# Patient Record
Sex: MALE | Race: WHITE | NOT HISPANIC OR LATINO | URBAN - METROPOLITAN AREA
[De-identification: names, ages, dates, MRNs, and addresses within clinical notes are randomized per-mention and may not be internally consistent; named-entity substitution may affect disease eponyms.]

---

## 2021-02-18 ENCOUNTER — EMERGENCY (EMERGENCY)
Facility: HOSPITAL | Age: 61
LOS: 0 days | Discharge: HOME | End: 2021-02-18
Attending: EMERGENCY MEDICINE
Payer: COMMERCIAL

## 2021-02-18 VITALS
OXYGEN SATURATION: 100 % | DIASTOLIC BLOOD PRESSURE: 84 MMHG | RESPIRATION RATE: 17 BRPM | TEMPERATURE: 99 F | SYSTOLIC BLOOD PRESSURE: 138 MMHG | HEART RATE: 85 BPM

## 2021-02-18 VITALS — HEIGHT: 67 IN | WEIGHT: 134.92 LBS

## 2021-02-18 DIAGNOSIS — R10.9 UNSPECIFIED ABDOMINAL PAIN: ICD-10-CM

## 2021-02-18 DIAGNOSIS — Z85.038 PERSONAL HISTORY OF OTHER MALIGNANT NEOPLASM OF LARGE INTESTINE: ICD-10-CM

## 2021-02-18 DIAGNOSIS — N12 TUBULO-INTERSTITIAL NEPHRITIS, NOT SPECIFIED AS ACUTE OR CHRONIC: ICD-10-CM

## 2021-02-18 DIAGNOSIS — Z87.442 PERSONAL HISTORY OF URINARY CALCULI: ICD-10-CM

## 2021-02-18 LAB
ALBUMIN SERPL ELPH-MCNC: 3.6 G/DL — SIGNIFICANT CHANGE UP (ref 3.3–5)
ALP SERPL-CCNC: 150 U/L — HIGH (ref 40–120)
ALT FLD-CCNC: 27 U/L — SIGNIFICANT CHANGE UP (ref 12–78)
ANION GAP SERPL CALC-SCNC: 5 MMOL/L — SIGNIFICANT CHANGE UP (ref 5–17)
APPEARANCE UR: CLEAR — SIGNIFICANT CHANGE UP
AST SERPL-CCNC: 19 U/L — SIGNIFICANT CHANGE UP (ref 15–37)
BACTERIA # UR AUTO: ABNORMAL
BASOPHILS # BLD AUTO: 0 K/UL — SIGNIFICANT CHANGE UP (ref 0–0.2)
BASOPHILS NFR BLD AUTO: 0 % — SIGNIFICANT CHANGE UP (ref 0–2)
BILIRUB SERPL-MCNC: 0.4 MG/DL — SIGNIFICANT CHANGE UP (ref 0.2–1.2)
BILIRUB UR-MCNC: NEGATIVE — SIGNIFICANT CHANGE UP
BUN SERPL-MCNC: 13 MG/DL — SIGNIFICANT CHANGE UP (ref 7–23)
CALCIUM SERPL-MCNC: 9.5 MG/DL — SIGNIFICANT CHANGE UP (ref 8.5–10.1)
CHLORIDE SERPL-SCNC: 108 MMOL/L — SIGNIFICANT CHANGE UP (ref 96–108)
CO2 SERPL-SCNC: 27 MMOL/L — SIGNIFICANT CHANGE UP (ref 22–31)
COLOR SPEC: YELLOW — SIGNIFICANT CHANGE UP
CREAT SERPL-MCNC: 0.92 MG/DL — SIGNIFICANT CHANGE UP (ref 0.5–1.3)
DIFF PNL FLD: ABNORMAL
EOSINOPHIL # BLD AUTO: 0.2 K/UL — SIGNIFICANT CHANGE UP (ref 0–0.5)
EOSINOPHIL NFR BLD AUTO: 1 % — SIGNIFICANT CHANGE UP (ref 0–6)
EPI CELLS # UR: SIGNIFICANT CHANGE UP
GLUCOSE SERPL-MCNC: 111 MG/DL — HIGH (ref 70–99)
GLUCOSE UR QL: NEGATIVE MG/DL — SIGNIFICANT CHANGE UP
HCT VFR BLD CALC: 35.2 % — LOW (ref 39–50)
HGB BLD-MCNC: 10.6 G/DL — LOW (ref 13–17)
KETONES UR-MCNC: ABNORMAL
LEUKOCYTE ESTERASE UR-ACNC: ABNORMAL
LIDOCAIN IGE QN: 52 U/L — LOW (ref 73–393)
LYMPHOCYTES # BLD AUTO: 23 % — SIGNIFICANT CHANGE UP (ref 13–44)
LYMPHOCYTES # BLD AUTO: 4.64 K/UL — HIGH (ref 1–3.3)
MANUAL SMEAR VERIFICATION: SIGNIFICANT CHANGE UP
MCHC RBC-ENTMCNC: 24.6 PG — LOW (ref 27–34)
MCHC RBC-ENTMCNC: 30.1 GM/DL — LOW (ref 32–36)
MCV RBC AUTO: 81.7 FL — SIGNIFICANT CHANGE UP (ref 80–100)
MONOCYTES # BLD AUTO: 1.82 K/UL — HIGH (ref 0–0.9)
MONOCYTES NFR BLD AUTO: 9 % — SIGNIFICANT CHANGE UP (ref 2–14)
NEUTROPHILS # BLD AUTO: 13.52 K/UL — HIGH (ref 1.8–7.4)
NEUTROPHILS NFR BLD AUTO: 67 % — SIGNIFICANT CHANGE UP (ref 43–77)
NITRITE UR-MCNC: NEGATIVE — SIGNIFICANT CHANGE UP
NRBC # BLD: 0 /100 — SIGNIFICANT CHANGE UP (ref 0–0)
NRBC # BLD: SIGNIFICANT CHANGE UP /100 WBCS (ref 0–0)
OVALOCYTES BLD QL SMEAR: SLIGHT — SIGNIFICANT CHANGE UP
PH UR: 5 — SIGNIFICANT CHANGE UP (ref 5–8)
PLAT MORPH BLD: NORMAL — SIGNIFICANT CHANGE UP
PLATELET # BLD AUTO: 667 K/UL — HIGH (ref 150–400)
POIKILOCYTOSIS BLD QL AUTO: SLIGHT — SIGNIFICANT CHANGE UP
POTASSIUM SERPL-MCNC: 4.1 MMOL/L — SIGNIFICANT CHANGE UP (ref 3.5–5.3)
POTASSIUM SERPL-SCNC: 4.1 MMOL/L — SIGNIFICANT CHANGE UP (ref 3.5–5.3)
PROT SERPL-MCNC: 7.4 GM/DL — SIGNIFICANT CHANGE UP (ref 6–8.3)
PROT UR-MCNC: 15 MG/DL
RBC # BLD: 4.31 M/UL — SIGNIFICANT CHANGE UP (ref 4.2–5.8)
RBC # FLD: 16.6 % — HIGH (ref 10.3–14.5)
RBC BLD AUTO: ABNORMAL
RBC CASTS # UR COMP ASSIST: ABNORMAL /HPF (ref 0–4)
SARS-COV-2 RNA SPEC QL NAA+PROBE: SIGNIFICANT CHANGE UP
SCHISTOCYTES BLD QL AUTO: SLIGHT — SIGNIFICANT CHANGE UP
SODIUM SERPL-SCNC: 140 MMOL/L — SIGNIFICANT CHANGE UP (ref 135–145)
SP GR SPEC: 1.02 — SIGNIFICANT CHANGE UP (ref 1.01–1.02)
TROPONIN I SERPL-MCNC: <0.015 NG/ML — SIGNIFICANT CHANGE UP (ref 0.01–0.04)
UROBILINOGEN FLD QL: NEGATIVE MG/DL — SIGNIFICANT CHANGE UP
WBC # BLD: 20.18 K/UL — HIGH (ref 3.8–10.5)
WBC # FLD AUTO: 20.18 K/UL — HIGH (ref 3.8–10.5)
WBC UR QL: SIGNIFICANT CHANGE UP

## 2021-02-18 PROCEDURE — 84484 ASSAY OF TROPONIN QUANT: CPT

## 2021-02-18 PROCEDURE — 99284 EMERGENCY DEPT VISIT MOD MDM: CPT | Mod: 25

## 2021-02-18 PROCEDURE — 96374 THER/PROPH/DIAG INJ IV PUSH: CPT | Mod: XU

## 2021-02-18 PROCEDURE — 81001 URINALYSIS AUTO W/SCOPE: CPT

## 2021-02-18 PROCEDURE — 74177 CT ABD & PELVIS W/CONTRAST: CPT

## 2021-02-18 PROCEDURE — 87186 SC STD MICRODIL/AGAR DIL: CPT

## 2021-02-18 PROCEDURE — 99284 EMERGENCY DEPT VISIT MOD MDM: CPT

## 2021-02-18 PROCEDURE — 80053 COMPREHEN METABOLIC PANEL: CPT

## 2021-02-18 PROCEDURE — 71045 X-RAY EXAM CHEST 1 VIEW: CPT | Mod: 26

## 2021-02-18 PROCEDURE — 93005 ELECTROCARDIOGRAM TRACING: CPT

## 2021-02-18 PROCEDURE — 96375 TX/PRO/DX INJ NEW DRUG ADDON: CPT

## 2021-02-18 PROCEDURE — 83690 ASSAY OF LIPASE: CPT

## 2021-02-18 PROCEDURE — 93010 ELECTROCARDIOGRAM REPORT: CPT

## 2021-02-18 PROCEDURE — 74177 CT ABD & PELVIS W/CONTRAST: CPT | Mod: 26

## 2021-02-18 PROCEDURE — 85025 COMPLETE CBC W/AUTO DIFF WBC: CPT

## 2021-02-18 PROCEDURE — U0005: CPT

## 2021-02-18 PROCEDURE — 36415 COLL VENOUS BLD VENIPUNCTURE: CPT

## 2021-02-18 PROCEDURE — U0003: CPT

## 2021-02-18 PROCEDURE — 71045 X-RAY EXAM CHEST 1 VIEW: CPT

## 2021-02-18 PROCEDURE — 87077 CULTURE AEROBIC IDENTIFY: CPT

## 2021-02-18 PROCEDURE — 87086 URINE CULTURE/COLONY COUNT: CPT

## 2021-02-18 RX ORDER — ONDANSETRON 8 MG/1
4 TABLET, FILM COATED ORAL ONCE
Refills: 0 | Status: COMPLETED | OUTPATIENT
Start: 2021-02-18 | End: 2021-02-18

## 2021-02-18 RX ORDER — CEFTRIAXONE 500 MG/1
1000 INJECTION, POWDER, FOR SOLUTION INTRAMUSCULAR; INTRAVENOUS ONCE
Refills: 0 | Status: DISCONTINUED | OUTPATIENT
Start: 2021-02-18 | End: 2021-02-18

## 2021-02-18 RX ORDER — FAMOTIDINE 10 MG/ML
20 INJECTION INTRAVENOUS ONCE
Refills: 0 | Status: COMPLETED | OUTPATIENT
Start: 2021-02-18 | End: 2021-02-18

## 2021-02-18 RX ORDER — CEPHALEXIN 500 MG
1 CAPSULE ORAL
Qty: 20 | Refills: 0
Start: 2021-02-18 | End: 2021-02-27

## 2021-02-18 RX ORDER — CEFTRIAXONE 500 MG/1
1000 INJECTION, POWDER, FOR SOLUTION INTRAMUSCULAR; INTRAVENOUS ONCE
Refills: 0 | Status: COMPLETED | OUTPATIENT
Start: 2021-02-18 | End: 2021-02-18

## 2021-02-18 RX ORDER — SODIUM CHLORIDE 9 MG/ML
1000 INJECTION INTRAMUSCULAR; INTRAVENOUS; SUBCUTANEOUS ONCE
Refills: 0 | Status: COMPLETED | OUTPATIENT
Start: 2021-02-18 | End: 2021-02-18

## 2021-02-18 RX ADMIN — CEFTRIAXONE 1000 MILLIGRAM(S): 500 INJECTION, POWDER, FOR SOLUTION INTRAMUSCULAR; INTRAVENOUS at 21:37

## 2021-02-18 RX ADMIN — Medication 30 MILLILITER(S): at 18:27

## 2021-02-18 RX ADMIN — ONDANSETRON 4 MILLIGRAM(S): 8 TABLET, FILM COATED ORAL at 18:27

## 2021-02-18 RX ADMIN — FAMOTIDINE 20 MILLIGRAM(S): 10 INJECTION INTRAVENOUS at 18:27

## 2021-02-18 RX ADMIN — SODIUM CHLORIDE 1000 MILLILITER(S): 9 INJECTION INTRAMUSCULAR; INTRAVENOUS; SUBCUTANEOUS at 18:25

## 2021-02-18 NOTE — ED STATDOCS - NSFOLLOWUPINSTRUCTIONS_ED_ALL_ED_FT
Acute Abdominal Pain    WHAT YOU NEED TO KNOW:    The cause of your abdominal pain may not be found. If a cause is found, treatment will depend on what the cause is.     DISCHARGE INSTRUCTIONS:    Return to the emergency department if:     You vomit blood or cannot stop vomiting.      You have blood in your bowel movement or it looks like tar.       You have bleeding from your rectum.       Your abdomen is larger than usual, more painful, and hard.       You have severe pain in your abdomen.       You stop passing gas and having bowel movements.       You feel weak, dizzy, or faint.    Contact your healthcare provider if:     You have a fever.      You have new signs and symptoms.      Your symptoms do not get better with treatment.       You have questions or concerns about your condition or care.    Medicines may be given to decrease pain, treat an infection, and manage your symptoms. Take your medicine as directed. Call your healthcare provider if you think your medicine is not helping or if you have side effects. Tell him if you are allergic to any medicine. Keep a list of the medicines, vitamins, and herbs you take. Include the amounts, and when and why you take them. Bring the list or the pill bottles to follow-up visits. Carry your medicine list with you in case of an emergency.    Manage your symptoms:     Apply heat on your abdomen for 20 to 30 minutes every 2 hours for as many days as directed. Heat helps decrease pain and muscle spasms.       Manage your stress. Stress may cause abdominal pain. Your healthcare provider may recommend relaxation techniques and deep breathing exercises to help decrease your stress. Your healthcare provider may recommend you talk to someone about your stress or anxiety, such as a counselor or a trusted friend. Get plenty of sleep and exercise regularly.       Limit or do not drink alcohol. Alcohol can make your abdominal pain worse. Ask your healthcare provider if it is safe for you to drink alcohol. Also ask how much is safe for you to drink.       Do not smoke. Nicotine and other chemicals in cigarettes can damage your esophagus and stomach. Ask your healthcare provider for information if you currently smoke and need help to quit. E-cigarettes or smokeless tobacco still contain nicotine. Talk to your healthcare provider before you use these products.     Make changes to the food you eat as directed: Do not eat foods that cause abdominal pain or other symptoms. Eat small meals more often.     Eat more high-fiber foods if you are constipated. High-fiber foods include fruits, vegetables, whole-grain foods, and legumes.       Do not eat foods that cause gas if you have bloating. Examples include broccoli, cabbage, and cauliflower. Do not drink soda or carbonated drinks, because these may also cause gas.       Do not eat foods or drinks that contain sorbitol or fructose if you have diarrhea and bloating. Some examples are fruit juices, candy, jelly, and sugar-free gum.       Do not eat high-fat foods, such as fried foods, cheeseburgers, hot dogs, and desserts.      Limit or do not drink caffeine. Caffeine may make symptoms, such as heart burn or nausea, worse.       Drink plenty of liquids to prevent dehydration from diarrhea or vomiting. Ask your healthcare provider how much liquid to drink each day and which liquids are best for you.     Follow up with your healthcare provider as directed: Write down your questions so you remember to ask them during your visits.     (POSSIBLE) Kidney Infection    WHAT YOU NEED TO KNOW:    A kidney infection, or pyelonephritis, is a bacterial infection. The infection usually starts in your bladder or urethra and moves into your kidney. One or both kidneys may be infected. Kidney, Ureters, Bladder         DISCHARGE INSTRUCTIONS:    Return to the emergency department if:     You have a fever and chills.       You cannot stop vomiting.      You have severe pain in your abdomen, lower back, or sides.    Contact your healthcare provider if:     You continue to have a fever after you take antibiotics for 3 days.      You have pain when you urinate, even after treatment.      Your signs and symptoms return.      You have questions or concerns about your condition or care.    Medicines: You may need any of the following:     Antibiotics treat your bacterial infection.       Acetaminophen decreases pain and fever. It is available without a doctor's order. Ask how much to take and how often to take it. Follow directions. Read the labels of all other medicines you are using to see if they also contain acetaminophen, or ask your doctor or pharmacist. Acetaminophen can cause liver damage if not taken correctly. Do not use more than 4 grams (4,000 milligrams) total of acetaminophen in one day.       NSAIDs, such as ibuprofen, help decrease swelling, pain, and fever. This medicine is available with or without a doctor's order. NSAIDs can cause stomach bleeding or kidney problems in certain people. If you take blood thinner medicine, always ask if NSAIDs are safe for you. Always read the medicine label and follow directions. Do not give these medicines to children under 6 months of age without direction from your child's healthcare provider.      Prescription pain medicine may be given. Ask how to take this medicine safely.      Take your medicine as directed. Contact your healthcare provider if you think your medicine is not helping or if you have side effects. Tell him of her if you are allergic to any medicine. Keep a list of the medicines, vitamins, and herbs you take. Include the amounts, and when and why you take them. Bring the list or the pill bottles to follow-up visits. Carry your medicine list with you in case of an emergency.    Drink liquids as directed: You may need to drink extra liquids to help flush your kidneys and urinary system. Water is the best liquid to drink. Ask your healthcare provider how much liquid to drink each day and which liquids are best for you.    Urinate as soon as you feel the urge: This will help flush bacteria from your urinary system. Do not wait or hold your urine for too long.     Clean your genital area every day with soap and water: Wipe from front to back after you urinate or have a bowel movement. Wear cotton underwear. Fabrics such as nylon and polyester can stay damp. This can increase your risk for infection. Urinate within 15 minutes after you have sex.    Follow up with your healthcare provider as directed: Write down your questions so you remember to ask them during your visits.     FOLLOW UP WITH A PRIMARY DOCTOR, UROLOGIST, AND DR ZIMMER THIS WEEK. RETURN TO ER FOR ANY WORSENING SYMPTOMS OR NEW CONCERNS.

## 2021-02-18 NOTE — ED STATDOCS - GASTROINTESTINAL, MLM
+moderate TPP diffusely, most notable b/l upper quadrants. abdomen soft and non-distended. Bowel sounds present.

## 2021-02-18 NOTE — ED STATDOCS - CLINICAL SUMMARY MEDICAL DECISION MAKING FREE TEXT BOX
Less suspicious for atypical ACS, will check 1 trop. Would eval for SBO vs pancreatitis vs gastritis. Dispo pending labs, imaging, reassessment.

## 2021-02-18 NOTE — ED STATDOCS - PROGRESS NOTE DETAILS
signed Fabiola Bal PA-C Pt seen initially in intake by Dr Weinstein.   60M c/o intermittent upper abd pain this week, occurred a few days ago, then again today around 2pm today. Now resolved. Pt with hx of stage 4 appendix CA, s/p colon resection, s/p cholecystectomy, s/p splenectomy, kidney stones. Sx was in [place] last May, pt has no doctors here in [place] but has an appt with Dr Kuhn next month who was a fellow when he had his sx down [facility] and knows the pt. No significant findings on labwork. UA no clear uti but will treat for possible pyelo given the CT results, will await cx. REcommend pt f/u with urology, Dr Juarez, and a PMD. return precautions given. Pt feeling well at UT, agrees with UT and plan of care. Given CT findings will treat for possible early pyelo.  Pt very well appearing.  Discussed results with Dr. Quezada who agrees with plan of care.  Pt well appearing.  Pain free presently.  AVSS.  Understands indications to return.  D/c home with strict return precautions and prompt outpatient f/u.

## 2021-02-18 NOTE — ED STATDOCS - CARE PROVIDERS DIRECT ADDRESSES
[email or web address]

## 2021-02-18 NOTE — ED ADULT TRIAGE NOTE - CHIEF COMPLAINT QUOTE
c/o upper abdominal pain started 2 weeks ago which resolved, started again last night intermittently, denies fever, nasuea/vomiting, denies hematuria, denies sick contact, patient he always have diarrhea, HX: stage 4 appendix cancer, had colon resection, splenectomy, kidney stone, received 1st covid vaccine yesterday

## 2021-02-18 NOTE — ED STATDOCS - OBJECTIVE STATEMENT
59 y/o male with a PMHx of stage 4 appendix CA, s/p colon resection, s/p cholecystectomy, s/p splenectomy, kidney stones, presents to the ED sent by urgent care c/o upper abdominal pain. Pt reports abdominal pain began 2 weeks ago, subsided, then returned again last night, waxing and waning. Pt ate breakfast this morning, went to the bathroom, then pain became more intense 3 hours PTA. Pt was Dx with appendix CA in 04/2020 s/p surgery in 05/2020. Denies fever, nausea, vomiting, chest pain, back pain, dysuria. Pt received 1st COVID vaccine yesterday. No Hx of COVID-19. Pt was seen at urgent care PTA and sent to the ED for further eval. No other complaints at this time. NKDA. 59 y/o male with a PMHx of  stage 4 appendix CA, s/p colon resection, s/p cholecystectomy, s/p splenectomy, kidney stones, presents to the ED sent by urgent care c/o upper abdominal pain. Pt reports abdominal pain began 2 weeks ago, subsided, then returned again last night, waxing and waning. Pt ate breakfast this morning, went to the bathroom, then pain became more intense 3 hours PTA. Pt was Dx with appendix CA in 04/2020 s/p surgery in 05/2020. Denies fever, nausea, vomiting, chest pain, back pain, dysuria. Pt received 1st COVID vaccine yesterday. No Hx of COVID-19. Pt was seen at urgent care PTA and sent to the ED for further eval. No other complaints at this time. NKDA.

## 2021-02-18 NOTE — ED STATDOCS - PATIENT PORTAL LINK FT
You can access the FollowMyHealth Patient Portal offered by [facility] by registering at the following website: [email or web address]. By joining [facility]’s FollowMyHealth portal, you will also be able to view your health information using other applications (apps) compatible with our system.

## 2021-02-18 NOTE — ED ADULT NURSE NOTE - OBJECTIVE STATEMENT
The patient is a 60y Male complaining of abdominal pain.Patient c/o upper abdominal pain started 2 weeks ago which resolved, started again last night intermittently, denies fever, nasuea/vomiting, denies hematuria, denies sick contact, patient he always have diarrhea, HX: stage 4 appendix cancer, had colon resection, splenectomy, kidney stone, received 1st covid vaccine yesterday

## 2021-02-18 NOTE — ED STATDOCS - PROVIDER TOKENS
PROVIDER:[TOKEN:[[id number]]],PROVIDER:[TOKEN:[[id number]]],PROVIDER:[TOKEN:[[id number]]]

## 2021-02-18 NOTE — ED STATDOCS - CARE PROVIDER_API CALL
Ta Juarez)  Surgery  [address] [place] [address]  Phone: [phone]  Fax: [phone]  Follow Up Time:     Golden Deng)  Internal Medicine  [address], [address]  [place] [address]  Phone: [phone]  Fax: [phone]  Follow Up Time:     Gerry Ellis)  Urology  [address] [place] [address]  Phone: [phone]  Fax: [phone]  Follow Up Time:

## 2021-02-19 ENCOUNTER — TRANSCRIPTION ENCOUNTER (OUTPATIENT)
Age: 61
End: 2021-02-19

## 2021-02-19 PROBLEM — Z00.00 ENCOUNTER FOR PREVENTIVE HEALTH EXAMINATION: Status: ACTIVE | Noted: 2021-02-19

## 2021-02-23 ENCOUNTER — APPOINTMENT (OUTPATIENT)
Dept: INTERNAL MEDICINE | Facility: CLINIC | Age: 61
End: 2021-02-23
Payer: COMMERCIAL

## 2021-02-23 VITALS
WEIGHT: 135 LBS | DIASTOLIC BLOOD PRESSURE: 90 MMHG | SYSTOLIC BLOOD PRESSURE: 130 MMHG | HEIGHT: 67 IN | BODY MASS INDEX: 21.19 KG/M2

## 2021-02-23 PROCEDURE — 99203 OFFICE O/P NEW LOW 30 MIN: CPT

## 2021-02-23 PROCEDURE — 99072 ADDL SUPL MATRL&STAF TM PHE: CPT

## 2021-02-23 NOTE — PHYSICAL EXAM
[No Lymphadenopathy] : no lymphadenopathy [Clear to Auscultation] : lungs were clear to auscultation bilaterally [No Carotid Bruits] : no carotid bruits [No Edema] : there was no peripheral edema [Soft] : abdomen soft [Non Tender] : non-tender [No Focal Deficits] : no focal deficits [Normal] : affect was normal and insight and judgment were intact

## 2021-02-23 NOTE — REVIEW OF SYSTEMS
[Fever] : no fever [Chills] : no chills [Chest Pain] : no chest pain [Shortness Of Breath] : no shortness of breath [Abdominal Pain] : no abdominal pain [Dysuria] : no dysuria [Hematuria] : no hematuria [FreeTextEntry7] : Frequent loose BMs

## 2021-02-23 NOTE — PLAN
[FreeTextEntry1] : Recent UTI–symptomatically is much improved and he will be completing his antibiotic course in the next few days.  He was told if he has any increasing symptoms he is to follow-up for repeat urine testing.\par \par History of stage IV appendiceal cancer–he recently moved back to [place] and has a follow-up appointment with Dr. Juarez in April.\par \par History of hyperlipidemia–he will remain on Zocor 10 mg for now.\par \par History of peripheral neuropathy–he is presently on Neurontin.

## 2021-02-23 NOTE — HISTORY OF PRESENT ILLNESS
[de-identified] : 61 y/o male with a past medical history significant for stage IV appendiceal cancer status post resection and chemotherapy in May 2020.  He was seen at a walk-in clinic 5 days ago complaining of abdominal pain and was referred to [facility] where he had a CAT scan that did not reveal any significant intra-abdominal pathology but was determined to have a urinary tract infection and has been on antibiotics initially Keflex and now amoxicillin.  Symptomatically he is much improved and he still needs to complete his antibiotic course.  His cancer was diagnosed and treated in [place] and recently moved back here.  He has been generally well otherwise without any recent illness.

## 2021-02-26 ENCOUNTER — APPOINTMENT (OUTPATIENT)
Dept: UROLOGY | Facility: CLINIC | Age: 61
End: 2021-02-26
Payer: COMMERCIAL

## 2021-02-26 VITALS
HEIGHT: 67 IN | BODY MASS INDEX: 21.19 KG/M2 | WEIGHT: 135 LBS | HEART RATE: 69 BPM | OXYGEN SATURATION: 100 % | TEMPERATURE: 97.6 F | SYSTOLIC BLOOD PRESSURE: 128 MMHG | DIASTOLIC BLOOD PRESSURE: 84 MMHG

## 2021-02-26 DIAGNOSIS — Z78.9 OTHER SPECIFIED HEALTH STATUS: ICD-10-CM

## 2021-02-26 DIAGNOSIS — Z83.3 FAMILY HISTORY OF DIABETES MELLITUS: ICD-10-CM

## 2021-02-26 DIAGNOSIS — Z82.49 FAMILY HISTORY OF ISCHEMIC HEART DISEASE AND OTHER DISEASES OF THE CIRCULATORY SYSTEM: ICD-10-CM

## 2021-02-26 PROCEDURE — 99072 ADDL SUPL MATRL&STAF TM PHE: CPT

## 2021-02-26 PROCEDURE — 99204 OFFICE O/P NEW MOD 45 MIN: CPT | Mod: 25

## 2021-02-26 PROCEDURE — 51798 US URINE CAPACITY MEASURE: CPT | Mod: 59

## 2021-02-26 PROCEDURE — 51741 ELECTRO-UROFLOWMETRY FIRST: CPT

## 2021-02-26 NOTE — ASSESSMENT
[FreeTextEntry1] : Reviewed outside records. \par \par Pyelonephritis:\par Complete Antibiotics. \par Recommended repeat Urine test 1 week after completion of Antibiotics course. \par \par Benign Prostatic Hyperplasia:\par See Uroflo and PVR. \par Discussed that he has some LUTs which probably are secondary to enlarged prostate and that I would like to address those first with alpha blockers. Patient wants to hold off for now.\par \par Return to office in 2 weeks or sooner if any issues- will do Uroflo/PVR. \par

## 2021-02-26 NOTE — REVIEW OF SYSTEMS
[Abdominal Pain] : abdominal pain [see HPI] : see HPI [Seen by urologist before (Name)  ___] : Preciously seen by a urologist: [unfilled] [Urine Infection (bladder/kidney)] : bladder/kidney infection [Dizziness] : dizziness [Negative] : Heme/Lymph [FreeTextEntry2] : stones in urine(had kidney stone 2x)

## 2021-02-26 NOTE — HISTORY OF PRESENT ILLNESS
[FreeTextEntry1] : 61 yo male presents for Pyelonephritis. \par Went to [facility] ED 10 days ago with left sided abdominal pain and was told has kidney infection. \par Sent home Antibiotics- taking. Symptoms resolved. \par No prior history of Urinary tract infection or Pyelonephritis. Has history of kidney stone- passed. \par Reports variable stream, urinates every few hours or so during the day. Nocturia of 1 x. \par Endorses off and on hesitancy. \par Denies dysuria, hematuria, lower abdominal or flank pain, fever, chills or rigors.\par Normal erections. Has Low libido, in the past was found to have low Testosterone and was on Testosterone replacement therapy. \par No family history of Prostate cancer. \par Had undescended testis and had orchiopexy. \par \par

## 2021-02-26 NOTE — PHYSICAL EXAM
[Normal Appearance] : normal appearance [General Appearance - In No Acute Distress] : no acute distress [] : no respiratory distress [Abdomen Soft] : soft [Abdomen Tenderness] : non-tender [Costovertebral Angle Tenderness] : no ~M costovertebral angle tenderness [Urethral Meatus] : meatus normal [Penis Abnormality] : normal circumcised penis [Scrotum] : the scrotum was normal [Prostate Tenderness] : the prostate was not tender [No Prostate Nodules] : no prostate nodules [Prostate Size ___ (0-4)] : prostate size [unfilled] (scale: 0-4) [FreeTextEntry1] : left small testis, normal right testis [Normal Station and Gait] : the gait and station were normal for the patient's age [Skin Color & Pigmentation] : normal skin color and pigmentation [No Focal Deficits] : no focal deficits [Oriented To Time, Place, And Person] : oriented to person, place, and time [No Palpable Adenopathy] : no palpable adenopathy

## 2021-03-05 ENCOUNTER — TRANSCRIPTION ENCOUNTER (OUTPATIENT)
Age: 61
End: 2021-03-05

## 2021-03-05 ENCOUNTER — NON-APPOINTMENT (OUTPATIENT)
Age: 61
End: 2021-03-05

## 2021-03-08 ENCOUNTER — LABORATORY RESULT (OUTPATIENT)
Age: 61
End: 2021-03-08

## 2021-03-09 ENCOUNTER — NON-APPOINTMENT (OUTPATIENT)
Age: 61
End: 2021-03-09

## 2021-03-11 ENCOUNTER — APPOINTMENT (OUTPATIENT)
Dept: SURGICAL ONCOLOGY | Facility: CLINIC | Age: 61
End: 2021-03-11
Payer: COMMERCIAL

## 2021-03-11 VITALS
DIASTOLIC BLOOD PRESSURE: 84 MMHG | HEART RATE: 90 BPM | HEIGHT: 67 IN | BODY MASS INDEX: 21.19 KG/M2 | SYSTOLIC BLOOD PRESSURE: 139 MMHG | WEIGHT: 135 LBS

## 2021-03-11 PROCEDURE — 99072 ADDL SUPL MATRL&STAF TM PHE: CPT

## 2021-03-11 PROCEDURE — 99244 OFF/OP CNSLTJ NEW/EST MOD 40: CPT

## 2021-03-11 NOTE — HISTORY OF PRESENT ILLNESS
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 Herberth Pink on 5/7/2020 who is here today to establish care after recently moving from [place]. \par \par The patient was originally diagnosed in April 2020 and at the time had noticed significant abd distention and bloating which prompted further work up and eventual malignant diagnosis. He started adjuvant FOLFOX in June 2020 and completed chemotherapy in December 2020. He does admit to peripheral neuropathy in both his hands and feet post chemotherapy. \par \par The patient recently moved from [place] to [place]. He states several weeks ago, he had increased abd pain which prompted him to come to  ER on 2/18/2021. A CT Abd/Pelvis was completed: IMPRESSION – No bowel obstruction. Mild left hydroureter with urothelial enhancement with bladder wall enhancement. Concerning for cystitis with ascending infection. A urine culture was completed and pt was diagnosed with UTI/pyelonephritis and sent home on Keflex. He had a repeat Urine Cx outpatient and his abx were changed to amoxicillin. \par \par Currently, he admits to issues with constipation for which he is taking Miralax intermittently with relief. He is requesting his port to be flushed - it was last flushed on 1/20/21 in [place]. He has not established care with a medical oncologist in [place]. He was never a smoker and rarely drinks. He has no family hx significant for cancer.

## 2021-03-11 NOTE — HISTORY OF PRESENT ILLNESS
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 Herberth Pink on 5/7/2020 who is here today to establish care after recently moving from [place]. \par \par The patient was originally diagnosed in April 2020 and at the time had noticed significant abd distention and bloating which prompted further work up and eventual malignant diagnosis. He started adjuvant FOLFOX in June 2020 and completed chemotherapy in December 2020. He does admit to peripheral neuropathy in both his hands and feet post chemotherapy. \par \par The patient recently moved from [place] to [place]. He states several weeks ago, he had increased abd pain which prompted him to come to  ER on 2/18/2021. A CT Abd/Pelvis was completed: IMPRESSION – No bowel obstruction. Mild left hydroureter with urothelial enhancement with bladder wall enhancement. Concerning for cystitis with ascending infection. A urine culture was completed and pt was diagnosed with UTI/pyelonephritis and sent home on Keflex. He had a repeat Urine Cx outpatient and his abx were changed to amoxicillin. \par \par Currently, he admits to issues with constipation for which he is taking Miralax intermittently with relief. He is requesting his port to be flushed - it was last flushed on 1/20/21 in [place]. He has not established care with a medical oncologist in [place]. He was never a smoker and rarely drinks. He has no family hx significant for cancer.

## 2021-03-12 ENCOUNTER — APPOINTMENT (OUTPATIENT)
Dept: UROLOGY | Facility: CLINIC | Age: 61
End: 2021-03-12
Payer: COMMERCIAL

## 2021-03-12 PROCEDURE — 99072 ADDL SUPL MATRL&STAF TM PHE: CPT

## 2021-03-12 PROCEDURE — 99213 OFFICE O/P EST LOW 20 MIN: CPT

## 2021-03-12 NOTE — HISTORY OF PRESENT ILLNESS
[FreeTextEntry1] : 61 yo male presents for follow up. \par Developed similar left sided abdominal pain last week, had called office, was asked to go to ED considering his extensive abdominal surgery. Took Miralax abdominal pain resolved. \par Had urine test this week, Urine culture again positive for Enterococcus, asymptomatic. \par Started on Nitrofurantoin yesterday considering history of Pyelonephritis. \par Same urination.\par Denies dysuria, hematuria, lower abdominal or flank pain, nausea, vomiting, fever, chills or rigors. \par \par Initially seen for Pyelonephritis. \par Went to [facility] ED with left sided abdominal pain and was told has kidney infection. \par Sent home Antibiotics- taking. Symptoms resolved. \par No prior history of Urinary tract infection or Pyelonephritis. Has history of kidney stone- passed. \par Reported variable stream, urinates every few hours or so during the day. Nocturia of 1 x. \par Endorsed off and on hesitancy. \par Denied dysuria, hematuria, lower abdominal or flank pain, fever, chills or rigors.\par Normal erections. Has Low libido, in the past was found to have low Testosterone and was on Testosterone replacement therapy. \par No family history of Prostate cancer. \par Had undescended testis and had orchiopexy. \par \par

## 2021-03-12 NOTE — ASSESSMENT
[FreeTextEntry1] : Benign Prostatic Hyperplasia:\par Urinary tract infection:\par Continue Nitrofurantoin. \par Recommended repeat Urine test 1 week after completion of Antibiotics course. \par Bladder Ultrasound before follow up appointment. \par \par Return to office on 3/26/21.  Tone is normal, moving all extremities well, reflexes normal for age.

## 2021-03-15 NOTE — REASON FOR VISIT
[Initial Consultation] : an initial consultation for [Spouse] : spouse [FreeTextEntry2] : appendiceal carcinoma

## 2021-03-15 NOTE — ASSESSMENT
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 He is feeling well at this point other than his recent cystitis/pyelonephritis, now resolved. We reviewed his CT from 2 weeks ago. Given his R2b resection we know there is residual mucin, and I can see some scalloping along the diaphragm/liver suggestive of this. We discussed that residual disease can be monitored as long as it does not appear to be increasing or involving the small bowel. I will order him for another CT scan in 3-4 months for surveillance and will send him for a  and CEA to follow for disease recurrence/progression. I will also refer him to medical oncology to establish port maintenance. He will also need a colonoscopy some time around 1 year after his surgery. He will return after completing the above studies in 3-4 months.

## 2021-03-15 NOTE — PHYSICAL EXAM
[Normal] : supple, no neck mass and thyroid not enlarged [Normal Neck Lymph Nodes] : normal neck lymph nodes  [Normal Supraclavicular Lymph Nodes] : normal supraclavicular lymph nodes [Normal Groin Lymph Nodes] : normal groin lymph nodes [Normal Axillary Lymph Nodes] : normal axillary lymph nodes [Normal] : oriented to person, place and time, with appropriate affect [de-identified] : abdomen soft, non-distended, non-tender. no masses. laparotomy and catheter incisions clean, dry, intact.

## 2021-03-15 NOTE — RESULTS/DATA
[FreeTextEntry1] :  EXAM: CT ABDOMEN AND PELVIS OC IC   *** ADDENDUM 02/28/2021 ***  The patient is status post cholecystectomy and splenectomy.  *** END OF ADDENDUM 02/28/2021 ***   PROCEDURE DATE: 02/18/2021    INTERPRETATION: CLINICAL INFORMATION: Abdominal pain. History of prior resection.  COMPARISON: None.  PROCEDURE: CT of the Abdomen and Pelvis was performed with intravenous contrast. Intravenous contrast: 90 ml Omnipaque 350. 10 ml discarded. Oral contrast: positive contrast was administered. Sagittal and coronal reformats were performed.  FINDINGS: LOWER CHEST: Within normal limits.  LIVER: Within normal limits. BILE DUCTS: Normal caliber. GALLBLADDER: Within normal limits. SPLEEN: Within normal limits. PANCREAS: Within normal limits. ADRENALS: Within normal limits. KIDNEYS/URETERS: Mild dilatation of the left ureter with urothelial enhancement. No obstructing stones.  BLADDER: Mild wall enhancement. REPRODUCTIVE ORGANS: Prostate is enlarged.  BOWEL: Surgical material in the proximal stomach. Subtotal colectomy. Large amount of fecal material in the remaining colon. No bowel obstruction. PERITONEUM: Trace perihepatic ascites (2-50). VESSELS: Moderate atherosclerotic calcification. RETROPERITONEUM/LYMPH NODES: No lymphadenopathy. ABDOMINAL WALL: Within normal limits. BONES: No acute abnormality.  IMPRESSION: No bowel obstruction. Mild left hydroureter with urothelial enhancement with bladder wall enhancement. Concerning for cystitis with ascending infection. Please correlate with clinical findings.   ***Please see the addendum at the top of this report. It may contain additional important information or changes.****    LEXUS JIMENEZ MD; Attending Radiologist This document has been electronically signed. Feb 18 2021 8:46PM Addend:LEXUS JIMENEZ MD; Attending Radiologist This addendum was electronically signed on: Feb 28 2021 1:30AM.                                Notes

## 2021-03-15 NOTE — HISTORY OF PRESENT ILLNESS
[de-identified] : Mr. Arias is a 60 year old gentleman referred to me by Dr. 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 Mr. 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 He now reports feeling well. He reports 3-4 loose but not watery BM's/day. He reports improving weight and energy levels. He denies any PO intolerance. \par \par He had an emergency room visit 2 weeks ago for severe abdominal/flank pain with a CT scan and UA suggestive of pyelonephritis.

## 2021-03-15 NOTE — PHYSICAL EXAM
[Normal] : supple, no neck mass and thyroid not enlarged [Normal Neck Lymph Nodes] : normal neck lymph nodes  [Normal Supraclavicular Lymph Nodes] : normal supraclavicular lymph nodes [Normal Groin Lymph Nodes] : normal groin lymph nodes [Normal Axillary Lymph Nodes] : normal axillary lymph nodes [Normal] : oriented to person, place and time, with appropriate affect [de-identified] : abdomen soft, non-distended, non-tender. no masses. laparotomy and catheter incisions clean, dry, intact.

## 2021-03-15 NOTE — HISTORY OF PRESENT ILLNESS
[de-identified] : Mr. Arias is a 60 year old gentleman referred to me by Dr. Herberth Pink at [facility] after he performed a CRS and HIPEC for LAMN on 5/7/2020. Mr. 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 He now reports feeling well. He reports 3-4 loose but not watery BM's/day. He reports improving weight and energy levels. He denies any PO intolerance. \par \par He had an emergency room visit 2 weeks ago for severe abdominal/flank pain with a CT scan and UA suggestive of pyelonephritis.

## 2021-03-17 ENCOUNTER — TRANSCRIPTION ENCOUNTER (OUTPATIENT)
Age: 61
End: 2021-03-17

## 2021-03-24 ENCOUNTER — RESULT REVIEW (OUTPATIENT)
Age: 61
End: 2021-03-24

## 2021-03-24 ENCOUNTER — OUTPATIENT (OUTPATIENT)
Dept: OUTPATIENT SERVICES | Facility: HOSPITAL | Age: 61
LOS: 1 days | End: 2021-03-24
Payer: COMMERCIAL

## 2021-03-24 ENCOUNTER — APPOINTMENT (OUTPATIENT)
Dept: ULTRASOUND IMAGING | Facility: CLINIC | Age: 61
End: 2021-03-24
Payer: COMMERCIAL

## 2021-03-24 ENCOUNTER — LABORATORY RESULT (OUTPATIENT)
Age: 61
End: 2021-03-24

## 2021-03-24 DIAGNOSIS — N40.1 BENIGN PROSTATIC HYPERPLASIA WITH LOWER URINARY TRACT SYMPTOMS: ICD-10-CM

## 2021-03-24 DIAGNOSIS — N39.0 URINARY TRACT INFECTION, SITE NOT SPECIFIED: ICD-10-CM

## 2021-03-24 PROCEDURE — 76770 US EXAM ABDO BACK WALL COMP: CPT | Mod: 26

## 2021-03-24 PROCEDURE — 76770 US EXAM ABDO BACK WALL COMP: CPT

## 2021-03-25 ENCOUNTER — LABORATORY RESULT (OUTPATIENT)
Age: 61
End: 2021-03-25

## 2021-03-25 LAB
APPEARANCE: CLEAR
BILIRUBIN URINE: NEGATIVE
BLOOD URINE: NEGATIVE
COLOR: NORMAL
GLUCOSE QUALITATIVE U: NEGATIVE
KETONES URINE: NEGATIVE
LEUKOCYTE ESTERASE URINE: ABNORMAL
NITRITE URINE: NEGATIVE
PH URINE: 6.5
PROTEIN URINE: NORMAL
SPECIFIC GRAVITY URINE: 1.02
UROBILINOGEN URINE: NORMAL

## 2021-03-26 ENCOUNTER — APPOINTMENT (OUTPATIENT)
Dept: UROLOGY | Facility: CLINIC | Age: 61
End: 2021-03-26
Payer: COMMERCIAL

## 2021-03-26 PROCEDURE — 99072 ADDL SUPL MATRL&STAF TM PHE: CPT

## 2021-03-26 PROCEDURE — 99213 OFFICE O/P EST LOW 20 MIN: CPT

## 2021-03-29 ENCOUNTER — NON-APPOINTMENT (OUTPATIENT)
Age: 61
End: 2021-03-29

## 2021-04-04 ENCOUNTER — OUTPATIENT (OUTPATIENT)
Dept: OUTPATIENT SERVICES | Facility: HOSPITAL | Age: 61
LOS: 1 days | Discharge: HOME | End: 2021-04-04
Payer: COMMERCIAL

## 2021-04-04 DIAGNOSIS — C18.1 MALIGNANT NEOPLASM OF APPENDIX: ICD-10-CM

## 2021-04-06 ENCOUNTER — RESULT REVIEW (OUTPATIENT)
Age: 61
End: 2021-04-06

## 2021-04-06 ENCOUNTER — APPOINTMENT (OUTPATIENT)
Dept: HEMATOLOGY ONCOLOGY | Facility: CLINIC | Age: 61
End: 2021-04-06
Payer: COMMERCIAL

## 2021-04-06 ENCOUNTER — APPOINTMENT (OUTPATIENT)
Dept: INFUSION THERAPY | Facility: CLINIC | Age: 61
End: 2021-04-06

## 2021-04-06 VITALS
WEIGHT: 136.9 LBS | BODY MASS INDEX: 21.44 KG/M2 | HEART RATE: 94 BPM | TEMPERATURE: 98.4 F | DIASTOLIC BLOOD PRESSURE: 80 MMHG | SYSTOLIC BLOOD PRESSURE: 129 MMHG

## 2021-04-06 LAB
ALBUMIN SERPL ELPH-MCNC: 4.1 G/DL — SIGNIFICANT CHANGE UP (ref 3.3–5)
ALP SERPL-CCNC: 109 U/L — SIGNIFICANT CHANGE UP (ref 40–120)
ALT FLD-CCNC: 14 U/L — SIGNIFICANT CHANGE UP (ref 10–45)
ANION GAP SERPL CALC-SCNC: 10 MMOL/L — SIGNIFICANT CHANGE UP (ref 5–17)
AST SERPL-CCNC: 19 U/L — SIGNIFICANT CHANGE UP (ref 10–40)
BASOPHILS # BLD AUTO: 0.05 K/UL — SIGNIFICANT CHANGE UP (ref 0–0.2)
BASOPHILS NFR BLD AUTO: 0.5 % — SIGNIFICANT CHANGE UP (ref 0–2)
BILIRUB SERPL-MCNC: 0.3 MG/DL — SIGNIFICANT CHANGE UP (ref 0.2–1.2)
BUN SERPL-MCNC: 14 MG/DL — SIGNIFICANT CHANGE UP (ref 7–23)
CALCIUM SERPL-MCNC: 9.5 MG/DL — SIGNIFICANT CHANGE UP (ref 8.4–10.5)
CEA SERPL-MCNC: 10.1 NG/ML — HIGH (ref 0–3.8)
CHLORIDE SERPL-SCNC: 107 MMOL/L — SIGNIFICANT CHANGE UP (ref 96–108)
CO2 SERPL-SCNC: 24 MMOL/L — SIGNIFICANT CHANGE UP (ref 22–31)
CREAT SERPL-MCNC: 0.9 MG/DL — SIGNIFICANT CHANGE UP (ref 0.5–1.3)
EOSINOPHIL # BLD AUTO: 0.31 K/UL — SIGNIFICANT CHANGE UP (ref 0–0.5)
EOSINOPHIL NFR BLD AUTO: 3.1 % — SIGNIFICANT CHANGE UP (ref 0–6)
FERRITIN SERPL-MCNC: 14 NG/ML — LOW (ref 30–400)
FOLATE SERPL-MCNC: 16.3 NG/ML — SIGNIFICANT CHANGE UP
GLUCOSE SERPL-MCNC: 84 MG/DL — SIGNIFICANT CHANGE UP (ref 70–99)
HCT VFR BLD CALC: 31.8 % — LOW (ref 39–50)
HGB BLD-MCNC: 9.7 G/DL — LOW (ref 13–17)
IMM GRANULOCYTES NFR BLD AUTO: 0.2 % — SIGNIFICANT CHANGE UP (ref 0–1.5)
IRON SATN MFR SERPL: 20 UG/DL — LOW (ref 45–165)
IRON SATN MFR SERPL: 6 % — LOW (ref 16–55)
LYMPHOCYTES # BLD AUTO: 3.2 K/UL — SIGNIFICANT CHANGE UP (ref 1–3.3)
LYMPHOCYTES # BLD AUTO: 32.3 % — SIGNIFICANT CHANGE UP (ref 13–44)
MCHC RBC-ENTMCNC: 23.7 PG — LOW (ref 27–34)
MCHC RBC-ENTMCNC: 30.5 GM/DL — LOW (ref 32–36)
MCV RBC AUTO: 77.8 FL — LOW (ref 80–100)
MONOCYTES # BLD AUTO: 1.27 K/UL — HIGH (ref 0–0.9)
MONOCYTES NFR BLD AUTO: 12.8 % — SIGNIFICANT CHANGE UP (ref 2–14)
NEUTROPHILS # BLD AUTO: 5.05 K/UL — SIGNIFICANT CHANGE UP (ref 1.8–7.4)
NEUTROPHILS NFR BLD AUTO: 51.1 % — SIGNIFICANT CHANGE UP (ref 43–77)
NRBC # BLD: 0 /100 WBCS — SIGNIFICANT CHANGE UP (ref 0–0)
PLATELET # BLD AUTO: 535 K/UL — HIGH (ref 150–400)
POTASSIUM SERPL-MCNC: 4.4 MMOL/L — SIGNIFICANT CHANGE UP (ref 3.5–5.3)
POTASSIUM SERPL-SCNC: 4.4 MMOL/L — SIGNIFICANT CHANGE UP (ref 3.5–5.3)
PROT SERPL-MCNC: 6.2 G/DL — SIGNIFICANT CHANGE UP (ref 6–8.3)
RBC # BLD: 4.09 M/UL — LOW (ref 4.2–5.8)
RBC # FLD: 18.3 % — HIGH (ref 10.3–14.5)
SODIUM SERPL-SCNC: 142 MMOL/L — SIGNIFICANT CHANGE UP (ref 135–145)
TIBC SERPL-MCNC: 346 UG/DL — SIGNIFICANT CHANGE UP (ref 220–430)
UIBC SERPL-MCNC: 327 UG/DL — SIGNIFICANT CHANGE UP (ref 110–370)
VIT B12 SERPL-MCNC: 485 PG/ML — SIGNIFICANT CHANGE UP (ref 232–1245)
WBC # BLD: 9.9 K/UL — SIGNIFICANT CHANGE UP (ref 3.8–10.5)
WBC # FLD AUTO: 9.9 K/UL — SIGNIFICANT CHANGE UP (ref 3.8–10.5)

## 2021-04-06 PROCEDURE — 99205 OFFICE O/P NEW HI 60 MIN: CPT

## 2021-04-06 RX ORDER — CIPROFLOXACIN HYDROCHLORIDE 500 MG/1
500 TABLET, FILM COATED ORAL TWICE DAILY
Qty: 14 | Refills: 0 | Status: DISCONTINUED | COMMUNITY
Start: 2021-03-29 | End: 2021-04-06

## 2021-04-06 RX ORDER — NITROFURANTOIN (MONOHYDRATE/MACROCRYSTALS) 25; 75 MG/1; MG/1
100 CAPSULE ORAL TWICE DAILY
Qty: 14 | Refills: 0 | Status: DISCONTINUED | COMMUNITY
Start: 2021-03-11 | End: 2021-04-06

## 2021-04-06 RX ORDER — AMOXICILLIN 500 MG/1
CAPSULE ORAL
Refills: 0 | Status: DISCONTINUED | COMMUNITY
End: 2021-04-06

## 2021-04-07 NOTE — ASSESSMENT
[FreeTextEntry1] : Urinary tract infection:\par Pyelonephritis:\par Urine culture pending.\par Reviewed Renal and Bladder Ultrasound. \par Will start Flomax if urine culture positive again.

## 2021-04-07 NOTE — HISTORY OF PRESENT ILLNESS
[FreeTextEntry1] : 61 yo male presents for follow up. \par Had pain one more time since last visit, thought related to eating apple?\par Same urination, no longer has nocturia \par Had Renal and Bladder Ultrasound. \par \par Had left sided abdominal pain last week, had called office, was asked to go to ED considering his extensive abdominal surgery. Took Miralax abdominal pain resolved. \par Urine culture again positive for Enterococcus, asymptomatic. \par Treated with Nitrofurantoin.\par \par Initially seen for Pyelonephritis. \par Went to [facility] ED with left sided abdominal pain and was told has kidney infection. \par Sent home Antibiotics- taking. Symptoms resolved. \par No prior history of Urinary tract infection or Pyelonephritis. Has history of kidney stone- passed. \par Reported variable stream, urinates every few hours or so during the day. Nocturia of 1 x. \par Endorsed off and on hesitancy. \par Denied dysuria, hematuria, lower abdominal or flank pain, fever, chills or rigors.\par Normal erections. Has Low libido, in the past was found to have low Testosterone and was on Testosterone replacement therapy. \par No family history of Prostate cancer. \par Had undescended testis and had orchiopexy. \par \par

## 2021-04-13 ENCOUNTER — TRANSCRIPTION ENCOUNTER (OUTPATIENT)
Age: 61
End: 2021-04-13

## 2021-04-13 LAB
APPEARANCE: CLEAR
BACTERIA: ABNORMAL
BILIRUBIN URINE: NEGATIVE
BLOOD URINE: NEGATIVE
CALCIUM OXALATE CRYSTALS: ABNORMAL
COLOR: YELLOW
GLUCOSE QUALITATIVE U: ABNORMAL
HYALINE CASTS: 1 /LPF
KETONES URINE: NEGATIVE
LEUKOCYTE ESTERASE URINE: ABNORMAL
MICROSCOPIC-UA: NORMAL
NITRITE URINE: NEGATIVE
PH URINE: 5.5
PROTEIN URINE: NORMAL
RED BLOOD CELLS URINE: 1 /HPF
SPECIFIC GRAVITY URINE: 1.03
SQUAMOUS EPITHELIAL CELLS: 1 /HPF
URINE COMMENTS: NORMAL
UROBILINOGEN URINE: NORMAL
WHITE BLOOD CELLS URINE: 27 /HPF

## 2021-04-15 ENCOUNTER — RESULT REVIEW (OUTPATIENT)
Age: 61
End: 2021-04-15

## 2021-04-15 PROCEDURE — 88321 CONSLTJ&REPRT SLD PREP ELSWR: CPT

## 2021-04-16 ENCOUNTER — NON-APPOINTMENT (OUTPATIENT)
Age: 61
End: 2021-04-16

## 2021-04-16 ENCOUNTER — APPOINTMENT (OUTPATIENT)
Dept: UROLOGY | Facility: CLINIC | Age: 61
End: 2021-04-16
Payer: COMMERCIAL

## 2021-04-16 PROCEDURE — 99072 ADDL SUPL MATRL&STAF TM PHE: CPT

## 2021-04-16 PROCEDURE — 51741 ELECTRO-UROFLOWMETRY FIRST: CPT

## 2021-04-16 PROCEDURE — 51798 US URINE CAPACITY MEASURE: CPT

## 2021-04-16 PROCEDURE — 99214 OFFICE O/P EST MOD 30 MIN: CPT | Mod: 25

## 2021-04-19 ENCOUNTER — TRANSCRIPTION ENCOUNTER (OUTPATIENT)
Age: 61
End: 2021-04-19

## 2021-04-20 ENCOUNTER — TRANSCRIPTION ENCOUNTER (OUTPATIENT)
Age: 61
End: 2021-04-20

## 2021-04-20 ENCOUNTER — RX CHANGE (OUTPATIENT)
Age: 61
End: 2021-04-20

## 2021-04-20 ENCOUNTER — APPOINTMENT (OUTPATIENT)
Dept: UROLOGY | Facility: CLINIC | Age: 61
End: 2021-04-20
Payer: COMMERCIAL

## 2021-04-20 ENCOUNTER — NON-APPOINTMENT (OUTPATIENT)
Age: 61
End: 2021-04-20

## 2021-04-20 VITALS
BODY MASS INDEX: 21.35 KG/M2 | TEMPERATURE: 98 F | HEART RATE: 80 BPM | WEIGHT: 136 LBS | SYSTOLIC BLOOD PRESSURE: 126 MMHG | DIASTOLIC BLOOD PRESSURE: 79 MMHG | HEIGHT: 67 IN | RESPIRATION RATE: 17 BRPM

## 2021-04-20 DIAGNOSIS — N13.30 UNSPECIFIED HYDRONEPHROSIS: ICD-10-CM

## 2021-04-20 LAB
BILIRUB UR QL STRIP: NORMAL
CLARITY UR: NORMAL
COLLECTION METHOD: NORMAL
GLUCOSE UR-MCNC: NORMAL
HCG UR QL: 0.2 EU/DL
HGB UR QL STRIP.AUTO: ABNORMAL
KETONES UR-MCNC: NORMAL
LEUKOCYTE ESTERASE UR QL STRIP: NORMAL
NITRITE UR QL STRIP: NORMAL
PH UR STRIP: 5.5
PROT UR STRIP-MCNC: ABNORMAL
SP GR UR STRIP: 1.02
SURGICAL PATHOLOGY STUDY: SIGNIFICANT CHANGE UP

## 2021-04-20 PROCEDURE — 99214 OFFICE O/P EST MOD 30 MIN: CPT

## 2021-04-20 PROCEDURE — 99072 ADDL SUPL MATRL&STAF TM PHE: CPT

## 2021-04-21 ENCOUNTER — NON-APPOINTMENT (OUTPATIENT)
Age: 61
End: 2021-04-21

## 2021-04-21 ENCOUNTER — TRANSCRIPTION ENCOUNTER (OUTPATIENT)
Age: 61
End: 2021-04-21

## 2021-04-21 LAB
APPEARANCE: CLEAR
BACTERIA: NEGATIVE
BILIRUBIN URINE: NEGATIVE
BLOOD URINE: NORMAL
COLOR: NORMAL
GLUCOSE QUALITATIVE U: NEGATIVE
HYALINE CASTS: 2 /LPF
KETONES URINE: NEGATIVE
LEUKOCYTE ESTERASE URINE: NEGATIVE
MICROSCOPIC-UA: NORMAL
NITRITE URINE: NEGATIVE
PH URINE: 6
PROTEIN URINE: NORMAL
RED BLOOD CELLS URINE: 2 /HPF
SPECIFIC GRAVITY URINE: 1.01
SQUAMOUS EPITHELIAL CELLS: 0 /HPF
UROBILINOGEN URINE: NORMAL
WHITE BLOOD CELLS URINE: 2 /HPF

## 2021-04-22 ENCOUNTER — APPOINTMENT (OUTPATIENT)
Dept: INFUSION THERAPY | Facility: CLINIC | Age: 61
End: 2021-04-22

## 2021-04-22 ENCOUNTER — TRANSCRIPTION ENCOUNTER (OUTPATIENT)
Age: 61
End: 2021-04-22

## 2021-04-22 LAB — BACTERIA UR CULT: NORMAL

## 2021-04-23 ENCOUNTER — RX CHANGE (OUTPATIENT)
Age: 61
End: 2021-04-23

## 2021-04-25 NOTE — HISTORY OF PRESENT ILLNESS
[FreeTextEntry1] : 61 yo male presents for follow up. \par Taking Flomax. Not much change in urination except no nocturia. \par Denies dysuria, hematuria, lower abdominal or flank pain, nausea, vomiting, fever, chills or rigors. \par Had repeat urine test.\par \par Had left sided abdominal pain last week, had called office, was asked to go to ED considering his extensive abdominal surgery. Took Miralax abdominal pain resolved. \par Urine culture again positive for Enterococcus, asymptomatic. \par Treated with Nitrofurantoin.\par \par Initially seen for Pyelonephritis. \par Went to [facility] ED with left sided abdominal pain and was told has kidney infection. \par Sent home Antibiotics- taking. Symptoms resolved. \par No prior history of Urinary tract infection or Pyelonephritis. Has history of kidney stone- passed. \par Reported variable stream, urinates every few hours or so during the day. Nocturia of 1 x. \par Endorsed off and on hesitancy. \par Denied dysuria, hematuria, lower abdominal or flank pain, fever, chills or rigors.\par Normal erections. Has Low libido, in the past was found to have low Testosterone and was on Testosterone replacement therapy. \par No family history of Prostate cancer. \par Had undescended testis and had orchiopexy. \par \par

## 2021-04-25 NOTE — ASSESSMENT
[FreeTextEntry1] : Benign Prostatic Hyperplasia:\par Urinary tract infection:\par Discussed Urinalysis. Urine culture pending. \par Has done Nitrofurantoin and Augmentin. \par Will do left over Ciprofloxacin, was prescribed in the past, did not take it. \par Recommended Cystoscopy if still Urine culture positive to rule out urethral stricture.  \par Continue Flomax. \par \par Will inform results.

## 2021-04-25 NOTE — PHYSICAL EXAM
[Normal Appearance] : normal appearance [General Appearance - In No Acute Distress] : no acute distress [Abdomen Soft] : soft [Abdomen Tenderness] : non-tender [Skin Color & Pigmentation] : normal skin color and pigmentation [FreeTextEntry1] : normal peripheral circulation  [] : no respiratory distress

## 2021-04-27 ENCOUNTER — RESULT REVIEW (OUTPATIENT)
Age: 61
End: 2021-04-27

## 2021-04-29 ENCOUNTER — APPOINTMENT (OUTPATIENT)
Dept: INFUSION THERAPY | Facility: CLINIC | Age: 61
End: 2021-04-29

## 2021-04-30 ENCOUNTER — OUTPATIENT (OUTPATIENT)
Dept: OUTPATIENT SERVICES | Facility: HOSPITAL | Age: 61
LOS: 1 days | End: 2021-04-30
Payer: COMMERCIAL

## 2021-04-30 ENCOUNTER — APPOINTMENT (OUTPATIENT)
Dept: CT IMAGING | Facility: CLINIC | Age: 61
End: 2021-04-30
Payer: COMMERCIAL

## 2021-04-30 ENCOUNTER — NON-APPOINTMENT (OUTPATIENT)
Age: 61
End: 2021-04-30

## 2021-04-30 ENCOUNTER — RESULT REVIEW (OUTPATIENT)
Age: 61
End: 2021-04-30

## 2021-04-30 DIAGNOSIS — C18.1 MALIGNANT NEOPLASM OF APPENDIX: ICD-10-CM

## 2021-04-30 PROCEDURE — 74177 CT ABD & PELVIS W/CONTRAST: CPT | Mod: 26

## 2021-04-30 PROCEDURE — 74177 CT ABD & PELVIS W/CONTRAST: CPT

## 2021-05-03 ENCOUNTER — NON-APPOINTMENT (OUTPATIENT)
Age: 61
End: 2021-05-03

## 2021-05-03 ENCOUNTER — TRANSCRIPTION ENCOUNTER (OUTPATIENT)
Age: 61
End: 2021-05-03

## 2021-05-03 NOTE — REVIEW OF SYSTEMS
[Patient Intake Form Reviewed] : Patient intake form was reviewed [Diarrhea] : diarrhea [Negative] : Allergic/Immunologic [FreeTextEntry7] : per HPI  occasional cramping  [de-identified] : numbness tingling

## 2021-05-03 NOTE — RESULTS/DATA
[FreeTextEntry1] : CT A Pel with iv contrast 2/28/21 mild dilatation of L ureter with bladder wall enhancement likely cystitis  Trace perihepatic ascites \par labs 2/18/21  WBC 20 Hgb 10.6  plts 667       LFTs normal \par \par \par Iron studies, CEA pending

## 2021-05-03 NOTE — PHYSICAL EXAM
[Restricted in physically strenuous activity but ambulatory and able to carry out work of a light or sedentary nature] : Status 1- Restricted in physically strenuous activity but ambulatory and able to carry out work of a light or sedentary nature, e.g., light house work, office work [Normal] : affect appropriate [de-identified] : mediport  [de-identified] : healed laparotomy scar , abd slightly distended, NT, BS +, no overt ascites

## 2021-05-03 NOTE — HISTORY OF PRESENT ILLNESS
[Disease: _____________________] : Disease: [unfilled] [T: ___] : T[unfilled] [N: ___] : N[unfilled] [M: ___] : M[unfilled] [de-identified] : 59 y/o male with low grade appendiceal tumor diagnosed in 2020.  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 \par \par 5/7/20 CRS and HIPEC for LAMN  ( Dr. Herberth Pink at [place] ).  Surgery included  a splenectomy and distal gastrectomy, omentectomy, cholecystectomy, subtotal colectomy and diaphragm stripping. 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  R2b resection. 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 \par \par Doing OK. BM- few times per day - baseline since surgery. Neuropathy since chemo- numbness fingers/ toes, no pain. \par \par Moved back to  . Wants to establish his oncologic care here. \par Saw Dr Juarez. CT scans planned for June 20212.\par \par Colonoscopy May 2020- told : OK \par \par Being treated for recurrent UTI.  [de-identified] : low grade appendiceal mucinous neoplasm LAMN

## 2021-05-03 NOTE — ASSESSMENT
[FreeTextEntry1] : 59 y/o male with low grade  mucinous neoplasm of appendix s/p CRS and HIPC  5/7/20 ( Dr Herberth Pink at [place]) R2 resection, tumor was metastatic to 2/14 regional lymph nodes. S/p 12 cycles ( 6 months ) of adjuvant FOLFOX - completed  in Dec 2020.\par His disease is not curable but hopefully it should not progress  rapidly given low histologic grade / natural history of LAMN.\par Continue periodic monitoring with  exams/ blood work ( CEA ) and periodic scans ( scans ordered for June by Dr Juarez.).\par Anemia- suspect iron deficiency. \par Will benefit from parenteral iron.\par \par Return visit in 4 months / sooner PRN.\par \par D/w patient and his wife.

## 2021-05-03 NOTE — CONSULT LETTER
[Dear  ___] : Dear ~CHAIM, [( Thank you for referring [unfilled] for consultation for _____ )] : Thank you for referring [unfilled] for consultation for [unfilled] [Please see my note below.] : Please see my note below. [Consult Closing:] : Thank you very much for allowing me to participate in the care of this patient.  If you have any questions, please do not hesitate to contact me. [Sincerely,] : Sincerely, [FreeTextEntry2] : Dr Bety Izquierdowitch  [FreeTextEntry3] : Malissa Garcia

## 2021-05-04 ENCOUNTER — NON-APPOINTMENT (OUTPATIENT)
Age: 61
End: 2021-05-04

## 2021-05-04 DIAGNOSIS — Z85.038 PERSONAL HISTORY OF OTHER MALIGNANT NEOPLASM OF LARGE INTESTINE: ICD-10-CM

## 2021-05-04 DIAGNOSIS — Z87.440 PERSONAL HISTORY OF URINARY (TRACT) INFECTIONS: ICD-10-CM

## 2021-05-06 ENCOUNTER — OUTPATIENT (OUTPATIENT)
Dept: OUTPATIENT SERVICES | Facility: HOSPITAL | Age: 61
LOS: 1 days | End: 2021-05-06
Payer: COMMERCIAL

## 2021-05-06 ENCOUNTER — APPOINTMENT (OUTPATIENT)
Dept: INFUSION THERAPY | Facility: CLINIC | Age: 61
End: 2021-05-06

## 2021-05-06 DIAGNOSIS — Z20.828 CONTACT WITH AND (SUSPECTED) EXPOSURE TO OTHER VIRAL COMMUNICABLE DISEASES: ICD-10-CM

## 2021-05-06 LAB — SARS-COV-2 RNA SPEC QL NAA+PROBE: SIGNIFICANT CHANGE UP

## 2021-05-06 PROCEDURE — C9803: CPT

## 2021-05-06 PROCEDURE — U0003: CPT

## 2021-05-06 PROCEDURE — U0005: CPT

## 2021-05-07 ENCOUNTER — RESULT REVIEW (OUTPATIENT)
Age: 61
End: 2021-05-07

## 2021-05-07 ENCOUNTER — OUTPATIENT (OUTPATIENT)
Dept: INPATIENT UNIT | Facility: HOSPITAL | Age: 61
LOS: 1 days | Discharge: HOME | End: 2021-05-07
Payer: COMMERCIAL

## 2021-05-07 VITALS
SYSTOLIC BLOOD PRESSURE: 130 MMHG | RESPIRATION RATE: 16 BRPM | OXYGEN SATURATION: 100 % | HEIGHT: 67 IN | HEART RATE: 64 BPM | TEMPERATURE: 98 F | DIASTOLIC BLOOD PRESSURE: 81 MMHG | WEIGHT: 138.89 LBS

## 2021-05-07 VITALS
TEMPERATURE: 97 F | OXYGEN SATURATION: 100 % | HEART RATE: 74 BPM | SYSTOLIC BLOOD PRESSURE: 120 MMHG | RESPIRATION RATE: 16 BRPM | DIASTOLIC BLOOD PRESSURE: 68 MMHG

## 2021-05-07 DIAGNOSIS — C18.1 MALIGNANT NEOPLASM OF APPENDIX: ICD-10-CM

## 2021-05-07 DIAGNOSIS — R18.8 OTHER ASCITES: ICD-10-CM

## 2021-05-07 DIAGNOSIS — Z87.438 PERSONAL HISTORY OF OTHER DISEASES OF MALE GENITAL ORGANS: Chronic | ICD-10-CM

## 2021-05-07 DIAGNOSIS — R19.00 INTRA-ABDOMINAL AND PELVIC SWELLING, MASS AND LUMP, UNSPECIFIED SITE: Chronic | ICD-10-CM

## 2021-05-07 DIAGNOSIS — Z98.890 OTHER SPECIFIED POSTPROCEDURAL STATES: Chronic | ICD-10-CM

## 2021-05-07 DIAGNOSIS — Z20.828 CONTACT WITH AND (SUSPECTED) EXPOSURE TO OTHER VIRAL COMMUNICABLE DISEASES: ICD-10-CM

## 2021-05-07 LAB
ANION GAP SERPL CALC-SCNC: 4 MMOL/L — LOW (ref 5–17)
B PERT IGG+IGM PNL SER: ABNORMAL
BUN SERPL-MCNC: 20 MG/DL — SIGNIFICANT CHANGE UP (ref 7–23)
CALCIUM SERPL-MCNC: 10 MG/DL — SIGNIFICANT CHANGE UP (ref 8.5–10.1)
CHLORIDE SERPL-SCNC: 108 MMOL/L — SIGNIFICANT CHANGE UP (ref 96–108)
CO2 SERPL-SCNC: 28 MMOL/L — SIGNIFICANT CHANGE UP (ref 22–31)
COLOR FLD: SIGNIFICANT CHANGE UP
CREAT SERPL-MCNC: 1.01 MG/DL — SIGNIFICANT CHANGE UP (ref 0.5–1.3)
EOSINOPHIL # FLD: 9 % — SIGNIFICANT CHANGE UP
FLUID INTAKE SUBSTANCE CLASS: SIGNIFICANT CHANGE UP
FLUID SEGMENTED GRANULOCYTES: 3 % — SIGNIFICANT CHANGE UP
GLUCOSE SERPL-MCNC: 95 MG/DL — SIGNIFICANT CHANGE UP (ref 70–99)
GRAM STN FLD: SIGNIFICANT CHANGE UP
HCT VFR BLD CALC: 33.3 % — LOW (ref 39–50)
HGB BLD-MCNC: 9.8 G/DL — LOW (ref 13–17)
INR BLD: 1.1 RATIO — SIGNIFICANT CHANGE UP (ref 0.88–1.16)
LYMPHOCYTES # FLD: 58 % — SIGNIFICANT CHANGE UP
MCHC RBC-ENTMCNC: 22.7 PG — LOW (ref 27–34)
MCHC RBC-ENTMCNC: 29.4 GM/DL — LOW (ref 32–36)
MCV RBC AUTO: 77.3 FL — LOW (ref 80–100)
MONOS+MACROS # FLD: 30 % — SIGNIFICANT CHANGE UP
PLATELET # BLD AUTO: 759 K/UL — HIGH (ref 150–400)
POTASSIUM SERPL-MCNC: 4.4 MMOL/L — SIGNIFICANT CHANGE UP (ref 3.5–5.3)
POTASSIUM SERPL-SCNC: 4.4 MMOL/L — SIGNIFICANT CHANGE UP (ref 3.5–5.3)
PROTHROM AB SERPL-ACNC: 12.8 SEC — SIGNIFICANT CHANGE UP (ref 10.6–13.6)
RBC # BLD: 4.31 M/UL — SIGNIFICANT CHANGE UP (ref 4.2–5.8)
RBC # FLD: 17.2 % — HIGH (ref 10.3–14.5)
RCV VOL RI: HIGH /UL (ref 0–0)
SODIUM SERPL-SCNC: 140 MMOL/L — SIGNIFICANT CHANGE UP (ref 135–145)
SPECIMEN SOURCE: SIGNIFICANT CHANGE UP
TOTAL NUCLEATED CELL COUNT, BODY FLUID: 1728 /UL — SIGNIFICANT CHANGE UP
TUBE TYPE: SIGNIFICANT CHANGE UP
WBC # BLD: 11.81 K/UL — HIGH (ref 3.8–10.5)
WBC # FLD AUTO: 11.81 K/UL — HIGH (ref 3.8–10.5)

## 2021-05-07 PROCEDURE — 49083 ABD PARACENTESIS W/IMAGING: CPT

## 2021-05-07 PROCEDURE — 85027 COMPLETE CBC AUTOMATED: CPT

## 2021-05-07 PROCEDURE — 83615 LACTATE (LD) (LDH) ENZYME: CPT

## 2021-05-07 PROCEDURE — 88305 TISSUE EXAM BY PATHOLOGIST: CPT

## 2021-05-07 PROCEDURE — 88108 CYTOPATH CONCENTRATE TECH: CPT

## 2021-05-07 PROCEDURE — 80048 BASIC METABOLIC PNL TOTAL CA: CPT

## 2021-05-07 PROCEDURE — 87102 FUNGUS ISOLATION CULTURE: CPT

## 2021-05-07 PROCEDURE — 89051 BODY FLUID CELL COUNT: CPT

## 2021-05-07 PROCEDURE — 85610 PROTHROMBIN TIME: CPT

## 2021-05-07 PROCEDURE — 87070 CULTURE OTHR SPECIMN AEROBIC: CPT

## 2021-05-07 PROCEDURE — C1729: CPT

## 2021-05-07 PROCEDURE — 82042 OTHER SOURCE ALBUMIN QUAN EA: CPT

## 2021-05-07 PROCEDURE — 87075 CULTR BACTERIA EXCEPT BLOOD: CPT

## 2021-05-07 PROCEDURE — 88305 TISSUE EXAM BY PATHOLOGIST: CPT | Mod: 26

## 2021-05-07 PROCEDURE — 36415 COLL VENOUS BLD VENIPUNCTURE: CPT

## 2021-05-07 PROCEDURE — 82945 GLUCOSE OTHER FLUID: CPT

## 2021-05-07 PROCEDURE — 84157 ASSAY OF PROTEIN OTHER: CPT

## 2021-05-07 NOTE — ASU PATIENT PROFILE, ADULT - PSH
History of undescended testicle  age 7   Abdominal mass  surgery 5/2020  remove mass, spleen, partial colectomy, lymph nodes, part small intestines, omentum, apendix, gall bladder, part stomach  History of undescended testicle  age 7   Abdominal mass  surgery 5/2020  remove mass, spleen, partial colectomy, lymph nodes, part small intestines, omentum, apendix, gall bladder, part stomach  History of abdominal paracentesis  x3  History of undescended testicle  age 7

## 2021-05-07 NOTE — ASU PATIENT PROFILE, ADULT - PMH
BPH (benign prostatic hyperplasia)    HLD (hyperlipidemia)    Neuropathic pain  hands and feet  No pertinent past medical history    UTI (urinary tract infection)     Appendix carcinoma    BPH (benign prostatic hyperplasia)    HLD (hyperlipidemia)    Neuropathic pain  hands and feet  UTI (urinary tract infection)

## 2021-05-08 LAB
ALBUMIN FLD-MCNC: 2.9 G/DL — SIGNIFICANT CHANGE UP
GLUCOSE FLD-MCNC: 70 MG/DL — SIGNIFICANT CHANGE UP
LDH SERPL L TO P-CCNC: 251 U/L — SIGNIFICANT CHANGE UP
PROT FLD-MCNC: 4.7 G/DL — SIGNIFICANT CHANGE UP

## 2021-05-12 PROBLEM — E78.5 HYPERLIPIDEMIA, UNSPECIFIED: Chronic | Status: ACTIVE | Noted: 2021-05-07

## 2021-05-12 PROBLEM — M79.2 NEURALGIA AND NEURITIS, UNSPECIFIED: Chronic | Status: ACTIVE | Noted: 2021-05-07

## 2021-05-12 PROBLEM — N40.0 BENIGN PROSTATIC HYPERPLASIA WITHOUT LOWER URINARY TRACT SYMPTOMS: Chronic | Status: ACTIVE | Noted: 2021-05-07

## 2021-05-12 LAB
CULTURE RESULTS: SIGNIFICANT CHANGE UP
SPECIMEN SOURCE: SIGNIFICANT CHANGE UP

## 2021-05-13 ENCOUNTER — RX RENEWAL (OUTPATIENT)
Age: 61
End: 2021-05-13

## 2021-05-13 ENCOUNTER — APPOINTMENT (OUTPATIENT)
Dept: SURGICAL ONCOLOGY | Facility: CLINIC | Age: 61
End: 2021-05-13
Payer: COMMERCIAL

## 2021-05-13 ENCOUNTER — APPOINTMENT (OUTPATIENT)
Dept: INFUSION THERAPY | Facility: CLINIC | Age: 61
End: 2021-05-13

## 2021-05-13 VITALS
RESPIRATION RATE: 17 BRPM | BODY MASS INDEX: 20.92 KG/M2 | SYSTOLIC BLOOD PRESSURE: 136 MMHG | WEIGHT: 138 LBS | OXYGEN SATURATION: 100 % | DIASTOLIC BLOOD PRESSURE: 85 MMHG | HEIGHT: 68 IN | HEART RATE: 85 BPM

## 2021-05-13 PROCEDURE — 99072 ADDL SUPL MATRL&STAF TM PHE: CPT

## 2021-05-13 PROCEDURE — 99245 OFF/OP CONSLTJ NEW/EST HI 55: CPT

## 2021-05-13 NOTE — REASON FOR VISIT
[Initial Consultation] : an initial consultation for [Spouse] : spouse [FreeTextEntry2] : appendiceal carcinoma-

## 2021-05-13 NOTE — HISTORY OF PRESENT ILLNESS
[de-identified] : Mr. Arias is a 60 year old gentleman referred to me by Dr. 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 He underwent  CRS and HIPEC for presumed LAMN, which turned out to node positive.([facility] pathology review well-differentiated mucinous adenocarcinoma) on 5/7/2020 at [place] with Dr. Herberth Peters. \par \par PCI: \par CC: R2b\par \par Mr. Arias has since moved to [place] to be closer to family and has established care in [facility]. Lives in [place]. \par \par Cytoreduction included a splenectomy and distal gastrectomy, omentectomy, cholecystectomy, subtotal colectomy and diaphragm stripping. 4L of mucinous ascites was drained. 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 As his tumor was felt to be low grade, he did not receive any systemic therapy up front. He now reports feeling well. He reports 3-4 loose but not watery BM's/day. He reports improving weight and energy levels. He denies any PO intolerance. He plays golf and does nature walks close to [facility]. \par \par CEA \par 6/5/20: 3.8\par 8/12/20: 6.1\par 4/7: 10.1 \par \par Pathology: \par 5/7/21: Paracentesis, cytology negative for malignant cells. 1.6 Liters removed. 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 He underwent subtotal colectomy with positive margins. 2/14 nodes involved. [place] review: low grade appendiceal mucinous neoplasm\par \par Scans were obtained at [facility] and demonstrated: \par 4/30/21: loculated intraperitoneal fluid. No bowel obstruction. \par \par Last colonoscopy May 2020. \par

## 2021-05-13 NOTE — ASSESSMENT
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 \par No extraperitoneal disease. \par Perfomance status- ecog 1\par \par He is feeling well at this point. He follows with Dr. Sanford for chronic bactiuria for which he is on 6 week course on nitrofurantoin. \par \par Disease was reasonably stable over the last year, now with progression requiring paracentesis-- most recent 1.6L drained May 2021. \par \par Given his progression within 1 year of CRS/ HIPEC, as well as R2b resection at first operation I do not think additional cytoreductive surgery is feasible. I have also discussed this with his surgeon, Dr. Peters who agrees. \par \par I discussed case with patients medical oncologist who favors regional approach over additional systemic therapy.\par \par I discussed regional approaches to peritoneal metastasis. Given prior CRS/ HIPEC patient is already very well versed. We discussed the pressurized intraperitoneal aerosolized chemotherapy trial. (PIPAC). We discussed risks and benefits associated with trial, as well as biologic rationale and available data. We discussed abdominal adhesions following CRS/ HIPEC as the major barrier. To better assess extent of disease I have recommended diagnostic laparoscopy and patient would like to proceed as early as possible. We discussed the risks of injury to intraperitoneal structures upon abdominal entry. \par \par If abdominal access is feasible will discuss with the [facility] PIPAC team and coordinate clinical trial enrollment in the near future. \par

## 2021-05-13 NOTE — PHYSICAL EXAM
[Normal] : supple, no neck mass and thyroid not enlarged [Normal Neck Lymph Nodes] : normal neck lymph nodes  [Normal Supraclavicular Lymph Nodes] : normal supraclavicular lymph nodes [Normal Groin Lymph Nodes] : normal groin lymph nodes [Normal Axillary Lymph Nodes] : normal axillary lymph nodes [Normal] : oriented to person, place and time, with appropriate affect [de-identified] : abdomen soft, non-distended, non-tender. no masses. laparotomy and drain incisions well healed

## 2021-05-14 ENCOUNTER — TRANSCRIPTION ENCOUNTER (OUTPATIENT)
Age: 61
End: 2021-05-14

## 2021-05-16 ENCOUNTER — APPOINTMENT (OUTPATIENT)
Dept: DISASTER EMERGENCY | Facility: CLINIC | Age: 61
End: 2021-05-16

## 2021-05-17 ENCOUNTER — OUTPATIENT (OUTPATIENT)
Dept: OUTPATIENT SERVICES | Facility: HOSPITAL | Age: 61
LOS: 1 days | End: 2021-05-17

## 2021-05-17 VITALS
SYSTOLIC BLOOD PRESSURE: 120 MMHG | OXYGEN SATURATION: 98 % | TEMPERATURE: 98 F | HEIGHT: 66.5 IN | DIASTOLIC BLOOD PRESSURE: 80 MMHG | WEIGHT: 136.91 LBS | RESPIRATION RATE: 16 BRPM | HEART RATE: 65 BPM

## 2021-05-17 DIAGNOSIS — Z87.438 PERSONAL HISTORY OF OTHER DISEASES OF MALE GENITAL ORGANS: Chronic | ICD-10-CM

## 2021-05-17 DIAGNOSIS — C18.1 MALIGNANT NEOPLASM OF APPENDIX: ICD-10-CM

## 2021-05-17 DIAGNOSIS — Z98.890 OTHER SPECIFIED POSTPROCEDURAL STATES: Chronic | ICD-10-CM

## 2021-05-17 DIAGNOSIS — R19.00 INTRA-ABDOMINAL AND PELVIC SWELLING, MASS AND LUMP, UNSPECIFIED SITE: Chronic | ICD-10-CM

## 2021-05-17 LAB
ALBUMIN SERPL ELPH-MCNC: 3.8 G/DL — SIGNIFICANT CHANGE UP (ref 3.3–5)
ALP SERPL-CCNC: 136 U/L — HIGH (ref 40–120)
ALT FLD-CCNC: 18 U/L — SIGNIFICANT CHANGE UP (ref 4–41)
ANION GAP SERPL CALC-SCNC: 11 MMOL/L — SIGNIFICANT CHANGE UP (ref 7–14)
AST SERPL-CCNC: 17 U/L — SIGNIFICANT CHANGE UP (ref 4–40)
BILIRUB SERPL-MCNC: <0.2 MG/DL — SIGNIFICANT CHANGE UP (ref 0.2–1.2)
BLD GP AB SCN SERPL QL: NEGATIVE — SIGNIFICANT CHANGE UP
BUN SERPL-MCNC: 20 MG/DL — SIGNIFICANT CHANGE UP (ref 7–23)
CALCIUM SERPL-MCNC: 9.2 MG/DL — SIGNIFICANT CHANGE UP (ref 8.4–10.5)
CHLORIDE SERPL-SCNC: 102 MMOL/L — SIGNIFICANT CHANGE UP (ref 98–107)
CO2 SERPL-SCNC: 26 MMOL/L — SIGNIFICANT CHANGE UP (ref 22–31)
CREAT SERPL-MCNC: 1 MG/DL — SIGNIFICANT CHANGE UP (ref 0.5–1.3)
GLUCOSE SERPL-MCNC: 79 MG/DL — SIGNIFICANT CHANGE UP (ref 70–99)
HCT VFR BLD CALC: 31.5 % — LOW (ref 39–50)
HGB BLD-MCNC: 9.4 G/DL — LOW (ref 13–17)
MCHC RBC-ENTMCNC: 22.9 PG — LOW (ref 27–34)
MCHC RBC-ENTMCNC: 29.8 GM/DL — LOW (ref 32–36)
MCV RBC AUTO: 76.6 FL — LOW (ref 80–100)
NRBC # BLD: 0 /100 WBCS — SIGNIFICANT CHANGE UP
NRBC # FLD: 0 K/UL — SIGNIFICANT CHANGE UP
PLATELET # BLD AUTO: 785 K/UL — HIGH (ref 150–400)
POTASSIUM SERPL-MCNC: 4.3 MMOL/L — SIGNIFICANT CHANGE UP (ref 3.5–5.3)
POTASSIUM SERPL-SCNC: 4.3 MMOL/L — SIGNIFICANT CHANGE UP (ref 3.5–5.3)
PROT SERPL-MCNC: 6.6 G/DL — SIGNIFICANT CHANGE UP (ref 6–8.3)
RBC # BLD: 4.11 M/UL — LOW (ref 4.2–5.8)
RBC # FLD: 17 % — HIGH (ref 10.3–14.5)
RH IG SCN BLD-IMP: POSITIVE — SIGNIFICANT CHANGE UP
SARS-COV-2 N GENE NPH QL NAA+PROBE: NOT DETECTED
SODIUM SERPL-SCNC: 139 MMOL/L — SIGNIFICANT CHANGE UP (ref 135–145)
WBC # BLD: 11.98 K/UL — HIGH (ref 3.8–10.5)
WBC # FLD AUTO: 11.98 K/UL — HIGH (ref 3.8–10.5)

## 2021-05-17 RX ORDER — CITALOPRAM 10 MG/1
5 TABLET, FILM COATED ORAL
Qty: 0 | Refills: 0 | DISCHARGE

## 2021-05-17 RX ORDER — SIMVASTATIN 20 MG/1
1 TABLET, FILM COATED ORAL
Qty: 0 | Refills: 0 | DISCHARGE

## 2021-05-17 RX ORDER — NITROFURANTOIN MACROCRYSTAL 50 MG
0 CAPSULE ORAL
Qty: 0 | Refills: 0 | DISCHARGE

## 2021-05-17 RX ORDER — SODIUM CHLORIDE 9 MG/ML
1000 INJECTION, SOLUTION INTRAVENOUS
Refills: 0 | Status: DISCONTINUED | OUTPATIENT
Start: 2021-05-19 | End: 2021-06-02

## 2021-05-17 RX ORDER — GABAPENTIN 400 MG/1
0 CAPSULE ORAL
Qty: 0 | Refills: 0 | DISCHARGE

## 2021-05-17 RX ORDER — TAMSULOSIN HYDROCHLORIDE 0.4 MG/1
1 CAPSULE ORAL
Qty: 0 | Refills: 0 | DISCHARGE

## 2021-05-17 NOTE — H&P PST ADULT - ATTENDING COMMENTS
Patient with peritoneal only metastasis from appendiceal tumor. Prior CRS/HIPEC at [facility] with recurrence after approximately one year. Plan for diagnostic laparoscopy today to assess for extent of peritoneal disease and determine eligibility for further regional therapy to the peritoneum. Risks, benefits, and alternatives discussed with patient. Especially risk of bowel injury given extent of prior operation.

## 2021-05-17 NOTE — H&P PST ADULT - HISTORY OF PRESENT ILLNESS
59 yo male with preop dx. Malignant neoplasm of appendix presents to pre surgical testing.  Pt with abdominal pain and bloating 2 weeks ago,  Workup CT revealed intraperitoneal fluid which was drained, negative for malignant cells.  Pt is scheduled for diagnostic laparoscopy with peritoneal washing and biopsy.  59 yo male with preop dx. Malignant neoplasm of appendix presents to pre surgical testing.  Pt s/p cytoreduction surgery with HIPEC 5/2020 and adjuvent chemotherapy.  Pt with c/o abdominal pain and bloating 2 weeks ago,  Workup CT revealed intraperitoneal fluid which was drained, negative for malignant cells.  Pt is scheduled for diagnostic laparoscopy with peritoneal washing and biopsy.     ***Of note, pt being treated for UTI with 6 week course of nitrofurantoin started 4/20/21, surgeon aware per pt.***

## 2021-05-17 NOTE — H&P PST ADULT - NEGATIVE NEUROLOGICAL SYMPTOMS
no transient paralysis/no weakness/no generalized seizures/no vertigo/no loss of sensation/no difficulty walking/no headache

## 2021-05-17 NOTE — H&P PST ADULT - NSANTHOSAYNRD_GEN_A_CORE
No. MALISSA screening performed.  STOP BANG Legend: 0-2 = LOW Risk; 3-4 = INTERMEDIATE Risk; 5-8 = HIGH Risk

## 2021-05-17 NOTE — H&P PST ADULT - NSICDXPASTSURGICALHX_GEN_ALL_CORE_FT
PAST SURGICAL HISTORY:  Abdominal mass surgery 5/2020  remove mass, spleen, partial colectomy, lymph nodes, part small intestines, omentum, apendix, gall bladder, part stomach. HIPEC    History of abdominal paracentesis x3    History of undescended testicle age 7

## 2021-05-17 NOTE — H&P PST ADULT - NSICDXPROBLEM_GEN_ALL_CORE_FT
PROBLEM DIAGNOSES  Problem: Malignant neoplasm of appendix  Assessment and Plan:        PROBLEM DIAGNOSES  Problem: Malignant neoplasm of appendix  Assessment and Plan: Pt is scheduled for diagnostic laparoscopy with peritoneal washing and biopsy on 5/19/21.  Verbal and written pre op instructions reviewed with patient and pt able to verbalize understanding.   Verbal and written instructions given with chlorhexidine wash, pt able to verbalize understanding via teach back method. COVID testing scheduled preop per pt.    Pt met STOP BANG score for MALISSA precaution. OR booking notified via fax.

## 2021-05-17 NOTE — H&P PST ADULT - GASTROINTESTINAL COMMENTS
abdominal bloating, preop dx. Malignant neoplasm of appendix abdominal bloating, preop dx. Malignant neoplasm of appendix, refer to HPI

## 2021-05-17 NOTE — H&P PST ADULT - NSICDXPASTMEDICALHX_GEN_ALL_CORE_FT
PAST MEDICAL HISTORY:  Anxiety     Appendix carcinoma s/p abdominal surgery and chemotherapy, completed 12/2020    BPH (benign prostatic hyperplasia)     HLD (hyperlipidemia)     Neuropathic pain hands and feet    UTI (urinary tract infection) started on nitrofurintoin 3.5 weeks ago, was prescribed 6 week treatment

## 2021-05-17 NOTE — H&P PST ADULT - NSICDXFAMILYHX_GEN_ALL_CORE_FT
FAMILY HISTORY:  Father  Still living? No  FH: diabetes mellitus, Age at diagnosis: Age Unknown    Mother  Still living? Yes, Estimated age: Age Unknown  FH: HTN (hypertension), Age at diagnosis: Age Unknown

## 2021-05-18 ENCOUNTER — TRANSCRIPTION ENCOUNTER (OUTPATIENT)
Age: 61
End: 2021-05-18

## 2021-05-18 NOTE — ASU PATIENT PROFILE, ADULT - PSH
Abdominal mass  surgery 5/2020  remove mass, spleen, partial colectomy, lymph nodes, part small intestines, omentum, apendix, gall bladder, part stomach. HIPEC  History of abdominal paracentesis  x3  History of undescended testicle  age 7

## 2021-05-18 NOTE — ASU PATIENT PROFILE, ADULT - PMH
Anxiety    Appendix carcinoma  s/p abdominal surgery and chemotherapy, completed 12/2020  BPH (benign prostatic hyperplasia)    HLD (hyperlipidemia)    Neuropathic pain  hands and feet  UTI (urinary tract infection)  started on nitrofurintoin 3.5 weeks ago, was prescribed 6 week treatment

## 2021-05-18 NOTE — ASU PATIENT PROFILE, ADULT - ABILITY TO HEAR (WITH HEARING AID OR HEARING APPLIANCE IF NORMALLY USED):
wears hearing aids but left them home/Mildly to Moderately Impaired: difficulty hearing in some environments or speaker may need to increase volume or speak distinctly

## 2021-05-19 ENCOUNTER — OUTPATIENT (OUTPATIENT)
Dept: OUTPATIENT SERVICES | Facility: HOSPITAL | Age: 61
LOS: 1 days | Discharge: HOME | End: 2021-05-19
Payer: COMMERCIAL

## 2021-05-19 ENCOUNTER — RESULT REVIEW (OUTPATIENT)
Age: 61
End: 2021-05-19

## 2021-05-19 ENCOUNTER — APPOINTMENT (OUTPATIENT)
Dept: SURGICAL ONCOLOGY | Facility: HOSPITAL | Age: 61
End: 2021-05-19

## 2021-05-19 VITALS
HEART RATE: 55 BPM | DIASTOLIC BLOOD PRESSURE: 73 MMHG | OXYGEN SATURATION: 100 % | RESPIRATION RATE: 16 BRPM | TEMPERATURE: 98 F | SYSTOLIC BLOOD PRESSURE: 152 MMHG

## 2021-05-19 VITALS
WEIGHT: 136.91 LBS | DIASTOLIC BLOOD PRESSURE: 81 MMHG | RESPIRATION RATE: 16 BRPM | HEART RATE: 66 BPM | OXYGEN SATURATION: 99 % | SYSTOLIC BLOOD PRESSURE: 124 MMHG | TEMPERATURE: 99 F | HEIGHT: 66.5 IN

## 2021-05-19 DIAGNOSIS — Z87.438 PERSONAL HISTORY OF OTHER DISEASES OF MALE GENITAL ORGANS: Chronic | ICD-10-CM

## 2021-05-19 DIAGNOSIS — C18.1 MALIGNANT NEOPLASM OF APPENDIX: ICD-10-CM

## 2021-05-19 DIAGNOSIS — Z98.890 OTHER SPECIFIED POSTPROCEDURAL STATES: Chronic | ICD-10-CM

## 2021-05-19 DIAGNOSIS — R19.00 INTRA-ABDOMINAL AND PELVIC SWELLING, MASS AND LUMP, UNSPECIFIED SITE: Chronic | ICD-10-CM

## 2021-05-19 PROCEDURE — 88305 TISSUE EXAM BY PATHOLOGIST: CPT | Mod: 26

## 2021-05-19 PROCEDURE — 88305 TISSUE EXAM BY PATHOLOGIST: CPT | Mod: 26,59

## 2021-05-19 PROCEDURE — 88112 CYTOPATH CELL ENHANCE TECH: CPT | Mod: 26

## 2021-05-19 PROCEDURE — 49321 LAPAROSCOPY BIOPSY: CPT

## 2021-05-19 RX ORDER — FENTANYL CITRATE 50 UG/ML
25 INJECTION INTRAVENOUS
Refills: 0 | Status: DISCONTINUED | OUTPATIENT
Start: 2021-05-19 | End: 2021-05-19

## 2021-05-19 RX ORDER — ONDANSETRON 8 MG/1
4 TABLET, FILM COATED ORAL ONCE
Refills: 0 | Status: DISCONTINUED | OUTPATIENT
Start: 2021-05-19 | End: 2021-06-02

## 2021-05-19 RX ORDER — IBUPROFEN 200 MG
400 TABLET ORAL EVERY 6 HOURS
Refills: 0 | Status: DISCONTINUED | OUTPATIENT
Start: 2021-05-19 | End: 2021-06-02

## 2021-05-19 RX ORDER — ACETAMINOPHEN 500 MG
650 TABLET ORAL EVERY 6 HOURS
Refills: 0 | Status: DISCONTINUED | OUTPATIENT
Start: 2021-05-19 | End: 2021-06-02

## 2021-05-19 RX ORDER — HYDROMORPHONE HYDROCHLORIDE 2 MG/ML
0.5 INJECTION INTRAMUSCULAR; INTRAVENOUS; SUBCUTANEOUS
Refills: 0 | Status: DISCONTINUED | OUTPATIENT
Start: 2021-05-19 | End: 2021-05-19

## 2021-05-19 RX ORDER — OXYCODONE HYDROCHLORIDE 5 MG/1
1 TABLET ORAL
Qty: 5 | Refills: 0
Start: 2021-05-19

## 2021-05-19 RX ORDER — OXYCODONE HYDROCHLORIDE 5 MG/1
5 TABLET ORAL EVERY 4 HOURS
Refills: 0 | Status: DISCONTINUED | OUTPATIENT
Start: 2021-05-19 | End: 2021-05-19

## 2021-05-19 RX ADMIN — SODIUM CHLORIDE 75 MILLILITER(S): 9 INJECTION, SOLUTION INTRAVENOUS at 10:00

## 2021-05-19 NOTE — ASU DISCHARGE PLAN (ADULT/PEDIATRIC) - ASU DC SPECIAL INSTRUCTIONSFT
Alternate Tylenol and Motrin for pain. Both can be taken every six hours, so you can take Tylenol at noon, Motrin at 3PM, Tylenol at 6PM, Motrin at 9PM, ect.     Take oxycodone as needed for severe pain.     Follow up with Dr. Rosas  in 2 weeks. Alternate Tylenol and Motrin for pain. Both can be taken every six hours, so you can take Tylenol at noon, Motrin at 3PM, Tylenol at 6PM, Motrin at 9PM, ect.     Take oxycodone as needed for severe pain.     Follow up with Dr. Rosas  in 2 weeks.    no tylenol or tylenol containing products till 3pm

## 2021-05-19 NOTE — ASU DISCHARGE PLAN (ADULT/PEDIATRIC) - CARE PROVIDER_API CALL
Florecita Mason)  Complex General Surgical Oncology; Surgery  [address] [place] [address]  Phone: [phone]  Fax: [phone]  Follow Up Time: 2 weeks

## 2021-05-19 NOTE — BRIEF OPERATIVE NOTE - NSICDXBRIEFPROCEDURE_GEN_ALL_CORE_FT
PROCEDURES:  Diagnostic laparoscopy 19-May-2021 10:03:16  Almas Rubio  Peritoneal biopsy 19-May-2021 10:03:24  Almas Rubio

## 2021-05-20 PROBLEM — F41.9 ANXIETY DISORDER, UNSPECIFIED: Chronic | Status: ACTIVE | Noted: 2021-05-17

## 2021-05-21 LAB — NON-GYNECOLOGICAL CYTOLOGY STUDY: SIGNIFICANT CHANGE UP

## 2021-05-25 ENCOUNTER — OUTPATIENT (OUTPATIENT)
Dept: OUTPATIENT SERVICES | Facility: HOSPITAL | Age: 61
LOS: 1 days | Discharge: HOME | End: 2021-05-25

## 2021-05-25 DIAGNOSIS — C26.9 MALIGNANT NEOPLASM OF ILL-DEFINED SITES WITHIN THE DIGESTIVE SYSTEM: ICD-10-CM

## 2021-05-25 DIAGNOSIS — Z98.890 OTHER SPECIFIED POSTPROCEDURAL STATES: Chronic | ICD-10-CM

## 2021-05-25 DIAGNOSIS — Z87.438 PERSONAL HISTORY OF OTHER DISEASES OF MALE GENITAL ORGANS: Chronic | ICD-10-CM

## 2021-05-25 DIAGNOSIS — R19.00 INTRA-ABDOMINAL AND PELVIC SWELLING, MASS AND LUMP, UNSPECIFIED SITE: Chronic | ICD-10-CM

## 2021-05-25 LAB — SURGICAL PATHOLOGY STUDY: SIGNIFICANT CHANGE UP

## 2021-05-26 ENCOUNTER — RESULT REVIEW (OUTPATIENT)
Age: 61
End: 2021-05-26

## 2021-05-26 ENCOUNTER — APPOINTMENT (OUTPATIENT)
Dept: HEMATOLOGY ONCOLOGY | Facility: CLINIC | Age: 61
End: 2021-05-26
Payer: COMMERCIAL

## 2021-05-26 ENCOUNTER — APPOINTMENT (OUTPATIENT)
Dept: SURGICAL ONCOLOGY | Facility: CLINIC | Age: 61
End: 2021-05-26

## 2021-05-26 ENCOUNTER — NON-APPOINTMENT (OUTPATIENT)
Age: 61
End: 2021-05-26

## 2021-05-26 ENCOUNTER — APPOINTMENT (OUTPATIENT)
Dept: SURGICAL ONCOLOGY | Facility: CLINIC | Age: 61
End: 2021-05-26
Payer: COMMERCIAL

## 2021-05-26 VITALS
SYSTOLIC BLOOD PRESSURE: 134 MMHG | WEIGHT: 137.79 LBS | BODY MASS INDEX: 21.88 KG/M2 | DIASTOLIC BLOOD PRESSURE: 84 MMHG | TEMPERATURE: 97.3 F | RESPIRATION RATE: 16 BRPM | HEART RATE: 75 BPM | OXYGEN SATURATION: 99 % | HEIGHT: 66.73 IN

## 2021-05-26 VITALS
BODY MASS INDEX: 21.19 KG/M2 | RESPIRATION RATE: 16 BRPM | TEMPERATURE: 98.1 F | HEART RATE: 76 BPM | SYSTOLIC BLOOD PRESSURE: 126 MMHG | WEIGHT: 135 LBS | OXYGEN SATURATION: 98 % | DIASTOLIC BLOOD PRESSURE: 80 MMHG | HEIGHT: 67 IN

## 2021-05-26 DIAGNOSIS — G62.9 POLYNEUROPATHY, UNSPECIFIED: ICD-10-CM

## 2021-05-26 DIAGNOSIS — D50.0 IRON DEFICIENCY ANEMIA SECONDARY TO BLOOD LOSS (CHRONIC): ICD-10-CM

## 2021-05-26 DIAGNOSIS — Z78.9 OTHER SPECIFIED HEALTH STATUS: ICD-10-CM

## 2021-05-26 LAB
BASOPHILS # BLD AUTO: 0.1 K/UL — SIGNIFICANT CHANGE UP (ref 0–0.2)
BASOPHILS NFR BLD AUTO: 0.9 % — SIGNIFICANT CHANGE UP (ref 0–2)
CHOLEST SERPL-MCNC: 184 MG/DL
EOSINOPHIL # BLD AUTO: 0.35 K/UL — SIGNIFICANT CHANGE UP (ref 0–0.5)
EOSINOPHIL NFR BLD AUTO: 3 % — SIGNIFICANT CHANGE UP (ref 0–6)
ESTIMATED AVERAGE GLUCOSE: 128 MG/DL
HBA1C MFR BLD HPLC: 6.1 %
HCT VFR BLD CALC: 32.8 % — LOW (ref 39–50)
HDLC SERPL-MCNC: 52 MG/DL
HGB BLD-MCNC: 9.6 G/DL — LOW (ref 13–17)
IMM GRANULOCYTES NFR BLD AUTO: 0.4 % — SIGNIFICANT CHANGE UP (ref 0–1.5)
LDLC SERPL CALC-MCNC: 102 MG/DL
LYMPHOCYTES # BLD AUTO: 35.2 % — SIGNIFICANT CHANGE UP (ref 13–44)
LYMPHOCYTES # BLD AUTO: 4.11 K/UL — HIGH (ref 1–3.3)
MCHC RBC-ENTMCNC: 22.8 PG — LOW (ref 27–34)
MCHC RBC-ENTMCNC: 29.3 G/DL — LOW (ref 32–36)
MCV RBC AUTO: 77.9 FL — LOW (ref 80–100)
MONOCYTES # BLD AUTO: 1.06 K/UL — HIGH (ref 0–0.9)
MONOCYTES NFR BLD AUTO: 9.1 % — SIGNIFICANT CHANGE UP (ref 2–14)
NEUTROPHILS # BLD AUTO: 5.99 K/UL — SIGNIFICANT CHANGE UP (ref 1.8–7.4)
NEUTROPHILS NFR BLD AUTO: 51.4 % — SIGNIFICANT CHANGE UP (ref 43–77)
NONHDLC SERPL-MCNC: 132 MG/DL
NRBC # BLD: 0 /100 WBCS — SIGNIFICANT CHANGE UP (ref 0–0)
PLATELET # BLD AUTO: 709 K/UL — HIGH (ref 150–400)
RBC # BLD: 4.21 M/UL — SIGNIFICANT CHANGE UP (ref 4.2–5.8)
RBC # FLD: 17.4 % — HIGH (ref 10.3–14.5)
TRIGL SERPL-MCNC: 151 MG/DL
WBC # BLD: 11.66 K/UL — HIGH (ref 3.8–10.5)
WBC # FLD AUTO: 11.66 K/UL — HIGH (ref 3.8–10.5)

## 2021-05-26 PROCEDURE — 99072 ADDL SUPL MATRL&STAF TM PHE: CPT

## 2021-05-26 PROCEDURE — 99245 OFF/OP CONSLTJ NEW/EST HI 55: CPT

## 2021-05-26 PROCEDURE — 99024 POSTOP FOLLOW-UP VISIT: CPT

## 2021-05-26 RX ORDER — CIPROFLOXACIN HYDROCHLORIDE 500 MG/1
500 TABLET, FILM COATED ORAL
Qty: 4 | Refills: 0 | Status: DISCONTINUED | COMMUNITY
Start: 2021-04-19 | End: 2021-05-26

## 2021-05-26 RX ORDER — AMOXICILLIN AND CLAVULANATE POTASSIUM 500; 125 MG/1; MG/1
500-125 TABLET, FILM COATED ORAL
Qty: 14 | Refills: 0 | Status: DISCONTINUED | COMMUNITY
Start: 2021-03-29 | End: 2021-05-26

## 2021-05-26 NOTE — CONSULT LETTER
[Dear  ___] : Dear  [unfilled], [Consult Letter:] : I had the pleasure of evaluating your patient, [unfilled]. [Please see my note below.] : Please see my note below. [Consult Closing:] : Thank you very much for allowing me to participate in the care of this patient.  If you have any questions, please do not hesitate to contact me. [Sincerely,] : Sincerely, [DrSamson  ___] : Dr. DAVID [DrSamson ___] : Dr. DAVID

## 2021-05-26 NOTE — HISTORY OF PRESENT ILLNESS
[de-identified] : Mr. Arias is a 60 year old gentlemen with past medical history of BPH presenting to the office for an initial consultation of appendiceal neoplasm.\par \par Patient was being seen by Dr. Mendoza at [facility] for low grade appendiceal tumor diagnosed in 2020.  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 During 4/2020 noted bloating and ascites was found. CT scan showed the malignancy.\par \par 5/7/2020 CRS and HIPEC for LAMN ( Dr. Herberth Pink at [place] ). Surgery included a splenectomy and distal gastrectomy, omentectomy, cholecystectomy, subtotal colectomy and diaphragm stripping. 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 R2b resection. 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 There was some dose reduction. Neuropathy started continually after chemotherapy was completed and is grade 1, with no impairment of ADLs. \par \par He moved from [place] back to [place] for more family support.\par In April 2021, he noted increase in abdominal fluid, and saw Oncology, Dr. Garcia.\par CT scan ordered and ascites drained.\par There was recurrence of disease on CT scan.\par He was referred to Dr. Mason, who has recommended pressurized intraperitoneal aerosolized chemotherapy trial. (PIPAC).\par \par CEA \par 6/5/2020: 3.8\par 8/12/2020: 6.1\par 4/72021: 10.1 \par \par Had laparoscope last week to biopsy and stage disease. He has mild pain that is worse with certain movements.\par Does not require pain medication.\par Normally, has has up to 6 BM daily due to short colon.\par Stools are loose.\par Currently no bloating, since drainage last week.\par Eating well; weight is stable. [de-identified] : Medical Oncology at [facility]: Sadie Mendoza\par Surgical Oncology: Florecita Rosas\par PCP: Pipe Lester\par Urology: Cesar Sanford\par \par patient: [phone]\par Astrid: [phone] (wife).

## 2021-05-27 ENCOUNTER — APPOINTMENT (OUTPATIENT)
Dept: CT IMAGING | Facility: CLINIC | Age: 61
End: 2021-05-27
Payer: COMMERCIAL

## 2021-05-27 ENCOUNTER — OUTPATIENT (OUTPATIENT)
Dept: OUTPATIENT SERVICES | Facility: HOSPITAL | Age: 61
LOS: 1 days | End: 2021-05-27
Payer: COMMERCIAL

## 2021-05-27 ENCOUNTER — RESULT REVIEW (OUTPATIENT)
Age: 61
End: 2021-05-27

## 2021-05-27 DIAGNOSIS — Z87.438 PERSONAL HISTORY OF OTHER DISEASES OF MALE GENITAL ORGANS: Chronic | ICD-10-CM

## 2021-05-27 DIAGNOSIS — R19.00 INTRA-ABDOMINAL AND PELVIC SWELLING, MASS AND LUMP, UNSPECIFIED SITE: Chronic | ICD-10-CM

## 2021-05-27 DIAGNOSIS — Z98.890 OTHER SPECIFIED POSTPROCEDURAL STATES: Chronic | ICD-10-CM

## 2021-05-27 DIAGNOSIS — Z00.8 ENCOUNTER FOR OTHER GENERAL EXAMINATION: ICD-10-CM

## 2021-05-27 PROCEDURE — 82565 ASSAY OF CREATININE: CPT

## 2021-05-27 PROCEDURE — 74177 CT ABD & PELVIS W/CONTRAST: CPT | Mod: 26

## 2021-05-27 PROCEDURE — 74177 CT ABD & PELVIS W/CONTRAST: CPT

## 2021-05-27 NOTE — HISTORY OF PRESENT ILLNESS
[de-identified] : Mr. Arias is a 60 year old gentleman referred to me by Dr. 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 He underwent  CRS and HIPEC for presumed LAMN, which turned out to node positive.([facility] pathology review well-differentiated mucinous adenocarcinoma) on 5/7/2020 at [place] with Dr. Herberth Peters. \par \par PCI: 34\par CC: R2b\par \par Mr. Arias has since moved to [place] to be closer to family and has established care in [facility]. Lives in [place]. \par \par Cytoreduction included a splenectomy and distal gastrectomy, omentectomy, cholecystectomy, subtotal colectomy and diaphragm stripping. 4L of mucinous ascites was drained. 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 As his tumor was felt to be low grade, he did not receive any systemic therapy up front. He now reports feeling well. He reports 3-4 loose but not watery BM's/day. He reports improving weight and energy levels. He denies any PO intolerance. He plays golf and does nature walks close to [facility]. \par \par CEA \par 6/5/20: 3.8\par 8/12/20: 6.1\par 4/7: 10.1 \par \par Pathology: \par 5/7/21: Paracentesis, cytology negative for malignant cells. 1.6 Liters removed. 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 He underwent subtotal colectomy with positive margins. 2/14 nodes involved. [place] review: low grade appendiceal mucinous neoplasm\par \par Scans were obtained at [facility] and demonstrated: \par 4/30/21: loculated intraperitoneal fluid. No bowel obstruction. \par \par Last colonoscopy May 2020. \par \par Underwent diagnostic laparoscopy by me on 5/19/21. PCI approx 25. Evacuated 1.4 liters ascites. Biopsies consistent with mucinous adenocarcinoma. \par

## 2021-05-27 NOTE — PHYSICAL EXAM
[Normal] : supple, no neck mass and thyroid not enlarged [Normal Neck Lymph Nodes] : normal neck lymph nodes  [Normal Supraclavicular Lymph Nodes] : normal supraclavicular lymph nodes [Normal Groin Lymph Nodes] : normal groin lymph nodes [Normal Axillary Lymph Nodes] : normal axillary lymph nodes [Normal] : oriented to person, place and time, with appropriate affect [de-identified] : abdomen soft, non-distended, non-tender. no masses. laparotomy and drain incisions well healed. Port sites healing well.

## 2021-05-27 NOTE — REASON FOR VISIT
[de-identified] : diagnostic laparoscopy 5/19/21 [Post-Op Visit] : a post-op visit for [Spouse] : spouse [FreeTextEntry2] : appendiceal carcinoma-  Here for enrollment visit for [facility] clinical trial

## 2021-05-27 NOTE — ASSESSMENT
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 \par No extraperitoneal disease \par Perfomance status- ecog 1\par \par He is feeling well at this point. He follows with Dr. Sanford for chronic bactiuria for which he is on 6 week course on nitrofurantoin. \par \par Disease was reasonably stable over the last year, now with progression requiring paracentesis-- most recent 1.6L drained May 2021. \par \par Given his progression within 1 year of CRS/ HIPEC, as well as R2b/ CC 2 resection at first operation I do not think additional cytoreductive surgery is feasible. I have notified his surgeon from [place], Dr. Peters who agrees. \par \par I discussed case with patients medical oncologist who favors regional approach over additional systemic therapy.\par \par I discussed regional approaches to peritoneal metastasis. Given prior CRS/ HIPEC patient is already very well versed. We discussed the pressurized intraperitoneal aerosolized chemotherapy trial. (PIPAC). We discussed risks and benefits associated with trial, as well as biologic rationale and available data. \par \par He is s/p diagnostic laparoscopy and there are no adhesions to anterior abdominal wall. \par \par Patient has visible peritoneal disease on diagnostic laparoscopy, and there is no evidence of impending bowel obstruction or = 5 L of ascites, is not a candidate for cytoreduction and HIPEC, does not have contraindications for laparoscopy, and no extraperitoneal metastatic disease. No known DPD deficiency, no bowel obstruction requiring NGT, PEG or exclusive TPN, does not have any prior unanticipated severe reaction or hypersensitivity to platinum based compounds, does not have known history of chronic hepatitis B virus or hepatitis C virus infection. Does not have any AE >Grade 1 from prior anti cancer therapy, has not received chemotherapy or surgery within the last 4 weeks prior to enrollment, did not have a previous anaphylactic reaction to the chemotherapy drug used, and is not receiving any other investigational or concurrent anti-cancer agents. Does not have ascites due to decompensated liver cirrhosis or portal vein thrombosis or uncontrolled intercurrent illness (NO ongoing or active infection, symptomatic CHF, severe myocardial insufficiency, recent MI, severe arrhythmias, severe renal impairment, myelosuppression, or severe hepatic impairment). Patient is not immunocompromised, does not have involvement in the planning and conduct of the study, does not have CNS metastases, does not have psychiatric illness/social situations that would limit study compliance. Patient does not have any of the following that required hospitalization in the last 6 months: heart failure, MI, acute coronary syndrome, DM with ketoacidosis, COPD. Patient had no simultaneous tumor debulking with gastrointestinal resection. They do not have a major systemic infection requiring antibiotics, does not need exclusive TPN and did not undergo any prior intra-abdominal aerosol chemotherapy. Patient has progressed on more than one evidence-based chemotherapeutic regimen, has feasible PIPAC access, there is room for aerosol therapy. Patient has a life expectancy of more than 6 months. Patient and partner agreed on using an adequate method of contraception to avoid pregnancy throughout the study and for up to 12 weeks after the last dose of investigational product.\par \par

## 2021-06-02 ENCOUNTER — TRANSCRIPTION ENCOUNTER (OUTPATIENT)
Age: 61
End: 2021-06-02

## 2021-06-03 ENCOUNTER — APPOINTMENT (OUTPATIENT)
Dept: INFUSION THERAPY | Facility: CLINIC | Age: 61
End: 2021-06-03

## 2021-06-03 ENCOUNTER — NON-APPOINTMENT (OUTPATIENT)
Age: 61
End: 2021-06-03

## 2021-06-03 ENCOUNTER — APPOINTMENT (OUTPATIENT)
Dept: SURGICAL ONCOLOGY | Facility: HOSPITAL | Age: 61
End: 2021-06-03

## 2021-06-03 ENCOUNTER — RESULT REVIEW (OUTPATIENT)
Age: 61
End: 2021-06-03

## 2021-06-03 ENCOUNTER — TRANSCRIPTION ENCOUNTER (OUTPATIENT)
Age: 61
End: 2021-06-03

## 2021-06-03 ENCOUNTER — INPATIENT (INPATIENT)
Facility: HOSPITAL | Age: 61
LOS: 0 days | Discharge: HOME | End: 2021-06-04
Attending: SURGERY | Admitting: SURGERY
Payer: COMMERCIAL

## 2021-06-03 VITALS
SYSTOLIC BLOOD PRESSURE: 110 MMHG | DIASTOLIC BLOOD PRESSURE: 53 MMHG | WEIGHT: 136.91 LBS | RESPIRATION RATE: 16 BRPM | HEIGHT: 67 IN | TEMPERATURE: 98 F | OXYGEN SATURATION: 99 % | HEART RATE: 56 BPM

## 2021-06-03 DIAGNOSIS — C18.1 MALIGNANT NEOPLASM OF APPENDIX: ICD-10-CM

## 2021-06-03 DIAGNOSIS — Z87.438 PERSONAL HISTORY OF OTHER DISEASES OF MALE GENITAL ORGANS: Chronic | ICD-10-CM

## 2021-06-03 DIAGNOSIS — R19.00 INTRA-ABDOMINAL AND PELVIC SWELLING, MASS AND LUMP, UNSPECIFIED SITE: Chronic | ICD-10-CM

## 2021-06-03 DIAGNOSIS — Z98.890 OTHER SPECIFIED POSTPROCEDURAL STATES: Chronic | ICD-10-CM

## 2021-06-03 LAB
ALBUMIN SERPL ELPH-MCNC: 4.1 G/DL — SIGNIFICANT CHANGE UP (ref 3.3–5)
ALP SERPL-CCNC: 138 U/L — HIGH (ref 40–120)
ALT FLD-CCNC: 18 U/L — SIGNIFICANT CHANGE UP (ref 4–41)
ANION GAP SERPL CALC-SCNC: 9 MMOL/L — SIGNIFICANT CHANGE UP (ref 7–14)
AST SERPL-CCNC: 17 U/L — SIGNIFICANT CHANGE UP (ref 4–40)
BASOPHILS # BLD AUTO: 0.09 K/UL — SIGNIFICANT CHANGE UP (ref 0–0.2)
BASOPHILS NFR BLD AUTO: 0.8 % — SIGNIFICANT CHANGE UP (ref 0–2)
BILIRUB SERPL-MCNC: 0.3 MG/DL — SIGNIFICANT CHANGE UP (ref 0.2–1.2)
BUN SERPL-MCNC: 16 MG/DL — SIGNIFICANT CHANGE UP (ref 7–23)
CALCIUM SERPL-MCNC: 9 MG/DL — SIGNIFICANT CHANGE UP (ref 8.4–10.5)
CHLORIDE SERPL-SCNC: 106 MMOL/L — SIGNIFICANT CHANGE UP (ref 98–107)
CO2 SERPL-SCNC: 25 MMOL/L — SIGNIFICANT CHANGE UP (ref 22–31)
CREAT SERPL-MCNC: 0.82 MG/DL — SIGNIFICANT CHANGE UP (ref 0.5–1.3)
EOSINOPHIL # BLD AUTO: 0.2 K/UL — SIGNIFICANT CHANGE UP (ref 0–0.5)
EOSINOPHIL NFR BLD AUTO: 1.9 % — SIGNIFICANT CHANGE UP (ref 0–6)
FULL GENE SEQUENCE RESULT: NORMAL
GLUCOSE SERPL-MCNC: 99 MG/DL — SIGNIFICANT CHANGE UP (ref 70–99)
HCT VFR BLD CALC: 32.4 % — LOW (ref 39–50)
HGB BLD-MCNC: 9.7 G/DL — LOW (ref 13–17)
IANC: 6.1 K/UL — SIGNIFICANT CHANGE UP (ref 1.5–8.5)
IMM GRANULOCYTES NFR BLD AUTO: 0.3 % — SIGNIFICANT CHANGE UP (ref 0–1.5)
INTERPRETATION PGDFRB: NORMAL
LYMPHOCYTES # BLD AUTO: 3.28 K/UL — SIGNIFICANT CHANGE UP (ref 1–3.3)
LYMPHOCYTES # BLD AUTO: 31 % — SIGNIFICANT CHANGE UP (ref 13–44)
Lab: NORMAL
MCHC RBC-ENTMCNC: 23.4 PG — LOW (ref 27–34)
MCHC RBC-ENTMCNC: 29.9 GM/DL — LOW (ref 32–36)
MCV RBC AUTO: 78.3 FL — LOW (ref 80–100)
MONOCYTES # BLD AUTO: 0.89 K/UL — SIGNIFICANT CHANGE UP (ref 0–0.9)
MONOCYTES NFR BLD AUTO: 8.4 % — SIGNIFICANT CHANGE UP (ref 2–14)
NEUTROPHILS # BLD AUTO: 6.1 K/UL — SIGNIFICANT CHANGE UP (ref 1.8–7.4)
NEUTROPHILS NFR BLD AUTO: 57.6 % — SIGNIFICANT CHANGE UP (ref 43–77)
NRBC # BLD: 0 /100 WBCS — SIGNIFICANT CHANGE UP
NRBC # FLD: 0 K/UL — SIGNIFICANT CHANGE UP
PLATELET # BLD AUTO: 733 K/UL — HIGH (ref 150–400)
POTASSIUM SERPL-MCNC: 4.4 MMOL/L — SIGNIFICANT CHANGE UP (ref 3.5–5.3)
POTASSIUM SERPL-SCNC: 4.4 MMOL/L — SIGNIFICANT CHANGE UP (ref 3.5–5.3)
PROT SERPL-MCNC: 6.7 G/DL — SIGNIFICANT CHANGE UP (ref 6–8.3)
RBC # BLD: 4.14 M/UL — LOW (ref 4.2–5.8)
RBC # FLD: 19.4 % — HIGH (ref 10.3–14.5)
REF LAB TEST METHOD: NORMAL
REVIEWED BY: NORMAL
SODIUM SERPL-SCNC: 140 MMOL/L — SIGNIFICANT CHANGE UP (ref 135–145)
TA REPEAT RESULT: NORMAL
TEST PERFORMANCE INFO SPEC: NORMAL
WBC # BLD: 10.59 K/UL — HIGH (ref 3.8–10.5)
WBC # FLD AUTO: 10.59 K/UL — HIGH (ref 3.8–10.5)

## 2021-06-03 PROCEDURE — 88112 CYTOPATH CELL ENHANCE TECH: CPT | Mod: 26

## 2021-06-03 PROCEDURE — 88305 TISSUE EXAM BY PATHOLOGIST: CPT | Mod: 26

## 2021-06-03 PROCEDURE — 88342 IMHCHEM/IMCYTCHM 1ST ANTB: CPT | Mod: 26

## 2021-06-03 PROCEDURE — 88341 IMHCHEM/IMCYTCHM EA ADD ANTB: CPT | Mod: 26

## 2021-06-03 PROCEDURE — 88305 TISSUE EXAM BY PATHOLOGIST: CPT | Mod: 26,59

## 2021-06-03 RX ORDER — TAMSULOSIN HYDROCHLORIDE 0.4 MG/1
0.4 CAPSULE ORAL AT BEDTIME
Refills: 0 | Status: DISCONTINUED | OUTPATIENT
Start: 2021-06-03 | End: 2021-06-04

## 2021-06-03 RX ORDER — FENTANYL CITRATE 50 UG/ML
50 INJECTION INTRAVENOUS
Refills: 0 | Status: DISCONTINUED | OUTPATIENT
Start: 2021-06-03 | End: 2021-06-03

## 2021-06-03 RX ORDER — ESCITALOPRAM OXALATE 10 MG/1
5 TABLET, FILM COATED ORAL DAILY
Refills: 0 | Status: DISCONTINUED | OUTPATIENT
Start: 2021-06-03 | End: 2021-06-04

## 2021-06-03 RX ORDER — NITROFURANTOIN MACROCRYSTAL 50 MG
50 CAPSULE ORAL
Refills: 0 | Status: DISCONTINUED | OUTPATIENT
Start: 2021-06-03 | End: 2021-06-03

## 2021-06-03 RX ORDER — ACETAMINOPHEN 500 MG
975 TABLET ORAL EVERY 6 HOURS
Refills: 0 | Status: DISCONTINUED | OUTPATIENT
Start: 2021-06-03 | End: 2021-06-04

## 2021-06-03 RX ORDER — ENOXAPARIN SODIUM 100 MG/ML
40 INJECTION SUBCUTANEOUS DAILY
Refills: 0 | Status: DISCONTINUED | OUTPATIENT
Start: 2021-06-03 | End: 2021-06-04

## 2021-06-03 RX ORDER — OXALIPLATIN 5 MG/ML
154 INJECTION, SOLUTION INTRAVENOUS ONCE
Refills: 0 | Status: DISCONTINUED | OUTPATIENT
Start: 2021-06-03 | End: 2021-06-04

## 2021-06-03 RX ORDER — OXYCODONE HYDROCHLORIDE 5 MG/1
5 TABLET ORAL EVERY 4 HOURS
Refills: 0 | Status: DISCONTINUED | OUTPATIENT
Start: 2021-06-03 | End: 2021-06-04

## 2021-06-03 RX ORDER — SODIUM CHLORIDE 9 MG/ML
1000 INJECTION, SOLUTION INTRAVENOUS
Refills: 0 | Status: DISCONTINUED | OUTPATIENT
Start: 2021-06-03 | End: 2021-06-04

## 2021-06-03 RX ORDER — HYDROMORPHONE HYDROCHLORIDE 2 MG/ML
0.5 INJECTION INTRAMUSCULAR; INTRAVENOUS; SUBCUTANEOUS
Refills: 0 | Status: DISCONTINUED | OUTPATIENT
Start: 2021-06-03 | End: 2021-06-03

## 2021-06-03 RX ORDER — ONDANSETRON 8 MG/1
4 TABLET, FILM COATED ORAL ONCE
Refills: 0 | Status: DISCONTINUED | OUTPATIENT
Start: 2021-06-03 | End: 2021-06-03

## 2021-06-03 RX ORDER — GABAPENTIN 400 MG/1
100 CAPSULE ORAL AT BEDTIME
Refills: 0 | Status: DISCONTINUED | OUTPATIENT
Start: 2021-06-03 | End: 2021-06-04

## 2021-06-03 RX ORDER — ONDANSETRON 8 MG/1
4 TABLET, FILM COATED ORAL ONCE
Refills: 0 | Status: COMPLETED | OUTPATIENT
Start: 2021-06-03 | End: 2021-06-04

## 2021-06-03 RX ORDER — SIMVASTATIN 20 MG/1
10 TABLET, FILM COATED ORAL AT BEDTIME
Refills: 0 | Status: DISCONTINUED | OUTPATIENT
Start: 2021-06-03 | End: 2021-06-04

## 2021-06-03 RX ORDER — NITROFURANTOIN MACROCRYSTAL 50 MG
100 CAPSULE ORAL
Refills: 0 | Status: DISCONTINUED | OUTPATIENT
Start: 2021-06-03 | End: 2021-06-04

## 2021-06-03 RX ADMIN — GABAPENTIN 100 MILLIGRAM(S): 400 CAPSULE ORAL at 22:16

## 2021-06-03 RX ADMIN — TAMSULOSIN HYDROCHLORIDE 0.4 MILLIGRAM(S): 0.4 CAPSULE ORAL at 22:15

## 2021-06-03 RX ADMIN — Medication 100 MILLIGRAM(S): at 22:14

## 2021-06-03 RX ADMIN — Medication 975 MILLIGRAM(S): at 22:16

## 2021-06-03 RX ADMIN — Medication 975 MILLIGRAM(S): at 22:46

## 2021-06-03 RX ADMIN — SODIUM CHLORIDE 50 MILLILITER(S): 9 INJECTION, SOLUTION INTRAVENOUS at 15:00

## 2021-06-03 RX ADMIN — HYDROMORPHONE HYDROCHLORIDE 0.5 MILLIGRAM(S): 2 INJECTION INTRAMUSCULAR; INTRAVENOUS; SUBCUTANEOUS at 15:35

## 2021-06-03 RX ADMIN — HYDROMORPHONE HYDROCHLORIDE 0.5 MILLIGRAM(S): 2 INJECTION INTRAMUSCULAR; INTRAVENOUS; SUBCUTANEOUS at 16:00

## 2021-06-03 RX ADMIN — ESCITALOPRAM OXALATE 5 MILLIGRAM(S): 10 TABLET, FILM COATED ORAL at 22:15

## 2021-06-03 NOTE — H&P ADULT - ATTENDING COMMENTS
Patient seen and examined. Plan for pressurized aerosolized intraperitoneal chemotherapy (PIPAC) with oxaliplatin today. Risks, benefits, and alternatives discussed in detail. Specifically risk of bowel injury and need for open operation was discussed.

## 2021-06-03 NOTE — ASU PREOP CHECKLIST - 4.
left upper chest infuseport left upper chest infuseport, abdominal old scopy sites x2 with dermabond, no ddrainage

## 2021-06-03 NOTE — H&P ADULT - ASSESSMENT
61 yo male with preop dx. Malignant neoplasm of appendix presents to pre surgical testing.  Pt s/p cytoreduction surgery with HIPEC 5/2020 and adjuvent chemotherapy.  Pt with c/o abdominal pain and bloating 2 weeks ago,  Workup CT revealed intraperitoneal fluid which was drained, negative for malignant cells.  Pt is scheduled for PIPAC clinical trial.     PLAN:  - Pt was discussed with Dr. Mason  - Pt is scheduled for PIPAC clinical trial    Berto Stafford, PGY-1  D Team  [id number] 59 yo male with preop dx.  Pt s/p cytoreduction surgery with HIPEC 5/2020 and adjuvent chemotherapy.  Pt with c/o abdominal pain and bloating 2 weeks ago,  Workup CT revealed intraperitoneal fluid which was drained, negative for malignant cells.  Pt is scheduled for PIPAC clinical trial.     PLAN:  - Pt was discussed with Dr. Mason  - Pt is scheduled for PIPAC clinical trial      Berto Stafford, PGY-1  D Team  [id number]

## 2021-06-03 NOTE — DISCHARGE NOTE PROVIDER - NSDCMRMEDTOKEN_GEN_ALL_CORE_FT
escitalopram 5 mg oral tablet: 1 tab(s) orally once a day (at bedtime)  gabapentin 100 mg oral tablet: 1 tab(s) orally once a day (at bedtime)  MiraLax oral powder for reconstitution: 1 packet(s) orally  nitrofurantoin macrocrystals 50 mg oral capsule: 1 cap(s) orally once a day (at bedtime)  oxyCODONE 5 mg oral tablet: 1 tab(s) orally every 6 hours, As Needed -for severe pain MDD:4 tabs per day   simvastatin 10 mg oral tablet: 1 tab(s) orally once a day (at bedtime)  tamsulosin 0.4 mg oral capsule: 1 cap(s) orally once a day (at bedtime)   acetaminophen 325 mg oral tablet: 3 tab(s) orally every 6 hours  escitalopram 5 mg oral tablet: 1 tab(s) orally once a day (at bedtime)  gabapentin 100 mg oral tablet: 1 tab(s) orally once a day (at bedtime)  MiraLax oral powder for reconstitution: 1 packet(s) orally  nitrofurantoin macrocrystals 50 mg oral capsule: 1 cap(s) orally once a day (at bedtime)  Oxaydo 5 mg oral tablet: 1 tab(s) orally every 6 hours for severe pain as needed MDD:4  simvastatin 10 mg oral tablet: 1 tab(s) orally once a day (at bedtime)  tamsulosin 0.4 mg oral capsule: 1 cap(s) orally once a day (at bedtime)

## 2021-06-03 NOTE — DISCHARGE NOTE PROVIDER - NSDCFUSCHEDAPPT_GEN_ALL_CORE_FT
RYANNE YOUNG ; 06/22/2021 ; NPP Urology [address] [facility]  RYANNE YOUNG ; 06/22/2021 ; NPP Urology [address] [facility]  RYANNE YOUNG ; 07/15/2021 ; NPP Surgonc [address] ; 07/28/2021 ; NPP [facility]  RYANNE YOUNG ; 07/28/2021 ; [facility]

## 2021-06-03 NOTE — H&P ADULT - HISTORY OF PRESENT ILLNESS
61 yo male with preop dx. Malignant neoplasm of appendix presents to pre surgical testing.  Pt s/p cytoreduction surgery with HIPEC 5/2020 and adjuvent chemotherapy.  Pt with c/o abdominal pain and bloating 2 weeks ago,  Workup CT revealed intraperitoneal fluid which was drained, negative for malignant cells.  Pt is scheduled for PIPAC clinical trial.

## 2021-06-03 NOTE — ASU PATIENT PROFILE, ADULT - ABILITY TO HEAR (WITH HEARING AID OR HEARING APPLIANCE IF NORMALLY USED):
mandy CALLE, left at home/Mildly to Moderately Impaired: difficulty hearing in some environments or speaker may need to increase volume or speak distinctly

## 2021-06-03 NOTE — DISCHARGE NOTE PROVIDER - HOSPITAL COURSE
Patient is a 60 year old male with a PMHx of BPH and HLD who presented with a pre-operative diagnosis of malignant neoplasm of appendix.    On 6/3 patient went to the OR for PIPEC.  Post operatively patient was advanced to a regular diet, which he tolerated well.  Pain was controlled with oral medication.    At the time of discharge, the patient was hemodynamically stable, was tolerating PO diet, was voiding urine and passing stool.  He was  ambulating and was comfortable with adequate pain control.  The patient was instructed to follow up with Dr. Mason within 1 week after discharge from the hospital.  The patient / family felt comfortable with discharge.  The patient had no other issues.    Per attending, patient deemed medically stable and cleared for discharge at this time.      ***COMPLETE UP TO 6/3*** Patient is a 60 year old male with a PMHx of BPH and HLD who presented with a pre-operative diagnosis of malignant neoplasm of appendix.    On 6/3 patient went to the OR for PIPEC.  Post operatively patient was advanced to a regular diet, which he tolerated well.  Pain was controlled with oral medication.    At the time of discharge, the patient was hemodynamically stable, was tolerating PO diet, was voiding urine and passing stool.  He was  ambulating and was comfortable with adequate pain control.  The patient was instructed to follow up with Dr. Mason within 1 week after discharge from the hospital.  The patient / family felt comfortable with discharge.  The patient had no other issues.    Per attending, patient deemed medically stable and cleared for discharge at this time.   Patient is a 60 year old male with a PMHx of BPH and HLD who presented with a pre-operative diagnosis of malignant neoplasm of appendix.    On 6/3 patient went to the OR for PIPAC.  Post operatively patient was advanced to a regular diet, which he tolerated well.  Pain was controlled with oral medication.    At the time of discharge, the patient was hemodynamically stable, was tolerating PO diet, was voiding urine and passing stool.  He was  ambulating and was comfortable with adequate pain control.  The patient was instructed to follow up with Dr. Mason within 1 week after discharge from the hospital.  The patient / family felt comfortable with discharge.  The patient had no other issues.    Per attending, patient deemed medically stable and cleared for discharge at this time.

## 2021-06-03 NOTE — DISCHARGE NOTE PROVIDER - NSDCCPCAREPLAN_GEN_ALL_CORE_FT
PRINCIPAL DISCHARGE DIAGNOSIS  Diagnosis: Neoplasm of appendix  Assessment and Plan of Treatment: s/p PIPEC  WOUND CARE:  Please keep incisions clean and dry. Please do not Scrub or rub incisions. DO NOT use lotion or powder on incisions.   BATHING: You may shower and/or sponge bathe. You may use warm soapy water in the shower and rinse, pat dry. NO baths no pools for 6 weeks.  ACTIVITY: No heavy lifting or straining. Otherwise, you may return to your usual level of physical activity.   Take Tylenol 500mg every 6 hours as needed for mild to moderate pain if your pain continues take oxycodone 5 mg. If you are taking narcotic pain (oxycodone) medication DO NOT drive a car, operate machinery or make important decisions.  DIET: Return to your usual diet.  NOTIFY YOUR SURGEON IF YOU HAVE: any bleeding that does not stop, any pus draining from your wound(s), any fever (over 100.4 F) persistent nausea/vomiting, or if your pain is not controlled on your discharge pain medications, unable to urinate.  Please follow up with your primary care physician in one week regarding your hospitalization, bring copies of your discharge paperwork.  Please follow up with your surgeon, Dr. Domínguez

## 2021-06-03 NOTE — DISCHARGE NOTE PROVIDER - NSDCFUADDINST_GEN_ALL_CORE_FT
WOUND CARE: Please keep incisions clean and dry.  Please do not scrub or rub incisions.  Do not use lotion or powder on incisions.   BATHING: Please do not submerge wound underwater.  You may shower and / or sponge bathe.  ACTIVITY: No heavy lifting or straining. Otherwise, you may return to your usual level of physical activity.  If you are taking narcotic pain medication (such as Oxycodone or Percocet) DO NOT drive a car, operate machinery or make important decisions.  DIET: Resume regular diet as tolerated.  NOTIFY YOUR SURGEON IF: You have any bleeding that does not stop, any pus draining from your wound(s), any fever (over 100.4 F) or chills, persistent nausea / vomiting, persistent diarrhea or if your pain is not controlled on your discharge pain medications.  FOLLOW-UP: Please follow up with your primary care physician in one week regarding your hospitalization.  Please follow-up with your surgeon, Dr. Mason within 7 days following discharge.  Please call [phone] to schedule an appointment.

## 2021-06-03 NOTE — H&P ADULT - NSHPPHYSICALEXAM_GEN_ALL_CORE
Physical Exam:  Constitutional: well-groomed; no distress  Eyes:	PERRL; EOMI; conjunctiva clear  ENMT:	mouth  Mouth:  mouth and gums; moist  Neck:	supple; no JVD  Breasts:	normal shape  Back:	normal shape; ROM intact; strength intact  Respiratory:	breath sounds equal; good air movement; respirations non-labored; clear to auscultation bilaterally  Cardiovascular:	regular rate and rhythm. positive S1; positive S2  GI:	soft; nontender; bowel sounds normal, ascites  Extremities:	no clubbing; no cyanosis; no pedal edema  Radial Pulse	right normal; left normal  Neurological:	Alert & oriented; no sensory, motor or coordination deficits, normal reflexes  Skin Details:	warm and dry; color normal  Lymphatics:	No palpable anterior cervical lymph nodes.  Musculoskeletal:	ROM intact; no joint swelling; no joint erythema; no joint warmth; no calf tenderness; normal strength  Psychiatric:	Affect and characteristics of appearance, verbalizations, behaviors are appropriate

## 2021-06-03 NOTE — DISCHARGE NOTE PROVIDER - CARE PROVIDER_API CALL
Florecita Mason)  Complex General Surgical Oncology; Surgery  [address] [place] [address]  Phone: [phone]  Fax: [phone]  Follow Up Time: 1 week

## 2021-06-03 NOTE — CHART NOTE - NSCHARTNOTEFT_GEN_A_CORE
SURGERY POST-OP NOTE:    S: Patient underwent and tolerated procedure without issue and sent to PACU then floor. Patient denies chest pain, shortness of breath, nausea, vomiting, lightheadedness, or dizziness. Reports some abdominal soreness but pain is well controlled.      O:  T(C): 37.2 (06-04-21 @ 00:30), Max: 37.2 (06-04-21 @ 00:30)  HR: 79 (06-04-21 @ 00:30) (78 - 79)  BP: 125/71 (06-04-21 @ 00:30) (122/69 - 125/71)  RR: 19 (06-04-21 @ 00:30) (19 - 19)  SpO2: 97% (06-04-21 @ 00:30) (97% - 100%)  Wt(kg): --                        9.7    10.59 )-----------( 733      ( 03 Jun 2021 12:17 )             32.4        06-03    140  |  106  |  16  ----------------------------<  99  4.4   |  25  |  0.82    Ca    9.0      03 Jun 2021 11:57      Gen: NAD  LS: Respirations unlabored   GI: Soft. Mildly tender to palpation. Nondistended. Incisions with dermabond c/d/i. Prior surgical incisions with dermabond peeling.   Ext: Warm, well perfused      Assessment/Plan:  59 yo males/p cytoreduction surgery with HIPEC 5/2020 and adjuvant chemotherapy s/p PIPEC 06/03 (enrolled in clinical trial)    PLAN:  - pain control  - regular diet  - continue nitrofurantoin from outpatient for UTI  - OOBAT  - DVT PPx: Lovenox    Dispo: 8T    D Team Surgery  [id number]

## 2021-06-03 NOTE — ASU PATIENT PROFILE, ADULT - VISION (WITH CORRECTIVE LENSES IF THE PATIENT USUALLY WEARS THEM):
with eyeglassess/Normal vision: sees adequately in most situations; can see medication labels, newsprint

## 2021-06-04 ENCOUNTER — TRANSCRIPTION ENCOUNTER (OUTPATIENT)
Age: 61
End: 2021-06-04

## 2021-06-04 VITALS
DIASTOLIC BLOOD PRESSURE: 67 MMHG | OXYGEN SATURATION: 99 % | TEMPERATURE: 99 F | HEART RATE: 86 BPM | SYSTOLIC BLOOD PRESSURE: 115 MMHG | RESPIRATION RATE: 18 BRPM

## 2021-06-04 LAB
ANION GAP SERPL CALC-SCNC: 14 MMOL/L — SIGNIFICANT CHANGE UP (ref 7–14)
BUN SERPL-MCNC: 14 MG/DL — SIGNIFICANT CHANGE UP (ref 7–23)
CALCIUM SERPL-MCNC: 8.8 MG/DL — SIGNIFICANT CHANGE UP (ref 8.4–10.5)
CEA SERPL-MCNC: 22.9 NG/ML — HIGH (ref 1–3.8)
CHLORIDE SERPL-SCNC: 102 MMOL/L — SIGNIFICANT CHANGE UP (ref 98–107)
CO2 SERPL-SCNC: 21 MMOL/L — LOW (ref 22–31)
CREAT SERPL-MCNC: 0.86 MG/DL — SIGNIFICANT CHANGE UP (ref 0.5–1.3)
GLUCOSE SERPL-MCNC: 169 MG/DL — HIGH (ref 70–99)
HCT VFR BLD CALC: 31.9 % — LOW (ref 39–50)
HCV AB S/CO SERPL IA: 0.11 S/CO — SIGNIFICANT CHANGE UP (ref 0–0.99)
HCV AB SERPL-IMP: SIGNIFICANT CHANGE UP
HGB BLD-MCNC: 9.8 G/DL — LOW (ref 13–17)
MAGNESIUM SERPL-MCNC: 1.7 MG/DL — SIGNIFICANT CHANGE UP (ref 1.6–2.6)
MCHC RBC-ENTMCNC: 23.7 PG — LOW (ref 27–34)
MCHC RBC-ENTMCNC: 30.7 GM/DL — LOW (ref 32–36)
MCV RBC AUTO: 77.2 FL — LOW (ref 80–100)
NRBC # BLD: 0 /100 WBCS — SIGNIFICANT CHANGE UP
NRBC # FLD: 0 K/UL — SIGNIFICANT CHANGE UP
PHOSPHATE SERPL-MCNC: 3.8 MG/DL — SIGNIFICANT CHANGE UP (ref 2.5–4.5)
PLATELET # BLD AUTO: 615 K/UL — HIGH (ref 150–400)
POTASSIUM SERPL-MCNC: 3.9 MMOL/L — SIGNIFICANT CHANGE UP (ref 3.5–5.3)
POTASSIUM SERPL-SCNC: 3.9 MMOL/L — SIGNIFICANT CHANGE UP (ref 3.5–5.3)
RBC # BLD: 4.13 M/UL — LOW (ref 4.2–5.8)
RBC # FLD: 19.6 % — HIGH (ref 10.3–14.5)
SODIUM SERPL-SCNC: 137 MMOL/L — SIGNIFICANT CHANGE UP (ref 135–145)
WBC # BLD: 18.75 K/UL — HIGH (ref 3.8–10.5)
WBC # FLD AUTO: 18.75 K/UL — HIGH (ref 3.8–10.5)

## 2021-06-04 RX ORDER — CALCIUM CARBONATE 500(1250)
2 TABLET ORAL ONCE
Refills: 0 | Status: COMPLETED | OUTPATIENT
Start: 2021-06-04 | End: 2021-06-04

## 2021-06-04 RX ORDER — ACETAMINOPHEN 500 MG
3 TABLET ORAL
Qty: 0 | Refills: 0 | DISCHARGE
Start: 2021-06-04

## 2021-06-04 RX ORDER — MAGNESIUM SULFATE 500 MG/ML
1 VIAL (ML) INJECTION ONCE
Refills: 0 | Status: COMPLETED | OUTPATIENT
Start: 2021-06-04 | End: 2021-06-04

## 2021-06-04 RX ORDER — OXYCODONE HYDROCHLORIDE 5 MG/1
1 TABLET ORAL
Qty: 8 | Refills: 0
Start: 2021-06-04 | End: 2021-06-05

## 2021-06-04 RX ADMIN — ENOXAPARIN SODIUM 40 MILLIGRAM(S): 100 INJECTION SUBCUTANEOUS at 11:58

## 2021-06-04 RX ADMIN — Medication 100 GRAM(S): at 11:58

## 2021-06-04 RX ADMIN — ONDANSETRON 4 MILLIGRAM(S): 8 TABLET, FILM COATED ORAL at 00:10

## 2021-06-04 RX ADMIN — SIMVASTATIN 10 MILLIGRAM(S): 20 TABLET, FILM COATED ORAL at 00:10

## 2021-06-04 RX ADMIN — Medication 975 MILLIGRAM(S): at 07:28

## 2021-06-04 RX ADMIN — Medication 300 UNIT(S): at 11:58

## 2021-06-04 RX ADMIN — Medication 2 TABLET(S): at 04:40

## 2021-06-04 NOTE — PROGRESS NOTE ADULT - SUBJECTIVE AND OBJECTIVE BOX
SURGERY PROGRESS NOTE    SUBJECTIVE / 24H EVENTS:  Patient seen and examined on morning rounds. Patient requested antacid for heartburn overnight.      OBJECTIVE:  VITAL SIGNS:  T(C): 37.2 (06-04-21 @ 00:30), Max: 37.2 (06-04-21 @ 00:30)  HR: 79 (06-04-21 @ 00:30) (53 - 79)  BP: 125/71 (06-04-21 @ 00:30) (110/53 - 146/62)  RR: 19 (06-04-21 @ 00:30) (14 - 24)  SpO2: 97% (06-04-21 @ 00:30) (97% - 100%)  Daily Height in cm: 170.18 (03 Jun 2021 10:42)    Daily       PHYSICAL EXAM:  Gen: NAD  LS: Respirations unlabored   GI: Soft. Mildly tender to palpation. Nondistended. Incisions with dermabond c/d/i. Prior surgical incisions with dermabond peeling.   Ext: Warm, well perfused      06-03-21 @ 07:01  -  06-04-21 @ 04:17  --------------------------------------------------------  IN:  Total IN: 0 mL    OUT:    Voided (mL): 325 mL  Total OUT: 325 mL    Total NET: -325 mL          LAB VALUES:  06-03    140  |  106  |  16  ----------------------------<  99  4.4   |  25  |  0.82    Ca    9.0      03 Jun 2021 11:57    TPro  6.7  /  Alb  4.1  /  TBili  0.3  /  DBili  x   /  AST  17  /  ALT  18  /  AlkPhos  138<H>  06-03                               9.7    10.59 )-----------( 733      ( 03 Jun 2021 12:17 )             32.4     LIVER FUNCTIONS - ( 03 Jun 2021 11:57 )  Alb: 4.1 g/dL / Pro: 6.7 g/dL / ALK PHOS: 138 U/L / ALT: 18 U/L / AST: 17 U/L / GGT: x                       MICROBIOLOGY:      RADIOLOGY:        MEDICATIONS  (STANDING):  acetaminophen   Tablet .. 975 milliGRAM(s) Oral every 6 hours  calcium carbonate    500 mG (Tums) Chewable 2 Tablet(s) Chew once  enoxaparin Injectable 40 milliGRAM(s) SubCutaneous daily  escitalopram 5 milliGRAM(s) Oral daily  gabapentin 100 milliGRAM(s) Oral at bedtime  lactated ringers. 1000 milliLiter(s) (50 mL/Hr) IV Continuous <Continuous>  nitrofurantoin macrocrystals (MACRODANTIN) 100 milliGRAM(s) Oral <User Schedule>  oxaliplatin INTRAPERITONEAL (eMAR) 154 milliGRAM(s) IntraPeritoneal once  simvastatin 10 milliGRAM(s) Oral at bedtime  tamsulosin 0.4 milliGRAM(s) Oral at bedtime    MEDICATIONS  (PRN):  oxyCODONE    IR 5 milliGRAM(s) Oral every 4 hours PRN moderate to severe pain

## 2021-06-04 NOTE — DISCHARGE NOTE NURSING/CASE MANAGEMENT/SOCIAL WORK - PATIENT PORTAL LINK FT
You can access the FollowMyHealth Patient Portal offered by [facility] by registering at the following website: [email or web address]. By joining [email or web address]’s FollowMyHealth portal, you will also be able to view your health information using other applications (apps) compatible with our system.

## 2021-06-04 NOTE — PROGRESS NOTE ADULT - ASSESSMENT
Assesment/Plan:    61 yo males/p cytoreduction surgery with HIPEC 5/2020 and adjuvant chemotherapy s/p PIPEC 06/03 (enrolled in clinical trial)    PLAN:  - pain control  - regular diet  - continue nitrofurantoin from outpatient for UTI  - OOBAT  - DVT PPx: Lovenox    D Team Surgery  [id number]   Assesment/Plan:    59 yo males/p cytoreduction surgery with HIPEC 5/2020 and adjuvant chemotherapy s/p PIPEC 06/03 (enrolled in clinical trial)    PLAN:  - pain control  - regular diet  - continue nitrofurantoin from outpatient for UTI  - OOBAT  - DVT PPx: Lovenox  - d/c    D Team Surgery  [id number]

## 2021-06-05 LAB
CULTURE RESULTS: SIGNIFICANT CHANGE UP
SPECIMEN SOURCE: SIGNIFICANT CHANGE UP

## 2021-06-06 LAB
DPYD GENOTYPE: NORMAL
DPYD PHENOTYPE: NORMAL
DPYD SPECIMEN: NORMAL

## 2021-06-07 ENCOUNTER — TRANSCRIPTION ENCOUNTER (OUTPATIENT)
Age: 61
End: 2021-06-07

## 2021-06-07 LAB — NON-GYNECOLOGICAL CYTOLOGY STUDY: SIGNIFICANT CHANGE UP

## 2021-06-08 LAB — SURGICAL PATHOLOGY STUDY: SIGNIFICANT CHANGE UP

## 2021-06-17 ENCOUNTER — APPOINTMENT (OUTPATIENT)
Dept: SURGICAL ONCOLOGY | Facility: CLINIC | Age: 61
End: 2021-06-17
Payer: COMMERCIAL

## 2021-06-17 ENCOUNTER — APPOINTMENT (OUTPATIENT)
Dept: HEMATOLOGY ONCOLOGY | Facility: CLINIC | Age: 61
End: 2021-06-17

## 2021-06-17 ENCOUNTER — NON-APPOINTMENT (OUTPATIENT)
Age: 61
End: 2021-06-17

## 2021-06-17 ENCOUNTER — RESULT REVIEW (OUTPATIENT)
Age: 61
End: 2021-06-17

## 2021-06-17 VITALS
HEART RATE: 75 BPM | WEIGHT: 133 LBS | DIASTOLIC BLOOD PRESSURE: 85 MMHG | SYSTOLIC BLOOD PRESSURE: 124 MMHG | BODY MASS INDEX: 21.38 KG/M2 | RESPIRATION RATE: 16 BRPM | OXYGEN SATURATION: 98 % | HEIGHT: 66 IN

## 2021-06-17 LAB
BASOPHILS # BLD AUTO: 0.08 K/UL — SIGNIFICANT CHANGE UP (ref 0–0.2)
BASOPHILS NFR BLD AUTO: 0.7 % — SIGNIFICANT CHANGE UP (ref 0–2)
EOSINOPHIL # BLD AUTO: 0.57 K/UL — HIGH (ref 0–0.5)
EOSINOPHIL NFR BLD AUTO: 5.3 % — SIGNIFICANT CHANGE UP (ref 0–6)
HCT VFR BLD CALC: 35.5 % — LOW (ref 39–50)
HGB BLD-MCNC: 10.5 G/DL — LOW (ref 13–17)
IMM GRANULOCYTES NFR BLD AUTO: 0.5 % — SIGNIFICANT CHANGE UP (ref 0–1.5)
LYMPHOCYTES # BLD AUTO: 3.85 K/UL — HIGH (ref 1–3.3)
LYMPHOCYTES # BLD AUTO: 35.9 % — SIGNIFICANT CHANGE UP (ref 13–44)
MCHC RBC-ENTMCNC: 24.5 PG — LOW (ref 27–34)
MCHC RBC-ENTMCNC: 29.6 G/DL — LOW (ref 32–36)
MCV RBC AUTO: 82.8 FL — SIGNIFICANT CHANGE UP (ref 80–100)
MONOCYTES # BLD AUTO: 1.25 K/UL — HIGH (ref 0–0.9)
MONOCYTES NFR BLD AUTO: 11.6 % — SIGNIFICANT CHANGE UP (ref 2–14)
NEUTROPHILS # BLD AUTO: 4.93 K/UL — SIGNIFICANT CHANGE UP (ref 1.8–7.4)
NEUTROPHILS NFR BLD AUTO: 46 % — SIGNIFICANT CHANGE UP (ref 43–77)
NRBC # BLD: 0 /100 WBCS — SIGNIFICANT CHANGE UP (ref 0–0)
PLATELET # BLD AUTO: 710 K/UL — HIGH (ref 150–400)
RBC # BLD: 4.29 M/UL — SIGNIFICANT CHANGE UP (ref 4.2–5.8)
RBC # FLD: 23.4 % — HIGH (ref 10.3–14.5)
WBC # BLD: 10.73 K/UL — HIGH (ref 3.8–10.5)
WBC # FLD AUTO: 10.73 K/UL — HIGH (ref 3.8–10.5)

## 2021-06-17 PROCEDURE — 99072 ADDL SUPL MATRL&STAF TM PHE: CPT

## 2021-06-17 PROCEDURE — 99212 OFFICE O/P EST SF 10 MIN: CPT

## 2021-06-17 NOTE — PHYSICAL EXAM
[Normal] : supple, no neck mass and thyroid not enlarged [Normal Neck Lymph Nodes] : normal neck lymph nodes  [Normal Supraclavicular Lymph Nodes] : normal supraclavicular lymph nodes [Normal Groin Lymph Nodes] : normal groin lymph nodes [Normal Axillary Lymph Nodes] : normal axillary lymph nodes [Normal] : oriented to person, place and time, with appropriate affect [de-identified] : abdomen soft, non-distended, non-tender. no masses. laparotomy and drain incisions well healed. Port sites healing well.

## 2021-06-17 NOTE — ASSESSMENT
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 \par No extraperitoneal disease \par Perfomance status- ecog 1\par \par He is feeling well at this point. He follows with Dr. Sanford for chronic bactiuria for which he is on 6 week course on nitrofurantoin. \par \par Disease was reasonably stable over the last year, now with progression requiring paracentesis-- most recent 1.6L drained May 2021. \par \par Given his progression within 1 year of CRS/ HIPEC, as well as R2b/ CC 2 resection at first operation I do not think additional cytoreductive surgery is feasible. I have notified his surgeon from [place], Dr. Peters who agrees. \par \par I discussed case with patients medical oncologist who favors regional approach over additional systemic therapy.\par \par Doing well after 1st PIPAC. No major adverse events. He describes some dyspepsia/ reflux symptoms, but is eating normally. \par \par Plan: \par [] Labs today\par [] FU w labs in 2 weeks\par [] Plan for PIPAC 2 around July 14th with 5-FU infusion the day prior \par \par \par

## 2021-06-17 NOTE — REASON FOR VISIT
[de-identified] : diagnostic laparoscopy 5/19/21, PIPAC 6/3/21 [Post-Op Visit] : a post-op visit for [Spouse] : spouse [FreeTextEntry2] : appendiceal carcinoma-  Here for enrollment visit for [facility] clinical trial

## 2021-06-17 NOTE — HISTORY OF PRESENT ILLNESS
[de-identified] : Mr. Arias is a 60 year old gentleman referred to me by Dr. 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 Herberth Peters. \par \par PCI: 34\par CC: R2b\par \par Mr. Arias has since moved to [place] to be closer to family and has established care in [facility]. Lives in [place]. \par \par Cytoreduction included a splenectomy and distal gastrectomy, omentectomy, cholecystectomy, subtotal colectomy and diaphragm stripping. 4L of mucinous ascites was drained. 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 As his tumor was felt to be low grade, he did not receive any systemic therapy up front. He now reports feeling well. He reports 3-4 loose but not watery BM's/day. He reports improving weight and energy levels. He denies any PO intolerance. He plays golf and does nature walks close to [facility]. \par \par CEA \par 6/5/20: 3.8\par 8/12/20: 6.1\par 4/7: 10.1 \par \par Pathology: \par 5/7/21: Paracentesis, cytology negative for malignant cells. 1.6 Liters removed. 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 He underwent subtotal colectomy with positive margins. 2/14 nodes involved. [facility] review: low grade appendiceal mucinous neoplasm\par \par Scans were obtained at [facility] and demonstrated: \par 4/30/21: loculated intraperitoneal fluid. No bowel obstruction. \par \par Last colonoscopy May 2020. \par \par Underwent diagnostic laparoscopy by me on 5/19/21. PCI approx 25. Evacuated 1.4 liters ascites. Biopsies consistent with mucinous adenocarcinoma. \par 6/3: PIPAC #1 \par 6/17: Doing well. No pain. Mild reflux/ dyspepsia. Relieved with tums. Eating well. \par

## 2021-06-20 LAB
ALBUMIN SERPL ELPH-MCNC: 4.1 G/DL
ALP BLD-CCNC: 154 U/L
ALT SERPL-CCNC: 16 U/L
ANION GAP SERPL CALC-SCNC: 12 MMOL/L
AST SERPL-CCNC: 16 U/L
BILIRUB SERPL-MCNC: 0.2 MG/DL
BUN SERPL-MCNC: 18 MG/DL
CALCIUM SERPL-MCNC: 9.6 MG/DL
CEA SERPL-MCNC: 19.9 NG/ML
CHLORIDE SERPL-SCNC: 107 MMOL/L
CO2 SERPL-SCNC: 24 MMOL/L
CREAT SERPL-MCNC: 0.98 MG/DL
GLUCOSE SERPL-MCNC: 92 MG/DL
POTASSIUM SERPL-SCNC: 5.3 MMOL/L
PROT SERPL-MCNC: 6.8 G/DL
SODIUM SERPL-SCNC: 143 MMOL/L

## 2021-06-22 ENCOUNTER — APPOINTMENT (OUTPATIENT)
Dept: UROLOGY | Facility: CLINIC | Age: 61
End: 2021-06-22
Payer: COMMERCIAL

## 2021-06-22 ENCOUNTER — APPOINTMENT (OUTPATIENT)
Dept: UROLOGY | Facility: CLINIC | Age: 61
End: 2021-06-22

## 2021-06-22 VITALS
WEIGHT: 133 LBS | HEART RATE: 62 BPM | BODY MASS INDEX: 21.38 KG/M2 | HEIGHT: 66 IN | DIASTOLIC BLOOD PRESSURE: 74 MMHG | TEMPERATURE: 97.2 F | RESPIRATION RATE: 17 BRPM | SYSTOLIC BLOOD PRESSURE: 124 MMHG

## 2021-06-22 DIAGNOSIS — N39.0 URINARY TRACT INFECTION, SITE NOT SPECIFIED: ICD-10-CM

## 2021-06-22 PROCEDURE — 99072 ADDL SUPL MATRL&STAF TM PHE: CPT

## 2021-06-22 PROCEDURE — 99214 OFFICE O/P EST MOD 30 MIN: CPT

## 2021-06-22 NOTE — ASSESSMENT
[FreeTextEntry1] : 61 yo M with apendiceal ca, now receiving PIPEC\par \par finish macrobid next few days\par UA/Cx\par US kidney in 6 months, if imaging done by other providers will just see in office

## 2021-06-22 NOTE — PHYSICAL EXAM
[General Appearance - Well Developed] : well developed [Epididymis] : the epididymides were normal [Testes Tenderness] : no tenderness of the testes [Skin Turgor] : supple [Respiration, Rhythm And Depth] : normal respiratory rhythm and effort

## 2021-06-22 NOTE — HISTORY OF PRESENT ILLNESS
[FreeTextEntry1] : pt with appeniceal ca status post major surgery including right colectomy, cholecystectomy and splenectomy. had post op chmo. Now having pressurized intraperitoneal chemo. feeling well. CT in May 2021 no hydronephrosis.\par \par in ER feb for abdominal pain form constipation. ct which i reviewed personalluy shows question hydro to bladdr.\par had bacituia that has persisted with enterococcus. had bph symptoms, on flowmax now  and voiding well other than ocasional splity stream. no symptoms of UTI currently on cipro. [Urinary Incontinence] : no urinary incontinence [Urinary Retention] : no urinary retention [Urinary Urgency] : no urinary urgency [Urinary Frequency] : no urinary frequency [Nocturia] : no nocturia

## 2021-06-23 LAB
APPEARANCE: CLEAR
BACTERIA UR CULT: NORMAL
BACTERIA: NEGATIVE
BILIRUBIN URINE: NEGATIVE
BLOOD URINE: NEGATIVE
COLOR: YELLOW
GLUCOSE QUALITATIVE U: NEGATIVE
HYALINE CASTS: 0 /LPF
KETONES URINE: NEGATIVE
LEUKOCYTE ESTERASE URINE: ABNORMAL
MICROSCOPIC-UA: NORMAL
NITRITE URINE: NEGATIVE
PH URINE: 6
PROTEIN URINE: NORMAL
RED BLOOD CELLS URINE: 2 /HPF
SPECIFIC GRAVITY URINE: 1.03
SQUAMOUS EPITHELIAL CELLS: 3 /HPF
UROBILINOGEN URINE: NORMAL
WHITE BLOOD CELLS URINE: 26 /HPF

## 2021-06-25 ENCOUNTER — OUTPATIENT (OUTPATIENT)
Dept: OUTPATIENT SERVICES | Facility: HOSPITAL | Age: 61
LOS: 1 days | End: 2021-06-25

## 2021-06-25 VITALS
HEART RATE: 57 BPM | OXYGEN SATURATION: 97 % | HEIGHT: 66 IN | SYSTOLIC BLOOD PRESSURE: 112 MMHG | RESPIRATION RATE: 16 BRPM | WEIGHT: 132.06 LBS | DIASTOLIC BLOOD PRESSURE: 74 MMHG | TEMPERATURE: 98 F

## 2021-06-25 DIAGNOSIS — Z87.438 PERSONAL HISTORY OF OTHER DISEASES OF MALE GENITAL ORGANS: Chronic | ICD-10-CM

## 2021-06-25 DIAGNOSIS — C18.1 MALIGNANT NEOPLASM OF APPENDIX: ICD-10-CM

## 2021-06-25 DIAGNOSIS — Z98.890 OTHER SPECIFIED POSTPROCEDURAL STATES: Chronic | ICD-10-CM

## 2021-06-25 DIAGNOSIS — R19.00 INTRA-ABDOMINAL AND PELVIC SWELLING, MASS AND LUMP, UNSPECIFIED SITE: Chronic | ICD-10-CM

## 2021-06-25 RX ORDER — TAMSULOSIN HYDROCHLORIDE 0.4 MG/1
1 CAPSULE ORAL
Qty: 0 | Refills: 0 | DISCHARGE

## 2021-06-25 RX ORDER — SIMVASTATIN 20 MG/1
1 TABLET, FILM COATED ORAL
Qty: 0 | Refills: 0 | DISCHARGE

## 2021-06-25 RX ORDER — NITROFURANTOIN MACROCRYSTAL 50 MG
1 CAPSULE ORAL
Qty: 0 | Refills: 0 | DISCHARGE

## 2021-06-25 RX ORDER — POLYETHYLENE GLYCOL 3350 17 G/17G
1 POWDER, FOR SOLUTION ORAL
Qty: 0 | Refills: 0 | DISCHARGE

## 2021-06-25 RX ORDER — ESCITALOPRAM OXALATE 10 MG/1
1 TABLET, FILM COATED ORAL
Qty: 0 | Refills: 0 | DISCHARGE

## 2021-06-25 RX ORDER — GABAPENTIN 400 MG/1
1 CAPSULE ORAL
Qty: 0 | Refills: 0 | DISCHARGE

## 2021-06-25 NOTE — H&P PST ADULT - NEGATIVE ENMT SYMPTOMS
no ear pain/no tinnitus/no vertigo/no sinus symptoms/no nasal congestion/no nasal discharge/no nasal obstruction/no post-nasal discharge/no nose bleeds/no dry mouth/no throat pain/no dysphagia

## 2021-06-25 NOTE — H&P PST ADULT - NEGATIVE NEUROLOGICAL SYMPTOMS
April 4, 2017      Regarding:  Samuel Allen  [address] [place] [address]         This is to certify that Samuel Allen has been under my care and can return to regular work with the following restrictions;.    RESTRICTIONS: Light duty (less than 20 pounds) for 2 weeks .            SIGNATURE:___________________________________________,   4/4/2017                                           MD Kane Alfredo MD  General and Bariatric Surgery    [facility]  [address] [place] [id number] [phone]     no transient paralysis/no weakness/no generalized seizures/no syncope/no tremors/no vertigo/no loss of sensation/no difficulty walking/no headache/no loss of consciousness/no hemiparesis

## 2021-06-25 NOTE — H&P PST ADULT - NEGATIVE GASTROINTESTINAL SYMPTOMS
no nausea/no vomiting/no diarrhea/no constipation/no change in bowel habits/no melena/no jaundice/no hiccoughs

## 2021-06-25 NOTE — H&P PST ADULT - NEGATIVE OPHTHALMOLOGIC SYMPTOMS
no diplopia/no photophobia/no lacrimation L/no lacrimation R/no blurred vision L/no blurred vision R/no discharge L/no pain L/no pain R/no irritation L/no irritation R/no loss of vision R

## 2021-06-25 NOTE — H&P PST ADULT - HISTORY OF PRESENT ILLNESS
61 y/o male with H/O: Dyslipidemia, BPH    preop dx. Malignant neoplasm of appendix presents to pre surgical testing.  Pt s/p cytoreduction surgery with HIPEC 5/2020 and adjuvent chemotherapy.  Pt with c/o abdominal pain and bloating 2 weeks ago,  Workup CT revealed intraperitoneal fluid which was drained, negative for malignant cells.  Pt is scheduled for diagnostic laparoscopy with peritoneal washing and biopsy.      59 y/o male with H/O: Dyslipidemia, BPH, Malignant neoplasm of appendix - s/p cytoreduction surgery with HIPEC 5/2020 and adjuvent chemotherapy. Pt presents to PST for pre op evaluation in preparation for diagnostic laparoscopy, peritoneal biopsies, pressurized intraperitoneal chemotherapy (PIPAC) on 07/14/2021

## 2021-06-25 NOTE — H&P PST ADULT - NSICDXPROBLEM_GEN_ALL_CORE_FT
PROBLEM DIAGNOSES  Problem: Malignant neoplasm of appendix  Assessment and Plan: Scheduled for Diagnostic laparoscopy, peritoneal biopsies, pressurized intraperitoneal chemotherapy (PIPAC) on 07/14/2021. Pre op instructions, famotidine, chlorhexidine gluconate soap given and explained. Pt verbalized understanding.

## 2021-06-28 ENCOUNTER — TRANSCRIPTION ENCOUNTER (OUTPATIENT)
Age: 61
End: 2021-06-28

## 2021-06-28 ENCOUNTER — OUTPATIENT (OUTPATIENT)
Dept: OUTPATIENT SERVICES | Facility: HOSPITAL | Age: 61
LOS: 1 days | Discharge: HOME | End: 2021-06-28

## 2021-06-28 DIAGNOSIS — C26.9 MALIGNANT NEOPLASM OF ILL-DEFINED SITES WITHIN THE DIGESTIVE SYSTEM: ICD-10-CM

## 2021-06-28 DIAGNOSIS — Z87.438 PERSONAL HISTORY OF OTHER DISEASES OF MALE GENITAL ORGANS: Chronic | ICD-10-CM

## 2021-06-28 DIAGNOSIS — R19.00 INTRA-ABDOMINAL AND PELVIC SWELLING, MASS AND LUMP, UNSPECIFIED SITE: Chronic | ICD-10-CM

## 2021-06-28 DIAGNOSIS — Z98.890 OTHER SPECIFIED POSTPROCEDURAL STATES: Chronic | ICD-10-CM

## 2021-07-01 ENCOUNTER — APPOINTMENT (OUTPATIENT)
Dept: HEMATOLOGY ONCOLOGY | Facility: CLINIC | Age: 61
End: 2021-07-01

## 2021-07-01 ENCOUNTER — APPOINTMENT (OUTPATIENT)
Dept: SURGICAL ONCOLOGY | Facility: CLINIC | Age: 61
End: 2021-07-01
Payer: COMMERCIAL

## 2021-07-01 ENCOUNTER — RESULT REVIEW (OUTPATIENT)
Age: 61
End: 2021-07-01

## 2021-07-01 ENCOUNTER — NON-APPOINTMENT (OUTPATIENT)
Age: 61
End: 2021-07-01

## 2021-07-01 VITALS
DIASTOLIC BLOOD PRESSURE: 82 MMHG | RESPIRATION RATE: 16 BRPM | WEIGHT: 128 LBS | OXYGEN SATURATION: 97 % | HEART RATE: 64 BPM | HEIGHT: 66 IN | SYSTOLIC BLOOD PRESSURE: 130 MMHG | BODY MASS INDEX: 20.57 KG/M2

## 2021-07-01 LAB
BASOPHILS # BLD AUTO: 0.05 K/UL — SIGNIFICANT CHANGE UP (ref 0–0.2)
BASOPHILS NFR BLD AUTO: 0.5 % — SIGNIFICANT CHANGE UP (ref 0–2)
EOSINOPHIL # BLD AUTO: 0.34 K/UL — SIGNIFICANT CHANGE UP (ref 0–0.5)
EOSINOPHIL NFR BLD AUTO: 3.6 % — SIGNIFICANT CHANGE UP (ref 0–6)
HCT VFR BLD CALC: 38.5 % — LOW (ref 39–50)
HGB BLD-MCNC: 11.8 G/DL — LOW (ref 13–17)
IMM GRANULOCYTES NFR BLD AUTO: 0.3 % — SIGNIFICANT CHANGE UP (ref 0–1.5)
LYMPHOCYTES # BLD AUTO: 3.66 K/UL — HIGH (ref 1–3.3)
LYMPHOCYTES # BLD AUTO: 39 % — SIGNIFICANT CHANGE UP (ref 13–44)
MCHC RBC-ENTMCNC: 26 PG — LOW (ref 27–34)
MCHC RBC-ENTMCNC: 30.6 G/DL — LOW (ref 32–36)
MCV RBC AUTO: 84.8 FL — SIGNIFICANT CHANGE UP (ref 80–100)
MONOCYTES # BLD AUTO: 1.08 K/UL — HIGH (ref 0–0.9)
MONOCYTES NFR BLD AUTO: 11.5 % — SIGNIFICANT CHANGE UP (ref 2–14)
NEUTROPHILS # BLD AUTO: 4.23 K/UL — SIGNIFICANT CHANGE UP (ref 1.8–7.4)
NEUTROPHILS NFR BLD AUTO: 45.1 % — SIGNIFICANT CHANGE UP (ref 43–77)
NRBC # BLD: 0 /100 WBCS — SIGNIFICANT CHANGE UP (ref 0–0)
PLATELET # BLD AUTO: 498 K/UL — HIGH (ref 150–400)
RBC # BLD: 4.54 M/UL — SIGNIFICANT CHANGE UP (ref 4.2–5.8)
RBC # FLD: 23.6 % — HIGH (ref 10.3–14.5)
WBC # BLD: 9.39 K/UL — SIGNIFICANT CHANGE UP (ref 3.8–10.5)
WBC # FLD AUTO: 9.39 K/UL — SIGNIFICANT CHANGE UP (ref 3.8–10.5)

## 2021-07-01 PROCEDURE — 99072 ADDL SUPL MATRL&STAF TM PHE: CPT

## 2021-07-01 PROCEDURE — 99212 OFFICE O/P EST SF 10 MIN: CPT

## 2021-07-01 NOTE — PHYSICAL EXAM
[Normal] : supple, no neck mass and thyroid not enlarged [Normal Neck Lymph Nodes] : normal neck lymph nodes  [Normal Supraclavicular Lymph Nodes] : normal supraclavicular lymph nodes [Normal Groin Lymph Nodes] : normal groin lymph nodes [Normal Axillary Lymph Nodes] : normal axillary lymph nodes [Normal] : grossly intact [de-identified] : abdomen soft, non-distended, non-tender. no masses. laparotomy and drain incisions well healed. Port sites healing well.

## 2021-07-01 NOTE — REASON FOR VISIT
[de-identified] : diagnostic laparoscopy 5/19/21, PIPAC 6/3/21 [Post-Op Visit] : a post-op visit for [Spouse] : spouse [FreeTextEntry2] : appendiceal carcinoma-  Here for enrollment visit for [facility] clinical trial

## 2021-07-01 NOTE — ASSESSMENT
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 \par No extraperitoneal disease \par Perfomance status- ecog 1\par \par He is feeling well at this point. He follows with Dr. Sanford for chronic bactiuria for which he is on 6 week course on nitrofurantoin. \par \par Disease was reasonably stable over the last year, now with progression requiring paracentesis-- most recent 1.6L drained May 2021. \par \par Given his progression within 1 year of CRS/ HIPEC, as well as R2b/ CC 2 resection at first operation I do not think additional cytoreductive surgery is feasible. I have notified his surgeon from [facility], Dr. Peters who agrees. \par \par I discussed case with patients medical oncologist who favors regional approach over additional systemic therapy.\par \par Doing well after 1st PIPAC. No major adverse events. He describes some dyspepsia/ reflux symptoms, which have persisted from last visit. His foot pain has resolved and lasted only a few days. \par \par Plan: \par [] Labs today\par [] PIPAC #2 in 2 weeks with 5-FU infusion the day prior\par [] Prescription for omeprazole sent to patients pharmarcy\par \par \par \par

## 2021-07-08 ENCOUNTER — APPOINTMENT (OUTPATIENT)
Dept: SURGICAL ONCOLOGY | Facility: CLINIC | Age: 61
End: 2021-07-08

## 2021-07-11 ENCOUNTER — EMERGENCY (EMERGENCY)
Facility: HOSPITAL | Age: 61
LOS: 0 days | Discharge: OTHER INSTITUTION | End: 2021-07-12
Attending: EMERGENCY MEDICINE
Payer: COMMERCIAL

## 2021-07-11 VITALS — HEIGHT: 67 IN | WEIGHT: 130.07 LBS

## 2021-07-11 DIAGNOSIS — Z20.822 CONTACT WITH AND (SUSPECTED) EXPOSURE TO COVID-19: ICD-10-CM

## 2021-07-11 DIAGNOSIS — Z98.890 OTHER SPECIFIED POSTPROCEDURAL STATES: Chronic | ICD-10-CM

## 2021-07-11 DIAGNOSIS — R19.00 INTRA-ABDOMINAL AND PELVIC SWELLING, MASS AND LUMP, UNSPECIFIED SITE: Chronic | ICD-10-CM

## 2021-07-11 DIAGNOSIS — Z85.038 PERSONAL HISTORY OF OTHER MALIGNANT NEOPLASM OF LARGE INTESTINE: ICD-10-CM

## 2021-07-11 DIAGNOSIS — Z90.49 ACQUIRED ABSENCE OF OTHER SPECIFIED PARTS OF DIGESTIVE TRACT: ICD-10-CM

## 2021-07-11 DIAGNOSIS — R10.84 GENERALIZED ABDOMINAL PAIN: ICD-10-CM

## 2021-07-11 DIAGNOSIS — Z87.438 PERSONAL HISTORY OF OTHER DISEASES OF MALE GENITAL ORGANS: Chronic | ICD-10-CM

## 2021-07-11 DIAGNOSIS — K56.609 UNSPECIFIED INTESTINAL OBSTRUCTION, UNSPECIFIED AS TO PARTIAL VERSUS COMPLETE OBSTRUCTION: ICD-10-CM

## 2021-07-11 DIAGNOSIS — R11.2 NAUSEA WITH VOMITING, UNSPECIFIED: ICD-10-CM

## 2021-07-11 LAB
HCT VFR BLD CALC: 40.5 % — SIGNIFICANT CHANGE UP (ref 39–50)
HGB BLD-MCNC: 12.9 G/DL — LOW (ref 13–17)
MCHC RBC-ENTMCNC: 26.4 PG — LOW (ref 27–34)
MCHC RBC-ENTMCNC: 31.9 GM/DL — LOW (ref 32–36)
MCV RBC AUTO: 83 FL — SIGNIFICANT CHANGE UP (ref 80–100)
PLATELET # BLD AUTO: 565 K/UL — HIGH (ref 150–400)
RBC # BLD: 4.88 M/UL — SIGNIFICANT CHANGE UP (ref 4.2–5.8)
RBC # FLD: 23.9 % — HIGH (ref 10.3–14.5)
WBC # BLD: 18.12 K/UL — HIGH (ref 3.8–10.5)
WBC # FLD AUTO: 18.12 K/UL — HIGH (ref 3.8–10.5)

## 2021-07-11 PROCEDURE — 93010 ELECTROCARDIOGRAM REPORT: CPT

## 2021-07-11 PROCEDURE — 74177 CT ABD & PELVIS W/CONTRAST: CPT

## 2021-07-11 PROCEDURE — 71045 X-RAY EXAM CHEST 1 VIEW: CPT

## 2021-07-11 PROCEDURE — 99284 EMERGENCY DEPT VISIT MOD MDM: CPT | Mod: 25

## 2021-07-11 PROCEDURE — 93005 ELECTROCARDIOGRAM TRACING: CPT

## 2021-07-11 PROCEDURE — 36415 COLL VENOUS BLD VENIPUNCTURE: CPT

## 2021-07-11 PROCEDURE — 83690 ASSAY OF LIPASE: CPT

## 2021-07-11 PROCEDURE — 0225U NFCT DS DNA&RNA 21 SARSCOV2: CPT

## 2021-07-11 PROCEDURE — 96376 TX/PRO/DX INJ SAME DRUG ADON: CPT

## 2021-07-11 PROCEDURE — 81001 URINALYSIS AUTO W/SCOPE: CPT

## 2021-07-11 PROCEDURE — 84484 ASSAY OF TROPONIN QUANT: CPT

## 2021-07-11 PROCEDURE — 85025 COMPLETE CBC W/AUTO DIFF WBC: CPT

## 2021-07-11 PROCEDURE — 96374 THER/PROPH/DIAG INJ IV PUSH: CPT | Mod: XU

## 2021-07-11 PROCEDURE — 96375 TX/PRO/DX INJ NEW DRUG ADDON: CPT

## 2021-07-11 PROCEDURE — 99285 EMERGENCY DEPT VISIT HI MDM: CPT

## 2021-07-11 PROCEDURE — 80053 COMPREHEN METABOLIC PANEL: CPT

## 2021-07-11 RX ORDER — ONDANSETRON 8 MG/1
4 TABLET, FILM COATED ORAL ONCE
Refills: 0 | Status: COMPLETED | OUTPATIENT
Start: 2021-07-11 | End: 2021-07-11

## 2021-07-11 RX ORDER — FAMOTIDINE 10 MG/ML
20 INJECTION INTRAVENOUS ONCE
Refills: 0 | Status: COMPLETED | OUTPATIENT
Start: 2021-07-11 | End: 2021-07-11

## 2021-07-11 RX ORDER — SODIUM CHLORIDE 9 MG/ML
1000 INJECTION INTRAMUSCULAR; INTRAVENOUS; SUBCUTANEOUS ONCE
Refills: 0 | Status: COMPLETED | OUTPATIENT
Start: 2021-07-11 | End: 2021-07-11

## 2021-07-11 RX ORDER — MORPHINE SULFATE 50 MG/1
4 CAPSULE, EXTENDED RELEASE ORAL ONCE
Refills: 0 | Status: DISCONTINUED | OUTPATIENT
Start: 2021-07-11 | End: 2021-07-11

## 2021-07-11 NOTE — ED ADULT TRIAGE NOTE - CHIEF COMPLAINT QUOTE
Patient presents to the ED ambulatory co 8/10 epigastric pain and ehartburn. onset of symptoms yesterday; pt states he took miralax this morning for pain resolution wihtout releif. pt endorses nausea, deneis numbness/tinlging/back pain/vomiting/diarrhea.

## 2021-07-12 ENCOUNTER — APPOINTMENT (OUTPATIENT)
Dept: SURGICAL ONCOLOGY | Facility: HOSPITAL | Age: 61
End: 2021-07-12

## 2021-07-12 ENCOUNTER — INPATIENT (INPATIENT)
Facility: HOSPITAL | Age: 61
LOS: 0 days | Discharge: HOME | End: 2021-07-13
Attending: SURGERY | Admitting: SURGERY
Payer: COMMERCIAL

## 2021-07-12 VITALS
TEMPERATURE: 98 F | DIASTOLIC BLOOD PRESSURE: 93 MMHG | OXYGEN SATURATION: 97 % | SYSTOLIC BLOOD PRESSURE: 173 MMHG | HEART RATE: 68 BPM | RESPIRATION RATE: 18 BRPM

## 2021-07-12 VITALS
SYSTOLIC BLOOD PRESSURE: 163 MMHG | HEART RATE: 90 BPM | TEMPERATURE: 98 F | RESPIRATION RATE: 18 BRPM | DIASTOLIC BLOOD PRESSURE: 92 MMHG | OXYGEN SATURATION: 98 %

## 2021-07-12 DIAGNOSIS — Z98.890 OTHER SPECIFIED POSTPROCEDURAL STATES: Chronic | ICD-10-CM

## 2021-07-12 DIAGNOSIS — Z90.49 ACQUIRED ABSENCE OF OTHER SPECIFIED PARTS OF DIGESTIVE TRACT: Chronic | ICD-10-CM

## 2021-07-12 DIAGNOSIS — Z87.438 PERSONAL HISTORY OF OTHER DISEASES OF MALE GENITAL ORGANS: Chronic | ICD-10-CM

## 2021-07-12 DIAGNOSIS — C18.1 MALIGNANT NEOPLASM OF APPENDIX: ICD-10-CM

## 2021-07-12 DIAGNOSIS — R19.00 INTRA-ABDOMINAL AND PELVIC SWELLING, MASS AND LUMP, UNSPECIFIED SITE: Chronic | ICD-10-CM

## 2021-07-12 LAB
ALBUMIN SERPL ELPH-MCNC: 3.7 G/DL — SIGNIFICANT CHANGE UP (ref 3.3–5)
ALBUMIN SERPL ELPH-MCNC: 4.4 G/DL — SIGNIFICANT CHANGE UP (ref 3.3–5)
ALP SERPL-CCNC: 131 U/L — HIGH (ref 40–120)
ALP SERPL-CCNC: 169 U/L — HIGH (ref 40–120)
ALT FLD-CCNC: 34 U/L — SIGNIFICANT CHANGE UP (ref 12–78)
ALT FLD-CCNC: 58 U/L — HIGH (ref 4–41)
ANION GAP SERPL CALC-SCNC: 12 MMOL/L — SIGNIFICANT CHANGE UP (ref 7–14)
ANION GAP SERPL CALC-SCNC: 4 MMOL/L — LOW (ref 5–17)
APPEARANCE UR: CLEAR — SIGNIFICANT CHANGE UP
APTT BLD: 32.6 SEC — SIGNIFICANT CHANGE UP (ref 27–36.3)
AST SERPL-CCNC: 30 U/L — SIGNIFICANT CHANGE UP (ref 15–37)
AST SERPL-CCNC: 71 U/L — HIGH (ref 4–40)
BASOPHILS # BLD AUTO: 0 K/UL — SIGNIFICANT CHANGE UP (ref 0–0.2)
BASOPHILS NFR BLD AUTO: 0 % — SIGNIFICANT CHANGE UP (ref 0–2)
BILIRUB SERPL-MCNC: 0.5 MG/DL — SIGNIFICANT CHANGE UP (ref 0.2–1.2)
BILIRUB SERPL-MCNC: 0.5 MG/DL — SIGNIFICANT CHANGE UP (ref 0.2–1.2)
BILIRUB UR-MCNC: NEGATIVE — SIGNIFICANT CHANGE UP
BLD GP AB SCN SERPL QL: NEGATIVE — SIGNIFICANT CHANGE UP
BUN SERPL-MCNC: 9 MG/DL — SIGNIFICANT CHANGE UP (ref 7–23)
BUN SERPL-MCNC: 9 MG/DL — SIGNIFICANT CHANGE UP (ref 7–23)
CALCIUM SERPL-MCNC: 9.2 MG/DL — SIGNIFICANT CHANGE UP (ref 8.5–10.1)
CALCIUM SERPL-MCNC: 9.7 MG/DL — SIGNIFICANT CHANGE UP (ref 8.4–10.5)
CHLORIDE SERPL-SCNC: 103 MMOL/L — SIGNIFICANT CHANGE UP (ref 98–107)
CHLORIDE SERPL-SCNC: 106 MMOL/L — SIGNIFICANT CHANGE UP (ref 96–108)
CO2 SERPL-SCNC: 26 MMOL/L — SIGNIFICANT CHANGE UP (ref 22–31)
CO2 SERPL-SCNC: 28 MMOL/L — SIGNIFICANT CHANGE UP (ref 22–31)
COLOR SPEC: YELLOW — SIGNIFICANT CHANGE UP
CREAT SERPL-MCNC: 0.82 MG/DL — SIGNIFICANT CHANGE UP (ref 0.5–1.3)
CREAT SERPL-MCNC: 0.87 MG/DL — SIGNIFICANT CHANGE UP (ref 0.5–1.3)
DIFF PNL FLD: ABNORMAL
EOSINOPHIL # BLD AUTO: 0.18 K/UL — SIGNIFICANT CHANGE UP (ref 0–0.5)
EOSINOPHIL NFR BLD AUTO: 1 % — SIGNIFICANT CHANGE UP (ref 0–6)
GLUCOSE SERPL-MCNC: 114 MG/DL — HIGH (ref 70–99)
GLUCOSE SERPL-MCNC: 165 MG/DL — HIGH (ref 70–99)
GLUCOSE UR QL: NEGATIVE MG/DL — SIGNIFICANT CHANGE UP
HCT VFR BLD CALC: 42.9 % — SIGNIFICANT CHANGE UP (ref 39–50)
HGB BLD-MCNC: 13.1 G/DL — SIGNIFICANT CHANGE UP (ref 13–17)
INR BLD: 1.03 RATIO — SIGNIFICANT CHANGE UP (ref 0.88–1.16)
KETONES UR-MCNC: NEGATIVE — SIGNIFICANT CHANGE UP
LEUKOCYTE ESTERASE UR-ACNC: ABNORMAL
LIDOCAIN IGE QN: 39 U/L — LOW (ref 73–393)
LYMPHOCYTES # BLD AUTO: 12 % — LOW (ref 13–44)
LYMPHOCYTES # BLD AUTO: 2.17 K/UL — SIGNIFICANT CHANGE UP (ref 1–3.3)
MAGNESIUM SERPL-MCNC: 2.1 MG/DL — SIGNIFICANT CHANGE UP (ref 1.6–2.6)
MCHC RBC-ENTMCNC: 25.8 PG — LOW (ref 27–34)
MCHC RBC-ENTMCNC: 30.5 GM/DL — LOW (ref 32–36)
MCV RBC AUTO: 84.6 FL — SIGNIFICANT CHANGE UP (ref 80–100)
MONOCYTES # BLD AUTO: 1.63 K/UL — HIGH (ref 0–0.9)
MONOCYTES NFR BLD AUTO: 9 % — SIGNIFICANT CHANGE UP (ref 2–14)
NEUTROPHILS # BLD AUTO: 14.13 K/UL — HIGH (ref 1.8–7.4)
NEUTROPHILS NFR BLD AUTO: 76 % — SIGNIFICANT CHANGE UP (ref 43–77)
NITRITE UR-MCNC: NEGATIVE — SIGNIFICANT CHANGE UP
NRBC # BLD: 0 /100 WBCS — SIGNIFICANT CHANGE UP
NRBC # BLD: SIGNIFICANT CHANGE UP /100 WBCS (ref 0–0)
NRBC # FLD: 0 K/UL — SIGNIFICANT CHANGE UP
PH UR: 5 — SIGNIFICANT CHANGE UP (ref 5–8)
PHOSPHATE SERPL-MCNC: 4.3 MG/DL — SIGNIFICANT CHANGE UP (ref 2.5–4.5)
PLATELET # BLD AUTO: 577 K/UL — HIGH (ref 150–400)
POTASSIUM SERPL-MCNC: 4 MMOL/L — SIGNIFICANT CHANGE UP (ref 3.5–5.3)
POTASSIUM SERPL-MCNC: 4.2 MMOL/L — SIGNIFICANT CHANGE UP (ref 3.5–5.3)
POTASSIUM SERPL-SCNC: 4 MMOL/L — SIGNIFICANT CHANGE UP (ref 3.5–5.3)
POTASSIUM SERPL-SCNC: 4.2 MMOL/L — SIGNIFICANT CHANGE UP (ref 3.5–5.3)
PROT SERPL-MCNC: 7.5 GM/DL — SIGNIFICANT CHANGE UP (ref 6–8.3)
PROT SERPL-MCNC: 7.6 G/DL — SIGNIFICANT CHANGE UP (ref 6–8.3)
PROT UR-MCNC: 15 MG/DL
PROTHROM AB SERPL-ACNC: 11.7 SEC — SIGNIFICANT CHANGE UP (ref 10.6–13.6)
RAPID RVP RESULT: SIGNIFICANT CHANGE UP
RBC # BLD: 5.07 M/UL — SIGNIFICANT CHANGE UP (ref 4.2–5.8)
RBC # FLD: 24.4 % — HIGH (ref 10.3–14.5)
RH IG SCN BLD-IMP: POSITIVE — SIGNIFICANT CHANGE UP
SARS-COV-2 RNA SPEC QL NAA+PROBE: SIGNIFICANT CHANGE UP
SODIUM SERPL-SCNC: 138 MMOL/L — SIGNIFICANT CHANGE UP (ref 135–145)
SODIUM SERPL-SCNC: 141 MMOL/L — SIGNIFICANT CHANGE UP (ref 135–145)
SP GR SPEC: 1.01 — SIGNIFICANT CHANGE UP (ref 1.01–1.02)
TROPONIN I SERPL-MCNC: <0.015 NG/ML — SIGNIFICANT CHANGE UP (ref 0.01–0.04)
UROBILINOGEN FLD QL: NEGATIVE MG/DL — SIGNIFICANT CHANGE UP
WBC # BLD: 12.88 K/UL — HIGH (ref 3.8–10.5)
WBC # FLD AUTO: 12.88 K/UL — HIGH (ref 3.8–10.5)

## 2021-07-12 PROCEDURE — 99223 1ST HOSP IP/OBS HIGH 75: CPT

## 2021-07-12 PROCEDURE — 71045 X-RAY EXAM CHEST 1 VIEW: CPT | Mod: 26

## 2021-07-12 PROCEDURE — 74177 CT ABD & PELVIS W/CONTRAST: CPT | Mod: 26,MA

## 2021-07-12 RX ORDER — SODIUM CHLORIDE 9 MG/ML
1000 INJECTION, SOLUTION INTRAVENOUS
Refills: 0 | Status: DISCONTINUED | OUTPATIENT
Start: 2021-07-12 | End: 2021-07-13

## 2021-07-12 RX ORDER — HYDROMORPHONE HYDROCHLORIDE 2 MG/ML
1 INJECTION INTRAMUSCULAR; INTRAVENOUS; SUBCUTANEOUS ONCE
Refills: 0 | Status: DISCONTINUED | OUTPATIENT
Start: 2021-07-12 | End: 2021-07-12

## 2021-07-12 RX ORDER — ACETAMINOPHEN 500 MG
1000 TABLET ORAL ONCE
Refills: 0 | Status: COMPLETED | OUTPATIENT
Start: 2021-07-12 | End: 2021-07-12

## 2021-07-12 RX ORDER — ENOXAPARIN SODIUM 100 MG/ML
40 INJECTION SUBCUTANEOUS EVERY 24 HOURS
Refills: 0 | Status: DISCONTINUED | OUTPATIENT
Start: 2021-07-12 | End: 2021-07-13

## 2021-07-12 RX ORDER — METOCLOPRAMIDE HCL 10 MG
10 TABLET ORAL ONCE
Refills: 0 | Status: COMPLETED | OUTPATIENT
Start: 2021-07-12 | End: 2021-07-12

## 2021-07-12 RX ORDER — MORPHINE SULFATE 50 MG/1
4 CAPSULE, EXTENDED RELEASE ORAL ONCE
Refills: 0 | Status: DISCONTINUED | OUTPATIENT
Start: 2021-07-12 | End: 2021-07-12

## 2021-07-12 RX ADMIN — Medication 10 MILLIGRAM(S): at 13:10

## 2021-07-12 RX ADMIN — HYDROMORPHONE HYDROCHLORIDE 1 MILLIGRAM(S): 2 INJECTION INTRAMUSCULAR; INTRAVENOUS; SUBCUTANEOUS at 03:14

## 2021-07-12 RX ADMIN — Medication 400 MILLIGRAM(S): at 10:55

## 2021-07-12 RX ADMIN — SODIUM CHLORIDE 1000 MILLILITER(S): 9 INJECTION INTRAMUSCULAR; INTRAVENOUS; SUBCUTANEOUS at 00:02

## 2021-07-12 RX ADMIN — FAMOTIDINE 20 MILLIGRAM(S): 10 INJECTION INTRAVENOUS at 00:03

## 2021-07-12 RX ADMIN — Medication 10 MILLIGRAM(S): at 00:04

## 2021-07-12 RX ADMIN — ONDANSETRON 4 MILLIGRAM(S): 8 TABLET, FILM COATED ORAL at 00:03

## 2021-07-12 RX ADMIN — MORPHINE SULFATE 4 MILLIGRAM(S): 50 CAPSULE, EXTENDED RELEASE ORAL at 05:58

## 2021-07-12 RX ADMIN — Medication 1000 MILLIGRAM(S): at 11:45

## 2021-07-12 RX ADMIN — SODIUM CHLORIDE 100 MILLILITER(S): 9 INJECTION, SOLUTION INTRAVENOUS at 10:22

## 2021-07-12 RX ADMIN — MORPHINE SULFATE 4 MILLIGRAM(S): 50 CAPSULE, EXTENDED RELEASE ORAL at 00:03

## 2021-07-12 RX ADMIN — ENOXAPARIN SODIUM 40 MILLIGRAM(S): 100 INJECTION SUBCUTANEOUS at 17:32

## 2021-07-12 NOTE — H&P ADULT - NSHPPHYSICALEXAM_GEN_ALL_CORE
General: alert and oriented, NAD  Resp: airway patent, respirations unlabored  CVS: regular rate and rhythm  Abdomen:   NGT:  Extremities: no edema  Skin: warm, dry, appropriate color General: alert and oriented, NAD  Resp: airway patent, respirations unlabored  CVS: regular rate and rhythm  Abdomen: mildly distended, non-localized tenderness. No rebound.   *NGT: bilious drainage  Extremities: no edema  Skin: warm, dry, appropriate color

## 2021-07-12 NOTE — H&P ADULT - NSHPREVIEWOFSYSTEMS_GEN_ALL_CORE
Patient is a 60 year old male with PMHx of appendiceal carcinoma with mets, s/p partial colectomy who presented to [facility] 7/11 with c/o diffuse abd pain, n/v x 1 day.     CT scan in ED--> Small bowel obstruction with transition in the lower abdomen. Additional focally dilated loop of small bowel in the mid abdomen likely reflects a second separate site of obstruction. Findings may be related to multiple sites of adhesion and/or tumor implants.    Slight interval decrease in moderate loculated ascites. 1 cm focal cystic implant along the posterior right hepatic lobe, stable since prior exam and decreased since 12/2/20.    New mild to moderate bilateral hydronephrosis and partial hydroureter.    NGT was placed in ED @ OSH. Patient was transferred to [facility] 7/12 given plans for PIPAC 7/14/21 under the care of Dr. Rosas.

## 2021-07-12 NOTE — ED PROVIDER NOTE - PROGRESS NOTE DETAILS
workup pending. Care transitioned to Dr. Reese. ~Aniket Quintana PA-C PA: Patient with hx of appendix cancer with mets s/o colectomy presents with c/o n/v and abd pain. Will get labs, IVF, anti-emetics. Reassess. ~Aniket Quintana PA-C Contacted by Dr. Nash who is the patient's oncologic surgeon at [facility] and will transfer out.  Lukas Phillip, DO

## 2021-07-12 NOTE — H&P ADULT - HISTORY OF PRESENT ILLNESS
Patient is a 60 year old male with PMHx of appendiceal carcinoma with mets, s/p partial colectomy who presented to [facility] 7/11 with c/o diffuse abd pain, n/v x 1 day.     CT scan in ED--> Small bowel obstruction with transition in the lower abdomen. Additional focally dilated loop of small bowel in the mid abdomen likely reflects a second separate site of obstruction. Findings may be related to multiple sites of adhesion and/or tumor implants.    Slight interval decrease in moderate loculated ascites. 1 cm focal cystic implant along the posterior right hepatic lobe, stable since prior exam and decreased since 12/2/20.    New mild to moderate bilateral hydronephrosis and partial hydroureter.    NGT was placed in ED @ OSH. Patient was transferred to [facility] 7/12 given plans for PIPAC 7/14/21 under the care of Dr. Rosas. Patient is a 60 year old male with PMHx Dyslipidemia, BPH, Malignant neoplasm of appendix - s/p cytoreduction surgery with HIPEC 5/2020 and adjuvent chemotherapy who presented to [facility] 7/11/21 with c/o diffuse abd pain, n/v x 1 day.     CT scan in ED--> Small bowel obstruction with transition in the lower abdomen. Additional focally dilated loop of small bowel in the mid abdomen likely reflects a second separate site of obstruction. Findings may be related to multiple sites of adhesion and/or tumor implants.    Slight interval decrease in moderate loculated ascites. 1 cm focal cystic implant along the posterior right hepatic lobe, stable since prior exam and decreased since 12/2/20.    New mild to moderate bilateral hydronephrosis and partial hydroureter.    NGT was placed in ED @ OSH. WBC: 18. Patient was transferred to [facility] 7/12 given plans for PIPAC 7/14/21 under the care of Dr. Rosas. Patient is a 60 year old male with PMHx Dyslipidemia, BPH, Malignant neoplasm of appendix - s/p cytoreduction surgery with HIPEC 5/2020 and adjuvent chemotherapy, PIPAC 6/3/21 who presented to [facility] 7/11/21 with c/o diffuse abd pain, n/v x 1 day.     CT scan in ED--> Small bowel obstruction with transition in the lower abdomen. Additional focally dilated loop of small bowel in the mid abdomen likely reflects a second separate site of obstruction. Findings may be related to multiple sites of adhesion and/or tumor implants.    Slight interval decrease in moderate loculated ascites. 1 cm focal cystic implant along the posterior right hepatic lobe, stable since prior exam and decreased since 12/2/20.    New mild to moderate bilateral hydronephrosis and partial hydroureter.    NGT was placed in ED @ OSH. WBC: 18. Patient was transferred to [facility] 7/12 given plans for PIPAC 7/14/21 under the care of Dr. Rosas.

## 2021-07-12 NOTE — ED PROVIDER NOTE - IV ALTEPASE ADMIN HIDDEN
Chinyere Olvera 73 y.o.  CC:Follow-up; Diabetes (TYpe II, eye exam 7-2019 Dr Fink); Hyperlipidemia; and Hypertension      [place]: Follow-up; Diabetes (TYpe II, eye exam 7-2019 Dr Fink); Hyperlipidemia; and Hypertension    Low fasting sugar- at times between 3 am and 6 am   Does not get snack at night  Discussed lowering levemir   Blood sugar and 90 day average sugar reviewed  Results for orders placed or performed in visit on 01/31/20   POC Glycosylated Hemoglobin (Hb A1C)   Result Value Ref Range    Hemoglobin A1C 6.4 %   POC Glucose Fingerstick   Result Value Ref Range    Glucose 170 (A) 70 - 130 mg/dL     Average sugar is 135  Some sweats today- not sure she is getting a bug or not   Discussed royce download and reviewed findings with her   14 days of data discussed with trend overnight of blood sugar falling - discussed download and recommended reducing lantus by 2 units to 28 units  Is utd with eye exam  No neuropathy or foot callus  Is on lipitor 10 mg daily   Is not on thyroid supplement  bp is good  C/o nausea currently     Allergies   Allergen Reactions   • Penicillins Rash       Current Outpatient Medications:   •  ACCU-CHEK GUIDE test strip, Test blood sugars QID, Disp: 400 each, Rfl: 3  •  amLODIPine (NORVASC) 5 MG tablet, TAKE 1 TABLET EVERY DAY, Disp: 90 tablet, Rfl: 1  •  atorvastatin (LIPITOR) 10 MG tablet, TAKE 1 TABLET EVERY DAY, Disp: 90 tablet, Rfl: 1  •  Blood Glucose Monitoring Suppl (ACCU-CHEK GUIDE) w/Device kit, 1 Device Daily., Disp: 1 kit, Rfl: 0  •  buPROPion XL (WELLBUTRIN XL) 150 MG 24 hr tablet, , Disp: , Rfl:   •  Calcium Citrate-Vitamin D 250-200 MG-UNIT tablet, , Disp: , Rfl:   •  Continuous Blood Gluc Sensor (FREESTYLE ROYCE 14 DAY SENSOR) misc, 1 Device Every 14 (Fourteen) Days., Disp: 6 each, Rfl: 3  •  Ferrous Fumarate (IRON) 18 MG tablet controlled-release, Take  by mouth daily., Disp: , Rfl:   •  glimepiride (AMARYL) 4 MG tablet, TAKE 1 TABLET EVERY DAY, Disp: 90 tablet,  "Rfl: 1  •  glucose blood (ONE TOUCH ULTRA TEST) test strip, Test blood sugars QID, Disp: 400 each, Rfl: 3  •  insulin aspart (NOVOLOG FLEXPEN) 100 UNIT/ML solution pen-injector sc pen, Inject 10 Units under the skin into the appropriate area as directed 3 (Three) Times a Day With Meals., Disp: 30 mL, Rfl: 3  •  Insulin Glargine (LANTUS SOLOSTAR) 100 UNIT/ML injection pen, INJECT 30 UNITS UNDER THE SKIN ONCE NIGHTLY, Disp: 15 pen, Rfl: 0  •  Insulin Pen Needle (B-D ULTRAFINE III SHORT PEN) 31G X 8 MM misc, USE FOUR TIMES DAILY TO INJECT INSULIN, Disp: 400 each, Rfl: 3  •  Insulin Syringe-Needle U-100 (BD INSULIN SYRINGE ULTRAFINE) 31G X 5/16\" 0.5 ML misc, , Disp: , Rfl:   •  lansoprazole (PREVACID) 30 MG capsule, Take  by mouth., Disp: , Rfl:   •  losartan-hydrochlorothiazide (HYZAAR) 100-12.5 MG per tablet, TAKE 1 TABLET EVERY DAY, Disp: 90 tablet, Rfl: 0  •  metoprolol tartrate (LOPRESSOR) 25 MG tablet, Take 12.5 mg by mouth 2 (Two) Times a Day., Disp: , Rfl:   •  Multiple Vitamins-Minerals (MULTIVITAMIN ADULTS 50+) tablet, , Disp: , Rfl:   •  Omega-3 Fatty Acids (FISH OIL) 1000 MG capsule capsule, Take  by mouth daily., Disp: , Rfl:   •  ondansetron (ZOFRAN) 4 MG tablet, Take 1 tablet by mouth Daily As Needed for Nausea or Vomiting., Disp: 30 tablet, Rfl: 1  •  potassium chloride (K-DUR,KLOR-CON) 10 MEQ CR tablet, TAKE 1 TABLET DAILY WITH FOOD., Disp: 90 tablet, Rfl: 0  •  PRODIGY TWIST TOP LANCETS 28G misc, Use 4 per day to test blood sugars, Disp: 400 each, Rfl: 3  •  zinc gluconate 50 MG tablet, zinc gluconate 50 mg tablet  Daily, Disp: , Rfl:   Patient Active Problem List    Diagnosis   • Abdominal bloating [R14.0]   • Preoperative evaluation to rule out surgical contraindication [Z01.818]   • Obstructive sleep apnea syndrome [G47.33]   • Peripheral neuropathy, idiopathic [G60.9]   • Stress fracture of foot [M84.376A]   • Foot pain [M79.673]   • Pain in left foot [M79.672]   • Abnormal liver function tests " [R94.5]   • Anemia [D64.9]   • Benign essential hypertension [I10]   • Type 1 diabetes mellitus with neurological manifestations (CMS/HCC) [E10.49]   • Gastroesophageal reflux disease [K21.9]   • Hyperlipidemia [E78.5]   • Hypokalemia [E87.6]   • Calculus of kidney [N20.0]   • Cyst of ovary [N83.209]   • Renal insufficiency [N28.9]   • Solitary pulmonary nodule [R91.1]   • Vitamin D deficiency [E55.9]   • Abnormal weight loss [R63.4]     Review of Systems   Constitutional: Negative for activity change, appetite change, chills, diaphoresis, fatigue, fever and unexpected weight change.   HENT: Negative for congestion, dental problem, drooling, ear discharge, ear pain, facial swelling, hearing loss, mouth sores, nosebleeds, postnasal drip, rhinorrhea, sinus pressure, sneezing, sore throat, tinnitus, trouble swallowing and voice change.    Eyes: Negative for photophobia, pain, discharge, redness, itching and visual disturbance.   Respiratory: Negative for apnea, cough, choking, chest tightness, shortness of breath, wheezing and stridor.    Cardiovascular: Negative for chest pain, palpitations and leg swelling.   Gastrointestinal: Positive for nausea (with associated sweats this morning). Negative for abdominal distention, abdominal pain, anal bleeding, blood in stool, constipation, diarrhea, rectal pain and vomiting.   Endocrine: Negative for cold intolerance, heat intolerance, polydipsia, polyphagia and polyuria.   Genitourinary: Negative for decreased urine volume, difficulty urinating, dysuria, enuresis, flank pain, frequency, genital sores, hematuria and urgency.   Musculoskeletal: Positive for arthralgias. Negative for back pain, gait problem, joint swelling, myalgias, neck pain and neck stiffness.   Skin: Negative for color change, pallor, rash and wound.   Allergic/Immunologic: Negative for environmental allergies, food allergies and immunocompromised state.   Neurological: Negative for dizziness, tremors,  "seizures, syncope, facial asymmetry, speech difficulty, weakness, light-headedness, numbness and headaches.   Hematological: Negative for adenopathy. Does not bruise/bleed easily.   Psychiatric/Behavioral: Negative for agitation, behavioral problems, confusion, decreased concentration, dysphoric mood, hallucinations, self-injury, sleep disturbance and suicidal ideas. The patient is not nervous/anxious and is not hyperactive.      Social History     Socioeconomic History   • Marital status:      Spouse name: Not on file   • Number of children: Not on file   • Years of education: Not on file   • Highest education level: Not on file   Tobacco Use   • Smoking status: Former Smoker   • Smokeless tobacco: Never Used   Substance and Sexual Activity   • Alcohol use: Yes     Alcohol/week: 7.0 standard drinks     Types: 7 Glasses of wine per week   • Drug use: No   • Sexual activity: Defer     Family History   Problem Relation Age of Onset   • Diabetes Father    • Diabetes Maternal Grandfather    • Coronary artery disease Other      /70   Pulse 69   Ht 165.1 cm (65\")   Wt 82.1 kg (181 lb)   SpO2 98%   BMI 30.12 kg/m²   Physical Exam   Constitutional: She is oriented to person, place, and time. She appears well-developed and well-nourished.   HENT:   Head: Normocephalic and atraumatic.   Nose: Nose normal.   Mouth/Throat: Oropharynx is clear and moist.   Eyes: Pupils are equal, round, and reactive to light. Conjunctivae, EOM and lids are normal.   Neck: Trachea normal and normal range of motion. Neck supple. Carotid bruit is not present. No tracheal deviation present. No thyroid mass and no thyromegaly present.   Cardiovascular: Normal rate, regular rhythm, normal heart sounds and intact distal pulses. Exam reveals no gallop and no friction rub.   No murmur heard.  Pulmonary/Chest: Effort normal and breath sounds normal. No respiratory distress. She has no wheezes.   Musculoskeletal: Normal range of motion. " She exhibits no edema or deformity.    Chinyere had a diabetic foot exam performed today.   During the foot exam she had a monofilament test performed.    Neurological Sensory Findings - Unaltered hot/cold right ankle/foot discrimination and unaltered hot/cold left ankle/foot discrimination. Unaltered sharp/dull right ankle/foot discrimination and unaltered sharp/dull left ankle/foot discrimination. No right ankle/foot altered proprioception and no left ankle/foot altered proprioception  Vascular Status -  Her right foot exhibits normal foot vasculature  and no edema. Her left foot exhibits normal foot vasculature  and no edema.  Skin Integrity  -  Her right foot skin is intact.Her left foot skin is intact..     Lymphadenopathy:     She has no cervical adenopathy.   Neurological: She is alert and oriented to person, place, and time. She has normal reflexes. She displays normal reflexes. No cranial nerve deficit.   Skin: Skin is warm and dry. No rash noted. No cyanosis or erythema. Nails show no clubbing.   Psychiatric: She has a normal mood and affect. Her speech is normal and behavior is normal. Judgment and thought content normal. Cognition and memory are normal.   Nursing note and vitals reviewed.    Results for orders placed or performed in visit on 01/31/20   POC Glycosylated Hemoglobin (Hb A1C)   Result Value Ref Range    Hemoglobin A1C 6.4 %   POC Glucose Fingerstick   Result Value Ref Range    Glucose 170 (A) 70 - 130 mg/dL     Chinyere was seen today for follow-up, diabetes, hyperlipidemia and hypertension.    Diagnoses and all orders for this visit:    Type 1 diabetes mellitus with diabetic polyneuropathy (CMS/HCC)  -     POC Glycosylated Hemoglobin (Hb A1C)  -     POC Glucose Fingerstick    Benign essential hypertension    Pure hypercholesterolemia    Other orders  -     Insulin Pen Needle (B-D ULTRAFINE III SHORT PEN) 31G X 8 MM misc; USE FOUR TIMES DAILY TO INJECT INSULIN  -     ondansetron (ZOFRAN) 4 MG tablet;  Take 1 tablet by mouth Daily As Needed for Nausea or Vomiting.      Problem List Items Addressed This Visit        Cardiovascular and Mediastinum    Hyperlipidemia     Is on low fat diet and atorvastatin 10 mg daily   LDL is 68   No change in medication currently (at goal)         Benign essential hypertension     Current bp is well controlled   No change in management currently   Continue monitoring and current medication             Endocrine    Type 1 diabetes mellitus with neurological manifestations (CMS/[facility]) - Primary     Blood sugar and 90 day average sugar reviewed  Results for orders placed or performed in visit on 01/31/20   POC Glycosylated Hemoglobin (Hb A1C)   Result Value Ref Range    Hemoglobin A1C 6.4 %   POC Glucose Fingerstick   Result Value Ref Range    Glucose 170 (A) 70 - 130 mg/dL     Average sugar is 135   Is utd with eye exam  No neuropathy but does have risk related to deformity  Ur alb neg and utd   Average sugar is good  Kike downloaded and reviewed 14 days of data (worn 88% of the time)  Higher daytime sugars with drop in glucose overnight, alcides at 3-6 am with significant low sugar  Discussed reducing lantus based on this by 2 units now with continued monitoring and further adjustment if she does not find that this corrects the issue  Is in target 72% of the time, doing very well with this device and it has made a significant impact on average sugar   F/u 3-4 months          Relevant Orders    POC Glycosylated Hemoglobin (Hb A1C) (Completed)    POC Glucose Fingerstick (Completed)        Had nausea in office- sent zofran to pharmacy  Resolved prior to her leaving without abdominal pain or nausea  Return in about 3 months (around 4/30/2020) for Recheck 30 min .    Maria Eugenia Lawrence MD  Signed Maria Eugenia Lawrence MD       show

## 2021-07-12 NOTE — H&P ADULT - ASSESSMENT
60y year old Male PMHx of appendiceal carcinoma with mets, s/p partial colectomy who presented to [facility] 7/11, diagnosed with SBO.     - Admit to D Team Surgery  - Serial abdominal exams  - No pain control until examined  - NPO/IVF  - NGT TO LCWS  - AXR to check NGT placement  - STAT labs  - DVT prophylaxis    D TEAM   [id number]   60y year old Male PMHx of appendiceal carcinoma with mets, s/p partial colectomy who presented to [facility] 7/11, diagnosed with SBO.     - Admit to D Team Surgery  - Serial abdominal exams  - No pain control until examined  - NPO/IVF  - NGT TO LCWS  - AXR to check NGT placement  - STAT labs  - Suppository then possible enema if no bowel movement  - DVT prophylaxis    D TEAM   [id number]

## 2021-07-12 NOTE — H&P ADULT - ATTENDING COMMENTS
Agree with above. Imaging reviewed. Constipation with concern for small bowel obstruction. Patient now reports passing flatus. NPO/NGT/IVF. Await bowel function. Suppository.

## 2021-07-12 NOTE — ED ADULT NURSE NOTE - OBJECTIVE STATEMENT
pt c/o abd pain that began yesterday. Endorses nausea/vomiting while in the ED. denies diarrhea. will ctm

## 2021-07-12 NOTE — ED PROVIDER NOTE - GASTROINTESTINAL, MLM
+Actively vomiting. +Surgical scar mid-abd. Abdomen is soft, +Mild diffuse tenderness. No rebound. NO guarding. NEG Lea. NEG McBurney. NO CVAT.

## 2021-07-12 NOTE — ED PROVIDER NOTE - CLINICAL SUMMARY MEDICAL DECISION MAKING FREE TEXT BOX
Pt with extensive surgical history p/w diffuse pain and vomiting concerning for SBO.  Plan: pain control prn, IV fluids, CBC, CMP, CTAP

## 2021-07-13 ENCOUNTER — NON-APPOINTMENT (OUTPATIENT)
Age: 61
End: 2021-07-13

## 2021-07-13 ENCOUNTER — APPOINTMENT (OUTPATIENT)
Dept: INFUSION THERAPY | Facility: HOSPITAL | Age: 61
End: 2021-07-13

## 2021-07-13 ENCOUNTER — TRANSCRIPTION ENCOUNTER (OUTPATIENT)
Age: 61
End: 2021-07-13

## 2021-07-13 ENCOUNTER — APPOINTMENT (OUTPATIENT)
Dept: HEMATOLOGY ONCOLOGY | Facility: CLINIC | Age: 61
End: 2021-07-13

## 2021-07-13 VITALS
HEART RATE: 66 BPM | TEMPERATURE: 99 F | DIASTOLIC BLOOD PRESSURE: 72 MMHG | SYSTOLIC BLOOD PRESSURE: 128 MMHG | RESPIRATION RATE: 18 BRPM | OXYGEN SATURATION: 100 %

## 2021-07-13 LAB
ANION GAP SERPL CALC-SCNC: 14 MMOL/L — SIGNIFICANT CHANGE UP (ref 7–14)
BUN SERPL-MCNC: 10 MG/DL — SIGNIFICANT CHANGE UP (ref 7–23)
CALCIUM SERPL-MCNC: 9 MG/DL — SIGNIFICANT CHANGE UP (ref 8.4–10.5)
CHLORIDE SERPL-SCNC: 103 MMOL/L — SIGNIFICANT CHANGE UP (ref 98–107)
CO2 SERPL-SCNC: 23 MMOL/L — SIGNIFICANT CHANGE UP (ref 22–31)
CREAT SERPL-MCNC: 0.79 MG/DL — SIGNIFICANT CHANGE UP (ref 0.5–1.3)
GLUCOSE SERPL-MCNC: 81 MG/DL — SIGNIFICANT CHANGE UP (ref 70–99)
HCT VFR BLD CALC: 36.9 % — LOW (ref 39–50)
HGB BLD-MCNC: 11.2 G/DL — LOW (ref 13–17)
MAGNESIUM SERPL-MCNC: 1.8 MG/DL — SIGNIFICANT CHANGE UP (ref 1.6–2.6)
MCHC RBC-ENTMCNC: 25.9 PG — LOW (ref 27–34)
MCHC RBC-ENTMCNC: 30.4 GM/DL — LOW (ref 32–36)
MCV RBC AUTO: 85.4 FL — SIGNIFICANT CHANGE UP (ref 80–100)
NRBC # BLD: 0 /100 WBCS — SIGNIFICANT CHANGE UP
NRBC # FLD: 0 K/UL — SIGNIFICANT CHANGE UP
PHOSPHATE SERPL-MCNC: 3.5 MG/DL — SIGNIFICANT CHANGE UP (ref 2.5–4.5)
PLATELET # BLD AUTO: 498 K/UL — HIGH (ref 150–400)
POTASSIUM SERPL-MCNC: 3.7 MMOL/L — SIGNIFICANT CHANGE UP (ref 3.5–5.3)
POTASSIUM SERPL-SCNC: 3.7 MMOL/L — SIGNIFICANT CHANGE UP (ref 3.5–5.3)
RBC # BLD: 4.32 M/UL — SIGNIFICANT CHANGE UP (ref 4.2–5.8)
RBC # FLD: 24.6 % — HIGH (ref 10.3–14.5)
SODIUM SERPL-SCNC: 140 MMOL/L — SIGNIFICANT CHANGE UP (ref 135–145)
WBC # BLD: 8.59 K/UL — SIGNIFICANT CHANGE UP (ref 3.8–10.5)
WBC # FLD AUTO: 8.59 K/UL — SIGNIFICANT CHANGE UP (ref 3.8–10.5)

## 2021-07-13 PROCEDURE — 74018 RADEX ABDOMEN 1 VIEW: CPT | Mod: 26

## 2021-07-13 PROCEDURE — 99231 SBSQ HOSP IP/OBS SF/LOW 25: CPT

## 2021-07-13 RX ORDER — OMEPRAZOLE 10 MG/1
1 CAPSULE, DELAYED RELEASE ORAL
Qty: 30 | Refills: 2
Start: 2021-07-13

## 2021-07-13 RX ORDER — SODIUM CHLORIDE 9 MG/ML
1000 INJECTION, SOLUTION INTRAVENOUS
Refills: 0 | Status: DISCONTINUED | OUTPATIENT
Start: 2021-07-13 | End: 2021-07-13

## 2021-07-13 RX ORDER — DOCUSATE SODIUM 100 MG
1 CAPSULE ORAL
Qty: 180 | Refills: 0
Start: 2021-07-13

## 2021-07-13 RX ADMIN — SODIUM CHLORIDE 100 MILLILITER(S): 9 INJECTION, SOLUTION INTRAVENOUS at 16:17

## 2021-07-13 RX ADMIN — Medication 10 MILLIGRAM(S): at 09:28

## 2021-07-13 RX ADMIN — SODIUM CHLORIDE 100 MILLILITER(S): 9 INJECTION, SOLUTION INTRAVENOUS at 08:41

## 2021-07-13 NOTE — DISCHARGE NOTE PROVIDER - HOSPITAL COURSE
Patient is a 60 year old male with PMHx Dyslipidemia, BPH, Malignant neoplasm of appendix - s/p cytoreduction surgery with HIPEC 5/2020 and adjuvent chemotherapy, PIPAC 6/3/21 who presented to [facility] 7/11/21 with c/o diffuse abd pain, n/v x 1 day.     CT scan in ED--> Small bowel obstruction with transition in the lower abdomen. Additional focally dilated loop of small bowel in the mid abdomen likely reflects a second separate site of obstruction. Findings may be related to multiple sites of adhesion and/or tumor implants.    Slight interval decrease in moderate loculated ascites. 1 cm focal cystic implant along the posterior right hepatic lobe, stable since prior exam and decreased since 12/2/20.    New mild to moderate bilateral hydronephrosis and partial hydroureter.    NGT was placed in ED @ OSH. WBC: 18. Patient was transferred to [facility] 7/12.    On arrival to [facility] an NGT was placed. He was given a suppository and later had 2 bowel movements. 7/13 abdominal XR demonstrated contrast in the colon suggesting resolution or partial obstruction only. The NGT was removed and his diet was advanced to clears. He felt less distended, was not in pain, and had two more bowel movements. He was deemed stable for discharge on a liquid diet with follow up with Dr. Rosas on 7/15. Patient is a 60 year old male with PMHx Dyslipidemia, BPH, Malignant neoplasm of appendix - s/p cytoreduction surgery with HIPEC 5/2020 and adjuvent chemotherapy, PIPAC 6/3/21 who presented to [facility] 7/11/21 with c/o diffuse abd pain, n/v x 1 day.     CT scan in ED--> Small bowel obstruction with transition in the lower abdomen. Additional focally dilated loop of small bowel in the mid abdomen likely reflects a second separate site of obstruction. Findings may be related to multiple sites of adhesion and/or tumor implants.    Slight interval decrease in moderate loculated ascites. 1 cm focal cystic implant along the posterior right hepatic lobe, stable since prior exam and decreased since 12/2/20.    New mild to moderate bilateral hydronephrosis and partial hydroureter.    NGT was placed in ED @ OSH. WBC: 18. Patient was transferred to [facility] 7/12.    On arrival to [facility] an NGT was placed. He was given a suppository and later had multiple bowel movements. 7/13 abdominal XR demonstrated contrast in the colon suggesting resolution. The NGT was removed and his diet was advanced to clears. He felt less distended, was not in pain, and had two more bowel movements. He was deemed stable for discharge on a liquid diet with follow up with Dr. Rosas on 7/15.

## 2021-07-13 NOTE — PROGRESS NOTE ADULT - ATTENDING COMMENTS
Feels better, passing flatus and BM. Contrast progression to distal colon on imaging. WIll trial NGT removal and slowly advance diet. Coordinated with clinical research team, colorectal surgery, medical oncology, anesethesia and OR nursing to cancel planned PIPAC for tomorrow. If bowel obstruction completely resolves will reschedule PIPAC and continue on clinical trial.

## 2021-07-13 NOTE — DISCHARGE NOTE PROVIDER - NSDCMRMEDTOKEN_GEN_ALL_CORE_FT
Colace 100 mg oral capsule: 1 cap(s) orally 2 times a day -for constipation   escitalopram 5 mg oral tablet: 1 tab(s) orally once a day (at bedtime)  gabapentin 100 mg oral capsule: 1 cap(s) orally once a day (at bedtime)  nitrofurantoin macrocrystals 50 mg oral capsule: 1 cap(s) orally once a day (at bedtime)  omeprazole 40 mg oral delayed release capsule: 1 cap(s) orally once a day   simvastatin 10 mg oral tablet: 1 tab(s) orally once a day (at bedtime)  tamsulosin 0.4 mg oral capsule: 1 cap(s) orally once a day (at bedtime)

## 2021-07-13 NOTE — DISCHARGE NOTE PROVIDER - NSDCCPCAREPLAN_GEN_ALL_CORE_FT
PRINCIPAL DISCHARGE DIAGNOSIS  Diagnosis: Partial bowel obstruction  Assessment and Plan of Treatment: DIET: Please stay on clear liquid diet tonight. Tomorrow you may have thicker full liquids such as yogurt, pureed food, Ensure, protein shakes. Please stay on a full liquid diet until follow up with Dr. Rosas.  MEDICATIONS: Take colace twice daily for constipation. Take omeprazole once daily for gastric reflux.  NOTIFY YOUR SURGEON IF: You have any abdominal pain, pain with eating or drinking, any fever (over 100.4 F) or chills, persistent nausea/vomiting.  FOLLOW-UP:  1. Please call to make a follow-up appointment tomorrow with Dr. Rosas  2. Please follow up with your primary care physician in one week regarding your hospitalization.

## 2021-07-13 NOTE — PROGRESS NOTE ADULT - ASSESSMENT
60y year old Male PMHx of appendiceal carcinoma with mets, s/p partial colectomy who presented to [facility] 7/11, diagnosed with SBO.     - Serial abdominal exams  - No pain control until examined  - AXR to check contrast progression  - NPO/IVF  - NGT TO LCWS ; possible clamp trial pending AXR  - Dulcolax suppository x1  - DVT prophylaxis    D TEAM   [id number]

## 2021-07-13 NOTE — DISCHARGE NOTE PROVIDER - NSDCFUSCHEDAPPT_GEN_ALL_CORE_FT
RYANNE YOUNG ; 07/14/2021 ; [facility]  RYANNE YOUNG ; 07/28/2021 ; [facility]  RYANNE YOUNG ; 07/28/2021 ; [facility] RYANNE YOUNG ; 07/22/2021 ; [facility]  RYANNE YOUNG ; 07/22/2021 ; [facility] [address]  RYANNE YOUNG ; 07/28/2021 ; [facility]  RYANNE YOUNG ; 07/28/2021 ; [facility]

## 2021-07-13 NOTE — DISCHARGE NOTE NURSING/CASE MANAGEMENT/SOCIAL WORK - NSDCPNINST_GEN_ALL_CORE
Any temperature greater than 100.4, severe pain not relieved with medications or bleeding at surgical site please call your surgeon or come back to ER.

## 2021-07-13 NOTE — DISCHARGE NOTE PROVIDER - CARE PROVIDER_API CALL
Florecita Mason)  Complex General Surgical Oncology; Surgery  [address] [place] [address]  Phone: [phone]  Fax: [phone]  Follow Up Time:

## 2021-07-13 NOTE — PROGRESS NOTE ADULT - SUBJECTIVE AND OBJECTIVE BOX
D TEAM SURGERY DAILY PROGRESS NOTE    Subjective: Patient seen and examined at bedside. Patient had two small bowel movements overnight and flatus. Denies pain.     O: Vital Signs Last 24 Hrs  T(C): 36.8 (13 Jul 2021 06:43), Max: 37 (12 Jul 2021 17:36)  T(F): 98.3 (13 Jul 2021 06:43), Max: 98.6 (12 Jul 2021 17:36)  HR: 71 (13 Jul 2021 06:43) (58 - 71)  BP: 146/81 (13 Jul 2021 06:43) (120/72 - 146/81)  RR: 18 (13 Jul 2021 06:43) (18 - 18)  SpO2: 100% (13 Jul 2021 06:43) (99% - 100%)    I&O's Detail    12 Jul 2021 07:01  -  13 Jul 2021 07:00  --------------------------------------------------------  IN:    Lactated Ringers: 1000 mL  Total IN: 1000 mL    OUT:    Nasogastric/Oral tube (mL): 200 mL    Oral Fluid: 0 mL    Voided (mL): 825 mL  Total OUT: 1025 mL    Total NET: -25 mL      EXAM  General: alert and oriented, NAD  Resp: airway patent, respirations unlabored  CVS: regular rate and rhythm  Abdomen: soft, nontender, mildly distended  Extremities: no edema  Skin: warm, dry, appropriate color      LABS                      11.2   8.59  )-----------( 498      ( 13 Jul 2021 07:25 )             36.9   07-13    140  |  103  |  10  ----------------------------<  81  3.7   |  23  |  0.79    Ca    9.0      13 Jul 2021 07:25  Phos  3.5     07-13  Mg     1.80     07-13    TPro  7.6  /  Alb  4.4  /  TBili  0.5  /  DBili  x   /  AST  71<H>  /  ALT  58<H>  /  AlkPhos  169<H>  07-12

## 2021-07-13 NOTE — DISCHARGE NOTE NURSING/CASE MANAGEMENT/SOCIAL WORK - PATIENT PORTAL LINK FT
You can access the FollowMyHealth Patient Portal offered by [facility] by registering at the following website: [email or web address]. By joining [employer]’s FollowMyHealth portal, you will also be able to view your health information using other applications (apps) compatible with our system.

## 2021-07-14 ENCOUNTER — TRANSCRIPTION ENCOUNTER (OUTPATIENT)
Age: 61
End: 2021-07-14

## 2021-07-14 ENCOUNTER — APPOINTMENT (OUTPATIENT)
Dept: SURGICAL ONCOLOGY | Facility: HOSPITAL | Age: 61
End: 2021-07-14

## 2021-07-14 PROBLEM — Z00.00 ENCOUNTER FOR PREVENTIVE HEALTH EXAMINATION: Noted: 2021-07-14

## 2021-07-15 ENCOUNTER — APPOINTMENT (OUTPATIENT)
Dept: SURGICAL ONCOLOGY | Facility: CLINIC | Age: 61
End: 2021-07-15

## 2021-07-15 ENCOUNTER — NON-APPOINTMENT (OUTPATIENT)
Age: 61
End: 2021-07-15

## 2021-07-15 ENCOUNTER — APPOINTMENT (OUTPATIENT)
Dept: SURGICAL ONCOLOGY | Facility: CLINIC | Age: 61
End: 2021-07-15
Payer: COMMERCIAL

## 2021-07-15 PROCEDURE — 99214 OFFICE O/P EST MOD 30 MIN: CPT | Mod: 95

## 2021-07-21 ENCOUNTER — APPOINTMENT (OUTPATIENT)
Dept: HEMATOLOGY ONCOLOGY | Facility: CLINIC | Age: 61
End: 2021-07-21

## 2021-07-21 ENCOUNTER — NON-APPOINTMENT (OUTPATIENT)
Age: 61
End: 2021-07-21

## 2021-07-21 ENCOUNTER — APPOINTMENT (OUTPATIENT)
Dept: HEMATOLOGY ONCOLOGY | Facility: CLINIC | Age: 61
End: 2021-07-21
Payer: COMMERCIAL

## 2021-07-21 ENCOUNTER — LABORATORY RESULT (OUTPATIENT)
Age: 61
End: 2021-07-21

## 2021-07-21 ENCOUNTER — TRANSCRIPTION ENCOUNTER (OUTPATIENT)
Age: 61
End: 2021-07-21

## 2021-07-21 VITALS
HEART RATE: 65 BPM | RESPIRATION RATE: 16 BRPM | TEMPERATURE: 98.4 F | OXYGEN SATURATION: 99 % | WEIGHT: 123.99 LBS | SYSTOLIC BLOOD PRESSURE: 123 MMHG | HEIGHT: 66.73 IN | DIASTOLIC BLOOD PRESSURE: 75 MMHG | BODY MASS INDEX: 19.69 KG/M2

## 2021-07-21 VITALS
HEART RATE: 65 BPM | OXYGEN SATURATION: 99 % | WEIGHT: 124.78 LBS | DIASTOLIC BLOOD PRESSURE: 75 MMHG | BODY MASS INDEX: 19.82 KG/M2 | RESPIRATION RATE: 16 BRPM | SYSTOLIC BLOOD PRESSURE: 123 MMHG | TEMPERATURE: 98.4 F | HEIGHT: 66.73 IN

## 2021-07-21 LAB
ALBUMIN SERPL ELPH-MCNC: 4.2 G/DL
ALP BLD-CCNC: 121 U/L
ALT SERPL-CCNC: 17 U/L
ANION GAP SERPL CALC-SCNC: 12 MMOL/L
APTT BLD: 31.8 SEC
AST SERPL-CCNC: 17 U/L
BASOPHILS # BLD AUTO: 0.05 K/UL
BASOPHILS NFR BLD AUTO: 0.6 %
BILIRUB SERPL-MCNC: 0.3 MG/DL
BUN SERPL-MCNC: 17 MG/DL
CALCIUM SERPL-MCNC: 9.6 MG/DL
CEA SERPL-MCNC: 15.2 NG/ML
CHLORIDE SERPL-SCNC: 105 MMOL/L
CO2 SERPL-SCNC: 23 MMOL/L
CREAT SERPL-MCNC: 0.93 MG/DL
EOSINOPHIL # BLD AUTO: 0.2 K/UL
EOSINOPHIL NFR BLD AUTO: 2.2 %
GLUCOSE SERPL-MCNC: 105 MG/DL
HCT VFR BLD CALC: 38.4 %
HGB BLD-MCNC: 11.7 G/DL
IMM GRANULOCYTES NFR BLD AUTO: 0.1 %
INR PPP: 1 RATIO
LYMPHOCYTES # BLD AUTO: 3.96 K/UL
LYMPHOCYTES NFR BLD AUTO: 43.9 %
MAGNESIUM SERPL-MCNC: 2.1 MG/DL
MAN DIFF?: NORMAL
MCHC RBC-ENTMCNC: 26.3 PG
MCHC RBC-ENTMCNC: 30.5 GM/DL
MCV RBC AUTO: 86.3 FL
MONOCYTES # BLD AUTO: 0.89 K/UL
MONOCYTES NFR BLD AUTO: 9.9 %
NEUTROPHILS # BLD AUTO: 3.92 K/UL
NEUTROPHILS NFR BLD AUTO: 43.3 %
PHOSPHATE SERPL-MCNC: 3.7 MG/DL
PLATELET # BLD AUTO: 582 K/UL
POTASSIUM SERPL-SCNC: 5 MMOL/L
PROT SERPL-MCNC: 6.6 G/DL
PT BLD: 11.9 SEC
RBC # BLD: 4.45 M/UL
RBC # FLD: 23.9 %
SODIUM SERPL-SCNC: 141 MMOL/L
WBC # FLD AUTO: 9.03 K/UL

## 2021-07-21 PROCEDURE — 99072 ADDL SUPL MATRL&STAF TM PHE: CPT

## 2021-07-21 PROCEDURE — 99214 OFFICE O/P EST MOD 30 MIN: CPT

## 2021-07-21 NOTE — HISTORY OF PRESENT ILLNESS
[de-identified] : Mr. Arias is a 60 year old gentleman referred to me by Dr. 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 Herberth Peters. \par \par PCI: 34\par CC: R2b\par \par Mr. Arias has since moved to [place] to be closer to family and has established care in [facility]. Lives in [place]. \par \par Cytoreduction included a splenectomy and distal gastrectomy, omentectomy, cholecystectomy, subtotal colectomy and diaphragm stripping. 4L of mucinous ascites was drained. 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 As his tumor was felt to be low grade, he did not receive any systemic therapy up front. He now reports feeling well. He reports 3-4 loose but not watery BM's/day. He reports improving weight and energy levels. He denies any PO intolerance. He plays golf and does nature walks close to [facility]. \par \par CEA \par 6/5/20: 3.8\par 8/12/20: 6.1\par 4/7: 10.1 \par \par Pathology: \par 5/7/21: Paracentesis, cytology negative for malignant cells. 1.6 Liters removed. 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 He underwent subtotal colectomy with positive margins. 2/14 nodes involved. [facility] review: low grade appendiceal mucinous neoplasm\par \par Scans were obtained at [facility] and demonstrated: \par 4/30/21: loculated intraperitoneal fluid. No bowel obstruction. \par \par Last colonoscopy May 2020. \par \par Underwent diagnostic laparoscopy by me on 5/19/21. PCI approx 25. Evacuated 1.4 liters ascites. Biopsies consistent with mucinous adenocarcinoma. \par 6/3: PIPAC #1 \par 6/17: Doing well. No pain. Mild reflux/ dyspepsia. Relieved with tums. Eating well. \par 7/15/21: Doing well. Admitted earlier this week 7/12-7/13 to [facility] with small bowel obstruction. Dramatically improved after 24 hours nasogastric decompression. Feels well since discharge. Multiple BM last 48 hours. Tolerating full liquid diet. No abdominal pain. \par

## 2021-07-21 NOTE — HISTORY OF PRESENT ILLNESS
[Disease: _____________________] : Disease: [unfilled] [de-identified] : Mr. Arias is a 60 year old gentlemen with past medical history of BPH presenting to the office for an initial consultation of appendiceal neoplasm.\par \par Patient was being seen by Dr. Mendoza at [facility] for low grade appendiceal tumor diagnosed in 2020.  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 During 4/2020 noted bloating and ascites was found. CT scan showed the malignancy.\par \par 5/7/2020 CRS and HIPEC for LAMN ( Dr. Herberth Pink at [facility] ). Surgery included a splenectomy and distal gastrectomy, omentectomy, cholecystectomy, subtotal colectomy and diaphragm stripping. 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 R2b resection. 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 There was some dose reduction. Neuropathy started continually after chemotherapy was completed and is grade 1, with no impairment of ADLs. \par \par He moved from [place] back to [place] for more family support.\par In April 2021, he noted increase in abdominal fluid, and saw Oncology, Dr. Garcia.\par CT scan ordered and ascites drained.\par There was recurrence of disease on CT scan.\par He was referred to Dr. Mason, who has recommended pressurized intraperitoneal aerosolized chemotherapy trial. (PIPAC).\par \par 7/21/2021\par Underwent diagnostic laparoscopy on 5/19/21. PCI approx 25. Biopsies consistent with mucinous adenocarcinoma. \par 6/3: PIPAC/oxaliplatin treatment #1\par 7/12 - 7/14/21: Patient admitted for transient, self-limited SBO, that was not deemed to be treatment-related.\par 7/22/21: Plan for PIPAC #2 + 5FU/LV\par Normally, has has up to 4-6 BM daily due to short colon.\par Stools are loose.\par Currently no bloating.\par He is active, has no pain, and looking forward to next cycle tomorrow.\par CEA dropping as below.\par \par CEA:\par 6/5/2020: 3.8\par 8/12/2020: 6.1\par 4/7/2021: 10.1 \par 5/26/21: 29\par 6/17/21: 19.9\par 7/21/21: 15.2 [de-identified] : CEA [de-identified] : Medical Oncology at [facility]: Sadie Mendoza\par Surgical Oncology: Florecita Rosas\par PCP: Pipe Lester\par Urology: Cesar Sanford\par \par patient: [phone]\par Astrid: [phone] (wife).

## 2021-07-21 NOTE — REASON FOR VISIT
[de-identified] : diagnostic laparoscopy 5/19/21, PIPAC 6/3/21 [FreeTextEntry2] : appendiceal carcinoma-  Here for enrollment visit for [facility] clinical trial

## 2021-07-21 NOTE — ASSESSMENT
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 \par No extraperitoneal disease \par Perfomance status- ecog 1\par \par He is feeling well at this point. He follows with Dr. Sanford for chronic bactiuria for which he is on 6 week course on nitrofurantoin. \par \par Disease was reasonably stable over the last year, now with progression requiring paracentesis-- most recent 1.6L drained May 2021. \par \par Given his progression within 1 year of CRS/ HIPEC, as well as R2b/ CC 2 resection at first operation I do not think additional cytoreductive surgery is feasible. I have notified his surgeon from [place], Dr. Peters who agrees. \par \par I discussed case with patients medical oncologist who favors regional approach over additional systemic therapy.\par \par Doing well after 1st PIPAC. Developed small bowel obstruction 5+ weeks after first PIPAC. Resolved with nonoperative management. \par \par Plan: \par [] Continue soft diet, low fiber\par [] Plan for PIPAC #2 on 7/22 with 5-FU infusion the morning of surgery\par [] Blood work the day prior. \par [] He will call if any symptoms of bowel obstruction in the meantime\par \par \par \par

## 2021-07-21 NOTE — PHYSICAL EXAM
[de-identified] : abdomen soft, non-distended, non-tender. no masses. laparotomy and drain incisions well healed. Port sites healing well.

## 2021-07-22 ENCOUNTER — INPATIENT (INPATIENT)
Facility: HOSPITAL | Age: 61
LOS: 0 days | Discharge: HOME | End: 2021-07-23
Attending: SURGERY | Admitting: SURGERY
Payer: COMMERCIAL

## 2021-07-22 ENCOUNTER — RESULT REVIEW (OUTPATIENT)
Age: 61
End: 2021-07-22

## 2021-07-22 ENCOUNTER — APPOINTMENT (OUTPATIENT)
Dept: INFUSION THERAPY | Facility: HOSPITAL | Age: 61
End: 2021-07-22

## 2021-07-22 ENCOUNTER — NON-APPOINTMENT (OUTPATIENT)
Age: 61
End: 2021-07-22

## 2021-07-22 ENCOUNTER — APPOINTMENT (OUTPATIENT)
Dept: SURGICAL ONCOLOGY | Facility: HOSPITAL | Age: 61
End: 2021-07-22

## 2021-07-22 ENCOUNTER — LABORATORY RESULT (OUTPATIENT)
Age: 61
End: 2021-07-22

## 2021-07-22 VITALS
RESPIRATION RATE: 16 BRPM | HEIGHT: 66 IN | OXYGEN SATURATION: 100 % | TEMPERATURE: 98 F | HEART RATE: 54 BPM | DIASTOLIC BLOOD PRESSURE: 63 MMHG | SYSTOLIC BLOOD PRESSURE: 114 MMHG | WEIGHT: 132.06 LBS

## 2021-07-22 DIAGNOSIS — Z98.890 OTHER SPECIFIED POSTPROCEDURAL STATES: Chronic | ICD-10-CM

## 2021-07-22 DIAGNOSIS — R19.00 INTRA-ABDOMINAL AND PELVIC SWELLING, MASS AND LUMP, UNSPECIFIED SITE: Chronic | ICD-10-CM

## 2021-07-22 DIAGNOSIS — Z90.49 ACQUIRED ABSENCE OF OTHER SPECIFIED PARTS OF DIGESTIVE TRACT: Chronic | ICD-10-CM

## 2021-07-22 DIAGNOSIS — C18.1 MALIGNANT NEOPLASM OF APPENDIX: ICD-10-CM

## 2021-07-22 DIAGNOSIS — Z87.438 PERSONAL HISTORY OF OTHER DISEASES OF MALE GENITAL ORGANS: Chronic | ICD-10-CM

## 2021-07-22 LAB
BLD GP AB SCN SERPL QL: NEGATIVE — SIGNIFICANT CHANGE UP
RH IG SCN BLD-IMP: POSITIVE — SIGNIFICANT CHANGE UP

## 2021-07-22 PROCEDURE — 88333 PATH CONSLTJ SURG CYTO XM 1: CPT | Mod: 26

## 2021-07-22 PROCEDURE — 88112 CYTOPATH CELL ENHANCE TECH: CPT | Mod: 26

## 2021-07-22 PROCEDURE — 49321 LAPAROSCOPY BIOPSY: CPT

## 2021-07-22 PROCEDURE — 88305 TISSUE EXAM BY PATHOLOGIST: CPT | Mod: 26,59

## 2021-07-22 PROCEDURE — 88305 TISSUE EXAM BY PATHOLOGIST: CPT | Mod: 26

## 2021-07-22 RX ORDER — SODIUM CHLORIDE 9 MG/ML
1000 INJECTION, SOLUTION INTRAVENOUS
Refills: 0 | Status: DISCONTINUED | OUTPATIENT
Start: 2021-07-22 | End: 2021-07-23

## 2021-07-22 RX ORDER — HYDROMORPHONE HYDROCHLORIDE 2 MG/ML
1 INJECTION INTRAMUSCULAR; INTRAVENOUS; SUBCUTANEOUS
Refills: 0 | Status: DISCONTINUED | OUTPATIENT
Start: 2021-07-22 | End: 2021-07-22

## 2021-07-22 RX ORDER — GABAPENTIN 400 MG/1
1 CAPSULE ORAL
Qty: 0 | Refills: 0 | DISCHARGE

## 2021-07-22 RX ORDER — TAMSULOSIN HYDROCHLORIDE 0.4 MG/1
0.4 CAPSULE ORAL AT BEDTIME
Refills: 0 | Status: DISCONTINUED | OUTPATIENT
Start: 2021-07-22 | End: 2021-07-23

## 2021-07-22 RX ORDER — ONDANSETRON 8 MG/1
4 TABLET, FILM COATED ORAL ONCE
Refills: 0 | Status: DISCONTINUED | OUTPATIENT
Start: 2021-07-22 | End: 2021-07-22

## 2021-07-22 RX ORDER — OXALIPLATIN 5 MG/ML
152 INJECTION, SOLUTION INTRAVENOUS ONCE
Refills: 0 | Status: DISCONTINUED | OUTPATIENT
Start: 2021-07-22 | End: 2021-07-23

## 2021-07-22 RX ORDER — ENOXAPARIN SODIUM 100 MG/ML
40 INJECTION SUBCUTANEOUS EVERY 24 HOURS
Refills: 0 | Status: DISCONTINUED | OUTPATIENT
Start: 2021-07-23 | End: 2021-07-23

## 2021-07-22 RX ORDER — OXYCODONE HYDROCHLORIDE 5 MG/1
2.5 TABLET ORAL EVERY 4 HOURS
Refills: 0 | Status: DISCONTINUED | OUTPATIENT
Start: 2021-07-22 | End: 2021-07-23

## 2021-07-22 RX ORDER — SODIUM CHLORIDE 9 MG/ML
1000 INJECTION, SOLUTION INTRAVENOUS
Refills: 0 | Status: DISCONTINUED | OUTPATIENT
Start: 2021-07-22 | End: 2021-07-22

## 2021-07-22 RX ORDER — KETOROLAC TROMETHAMINE 30 MG/ML
15 SYRINGE (ML) INJECTION EVERY 6 HOURS
Refills: 0 | Status: DISCONTINUED | OUTPATIENT
Start: 2021-07-22 | End: 2021-07-23

## 2021-07-22 RX ORDER — SIMVASTATIN 20 MG/1
10 TABLET, FILM COATED ORAL AT BEDTIME
Refills: 0 | Status: DISCONTINUED | OUTPATIENT
Start: 2021-07-22 | End: 2021-07-23

## 2021-07-22 RX ORDER — PANTOPRAZOLE SODIUM 20 MG/1
40 TABLET, DELAYED RELEASE ORAL
Refills: 0 | Status: DISCONTINUED | OUTPATIENT
Start: 2021-07-22 | End: 2021-07-23

## 2021-07-22 RX ORDER — ESCITALOPRAM OXALATE 10 MG/1
5 TABLET, FILM COATED ORAL AT BEDTIME
Refills: 0 | Status: DISCONTINUED | OUTPATIENT
Start: 2021-07-22 | End: 2021-07-23

## 2021-07-22 RX ORDER — ACETAMINOPHEN 500 MG
650 TABLET ORAL EVERY 6 HOURS
Refills: 0 | Status: DISCONTINUED | OUTPATIENT
Start: 2021-07-22 | End: 2021-07-23

## 2021-07-22 RX ADMIN — SODIUM CHLORIDE 30 MILLILITER(S): 9 INJECTION, SOLUTION INTRAVENOUS at 23:21

## 2021-07-22 RX ADMIN — TAMSULOSIN HYDROCHLORIDE 0.4 MILLIGRAM(S): 0.4 CAPSULE ORAL at 23:04

## 2021-07-22 RX ADMIN — Medication 650 MILLIGRAM(S): at 23:09

## 2021-07-22 RX ADMIN — SIMVASTATIN 10 MILLIGRAM(S): 20 TABLET, FILM COATED ORAL at 23:21

## 2021-07-22 RX ADMIN — Medication 15 MILLIGRAM(S): at 23:09

## 2021-07-22 RX ADMIN — ESCITALOPRAM OXALATE 5 MILLIGRAM(S): 10 TABLET, FILM COATED ORAL at 23:04

## 2021-07-22 NOTE — CHART NOTE - NSCHARTNOTEFT_GEN_A_CORE
POST-OPERATIVE NOTE    Subjective:  Patient is s/p diagnostic lap, peritoneal biopsy, and PIPAC. Reports mild incisional soreness but otherwise has no complaints. Denies CP, SOB, fevers/chills.    Vital Signs Last 24 Hrs  T(C): 36.8 (22 Jul 2021 22:15), Max: 36.9 (22 Jul 2021 21:00)  T(F): 98.3 (22 Jul 2021 22:15), Max: 98.4 (22 Jul 2021 21:00)  HR: 49 (22 Jul 2021 22:15) (42 - 58)  BP: 119/65 (22 Jul 2021 22:15) (112/69 - 127/72)  BP(mean): 81 (22 Jul 2021 21:15) (78 - 83)  RR: 16 (22 Jul 2021 22:15) (12 - 18)  SpO2: 100% (22 Jul 2021 22:15) (99% - 100%)  I&O's Detail    22 Jul 2021 07:01  -  22 Jul 2021 22:45  --------------------------------------------------------  IN:    Lactated Ringers: 225 mL    Oral Fluid: 240 mL  Total IN: 465 mL    OUT:    Voided (mL): 100 mL  Total OUT: 100 mL    Total NET: 365 mL        tamsulosin 0.4    PAST MEDICAL & SURGICAL HISTORY:  HLD (hyperlipidemia)    BPH (benign prostatic hyperplasia)    Neuropathic pain  hands and feet    UTI (urinary tract infection)  started on nitrofurintoin 3.5 weeks ago, was prescribed 6 week treatment    Appendix carcinoma  s/p abdominal surgery and chemotherapy, completed 12/2020    Anxiety    History of undescended testicle  age 7    Abdominal mass  surgery 5/2020  remove mass, spleen, partial colectomy, lymph nodes, part small intestines, omentum, apendix, gall bladder, part stomach. HIPEC    History of abdominal paracentesis  x3        Physical Exam:   GEN: resting in bed comfortably in NAD  RESP: no increased WOB  ABD: soft, non-distended, appropriately tender around incisions without rebound tenderness or guarding. Incision sites clean, dry, and intact without erythema or edema.   EXTR: warm, well-perfused without gross deformities  NEURO: awake, alert     LABS:  None    Assessment:  The patient is a 60y Male who is now several hours post-op from a diagnostic lap, peritoneal biopsy, and PIPAC    Plan:  - Pain control as needed  - CLD  - OOB and ambulating as tolerated  - F/u AM labs

## 2021-07-22 NOTE — BRIEF OPERATIVE NOTE - OPERATION/FINDINGS
Diagnostic laparoscopy, peritoneal carcinomatosis index 28, from 29 on initial treatment  Biopsies taken of RLQ, RUQ, LLQ, LUQ. Frozen section demonstrated low grade mucinous carcinoma  Pressurized IntraPeritoneal Aerosol Chemotherapy (PIPAC) with Oxaliplatin

## 2021-07-22 NOTE — PROVIDER CONTACT NOTE (OTHER) - ACTION/TREATMENT ORDERED:
Provider aware, states to contact MD if HR goes below 40 or patient becomes symptomatic. Will continue tele monitoring.

## 2021-07-22 NOTE — PROVIDER CONTACT NOTE (OTHER) - ASSESSMENT
Patient is A&Ox4. Patient runs bradycardic between 40-60s, all other vitals stable. Patient denies chest pain, sob, palpitations, and dizziness.

## 2021-07-22 NOTE — H&P ADULT - NSHPPHYSICALEXAM_GEN_ALL_CORE
General: alert and oriented, NAD  Resp: airway patent, respirations unlabored  CVS: regular rate and rhythm  Abdomen: mildly distended, non-localized tenderness. No rebound.   *NGT: bilious drainage  Extremities: no edema  Skin: warm, dry, appropriate color

## 2021-07-22 NOTE — H&P ADULT - HISTORY OF PRESENT ILLNESS
Patient is a 60 year old male with PMHx Dyslipidemia, BPH, Malignant neoplasm of appendix - s/p cytoreduction surgery with HIPEC 5/2020 and adjuvent chemotherapy, PIPAC 6/3/21 who presented to [facility] 7/11/21 with c/o diffuse abd pain, n/v x 1 day.     CT scan in ED--> Small bowel obstruction with transition in the lower abdomen. Additional focally dilated loop of small bowel in the mid abdomen likely reflects a second separate site of obstruction. Findings may be related to multiple sites of adhesion and/or tumor implants.    Slight interval decrease in moderate loculated ascites. 1 cm focal cystic implant along the posterior right hepatic lobe, stable since prior exam and decreased since 12/2/20.    New mild to moderate bilateral hydronephrosis and partial hydroureter.    NGT was placed in ED @ OSH. WBC: 18. Patient was transferred to [facility] 7/12 given plans for PIPAC 7/14/21 under the care of Dr. Rosas. Patient is a 60 year old male with PMHx Dyslipidemia, BPH, Malignant neoplasm of appendix - s/p cytoreduction surgery with HIPEC 5/2020 and adjuvent chemotherapy, PIPAC 6/3/21 who presented to [facility] 7/11/21 with c/o diffuse abd pain, n/v x 1 day.     CT scan in ED--> Small bowel obstruction with transition in the lower abdomen. Additional focally dilated loop of small bowel in the mid abdomen likely reflects a second separate site of obstruction. Findings may be related to multiple sites of adhesion and/or tumor implants.    Slight interval decrease in moderate loculated ascites. 1 cm focal cystic implant along the posterior right hepatic lobe, stable since prior exam and decreased since 12/2/20.    New mild to moderate bilateral hydronephrosis and partial hydroureter.    NGT was placed in ED @ OSH. WBC: 18. Patient was transferred to [facility] 7/12 given plans for PIPAC 7/14/21 under the care of Dr. Rosas.    Patient recovered within 36 hours and was discharged home the day after admission. He has done well and received 5-FU today.

## 2021-07-22 NOTE — H&P ADULT - ATTENDING COMMENTS
Agree with above. Imaging reviewed. Constipation with concern for small bowel obstruction. Patient now reports passing flatus. NPO/NGT/IVF. Await bowel function. Suppository. Agree with above. Plan for diagnostic laparoscopy, peritoneal biopsies, and pressurized aerosolized intraperitoneal chemotherapy. (PIPAC.) Risks, benefits, and alternatives discussed in detail with patient. Received 5-Fu today.

## 2021-07-23 ENCOUNTER — TRANSCRIPTION ENCOUNTER (OUTPATIENT)
Age: 61
End: 2021-07-23

## 2021-07-23 VITALS
HEART RATE: 65 BPM | OXYGEN SATURATION: 100 % | RESPIRATION RATE: 15 BRPM | DIASTOLIC BLOOD PRESSURE: 49 MMHG | SYSTOLIC BLOOD PRESSURE: 101 MMHG | TEMPERATURE: 99 F

## 2021-07-23 DIAGNOSIS — Z51.11 ENCOUNTER FOR ANTINEOPLASTIC CHEMOTHERAPY: ICD-10-CM

## 2021-07-23 DIAGNOSIS — R11.2 NAUSEA WITH VOMITING, UNSPECIFIED: ICD-10-CM

## 2021-07-23 LAB
ANION GAP SERPL CALC-SCNC: 14 MMOL/L — SIGNIFICANT CHANGE UP (ref 7–14)
BUN SERPL-MCNC: 16 MG/DL — SIGNIFICANT CHANGE UP (ref 7–23)
CALCIUM SERPL-MCNC: 8.7 MG/DL — SIGNIFICANT CHANGE UP (ref 8.4–10.5)
CHLORIDE SERPL-SCNC: 101 MMOL/L — SIGNIFICANT CHANGE UP (ref 98–107)
CO2 SERPL-SCNC: 21 MMOL/L — LOW (ref 22–31)
CREAT SERPL-MCNC: 0.84 MG/DL — SIGNIFICANT CHANGE UP (ref 0.5–1.3)
GLUCOSE SERPL-MCNC: 111 MG/DL — HIGH (ref 70–99)
HCT VFR BLD CALC: 34.7 % — LOW (ref 39–50)
HGB BLD-MCNC: 10.6 G/DL — LOW (ref 13–17)
MAGNESIUM SERPL-MCNC: 1.8 MG/DL — SIGNIFICANT CHANGE UP (ref 1.6–2.6)
MCHC RBC-ENTMCNC: 26.2 PG — LOW (ref 27–34)
MCHC RBC-ENTMCNC: 30.5 GM/DL — LOW (ref 32–36)
MCV RBC AUTO: 85.7 FL — SIGNIFICANT CHANGE UP (ref 80–100)
NRBC # BLD: 0 /100 WBCS — SIGNIFICANT CHANGE UP
NRBC # FLD: 0 K/UL — SIGNIFICANT CHANGE UP
PHOSPHATE SERPL-MCNC: 4.3 MG/DL — SIGNIFICANT CHANGE UP (ref 2.5–4.5)
PLATELET # BLD AUTO: 514 K/UL — HIGH (ref 150–400)
POTASSIUM SERPL-MCNC: 3.9 MMOL/L — SIGNIFICANT CHANGE UP (ref 3.5–5.3)
POTASSIUM SERPL-SCNC: 3.9 MMOL/L — SIGNIFICANT CHANGE UP (ref 3.5–5.3)
RBC # BLD: 4.05 M/UL — LOW (ref 4.2–5.8)
RBC # FLD: 23.9 % — HIGH (ref 10.3–14.5)
SODIUM SERPL-SCNC: 136 MMOL/L — SIGNIFICANT CHANGE UP (ref 135–145)
WBC # BLD: 16.72 K/UL — HIGH (ref 3.8–10.5)
WBC # FLD AUTO: 16.72 K/UL — HIGH (ref 3.8–10.5)

## 2021-07-23 RX ORDER — ACETAMINOPHEN 500 MG
2 TABLET ORAL
Qty: 0 | Refills: 0 | DISCHARGE
Start: 2021-07-23

## 2021-07-23 RX ORDER — OXYCODONE HYDROCHLORIDE 5 MG/1
2.5 TABLET ORAL
Qty: 0 | Refills: 0 | DISCHARGE
Start: 2021-07-23

## 2021-07-23 RX ORDER — OXYCODONE HYDROCHLORIDE 5 MG/1
1 TABLET ORAL
Qty: 8 | Refills: 0
Start: 2021-07-23

## 2021-07-23 RX ADMIN — PANTOPRAZOLE SODIUM 40 MILLIGRAM(S): 20 TABLET, DELAYED RELEASE ORAL at 05:17

## 2021-07-23 RX ADMIN — ENOXAPARIN SODIUM 40 MILLIGRAM(S): 100 INJECTION SUBCUTANEOUS at 05:16

## 2021-07-23 NOTE — DISCHARGE NOTE PROVIDER - NSDCMRMEDTOKEN_GEN_ALL_CORE_FT
acetaminophen 325 mg oral tablet: 2 tab(s) orally every 6 hours, As Needed  Colace 100 mg oral capsule: 1 cap(s) orally 2 times a day -for constipation   escitalopram 5 mg oral tablet: 1 tab(s) orally once a day (at bedtime)  nitrofurantoin macrocrystals 50 mg oral capsule: 1 cap(s) orally once a day (at bedtime)  omeprazole 40 mg oral delayed release capsule: 1 cap(s) orally once a day   oxyCODONE 5 mg oral tablet: 1 tab(s) orally every 6 hours, As Needed MDD:4  simvastatin 10 mg oral tablet: 1 tab(s) orally once a day (at bedtime)  tamsulosin 0.4 mg oral capsule: 1 cap(s) orally once a day (at bedtime)

## 2021-07-23 NOTE — DISCHARGE NOTE PROVIDER - HOSPITAL COURSE
Patient is a 60 year old male with PMHx Dyslipidemia, BPH, Malignant neoplasm of appendix - s/p cytoreduction surgery with HIPEC 5/2020 and adjuvant chemotherapy, PIPAC 6/3/21 who was recently admitted for SBO two weeks ago. Patient followed up with Dr Mason with plan for PIPAC on 7/22.    Patient was taken to the OR  and is s/p Diagnostic laparoscopy, peritoneal carcinomatosis index 28, from 29 on initial treatment  Biopsies taken of RLQ, RUQ, LLQ, LUQ. Frozen section demonstrated low grade mucinous carcinoma. Pressurized Intra Peritoneal Aerosol Chemotherapy (PIPAC) with Oxaliplatin    Patient did well post operatively. Patient was advanced from clears to regular diet and tolerated well.    Pain is well controlled. He is voiding and ambulating without difficulty.    Patient is stable for discharge home and will follow up with Dr Mason.

## 2021-07-23 NOTE — PROGRESS NOTE ADULT - SUBJECTIVE AND OBJECTIVE BOX
Subjective:   Patient seen at bedside this AM. Overnight pt was bradycardic to the 40s but asymptomatic.    Objective:  Vital Signs  T(C): 36.8 (07-22 @ 22:15), Max: 36.9 (07-22 @ 21:00)  HR: 49 (07-22 @ 22:15) (42 - 58)  BP: 119/65 (07-22 @ 22:15) (112/69 - 127/72)  RR: 16 (07-22 @ 22:15) (12 - 18)  SpO2: 100% (07-22 @ 22:15) (99% - 100%)  07-22-21 @ 07:01  -  07-23-21 @ 00:22  --------------------------------------------------------  IN:  Total IN: 0 mL    OUT:    Voided (mL): 100 mL  Total OUT: 100 mL    Total NET: -100 mL        Physical Exam:  GEN: resting in bed comfortably in NAD  RESP: no increased WOB  ABD: soft, non-distended, non-tender without rebound tenderness or guarding. Incision sites clean, dry, and intact without erythema or edema.  EXTR: warm, well-perfused, no edema  NEURO: awake, alert    Labs:        CAPILLARY BLOOD GLUCOSE          Imaging:     Subjective:   Patient seen at bedside this AM. Overnight pt was bradycardic to the 40s but asymptomatic.    Vital Signs Last 24 Hrs  T(C): 36.9 (23 Jul 2021 05:15), Max: 36.9 (22 Jul 2021 21:00)  T(F): 98.4 (23 Jul 2021 05:15), Max: 98.4 (22 Jul 2021 21:00)  HR: 39 (23 Jul 2021 05:39) (39 - 75)  BP: 104/60 (23 Jul 2021 05:15) (104/60 - 127/72)  BP(mean): 81 (22 Jul 2021 21:15) (78 - 83)  RR: 16 (23 Jul 2021 05:15) (12 - 18)  SpO2: 100% (23 Jul 2021 05:15) (97% - 100%)      22 Jul 2021 07:01  -  23 Jul 2021 07:00  --------------------------------------------------------  IN:    Lactated Ringers: 225 mL    Lactated Ringers: 300 mL    Oral Fluid: 510 mL  Total IN: 1035 mL    OUT:    Voided (mL): 350 mL  Total OUT: 350 mL    Total NET: 685 mL        Physical Exam:  GEN: resting in bed comfortably in NAD  RESP: no increased WOB  ABD: soft, non-distended, non-tender without rebound tenderness or guarding. Incision sites clean, dry, and intact without erythema or edema.  EXTR: warm, well-perfused, no edema  NEURO: awake, alert      LABS:  cret                        10.6   16.72 )-----------( 514      ( 23 Jul 2021 06:28 )             34.7     07-23    136  |  101  |  16  ----------------------------<  111<H>  3.9   |  21<L>  |  0.84    Ca    8.7      23 Jul 2021 06:28  Phos  4.3     07-23  Mg     1.80     07-23       Subjective:   Patient seen at bedside this AM. Overnight pt was bradycardic to the 40s but asymptomatic.  Feeling well, tolerating liquids.     Vital Signs Last 24 Hrs  T(C): 36.9 (23 Jul 2021 05:15), Max: 36.9 (22 Jul 2021 21:00)  T(F): 98.4 (23 Jul 2021 05:15), Max: 98.4 (22 Jul 2021 21:00)  HR: 39 (23 Jul 2021 05:39) (39 - 75)  BP: 104/60 (23 Jul 2021 05:15) (104/60 - 127/72)  BP(mean): 81 (22 Jul 2021 21:15) (78 - 83)  RR: 16 (23 Jul 2021 05:15) (12 - 18)  SpO2: 100% (23 Jul 2021 05:15) (97% - 100%)      22 Jul 2021 07:01  -  23 Jul 2021 07:00  --------------------------------------------------------  IN:    Lactated Ringers: 225 mL    Lactated Ringers: 300 mL    Oral Fluid: 510 mL  Total IN: 1035 mL    OUT:    Voided (mL): 350 mL  Total OUT: 350 mL    Total NET: 685 mL        Physical Exam:  GEN: resting in bed comfortably in NAD  RESP: no increased WOB  ABD: soft, non-distended, non-tender without rebound tenderness or guarding. Incision sites clean, dry, and intact without erythema or edema.  EXTR: warm, well-perfused, no edema  NEURO: awake, alert      LABS:  cret                        10.6   16.72 )-----------( 514      ( 23 Jul 2021 06:28 )             34.7     07-23    136  |  101  |  16  ----------------------------<  111<H>  3.9   |  21<L>  |  0.84    Ca    8.7      23 Jul 2021 06:28  Phos  4.3     07-23  Mg     1.80     07-23

## 2021-07-23 NOTE — DISCHARGE NOTE PROVIDER - NSDCFUADDINST_GEN_ALL_CORE_FT
WOUND CARE:    BATHING: Please do not submerge wound underwater. You may shower and/or sponge bathe.  ACTIVITY: No heavy lifting or straining. Otherwise, you may return to your usual level of physical activity. If you are taking narcotic pain medication (such as Percocet) DO NOT drive a car, operate machinery or make important decisions.  DIET: Return to your usual diet.  NOTIFY YOUR SURGEON IF: You have any bleeding that does not stop, any pus draining from your wound(s), any fever (over 100.4 F) or chills, persistent nausea/vomiting, persistent diarrhea, or if your pain is not controlled on your discharge pain medications.  FOLLOW-UP: Please follow up with your primary care physician in one week regarding your hospitalization

## 2021-07-23 NOTE — PROGRESS NOTE ADULT - ASSESSMENT
61yo M with h/o appendiceal carcinoma s/p extensive abdominal surgery and HIPEC (5/2020), now s/p diagnostic lap, peritoneal biopsy, and PIPAC 7/22. 59yo M with h/o appendiceal carcinoma s/p extensive abdominal surgery and HIPEC (5/2020), now s/p diagnostic lap, peritoneal biopsy, and PIPAC 7/22.    Plan:  - advance to soft diet  - IVF @ 30   - lovenox for DVT ppx  - dispo planning to home       D Team Surgery   [id number]

## 2021-07-23 NOTE — DISCHARGE NOTE NURSING/CASE MANAGEMENT/SOCIAL WORK - NSDCPNINST_GEN_ALL_CORE
Call 911 for chest pain, and/or difficulty breathing. Report to your doctor any fever, pus to incision

## 2021-07-23 NOTE — DISCHARGE NOTE NURSING/CASE MANAGEMENT/SOCIAL WORK - PATIENT PORTAL LINK FT
You can access the FollowMyHealth Patient Portal offered by [facility] by registering at the following website: [email or web address]. By joining [facility]’s FollowMyHealth portal, you will also be able to view your health information using other applications (apps) compatible with our system.

## 2021-07-23 NOTE — DISCHARGE NOTE PROVIDER - NSDCFUSCHEDAPPT_GEN_ALL_CORE_FT
RYANNE YOUNG ; 07/28/2021 ; [facility]  RYANNE YOUNG ; 07/28/2021 ; [facility]

## 2021-07-23 NOTE — DISCHARGE NOTE PROVIDER - NSDCCPTREATMENT_GEN_ALL_CORE_FT
PRINCIPAL PROCEDURE  Procedure: Diagnostic laparoscopy  Findings and Treatment:       SECONDARY PROCEDURE  Procedure: Intraperitoneal chemotherapy  Findings and Treatment:     
Discharged

## 2021-07-23 NOTE — DISCHARGE NOTE PROVIDER - CARE PROVIDER_API CALL
Florecita Mason)  Complex General Surgical Oncology; Surgery  [address] [place] [address]  Phone: [phone]  Fax: [phone]  Follow Up Time: 1 week

## 2021-07-26 ENCOUNTER — OUTPATIENT (OUTPATIENT)
Dept: OUTPATIENT SERVICES | Facility: HOSPITAL | Age: 61
LOS: 1 days | Discharge: HOME | End: 2021-07-26

## 2021-07-26 DIAGNOSIS — Z90.49 ACQUIRED ABSENCE OF OTHER SPECIFIED PARTS OF DIGESTIVE TRACT: Chronic | ICD-10-CM

## 2021-07-26 DIAGNOSIS — Z87.438 PERSONAL HISTORY OF OTHER DISEASES OF MALE GENITAL ORGANS: Chronic | ICD-10-CM

## 2021-07-26 DIAGNOSIS — Z98.890 OTHER SPECIFIED POSTPROCEDURAL STATES: Chronic | ICD-10-CM

## 2021-07-26 DIAGNOSIS — C26.9 MALIGNANT NEOPLASM OF ILL-DEFINED SITES WITHIN THE DIGESTIVE SYSTEM: ICD-10-CM

## 2021-07-26 DIAGNOSIS — R19.00 INTRA-ABDOMINAL AND PELVIC SWELLING, MASS AND LUMP, UNSPECIFIED SITE: Chronic | ICD-10-CM

## 2021-07-27 ENCOUNTER — OUTPATIENT (OUTPATIENT)
Dept: OUTPATIENT SERVICES | Facility: HOSPITAL | Age: 61
LOS: 1 days | Discharge: HOME | End: 2021-07-27

## 2021-07-27 DIAGNOSIS — C26.9 MALIGNANT NEOPLASM OF ILL-DEFINED SITES WITHIN THE DIGESTIVE SYSTEM: ICD-10-CM

## 2021-07-27 DIAGNOSIS — Z90.49 ACQUIRED ABSENCE OF OTHER SPECIFIED PARTS OF DIGESTIVE TRACT: Chronic | ICD-10-CM

## 2021-07-27 DIAGNOSIS — Z98.890 OTHER SPECIFIED POSTPROCEDURAL STATES: Chronic | ICD-10-CM

## 2021-07-27 DIAGNOSIS — Z87.438 PERSONAL HISTORY OF OTHER DISEASES OF MALE GENITAL ORGANS: Chronic | ICD-10-CM

## 2021-07-27 DIAGNOSIS — R19.00 INTRA-ABDOMINAL AND PELVIC SWELLING, MASS AND LUMP, UNSPECIFIED SITE: Chronic | ICD-10-CM

## 2021-07-28 ENCOUNTER — APPOINTMENT (OUTPATIENT)
Dept: INFUSION THERAPY | Facility: CLINIC | Age: 61
End: 2021-07-28

## 2021-07-28 ENCOUNTER — APPOINTMENT (OUTPATIENT)
Dept: HEMATOLOGY ONCOLOGY | Facility: CLINIC | Age: 61
End: 2021-07-28

## 2021-07-29 ENCOUNTER — TRANSCRIPTION ENCOUNTER (OUTPATIENT)
Age: 61
End: 2021-07-29

## 2021-07-29 LAB — SURGICAL PATHOLOGY STUDY: SIGNIFICANT CHANGE UP

## 2021-07-30 LAB
NON-GYNECOLOGICAL CYTOLOGY STUDY: SIGNIFICANT CHANGE UP

## 2021-08-04 ENCOUNTER — TRANSCRIPTION ENCOUNTER (OUTPATIENT)
Age: 61
End: 2021-08-04

## 2021-08-04 ENCOUNTER — RESULT REVIEW (OUTPATIENT)
Age: 61
End: 2021-08-04

## 2021-08-05 ENCOUNTER — APPOINTMENT (OUTPATIENT)
Dept: HEMATOLOGY ONCOLOGY | Facility: CLINIC | Age: 61
End: 2021-08-05

## 2021-08-05 ENCOUNTER — RESULT REVIEW (OUTPATIENT)
Age: 61
End: 2021-08-05

## 2021-08-05 ENCOUNTER — APPOINTMENT (OUTPATIENT)
Dept: SURGICAL ONCOLOGY | Facility: CLINIC | Age: 61
End: 2021-08-05
Payer: COMMERCIAL

## 2021-08-05 ENCOUNTER — NON-APPOINTMENT (OUTPATIENT)
Age: 61
End: 2021-08-05

## 2021-08-05 VITALS
DIASTOLIC BLOOD PRESSURE: 76 MMHG | WEIGHT: 122 LBS | HEIGHT: 66 IN | OXYGEN SATURATION: 98 % | BODY MASS INDEX: 19.61 KG/M2 | SYSTOLIC BLOOD PRESSURE: 123 MMHG | RESPIRATION RATE: 16 BRPM

## 2021-08-05 LAB
BASOPHILS # BLD AUTO: 0.06 K/UL — SIGNIFICANT CHANGE UP (ref 0–0.2)
BASOPHILS NFR BLD AUTO: 0.7 % — SIGNIFICANT CHANGE UP (ref 0–2)
EOSINOPHIL # BLD AUTO: 0.19 K/UL — SIGNIFICANT CHANGE UP (ref 0–0.5)
EOSINOPHIL NFR BLD AUTO: 2.3 % — SIGNIFICANT CHANGE UP (ref 0–6)
HCT VFR BLD CALC: 34.4 % — LOW (ref 39–50)
HGB BLD-MCNC: 10.7 G/DL — LOW (ref 13–17)
IMM GRANULOCYTES NFR BLD AUTO: 0.2 % — SIGNIFICANT CHANGE UP (ref 0–1.5)
LYMPHOCYTES # BLD AUTO: 2.91 K/UL — SIGNIFICANT CHANGE UP (ref 1–3.3)
LYMPHOCYTES # BLD AUTO: 35.1 % — SIGNIFICANT CHANGE UP (ref 13–44)
MCHC RBC-ENTMCNC: 27.4 PG — SIGNIFICANT CHANGE UP (ref 27–34)
MCHC RBC-ENTMCNC: 31.1 G/DL — LOW (ref 32–36)
MCV RBC AUTO: 88 FL — SIGNIFICANT CHANGE UP (ref 80–100)
MONOCYTES # BLD AUTO: 0.81 K/UL — SIGNIFICANT CHANGE UP (ref 0–0.9)
MONOCYTES NFR BLD AUTO: 9.8 % — SIGNIFICANT CHANGE UP (ref 2–14)
NEUTROPHILS # BLD AUTO: 4.29 K/UL — SIGNIFICANT CHANGE UP (ref 1.8–7.4)
NEUTROPHILS NFR BLD AUTO: 51.9 % — SIGNIFICANT CHANGE UP (ref 43–77)
NRBC # BLD: 0 /100 WBCS — SIGNIFICANT CHANGE UP (ref 0–0)
PLATELET # BLD AUTO: 543 K/UL — HIGH (ref 150–400)
RBC # BLD: 3.91 M/UL — LOW (ref 4.2–5.8)
RBC # FLD: 23.6 % — HIGH (ref 10.3–14.5)
WBC # BLD: 8.28 K/UL — SIGNIFICANT CHANGE UP (ref 3.8–10.5)
WBC # FLD AUTO: 8.28 K/UL — SIGNIFICANT CHANGE UP (ref 3.8–10.5)

## 2021-08-05 PROCEDURE — 99212 OFFICE O/P EST SF 10 MIN: CPT

## 2021-08-05 NOTE — REASON FOR VISIT
[Post-Op Visit] : a post-op visit for [Spouse] : spouse [de-identified] : diagnostic laparoscopy 5/19/21, PIPAC 6/3/21, 7/22/21 [FreeTextEntry2] : appendiceal carcinoma-  Here for enrollment visit for [facility] clinical trial

## 2021-08-05 NOTE — PHYSICAL EXAM
[Normal] : oriented to person, place and time, with appropriate affect [de-identified] : abdomen soft, non-distended, non-tender. no masses. laparotomy and drain incisions well healed. Port sites healing well.

## 2021-08-05 NOTE — ASSESSMENT
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 \par No extraperitoneal disease \par Perfomance status- ecog 1\par \par He is feeling well at this point. He follows with Dr. Sanford for chronic bactiuria for which he completed  6 week course on nitrofurantoin. \par \par Disease was reasonably stable over the last year, now with progression requiring paracentesis-- 1.6L drained May 2021. \par \par Given his progression within 1 year of CRS/ HIPEC, as well as R2b/ CC 2 resection at first operation I do not think additional cytoreductive surgery is feasible. I have notified his surgeon from [place], Dr. Peters who agrees. \par \par I discussed case with patients medical oncologist who favors regional approach over additional systemic therapy.\par \par Doing well after 2nd PIPAC. 2nd PIPAC delayed by 1 week given concern for small bowel obstruction requiring overnight admission. Resolved quickly. Now moving bowels at least daily. Taking prilosec intermittently. No nausea/ vomiting/ abdominal pain. \par \par PCI PIPAC 1: 29\par PCI PIPAC 2: 28\par \par Plan: \par [] Blood work today\par [] CT Scheduled for tomorrow \par [] Plan for PIPAC #3 likely 9/1 with 5-FU infusion the day prior\par [] He will call if any symptoms of bowel obstruction in the meantime\par \par \par \par

## 2021-08-05 NOTE — HISTORY OF PRESENT ILLNESS
[Post-Op Complications] : No post-op complications reported [de-identified] : Mr. Arias is a 60 year old gentleman referred to me by Dr. 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 He underwent  CRS and HIPEC for presumed LAMN, which turned out to node positive.([facility] pathology review well-differentiated mucinous adenocarcinoma) on 5/7/2020 at [place] with Dr. Herberth Peters. \par \par PCI: 34\par CC: R2b\par \par Mr. Arias has since moved to [place] to be closer to family and has established care in [facility]. Lives in [place]. \par \par Cytoreduction included a splenectomy and distal gastrectomy, omentectomy, cholecystectomy, subtotal colectomy and diaphragm stripping. 4L of mucinous ascites was drained. 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 As his tumor was felt to be low grade, he did not receive any systemic therapy up front. He now reports feeling well. He reports 3-4 loose but not watery BM's/day. He reports improving weight and energy levels. He denies any PO intolerance. He plays golf and does nature walks close to [facility]. \par \par CEA \par 6/5/20: 3.8\par 8/12/20: 6.1\par 4/7: 10.1 \par \par Pathology: \par 5/7/21: Paracentesis, cytology negative for malignant cells. 1.6 Liters removed. 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 He underwent subtotal colectomy with positive margins. 2/14 nodes involved. [place] review: low grade appendiceal mucinous neoplasm\par \par Scans were obtained at [facility] and demonstrated: \par 4/30/21: loculated intraperitoneal fluid. No bowel obstruction. \par \par Last colonoscopy May 2020. \par \par Underwent diagnostic laparoscopy by me on 5/19/21. PCI approx 25. Evacuated 1.4 liters ascites. Biopsies consistent with mucinous adenocarcinoma. \par 6/3: PIPAC #1 \par 6/17: Doing well. No pain. Mild reflux/ dyspepsia. Relieved with tums. Eating well. \par 7/15/21: Doing well. Admitted earlier this week 7/12-7/13 to [facility] with small bowel obstruction. Dramatically improved after 24 hours nasogastric decompression. Feels well since discharge. Multiple BM last 48 hours. Tolerating full liquid diet. No abdominal pain. \par 8/5/21: \par

## 2021-08-06 ENCOUNTER — NON-APPOINTMENT (OUTPATIENT)
Age: 61
End: 2021-08-06

## 2021-08-06 ENCOUNTER — OUTPATIENT (OUTPATIENT)
Dept: OUTPATIENT SERVICES | Facility: HOSPITAL | Age: 61
LOS: 1 days | End: 2021-08-06
Payer: COMMERCIAL

## 2021-08-06 ENCOUNTER — APPOINTMENT (OUTPATIENT)
Dept: CT IMAGING | Facility: CLINIC | Age: 61
End: 2021-08-06
Payer: COMMERCIAL

## 2021-08-06 ENCOUNTER — RESULT REVIEW (OUTPATIENT)
Age: 61
End: 2021-08-06

## 2021-08-06 DIAGNOSIS — Z98.890 OTHER SPECIFIED POSTPROCEDURAL STATES: Chronic | ICD-10-CM

## 2021-08-06 DIAGNOSIS — R19.00 INTRA-ABDOMINAL AND PELVIC SWELLING, MASS AND LUMP, UNSPECIFIED SITE: Chronic | ICD-10-CM

## 2021-08-06 DIAGNOSIS — Z87.438 PERSONAL HISTORY OF OTHER DISEASES OF MALE GENITAL ORGANS: Chronic | ICD-10-CM

## 2021-08-06 DIAGNOSIS — Z90.49 ACQUIRED ABSENCE OF OTHER SPECIFIED PARTS OF DIGESTIVE TRACT: Chronic | ICD-10-CM

## 2021-08-06 DIAGNOSIS — Z00.8 ENCOUNTER FOR OTHER GENERAL EXAMINATION: ICD-10-CM

## 2021-08-06 LAB
ALBUMIN SERPL ELPH-MCNC: 3.9 G/DL
ALP BLD-CCNC: 107 U/L
ALT SERPL-CCNC: 15 U/L
ANION GAP SERPL CALC-SCNC: 11 MMOL/L
AST SERPL-CCNC: 17 U/L
BILIRUB SERPL-MCNC: 0.3 MG/DL
BUN SERPL-MCNC: 20 MG/DL
CALCIUM SERPL-MCNC: 9.1 MG/DL
CEA SERPL-MCNC: 13.6 NG/ML
CHLORIDE SERPL-SCNC: 107 MMOL/L
CO2 SERPL-SCNC: 24 MMOL/L
CREAT SERPL-MCNC: 0.99 MG/DL
GLUCOSE SERPL-MCNC: 110 MG/DL
POTASSIUM SERPL-SCNC: 4.2 MMOL/L
PROT SERPL-MCNC: 6.2 G/DL
SODIUM SERPL-SCNC: 142 MMOL/L

## 2021-08-06 PROCEDURE — 74177 CT ABD & PELVIS W/CONTRAST: CPT

## 2021-08-06 PROCEDURE — 74177 CT ABD & PELVIS W/CONTRAST: CPT | Mod: 26

## 2021-08-11 PROBLEM — Z78.9 OTHER SPECIFIED HEALTH STATUS: Chronic | Status: INACTIVE | Noted: 2021-02-18 | Resolved: 2021-05-07

## 2021-08-12 PROBLEM — C18.1 MALIGNANT NEOPLASM OF APPENDIX: Chronic | Status: ACTIVE | Noted: 2021-07-12

## 2021-08-17 ENCOUNTER — LABORATORY RESULT (OUTPATIENT)
Age: 61
End: 2021-08-17

## 2021-08-19 ENCOUNTER — APPOINTMENT (OUTPATIENT)
Dept: SURGICAL ONCOLOGY | Facility: CLINIC | Age: 61
End: 2021-08-19
Payer: COMMERCIAL

## 2021-08-19 ENCOUNTER — OUTPATIENT (OUTPATIENT)
Dept: OUTPATIENT SERVICES | Facility: HOSPITAL | Age: 61
LOS: 1 days | End: 2021-08-19
Payer: COMMERCIAL

## 2021-08-19 VITALS
HEIGHT: 67 IN | WEIGHT: 119.93 LBS | OXYGEN SATURATION: 99 % | SYSTOLIC BLOOD PRESSURE: 112 MMHG | DIASTOLIC BLOOD PRESSURE: 70 MMHG | TEMPERATURE: 98 F | RESPIRATION RATE: 16 BRPM | HEART RATE: 52 BPM

## 2021-08-19 DIAGNOSIS — Z90.49 ACQUIRED ABSENCE OF OTHER SPECIFIED PARTS OF DIGESTIVE TRACT: Chronic | ICD-10-CM

## 2021-08-19 DIAGNOSIS — Z20.822 CONTACT WITH AND (SUSPECTED) EXPOSURE TO COVID-19: ICD-10-CM

## 2021-08-19 DIAGNOSIS — C18.1 MALIGNANT NEOPLASM OF APPENDIX: ICD-10-CM

## 2021-08-19 DIAGNOSIS — Z98.890 OTHER SPECIFIED POSTPROCEDURAL STATES: Chronic | ICD-10-CM

## 2021-08-19 DIAGNOSIS — R19.00 INTRA-ABDOMINAL AND PELVIC SWELLING, MASS AND LUMP, UNSPECIFIED SITE: Chronic | ICD-10-CM

## 2021-08-19 DIAGNOSIS — Z87.438 PERSONAL HISTORY OF OTHER DISEASES OF MALE GENITAL ORGANS: Chronic | ICD-10-CM

## 2021-08-19 PROCEDURE — 99212 OFFICE O/P EST SF 10 MIN: CPT | Mod: 95

## 2021-08-19 PROCEDURE — 93010 ELECTROCARDIOGRAM REPORT: CPT

## 2021-08-19 RX ORDER — SODIUM CHLORIDE 9 MG/ML
1000 INJECTION, SOLUTION INTRAVENOUS
Refills: 0 | Status: DISCONTINUED | OUTPATIENT
Start: 2021-09-02 | End: 2021-09-03

## 2021-08-19 RX ORDER — NITROFURANTOIN MACROCRYSTAL 50 MG
1 CAPSULE ORAL
Qty: 0 | Refills: 0 | DISCHARGE

## 2021-08-19 NOTE — REASON FOR VISIT
[de-identified] : diagnostic laparoscopy 5/19/21, PIPAC 6/3/21, 7/22/21 [Post-Op Visit] : a post-op visit for [Spouse] : spouse [FreeTextEntry2] : appendiceal carcinoma-  Here for enrollment visit for [facility] clinical trial

## 2021-08-19 NOTE — H&P PST ADULT - PROBLEM SELECTOR PLAN 1
Patient scheduled for diagnostic laparoscopy, peritoneal biopsies, pressurized intraperitoneal chemotherapy on 9/2/2021.    Written & verbal preop instructions, gi prophylaxis patient to take own & surgical soap given  Pt verbalized good understanding.  Teach back done on surgical soap instructions. Patient scheduled for diagnostic laparoscopy, peritoneal biopsies, pressurized intraperitoneal chemotherapy on 9/2/2021.    Written & verbal preop instructions, gi prophylaxis patient to take own & surgical soap given  Pt verbalized good understanding.  Teach back done on surgical soap instructions.  MALISSA precaution recommended

## 2021-08-19 NOTE — ASSESSMENT
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 \par No extraperitoneal disease \par Performance status- ecog 1\par \par He is feeling well at this point. \par Disease was reasonably stable over the last year, now with progression requiring paracentesis-- 1.6L drained May 2021. \par \par Given his progression within 1 year of CRS/ HIPEC, as well as R2b/ CC 2 resection at first operation I do not think additional cytoreductive surgery is feasible. I have notified his surgeon from [place], Dr. Peters who agrees. \par \par I discussed case with patients medical oncologist who favors regional approach over additional systemic therapy.\par \par Doing well after 2nd PIPAC. 2nd PIPAC delayed by 1 week given concern for small bowel obstruction requiring overnight admission. Resolved quickly. Now moving bowels at least daily. Taking prilosec intermittently. No nausea/ vomiting/ abdominal pain. \par \par PCI PIPAC 1: 29\par PCI PIPAC 2: 28\par \par Plan: \par [] Blood work reviewed \par [] CT reviewed \par [] Plan for PIPAC #3 likely 9/2 with 5-FU infusion the morning of surgery \par [] PST completed \par [] He will call if any symptoms of bowel obstruction in the meantime\par \par \par \par

## 2021-08-19 NOTE — HISTORY OF PRESENT ILLNESS
[Post-Op Complications] : No post-op complications reported [de-identified] : Mr. Arias is a 60 year old gentleman referred to me by Dr. 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 Herberth Peters. \par \par PCI: 34\par CC: R2b\par \par Mr. Arias has since moved to [place] to be closer to family and has established care in [facility]. Lives in [place]. \par \par Cytoreduction included a splenectomy and distal gastrectomy, omentectomy, cholecystectomy, subtotal colectomy and diaphragm stripping. 4L of mucinous ascites was drained. 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 As his tumor was felt to be low grade, he did not receive any systemic therapy up front. He now reports feeling well. He reports 3-4 loose but not watery BM's/day. He reports improving weight and energy levels. He denies any PO intolerance. He plays golf and does nature walks close to [facility]. \par \par CEA \par 6/5/20: 3.8\par 8/12/20: 6.1\par 4/7: 10.1 \par \par Pathology: \par 5/7/21: Paracentesis, cytology negative for malignant cells. 1.6 Liters removed. 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 He underwent subtotal colectomy with positive margins. 2/14 nodes involved. [facility] [facility] review: low grade appendiceal mucinous neoplasm\par \par Scans were obtained at [facility] and demonstrated: \par 4/30/21: loculated intraperitoneal fluid. No bowel obstruction. \par \par Last colonoscopy May 2020. \par \par Underwent diagnostic laparoscopy by me on 5/19/21. PCI approx 25. Evacuated 1.4 liters ascites. Biopsies consistent with mucinous adenocarcinoma. \par 6/3: PIPAC #1 \par 6/17: Doing well. No pain. Mild reflux/ dyspepsia. Relieved with tums. Eating well. \par 7/15/21: Doing well. Admitted earlier this week 7/12-7/13 to [facility] with small bowel obstruction. Dramatically improved after 24 hours nasogastric decompression. Feels well since discharge. Multiple BM last 48 hours. Tolerating full liquid diet. No abdominal pain. \par 8/19/21: Doing well. No adverse events. No complaints. Wants to go swimming. Eating well. Weight is stable. Moving bowels at least daily. \par

## 2021-08-19 NOTE — H&P PST ADULT - OTHER CARE PROVIDERS
Dr. Shanae Garcia (Onc) [phone], Dr. Burrell Oncologist Dr. Shanae Garcia (Onc) [phone], Dr. Isidro Daniels Oncologist  [phone]

## 2021-08-19 NOTE — H&P PST ADULT - NEGATIVE ENMT SYMPTOMS
no ear pain/no tinnitus/no vertigo/no sinus symptoms/no nasal congestion/no nose bleeds/no gum bleeding/no throat pain/no dysphagia

## 2021-08-19 NOTE — H&P PST ADULT - ATTENDING COMMENTS
Plan for 3rd PIPAC treatment today. RIsks, benefits, and alternatives discussed in detail. COnsent in chart.

## 2021-08-19 NOTE — H&P PST ADULT - NEGATIVE NEUROLOGICAL SYMPTOMS
Spoke to patient in regards to ortho appointment. Informed patient to arrive 30 minutes prior for xrays. Patient verbalized understanding. MS   
no weakness/no generalized seizures/no focal seizures/no syncope/no tremors/no vertigo/no loss of sensation/no difficulty walking/no headache/no loss of consciousness

## 2021-08-19 NOTE — H&P PST ADULT - NSICDXPASTSURGICALHX_GEN_ALL_CORE_FT
PAST SURGICAL HISTORY:  Abdominal mass surgery 5/2020  remove mass, spleen, partial colectomy, lymph nodes, part small intestines, omentum, apendix, gall bladder, part stomach. HIPEC    H/O colectomy     History of abdominal paracentesis x3    History of undescended testicle age 7

## 2021-08-19 NOTE — H&P PST ADULT - HISTORY OF PRESENT ILLNESS
59 y/o male with H/O: Dyslipidemia, BPH, Malignant neoplasm of appendix - s/p cytoreduction surgery with HIPEC 5/2020 and adjuvent chemotherapy. Pt presents to PST for pre op evaluation in preparation for diagnostic laparoscopy, peritoneal biopsies, pressurized intraperitoneal chemotherapy. 61 yo male presents to [facility] for preop evaluation for diagnostic laparoscopy, peritoneal biopsies, pressurized intraperitoneal chemotherapy.  Patient reports history of appendix cancer s/p cytoreduction surgery with HIPEC 5/2020 and adjuvent chemotherapy.  Patient denies abdominal pain, nausea, vomiting,  changes in bowel habits or hematochezia

## 2021-08-19 NOTE — PHYSICAL EXAM
[Normal] : oriented to person, place and time, with appropriate affect [de-identified] : abdomen soft, non-distended, non-tender. no masses. laparotomy and drain incisions well healed. Port sites healing well.

## 2021-08-24 PROBLEM — C18.1 MALIGNANT NEOPLASM OF APPENDIX: Chronic | Status: ACTIVE | Noted: 2021-08-19

## 2021-08-24 RX ORDER — OXALIPLATIN 5 MG/ML
146 INJECTION, SOLUTION INTRAVENOUS ONCE
Refills: 0 | Status: DISCONTINUED | OUTPATIENT
Start: 2021-09-02 | End: 2021-09-03

## 2021-08-30 ENCOUNTER — APPOINTMENT (OUTPATIENT)
Dept: DISASTER EMERGENCY | Facility: CLINIC | Age: 61
End: 2021-08-30

## 2021-08-31 ENCOUNTER — OUTPATIENT (OUTPATIENT)
Dept: OUTPATIENT SERVICES | Facility: HOSPITAL | Age: 61
LOS: 1 days | Discharge: HOME | End: 2021-08-31

## 2021-08-31 DIAGNOSIS — Z98.890 OTHER SPECIFIED POSTPROCEDURAL STATES: Chronic | ICD-10-CM

## 2021-08-31 DIAGNOSIS — Z87.438 PERSONAL HISTORY OF OTHER DISEASES OF MALE GENITAL ORGANS: Chronic | ICD-10-CM

## 2021-08-31 DIAGNOSIS — R19.00 INTRA-ABDOMINAL AND PELVIC SWELLING, MASS AND LUMP, UNSPECIFIED SITE: Chronic | ICD-10-CM

## 2021-08-31 DIAGNOSIS — C26.9 MALIGNANT NEOPLASM OF ILL-DEFINED SITES WITHIN THE DIGESTIVE SYSTEM: ICD-10-CM

## 2021-08-31 DIAGNOSIS — Z90.49 ACQUIRED ABSENCE OF OTHER SPECIFIED PARTS OF DIGESTIVE TRACT: Chronic | ICD-10-CM

## 2021-08-31 LAB — SARS-COV-2 N GENE NPH QL NAA+PROBE: NOT DETECTED

## 2021-09-01 ENCOUNTER — LABORATORY RESULT (OUTPATIENT)
Age: 61
End: 2021-09-01

## 2021-09-01 ENCOUNTER — NON-APPOINTMENT (OUTPATIENT)
Age: 61
End: 2021-09-01

## 2021-09-01 ENCOUNTER — TRANSCRIPTION ENCOUNTER (OUTPATIENT)
Age: 61
End: 2021-09-01

## 2021-09-01 NOTE — ASU PATIENT PROFILE, ADULT - VISION (WITH CORRECTIVE LENSES IF THE PATIENT USUALLY WEARS THEM):
with eyeglasses/Normal vision: sees adequately in most situations; can see medication labels, newsprint

## 2021-09-01 NOTE — ASU PATIENT PROFILE, ADULT - PRO INTERPRETER NEED 2
New problem  Concern for bacterial as worsening  Start Z-pack x 5 days, Allegra 180mg daily and Flonase nasal spray BID  Tylenol prn  Drink plenty of fluids, warm soup and tea  Return to clinic if worsening symptoms  Monitor  
English

## 2021-09-01 NOTE — ASU PATIENT PROFILE, ADULT - NSICDXPASTMEDICALHX_GEN_ALL_CORE_FT
PAST MEDICAL HISTORY:  Anxiety     Appendix carcinoma s/p abdominal surgery and chemotherapy, completed 12/2020    BPH (benign prostatic hyperplasia)     Cancer of appendix     HLD (hyperlipidemia)     Malignant neoplasm of appendix     Neuropathic pain hands and feet    UTI (urinary tract infection)

## 2021-09-02 ENCOUNTER — APPOINTMENT (OUTPATIENT)
Dept: INFUSION THERAPY | Facility: HOSPITAL | Age: 61
End: 2021-09-02

## 2021-09-02 ENCOUNTER — APPOINTMENT (OUTPATIENT)
Dept: HEMATOLOGY ONCOLOGY | Facility: CLINIC | Age: 61
End: 2021-09-02
Payer: COMMERCIAL

## 2021-09-02 ENCOUNTER — RESULT REVIEW (OUTPATIENT)
Age: 61
End: 2021-09-02

## 2021-09-02 ENCOUNTER — NON-APPOINTMENT (OUTPATIENT)
Age: 61
End: 2021-09-02

## 2021-09-02 ENCOUNTER — APPOINTMENT (OUTPATIENT)
Dept: SURGICAL ONCOLOGY | Facility: HOSPITAL | Age: 61
End: 2021-09-02

## 2021-09-02 ENCOUNTER — INPATIENT (INPATIENT)
Facility: HOSPITAL | Age: 61
LOS: 0 days | Discharge: HOME | End: 2021-09-03
Attending: SURGERY | Admitting: SURGERY
Payer: COMMERCIAL

## 2021-09-02 VITALS
WEIGHT: 120.59 LBS | BODY MASS INDEX: 19.15 KG/M2 | SYSTOLIC BLOOD PRESSURE: 132 MMHG | TEMPERATURE: 97.2 F | HEIGHT: 66.54 IN | OXYGEN SATURATION: 98 % | HEART RATE: 51 BPM | DIASTOLIC BLOOD PRESSURE: 83 MMHG | RESPIRATION RATE: 16 BRPM

## 2021-09-02 VITALS
DIASTOLIC BLOOD PRESSURE: 59 MMHG | HEART RATE: 54 BPM | SYSTOLIC BLOOD PRESSURE: 115 MMHG | HEIGHT: 67 IN | TEMPERATURE: 99 F | RESPIRATION RATE: 18 BRPM | WEIGHT: 119.93 LBS | OXYGEN SATURATION: 99 %

## 2021-09-02 DIAGNOSIS — Z87.438 PERSONAL HISTORY OF OTHER DISEASES OF MALE GENITAL ORGANS: Chronic | ICD-10-CM

## 2021-09-02 DIAGNOSIS — Z98.890 OTHER SPECIFIED POSTPROCEDURAL STATES: Chronic | ICD-10-CM

## 2021-09-02 DIAGNOSIS — Z90.49 ACQUIRED ABSENCE OF OTHER SPECIFIED PARTS OF DIGESTIVE TRACT: Chronic | ICD-10-CM

## 2021-09-02 DIAGNOSIS — C18.1 MALIGNANT NEOPLASM OF APPENDIX: ICD-10-CM

## 2021-09-02 DIAGNOSIS — R19.00 INTRA-ABDOMINAL AND PELVIC SWELLING, MASS AND LUMP, UNSPECIFIED SITE: Chronic | ICD-10-CM

## 2021-09-02 LAB
ALBUMIN SERPL ELPH-MCNC: 3.8 G/DL
ALBUMIN SERPL ELPH-MCNC: 4.2 G/DL
ALP BLD-CCNC: 122 U/L
ALP BLD-CCNC: 99 U/L
ALT SERPL-CCNC: 19 U/L
ALT SERPL-CCNC: 21 U/L
ANION GAP SERPL CALC-SCNC: 12 MMOL/L
ANION GAP SERPL CALC-SCNC: 9 MMOL/L
AST SERPL-CCNC: 17 U/L
AST SERPL-CCNC: 25 U/L
BASOPHILS # BLD AUTO: 0 K/UL
BASOPHILS # BLD AUTO: 0.08 K/UL
BASOPHILS NFR BLD AUTO: 0 %
BASOPHILS NFR BLD AUTO: 0.7 %
BILIRUB SERPL-MCNC: 0.3 MG/DL
BILIRUB SERPL-MCNC: 0.3 MG/DL
BUN SERPL-MCNC: 17 MG/DL
BUN SERPL-MCNC: 22 MG/DL
CALCIUM SERPL-MCNC: 9.3 MG/DL
CALCIUM SERPL-MCNC: 9.6 MG/DL
CEA SERPL-MCNC: 11.9 NG/ML
CHLORIDE SERPL-SCNC: 104 MMOL/L
CHLORIDE SERPL-SCNC: 106 MMOL/L
CO2 SERPL-SCNC: 24 MMOL/L
CO2 SERPL-SCNC: 28 MMOL/L
CREAT SERPL-MCNC: 0.84 MG/DL
CREAT SERPL-MCNC: 0.88 MG/DL
EOSINOPHIL # BLD AUTO: 0.07 K/UL
EOSINOPHIL # BLD AUTO: 0.25 K/UL
EOSINOPHIL NFR BLD AUTO: 0.9 %
EOSINOPHIL NFR BLD AUTO: 2.3 %
GLUCOSE SERPL-MCNC: 174 MG/DL
GLUCOSE SERPL-MCNC: 84 MG/DL
HCT VFR BLD CALC: 36.6 %
HCT VFR BLD CALC: 39.6 %
HGB BLD-MCNC: 11.4 G/DL
HGB BLD-MCNC: 12.2 G/DL
IMM GRANULOCYTES NFR BLD AUTO: 0.2 %
LYMPHOCYTES # BLD AUTO: 3.36 K/UL
LYMPHOCYTES # BLD AUTO: 4.99 K/UL
LYMPHOCYTES NFR BLD AUTO: 40.8 %
LYMPHOCYTES NFR BLD AUTO: 45.5 %
MAN DIFF?: NORMAL
MAN DIFF?: NORMAL
MCHC RBC-ENTMCNC: 28.4 PG
MCHC RBC-ENTMCNC: 28.4 PG
MCHC RBC-ENTMCNC: 30.8 GM/DL
MCHC RBC-ENTMCNC: 31.1 GM/DL
MCV RBC AUTO: 91 FL
MCV RBC AUTO: 92.3 FL
MONOCYTES # BLD AUTO: 0.86 K/UL
MONOCYTES # BLD AUTO: 0.89 K/UL
MONOCYTES NFR BLD AUTO: 10.8 %
MONOCYTES NFR BLD AUTO: 7.8 %
NEUTROPHILS # BLD AUTO: 3.91 K/UL
NEUTROPHILS # BLD AUTO: 4.76 K/UL
NEUTROPHILS NFR BLD AUTO: 43.5 %
NEUTROPHILS NFR BLD AUTO: 47.5 %
PLATELET # BLD AUTO: 419 K/UL
PLATELET # BLD AUTO: 584 K/UL
POTASSIUM SERPL-SCNC: 4.7 MMOL/L
POTASSIUM SERPL-SCNC: 4.7 MMOL/L
PROT SERPL-MCNC: 6.8 G/DL
PROT SERPL-MCNC: 6.9 G/DL
RBC # BLD: 4.02 M/UL
RBC # BLD: 4.29 M/UL
RBC # FLD: 19.4 %
RBC # FLD: 22.3 %
SODIUM SERPL-SCNC: 141 MMOL/L
SODIUM SERPL-SCNC: 141 MMOL/L
WBC # FLD AUTO: 10.96 K/UL
WBC # FLD AUTO: 8.23 K/UL

## 2021-09-02 PROCEDURE — 88305 TISSUE EXAM BY PATHOLOGIST: CPT | Mod: 26

## 2021-09-02 PROCEDURE — 88341 IMHCHEM/IMCYTCHM EA ADD ANTB: CPT | Mod: 26

## 2021-09-02 PROCEDURE — 99213 OFFICE O/P EST LOW 20 MIN: CPT

## 2021-09-02 PROCEDURE — 88112 CYTOPATH CELL ENHANCE TECH: CPT | Mod: 26,59

## 2021-09-02 PROCEDURE — 88342 IMHCHEM/IMCYTCHM 1ST ANTB: CPT | Mod: 26

## 2021-09-02 PROCEDURE — 49321 LAPAROSCOPY BIOPSY: CPT

## 2021-09-02 PROCEDURE — 88305 TISSUE EXAM BY PATHOLOGIST: CPT | Mod: 26,59

## 2021-09-02 RX ORDER — ENOXAPARIN SODIUM 100 MG/ML
40 INJECTION SUBCUTANEOUS EVERY 24 HOURS
Refills: 0 | Status: DISCONTINUED | OUTPATIENT
Start: 2021-09-03 | End: 2021-09-03

## 2021-09-02 RX ORDER — ONDANSETRON 8 MG/1
4 TABLET, FILM COATED ORAL ONCE
Refills: 0 | Status: DISCONTINUED | OUTPATIENT
Start: 2021-09-02 | End: 2021-09-02

## 2021-09-02 RX ORDER — ACETAMINOPHEN 500 MG
1000 TABLET ORAL EVERY 6 HOURS
Refills: 0 | Status: DISCONTINUED | OUTPATIENT
Start: 2021-09-02 | End: 2021-09-03

## 2021-09-02 RX ORDER — HYDROMORPHONE HYDROCHLORIDE 2 MG/ML
0.5 INJECTION INTRAMUSCULAR; INTRAVENOUS; SUBCUTANEOUS
Refills: 0 | Status: DISCONTINUED | OUTPATIENT
Start: 2021-09-02 | End: 2021-09-02

## 2021-09-02 RX ORDER — SENNA PLUS 8.6 MG/1
2 TABLET ORAL AT BEDTIME
Refills: 0 | Status: DISCONTINUED | OUTPATIENT
Start: 2021-09-02 | End: 2021-09-03

## 2021-09-02 RX ORDER — HYDROMORPHONE HYDROCHLORIDE 2 MG/ML
0.5 INJECTION INTRAMUSCULAR; INTRAVENOUS; SUBCUTANEOUS EVERY 4 HOURS
Refills: 0 | Status: DISCONTINUED | OUTPATIENT
Start: 2021-09-02 | End: 2021-09-03

## 2021-09-02 RX ORDER — TAMSULOSIN HYDROCHLORIDE 0.4 MG/1
0.4 CAPSULE ORAL AT BEDTIME
Refills: 0 | Status: DISCONTINUED | OUTPATIENT
Start: 2021-09-02 | End: 2021-09-03

## 2021-09-02 RX ORDER — FENTANYL CITRATE 50 UG/ML
50 INJECTION INTRAVENOUS
Refills: 0 | Status: DISCONTINUED | OUTPATIENT
Start: 2021-09-02 | End: 2021-09-02

## 2021-09-02 RX ORDER — PANTOPRAZOLE SODIUM 20 MG/1
40 TABLET, DELAYED RELEASE ORAL
Refills: 0 | Status: DISCONTINUED | OUTPATIENT
Start: 2021-09-02 | End: 2021-09-03

## 2021-09-02 RX ORDER — ESCITALOPRAM OXALATE 10 MG/1
5 TABLET, FILM COATED ORAL AT BEDTIME
Refills: 0 | Status: DISCONTINUED | OUTPATIENT
Start: 2021-09-02 | End: 2021-09-03

## 2021-09-02 RX ORDER — KETOROLAC TROMETHAMINE 30 MG/ML
30 SYRINGE (ML) INJECTION ONCE
Refills: 0 | Status: DISCONTINUED | OUTPATIENT
Start: 2021-09-02 | End: 2021-09-02

## 2021-09-02 RX ORDER — KETOROLAC TROMETHAMINE 30 MG/ML
15 SYRINGE (ML) INJECTION EVERY 6 HOURS
Refills: 0 | Status: DISCONTINUED | OUTPATIENT
Start: 2021-09-02 | End: 2021-09-03

## 2021-09-02 RX ORDER — SIMVASTATIN 20 MG/1
10 TABLET, FILM COATED ORAL AT BEDTIME
Refills: 0 | Status: DISCONTINUED | OUTPATIENT
Start: 2021-09-02 | End: 2021-09-03

## 2021-09-02 RX ORDER — INFLUENZA VIRUS VACCINE 15; 15; 15; 15 UG/.5ML; UG/.5ML; UG/.5ML; UG/.5ML
0.5 SUSPENSION INTRAMUSCULAR ONCE
Refills: 0 | Status: DISCONTINUED | OUTPATIENT
Start: 2021-09-02 | End: 2021-09-03

## 2021-09-02 RX ADMIN — ESCITALOPRAM OXALATE 5 MILLIGRAM(S): 10 TABLET, FILM COATED ORAL at 22:29

## 2021-09-02 RX ADMIN — SIMVASTATIN 10 MILLIGRAM(S): 20 TABLET, FILM COATED ORAL at 22:29

## 2021-09-02 RX ADMIN — Medication 15 MILLIGRAM(S): at 23:00

## 2021-09-02 RX ADMIN — SODIUM CHLORIDE 30 MILLILITER(S): 9 INJECTION, SOLUTION INTRAVENOUS at 14:21

## 2021-09-02 RX ADMIN — TAMSULOSIN HYDROCHLORIDE 0.4 MILLIGRAM(S): 0.4 CAPSULE ORAL at 22:29

## 2021-09-02 RX ADMIN — SENNA PLUS 2 TABLET(S): 8.6 TABLET ORAL at 22:30

## 2021-09-02 RX ADMIN — Medication 15 MILLIGRAM(S): at 22:30

## 2021-09-02 NOTE — HISTORY OF PRESENT ILLNESS
[Disease: _____________________] : Disease: [unfilled] [de-identified] : Mr. Arias is a 60 year old gentlemen with past medical history of BPH presenting to the office for an initial consultation of appendiceal neoplasm.\par \par Patient was being seen by Dr. Mendoza at [facility] for low grade appendiceal tumor diagnosed in 2020.  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 During 4/2020 noted bloating and ascites was found. CT scan showed the malignancy.\par \par 5/7/2020 CRS and HIPEC for LAMN ( Dr. Herberth Pink at [facility] ). Surgery included a splenectomy and distal gastrectomy, omentectomy, cholecystectomy, subtotal colectomy and diaphragm stripping. 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 R2b resection. 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 There was some dose reduction. Neuropathy started continually after chemotherapy was completed and is grade 1, with no impairment of ADLs. \par \par He moved from [place] back to [place] for more family support.\par In April 2021, he noted increase in abdominal fluid, and saw Oncology, Dr. Garcia.\par CT scan ordered and ascites drained.\par There was recurrence of disease on CT scan.\par He was referred to Dr. Mason, who has recommended pressurized intraperitoneal aerosolized chemotherapy trial. (PIPAC).\par \par 7/21/2021\par Underwent diagnostic laparoscopy on 5/19/21. PCI approx 25. Biopsies consistent with mucinous adenocarcinoma. \par 6/3: PIPAC/oxaliplatin treatment #1\par 7/12 - 7/14/21: Patient admitted for transient, self-limited SBO, that was not deemed to be treatment-related.\par 7/22/21: Plan for PIPAC #2 + 5FU/LV\par Normally, has has up to 4-6 BM daily due to short colon.\par Stools are loose.\par Currently no bloating.\par He is active, has no pain, and looking forward to next cycle tomorrow.\par CEA dropping as below.\par \par CEA:\par 6/5/2020: 3.8\par 8/12/2020: 6.1\par 4/7/2021: 10.1 \par 5/26/21: 29\par 6/17/21: 19.9\par 7/21/21: 15.2\par \par 9/2/21\par Patient here for C#3 PIPAC \par Patient has GERD and requests more omeprazole.\par Definitely needs to have it.\par CEA has been declining. [de-identified] : CEA [de-identified] : Medical Oncology at [facility]: Sadie Mendoza\par Surgical Oncology: Florecita Rosas\par PCP: Pipe Lester\par Urology: Cesar Sanford\par \par patient: [phone]\par Astrid: [phone] (wife).

## 2021-09-02 NOTE — PHYSICAL EXAM
[Restricted in physically strenuous activity but ambulatory and able to carry out work of a light or sedentary nature] : Status 1- Restricted in physically strenuous activity but ambulatory and able to carry out work of a light or sedentary nature, e.g., light house work, office work [Normal] : affect appropriate [de-identified] : incision site shows small granulating suture; right lateral linda-umbilical.

## 2021-09-02 NOTE — CHART NOTE - NSCHARTNOTEFT_GEN_A_CORE
POST-OPERATIVE NOTE    Subjective:  Patient is s/p diagnostic laparoscopy, peritoneal biopsy. Recovering appropriately. Patient is doing well, urinating without any issues. His pain is well managed. Reports no SOB, n/v or chest pain.     Vital Signs Last 24 Hrs  T(C): 36.5 (02 Sep 2021 21:05), Max: 37.1 (02 Sep 2021 19:10)  T(F): 97.7 (02 Sep 2021 21:05), Max: 98.8 (02 Sep 2021 19:10)  HR: 60 (02 Sep 2021 21:05) (46 - 82)  BP: 124/76 (02 Sep 2021 21:05) (111/66 - 126/78)  BP(mean): 73 (02 Sep 2021 20:15) (69 - 76)  RR: 18 (02 Sep 2021 21:05) (12 - 19)  SpO2: 99% (02 Sep 2021 21:05) (95% - 100%)  I&O's Detail    02 Sep 2021 07:01  -  02 Sep 2021 23:59  --------------------------------------------------------  IN:    Lactated Ringers: 150 mL    Oral Fluid: 50 mL  Total IN: 200 mL    OUT:    Voided (mL): 100 mL  Total OUT: 100 mL    Total NET: 100 mL        tamsulosin 0.4    PAST MEDICAL & SURGICAL HISTORY:  HLD (hyperlipidemia)    BPH (benign prostatic hyperplasia)    Neuropathic pain  hands and feet    UTI (urinary tract infection)    Appendix carcinoma  s/p abdominal surgery and chemotherapy, completed 12/2020    Anxiety    Cancer of appendix    Malignant neoplasm of appendix    History of undescended testicle  age 7    Abdominal mass  surgery 5/2020  remove mass, spleen, partial colectomy, lymph nodes, part small intestines, omentum, apendix, gall bladder, part stomach. HIPEC    History of abdominal paracentesis  x3    H/O colectomy          Physical Exam:  General: NAD, resting comfortably in bed  Pulmonary: Nonlabored breathing, no respiratory distress  Cardiovascular: NSR  Abdominal: soft, NT/ND  Extremities: WWP      LABS:            CAPILLARY BLOOD GLUCOSE          Radiology and Additional Studies:    Assessment:  The patient is a 60y Male who is now several hours post-op from a diagnostic laparoscopy, peritoneal biopsy.    Plan:  - Pain control as needed  - DVT ppx  - OOB and ambulating as tolerated  - F/u AM labs

## 2021-09-02 NOTE — BRIEF OPERATIVE NOTE - OPERATION/FINDINGS
Diagnostic laparoscopy, peritoneal carcinomatosis index 24, from 29 on initial treatment  Biopsies taken of RLQ, R flank, LLQ, LUQ, and normal tissue  Pressurized IntraPeritoneal Aerosol Chemotherapy (PIPAC) with Oxaliplatin

## 2021-09-02 NOTE — PATIENT PROFILE ADULT - SURGICAL SITE DESCRIPTION
Anal cancer  diagnosed in 2010, had chemotherapy and radiation in 2010.  Asthma  no h/o intubation, last episode of hospitalization due to asthma 10 years ago . No h/o medications on a daily basis  Cataract    Complete rotator cuff tear or rupture of left shoulder, not specified as traumatic    Complete rotator cuff tear or rupture of right shoulder, not specified as traumatic    Hyperlipemia    Hypertension    Hypothyroidism x3 [facility] sites

## 2021-09-03 ENCOUNTER — TRANSCRIPTION ENCOUNTER (OUTPATIENT)
Age: 61
End: 2021-09-03

## 2021-09-03 ENCOUNTER — NON-APPOINTMENT (OUTPATIENT)
Age: 61
End: 2021-09-03

## 2021-09-03 VITALS
HEART RATE: 54 BPM | OXYGEN SATURATION: 100 % | DIASTOLIC BLOOD PRESSURE: 61 MMHG | SYSTOLIC BLOOD PRESSURE: 104 MMHG | TEMPERATURE: 98 F | RESPIRATION RATE: 16 BRPM

## 2021-09-03 DIAGNOSIS — Z51.11 ENCOUNTER FOR ANTINEOPLASTIC CHEMOTHERAPY: ICD-10-CM

## 2021-09-03 DIAGNOSIS — R11.2 NAUSEA WITH VOMITING, UNSPECIFIED: ICD-10-CM

## 2021-09-03 LAB
ANION GAP SERPL CALC-SCNC: 12 MMOL/L — SIGNIFICANT CHANGE UP (ref 7–14)
BUN SERPL-MCNC: 22 MG/DL — SIGNIFICANT CHANGE UP (ref 7–23)
CALCIUM SERPL-MCNC: 9 MG/DL — SIGNIFICANT CHANGE UP (ref 8.4–10.5)
CHLORIDE SERPL-SCNC: 101 MMOL/L — SIGNIFICANT CHANGE UP (ref 98–107)
CO2 SERPL-SCNC: 24 MMOL/L — SIGNIFICANT CHANGE UP (ref 22–31)
CREAT SERPL-MCNC: 0.83 MG/DL — SIGNIFICANT CHANGE UP (ref 0.5–1.3)
GLUCOSE SERPL-MCNC: 131 MG/DL — HIGH (ref 70–99)
HCT VFR BLD CALC: 34.6 % — LOW (ref 39–50)
HGB BLD-MCNC: 11.2 G/DL — LOW (ref 13–17)
MAGNESIUM SERPL-MCNC: 1.8 MG/DL — SIGNIFICANT CHANGE UP (ref 1.6–2.6)
MCHC RBC-ENTMCNC: 28.4 PG — SIGNIFICANT CHANGE UP (ref 27–34)
MCHC RBC-ENTMCNC: 32.4 GM/DL — SIGNIFICANT CHANGE UP (ref 32–36)
MCV RBC AUTO: 87.8 FL — SIGNIFICANT CHANGE UP (ref 80–100)
NRBC # BLD: 0 /100 WBCS — SIGNIFICANT CHANGE UP
NRBC # FLD: 0 K/UL — SIGNIFICANT CHANGE UP
PHOSPHATE SERPL-MCNC: 4.9 MG/DL — HIGH (ref 2.5–4.5)
PLATELET # BLD AUTO: 516 K/UL — HIGH (ref 150–400)
POTASSIUM SERPL-MCNC: 4.6 MMOL/L — SIGNIFICANT CHANGE UP (ref 3.5–5.3)
POTASSIUM SERPL-SCNC: 4.6 MMOL/L — SIGNIFICANT CHANGE UP (ref 3.5–5.3)
RBC # BLD: 3.94 M/UL — LOW (ref 4.2–5.8)
RBC # FLD: 18.4 % — HIGH (ref 10.3–14.5)
SODIUM SERPL-SCNC: 137 MMOL/L — SIGNIFICANT CHANGE UP (ref 135–145)
WBC # BLD: 18.55 K/UL — HIGH (ref 3.8–10.5)
WBC # FLD AUTO: 18.55 K/UL — HIGH (ref 3.8–10.5)

## 2021-09-03 RX ORDER — IBUPROFEN 200 MG
400 TABLET ORAL EVERY 6 HOURS
Refills: 0 | Status: DISCONTINUED | OUTPATIENT
Start: 2021-09-03 | End: 2021-09-03

## 2021-09-03 RX ORDER — IBUPROFEN 200 MG
1 TABLET ORAL
Qty: 0 | Refills: 0 | DISCHARGE
Start: 2021-09-03

## 2021-09-03 RX ORDER — MAGNESIUM SULFATE 500 MG/ML
1 VIAL (ML) INJECTION ONCE
Refills: 0 | Status: COMPLETED | OUTPATIENT
Start: 2021-09-03 | End: 2021-09-03

## 2021-09-03 RX ORDER — ACETAMINOPHEN 500 MG
3 TABLET ORAL
Qty: 0 | Refills: 0 | DISCHARGE
Start: 2021-09-03

## 2021-09-03 RX ORDER — ACETAMINOPHEN 500 MG
975 TABLET ORAL EVERY 6 HOURS
Refills: 0 | Status: DISCONTINUED | OUTPATIENT
Start: 2021-09-03 | End: 2021-09-03

## 2021-09-03 RX ORDER — SENNA PLUS 8.6 MG/1
2 TABLET ORAL
Qty: 0 | Refills: 0 | DISCHARGE
Start: 2021-09-03

## 2021-09-03 RX ADMIN — Medication 400 MILLIGRAM(S): at 05:51

## 2021-09-03 RX ADMIN — Medication 1000 MILLIGRAM(S): at 07:13

## 2021-09-03 RX ADMIN — Medication 15 MILLIGRAM(S): at 05:51

## 2021-09-03 RX ADMIN — ENOXAPARIN SODIUM 40 MILLIGRAM(S): 100 INJECTION SUBCUTANEOUS at 05:51

## 2021-09-03 RX ADMIN — PANTOPRAZOLE SODIUM 40 MILLIGRAM(S): 20 TABLET, DELAYED RELEASE ORAL at 05:52

## 2021-09-03 RX ADMIN — Medication 100 GRAM(S): at 09:49

## 2021-09-03 RX ADMIN — Medication 15 MILLIGRAM(S): at 07:13

## 2021-09-03 RX ADMIN — Medication 975 MILLIGRAM(S): at 12:28

## 2021-09-03 NOTE — DISCHARGE NOTE PROVIDER - CARE PROVIDER_API CALL
Florecita Mason)  Complex General Surgical Oncology; Surgery  [address] [place] [address]  Phone: [phone]  Fax: [phone]  Follow Up Time: 1 week

## 2021-09-03 NOTE — DISCHARGE NOTE PROVIDER - NSDCMRMEDTOKEN_GEN_ALL_CORE_FT
Colace 100 mg oral capsule: 1 cap(s) orally 2 times a day -for constipation   escitalopram 5 mg oral tablet: 1 tab(s) orally once a day (at bedtime)  omeprazole 40 mg oral delayed release capsule: 1 cap(s) orally once a day   simvastatin 10 mg oral tablet: 1 tab(s) orally once a day (at bedtime)  tamsulosin 0.4 mg oral capsule: 1 cap(s) orally once a day (at bedtime)   acetaminophen 325 mg oral tablet: 3 tab(s) orally every 6 hours  Colace 100 mg oral capsule: 1 cap(s) orally 2 times a day -for constipation   escitalopram 5 mg oral tablet: 1 tab(s) orally once a day (at bedtime)  ibuprofen 400 mg oral tablet: 1 tab(s) orally every 6 hours  omeprazole 40 mg oral delayed release capsule: 1 cap(s) orally once a day   senna oral tablet: 2 tab(s) orally once a day (at bedtime)  simvastatin 10 mg oral tablet: 1 tab(s) orally once a day (at bedtime)  tamsulosin 0.4 mg oral capsule: 1 cap(s) orally once a day (at bedtime)

## 2021-09-03 NOTE — DISCHARGE NOTE PROVIDER - HOSPITAL COURSE
59 yo male presents to [facility] for preop evaluation for diagnostic laparoscopy, peritoneal biopsies, pressurized intraperitoneal chemotherapy.  Patient reports history of appendix cancer s/p cytoreduction surgery with HIPEC 5/2020 and adjuvent chemotherapy.  Patient denies abdominal pain, nausea, vomiting,  changes in bowel habits or hematochezia.  Patient was admitted to [facility] surgery service 9/2/21 where he underwent diagnostic laparoscopy, peritoneal carcinomatosis index 24, from 29 on initial treatment  Biopsies taken of RLQ, R flank, LLQ, LUQ, and normal tissue and Pressurized IntraPeritoneal Aerosol Chemotherapy with Oxaliplatin. Patient tolerated procedure well without complication.  Patient was transferred to surgical floor for monitoring. IV pain medications transitioned to oral. Diet advanced to regular as tolerated.  Patient is currently ambulating, voiding, GI function is at baseline, tolerating a regular diet, and pain is well controlled.  Per team and attending patient is hemodynamically stable for discharge home today and will follow up outpatient with Dr. Mason in one week.

## 2021-09-03 NOTE — PROGRESS NOTE ADULT - SUBJECTIVE AND OBJECTIVE BOX
GENERAL SURGERY PROGRESS NOTE    SUBJECTIVE  Patient seen and examined. No acute events overnight. Reports tolerating diet without nausea, vomiting, passing flatus, having bowel movements, voiding without issues, have been ambulating and out of bed. Denies fever, chills, SOB, chest pain.         OBJECTIVE    PHYSICAL EXAM  General: Appears well, NAD  CHEST: breathing comfortably  CV: appears well perfused  Abdomen: soft, nontender, nondistended, no rebound or guarding  Extremities: Grossly symmetric    T(C): 36.8 (09-03-21 @ 00:11), Max: 37.1 (09-02-21 @ 19:10)  HR: 68 (09-03-21 @ 00:11) (46 - 82)  BP: 118/67 (09-03-21 @ 00:11) (111/66 - 126/78)  RR: 18 (09-03-21 @ 00:11) (12 - 19)  SpO2: 98% (09-03-21 @ 00:11) (95% - 100%)    09-02-21 @ 07:01  -  09-03-21 @ 03:21  --------------------------------------------------------  IN: 650 mL / OUT: 100 mL / NET: 550 mL        MEDICATIONS  acetaminophen  IVPB .. 1000 milliGRAM(s) IV Intermittent every 6 hours  enoxaparin Injectable 40 milliGRAM(s) SubCutaneous every 24 hours  escitalopram 5 milliGRAM(s) Oral at bedtime  HYDROmorphone  Injectable 0.5 milliGRAM(s) IV Push every 4 hours PRN  influenza   Vaccine 0.5 milliLiter(s) IntraMuscular once  ketorolac   Injectable 15 milliGRAM(s) IV Push every 6 hours  lactated ringers. 1000 milliLiter(s) IV Continuous <Continuous>  oxaliplatin INTRAPERITONEAL (eMAR) 146 milliGRAM(s) IntraPeritoneal once  pantoprazole    Tablet 40 milliGRAM(s) Oral before breakfast  senna 2 Tablet(s) Oral at bedtime  simvastatin 10 milliGRAM(s) Oral at bedtime  tamsulosin 0.4 milliGRAM(s) Oral at bedtime      LABS                RADIOLOGY & ADDITIONAL STUDIES D TEAM SURGERY PROGRESS NOTE    INTERVAL EVENTS: Patient POD#1 s/p diagnostic laparoscopy, biopsies and PIPAC.     SUBJECTIVE  Patient seen and examined. No acute events overnight. Denies abdominal pain. Tolerating clear liquids. Denies nausea, vomiting. Passing flatus, no bowel movement. Voiding spontaneously. Denies fever, chills, SOB, chest pain.       OBJECTIVE    T(C): 36.8 (09-03-21 @ 00:11), Max: 37.1 (09-02-21 @ 19:10)  HR: 68 (09-03-21 @ 00:11) (46 - 82)  BP: 118/67 (09-03-21 @ 00:11) (111/66 - 126/78)  RR: 18 (09-03-21 @ 00:11) (12 - 19)  SpO2: 98% (09-03-21 @ 00:11) (95% - 100%)    09-02-21 @ 07:01  -  09-03-21 @ 03:21  --------------------------------------------------------  IN: 650 mL / OUT: 100 mL / NET: 550 mL    PHYSICAL EXAM  General: Appears well, NAD  CHEST: breathing comfortably  CV: appears well perfused  Abdomen: soft, nontender, nondistended, no rebound or guarding. Incisions C/D/I.  Extremities: Grossly symmetric    MEDICATIONS  acetaminophen  IVPB .. 1000 milliGRAM(s) IV Intermittent every 6 hours  enoxaparin Injectable 40 milliGRAM(s) SubCutaneous every 24 hours  escitalopram 5 milliGRAM(s) Oral at bedtime  HYDROmorphone  Injectable 0.5 milliGRAM(s) IV Push every 4 hours PRN  influenza   Vaccine 0.5 milliLiter(s) IntraMuscular once  ketorolac   Injectable 15 milliGRAM(s) IV Push every 6 hours  lactated ringers. 1000 milliLiter(s) IV Continuous <Continuous>  oxaliplatin INTRAPERITONEAL (eMAR) 146 milliGRAM(s) IntraPeritoneal once  pantoprazole    Tablet 40 milliGRAM(s) Oral before breakfast  senna 2 Tablet(s) Oral at bedtime  simvastatin 10 milliGRAM(s) Oral at bedtime  tamsulosin 0.4 milliGRAM(s) Oral at bedtime      LABS                        11.2   18.55 )-----------( 516      ( 03 Sep 2021 06:33 )             34.6   09-03    137  |  101  |  22  ----------------------------<  131<H>  4.6   |  24  |  0.83    Ca    9.0      03 Sep 2021 06:33  Phos  4.9     09-03  Mg     1.80     09-03                    RADIOLOGY & ADDITIONAL STUDIES

## 2021-09-03 NOTE — DISCHARGE NOTE NURSING/CASE MANAGEMENT/SOCIAL WORK - PATIENT PORTAL LINK FT
You can access the FollowMyHealth Patient Portal offered by [facility] by registering at the following website: [email or web address]. By joining [facility]’s FollowMyHealth portal, you will also be able to view your health information using other applications (apps) compatible with our system.

## 2021-09-03 NOTE — PROGRESS NOTE ADULT - ASSESSMENT
60M with history of appendiceal cancer s/p cytoreduction surgery with HIPEC 5/2020 and adjuvant chemotherapy. Presenting with peritoneal carcinomatosis, now s/p diagnostic laparoscopy, biopsies and PIPAC. Recovering appropriately.     PLAN:  - Pain control PRN  - OOB/ Ambulate/ IS while in bed  - Advance diet to regular  - IVL  - Continue home medications  - VTE ppx: Lovenox  - Dispo: Home possibly today if tolerates regular    D Team Surgery  [id number]

## 2021-09-03 NOTE — DISCHARGE NOTE PROVIDER - NSDCCPCAREPLAN_GEN_ALL_CORE_FT
PRINCIPAL DISCHARGE DIAGNOSIS  Diagnosis: Malignant neoplasm of appendix  Assessment and Plan of Treatment: WOUND CARE:  Keep port sites clean, dry, and intact. Do not rub or scrub incisions.  BATHING: Please do not submerge wound underwater. You may shower and/or sponge bathe.  ACTIVITY: No heavy lifting or straining. Otherwise, you may return to your usual level of physical activity. If you are taking narcotic pain medication (such as Percocet) DO NOT drive a car, operate machinery or make important decisions.  DIET: Return to your usual diet.  NOTIFY YOUR SURGEON IF: You have any bleeding that does not stop, any pus draining from your wound(s), any fever (over 100.4 F) or chills, persistent nausea/vomiting, persistent diarrhea, or if your pain is not controlled on your discharge pain medications.  FOLLOW-UP: Please follow up with your primary care physician in one week regarding your hospitalization       PRINCIPAL DISCHARGE DIAGNOSIS  Diagnosis: Malignant neoplasm of appendix  Assessment and Plan of Treatment: WOUND CARE:  Keep port sites clean, dry, and intact. Do not rub or scrub incisions.  BATHING: Please do not submerge wound underwater. You may shower and/or sponge bathe.  ACTIVITY: No heavy lifting or straining. Otherwise, you may return to your usual level of physical activity.   DIET: Return to your usual diet.  NOTIFY YOUR SURGEON IF: You have any bleeding that does not stop, any pus draining from your wound(s), any fever (over 100.4 F) or chills, persistent nausea/vomiting, persistent diarrhea, or if your pain is not controlled on your discharge pain medications.  FOLLOW-UP: Please follow up with your primary care physician in one week regarding your hospitalization  Please follow up with Dr. Nash in 1 week from discharge from the hospital. Call to make an appointment.

## 2021-09-08 ENCOUNTER — NON-APPOINTMENT (OUTPATIENT)
Age: 61
End: 2021-09-08

## 2021-09-08 LAB — SURGICAL PATHOLOGY STUDY: SIGNIFICANT CHANGE UP

## 2021-09-10 LAB
NON-GYNECOLOGICAL CYTOLOGY STUDY: SIGNIFICANT CHANGE UP

## 2021-09-17 ENCOUNTER — APPOINTMENT (OUTPATIENT)
Dept: COLORECTAL SURGERY | Facility: CLINIC | Age: 61
End: 2021-09-17
Payer: COMMERCIAL

## 2021-09-17 ENCOUNTER — NON-APPOINTMENT (OUTPATIENT)
Age: 61
End: 2021-09-17

## 2021-09-17 VITALS
WEIGHT: 120.19 LBS | SYSTOLIC BLOOD PRESSURE: 119 MMHG | HEART RATE: 58 BPM | DIASTOLIC BLOOD PRESSURE: 75 MMHG | OXYGEN SATURATION: 99 % | TEMPERATURE: 98.6 F | HEIGHT: 67 IN | BODY MASS INDEX: 18.87 KG/M2

## 2021-09-17 LAB
ALBUMIN SERPL ELPH-MCNC: 4.2 G/DL
ALP BLD-CCNC: 124 U/L
ALT SERPL-CCNC: 15 U/L
ANION GAP SERPL CALC-SCNC: 12 MMOL/L
AST SERPL-CCNC: 15 U/L
BASOPHILS # BLD AUTO: 0.11 K/UL
BASOPHILS NFR BLD AUTO: 1.1 %
BILIRUB SERPL-MCNC: 0.2 MG/DL
BUN SERPL-MCNC: 18 MG/DL
CALCIUM SERPL-MCNC: 9.6 MG/DL
CEA SERPL-MCNC: 9.3 NG/ML
CHLORIDE SERPL-SCNC: 106 MMOL/L
CO2 SERPL-SCNC: 24 MMOL/L
CREAT SERPL-MCNC: 0.91 MG/DL
EOSINOPHIL # BLD AUTO: 0.21 K/UL
EOSINOPHIL NFR BLD AUTO: 2.1 %
GLUCOSE SERPL-MCNC: 88 MG/DL
HCT VFR BLD CALC: 42.2 %
HGB BLD-MCNC: 12.3 G/DL
IMM GRANULOCYTES NFR BLD AUTO: 0.2 %
LYMPHOCYTES # BLD AUTO: 4.25 K/UL
LYMPHOCYTES NFR BLD AUTO: 43.4 %
MAN DIFF?: NORMAL
MCHC RBC-ENTMCNC: 28.1 PG
MCHC RBC-ENTMCNC: 29.1 GM/DL
MCV RBC AUTO: 96.3 FL
MONOCYTES # BLD AUTO: 0.85 K/UL
MONOCYTES NFR BLD AUTO: 8.7 %
NEUTROPHILS # BLD AUTO: 4.35 K/UL
NEUTROPHILS NFR BLD AUTO: 44.5 %
PLATELET # BLD AUTO: 605 K/UL
POTASSIUM SERPL-SCNC: 4.9 MMOL/L
PROT SERPL-MCNC: 6.7 G/DL
RBC # BLD: 4.38 M/UL
RBC # FLD: 17.9 %
SODIUM SERPL-SCNC: 143 MMOL/L
WBC # FLD AUTO: 9.79 K/UL

## 2021-09-17 PROCEDURE — 99024 POSTOP FOLLOW-UP VISIT: CPT

## 2021-09-17 RX ORDER — GABAPENTIN 100 MG/1
100 CAPSULE ORAL 3 TIMES DAILY
Qty: 90 | Refills: 0 | Status: COMPLETED | COMMUNITY
End: 2021-09-17

## 2021-09-17 RX ORDER — NITROFURANTOIN MACROCRYSTALS 50 MG/1
50 CAPSULE ORAL
Qty: 30 | Refills: 1 | Status: COMPLETED | COMMUNITY
Start: 2021-04-20 | End: 2021-09-17

## 2021-09-17 NOTE — HISTORY OF PRESENT ILLNESS
[FreeTextEntry1] : 60 year old male s/p 3rd PIPAC  procedure on  9/2/2021.  doing well.  No  pain, no nauseea or vomiting. \par Tolerating diet.    \par Patient is part of [facility] Phase 1 PIPAC trial.\par Patient ahs complete the 3rd pipac and this is the first post pipac visit being done 14 days postop.\par The oxaliplatin PIPAC and IV 5 FU leukovorin (given the same day) were tolerated well.\par Patient was discuarged on POD1.\par At home has been eating and stooling well.  No N/V.  Does have some GERD sx's for which he takes omeprazole.  No fever.  Maintaining normal activity levels.  \par No urinary, pulmonary, or other issues noted.  \par \par labs   CBC  and chemistry tests WNL except for minor A phosphatase elevation.\par CEA 9.3 (down from 11 9/1/21 and 29 May 26th.

## 2021-09-17 NOTE — ASSESSMENT
[FreeTextEntry1] : Doing well post 3rd and final PIPAC\par NO CTCAE events noted.\par Wounds fine\par GI function and overall function is good.  stable.\par Next visit will be vitural in 2 weeks (blood tests will be done as well), the in person visit in 4 weeks at which time a CT scan and added bloods will be taken. \par Patient to call if problems develop.\par Patient and wife request compassionate use 4th PIPAC as he is responding and has few other options.\par They understand that this will likely not be possible given our inability to buy capnopens (not FDA approved yet) and the few we have are earmarked for phase 1 trial use. \par Email sent to company asking for gratis pen.  Await reply.  If pen gotten then would make appeal to FDA.  \par [place] might go [place] or [facility] to get treatment.    \par

## 2021-09-17 NOTE — PHYSICAL EXAM
[Normal] : was normal [None] : there was no rectal abscess [Normal Breath Sounds] : Normal breath sounds [Normal Heart Sounds] : normal heart sounds [2+] : left 2+ [No Rash or Lesion] : No rash or lesion [Alert] : alert [Oriented to Person] : oriented to person [Oriented to Place] : oriented to place [Oriented to Time] : oriented to time [Calm] : calm [Abdomen Masses] : No abdominal masses [Abdomen Tenderness] : ~T No ~M abdominal tenderness [Stool Sample Taken] : no stool obtained on rectal exam [Gross Blood] : no gross blood [Fecal Impaction] : no fecal impaction was present [JVD] : no jugular venous distention  [Thyroid] : the thyroid was abnormal [Carotid Bruits] : no carotid bruits [de-identified] : incisons closed, dermabond in place.  BS+, not distended, non tender [de-identified] : peruritus type mild rash in jose antonio shape, no skin ulcerations but skin irritated.  Digital: no impaction or masses, no anoscopy [de-identified] : NAD

## 2021-09-21 ENCOUNTER — TRANSCRIPTION ENCOUNTER (OUTPATIENT)
Age: 61
End: 2021-09-21

## 2021-10-01 ENCOUNTER — RESULT REVIEW (OUTPATIENT)
Age: 61
End: 2021-10-01

## 2021-10-01 ENCOUNTER — APPOINTMENT (OUTPATIENT)
Dept: COLORECTAL SURGERY | Facility: CLINIC | Age: 61
End: 2021-10-01
Payer: COMMERCIAL

## 2021-10-01 LAB
ALBUMIN SERPL ELPH-MCNC: 4 G/DL
ALP BLD-CCNC: 94 U/L
ALT SERPL-CCNC: 19 U/L
ANION GAP SERPL CALC-SCNC: 11 MMOL/L
AST SERPL-CCNC: 17 U/L
BASOPHILS # BLD AUTO: 0.06 K/UL
BASOPHILS NFR BLD AUTO: 0.7 %
BILIRUB SERPL-MCNC: 0.3 MG/DL
BUN SERPL-MCNC: 17 MG/DL
CALCIUM SERPL-MCNC: 9.3 MG/DL
CHLORIDE SERPL-SCNC: 105 MMOL/L
CO2 SERPL-SCNC: 23 MMOL/L
CREAT SERPL-MCNC: 0.88 MG/DL
EOSINOPHIL # BLD AUTO: 0.21 K/UL
EOSINOPHIL NFR BLD AUTO: 2.5 %
GLUCOSE SERPL-MCNC: 79 MG/DL
HCT VFR BLD CALC: 40.3 %
HGB BLD-MCNC: 12.3 G/DL
IMM GRANULOCYTES NFR BLD AUTO: 0.2 %
LYMPHOCYTES # BLD AUTO: 3.56 K/UL
LYMPHOCYTES NFR BLD AUTO: 43 %
MAN DIFF?: NORMAL
MCHC RBC-ENTMCNC: 28.9 PG
MCHC RBC-ENTMCNC: 30.5 GM/DL
MCV RBC AUTO: 94.8 FL
MONOCYTES # BLD AUTO: 0.81 K/UL
MONOCYTES NFR BLD AUTO: 9.8 %
NEUTROPHILS # BLD AUTO: 3.62 K/UL
NEUTROPHILS NFR BLD AUTO: 43.8 %
PLATELET # BLD AUTO: 437 K/UL
POTASSIUM SERPL-SCNC: 4.6 MMOL/L
PROT SERPL-MCNC: 6.5 G/DL
RBC # BLD: 4.25 M/UL
RBC # FLD: 16.7 %
SODIUM SERPL-SCNC: 140 MMOL/L
WBC # FLD AUTO: 8.28 K/UL

## 2021-10-01 PROCEDURE — 99213 OFFICE O/P EST LOW 20 MIN: CPT | Mod: 95

## 2021-10-01 NOTE — ASSESSMENT
[FreeTextEntry1] : see above.\par \par Doing well. \par No complications\par No AE's\par ECOG 0 status\par To have CT on October 15th\par [facility], [address], \par Next visit in person  in 2 weeks\par Submitting compassionate use waiver request to the FDA

## 2021-10-01 NOTE — HISTORY OF PRESENT ILLNESS
[FreeTextEntry1] : Appendiceal cancer with carcinomatosis.  S/P 3rd PIPAC treatment - 4 weeks after.\par Eating normally.  No N/V. Having up to 6 semiformed BM's per day (his normal since having had colectomy)\par Ambulating and pursuing normal acitivites of daily living.  Is independend\par ECOG Grade 0.\par NO complications from the surgery or chemotherapy noted.  Clavien Dindo 0 (no complication\par \par Exam via phone:  abd: wounds well healed, no distension\par \par H/H  12.2/40.3\par WBC 8.3\par lytes WNL\par Albumen 4.0

## 2021-10-01 NOTE — REASON FOR VISIT
[Home] : at home, [unfilled] , at the time of the visit. [Medical Office: ([facility])___] : at the medical office located in  [Verbal consent obtained from patient] : the patient, [unfilled] [Post Op: _________] : a [unfilled] post op visit [Spouse] : spouse

## 2021-10-04 ENCOUNTER — NON-APPOINTMENT (OUTPATIENT)
Age: 61
End: 2021-10-04

## 2021-10-14 ENCOUNTER — APPOINTMENT (OUTPATIENT)
Dept: INFUSION THERAPY | Facility: HOSPITAL | Age: 61
End: 2021-10-14

## 2021-10-15 ENCOUNTER — OUTPATIENT (OUTPATIENT)
Dept: OUTPATIENT SERVICES | Facility: HOSPITAL | Age: 61
LOS: 1 days | End: 2021-10-15

## 2021-10-15 VITALS
HEART RATE: 58 BPM | WEIGHT: 123.9 LBS | RESPIRATION RATE: 16 BRPM | HEIGHT: 66.5 IN | SYSTOLIC BLOOD PRESSURE: 114 MMHG | OXYGEN SATURATION: 98 % | TEMPERATURE: 97 F | DIASTOLIC BLOOD PRESSURE: 78 MMHG

## 2021-10-15 DIAGNOSIS — R19.00 INTRA-ABDOMINAL AND PELVIC SWELLING, MASS AND LUMP, UNSPECIFIED SITE: Chronic | ICD-10-CM

## 2021-10-15 DIAGNOSIS — C18.1 MALIGNANT NEOPLASM OF APPENDIX: ICD-10-CM

## 2021-10-15 DIAGNOSIS — Z87.438 PERSONAL HISTORY OF OTHER DISEASES OF MALE GENITAL ORGANS: Chronic | ICD-10-CM

## 2021-10-15 DIAGNOSIS — Z98.890 OTHER SPECIFIED POSTPROCEDURAL STATES: Chronic | ICD-10-CM

## 2021-10-15 DIAGNOSIS — Z90.49 ACQUIRED ABSENCE OF OTHER SPECIFIED PARTS OF DIGESTIVE TRACT: Chronic | ICD-10-CM

## 2021-10-15 LAB
ALBUMIN SERPL ELPH-MCNC: 4.1 G/DL — SIGNIFICANT CHANGE UP (ref 3.3–5)
ALP SERPL-CCNC: 89 U/L — SIGNIFICANT CHANGE UP (ref 40–120)
ALT FLD-CCNC: 18 U/L — SIGNIFICANT CHANGE UP (ref 4–41)
ANION GAP SERPL CALC-SCNC: 11 MMOL/L — SIGNIFICANT CHANGE UP (ref 7–14)
AST SERPL-CCNC: 21 U/L — SIGNIFICANT CHANGE UP (ref 4–40)
BILIRUB SERPL-MCNC: 0.4 MG/DL — SIGNIFICANT CHANGE UP (ref 0.2–1.2)
BLD GP AB SCN SERPL QL: NEGATIVE — SIGNIFICANT CHANGE UP
BUN SERPL-MCNC: 19 MG/DL — SIGNIFICANT CHANGE UP (ref 7–23)
CALCIUM SERPL-MCNC: 9.7 MG/DL — SIGNIFICANT CHANGE UP (ref 8.4–10.5)
CHLORIDE SERPL-SCNC: 104 MMOL/L — SIGNIFICANT CHANGE UP (ref 98–107)
CO2 SERPL-SCNC: 26 MMOL/L — SIGNIFICANT CHANGE UP (ref 22–31)
CREAT SERPL-MCNC: 0.86 MG/DL — SIGNIFICANT CHANGE UP (ref 0.5–1.3)
GLUCOSE SERPL-MCNC: 85 MG/DL — SIGNIFICANT CHANGE UP (ref 70–99)
HCT VFR BLD CALC: 39.7 % — SIGNIFICANT CHANGE UP (ref 39–50)
HGB BLD-MCNC: 12.9 G/DL — LOW (ref 13–17)
MCHC RBC-ENTMCNC: 29.8 PG — SIGNIFICANT CHANGE UP (ref 27–34)
MCHC RBC-ENTMCNC: 32.5 GM/DL — SIGNIFICANT CHANGE UP (ref 32–36)
MCV RBC AUTO: 91.7 FL — SIGNIFICANT CHANGE UP (ref 80–100)
NRBC # BLD: 0 /100 WBCS — SIGNIFICANT CHANGE UP
NRBC # FLD: 0 K/UL — SIGNIFICANT CHANGE UP
PLATELET # BLD AUTO: 433 K/UL — HIGH (ref 150–400)
POTASSIUM SERPL-MCNC: 4.4 MMOL/L — SIGNIFICANT CHANGE UP (ref 3.5–5.3)
POTASSIUM SERPL-SCNC: 4.4 MMOL/L — SIGNIFICANT CHANGE UP (ref 3.5–5.3)
PROT SERPL-MCNC: 7 G/DL — SIGNIFICANT CHANGE UP (ref 6–8.3)
RBC # BLD: 4.33 M/UL — SIGNIFICANT CHANGE UP (ref 4.2–5.8)
RBC # FLD: 16.6 % — HIGH (ref 10.3–14.5)
RH IG SCN BLD-IMP: POSITIVE — SIGNIFICANT CHANGE UP
SODIUM SERPL-SCNC: 141 MMOL/L — SIGNIFICANT CHANGE UP (ref 135–145)
WBC # BLD: 8.34 K/UL — SIGNIFICANT CHANGE UP (ref 3.8–10.5)
WBC # FLD AUTO: 8.34 K/UL — SIGNIFICANT CHANGE UP (ref 3.8–10.5)

## 2021-10-15 RX ORDER — SODIUM CHLORIDE 9 MG/ML
1000 INJECTION, SOLUTION INTRAVENOUS
Refills: 0 | Status: DISCONTINUED | OUTPATIENT
Start: 2021-10-21 | End: 2021-10-21

## 2021-10-15 NOTE — H&P PST ADULT - NEGATIVE ENMT SYMPTOMS
no ear pain/no tinnitus/no vertigo/no sinus symptoms/no nasal congestion/no nose bleeds/no gum bleeding/no throat pain/no dysphagia no sinus symptoms/no throat pain/no dysphagia

## 2021-10-15 NOTE — H&P PST ADULT - MUSCULOSKELETAL
ROM intact/no joint swelling/no joint erythema/no joint warmth/no calf tenderness detailed exam details… ROM intact/normal strength

## 2021-10-15 NOTE — H&P PST ADULT - NEGATIVE RESPIRATORY AND THORAX SYMPTOMS
no wheezing/no dyspnea/no cough/no hemoptysis/no pleuritic chest pain no wheezing/no dyspnea/no cough/no pleuritic chest pain

## 2021-10-15 NOTE — H&P PST ADULT - HISTORY OF PRESENT ILLNESS
61 yo male presents to [facility] for preop evaluation for diagnostic laparoscopy, peritoneal biopsies, pressurized intraperitoneal chemotherapy.  Patient reports history of appendix cancer s/p cytoreduction surgery with HIPEC 5/2020 and adjuvent chemotherapy.  Patient denies abdominal pain, nausea, vomiting,  changes in bowel habits or hematochezia Pt is a 59 yo male presents to PST for preop evaluation for diagnostic laparoscopy, peritoneal biopsies, pressurized intraperitoneal chemotherapy with dr Manriquez tentatively 10/21/21 -  Patient reports history of appendix cancer s/p cytoreduction surgery with HIPEC 5/2020 and adjuvent chemotherapy.  Patient denies abdominal pain, nausea, vomiting,  but c/o of increased BM and diarrhea. Pt has Port placed left upper chest.   Pt denies COVID   Pt has had Pfizer vaccine  Patient instructed to contact surgeon's office concerning COVID test prior to surgery

## 2021-10-15 NOTE — H&P PST ADULT - NEGATIVE NEUROLOGICAL SYMPTOMS
no weakness/no generalized seizures/no focal seizures/no syncope/no tremors/no vertigo/no loss of sensation/no difficulty walking/no headache/no loss of consciousness no transient paralysis/no weakness/no generalized seizures

## 2021-10-15 NOTE — H&P PST ADULT - NEGATIVE GASTROINTESTINAL SYMPTOMS
no nausea/no vomiting/no constipation/no abdominal pain/no hematochezia no nausea/no vomiting/no constipation/no abdominal pain

## 2021-10-15 NOTE — H&P PST ADULT - NEGATIVE OPHTHALMOLOGIC SYMPTOMS
no diplopia/no photophobia/no blurred vision L/no blurred vision R/no pain L/no pain R/no irritation L/no irritation R no diplopia/no blurred vision L/no blurred vision R

## 2021-10-15 NOTE — H&P PST ADULT - NEGATIVE CARDIOVASCULAR SYMPTOMS
no chest pain/no palpitations/no dyspnea on exertion/no peripheral edema/no claudication no chest pain/no palpitations/no dyspnea on exertion/no peripheral edema

## 2021-10-15 NOTE — H&P PST ADULT - RS GEN PE MLT RESP DETAILS PC
airway patent/breath sounds equal/good air movement/respirations non-labored/clear to auscultation bilaterally airway patent/respirations non-labored/clear to auscultation bilaterally

## 2021-10-15 NOTE — H&P PST ADULT - NEGATIVE MUSCULOSKELETAL SYMPTOMS
no arthritis/no joint swelling/no myalgia/no muscle cramps/no neck pain/no back pain no arthralgia/no arthritis/no neck pain/no back pain

## 2021-10-15 NOTE — H&P PST ADULT - NEGATIVE GENERAL GENITOURINARY SYMPTOMS
no hematuria/no bladder infections/no dysuria/normal urinary frequency no hematuria/no flank pain L/no flank pain R/no dysuria/normal urinary frequency

## 2021-10-15 NOTE — H&P PST ADULT - PROBLEM SELECTOR PLAN 1
Pt scheduled for surgery diagnostic laparoscopy, peritoneal biopsies, pressurized intraperitoneal chemotherapy with dr Manriquez tentatively 10/21/21 and preop instructions including instructions for taking own GI protection  and for Chlorhexidine use in showering on the day of surgery, given verbally and with use of  written materials, and patient confirming understanding of such instructions using  teach back method.

## 2021-10-18 ENCOUNTER — LABORATORY RESULT (OUTPATIENT)
Age: 61
End: 2021-10-18

## 2021-10-18 ENCOUNTER — NON-APPOINTMENT (OUTPATIENT)
Age: 61
End: 2021-10-18

## 2021-10-18 ENCOUNTER — OUTPATIENT (OUTPATIENT)
Dept: OUTPATIENT SERVICES | Facility: HOSPITAL | Age: 61
LOS: 1 days | End: 2021-10-18
Payer: COMMERCIAL

## 2021-10-18 ENCOUNTER — APPOINTMENT (OUTPATIENT)
Dept: CT IMAGING | Facility: CLINIC | Age: 61
End: 2021-10-18
Payer: COMMERCIAL

## 2021-10-18 DIAGNOSIS — Z90.49 ACQUIRED ABSENCE OF OTHER SPECIFIED PARTS OF DIGESTIVE TRACT: Chronic | ICD-10-CM

## 2021-10-18 DIAGNOSIS — R19.00 INTRA-ABDOMINAL AND PELVIC SWELLING, MASS AND LUMP, UNSPECIFIED SITE: Chronic | ICD-10-CM

## 2021-10-18 DIAGNOSIS — Z00.8 ENCOUNTER FOR OTHER GENERAL EXAMINATION: ICD-10-CM

## 2021-10-18 DIAGNOSIS — C18.1 MALIGNANT NEOPLASM OF APPENDIX: ICD-10-CM

## 2021-10-18 DIAGNOSIS — Z98.890 OTHER SPECIFIED POSTPROCEDURAL STATES: Chronic | ICD-10-CM

## 2021-10-18 DIAGNOSIS — Z87.438 PERSONAL HISTORY OF OTHER DISEASES OF MALE GENITAL ORGANS: Chronic | ICD-10-CM

## 2021-10-18 LAB
ALBUMIN SERPL ELPH-MCNC: 4.1 G/DL
ALP BLD-CCNC: 90 U/L
ALT SERPL-CCNC: 21 U/L
ANION GAP SERPL CALC-SCNC: 10 MMOL/L
AST SERPL-CCNC: 21 U/L
BASOPHILS # BLD AUTO: 0.07 K/UL
BASOPHILS NFR BLD AUTO: 0.7 %
BILIRUB SERPL-MCNC: 0.4 MG/DL
BUN SERPL-MCNC: 16 MG/DL
CALCIUM SERPL-MCNC: 9.5 MG/DL
CEA SERPL-MCNC: 9.2 NG/ML
CHLORIDE SERPL-SCNC: 105 MMOL/L
CO2 SERPL-SCNC: 25 MMOL/L
CREAT SERPL-MCNC: 0.93 MG/DL
EOSINOPHIL # BLD AUTO: 0.21 K/UL
EOSINOPHIL NFR BLD AUTO: 2.2 %
GLUCOSE SERPL-MCNC: 70 MG/DL
HCT VFR BLD CALC: 40.7 %
HGB BLD-MCNC: 13 G/DL
IMM GRANULOCYTES NFR BLD AUTO: 0.1 %
INR PPP: 0.95 RATIO
LYMPHOCYTES # BLD AUTO: 3.99 K/UL
LYMPHOCYTES NFR BLD AUTO: 41.2 %
MAN DIFF?: NORMAL
MCHC RBC-ENTMCNC: 30 PG
MCHC RBC-ENTMCNC: 31.9 GM/DL
MCV RBC AUTO: 94 FL
MONOCYTES # BLD AUTO: 0.9 K/UL
MONOCYTES NFR BLD AUTO: 9.3 %
NEUTROPHILS # BLD AUTO: 4.5 K/UL
NEUTROPHILS NFR BLD AUTO: 46.5 %
PLATELET # BLD AUTO: 417 K/UL
POTASSIUM SERPL-SCNC: 4.3 MMOL/L
PROT SERPL-MCNC: 6.4 G/DL
PT BLD: 11.2 SEC
RBC # BLD: 4.33 M/UL
RBC # FLD: 16.7 %
SODIUM SERPL-SCNC: 140 MMOL/L
WBC # FLD AUTO: 9.68 K/UL

## 2021-10-18 PROCEDURE — 74177 CT ABD & PELVIS W/CONTRAST: CPT | Mod: 26

## 2021-10-18 PROCEDURE — 74177 CT ABD & PELVIS W/CONTRAST: CPT

## 2021-10-18 PROCEDURE — 71260 CT THORAX DX C+: CPT

## 2021-10-18 PROCEDURE — 71260 CT THORAX DX C+: CPT | Mod: 26

## 2021-10-19 ENCOUNTER — OUTPATIENT (OUTPATIENT)
Dept: OUTPATIENT SERVICES | Facility: HOSPITAL | Age: 61
LOS: 1 days | Discharge: HOME | End: 2021-10-19

## 2021-10-19 DIAGNOSIS — Z98.890 OTHER SPECIFIED POSTPROCEDURAL STATES: Chronic | ICD-10-CM

## 2021-10-19 DIAGNOSIS — C26.9 MALIGNANT NEOPLASM OF ILL-DEFINED SITES WITHIN THE DIGESTIVE SYSTEM: ICD-10-CM

## 2021-10-19 DIAGNOSIS — R19.00 INTRA-ABDOMINAL AND PELVIC SWELLING, MASS AND LUMP, UNSPECIFIED SITE: Chronic | ICD-10-CM

## 2021-10-19 DIAGNOSIS — Z90.49 ACQUIRED ABSENCE OF OTHER SPECIFIED PARTS OF DIGESTIVE TRACT: Chronic | ICD-10-CM

## 2021-10-19 DIAGNOSIS — Z87.438 PERSONAL HISTORY OF OTHER DISEASES OF MALE GENITAL ORGANS: Chronic | ICD-10-CM

## 2021-10-19 LAB — SARS-COV-2 N GENE NPH QL NAA+PROBE: NOT DETECTED

## 2021-10-20 ENCOUNTER — LABORATORY RESULT (OUTPATIENT)
Age: 61
End: 2021-10-20

## 2021-10-20 ENCOUNTER — RESULT REVIEW (OUTPATIENT)
Age: 61
End: 2021-10-20

## 2021-10-20 ENCOUNTER — APPOINTMENT (OUTPATIENT)
Dept: INFUSION THERAPY | Facility: HOSPITAL | Age: 61
End: 2021-10-20

## 2021-10-20 ENCOUNTER — NON-APPOINTMENT (OUTPATIENT)
Age: 61
End: 2021-10-20

## 2021-10-20 ENCOUNTER — TRANSCRIPTION ENCOUNTER (OUTPATIENT)
Age: 61
End: 2021-10-20

## 2021-10-20 ENCOUNTER — APPOINTMENT (OUTPATIENT)
Dept: HEMATOLOGY ONCOLOGY | Facility: CLINIC | Age: 61
End: 2021-10-20
Payer: COMMERCIAL

## 2021-10-20 VITALS
HEIGHT: 66.73 IN | HEART RATE: 59 BPM | TEMPERATURE: 97.9 F | OXYGEN SATURATION: 98 % | RESPIRATION RATE: 16 BRPM | SYSTOLIC BLOOD PRESSURE: 108 MMHG | BODY MASS INDEX: 19.85 KG/M2 | DIASTOLIC BLOOD PRESSURE: 73 MMHG | WEIGHT: 125 LBS

## 2021-10-20 LAB
BASOPHILS # BLD AUTO: 0.09 K/UL — SIGNIFICANT CHANGE UP (ref 0–0.2)
BASOPHILS NFR BLD AUTO: 0.7 % — SIGNIFICANT CHANGE UP (ref 0–2)
EOSINOPHIL # BLD AUTO: 0.29 K/UL — SIGNIFICANT CHANGE UP (ref 0–0.5)
EOSINOPHIL NFR BLD AUTO: 2.3 % — SIGNIFICANT CHANGE UP (ref 0–6)
HCT VFR BLD CALC: 39.6 % — SIGNIFICANT CHANGE UP (ref 39–50)
HGB BLD-MCNC: 13 G/DL — SIGNIFICANT CHANGE UP (ref 13–17)
IMM GRANULOCYTES NFR BLD AUTO: 1.7 % — HIGH (ref 0–1.5)
LYMPHOCYTES # BLD AUTO: 34.2 % — SIGNIFICANT CHANGE UP (ref 13–44)
LYMPHOCYTES # BLD AUTO: 4.4 K/UL — HIGH (ref 1–3.3)
MCHC RBC-ENTMCNC: 30 PG — SIGNIFICANT CHANGE UP (ref 27–34)
MCHC RBC-ENTMCNC: 32.8 G/DL — SIGNIFICANT CHANGE UP (ref 32–36)
MCV RBC AUTO: 91.5 FL — SIGNIFICANT CHANGE UP (ref 80–100)
MONOCYTES # BLD AUTO: 1.15 K/UL — HIGH (ref 0–0.9)
MONOCYTES NFR BLD AUTO: 8.9 % — SIGNIFICANT CHANGE UP (ref 2–14)
NEUTROPHILS # BLD AUTO: 6.73 K/UL — SIGNIFICANT CHANGE UP (ref 1.8–7.4)
NEUTROPHILS NFR BLD AUTO: 52.2 % — SIGNIFICANT CHANGE UP (ref 43–77)
NRBC # BLD: 0 /100 WBCS — SIGNIFICANT CHANGE UP (ref 0–0)
PLATELET # BLD AUTO: 379 K/UL — SIGNIFICANT CHANGE UP (ref 150–400)
RBC # BLD: 4.33 M/UL — SIGNIFICANT CHANGE UP (ref 4.2–5.8)
RBC # FLD: 16.3 % — HIGH (ref 10.3–14.5)
WBC # BLD: 12.88 K/UL — HIGH (ref 3.8–10.5)
WBC # FLD AUTO: 12.88 K/UL — HIGH (ref 3.8–10.5)

## 2021-10-20 PROCEDURE — 99214 OFFICE O/P EST MOD 30 MIN: CPT

## 2021-10-20 RX ORDER — OXALIPLATIN 5 MG/ML
146 INJECTION, SOLUTION INTRAVENOUS ONCE
Refills: 0 | Status: DISCONTINUED | OUTPATIENT
Start: 2021-10-21 | End: 2021-10-22

## 2021-10-20 NOTE — PHYSICAL EXAM
[Restricted in physically strenuous activity but ambulatory and able to carry out work of a light or sedentary nature] : Status 1- Restricted in physically strenuous activity but ambulatory and able to carry out work of a light or sedentary nature, e.g., light house work, office work [Normal] : affect appropriate [de-identified] : incision site shows small granulating suture; right lateral linda-umbilical.

## 2021-10-20 NOTE — HISTORY OF PRESENT ILLNESS
[Disease: _____________________] : Disease: [unfilled] [de-identified] : Mr. Arias is a 60 year old gentlemen with past medical history of BPH presenting to the office for an initial consultation of appendiceal neoplasm.\par \par Patient was being seen by Dr. Mendoza at [facility] for low grade appendiceal tumor diagnosed in 2020.  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 During 4/2020 noted bloating and ascites was found. CT scan showed the malignancy.\par \par 5/7/2020 CRS and HIPEC for LAMN ( Dr. Herberth Pink at [facility] ). Surgery included a splenectomy and distal gastrectomy, omentectomy, cholecystectomy, subtotal colectomy and diaphragm stripping. 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 R2b resection. 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 There was some dose reduction. Neuropathy started continually after chemotherapy was completed and is grade 1, with no impairment of ADLs. \par \par He moved from [place] back to [place] for more family support.\par In April 2021, he noted increase in abdominal fluid, and saw Oncology, Dr. Garcia.\par CT scan ordered and ascites drained.\par There was recurrence of disease on CT scan.\par He was referred to Dr. Mason, who has recommended pressurized intraperitoneal aerosolized chemotherapy trial. (PIPAC).\par \par 7/21/2021\par Underwent diagnostic laparoscopy on 5/19/21. PCI approx 25. Biopsies consistent with mucinous adenocarcinoma. \par 6/3: PIPAC/oxaliplatin treatment #1\par 7/12 - 7/14/21: Patient admitted for transient, self-limited SBO, that was not deemed to be treatment-related.\par 7/22/21: Plan for PIPAC #2 + 5FU/LV\par Normally, has has up to 4-6 BM daily due to short colon.\par Stools are loose.\par Currently no bloating.\par He is active, has no pain, and looking forward to next cycle tomorrow.\par CEA dropping as below.\par \par CEA:\par 6/5/2020: 3.8\par 8/12/2020: 6.1\par 4/7/2021: 10.1 \par 5/26/21: 29\par 6/17/21: 19.9\par 7/21/21: 15.2\par \par 9/2/21\par Patient here for C#3 PIPAC \par Patient has GERD and requests more omeprazole.\par Definitely needs to have it.\par CEA has been declining.\par \par 10/20/21\par Patient has been approved for compassionate use treatment with PIPAC.\par He feels well and does want to proceed.\par Today will be his EOT visit, as well as a pre-treatment visit for continued use of PIIPAC.\par Will have CBC today.\par 5FU and LV will be administered within 24 hour window of planned PIPAC. [de-identified] : CEA [de-identified] : Medical Oncology at [facility]: Sadie Mendoza\par Surgical Oncology: Florecita Rosas\par PCP: Pipe Lester\par Urology: Cesar Sanford\par \par patient: [phone]\par Astrid: [phone] (wife).

## 2021-10-21 ENCOUNTER — APPOINTMENT (OUTPATIENT)
Dept: SURGICAL ONCOLOGY | Facility: HOSPITAL | Age: 61
End: 2021-10-21

## 2021-10-21 ENCOUNTER — RESULT REVIEW (OUTPATIENT)
Age: 61
End: 2021-10-21

## 2021-10-21 ENCOUNTER — INPATIENT (INPATIENT)
Facility: HOSPITAL | Age: 61
LOS: 0 days | Discharge: HOME | End: 2021-10-22
Attending: SURGERY | Admitting: SURGERY
Payer: COMMERCIAL

## 2021-10-21 ENCOUNTER — NON-APPOINTMENT (OUTPATIENT)
Age: 61
End: 2021-10-21

## 2021-10-21 VITALS
RESPIRATION RATE: 16 BRPM | WEIGHT: 123.9 LBS | HEART RATE: 41 BPM | HEIGHT: 66.5 IN | TEMPERATURE: 99 F | OXYGEN SATURATION: 98 % | DIASTOLIC BLOOD PRESSURE: 63 MMHG | SYSTOLIC BLOOD PRESSURE: 115 MMHG

## 2021-10-21 DIAGNOSIS — Z51.11 ENCOUNTER FOR ANTINEOPLASTIC CHEMOTHERAPY: ICD-10-CM

## 2021-10-21 DIAGNOSIS — R11.2 NAUSEA WITH VOMITING, UNSPECIFIED: ICD-10-CM

## 2021-10-21 DIAGNOSIS — Z90.49 ACQUIRED ABSENCE OF OTHER SPECIFIED PARTS OF DIGESTIVE TRACT: Chronic | ICD-10-CM

## 2021-10-21 DIAGNOSIS — R19.00 INTRA-ABDOMINAL AND PELVIC SWELLING, MASS AND LUMP, UNSPECIFIED SITE: Chronic | ICD-10-CM

## 2021-10-21 DIAGNOSIS — Z87.438 PERSONAL HISTORY OF OTHER DISEASES OF MALE GENITAL ORGANS: Chronic | ICD-10-CM

## 2021-10-21 DIAGNOSIS — C18.1 MALIGNANT NEOPLASM OF APPENDIX: ICD-10-CM

## 2021-10-21 DIAGNOSIS — Z98.890 OTHER SPECIFIED POSTPROCEDURAL STATES: Chronic | ICD-10-CM

## 2021-10-21 PROCEDURE — 88305 TISSUE EXAM BY PATHOLOGIST: CPT | Mod: 26

## 2021-10-21 PROCEDURE — 88305 TISSUE EXAM BY PATHOLOGIST: CPT | Mod: 26,59

## 2021-10-21 PROCEDURE — 88112 CYTOPATH CELL ENHANCE TECH: CPT | Mod: 26

## 2021-10-21 RX ORDER — TAMSULOSIN HYDROCHLORIDE 0.4 MG/1
0.4 CAPSULE ORAL AT BEDTIME
Refills: 0 | Status: DISCONTINUED | OUTPATIENT
Start: 2021-10-21 | End: 2021-10-22

## 2021-10-21 RX ORDER — ACETAMINOPHEN 500 MG
650 TABLET ORAL EVERY 6 HOURS
Refills: 0 | Status: DISCONTINUED | OUTPATIENT
Start: 2021-10-21 | End: 2021-10-22

## 2021-10-21 RX ORDER — OXYCODONE HYDROCHLORIDE 5 MG/1
2.5 TABLET ORAL EVERY 4 HOURS
Refills: 0 | Status: DISCONTINUED | OUTPATIENT
Start: 2021-10-21 | End: 2021-10-22

## 2021-10-21 RX ORDER — ENOXAPARIN SODIUM 100 MG/ML
40 INJECTION SUBCUTANEOUS DAILY
Refills: 0 | Status: DISCONTINUED | OUTPATIENT
Start: 2021-10-22 | End: 2021-10-22

## 2021-10-21 RX ORDER — OXYCODONE HYDROCHLORIDE 5 MG/1
5 TABLET ORAL EVERY 4 HOURS
Refills: 0 | Status: DISCONTINUED | OUTPATIENT
Start: 2021-10-21 | End: 2021-10-22

## 2021-10-21 RX ORDER — PANTOPRAZOLE SODIUM 20 MG/1
40 TABLET, DELAYED RELEASE ORAL
Refills: 0 | Status: DISCONTINUED | OUTPATIENT
Start: 2021-10-21 | End: 2021-10-22

## 2021-10-21 RX ORDER — FENTANYL CITRATE 50 UG/ML
50 INJECTION INTRAVENOUS
Refills: 0 | Status: DISCONTINUED | OUTPATIENT
Start: 2021-10-21 | End: 2021-10-21

## 2021-10-21 RX ORDER — SODIUM CHLORIDE 9 MG/ML
1000 INJECTION, SOLUTION INTRAVENOUS
Refills: 0 | Status: DISCONTINUED | OUTPATIENT
Start: 2021-10-21 | End: 2021-10-22

## 2021-10-21 RX ORDER — SIMVASTATIN 20 MG/1
10 TABLET, FILM COATED ORAL AT BEDTIME
Refills: 0 | Status: DISCONTINUED | OUTPATIENT
Start: 2021-10-21 | End: 2021-10-22

## 2021-10-21 RX ORDER — INFLUENZA VIRUS VACCINE 15; 15; 15; 15 UG/.5ML; UG/.5ML; UG/.5ML; UG/.5ML
0.5 SUSPENSION INTRAMUSCULAR ONCE
Refills: 0 | Status: DISCONTINUED | OUTPATIENT
Start: 2021-10-21 | End: 2021-10-22

## 2021-10-21 RX ORDER — ESCITALOPRAM OXALATE 10 MG/1
5 TABLET, FILM COATED ORAL DAILY
Refills: 0 | Status: DISCONTINUED | OUTPATIENT
Start: 2021-10-21 | End: 2021-10-22

## 2021-10-21 RX ORDER — HYDROMORPHONE HYDROCHLORIDE 2 MG/ML
0.5 INJECTION INTRAMUSCULAR; INTRAVENOUS; SUBCUTANEOUS
Refills: 0 | Status: DISCONTINUED | OUTPATIENT
Start: 2021-10-21 | End: 2021-10-21

## 2021-10-21 RX ORDER — ONDANSETRON 8 MG/1
4 TABLET, FILM COATED ORAL ONCE
Refills: 0 | Status: COMPLETED | OUTPATIENT
Start: 2021-10-21 | End: 2021-10-21

## 2021-10-21 RX ADMIN — SIMVASTATIN 10 MILLIGRAM(S): 20 TABLET, FILM COATED ORAL at 23:12

## 2021-10-21 RX ADMIN — TAMSULOSIN HYDROCHLORIDE 0.4 MILLIGRAM(S): 0.4 CAPSULE ORAL at 21:53

## 2021-10-21 RX ADMIN — ESCITALOPRAM OXALATE 5 MILLIGRAM(S): 10 TABLET, FILM COATED ORAL at 21:53

## 2021-10-21 RX ADMIN — ONDANSETRON 4 MILLIGRAM(S): 8 TABLET, FILM COATED ORAL at 21:18

## 2021-10-21 RX ADMIN — SODIUM CHLORIDE 40 MILLILITER(S): 9 INJECTION, SOLUTION INTRAVENOUS at 19:05

## 2021-10-21 NOTE — HISTORY OF PRESENT ILLNESS
[FreeTextEntry1] : Patient is s/p 3 PIPAC treatments and this is the 18 week study visit being done at [facility] prior to a compassionate use PIPAC treatment.\par \par Patient has had no adverse events following the last PIPAC.\par Full activity, doing all ADL's, walks > 1 mile per day outdoors.\par Eating well.  Has gained 2-3 lbs.  BM x several daily (s/p LAR).\par No N/V.\par No abdominal or other pain.  Not taking pain meds.\par voiding well.  \par \par VS (see holding area values, Pulse 62, O2 Sat 99%), /67, RR 12/minute\par

## 2021-10-21 NOTE — CHART NOTE - NSCHARTNOTEFT_GEN_A_CORE
Surgical Oncology  Post Operative Check Note  Patient: RYANNE YOUNG 61y (1960) Male   MRN: [id number]  Location: [place] [id number] A  Visit: 10-21-21 Inpatient  Date: 10-22-21 @ 01:42    Procedure: S/P diagnostic laparoscopy with biopsies taken of RLQ, R flank, LLQ, LUQ and PIPAC with Oxaliplatin    Subjective: Patient seen and examined at bedside approximately four hours after surgery. No acute events postoperatively. Pain controlled. No nausea/vomiting. Patient sleeping comfortably in bed. Passed TOV after landin was removed.      Objective:  Vitals: T(F): 97.8 (10-22-21 @ 00:32), Max: 98.9 (10-21-21 @ 13:51)  HR: 62 (10-22-21 @ 00:32)  BP: 108/60 (10-22-21 @ 00:32) (108/60 - 136/76)  RR: 18 (10-22-21 @ 00:32)  SpO2: 100% (10-22-21 @ 00:32)  Vent Settings:     In:   10-21-21 @ 07:01  -  10-22-21 @ 01:42  --------------------------------------------------------  IN: 478 mL      IV Fluids: lactated ringers. 1000 milliLiter(s) (40 mL/Hr) IV Continuous <Continuous>      Out:   10-21-21 @ 07:01  -  10-22-21 @ 01:42  --------------------------------------------------------  OUT: 150 mL      EBL:     Voided Urine:   10-21-21 @ 07:01  -  10-22-21 @ 01:42  --------------------------------------------------------  OUT: 150 mL      Landin Catheter: no   No drains        Physical Examination:  General: NAD, resting comfortably in bed  CV: Regular rate  Resp: Nonlabored breathing on room air  Abdomen: Soft, nondistended, appropriately tender to palpation, 3 port signs c/d/i with no dehiscence or drainage    Imaging:  No post-op imaging studies    Assessment:  61yMale patient S/P *** for ***    Plan:  - IV Abx:   - Pain control PRN  - Diet:  - Activity:  - DVT ppx:    Date/Time: 10-22-21 @ 01:42 Surgical Oncology  Post Operative Check Note  Patient: RYANNE YOUNG 61y (1960) Male   MRN: [id number]  Location: [place] [id number] A  Visit: 10-21-21 Inpatient  Date: 10-22-21 @ 01:42    Procedure: S/P diagnostic laparoscopy with biopsies taken of RLQ, R flank, LLQ, LUQ and PIPAC with Oxaliplatin    Subjective: Patient seen and examined at bedside approximately four hours after surgery. No acute events postoperatively. Pain controlled. No nausea/vomiting. Patient sleeping comfortably in bed. Passed TOV after landin was removed.      Objective:  Vitals: T(F): 97.8 (10-22-21 @ 00:32), Max: 98.9 (10-21-21 @ 13:51)  HR: 62 (10-22-21 @ 00:32)  BP: 108/60 (10-22-21 @ 00:32) (108/60 - 136/76)  RR: 18 (10-22-21 @ 00:32)  SpO2: 100% (10-22-21 @ 00:32)  Vent Settings:     In:   10-21-21 @ 07:01  -  10-22-21 @ 01:42  --------------------------------------------------------  IN: 478 mL      IV Fluids: lactated ringers. 1000 milliLiter(s) (40 mL/Hr) IV Continuous <Continuous>      Out:   10-21-21 @ 07:01  -  10-22-21 @ 01:42  --------------------------------------------------------  OUT: 150 mL      EBL:     Voided Urine:   10-21-21 @ 07:01  -  10-22-21 @ 01:42  --------------------------------------------------------  OUT: 150 mL      Landin Catheter: no   No drains        Physical Examination:  General: NAD, resting comfortably in bed  CV: Regular rate  Resp: Nonlabored breathing on room air  Abdomen: Soft, nondistended, appropriately tender to palpation, 3 port signs c/d/i with no dehiscence or drainage    Imaging:  No post-op imaging studies    Assessment:  61yMale patient S/P diagnostic laparoscopy with biopsies taken of RLQ, R flank, LLQ, LUQ and PIPAC with Oxaliplatin for peritoneal carcinomatosis. Recovering well.    Plan:  - Pain control  - Diet: Regular  - Resume home meds as ordered  - Activity: OOB and ambulate as tolerated  - DVT ppx: LVX      Date/Time: 10-22-21 @ 01:42  D Team Surgery  [id number]

## 2021-10-21 NOTE — PHYSICAL EXAM
[Exam Deferred] : exam was deferred [No Rash or Lesion] : No rash or lesion [Alert] : alert [Oriented to Person] : oriented to person [Oriented to Place] : oriented to place [Oriented to Time] : oriented to time [Calm] : calm [Abdomen Masses] : No abdominal masses [Abdomen Tenderness] : ~T No ~M abdominal tenderness [de-identified] : not distended, incisions well healed, no hernia, no tenderness

## 2021-10-21 NOTE — ASU PREOP CHECKLIST - BLOOD AVAILABLE
Quality 226: Preventive Care And Screening: Tobacco Use: Screening And Cessation Intervention: Patient screened for tobacco use and is an ex/non-smoker Quality 431: Preventive Care And Screening: Unhealthy Alcohol Use - Screening: Patient screened for unhealthy alcohol use using a single question and scores less than 2 times per year Quality 111:Pneumonia Vaccination Status For Older Adults: Pneumococcal Vaccination Previously Received Detail Level: Generalized Quality 110: Preventive Care And Screening: Influenza Immunization: Influenza Immunization Administered during Influenza season n/a

## 2021-10-21 NOTE — BRIEF OPERATIVE NOTE - OPERATION/FINDINGS
Diagnostic laparoscopy  Biopsies taken of RLQ, R flank, LLQ, LUQ  Pressurized IntraPeritoneal Aerosol Chemotherapy (PIPAC) with Oxaliplatin

## 2021-10-21 NOTE — ASSESSMENT
[FreeTextEntry1] : Patient is doing well after 3 PIPAC\par No HEAVEN or AE's.\par GI function excellent\par ECOG 0-1\par \par Repeat CT done this week and report in system. loculated ascistes (decreased) No liver mets, suspect mesenteric disease and periotoneal disease (slighlty decreased), no SBO\par \par CEA 8.1 (< 9.3)\par \par metabolic panel and CBC in system. renal function is fine. \par \par To get compassionate use PIPAC treatment today.  \par \par F/u in 2 weeks.  \par

## 2021-10-22 ENCOUNTER — APPOINTMENT (OUTPATIENT)
Dept: COLORECTAL SURGERY | Facility: CLINIC | Age: 61
End: 2021-10-22

## 2021-10-22 ENCOUNTER — TRANSCRIPTION ENCOUNTER (OUTPATIENT)
Age: 61
End: 2021-10-22

## 2021-10-22 VITALS — TEMPERATURE: 99 F

## 2021-10-22 LAB
ANION GAP SERPL CALC-SCNC: 15 MMOL/L — HIGH (ref 7–14)
BUN SERPL-MCNC: 16 MG/DL — SIGNIFICANT CHANGE UP (ref 7–23)
CALCIUM SERPL-MCNC: 8.9 MG/DL — SIGNIFICANT CHANGE UP (ref 8.4–10.5)
CHLORIDE SERPL-SCNC: 100 MMOL/L — SIGNIFICANT CHANGE UP (ref 98–107)
CO2 SERPL-SCNC: 22 MMOL/L — SIGNIFICANT CHANGE UP (ref 22–31)
CREAT SERPL-MCNC: 0.8 MG/DL — SIGNIFICANT CHANGE UP (ref 0.5–1.3)
GLUCOSE SERPL-MCNC: 119 MG/DL — HIGH (ref 70–99)
HCT VFR BLD CALC: 36.6 % — LOW (ref 39–50)
HGB BLD-MCNC: 12.4 G/DL — LOW (ref 13–17)
MAGNESIUM SERPL-MCNC: 1.7 MG/DL — SIGNIFICANT CHANGE UP (ref 1.6–2.6)
MCHC RBC-ENTMCNC: 30.2 PG — SIGNIFICANT CHANGE UP (ref 27–34)
MCHC RBC-ENTMCNC: 33.9 GM/DL — SIGNIFICANT CHANGE UP (ref 32–36)
MCV RBC AUTO: 89.1 FL — SIGNIFICANT CHANGE UP (ref 80–100)
NRBC # BLD: 0 /100 WBCS — SIGNIFICANT CHANGE UP
NRBC # FLD: 0 K/UL — SIGNIFICANT CHANGE UP
PHOSPHATE SERPL-MCNC: 5.3 MG/DL — HIGH (ref 2.5–4.5)
PLATELET # BLD AUTO: 352 K/UL — SIGNIFICANT CHANGE UP (ref 150–400)
POTASSIUM SERPL-MCNC: 4.5 MMOL/L — SIGNIFICANT CHANGE UP (ref 3.5–5.3)
POTASSIUM SERPL-SCNC: 4.5 MMOL/L — SIGNIFICANT CHANGE UP (ref 3.5–5.3)
RBC # BLD: 4.11 M/UL — LOW (ref 4.2–5.8)
RBC # FLD: 16.2 % — HIGH (ref 10.3–14.5)
SODIUM SERPL-SCNC: 137 MMOL/L — SIGNIFICANT CHANGE UP (ref 135–145)
WBC # BLD: 17.26 K/UL — HIGH (ref 3.8–10.5)
WBC # FLD AUTO: 17.26 K/UL — HIGH (ref 3.8–10.5)

## 2021-10-22 RX ORDER — ONDANSETRON 8 MG/1
4 TABLET, FILM COATED ORAL ONCE
Refills: 0 | Status: COMPLETED | OUTPATIENT
Start: 2021-10-22 | End: 2021-10-22

## 2021-10-22 RX ORDER — MAGNESIUM SULFATE 500 MG/ML
2 VIAL (ML) INJECTION ONCE
Refills: 0 | Status: COMPLETED | OUTPATIENT
Start: 2021-10-22 | End: 2021-10-22

## 2021-10-22 RX ADMIN — Medication 50 GRAM(S): at 11:28

## 2021-10-22 RX ADMIN — Medication 650 MILLIGRAM(S): at 02:04

## 2021-10-22 RX ADMIN — ONDANSETRON 4 MILLIGRAM(S): 8 TABLET, FILM COATED ORAL at 06:06

## 2021-10-22 RX ADMIN — Medication 650 MILLIGRAM(S): at 10:00

## 2021-10-22 RX ADMIN — Medication 650 MILLIGRAM(S): at 09:15

## 2021-10-22 RX ADMIN — Medication 650 MILLIGRAM(S): at 01:34

## 2021-10-22 RX ADMIN — ENOXAPARIN SODIUM 40 MILLIGRAM(S): 100 INJECTION SUBCUTANEOUS at 11:28

## 2021-10-22 RX ADMIN — PANTOPRAZOLE SODIUM 40 MILLIGRAM(S): 20 TABLET, DELAYED RELEASE ORAL at 05:01

## 2021-10-22 NOTE — PROGRESS NOTE ADULT - SUBJECTIVE AND OBJECTIVE BOX
Morning Surgical Progress Note    SUBJECTIVE: Patient seen and examined at bedside with surgical team, patient without complaints. Pt +/- w/ regard to bowel status. Pt denies fevers/chills    Vital Signs Last 24 Hrs  T(C): 36.4 (22 Oct 2021 04:55), Max: 37.2 (21 Oct 2021 13:51)  T(F): 97.6 (22 Oct 2021 04:55), Max: 98.9 (21 Oct 2021 13:51)  HR: 52 (22 Oct 2021 04:55) (41 - 64)  BP: 105/61 (22 Oct 2021 04:55) (105/61 - 136/76)  BP(mean): 90 (21 Oct 2021 21:00) (61 - 90)  RR: 18 (22 Oct 2021 04:55) (12 - 18)  SpO2: 100% (22 Oct 2021 04:55) (97% - 100%)I&O's Detail    21 Oct 2021 07:01  -  22 Oct 2021 07:00  --------------------------------------------------------  IN:    Lactated Ringers: 120 mL    Lactated Ringers: 240 mL    Oral Fluid: 118 mL  Total IN: 478 mL    OUT:    Voided (mL): 450 mL  Total OUT: 450 mL    Total NET: 28 mL        Medications  MEDICATIONS  (STANDING):  enoxaparin Injectable 40 milliGRAM(s) SubCutaneous daily  escitalopram 5 milliGRAM(s) Oral daily  influenza   Vaccine 0.5 milliLiter(s) IntraMuscular once  lactated ringers. 1000 milliLiter(s) (40 mL/Hr) IV Continuous <Continuous>  magnesium sulfate  IVPB 2 Gram(s) IV Intermittent once  oxaliplatin INTRAPERITONEAL (eMAR) 146 milliGRAM(s) IntraPeritoneal once  pantoprazole    Tablet 40 milliGRAM(s) Oral before breakfast  simvastatin 10 milliGRAM(s) Oral at bedtime  tamsulosin 0.4 milliGRAM(s) Oral at bedtime    MEDICATIONS  (PRN):  acetaminophen     Tablet .. 650 milliGRAM(s) Oral every 6 hours PRN Mild Pain (1 - 3)  oxyCODONE    IR 5 milliGRAM(s) Oral every 4 hours PRN Severe Pain (7 - 10)  oxyCODONE    IR 2.5 milliGRAM(s) Oral every 4 hours PRN Moderate Pain (4 - 6)      Physical Exam  Physical Examination:  General: NAD, resting comfortably in bed  CV: Regular rate  Resp: Nonlabored breathing on room air  Abdomen: Soft, nondistended, appropriately tender to palpation, 3 port signs c/d/i with no dehiscence or drainage    LABS:                        12.4   17.26 )-----------( 352      ( 22 Oct 2021 06:40 )             36.6     10-22    137  |  100  |  16  ----------------------------<  119<H>  4.5   |  22  |  0.80    Ca    8.9      22 Oct 2021 06:40  Phos  5.3     10-22  Mg     1.70     10-22

## 2021-10-22 NOTE — DISCHARGE NOTE PROVIDER - CARE PROVIDER_API CALL
Gigi Manriquez)  Surgery  [address], [place] [address]  Phone: [phone]  Fax: ()-  Follow Up Time: 2 weeks    Rafi Krishnamurthy)  ColonRectal Surgery  [address] [place] [address], [place] [address]  Phone: [phone]  Fax: [phone]  Follow Up Time: 2 weeks

## 2021-10-22 NOTE — DISCHARGE NOTE PROVIDER - NSDCFUADDINST_GEN_ALL_CORE_FT
WOUND CARE:  Please keep incisions clean and dry. Please do not Scrub or rub incisions. Do not use lotion or powder on incisions  BATHING: Please do not submerge wound underwater. You may shower and/or sponge bathe 48 hours after surgery.  ACTIVITY: No heavy lifting or straining. Otherwise, you may return to your usual level of physical activity. If you are taking narcotic pain medication (such as Oxycodone) DO NOT drive a car, operate machinery or make important decisions.  DIET: Return to your usual diet.  NOTIFY YOUR SURGEON IF: You have any bleeding that does not stop, any pus draining from your wound(s), any fever (over 100.4 F) or chills, persistent nausea/vomiting, persistent diarrhea, or if your pain is not controlled on your discharge pain medications.  FOLLOW-UP: Please follow up with your primary care physician in one week regarding your hospitalization.  Please follow up with your surgeon.  Call today for appointment in 1 week. 
Statement Selected

## 2021-10-22 NOTE — DISCHARGE NOTE NURSING/CASE MANAGEMENT/SOCIAL WORK - PATIENT PORTAL LINK FT
You can access the FollowMyHealth Patient Portal offered by [facility] by registering at the following website: [email or web address]. By joining [employer]’s FollowMyHealth portal, you will also be able to view your health information using other applications (apps) compatible with our system.

## 2021-10-22 NOTE — DISCHARGE NOTE PROVIDER - PROVIDER TOKENS
PROVIDER:[TOKEN:[[id number]],FOLLOWUP:[2 weeks]],PROVIDER:[TOKEN:[[id number]],FOLLOWUP:[2 weeks]]

## 2021-10-22 NOTE — DISCHARGE NOTE PROVIDER - NSDCMRMEDTOKEN_GEN_ALL_CORE_FT
escitalopram 5 mg oral tablet: 1 tab(s) orally once a day (at bedtime)  omeprazole 40 mg oral delayed release capsule: 1 cap(s) orally once a day   simvastatin 10 mg oral tablet: 1 tab(s) orally once a day (at bedtime)  tamsulosin 0.4 mg oral capsule: 1 cap(s) orally once a day (at bedtime)

## 2021-10-22 NOTE — PROGRESS NOTE ADULT - ASSESSMENT
61yMale patient S/P diagnostic laparoscopy with biopsies taken of RLQ, R flank, LLQ, LUQ and PIPAC with Oxaliplatin for peritoneal carcinomatosis. Recovering well.    Plan:  - F/u Electrolytes  - F/u Bowel Status  - F/u diet tolerance  - DVT PPx: Lovenox  - If patient stable and amenable later in day, plan to d/c home  - Dispo; Home      D-Team Surgery  [id number]

## 2021-10-22 NOTE — DISCHARGE NOTE PROVIDER - HOSPITAL COURSE
Pt is a 61 yo male presents to [facility] for preop evaluation for diagnostic laparoscopy, peritoneal biopsies, pressurized intraperitoneal chemotherapy with dr Manriquez tentatively 10/21/21 -  Patient reports history of appendix cancer s/p cytoreduction surgery with HIPEC 5/2020 and adjuvent chemotherapy.  Patient denies abdominal pain, nausea, vomiting,  but c/o of increased BM and diarrhea. Pt has Port placed left upper chest.     On 10/21 patient underwent Diagnostic laparoscopy and Pressurized IntraPeritoneal Aerosol Chemotherapy (PIPAC) with Oxaliplatin. Pt tolerated the procedure well, recovered in the post operative care unit and then was subsequently transferred up to the surgical floors where patient had his diet advanced. Pt was able to tolerate a regular diet, is voiding without issues, pain controlled and was deemed stable for discharge by attending. At this point in time patient is stable for discharge to home. Patient will follow up with Dr. Manriquez and Dr. Krishnamurthy within 1-2 weeks.

## 2021-10-22 NOTE — DISCHARGE NOTE PROVIDER - NSDCCPCAREPLAN_GEN_ALL_CORE_FT
PRINCIPAL DISCHARGE DIAGNOSIS  Diagnosis: Malignant tumor of appendix  Assessment and Plan of Treatment:

## 2021-10-27 LAB — SURGICAL PATHOLOGY STUDY: SIGNIFICANT CHANGE UP

## 2021-10-28 LAB
NON-GYNECOLOGICAL CYTOLOGY STUDY: SIGNIFICANT CHANGE UP

## 2021-11-03 LAB
ALBUMIN SERPL ELPH-MCNC: 4.1 G/DL
ALP BLD-CCNC: 97 U/L
ALT SERPL-CCNC: 24 U/L
ANION GAP SERPL CALC-SCNC: 13 MMOL/L
AST SERPL-CCNC: 20 U/L
BASOPHILS # BLD AUTO: 0.07 K/UL
BASOPHILS NFR BLD AUTO: 0.8 %
BILIRUB SERPL-MCNC: 0.4 MG/DL
BUN SERPL-MCNC: 19 MG/DL
CALCIUM SERPL-MCNC: 9.5 MG/DL
CHLORIDE SERPL-SCNC: 104 MMOL/L
CO2 SERPL-SCNC: 23 MMOL/L
CREAT SERPL-MCNC: 0.97 MG/DL
EOSINOPHIL # BLD AUTO: 0.25 K/UL
EOSINOPHIL NFR BLD AUTO: 2.8 %
GLUCOSE SERPL-MCNC: 99 MG/DL
HCT VFR BLD CALC: 41.5 %
HGB BLD-MCNC: 12.9 G/DL
IMM GRANULOCYTES NFR BLD AUTO: 0.2 %
LYMPHOCYTES # BLD AUTO: 3.32 K/UL
LYMPHOCYTES NFR BLD AUTO: 37.2 %
MAN DIFF?: NORMAL
MCHC RBC-ENTMCNC: 29.6 PG
MCHC RBC-ENTMCNC: 31.1 GM/DL
MCV RBC AUTO: 95.2 FL
MONOCYTES # BLD AUTO: 0.87 K/UL
MONOCYTES NFR BLD AUTO: 9.7 %
NEUTROPHILS # BLD AUTO: 4.4 K/UL
NEUTROPHILS NFR BLD AUTO: 49.3 %
PLATELET # BLD AUTO: 286 K/UL
POTASSIUM SERPL-SCNC: 5.1 MMOL/L
PROT SERPL-MCNC: 6.4 G/DL
RBC # BLD: 4.36 M/UL
RBC # FLD: 16.4 %
SODIUM SERPL-SCNC: 140 MMOL/L
WBC # FLD AUTO: 8.93 K/UL

## 2021-11-05 ENCOUNTER — APPOINTMENT (OUTPATIENT)
Dept: COLORECTAL SURGERY | Facility: CLINIC | Age: 61
End: 2021-11-05
Payer: COMMERCIAL

## 2021-11-05 VITALS
BODY MASS INDEX: 19.44 KG/M2 | SYSTOLIC BLOOD PRESSURE: 115 MMHG | TEMPERATURE: 97.6 F | WEIGHT: 121 LBS | OXYGEN SATURATION: 100 % | HEART RATE: 56 BPM | DIASTOLIC BLOOD PRESSURE: 70 MMHG | HEIGHT: 66 IN

## 2021-11-05 PROCEDURE — 99024 POSTOP FOLLOW-UP VISIT: CPT

## 2021-11-05 NOTE — HISTORY OF PRESENT ILLNESS
[FreeTextEntry1] : 2 weeks s/p the 4th PIPAC treatment.\par He went home on POD 1\par He took tylenol only for pain and is not having any abdominal pain presently.\par No N/V.\par Tolerating diet minus problems.  Having daily BM's. No blood.\par Maintaining activity levels and is walking outside x 1 mile per most days.\par No fever, wound, or other problems noted.  Voiding well.\par \par Wants to get the 5th PIPAC\par

## 2021-11-05 NOTE — ASSESSMENT
[FreeTextEntry1] : Doing well.\par No surgical complications or chemo related complications noted\par Chemistry and CBC results all NL, K= 5.0.  BUN / creatinine are fine.\par Eating well. exam unremarkable.\par \par Plan is to do 5th PIPAC  December 2nd.  \par We discussed the options for Rx after PIPAC 5: 1) apply for additional PIPAC (no guarantee but would attempt), 2) start another line of chemo (thus far he has only received FOLFOX x 12 cycles) FOLFIRI is an option, 3) go on trial for phase 1 new drug, 4) not get more Rx for cancer.   \par I have asked the patient to speak with 's Malissa Garcia and Isidro Daniels about possible chemo options.\par \par We also talked about the new adhesions noted laterally, cephalad, and in pelvis noted at last PIPAC.  Central and main anterior abdominal wall was adhesion free and, thus, I am comfortable giving the 5th PIPAC.  The adhesions may increase (that has been the pattern) which may preclude ore PIPAC at some point.  \par \par Next f/u in 2 weeks via telehealth\par PIPAC December 2nd\par \par \par

## 2021-11-10 ENCOUNTER — TRANSCRIPTION ENCOUNTER (OUTPATIENT)
Age: 61
End: 2021-11-10

## 2021-11-12 LAB — CEA SERPL-MCNC: 8 NG/ML

## 2021-11-22 ENCOUNTER — APPOINTMENT (OUTPATIENT)
Dept: COLORECTAL SURGERY | Facility: CLINIC | Age: 61
End: 2021-11-22
Payer: COMMERCIAL

## 2021-11-22 PROCEDURE — 99024 POSTOP FOLLOW-UP VISIT: CPT

## 2021-11-22 PROCEDURE — 99212 OFFICE O/P EST SF 10 MIN: CPT | Mod: 95

## 2021-11-22 NOTE — HISTORY OF PRESENT ILLNESS
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  Been 4 weeks since 4th PIPAC.  Eating OK.  Having daily BM's x 6 (his routine).  No bleeding. No N/V.\par No abdominal pain.  No pain meds being taken\par \par Wright has appointment with Dr. Garcia on 11/30/21.  Will see Dr. Daniels prior to next PIPAC\par Issue of what systemic chemo, if any, he might take after PIPAC completed discussed.\par Dr. Daniels and [facility] meghan think that FOLFOX + avastin is next best acknowledging that the response rate for LAMN.  Patient may take break from treatment.  He has spoken to Charis Hamm MD who advises short course of FOLFOX + avastin only.  Patient is considering immunotherapy as well (would have to be on trial).  \par \par \par Actiivty level: same:  working out in home gym doing resistance training.  Walks outside as well but less with colder weather.  \par Weight stable:  125 lb.\par \par Current Meds\par simvastatin\par cialopam\par proton pump inhibitor\par flomax\par androgel\par \par No exam\par \par

## 2021-11-22 NOTE — REASON FOR VISIT
[Home] : at home, [unfilled] , at the time of the visit. [Medical Office: ([facility])___] : at the medical office located in  [Verbal consent obtained from patient] : the patient, [unfilled] [Follow-Up: _____] : a [unfilled] follow-up visit [Spouse] : spouse

## 2021-11-22 NOTE — ASSESSMENT
[FreeTextEntry1] : Doing well presently.\par Functional level is good and stable.\par Diet and bowel function is ok.\par Next and final PIPAC scheduled for December 2, 2021 (pending final approvals).  \par Needs preop bloods and also appointment with Med Onc at [facility] for pre-PIPAC chemo.\par Needs to decide if he will go with IV chemo postop.  \par To call if problems arise.

## 2021-11-29 ENCOUNTER — OUTPATIENT (OUTPATIENT)
Dept: OUTPATIENT SERVICES | Facility: HOSPITAL | Age: 61
LOS: 1 days | Discharge: HOME | End: 2021-11-29

## 2021-11-29 ENCOUNTER — OUTPATIENT (OUTPATIENT)
Dept: OUTPATIENT SERVICES | Facility: HOSPITAL | Age: 61
LOS: 1 days | End: 2021-11-29
Payer: COMMERCIAL

## 2021-11-29 VITALS
RESPIRATION RATE: 16 BRPM | HEART RATE: 57 BPM | WEIGHT: 126.1 LBS | SYSTOLIC BLOOD PRESSURE: 110 MMHG | OXYGEN SATURATION: 98 % | HEIGHT: 66.5 IN | TEMPERATURE: 97 F | DIASTOLIC BLOOD PRESSURE: 72 MMHG

## 2021-11-29 DIAGNOSIS — C18.1 MALIGNANT NEOPLASM OF APPENDIX: ICD-10-CM

## 2021-11-29 DIAGNOSIS — C26.9 MALIGNANT NEOPLASM OF ILL-DEFINED SITES WITHIN THE DIGESTIVE SYSTEM: ICD-10-CM

## 2021-11-29 DIAGNOSIS — Z98.890 OTHER SPECIFIED POSTPROCEDURAL STATES: Chronic | ICD-10-CM

## 2021-11-29 DIAGNOSIS — Z90.49 ACQUIRED ABSENCE OF OTHER SPECIFIED PARTS OF DIGESTIVE TRACT: Chronic | ICD-10-CM

## 2021-11-29 DIAGNOSIS — R19.00 INTRA-ABDOMINAL AND PELVIC SWELLING, MASS AND LUMP, UNSPECIFIED SITE: Chronic | ICD-10-CM

## 2021-11-29 DIAGNOSIS — Z87.438 PERSONAL HISTORY OF OTHER DISEASES OF MALE GENITAL ORGANS: Chronic | ICD-10-CM

## 2021-11-29 DIAGNOSIS — C18.1 MALIGNANT NEOPLASM OF APPENDIX: Chronic | ICD-10-CM

## 2021-11-29 LAB
ALBUMIN SERPL ELPH-MCNC: 4.1 G/DL — SIGNIFICANT CHANGE UP (ref 3.3–5)
ALP SERPL-CCNC: 98 U/L — SIGNIFICANT CHANGE UP (ref 40–120)
ALT FLD-CCNC: 24 U/L — SIGNIFICANT CHANGE UP (ref 4–41)
ANION GAP SERPL CALC-SCNC: 9 MMOL/L — SIGNIFICANT CHANGE UP (ref 7–14)
AST SERPL-CCNC: 20 U/L — SIGNIFICANT CHANGE UP (ref 4–40)
BILIRUB SERPL-MCNC: 0.5 MG/DL — SIGNIFICANT CHANGE UP (ref 0.2–1.2)
BLD GP AB SCN SERPL QL: NEGATIVE — SIGNIFICANT CHANGE UP
BUN SERPL-MCNC: 19 MG/DL — SIGNIFICANT CHANGE UP (ref 7–23)
CALCIUM SERPL-MCNC: 9.4 MG/DL — SIGNIFICANT CHANGE UP (ref 8.4–10.5)
CHLORIDE SERPL-SCNC: 106 MMOL/L — SIGNIFICANT CHANGE UP (ref 98–107)
CO2 SERPL-SCNC: 28 MMOL/L — SIGNIFICANT CHANGE UP (ref 22–31)
CREAT SERPL-MCNC: 0.92 MG/DL — SIGNIFICANT CHANGE UP (ref 0.5–1.3)
GLUCOSE SERPL-MCNC: 86 MG/DL — SIGNIFICANT CHANGE UP (ref 70–99)
HCT VFR BLD CALC: 40.6 % — SIGNIFICANT CHANGE UP (ref 39–50)
HGB BLD-MCNC: 13.2 G/DL — SIGNIFICANT CHANGE UP (ref 13–17)
MCHC RBC-ENTMCNC: 30.6 PG — SIGNIFICANT CHANGE UP (ref 27–34)
MCHC RBC-ENTMCNC: 32.5 GM/DL — SIGNIFICANT CHANGE UP (ref 32–36)
MCV RBC AUTO: 94.2 FL — SIGNIFICANT CHANGE UP (ref 80–100)
NRBC # BLD: 0 /100 WBCS — SIGNIFICANT CHANGE UP
NRBC # FLD: 0 K/UL — SIGNIFICANT CHANGE UP
PLATELET # BLD AUTO: 456 K/UL — HIGH (ref 150–400)
POTASSIUM SERPL-MCNC: 4.3 MMOL/L — SIGNIFICANT CHANGE UP (ref 3.5–5.3)
POTASSIUM SERPL-SCNC: 4.3 MMOL/L — SIGNIFICANT CHANGE UP (ref 3.5–5.3)
PROT SERPL-MCNC: 6.7 G/DL — SIGNIFICANT CHANGE UP (ref 6–8.3)
RBC # BLD: 4.31 M/UL — SIGNIFICANT CHANGE UP (ref 4.2–5.8)
RBC # FLD: 14.9 % — HIGH (ref 10.3–14.5)
RH IG SCN BLD-IMP: POSITIVE — SIGNIFICANT CHANGE UP
SODIUM SERPL-SCNC: 143 MMOL/L — SIGNIFICANT CHANGE UP (ref 135–145)
WBC # BLD: 8.93 K/UL — SIGNIFICANT CHANGE UP (ref 3.8–10.5)
WBC # FLD AUTO: 8.93 K/UL — SIGNIFICANT CHANGE UP (ref 3.8–10.5)

## 2021-11-29 PROCEDURE — 93010 ELECTROCARDIOGRAM REPORT: CPT

## 2021-11-29 RX ORDER — SODIUM CHLORIDE 9 MG/ML
1000 INJECTION, SOLUTION INTRAVENOUS
Refills: 0 | Status: DISCONTINUED | OUTPATIENT
Start: 2021-12-02 | End: 2021-12-02

## 2021-11-29 RX ORDER — ESCITALOPRAM OXALATE 10 MG/1
1 TABLET, FILM COATED ORAL
Qty: 0 | Refills: 0 | DISCHARGE

## 2021-11-29 NOTE — H&P PST ADULT - NSICDXPASTSURGICALHX_GEN_ALL_CORE_FT
PAST SURGICAL HISTORY:  Abdominal mass surgery 5/2020  remove mass, spleen, partial colectomy, lymph nodes, part small intestines, omentum, apendix, gall bladder, part stomach. HIPEC    H/O colectomy     History of abdominal paracentesis x3    History of undescended testicle age 7     PAST SURGICAL HISTORY:  Abdominal mass surgery 5/2020  remove mass, spleen, partial colectomy, lymph nodes, part small intestines, omentum, apendix, gall bladder, part stomach. 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

## 2021-11-29 NOTE — H&P PST ADULT - PROBLEM SELECTOR PLAN 1
Diagnostic Laparoscopy Peritoneal Biopsy, Pressurized Intraperitoneal chemotherapy.      CBC CMP T&S EKG    Preop instructions and antibacterial soap given and explained (verbal and written), with teach back. Diagnostic Laparoscopy Peritoneal Biopsy, Pressurized Intraperitoneal chemotherapy.      CBC CMP T&S EKG    Preop instructions and antibacterial soap given and explained (verbal and written), with teach back.    MALISSA precautions per stop bang questionnaire criteria

## 2021-11-29 NOTE — H&P PST ADULT - PRESSURE ULCER(S)
Pharmacist Admission Medication Reconciliation Pending Note    Prior to Admission Medications were reviewed by the pharmacist and pended for provider review during admission medication reconciliation.    Medications were pended by the pharmacist at this time as follows:    Pended Admission Order Reconciliation Actions     Order Name Action    naproxen (NAPROSYN) 250 MG tablet Do Not Order for Admission    MAGNESIUM PO Do Not Order for Admission    Ascorbic Acid (VITAMIN C) 1000 MG tablet Do Not Order for Admission    Multiple Vitamin (MULTI-VITAMIN DAILY) Tab Do Not Order for Admission    VITAMIN E PO Do Not Order for Admission    Misc Natural Products (GREEN TEA) Tab Do Not Order for Admission                  Pharmacist Notations:           Orders that are ultimately reconciled and signed during admission medication reconciliation may differ from the pended actions above.    Please contact the pharmacist for questions.    Hector Cobian [facility]  3/20/2018 2:30 AM  
no

## 2021-11-29 NOTE — H&P PST ADULT - ASSESSMENT
Pt is a 61 yo male  with hx of appendix carcinoma, s/p 4 PIPAC Rx, most recently Oct 2021.  Hx of Cytoreduction surgery with HIPEC 5/2020 and chemotherapy. Now scheduled for diagnostic Laparoscopy Peritoneal Biopsy, Pressurized Intraperitoneal chemotherapy.

## 2021-11-29 NOTE — H&P PST ADULT - GASTROINTESTINAL COMMENTS
hx of appendix carcinoma, s/p 4 PIPAC Rx, most recently Oct 2021.  Hx of  Cytoreduction surgery with HIPEC 5/2020 and chemotherapy. Now scheduled for diagnostic Laparoscopy Peritoneal Biopsy, Pressurized Intraperitoneal chemotherapy.

## 2021-11-29 NOTE — H&P PST ADULT - HISTORY OF PRESENT ILLNESS
Pt is a 59 yo male  with hx of appendix carcinoma, s/p 4 PIPAC Rx, most recently Oct 2021.  Cytoreduction surgery with HIPEC 5/2020 and chemotherapy. Now scheduled for diagnostic Laparoscopy Peritoneal Biopsy, Pressurized Intraperitoneal chemotherapy.       diagnostic laparoscopy, peritoneal biopsies, pressurized intraperitoneal chemotherapy with Dr Manriquez tentatively 10/21/21 -     Pt is a 61 yo male  with hx of appendix carcinoma, s/p 4 PIPAC Rx, most recently Oct 2021.  Hx of Cytoreduction surgery with HIPEC 5/2020 and chemotherapy. Now scheduled for diagnostic Laparoscopy Peritoneal Biopsy, Pressurized Intraperitoneal chemotherapy.       diagnostic laparoscopy, peritoneal biopsies, pressurized intraperitoneal chemotherapy with Dr Manriquez tentatively 10/21/21 -     Pt is a 59 yo male  with hx of appendix carcinoma, s/p 4 PIPAC Rx, most recently Oct 2021.  Hx of Cytoreduction surgery with HIPEC 5/2020 and chemotherapy. Now scheduled for diagnostic Laparoscopy Peritoneal Biopsy, Pressurized Intraperitoneal chemotherapy.          Pt is a 61 yo male  with hx of appendix carcinoma, s/p 4 PIPAC Rx, most recently Oct 2021.  Hx of Cytoreduction surgery with HIPEC 5/2020 and chemotherapy. Now scheduled for diagnostic Laparoscopy Peritoneal Biopsy, Pressurized Intraperitoneal chemotherapy.     Dr. Jenkins to add codes

## 2021-11-29 NOTE — H&P PST ADULT - ATTENDING COMMENTS
Plan for diagnostic laparoscopy, peritoneal biopsies, and pressurized aerosolized chemotherapy today with oxaliplatin. Received 5FU infusion yesterday. Risks, benefits, and alternatives discussed and surgical consent obtained.

## 2021-11-29 NOTE — H&P PST ADULT - GENERAL
ED Time Seen By Provider Entered On:  11/29/2017 4:39     Performed On:  11/29/2017 4:39  by Patience Vang MD      Time Seen By Provider   Time Seen by Provider :   11/29/2017 04:39    Kirk Lopez MD, Patience Pardo - 11/29/2017 4:39            details…

## 2021-11-29 NOTE — ASU PATIENT PROFILE, ADULT - HEALTH/HEALTHCARE ANXIETIES, PROFILE
Chief Complaint   Patient presents with     Consult     Consult to discuss IR Port Replacement Catheter Only Right.         Medications and allergies reconciled.  Vitals collected.    Rajwinder Barrett LPN    
pre op anxiety

## 2021-11-30 ENCOUNTER — OUTPATIENT (OUTPATIENT)
Dept: OUTPATIENT SERVICES | Facility: HOSPITAL | Age: 61
LOS: 1 days | Discharge: HOME | End: 2021-11-30

## 2021-11-30 ENCOUNTER — APPOINTMENT (OUTPATIENT)
Dept: HEMATOLOGY ONCOLOGY | Facility: CLINIC | Age: 61
End: 2021-11-30
Payer: COMMERCIAL

## 2021-11-30 DIAGNOSIS — Z87.438 PERSONAL HISTORY OF OTHER DISEASES OF MALE GENITAL ORGANS: Chronic | ICD-10-CM

## 2021-11-30 DIAGNOSIS — C18.1 MALIGNANT NEOPLASM OF APPENDIX: Chronic | ICD-10-CM

## 2021-11-30 DIAGNOSIS — Z98.890 OTHER SPECIFIED POSTPROCEDURAL STATES: Chronic | ICD-10-CM

## 2021-11-30 DIAGNOSIS — C26.9 MALIGNANT NEOPLASM OF ILL-DEFINED SITES WITHIN THE DIGESTIVE SYSTEM: ICD-10-CM

## 2021-11-30 DIAGNOSIS — Z90.49 ACQUIRED ABSENCE OF OTHER SPECIFIED PARTS OF DIGESTIVE TRACT: Chronic | ICD-10-CM

## 2021-11-30 DIAGNOSIS — R19.00 INTRA-ABDOMINAL AND PELVIC SWELLING, MASS AND LUMP, UNSPECIFIED SITE: Chronic | ICD-10-CM

## 2021-11-30 PROCEDURE — XXXXX: CPT

## 2021-12-01 ENCOUNTER — RESULT REVIEW (OUTPATIENT)
Age: 61
End: 2021-12-01

## 2021-12-01 ENCOUNTER — TRANSCRIPTION ENCOUNTER (OUTPATIENT)
Age: 61
End: 2021-12-01

## 2021-12-01 ENCOUNTER — APPOINTMENT (OUTPATIENT)
Dept: INFUSION THERAPY | Facility: HOSPITAL | Age: 61
End: 2021-12-01

## 2021-12-01 ENCOUNTER — APPOINTMENT (OUTPATIENT)
Dept: HEMATOLOGY ONCOLOGY | Facility: CLINIC | Age: 61
End: 2021-12-01
Payer: COMMERCIAL

## 2021-12-01 VITALS
SYSTOLIC BLOOD PRESSURE: 116 MMHG | HEART RATE: 55 BPM | RESPIRATION RATE: 14 BRPM | BODY MASS INDEX: 19.75 KG/M2 | WEIGHT: 122.36 LBS | OXYGEN SATURATION: 100 % | TEMPERATURE: 97 F | DIASTOLIC BLOOD PRESSURE: 72 MMHG

## 2021-12-01 LAB
BASOPHILS # BLD AUTO: 0.08 K/UL — SIGNIFICANT CHANGE UP (ref 0–0.2)
BASOPHILS NFR BLD AUTO: 0.8 % — SIGNIFICANT CHANGE UP (ref 0–2)
EOSINOPHIL # BLD AUTO: 0.21 K/UL — SIGNIFICANT CHANGE UP (ref 0–0.5)
EOSINOPHIL NFR BLD AUTO: 2.2 % — SIGNIFICANT CHANGE UP (ref 0–6)
HCT VFR BLD CALC: 37.9 % — LOW (ref 39–50)
HGB BLD-MCNC: 12.6 G/DL — LOW (ref 13–17)
IMM GRANULOCYTES NFR BLD AUTO: 0.3 % — SIGNIFICANT CHANGE UP (ref 0–1.5)
LYMPHOCYTES # BLD AUTO: 3.51 K/UL — HIGH (ref 1–3.3)
LYMPHOCYTES # BLD AUTO: 36.4 % — SIGNIFICANT CHANGE UP (ref 13–44)
MCHC RBC-ENTMCNC: 30.7 PG — SIGNIFICANT CHANGE UP (ref 27–34)
MCHC RBC-ENTMCNC: 33.2 G/DL — SIGNIFICANT CHANGE UP (ref 32–36)
MCV RBC AUTO: 92.4 FL — SIGNIFICANT CHANGE UP (ref 80–100)
MONOCYTES # BLD AUTO: 1.01 K/UL — HIGH (ref 0–0.9)
MONOCYTES NFR BLD AUTO: 10.5 % — SIGNIFICANT CHANGE UP (ref 2–14)
NEUTROPHILS # BLD AUTO: 4.79 K/UL — SIGNIFICANT CHANGE UP (ref 1.8–7.4)
NEUTROPHILS NFR BLD AUTO: 49.8 % — SIGNIFICANT CHANGE UP (ref 43–77)
NRBC # BLD: 0 /100 WBCS — SIGNIFICANT CHANGE UP (ref 0–0)
PLATELET # BLD AUTO: 416 K/UL — HIGH (ref 150–400)
RBC # BLD: 4.1 M/UL — LOW (ref 4.2–5.8)
RBC # FLD: 14.6 % — HIGH (ref 10.3–14.5)
SARS-COV-2 N GENE NPH QL NAA+PROBE: NOT DETECTED
WBC # BLD: 9.63 K/UL — SIGNIFICANT CHANGE UP (ref 3.8–10.5)
WBC # FLD AUTO: 9.63 K/UL — SIGNIFICANT CHANGE UP (ref 3.8–10.5)

## 2021-12-01 PROCEDURE — 99215 OFFICE O/P EST HI 40 MIN: CPT

## 2021-12-01 RX ORDER — OMEPRAZOLE 40 MG/1
40 CAPSULE, DELAYED RELEASE ORAL DAILY
Qty: 90 | Refills: 3 | Status: DISCONTINUED | COMMUNITY
Start: 2021-07-01 | End: 2021-12-01

## 2021-12-01 RX ORDER — DOXYCYCLINE HYCLATE 100 MG/1
100 CAPSULE ORAL
Qty: 2 | Refills: 0 | Status: DISCONTINUED | COMMUNITY
Start: 2021-10-14 | End: 2021-12-01

## 2021-12-01 NOTE — ASU PATIENT PROFILE, ADULT - NSICDXPASTSURGICALHX_GEN_ALL_CORE_FT
PAST SURGICAL HISTORY:  Abdominal mass surgery 5/2020  remove mass, spleen, partial colectomy, lymph nodes, part small intestines, omentum, apendix, gall bladder, part stomach. 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

## 2021-12-01 NOTE — ASU PATIENT PROFILE, ADULT - FALL HARM RISK - UNIVERSAL INTERVENTIONS
Bed in lowest position, wheels locked, appropriate side rails in place/Call bell, personal items and telephone in reach/Instruct patient to call for assistance before getting out of bed or chair/Non-slip footwear when patient is out of bed/[place] to call system/Physically safe environment - no spills, clutter or unnecessary equipment/Purposeful Proactive Rounding/Room/bathroom lighting operational, light cord in reach

## 2021-12-02 ENCOUNTER — RESULT REVIEW (OUTPATIENT)
Age: 61
End: 2021-12-02

## 2021-12-02 ENCOUNTER — APPOINTMENT (OUTPATIENT)
Dept: SURGICAL ONCOLOGY | Facility: HOSPITAL | Age: 61
End: 2021-12-02

## 2021-12-02 ENCOUNTER — TRANSCRIPTION ENCOUNTER (OUTPATIENT)
Age: 61
End: 2021-12-02

## 2021-12-02 ENCOUNTER — NON-APPOINTMENT (OUTPATIENT)
Age: 61
End: 2021-12-02

## 2021-12-02 ENCOUNTER — INPATIENT (INPATIENT)
Facility: HOSPITAL | Age: 61
LOS: 0 days | Discharge: HOME | End: 2021-12-03
Attending: SURGERY | Admitting: SURGERY
Payer: COMMERCIAL

## 2021-12-02 VITALS
SYSTOLIC BLOOD PRESSURE: 126 MMHG | DIASTOLIC BLOOD PRESSURE: 69 MMHG | WEIGHT: 126.1 LBS | HEIGHT: 66.5 IN | HEART RATE: 81 BPM | RESPIRATION RATE: 14 BRPM | TEMPERATURE: 98 F | OXYGEN SATURATION: 100 %

## 2021-12-02 DIAGNOSIS — C18.1 MALIGNANT NEOPLASM OF APPENDIX: Chronic | ICD-10-CM

## 2021-12-02 DIAGNOSIS — R19.00 INTRA-ABDOMINAL AND PELVIC SWELLING, MASS AND LUMP, UNSPECIFIED SITE: Chronic | ICD-10-CM

## 2021-12-02 DIAGNOSIS — Z90.49 ACQUIRED ABSENCE OF OTHER SPECIFIED PARTS OF DIGESTIVE TRACT: Chronic | ICD-10-CM

## 2021-12-02 DIAGNOSIS — Z51.11 ENCOUNTER FOR ANTINEOPLASTIC CHEMOTHERAPY: ICD-10-CM

## 2021-12-02 DIAGNOSIS — R11.2 NAUSEA WITH VOMITING, UNSPECIFIED: ICD-10-CM

## 2021-12-02 DIAGNOSIS — Z98.890 OTHER SPECIFIED POSTPROCEDURAL STATES: Chronic | ICD-10-CM

## 2021-12-02 DIAGNOSIS — Z87.438 PERSONAL HISTORY OF OTHER DISEASES OF MALE GENITAL ORGANS: Chronic | ICD-10-CM

## 2021-12-02 DIAGNOSIS — C18.1 MALIGNANT NEOPLASM OF APPENDIX: ICD-10-CM

## 2021-12-02 PROCEDURE — 49321 LAPAROSCOPY BIOPSY: CPT

## 2021-12-02 PROCEDURE — 88305 TISSUE EXAM BY PATHOLOGIST: CPT | Mod: 26

## 2021-12-02 PROCEDURE — 88305 TISSUE EXAM BY PATHOLOGIST: CPT | Mod: 26,59

## 2021-12-02 PROCEDURE — 88112 CYTOPATH CELL ENHANCE TECH: CPT | Mod: 26

## 2021-12-02 RX ORDER — KETOROLAC TROMETHAMINE 30 MG/ML
15 SYRINGE (ML) INJECTION EVERY 6 HOURS
Refills: 0 | Status: COMPLETED | OUTPATIENT
Start: 2021-12-02 | End: 2021-12-03

## 2021-12-02 RX ORDER — ENOXAPARIN SODIUM 100 MG/ML
40 INJECTION SUBCUTANEOUS ONCE
Refills: 0 | Status: DISCONTINUED | OUTPATIENT
Start: 2021-12-03 | End: 2021-12-03

## 2021-12-02 RX ORDER — OXALIPLATIN 5 MG/ML
144 INJECTION, SOLUTION INTRAVENOUS ONCE
Refills: 0 | Status: DISCONTINUED | OUTPATIENT
Start: 2021-12-02 | End: 2021-12-03

## 2021-12-02 RX ORDER — HYDROMORPHONE HYDROCHLORIDE 2 MG/ML
0.5 INJECTION INTRAMUSCULAR; INTRAVENOUS; SUBCUTANEOUS EVERY 4 HOURS
Refills: 0 | Status: DISCONTINUED | OUTPATIENT
Start: 2021-12-02 | End: 2021-12-03

## 2021-12-02 RX ORDER — ONDANSETRON 8 MG/1
4 TABLET, FILM COATED ORAL ONCE
Refills: 0 | Status: DISCONTINUED | OUTPATIENT
Start: 2021-12-02 | End: 2021-12-02

## 2021-12-02 RX ORDER — HYDROMORPHONE HYDROCHLORIDE 2 MG/ML
0.5 INJECTION INTRAMUSCULAR; INTRAVENOUS; SUBCUTANEOUS
Refills: 0 | Status: DISCONTINUED | OUTPATIENT
Start: 2021-12-02 | End: 2021-12-02

## 2021-12-02 RX ORDER — INFLUENZA VIRUS VACCINE 15; 15; 15; 15 UG/.5ML; UG/.5ML; UG/.5ML; UG/.5ML
0.5 SUSPENSION INTRAMUSCULAR ONCE
Refills: 0 | Status: DISCONTINUED | OUTPATIENT
Start: 2021-12-02 | End: 2021-12-03

## 2021-12-02 RX ORDER — ATORVASTATIN CALCIUM 80 MG/1
10 TABLET, FILM COATED ORAL AT BEDTIME
Refills: 0 | Status: DISCONTINUED | OUTPATIENT
Start: 2021-12-02 | End: 2021-12-03

## 2021-12-02 RX ORDER — OXYCODONE HYDROCHLORIDE 5 MG/1
5 TABLET ORAL ONCE
Refills: 0 | Status: DISCONTINUED | OUTPATIENT
Start: 2021-12-02 | End: 2021-12-02

## 2021-12-02 RX ORDER — FAMOTIDINE 10 MG/ML
20 INJECTION INTRAVENOUS
Refills: 0 | Status: DISCONTINUED | OUTPATIENT
Start: 2021-12-02 | End: 2021-12-03

## 2021-12-02 RX ORDER — SODIUM CHLORIDE 9 MG/ML
1000 INJECTION, SOLUTION INTRAVENOUS
Refills: 0 | Status: DISCONTINUED | OUTPATIENT
Start: 2021-12-02 | End: 2021-12-03

## 2021-12-02 RX ORDER — ACETAMINOPHEN 500 MG
975 TABLET ORAL EVERY 6 HOURS
Refills: 0 | Status: DISCONTINUED | OUTPATIENT
Start: 2021-12-02 | End: 2021-12-03

## 2021-12-02 RX ORDER — CALCIUM CARBONATE 500(1250)
1 TABLET ORAL ONCE
Refills: 0 | Status: COMPLETED | OUTPATIENT
Start: 2021-12-02 | End: 2021-12-02

## 2021-12-02 RX ORDER — OXYCODONE HYDROCHLORIDE 5 MG/1
5 TABLET ORAL EVERY 4 HOURS
Refills: 0 | Status: DISCONTINUED | OUTPATIENT
Start: 2021-12-02 | End: 2021-12-03

## 2021-12-02 RX ORDER — TAMSULOSIN HYDROCHLORIDE 0.4 MG/1
0.4 CAPSULE ORAL AT BEDTIME
Refills: 0 | Status: DISCONTINUED | OUTPATIENT
Start: 2021-12-02 | End: 2021-12-03

## 2021-12-02 RX ORDER — ESCITALOPRAM OXALATE 10 MG/1
10 TABLET, FILM COATED ORAL AT BEDTIME
Refills: 0 | Status: DISCONTINUED | OUTPATIENT
Start: 2021-12-02 | End: 2021-12-03

## 2021-12-02 RX ADMIN — Medication 1 TABLET(S): at 19:59

## 2021-12-02 RX ADMIN — Medication 15 MILLIGRAM(S): at 18:52

## 2021-12-02 RX ADMIN — Medication 1 TABLET(S): at 22:37

## 2021-12-02 RX ADMIN — Medication 975 MILLIGRAM(S): at 23:27

## 2021-12-02 RX ADMIN — Medication 15 MILLIGRAM(S): at 18:11

## 2021-12-02 RX ADMIN — Medication 975 MILLIGRAM(S): at 22:37

## 2021-12-02 NOTE — PATIENT PROFILE ADULT - FALL HARM RISK - HARM RISK INTERVENTIONS

## 2021-12-02 NOTE — BRIEF OPERATIVE NOTE - OPERATION/FINDINGS
Diagnostic laparoscopy  Biopsies taken of pelvic cavity, LUQ, RLQ, umbilical port site  Pressurized IntraPeritoneal Aerosol Chemotherapy (PIPAC) with Oxaliplatin

## 2021-12-02 NOTE — DISCHARGE NOTE PROVIDER - HOSPITAL COURSE
Pt is a 59 yo male  with hx of appendix carcinoma, s/p 4 PIPAC Rx, most recently Oct 2021.  Hx of Cytoreduction surgery with HIPEC 5/2020 and chemotherapy. Patient presented to [facility] 12/2/21 for scheduled surgical intervention. He was admitted to the surgical service and taken to the operating room 12/2/21 and underwent diagnostic laparoscopy, biopsies taken of pelvic cavity, LUQ, RLQ, umbilical port site, and pressurized IntraPeritoneal Aerosol Chemotherapy (PIPAC) with Oxaliplatin. Pt tolerated procedure well, without complication.Pt remained hemodynamically stable in the PACU and transferred to the surgical floor.     Diet was restarted and advanced as tolerated. Pain control was transitioned from IV to PO pain meds. Pt currently ambulating, voiding, tolerating a regular diet, with pain well controlled on PO pain meds. Patient is stable for discharge home to follow up as an outpatient, instructed to call to schedule appointment.

## 2021-12-02 NOTE — DISCHARGE NOTE PROVIDER - NSDCCPCAREPLAN_GEN_ALL_CORE_FT
PRINCIPAL DISCHARGE DIAGNOSIS  Diagnosis: Appendiceal tumor  Assessment and Plan of Treatment: WOUND CARE:  Please keep incisions clean and dry. Please do not Scrub or rub incisions. Do not use lotion or powder on incisions.   BATHING: Please do not submerge wound underwater. You may shower and/or sponge bathe.  ACTIVITY: No heavy lifting or straining. Otherwise, you may return to your usual level of physical activity. If you are taking narcotic pain medication (such as Percocet) DO NOT drive a car, operate machinery or make important decisions.  DIET: Return to your usual diet.  NOTIFY YOUR SURGEON IF: You have any bleeding that does not stop, any pus draining from your wound(s), any fever (over 100.4 F) or chills, persistent nausea/vomiting, persistent diarrhea, or if your pain is not controlled on your discharge pain medications.  FOLLOW-UP: Please follow up with your primary care physician in one week regarding your hospitalization. Please follow-up with your surgeon, within 7 days following discharge- please call to schedule an appointment.

## 2021-12-02 NOTE — CHART NOTE - NSCHARTNOTEFT_GEN_A_CORE
Post Operative Note  Patient: RYANNE YOUNG 61y (1960) Male   MRN: [id number]  Location: [facility] 03  Visit: 12-02-21 Inpatient  Date: 12-02-21 @ 19:56    Procedure: S/P Diagnostic laparoscopy and Pressurized IntraPeritoneal Aerosol Chemotherapy (PIPAC) with Oxaliplatin    Subjective:   Seen and examined 4 hrs postop. No acute events in the immediate postop period. Denies chest pain, SOB, nausea or vomiting. Tolerating a diet.     Objective:  Vitals: T(F): 98.8 (12-02-21 @ 19:30), Max: 98.8 (12-02-21 @ 19:30)  HR: 78 (12-02-21 @ 19:30)  BP: 120/62 (12-02-21 @ 19:30) (115/62 - 133/62)  RR: 20 (12-02-21 @ 19:30)  SpO2: 100% (12-02-21 @ 19:30)  Vent Settings:     In:   12-02-21 @ 07:01  -  12-02-21 @ 19:56  --------------------------------------------------------  IN: 300 mL      IV Fluids: lactated ringers. 1000 milliLiter(s) (50 mL/Hr) IV Continuous <Continuous>      Out:   12-02-21 @ 07:01  -  12-02-21 @ 19:56  --------------------------------------------------------  OUT: 0 mL      EBL: 2cc    Voided Urine:   12-02-21 @ 07:01  -  12-02-21 @ 19:56  --------------------------------------------------------  OUT: 0 mL      Raygoza Catheter: no         Physical Examination:  General: NAD, resting comfortably in bed  HEENT: Normocephalic atraumatic  Respiratory: Nonlabored respirations, normal CW expansion.  Cardio: regular rate and rhythm.  Abdomen: softly distended, appropriately tender, surgical incisions are c/d/i.   Vascular: extremities are warm and well perfused.     Imaging:  No post-op imaging studies    Assessment:  61yMale patient S/P Diagnostic laparoscopy and Pressurized IntraPeritoneal Aerosol Chemotherapy (PIPAC) with Oxaliplatin for peritoneal carcinomatois    Plan:  - IV Abx: periop  - Pain control: tylenol, toradol  - Diet: LRD  - IVF   - Activity: as tolerated   - DVT ppx: LVX    D Team Surgery  [id number]

## 2021-12-02 NOTE — DISCHARGE NOTE PROVIDER - NSDCMRMEDTOKEN_GEN_ALL_CORE_FT
AndroGel Pump: 2 pumps Apply topically to affected area once a day  escitalopram 10 mg oral tablet: 1 tab(s) orally once a day  Pepcid AC: 20 milligram(s) orally 2 times a day  simvastatin 10 mg oral tablet: 1 tab(s) orally once a day (at bedtime)  tamsulosin 0.4 mg oral capsule: 1 cap(s) orally once a day (at bedtime)   acetaminophen 325 mg oral tablet: 3 tab(s) orally every 6 hours  AndroGel Pump: 2 pumps Apply topically to affected area once a day  escitalopram 10 mg oral tablet: 1 tab(s) orally once a day  Pepcid AC: 20 milligram(s) orally 2 times a day  simvastatin 10 mg oral tablet: 1 tab(s) orally once a day (at bedtime)  tamsulosin 0.4 mg oral capsule: 1 cap(s) orally once a day (at bedtime)

## 2021-12-02 NOTE — DISCHARGE NOTE PROVIDER - CARE PROVIDER_API CALL
Florecita Mason)  Complex General Surgical Oncology; Surgery  [address] [place] [address]  Phone: [phone]  Fax: [phone]  Follow Up Time:

## 2021-12-02 NOTE — DISCHARGE NOTE PROVIDER - NSDCFUADDAPPT_GEN_ALL_CORE_FT
Please follow-up with your surgeon, within 7 days following discharge- please call to schedule an appointment.

## 2021-12-03 ENCOUNTER — TRANSCRIPTION ENCOUNTER (OUTPATIENT)
Age: 61
End: 2021-12-03

## 2021-12-03 VITALS
DIASTOLIC BLOOD PRESSURE: 79 MMHG | HEART RATE: 56 BPM | OXYGEN SATURATION: 100 % | SYSTOLIC BLOOD PRESSURE: 130 MMHG | TEMPERATURE: 98 F | RESPIRATION RATE: 16 BRPM

## 2021-12-03 LAB
ANION GAP SERPL CALC-SCNC: 15 MMOL/L — HIGH (ref 7–14)
BUN SERPL-MCNC: 18 MG/DL — SIGNIFICANT CHANGE UP (ref 7–23)
CALCIUM SERPL-MCNC: 9.2 MG/DL — SIGNIFICANT CHANGE UP (ref 8.4–10.5)
CHLORIDE SERPL-SCNC: 103 MMOL/L — SIGNIFICANT CHANGE UP (ref 98–107)
CO2 SERPL-SCNC: 18 MMOL/L — LOW (ref 22–31)
CREAT SERPL-MCNC: 0.87 MG/DL — SIGNIFICANT CHANGE UP (ref 0.5–1.3)
GLUCOSE SERPL-MCNC: 94 MG/DL — SIGNIFICANT CHANGE UP (ref 70–99)
HCT VFR BLD CALC: 39.5 % — SIGNIFICANT CHANGE UP (ref 39–50)
HGB BLD-MCNC: 12.8 G/DL — LOW (ref 13–17)
MAGNESIUM SERPL-MCNC: 2 MG/DL — SIGNIFICANT CHANGE UP (ref 1.6–2.6)
MCHC RBC-ENTMCNC: 30.2 PG — SIGNIFICANT CHANGE UP (ref 27–34)
MCHC RBC-ENTMCNC: 32.4 GM/DL — SIGNIFICANT CHANGE UP (ref 32–36)
MCV RBC AUTO: 93.2 FL — SIGNIFICANT CHANGE UP (ref 80–100)
NRBC # BLD: 0 /100 WBCS — SIGNIFICANT CHANGE UP
NRBC # FLD: 0 K/UL — SIGNIFICANT CHANGE UP
PHOSPHATE SERPL-MCNC: 4.3 MG/DL — SIGNIFICANT CHANGE UP (ref 2.5–4.5)
PLATELET # BLD AUTO: 382 K/UL — SIGNIFICANT CHANGE UP (ref 150–400)
POTASSIUM SERPL-MCNC: 4.7 MMOL/L — SIGNIFICANT CHANGE UP (ref 3.5–5.3)
POTASSIUM SERPL-SCNC: 4.7 MMOL/L — SIGNIFICANT CHANGE UP (ref 3.5–5.3)
RBC # BLD: 4.24 M/UL — SIGNIFICANT CHANGE UP (ref 4.2–5.8)
RBC # FLD: 15 % — HIGH (ref 10.3–14.5)
SODIUM SERPL-SCNC: 136 MMOL/L — SIGNIFICANT CHANGE UP (ref 135–145)
WBC # BLD: 17.82 K/UL — HIGH (ref 3.8–10.5)
WBC # FLD AUTO: 17.82 K/UL — HIGH (ref 3.8–10.5)

## 2021-12-03 RX ORDER — ACETAMINOPHEN 500 MG
3 TABLET ORAL
Qty: 0 | Refills: 0 | DISCHARGE
Start: 2021-12-03

## 2021-12-03 RX ORDER — FAMOTIDINE 10 MG/ML
20 INJECTION INTRAVENOUS DAILY
Refills: 0 | Status: DISCONTINUED | OUTPATIENT
Start: 2021-12-03 | End: 2021-12-03

## 2021-12-03 RX ADMIN — ESCITALOPRAM OXALATE 10 MILLIGRAM(S): 10 TABLET, FILM COATED ORAL at 01:20

## 2021-12-03 RX ADMIN — FAMOTIDINE 20 MILLIGRAM(S): 10 INJECTION INTRAVENOUS at 06:55

## 2021-12-03 RX ADMIN — TAMSULOSIN HYDROCHLORIDE 0.4 MILLIGRAM(S): 0.4 CAPSULE ORAL at 00:46

## 2021-12-03 RX ADMIN — ATORVASTATIN CALCIUM 10 MILLIGRAM(S): 80 TABLET, FILM COATED ORAL at 01:20

## 2021-12-03 RX ADMIN — FAMOTIDINE 20 MILLIGRAM(S): 10 INJECTION INTRAVENOUS at 00:17

## 2021-12-03 NOTE — DISCHARGE NOTE NURSING/CASE MANAGEMENT/SOCIAL WORK - NSDCPNINST_GEN_ALL_CORE
no Call 911 for chest pain, and/or difficulty breathing. Report to your doctor any fever, pus to incision.

## 2021-12-03 NOTE — DISCHARGE NOTE NURSING/CASE MANAGEMENT/SOCIAL WORK - NSDCPEFALRISK_GEN_ALL_CORE
For information on Fall & Injury Prevention, visit: [email or web address] OR  [email or web address] OR  https://www.cdc.gov/steadi/patient.html

## 2021-12-03 NOTE — PROGRESS NOTE ADULT - ASSESSMENT
Assessment:  61yMale patient S/P Diagnostic laparoscopy and Pressurized IntraPeritoneal Aerosol Chemotherapy (PIPAC) with Oxaliplatin for peritoneal carcinomatois s/p 12/2.     Plan  -  -  -  -   Assessment:  61yMale patient S/P Diagnostic laparoscopy and Pressurized IntraPeritoneal Aerosol Chemotherapy (PIPAC) with Oxaliplatin for peritoneal carcinomatois s/p 12/2.     Plan:  - Multimodal pain control   - Diet: LRD  - Oob/ambulation as tolerated   - DVT ppx: LVX  - D/C today     D Team Surgery  [id number].

## 2021-12-03 NOTE — PROGRESS NOTE ADULT - SUBJECTIVE AND OBJECTIVE BOX
SUBJECTIVE:   Seen and examined at bedside. No acute events overnight.     OBJECTIVE: T(C): 36.7 (12-03-21 @ 05:25), Max: 37.1 (12-02-21 @ 19:30)  HR: 56 (12-03-21 @ 05:25) (55 - 88)  BP: 130/79 (12-03-21 @ 05:25) (115/62 - 133/62)  RR: 16 (12-03-21 @ 05:25) (14 - 24)  SpO2: 100% (12-03-21 @ 05:25) (98% - 100%)  Wt(kg): --  I&O's Summary    02 Dec 2021 07:01  -  03 Dec 2021 05:55  --------------------------------------------------------  IN: 1120 mL / OUT: 150 mL / NET: 970 mL      I&O's Detail    02 Dec 2021 07:01  -  03 Dec 2021 05:55  --------------------------------------------------------  IN:    Lactated Ringers: 650 mL    Oral Fluid: 470 mL  Total IN: 1120 mL    OUT:    Voided (mL): 150 mL  Total OUT: 150 mL    Total NET: 970 mL      Physical Exam:   General:  Abdomen:    MEDICATIONS  (STANDING):  acetaminophen     Tablet .. 975 milliGRAM(s) Oral every 6 hours  atorvastatin 10 milliGRAM(s) Oral at bedtime  enoxaparin Injectable 40 milliGRAM(s) SubCutaneous once  escitalopram 10 milliGRAM(s) Oral at bedtime  famotidine    Tablet 20 milliGRAM(s) Oral two times a day  influenza   Vaccine 0.5 milliLiter(s) IntraMuscular once  ketorolac   Injectable 15 milliGRAM(s) IV Push every 6 hours  lactated ringers. 1000 milliLiter(s) (50 mL/Hr) IV Continuous <Continuous>  oxaliplatin INTRAPERITONEAL (eMAR) 144 milliGRAM(s) IntraPeritoneal once  tamsulosin 0.4 milliGRAM(s) Oral at bedtime    MEDICATIONS  (PRN):  HYDROmorphone  Injectable 0.5 milliGRAM(s) IV Push every 4 hours PRN Breakthrough pain  oxyCODONE    IR 5 milliGRAM(s) Oral every 4 hours PRN Severe Pain (7 - 10)      LABS:                        12.6   9.63  )-----------( 416      ( 01 Dec 2021 15:37 )             37.9                        SUBJECTIVE:   Seen and examined at bedside. No acute events overnight.     OBJECTIVE: T(C): 36.7 (12-03-21 @ 05:25), Max: 37.1 (12-02-21 @ 19:30)  HR: 56 (12-03-21 @ 05:25) (55 - 88)  BP: 130/79 (12-03-21 @ 05:25) (115/62 - 133/62)  RR: 16 (12-03-21 @ 05:25) (14 - 24)  SpO2: 100% (12-03-21 @ 05:25) (98% - 100%)  Wt(kg): --  I&O's Summary    02 Dec 2021 07:01  -  03 Dec 2021 05:55  --------------------------------------------------------  IN: 1120 mL / OUT: 150 mL / NET: 970 mL      I&O's Detail    02 Dec 2021 07:01  -  03 Dec 2021 05:55  --------------------------------------------------------  IN:    Lactated Ringers: 650 mL    Oral Fluid: 470 mL  Total IN: 1120 mL    OUT:    Voided (mL): 150 mL  Total OUT: 150 mL    Total NET: 970 mL      Physical Examination:  General: NAD, resting comfortably in bed  HEENT: Normocephalic atraumatic  Respiratory: Nonlabored respirations, normal CW expansion.  Cardio: regular rate and rhythm.  Abdomen: softly distended, appropriately tender, surgical incisions are c/d/i.   Vascular: extremities are warm and well perfused.    MEDICATIONS  (STANDING):  acetaminophen     Tablet .. 975 milliGRAM(s) Oral every 6 hours  atorvastatin 10 milliGRAM(s) Oral at bedtime  enoxaparin Injectable 40 milliGRAM(s) SubCutaneous once  escitalopram 10 milliGRAM(s) Oral at bedtime  famotidine    Tablet 20 milliGRAM(s) Oral two times a day  influenza   Vaccine 0.5 milliLiter(s) IntraMuscular once  ketorolac   Injectable 15 milliGRAM(s) IV Push every 6 hours  lactated ringers. 1000 milliLiter(s) (50 mL/Hr) IV Continuous <Continuous>  oxaliplatin INTRAPERITONEAL (eMAR) 144 milliGRAM(s) IntraPeritoneal once  tamsulosin 0.4 milliGRAM(s) Oral at bedtime    MEDICATIONS  (PRN):  HYDROmorphone  Injectable 0.5 milliGRAM(s) IV Push every 4 hours PRN Breakthrough pain  oxyCODONE    IR 5 milliGRAM(s) Oral every 4 hours PRN Severe Pain (7 - 10)      LABS:                        12.6   9.63  )-----------( 416      ( 01 Dec 2021 15:37 )             37.9

## 2021-12-03 NOTE — DISCHARGE NOTE NURSING/CASE MANAGEMENT/SOCIAL WORK - PATIENT PORTAL LINK FT
You can access the FollowMyHealth Patient Portal offered by [facility] by registering at the following website: [email or web address]. By joining [facility]’s FollowMyHealth portal, you will also be able to view your health information using other applications (apps) compatible with our system.

## 2021-12-04 NOTE — PHYSICAL EXAM
[Restricted in physically strenuous activity but ambulatory and able to carry out work of a light or sedentary nature] : Status 1- Restricted in physically strenuous activity but ambulatory and able to carry out work of a light or sedentary nature, e.g., light house work, office work [Normal] : affect appropriate [de-identified] : anicteric  [de-identified] : normal respiratory effort, no audible wheeze [de-identified] : incision site shows small granulating suture; right lateral linda-umbilical.

## 2021-12-04 NOTE — HISTORY OF PRESENT ILLNESS
[Disease: _____________________] : Disease: [unfilled] [de-identified] : Mr. Arias is a 60 year old gentlemen with past medical history of BPH presenting to the office for an initial consultation of appendiceal neoplasm.\par \par Patient was being seen by Dr. Mendoza at [facility] for low grade appendiceal tumor diagnosed in 2020.  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 During 4/2020 noted bloating and ascites was found. CT scan showed the malignancy.\par \par 5/7/2020 CRS and HIPEC for LAMN ( Dr. Herberth Pink at [place] ). Surgery included a splenectomy and distal gastrectomy, omentectomy, cholecystectomy, subtotal colectomy and diaphragm stripping. 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 R2b resection. 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 There was some dose reduction. Neuropathy started continually after chemotherapy was completed and is grade 1, with no impairment of ADLs. \par \par He moved from [place] back to [place] for more family support.\par In April 2021, he noted increase in abdominal fluid, and saw Oncology, Dr. Garcia.\par CT scan ordered and ascites drained.\par There was recurrence of disease on CT scan.\par He was referred to Dr. Mason, who has recommended pressurized intraperitoneal aerosolized chemotherapy trial. (PIPAC).\par \par 7/21/2021\par Underwent diagnostic laparoscopy on 5/19/21. PCI approx 25. Biopsies consistent with mucinous adenocarcinoma. \par 6/3: PIPAC/oxaliplatin treatment #1\par 7/12 - 7/14/21: Patient admitted for transient, self-limited SBO, that was not deemed to be treatment-related.\par 7/22/21: Plan for PIPAC #2 + 5FU/LV\par Normally, has has up to 4-6 BM daily due to short colon.\par Stools are loose.\par Currently no bloating.\par He is active, has no pain, and looking forward to next cycle tomorrow.\par CEA dropping as below.\par \par CEA:\par 6/5/2020: 3.8\par 8/12/2020: 6.1\par 4/7/2021: 10.1 \par 5/26/21: 29\par 6/17/21: 19.9\par 7/21/21: 15.2\par \par 9/2/21\par Patient here for C#3 PIPAC \par Patient has GERD and requests more omeprazole.\par Definitely needs to have it.\par CEA has been declining.\par \par 10/20/21\par Patient has been approved for compassionate use treatment with PIPAC.\par He feels well and does want to proceed.\par Today will be his EOT visit, as well as a pre-treatment visit for continued use of PIIPAC.\par Will have CBC today.\par 5FU and LV will be administered within 24 hour window of planned PIPAC. [de-identified] : CEA [de-identified] : patient with spouse today \par planned for PIPAC tomorrow\par to get 5FU/LV today \par no abdominal pain, fevers or chills

## 2021-12-06 ENCOUNTER — APPOINTMENT (OUTPATIENT)
Dept: INTERNAL MEDICINE | Facility: CLINIC | Age: 61
End: 2021-12-06
Payer: COMMERCIAL

## 2021-12-06 VITALS
BODY MASS INDEX: 19.61 KG/M2 | DIASTOLIC BLOOD PRESSURE: 70 MMHG | HEIGHT: 66 IN | WEIGHT: 122 LBS | SYSTOLIC BLOOD PRESSURE: 114 MMHG

## 2021-12-06 PROCEDURE — 99214 OFFICE O/P EST MOD 30 MIN: CPT

## 2021-12-06 NOTE — HISTORY OF PRESENT ILLNESS
[de-identified] : 60 y/o presents for follow up and prescription renewal.\par He has a history of appendiceal cancer and is presently being treated with intra-abdominal chemotherapy.\par He also has a history of hyperlipidemia, hypothyroidism and GERD symptoms that he states seemed to be worse after his chemo treatments.  He has used Prilosec in the past but is presently using Pepcid with some partial relief.  His diet is very limited due to his partial colectomy and this in combination with the reflux is making it difficult for him to maintain his weight.\par He has been on testosterone replacement for approximately 10 years but had only done intermittently when his cancer treatment started.  He recently has restarted using it and needs the AndroGel he has been taking renewed.

## 2021-12-06 NOTE — ASSESSMENT
[FreeTextEntry1] : Hyperlipidemia–a requisition was sent to obtain fasting labs and lipid profile.\par For now he will continue Zocor 10 mg daily.\par \par GERD symptoms–options were discussed.  He is going to try Protonix 40 mg daily and Pepcid 40 mg at bedtime.  He was told he can use this on a regular basis or only intermittently based on his symptoms.\par \par History of hypogonadism–he will obtain a follow-up testosterone level.  For now he will remain on AndroGel 1.62%, 4 pumps daily.  He has used this dose in the past and it has been effective with adequate levels previously.\par \par Appendiceal cancer–he remains on treatment with PIPAC and and had his most recent treatment last week.

## 2021-12-06 NOTE — PHYSICAL EXAM
[No Acute Distress] : no acute distress [No JVD] : no jugular venous distention [Clear to Auscultation] : lungs were clear to auscultation bilaterally [Normal Rate] : normal rate  [Regular Rhythm] : with a regular rhythm [Normal S1, S2] : normal S1 and S2

## 2021-12-06 NOTE — REVIEW OF SYSTEMS
[Fatigue] : fatigue [Heartburn] : heartburn [Fever] : no fever [Chills] : no chills [Chest Pain] : no chest pain [Shortness Of Breath] : no shortness of breath

## 2021-12-07 LAB
NON-GYNECOLOGICAL CYTOLOGY STUDY: SIGNIFICANT CHANGE UP

## 2021-12-08 ENCOUNTER — LABORATORY RESULT (OUTPATIENT)
Age: 61
End: 2021-12-08

## 2021-12-09 LAB — SURGICAL PATHOLOGY STUDY: SIGNIFICANT CHANGE UP

## 2021-12-14 LAB
ALBUMIN SERPL ELPH-MCNC: 3.8 G/DL
ALP BLD-CCNC: 101 U/L
ALT SERPL-CCNC: 26 U/L
ANION GAP SERPL CALC-SCNC: 11 MMOL/L
APTT 2H P 1:4 NP PPP: NORMAL
APTT 2H P INC PPP: NORMAL
APTT IMM NP/PRE NP PPP: NORMAL
APTT INV RATIO PPP: 30.4 SEC
AST SERPL-CCNC: 23 U/L
BASOPHILS # BLD AUTO: 0.08 K/UL
BASOPHILS NFR BLD AUTO: 0.9 %
BILIRUB SERPL-MCNC: 0.4 MG/DL
BUN SERPL-MCNC: 21 MG/DL
CALCIUM SERPL-MCNC: 8.9 MG/DL
CHLORIDE SERPL-SCNC: 105 MMOL/L
CO2 SERPL-SCNC: 25 MMOL/L
CREAT SERPL-MCNC: 0.97 MG/DL
EOSINOPHIL # BLD AUTO: 0.23 K/UL
EOSINOPHIL NFR BLD AUTO: 2.6 %
GLUCOSE SERPL-MCNC: 100 MG/DL
HCT VFR BLD CALC: 40.8 %
HGB BLD-MCNC: 12.7 G/DL
IMM GRANULOCYTES NFR BLD AUTO: 0.2 %
INR PPP: 0.99 RATIO
LYMPHOCYTES # BLD AUTO: 3.73 K/UL
LYMPHOCYTES NFR BLD AUTO: 42.6 %
MAN DIFF?: NORMAL
MCHC RBC-ENTMCNC: 30.5 PG
MCHC RBC-ENTMCNC: 31.1 GM/DL
MCV RBC AUTO: 98.1 FL
MONOCYTES # BLD AUTO: 1.15 K/UL
MONOCYTES NFR BLD AUTO: 13.1 %
NEUTROPHILS # BLD AUTO: 3.55 K/UL
NEUTROPHILS NFR BLD AUTO: 40.6 %
NPP NORMAL POOLED PLASMA: NORMAL SECS
PLATELET # BLD AUTO: 483 K/UL
POTASSIUM SERPL-SCNC: 4.7 MMOL/L
PROT SERPL-MCNC: 6.4 G/DL
PT BLD: 11.7 SEC
RBC # BLD: 4.16 M/UL
RBC # FLD: 15.1 %
SODIUM SERPL-SCNC: 141 MMOL/L
WBC # FLD AUTO: 8.76 K/UL

## 2021-12-16 ENCOUNTER — APPOINTMENT (OUTPATIENT)
Dept: SURGICAL ONCOLOGY | Facility: CLINIC | Age: 61
End: 2021-12-16
Payer: COMMERCIAL

## 2021-12-16 VITALS
SYSTOLIC BLOOD PRESSURE: 120 MMHG | HEART RATE: 86 BPM | DIASTOLIC BLOOD PRESSURE: 76 MMHG | BODY MASS INDEX: 19.61 KG/M2 | TEMPERATURE: 97.5 F | RESPIRATION RATE: 16 BRPM | WEIGHT: 122 LBS | OXYGEN SATURATION: 95 % | HEIGHT: 66 IN

## 2021-12-16 PROCEDURE — 99212 OFFICE O/P EST SF 10 MIN: CPT

## 2021-12-16 NOTE — REASON FOR VISIT
[de-identified] : diagnostic laparoscopy 5/19/21, PIPAC 6/3/21, 7/22/21, 9/2/21, 10/2021, 12/2/21 [Post-Op Visit] : a post-op visit for [Spouse] : spouse [FreeTextEntry2] : appendiceal carcinoma-  Here for enrollment visit for [facility] clinical trial

## 2021-12-16 NOTE — PHYSICAL EXAM
[Normal] : oriented to person, place and time, with appropriate affect [de-identified] : abdomen soft, non-distended, non-tender. no masses. laparotomy and drain incisions well healed. Port sites healing well.

## 2021-12-16 NOTE — ASSESSMENT
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 \par No extraperitoneal disease \par Performance status- ecog 1\par \par He is feeling well at this point. \par Disease was reasonably stable over the last year, now with progression requiring paracentesis-- 1.6L drained May 2021. \par \par Given his progression within 1 year of CRS/ HIPEC, as well as R2b/ CC 2 resection at first operation I do not think additional cytoreductive surgery is feasible. I have notified his surgeon from [place], Dr. Peters who agrees. \par \par I discussed case with patients medical oncologist who favors regional approach over additional systemic therapy.\par \par Doing well after 5th PIPAC. CEA down to 6.7 from 8. Pre-PIPAC was 29. \par \par PCI PIPAC 1: 29\par PCI PIPAC 2: 28\par PCI PIPAC 3: 24\par PCI PIPAC 4: \par PCI PIPAC 5: 22\par \par Plan: \par [] Blood work reviewed \par [] Plan for restaging scans in Jan 2022\par [] Plan for PIPAC #6 in 8-10 weeks, compassionate use\par \par \par \par \par

## 2021-12-16 NOTE — HISTORY OF PRESENT ILLNESS
[Post-Op Complications] : No post-op complications reported [de-identified] : Mr. Arias is a 60 year old gentleman referred to me by Dr. 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 Herberth Peters. \par \par PCI: 34\par CC: R2b\par \par Mr. Arias has since moved to [place] to be closer to family and has established care in [facility]. Lives in [place]. \par \par Cytoreduction included a splenectomy and distal gastrectomy, omentectomy, cholecystectomy, subtotal colectomy and diaphragm stripping. 4L of mucinous ascites was drained. 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 As his tumor was felt to be low grade, he did not receive any systemic therapy up front. He now reports feeling well. He reports 3-4 loose but not watery BM's/day. He reports improving weight and energy levels. He denies any PO intolerance. He plays golf and does nature walks close to [facility]. \par \par CEA \par 6/5/20: 3.8\par 8/12/20: 6.1\par 4/7: 10.1 \par \par Pathology: \par 5/7/21: Paracentesis, cytology negative for malignant cells. 1.6 Liters removed. 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 He underwent subtotal colectomy with positive margins. 2/14 nodes involved. [facility] review: low grade appendiceal mucinous neoplasm\par \par Scans were obtained at [facility] and demonstrated: \par 4/30/21: loculated intraperitoneal fluid. No bowel obstruction. \par \par Last colonoscopy May 2020. \par \par Underwent diagnostic laparoscopy by me on 5/19/21. PCI approx 25. Evacuated 1.4 liters ascites. Biopsies consistent with mucinous adenocarcinoma. \par 6/3: PIPAC #1 \par 6/17: Doing well. No pain. Mild reflux/ dyspepsia. Relieved with tums. Eating well. \par 7/15/21: Doing well. Admitted earlier this week 7/12-7/13 to [facility] with small bowel obstruction. Dramatically improved after 24 hours nasogastric decompression. Feels well since discharge. Multiple BM last 48 hours. Tolerating full liquid diet. No abdominal pain. \par 8/19/21: Doing well. No adverse events. No complaints. Wants to go swimming. Eating well. Weight is stable. Moving bowels at least daily. \par 12/16/21: Doing well. Completed compassionate use PIPAC X 2. No complaints. Wants to lift weights, go scuba diving, and skiing. Plan for travel to [place] next month\par

## 2021-12-17 ENCOUNTER — TRANSCRIPTION ENCOUNTER (OUTPATIENT)
Age: 61
End: 2021-12-17

## 2021-12-30 ENCOUNTER — APPOINTMENT (OUTPATIENT)
Dept: CT IMAGING | Facility: CLINIC | Age: 61
End: 2021-12-30
Payer: COMMERCIAL

## 2021-12-30 ENCOUNTER — OUTPATIENT (OUTPATIENT)
Dept: OUTPATIENT SERVICES | Facility: HOSPITAL | Age: 61
LOS: 1 days | End: 2021-12-30
Payer: COMMERCIAL

## 2021-12-30 DIAGNOSIS — Z00.8 ENCOUNTER FOR OTHER GENERAL EXAMINATION: ICD-10-CM

## 2021-12-30 DIAGNOSIS — C18.1 MALIGNANT NEOPLASM OF APPENDIX: Chronic | ICD-10-CM

## 2021-12-30 DIAGNOSIS — Z98.890 OTHER SPECIFIED POSTPROCEDURAL STATES: Chronic | ICD-10-CM

## 2021-12-30 DIAGNOSIS — Z87.438 PERSONAL HISTORY OF OTHER DISEASES OF MALE GENITAL ORGANS: Chronic | ICD-10-CM

## 2021-12-30 DIAGNOSIS — C18.1 MALIGNANT NEOPLASM OF APPENDIX: ICD-10-CM

## 2021-12-30 DIAGNOSIS — R19.00 INTRA-ABDOMINAL AND PELVIC SWELLING, MASS AND LUMP, UNSPECIFIED SITE: Chronic | ICD-10-CM

## 2021-12-30 DIAGNOSIS — Z90.49 ACQUIRED ABSENCE OF OTHER SPECIFIED PARTS OF DIGESTIVE TRACT: Chronic | ICD-10-CM

## 2021-12-30 PROCEDURE — 74177 CT ABD & PELVIS W/CONTRAST: CPT

## 2021-12-30 PROCEDURE — 71260 CT THORAX DX C+: CPT | Mod: 26

## 2021-12-30 PROCEDURE — 71260 CT THORAX DX C+: CPT

## 2021-12-30 PROCEDURE — 74177 CT ABD & PELVIS W/CONTRAST: CPT | Mod: 26

## 2022-01-01 ENCOUNTER — APPOINTMENT (OUTPATIENT)
Dept: UROLOGY | Facility: CLINIC | Age: 62
End: 2022-01-01

## 2022-01-01 ENCOUNTER — TRANSCRIPTION ENCOUNTER (OUTPATIENT)
Age: 62
End: 2022-01-01

## 2022-01-01 ENCOUNTER — INPATIENT (INPATIENT)
Facility: HOSPITAL | Age: 62
LOS: 0 days | Discharge: HOME | End: 2022-10-20
Attending: HOSPITALIST | Admitting: HOSPITALIST

## 2022-01-01 ENCOUNTER — NON-APPOINTMENT (OUTPATIENT)
Age: 62
End: 2022-01-01

## 2022-01-01 ENCOUNTER — OUTPATIENT (OUTPATIENT)
Dept: OUTPATIENT SERVICES | Facility: HOSPITAL | Age: 62
LOS: 1 days | Discharge: HOME | End: 2022-01-01

## 2022-01-01 ENCOUNTER — INPATIENT (INPATIENT)
Facility: HOSPITAL | Age: 62
LOS: 1 days | Discharge: HOME | End: 2022-12-03
Attending: SURGERY | Admitting: SURGERY

## 2022-01-01 ENCOUNTER — INPATIENT (INPATIENT)
Facility: HOSPITAL | Age: 62
LOS: 2 days | Discharge: HOME | End: 2022-12-30
Attending: SURGERY | Admitting: SURGERY
Payer: MEDICARE

## 2022-01-01 ENCOUNTER — APPOINTMENT (OUTPATIENT)
Dept: INTERNAL MEDICINE | Facility: CLINIC | Age: 62
End: 2022-01-01
Payer: MEDICARE

## 2022-01-01 ENCOUNTER — OUTPATIENT (OUTPATIENT)
Dept: OUTPATIENT SERVICES | Facility: HOSPITAL | Age: 62
LOS: 1 days | End: 2022-01-01
Payer: MEDICARE

## 2022-01-01 ENCOUNTER — APPOINTMENT (OUTPATIENT)
Dept: INFUSION THERAPY | Facility: CLINIC | Age: 62
End: 2022-01-01

## 2022-01-01 ENCOUNTER — APPOINTMENT (OUTPATIENT)
Dept: INFECTIOUS DISEASE | Facility: CLINIC | Age: 62
End: 2022-01-01

## 2022-01-01 ENCOUNTER — RX RENEWAL (OUTPATIENT)
Age: 62
End: 2022-01-01

## 2022-01-01 ENCOUNTER — APPOINTMENT (OUTPATIENT)
Dept: HEMATOLOGY ONCOLOGY | Facility: CLINIC | Age: 62
End: 2022-01-01

## 2022-01-01 ENCOUNTER — APPOINTMENT (OUTPATIENT)
Dept: SURGICAL ONCOLOGY | Facility: CLINIC | Age: 62
End: 2022-01-01

## 2022-01-01 ENCOUNTER — LABORATORY RESULT (OUTPATIENT)
Age: 62
End: 2022-01-01

## 2022-01-01 ENCOUNTER — APPOINTMENT (OUTPATIENT)
Dept: CT IMAGING | Facility: CLINIC | Age: 62
End: 2022-01-01

## 2022-01-01 ENCOUNTER — INPATIENT (INPATIENT)
Facility: HOSPITAL | Age: 62
LOS: 3 days | Discharge: HOME | End: 2022-12-15
Attending: SURGERY | Admitting: SURGERY
Payer: MEDICARE

## 2022-01-01 ENCOUNTER — OUTPATIENT (OUTPATIENT)
Dept: OUTPATIENT SERVICES | Facility: HOSPITAL | Age: 62
LOS: 1 days | End: 2022-01-01
Payer: COMMERCIAL

## 2022-01-01 VITALS
DIASTOLIC BLOOD PRESSURE: 72 MMHG | SYSTOLIC BLOOD PRESSURE: 129 MMHG | TEMPERATURE: 98 F | OXYGEN SATURATION: 100 % | HEART RATE: 45 BPM | RESPIRATION RATE: 18 BRPM

## 2022-01-01 VITALS
HEART RATE: 57 BPM | OXYGEN SATURATION: 100 % | RESPIRATION RATE: 18 BRPM | BODY MASS INDEX: 19.53 KG/M2 | SYSTOLIC BLOOD PRESSURE: 136 MMHG | DIASTOLIC BLOOD PRESSURE: 83 MMHG | WEIGHT: 121.5 LBS | TEMPERATURE: 98.4 F | HEIGHT: 66 IN

## 2022-01-01 VITALS
HEIGHT: 66 IN | OXYGEN SATURATION: 100 % | RESPIRATION RATE: 16 BRPM | DIASTOLIC BLOOD PRESSURE: 83 MMHG | BODY MASS INDEX: 19.25 KG/M2 | WEIGHT: 119.8 LBS | SYSTOLIC BLOOD PRESSURE: 136 MMHG | HEART RATE: 53 BPM | TEMPERATURE: 97.5 F

## 2022-01-01 VITALS
DIASTOLIC BLOOD PRESSURE: 105 MMHG | SYSTOLIC BLOOD PRESSURE: 162 MMHG | RESPIRATION RATE: 18 BRPM | HEART RATE: 65 BPM | HEIGHT: 67 IN | OXYGEN SATURATION: 100 % | TEMPERATURE: 98 F

## 2022-01-01 VITALS
HEIGHT: 66 IN | WEIGHT: 124.19 LBS | OXYGEN SATURATION: 99 % | SYSTOLIC BLOOD PRESSURE: 132 MMHG | DIASTOLIC BLOOD PRESSURE: 77 MMHG | HEART RATE: 70 BPM | TEMPERATURE: 99.1 F | BODY MASS INDEX: 19.96 KG/M2 | RESPIRATION RATE: 18 BRPM

## 2022-01-01 VITALS
SYSTOLIC BLOOD PRESSURE: 108 MMHG | DIASTOLIC BLOOD PRESSURE: 82 MMHG | WEIGHT: 124 LBS | BODY MASS INDEX: 19.93 KG/M2 | HEIGHT: 66 IN

## 2022-01-01 VITALS
HEART RATE: 67 BPM | RESPIRATION RATE: 18 BRPM | DIASTOLIC BLOOD PRESSURE: 79 MMHG | TEMPERATURE: 98.3 F | OXYGEN SATURATION: 99 % | WEIGHT: 127 LBS | BODY MASS INDEX: 20.41 KG/M2 | HEIGHT: 66 IN | SYSTOLIC BLOOD PRESSURE: 121 MMHG

## 2022-01-01 VITALS
TEMPERATURE: 98 F | HEIGHT: 67 IN | RESPIRATION RATE: 16 BRPM | HEART RATE: 61 BPM | DIASTOLIC BLOOD PRESSURE: 84 MMHG | SYSTOLIC BLOOD PRESSURE: 147 MMHG

## 2022-01-01 VITALS
TEMPERATURE: 98 F | HEART RATE: 67 BPM | OXYGEN SATURATION: 100 % | SYSTOLIC BLOOD PRESSURE: 152 MMHG | RESPIRATION RATE: 18 BRPM | DIASTOLIC BLOOD PRESSURE: 64 MMHG

## 2022-01-01 VITALS
OXYGEN SATURATION: 98 % | TEMPERATURE: 99 F | HEART RATE: 59 BPM | DIASTOLIC BLOOD PRESSURE: 74 MMHG | RESPIRATION RATE: 16 BRPM | SYSTOLIC BLOOD PRESSURE: 116 MMHG

## 2022-01-01 VITALS
HEART RATE: 70 BPM | WEIGHT: 124 LBS | TEMPERATURE: 99.1 F | DIASTOLIC BLOOD PRESSURE: 86 MMHG | RESPIRATION RATE: 17 BRPM | HEIGHT: 66 IN | SYSTOLIC BLOOD PRESSURE: 135 MMHG | BODY MASS INDEX: 19.93 KG/M2

## 2022-01-01 VITALS
SYSTOLIC BLOOD PRESSURE: 141 MMHG | DIASTOLIC BLOOD PRESSURE: 88 MMHG | TEMPERATURE: 96.9 F | HEIGHT: 66 IN | HEART RATE: 67 BPM | WEIGHT: 124 LBS | BODY MASS INDEX: 19.93 KG/M2 | RESPIRATION RATE: 17 BRPM

## 2022-01-01 VITALS
HEART RATE: 76 BPM | SYSTOLIC BLOOD PRESSURE: 154 MMHG | DIASTOLIC BLOOD PRESSURE: 86 MMHG | HEIGHT: 67 IN | TEMPERATURE: 98 F | RESPIRATION RATE: 18 BRPM | OXYGEN SATURATION: 100 %

## 2022-01-01 VITALS
RESPIRATION RATE: 16 BRPM | HEART RATE: 60 BPM | OXYGEN SATURATION: 100 % | DIASTOLIC BLOOD PRESSURE: 84 MMHG | SYSTOLIC BLOOD PRESSURE: 146 MMHG | TEMPERATURE: 98 F

## 2022-01-01 VITALS
TEMPERATURE: 98.2 F | HEART RATE: 67 BPM | SYSTOLIC BLOOD PRESSURE: 124 MMHG | DIASTOLIC BLOOD PRESSURE: 88 MMHG | RESPIRATION RATE: 16 BRPM

## 2022-01-01 VITALS
DIASTOLIC BLOOD PRESSURE: 95 MMHG | HEIGHT: 67 IN | TEMPERATURE: 98 F | HEART RATE: 64 BPM | SYSTOLIC BLOOD PRESSURE: 172 MMHG | RESPIRATION RATE: 24 BRPM | OXYGEN SATURATION: 100 %

## 2022-01-01 DIAGNOSIS — C18.1 MALIGNANT NEOPLASM OF APPENDIX: Chronic | ICD-10-CM

## 2022-01-01 DIAGNOSIS — N21.0 CALCULUS IN BLADDER: ICD-10-CM

## 2022-01-01 DIAGNOSIS — R19.00 INTRA-ABDOMINAL AND PELVIC SWELLING, MASS AND LUMP, UNSPECIFIED SITE: Chronic | ICD-10-CM

## 2022-01-01 DIAGNOSIS — N17.9 ACUTE KIDNEY FAILURE, UNSPECIFIED: ICD-10-CM

## 2022-01-01 DIAGNOSIS — Z98.890 OTHER SPECIFIED POSTPROCEDURAL STATES: Chronic | ICD-10-CM

## 2022-01-01 DIAGNOSIS — Z87.438 PERSONAL HISTORY OF OTHER DISEASES OF MALE GENITAL ORGANS: Chronic | ICD-10-CM

## 2022-01-01 DIAGNOSIS — K56.609 UNSPECIFIED INTESTINAL OBSTRUCTION, UNSPECIFIED AS TO PARTIAL VERSUS COMPLETE OBSTRUCTION: ICD-10-CM

## 2022-01-01 DIAGNOSIS — D72.829 ELEVATED WHITE BLOOD CELL COUNT, UNSPECIFIED: ICD-10-CM

## 2022-01-01 DIAGNOSIS — C78.6 SECONDARY MALIGNANT NEOPLASM OF RETROPERITONEUM AND PERITONEUM: ICD-10-CM

## 2022-01-01 DIAGNOSIS — K21.9 GASTRO-ESOPHAGEAL REFLUX DISEASE W/OUT ESOPHAGITIS: ICD-10-CM

## 2022-01-01 DIAGNOSIS — N13.30 UNSPECIFIED HYDRONEPHROSIS: ICD-10-CM

## 2022-01-01 DIAGNOSIS — R35.0 FREQUENCY OF MICTURITION: ICD-10-CM

## 2022-01-01 DIAGNOSIS — C26.9 MALIGNANT NEOPLASM OF ILL-DEFINED SITES WITHIN THE DIGESTIVE SYSTEM: ICD-10-CM

## 2022-01-01 DIAGNOSIS — Z90.49 ACQUIRED ABSENCE OF OTHER SPECIFIED PARTS OF DIGESTIVE TRACT: Chronic | ICD-10-CM

## 2022-01-01 DIAGNOSIS — R10.9 UNSPECIFIED ABDOMINAL PAIN: ICD-10-CM

## 2022-01-01 DIAGNOSIS — C18.1 MALIGNANT NEOPLASM OF APPENDIX: ICD-10-CM

## 2022-01-01 DIAGNOSIS — E78.5 HYPERLIPIDEMIA, UNSPECIFIED: ICD-10-CM

## 2022-01-01 DIAGNOSIS — F32.A DEPRESSION, UNSPECIFIED: ICD-10-CM

## 2022-01-01 DIAGNOSIS — Z29.9 ENCOUNTER FOR PROPHYLACTIC MEASURES, UNSPECIFIED: ICD-10-CM

## 2022-01-01 DIAGNOSIS — N13.8 BENIGN PROSTATIC HYPERPLASIA WITH LOWER URINARY TRACT SYMPMS: ICD-10-CM

## 2022-01-01 DIAGNOSIS — N40.1 BENIGN PROSTATIC HYPERPLASIA WITH LOWER URINARY TRACT SYMPTOMS: ICD-10-CM

## 2022-01-01 DIAGNOSIS — N40.1 BENIGN PROSTATIC HYPERPLASIA WITH LOWER URINARY TRACT SYMPMS: ICD-10-CM

## 2022-01-01 DIAGNOSIS — N20.0 CALCULUS OF KIDNEY: ICD-10-CM

## 2022-01-01 DIAGNOSIS — F41.9 ANXIETY DISORDER, UNSPECIFIED: ICD-10-CM

## 2022-01-01 DIAGNOSIS — N12 TUBULO-INTERSTITIAL NEPHRITIS, NOT SPECIFIED AS ACUTE OR CHRONIC: ICD-10-CM

## 2022-01-01 DIAGNOSIS — Z51.5 ENCOUNTER FOR PALLIATIVE CARE: ICD-10-CM

## 2022-01-01 DIAGNOSIS — Z00.8 ENCOUNTER FOR OTHER GENERAL EXAMINATION: ICD-10-CM

## 2022-01-01 LAB
ACANTHOCYTES BLD QL SMEAR: SLIGHT — SIGNIFICANT CHANGE UP
ALBUMIN SERPL ELPH-MCNC: 3.6 G/DL
ALBUMIN SERPL ELPH-MCNC: 3.7 G/DL
ALBUMIN SERPL ELPH-MCNC: 3.8 G/DL
ALBUMIN SERPL ELPH-MCNC: 3.9 G/DL
ALBUMIN SERPL ELPH-MCNC: 4 G/DL — SIGNIFICANT CHANGE UP (ref 3.3–5)
ALBUMIN SERPL ELPH-MCNC: 4.1 G/DL
ALBUMIN SERPL ELPH-MCNC: 4.1 G/DL — SIGNIFICANT CHANGE UP (ref 3.3–5)
ALBUMIN SERPL ELPH-MCNC: 4.2 G/DL — SIGNIFICANT CHANGE UP (ref 3.3–5)
ALBUMIN SERPL ELPH-MCNC: 4.3 G/DL — SIGNIFICANT CHANGE UP (ref 3.3–5)
ALBUMIN SERPL ELPH-MCNC: 4.5 G/DL
ALP BLD-CCNC: 103 U/L
ALP BLD-CCNC: 105 U/L
ALP BLD-CCNC: 170 U/L
ALP BLD-CCNC: 232 U/L
ALP BLD-CCNC: 90 U/L
ALP BLD-CCNC: 90 U/L
ALP SERPL-CCNC: 103 U/L — SIGNIFICANT CHANGE UP (ref 40–120)
ALP SERPL-CCNC: 84 U/L — SIGNIFICANT CHANGE UP (ref 40–120)
ALP SERPL-CCNC: 92 U/L — SIGNIFICANT CHANGE UP (ref 40–120)
ALP SERPL-CCNC: 97 U/L — SIGNIFICANT CHANGE UP (ref 40–120)
ALT FLD-CCNC: 14 U/L — SIGNIFICANT CHANGE UP (ref 4–41)
ALT FLD-CCNC: 19 U/L — SIGNIFICANT CHANGE UP (ref 4–41)
ALT FLD-CCNC: 20 U/L — SIGNIFICANT CHANGE UP (ref 4–41)
ALT FLD-CCNC: 23 U/L — SIGNIFICANT CHANGE UP (ref 4–41)
ALT SERPL-CCNC: 19 U/L
ALT SERPL-CCNC: 22 U/L
ALT SERPL-CCNC: 23 U/L
ALT SERPL-CCNC: 26 U/L
ALT SERPL-CCNC: 26 U/L
ALT SERPL-CCNC: 30 U/L
ANION GAP SERPL CALC-SCNC: 10 MMOL/L
ANION GAP SERPL CALC-SCNC: 10 MMOL/L — SIGNIFICANT CHANGE UP (ref 7–14)
ANION GAP SERPL CALC-SCNC: 11 MMOL/L
ANION GAP SERPL CALC-SCNC: 11 MMOL/L — SIGNIFICANT CHANGE UP (ref 7–14)
ANION GAP SERPL CALC-SCNC: 12 MMOL/L
ANION GAP SERPL CALC-SCNC: 12 MMOL/L — SIGNIFICANT CHANGE UP (ref 7–14)
ANION GAP SERPL CALC-SCNC: 12 MMOL/L — SIGNIFICANT CHANGE UP (ref 7–14)
ANION GAP SERPL CALC-SCNC: 13 MMOL/L — SIGNIFICANT CHANGE UP (ref 7–14)
ANION GAP SERPL CALC-SCNC: 14 MMOL/L
ANION GAP SERPL CALC-SCNC: 14 MMOL/L — SIGNIFICANT CHANGE UP (ref 7–14)
ANION GAP SERPL CALC-SCNC: 14 MMOL/L — SIGNIFICANT CHANGE UP (ref 7–14)
ANION GAP SERPL CALC-SCNC: 15 MMOL/L
ANION GAP SERPL CALC-SCNC: 15 MMOL/L
ANION GAP SERPL CALC-SCNC: 16 MMOL/L — HIGH (ref 7–14)
ANION GAP SERPL CALC-SCNC: 17 MMOL/L
ANION GAP SERPL CALC-SCNC: 6 MMOL/L — LOW (ref 7–14)
ANION GAP SERPL CALC-SCNC: 7 MMOL/L — SIGNIFICANT CHANGE UP (ref 7–14)
ANION GAP SERPL CALC-SCNC: 8 MMOL/L — SIGNIFICANT CHANGE UP (ref 7–14)
ANION GAP SERPL CALC-SCNC: 8 MMOL/L — SIGNIFICANT CHANGE UP (ref 7–14)
ANION GAP SERPL CALC-SCNC: 9 MMOL/L — SIGNIFICANT CHANGE UP (ref 7–14)
ANISOCYTOSIS BLD QL: SLIGHT — SIGNIFICANT CHANGE UP
ANISOCYTOSIS BLD QL: SLIGHT — SIGNIFICANT CHANGE UP
APPEARANCE UR: CLEAR — SIGNIFICANT CHANGE UP
APTT BLD: 28 SEC — SIGNIFICANT CHANGE UP (ref 27–36.3)
APTT BLD: 29.3 SEC — SIGNIFICANT CHANGE UP (ref 27–36.3)
APTT BLD: 34.8 SEC
AST SERPL-CCNC: 20 U/L
AST SERPL-CCNC: 20 U/L — SIGNIFICANT CHANGE UP (ref 4–40)
AST SERPL-CCNC: 20 U/L — SIGNIFICANT CHANGE UP (ref 4–40)
AST SERPL-CCNC: 22 U/L
AST SERPL-CCNC: 23 U/L — SIGNIFICANT CHANGE UP (ref 4–40)
AST SERPL-CCNC: 24 U/L — SIGNIFICANT CHANGE UP (ref 4–40)
AST SERPL-CCNC: 30 U/L
AST SERPL-CCNC: 32 U/L
AST SERPL-CCNC: 33 U/L
AST SERPL-CCNC: 34 U/L
BACTERIA # UR AUTO: NEGATIVE — SIGNIFICANT CHANGE UP
BACTERIA # UR AUTO: NEGATIVE — SIGNIFICANT CHANGE UP
BACTERIA UR CULT: NORMAL
BASE EXCESS BLDV CALC-SCNC: -1.9 MMOL/L — SIGNIFICANT CHANGE UP (ref -2–3)
BASE EXCESS BLDV CALC-SCNC: 0.6 MMOL/L — SIGNIFICANT CHANGE UP (ref -2–3)
BASE EXCESS BLDV CALC-SCNC: 2.2 MMOL/L — SIGNIFICANT CHANGE UP (ref -2–3)
BASE EXCESS BLDV CALC-SCNC: 3.1 MMOL/L — HIGH (ref -2–3)
BASOPHILS # BLD AUTO: 0 K/UL — SIGNIFICANT CHANGE UP (ref 0–0.2)
BASOPHILS # BLD AUTO: 0 K/UL — SIGNIFICANT CHANGE UP (ref 0–0.2)
BASOPHILS # BLD AUTO: 0.02 K/UL — SIGNIFICANT CHANGE UP (ref 0–0.2)
BASOPHILS # BLD AUTO: 0.02 K/UL — SIGNIFICANT CHANGE UP (ref 0–0.2)
BASOPHILS # BLD AUTO: 0.06 K/UL — SIGNIFICANT CHANGE UP (ref 0–0.2)
BASOPHILS # BLD AUTO: 0.07 K/UL
BASOPHILS # BLD AUTO: 0.08 K/UL
BASOPHILS # BLD AUTO: 0.08 K/UL — SIGNIFICANT CHANGE UP (ref 0–0.2)
BASOPHILS # BLD AUTO: 0.08 K/UL — SIGNIFICANT CHANGE UP (ref 0–0.2)
BASOPHILS # BLD AUTO: 0.09 K/UL
BASOPHILS # BLD AUTO: 0.1 K/UL
BASOPHILS # BLD AUTO: 0.1 K/UL
BASOPHILS # BLD AUTO: 0.1 K/UL — SIGNIFICANT CHANGE UP (ref 0–0.2)
BASOPHILS # BLD AUTO: 0.11 K/UL
BASOPHILS NFR BLD AUTO: 0 % — SIGNIFICANT CHANGE UP (ref 0–2)
BASOPHILS NFR BLD AUTO: 0 % — SIGNIFICANT CHANGE UP (ref 0–2)
BASOPHILS NFR BLD AUTO: 0.3 % — SIGNIFICANT CHANGE UP (ref 0–2)
BASOPHILS NFR BLD AUTO: 0.3 % — SIGNIFICANT CHANGE UP (ref 0–2)
BASOPHILS NFR BLD AUTO: 0.5 %
BASOPHILS NFR BLD AUTO: 0.5 % — SIGNIFICANT CHANGE UP (ref 0–2)
BASOPHILS NFR BLD AUTO: 0.8 %
BASOPHILS NFR BLD AUTO: 0.8 %
BASOPHILS NFR BLD AUTO: 0.9 %
BASOPHILS NFR BLD AUTO: 0.9 %
BASOPHILS NFR BLD AUTO: 0.9 % — SIGNIFICANT CHANGE UP (ref 0–2)
BASOPHILS NFR BLD AUTO: 1.1 %
BILIRUB SERPL-MCNC: 0.3 MG/DL
BILIRUB SERPL-MCNC: 0.4 MG/DL
BILIRUB SERPL-MCNC: 0.4 MG/DL
BILIRUB SERPL-MCNC: 0.4 MG/DL — SIGNIFICANT CHANGE UP (ref 0.2–1.2)
BILIRUB SERPL-MCNC: 0.5 MG/DL
BILIRUB SERPL-MCNC: 0.6 MG/DL — SIGNIFICANT CHANGE UP (ref 0.2–1.2)
BILIRUB SERPL-MCNC: 0.6 MG/DL — SIGNIFICANT CHANGE UP (ref 0.2–1.2)
BILIRUB SERPL-MCNC: 0.7 MG/DL — SIGNIFICANT CHANGE UP (ref 0.2–1.2)
BILIRUB SERPL-MCNC: <0.2 MG/DL
BILIRUB SERPL-MCNC: <0.2 MG/DL
BILIRUB UR-MCNC: NEGATIVE — SIGNIFICANT CHANGE UP
BLD GP AB SCN SERPL QL: NEGATIVE — SIGNIFICANT CHANGE UP
BLOOD GAS VENOUS COMPREHENSIVE RESULT: SIGNIFICANT CHANGE UP
BUN SERPL-MCNC: 16 MG/DL — SIGNIFICANT CHANGE UP (ref 7–23)
BUN SERPL-MCNC: 17 MG/DL
BUN SERPL-MCNC: 17 MG/DL — SIGNIFICANT CHANGE UP (ref 7–23)
BUN SERPL-MCNC: 18 MG/DL
BUN SERPL-MCNC: 18 MG/DL — SIGNIFICANT CHANGE UP (ref 7–23)
BUN SERPL-MCNC: 18 MG/DL — SIGNIFICANT CHANGE UP (ref 7–23)
BUN SERPL-MCNC: 19 MG/DL
BUN SERPL-MCNC: 19 MG/DL — SIGNIFICANT CHANGE UP (ref 7–23)
BUN SERPL-MCNC: 20 MG/DL — SIGNIFICANT CHANGE UP (ref 7–23)
BUN SERPL-MCNC: 21 MG/DL
BUN SERPL-MCNC: 21 MG/DL
BUN SERPL-MCNC: 22 MG/DL — SIGNIFICANT CHANGE UP (ref 7–23)
BUN SERPL-MCNC: 23 MG/DL
BUN SERPL-MCNC: 26 MG/DL — HIGH (ref 7–23)
BUN SERPL-MCNC: 28 MG/DL
BURR CELLS BLD QL SMEAR: PRESENT — SIGNIFICANT CHANGE UP
CALCIUM SERPL-MCNC: 10 MG/DL — SIGNIFICANT CHANGE UP (ref 8.4–10.5)
CALCIUM SERPL-MCNC: 8.6 MG/DL — SIGNIFICANT CHANGE UP (ref 8.4–10.5)
CALCIUM SERPL-MCNC: 8.7 MG/DL — SIGNIFICANT CHANGE UP (ref 8.4–10.5)
CALCIUM SERPL-MCNC: 8.7 MG/DL — SIGNIFICANT CHANGE UP (ref 8.4–10.5)
CALCIUM SERPL-MCNC: 8.8 MG/DL — SIGNIFICANT CHANGE UP (ref 8.4–10.5)
CALCIUM SERPL-MCNC: 8.9 MG/DL — SIGNIFICANT CHANGE UP (ref 8.4–10.5)
CALCIUM SERPL-MCNC: 8.9 MG/DL — SIGNIFICANT CHANGE UP (ref 8.4–10.5)
CALCIUM SERPL-MCNC: 9 MG/DL — SIGNIFICANT CHANGE UP (ref 8.4–10.5)
CALCIUM SERPL-MCNC: 9.2 MG/DL
CALCIUM SERPL-MCNC: 9.2 MG/DL — SIGNIFICANT CHANGE UP (ref 8.4–10.5)
CALCIUM SERPL-MCNC: 9.3 MG/DL
CALCIUM SERPL-MCNC: 9.3 MG/DL — SIGNIFICANT CHANGE UP (ref 8.4–10.5)
CALCIUM SERPL-MCNC: 9.4 MG/DL
CALCIUM SERPL-MCNC: 9.4 MG/DL
CALCIUM SERPL-MCNC: 9.6 MG/DL
CALCIUM SERPL-MCNC: 9.7 MG/DL
CALCIUM SERPL-MCNC: 9.7 MG/DL — SIGNIFICANT CHANGE UP (ref 8.4–10.5)
CALCIUM SERPL-MCNC: 9.9 MG/DL
CEA SERPL-MCNC: 4.3 NG/ML
CEA SERPL-MCNC: 6.4 NG/ML
CEA SERPL-MCNC: 6.6 NG/ML
CEA SERPL-MCNC: 6.7 NG/ML
CEA SERPL-MCNC: 7.2 NG/ML
CEA SERPL-MCNC: 7.7 NG/ML
CEA SERPL-MCNC: 7.9 NG/ML — HIGH (ref 1–3.8)
CEA SERPL-MCNC: 8.7 NG/ML
CEA SERPL-MCNC: 9.7 NG/ML
CHLORIDE BLDV-SCNC: 103 MMOL/L — SIGNIFICANT CHANGE UP (ref 96–108)
CHLORIDE BLDV-SCNC: 103 MMOL/L — SIGNIFICANT CHANGE UP (ref 96–108)
CHLORIDE BLDV-SCNC: 104 MMOL/L — SIGNIFICANT CHANGE UP (ref 96–108)
CHLORIDE BLDV-SCNC: 96 MMOL/L — SIGNIFICANT CHANGE UP (ref 96–108)
CHLORIDE SERPL-SCNC: 100 MMOL/L — SIGNIFICANT CHANGE UP (ref 98–107)
CHLORIDE SERPL-SCNC: 100 MMOL/L — SIGNIFICANT CHANGE UP (ref 98–107)
CHLORIDE SERPL-SCNC: 101 MMOL/L — SIGNIFICANT CHANGE UP (ref 98–107)
CHLORIDE SERPL-SCNC: 102 MMOL/L
CHLORIDE SERPL-SCNC: 102 MMOL/L — SIGNIFICANT CHANGE UP (ref 98–107)
CHLORIDE SERPL-SCNC: 103 MMOL/L — SIGNIFICANT CHANGE UP (ref 98–107)
CHLORIDE SERPL-SCNC: 104 MMOL/L
CHLORIDE SERPL-SCNC: 104 MMOL/L
CHLORIDE SERPL-SCNC: 104 MMOL/L — SIGNIFICANT CHANGE UP (ref 98–107)
CHLORIDE SERPL-SCNC: 105 MMOL/L — SIGNIFICANT CHANGE UP (ref 98–107)
CHLORIDE SERPL-SCNC: 106 MMOL/L
CHLORIDE SERPL-SCNC: 106 MMOL/L
CHOLEST SERPL-MCNC: 211 MG/DL
CO2 BLDV-SCNC: 27.4 MMOL/L — HIGH (ref 22–26)
CO2 BLDV-SCNC: 28.5 MMOL/L — HIGH (ref 22–26)
CO2 BLDV-SCNC: 29.8 MMOL/L — HIGH (ref 22–26)
CO2 BLDV-SCNC: 30.5 MMOL/L — HIGH (ref 22–26)
CO2 SERPL-SCNC: 17 MMOL/L — LOW (ref 22–31)
CO2 SERPL-SCNC: 21 MMOL/L
CO2 SERPL-SCNC: 21 MMOL/L
CO2 SERPL-SCNC: 22 MMOL/L
CO2 SERPL-SCNC: 22 MMOL/L — SIGNIFICANT CHANGE UP (ref 22–31)
CO2 SERPL-SCNC: 23 MMOL/L — SIGNIFICANT CHANGE UP (ref 22–31)
CO2 SERPL-SCNC: 24 MMOL/L
CO2 SERPL-SCNC: 24 MMOL/L
CO2 SERPL-SCNC: 24 MMOL/L — SIGNIFICANT CHANGE UP (ref 22–31)
CO2 SERPL-SCNC: 25 MMOL/L
CO2 SERPL-SCNC: 25 MMOL/L
CO2 SERPL-SCNC: 25 MMOL/L — SIGNIFICANT CHANGE UP (ref 22–31)
CO2 SERPL-SCNC: 25 MMOL/L — SIGNIFICANT CHANGE UP (ref 22–31)
CO2 SERPL-SCNC: 26 MMOL/L — SIGNIFICANT CHANGE UP (ref 22–31)
CO2 SERPL-SCNC: 27 MMOL/L — SIGNIFICANT CHANGE UP (ref 22–31)
CO2 SERPL-SCNC: 29 MMOL/L — SIGNIFICANT CHANGE UP (ref 22–31)
CO2 SERPL-SCNC: 29 MMOL/L — SIGNIFICANT CHANGE UP (ref 22–31)
CO2 SERPL-SCNC: 30 MMOL/L — SIGNIFICANT CHANGE UP (ref 22–31)
COLOR SPEC: SIGNIFICANT CHANGE UP
CREAT SERPL-MCNC: 0.94 MG/DL
CREAT SERPL-MCNC: 0.97 MG/DL
CREAT SERPL-MCNC: 1.02 MG/DL
CREAT SERPL-MCNC: 1.07 MG/DL
CREAT SERPL-MCNC: 1.14 MG/DL — SIGNIFICANT CHANGE UP (ref 0.5–1.3)
CREAT SERPL-MCNC: 1.16 MG/DL — SIGNIFICANT CHANGE UP (ref 0.5–1.3)
CREAT SERPL-MCNC: 1.17 MG/DL — SIGNIFICANT CHANGE UP (ref 0.5–1.3)
CREAT SERPL-MCNC: 1.17 MG/DL — SIGNIFICANT CHANGE UP (ref 0.5–1.3)
CREAT SERPL-MCNC: 1.2 MG/DL — SIGNIFICANT CHANGE UP (ref 0.5–1.3)
CREAT SERPL-MCNC: 1.22 MG/DL — SIGNIFICANT CHANGE UP (ref 0.5–1.3)
CREAT SERPL-MCNC: 1.23 MG/DL
CREAT SERPL-MCNC: 1.23 MG/DL — SIGNIFICANT CHANGE UP (ref 0.5–1.3)
CREAT SERPL-MCNC: 1.25 MG/DL
CREAT SERPL-MCNC: 1.25 MG/DL — SIGNIFICANT CHANGE UP (ref 0.5–1.3)
CREAT SERPL-MCNC: 1.26 MG/DL — SIGNIFICANT CHANGE UP (ref 0.5–1.3)
CREAT SERPL-MCNC: 1.27 MG/DL
CREAT SERPL-MCNC: 1.31 MG/DL — HIGH (ref 0.5–1.3)
CREAT SERPL-MCNC: 1.32 MG/DL — HIGH (ref 0.5–1.3)
CREAT SERPL-MCNC: 1.46 MG/DL — HIGH (ref 0.5–1.3)
CREAT SERPL-MCNC: 1.55 MG/DL — HIGH (ref 0.5–1.3)
CREAT SERPL-MCNC: 1.7 MG/DL — HIGH (ref 0.5–1.3)
CREAT SERPL-MCNC: 2.19 MG/DL — HIGH (ref 0.5–1.3)
CULTURE RESULTS: SIGNIFICANT CHANGE UP
DACRYOCYTES BLD QL SMEAR: SLIGHT — SIGNIFICANT CHANGE UP
DIFF PNL FLD: ABNORMAL
DIFF PNL FLD: NEGATIVE — SIGNIFICANT CHANGE UP
DIFF PNL FLD: NEGATIVE — SIGNIFICANT CHANGE UP
EGFR: 33 ML/MIN/1.73M2 — LOW
EGFR: 45 ML/MIN/1.73M2 — LOW
EGFR: 50 ML/MIN/1.73M2 — LOW
EGFR: 54 ML/MIN/1.73M2 — LOW
EGFR: 61 ML/MIN/1.73M2 — SIGNIFICANT CHANGE UP
EGFR: 61 ML/MIN/1.73M2 — SIGNIFICANT CHANGE UP
EGFR: 63 ML/MIN/1.73M2
EGFR: 64 ML/MIN/1.73M2 — SIGNIFICANT CHANGE UP
EGFR: 65 ML/MIN/1.73M2
EGFR: 65 ML/MIN/1.73M2 — SIGNIFICANT CHANGE UP
EGFR: 66 ML/MIN/1.73M2
EGFR: 66 ML/MIN/1.73M2 — SIGNIFICANT CHANGE UP
EGFR: 67 ML/MIN/1.73M2 — SIGNIFICANT CHANGE UP
EGFR: 68 ML/MIN/1.73M2 — SIGNIFICANT CHANGE UP
EGFR: 70 ML/MIN/1.73M2 — SIGNIFICANT CHANGE UP
EGFR: 70 ML/MIN/1.73M2 — SIGNIFICANT CHANGE UP
EGFR: 71 ML/MIN/1.73M2 — SIGNIFICANT CHANGE UP
EGFR: 72 ML/MIN/1.73M2 — SIGNIFICANT CHANGE UP
EGFR: 78 ML/MIN/1.73M2
EGFR: 83 ML/MIN/1.73M2
EGFR: 88 ML/MIN/1.73M2
EGFR: 92 ML/MIN/1.73M2
ELLIPTOCYTES BLD QL SMEAR: SLIGHT — SIGNIFICANT CHANGE UP
EOSINOPHIL # BLD AUTO: 0 K/UL — SIGNIFICANT CHANGE UP (ref 0–0.5)
EOSINOPHIL # BLD AUTO: 0.03 K/UL — SIGNIFICANT CHANGE UP (ref 0–0.5)
EOSINOPHIL # BLD AUTO: 0.04 K/UL — SIGNIFICANT CHANGE UP (ref 0–0.5)
EOSINOPHIL # BLD AUTO: 0.07 K/UL
EOSINOPHIL # BLD AUTO: 0.08 K/UL — SIGNIFICANT CHANGE UP (ref 0–0.5)
EOSINOPHIL # BLD AUTO: 0.18 K/UL
EOSINOPHIL # BLD AUTO: 0.22 K/UL
EOSINOPHIL # BLD AUTO: 0.24 K/UL — SIGNIFICANT CHANGE UP (ref 0–0.5)
EOSINOPHIL # BLD AUTO: 0.28 K/UL
EOSINOPHIL # BLD AUTO: 0.3 K/UL
EOSINOPHIL # BLD AUTO: 0.55 K/UL — HIGH (ref 0–0.5)
EOSINOPHIL # BLD AUTO: 0.56 K/UL
EOSINOPHIL NFR BLD AUTO: 0 % — SIGNIFICANT CHANGE UP (ref 0–6)
EOSINOPHIL NFR BLD AUTO: 0.3 % — SIGNIFICANT CHANGE UP (ref 0–6)
EOSINOPHIL NFR BLD AUTO: 0.4 %
EOSINOPHIL NFR BLD AUTO: 0.4 % — SIGNIFICANT CHANGE UP (ref 0–6)
EOSINOPHIL NFR BLD AUTO: 1.1 % — SIGNIFICANT CHANGE UP (ref 0–6)
EOSINOPHIL NFR BLD AUTO: 2 %
EOSINOPHIL NFR BLD AUTO: 2.2 %
EOSINOPHIL NFR BLD AUTO: 2.2 %
EOSINOPHIL NFR BLD AUTO: 2.7 %
EOSINOPHIL NFR BLD AUTO: 2.8 % — SIGNIFICANT CHANGE UP (ref 0–6)
EOSINOPHIL NFR BLD AUTO: 3.8 % — SIGNIFICANT CHANGE UP (ref 0–6)
EOSINOPHIL NFR BLD AUTO: 6.1 %
EPI CELLS # UR: 2 /HPF — SIGNIFICANT CHANGE UP (ref 0–5)
EPI CELLS # UR: 4 /HPF — SIGNIFICANT CHANGE UP (ref 0–5)
ESTRADIOL SERPL-MCNC: 26 PG/ML
FLUAV AG NPH QL: SIGNIFICANT CHANGE UP
FLUBV AG NPH QL: SIGNIFICANT CHANGE UP
GAS PNL BLDV: 127 MMOL/L — LOW (ref 136–145)
GAS PNL BLDV: 135 MMOL/L — LOW (ref 136–145)
GAS PNL BLDV: 137 MMOL/L — SIGNIFICANT CHANGE UP (ref 136–145)
GAS PNL BLDV: 138 MMOL/L — SIGNIFICANT CHANGE UP (ref 136–145)
GAS PNL BLDV: SIGNIFICANT CHANGE UP
GIANT PLATELETS BLD QL SMEAR: PRESENT — SIGNIFICANT CHANGE UP
GLUCOSE BLDV-MCNC: 101 MG/DL — HIGH (ref 70–99)
GLUCOSE BLDV-MCNC: 110 MG/DL — HIGH (ref 70–99)
GLUCOSE BLDV-MCNC: 110 MG/DL — HIGH (ref 70–99)
GLUCOSE BLDV-MCNC: 120 MG/DL — HIGH (ref 70–99)
GLUCOSE SERPL-MCNC: 102 MG/DL
GLUCOSE SERPL-MCNC: 107 MG/DL — HIGH (ref 70–99)
GLUCOSE SERPL-MCNC: 108 MG/DL — HIGH (ref 70–99)
GLUCOSE SERPL-MCNC: 109 MG/DL — HIGH (ref 70–99)
GLUCOSE SERPL-MCNC: 110 MG/DL — HIGH (ref 70–99)
GLUCOSE SERPL-MCNC: 111 MG/DL
GLUCOSE SERPL-MCNC: 113 MG/DL — HIGH (ref 70–99)
GLUCOSE SERPL-MCNC: 114 MG/DL — HIGH (ref 70–99)
GLUCOSE SERPL-MCNC: 117 MG/DL — HIGH (ref 70–99)
GLUCOSE SERPL-MCNC: 125 MG/DL — HIGH (ref 70–99)
GLUCOSE SERPL-MCNC: 127 MG/DL — HIGH (ref 70–99)
GLUCOSE SERPL-MCNC: 132 MG/DL — HIGH (ref 70–99)
GLUCOSE SERPL-MCNC: 133 MG/DL — HIGH (ref 70–99)
GLUCOSE SERPL-MCNC: 133 MG/DL — HIGH (ref 70–99)
GLUCOSE SERPL-MCNC: 142 MG/DL — HIGH (ref 70–99)
GLUCOSE SERPL-MCNC: 150 MG/DL — HIGH (ref 70–99)
GLUCOSE SERPL-MCNC: 168 MG/DL — HIGH (ref 70–99)
GLUCOSE SERPL-MCNC: 48 MG/DL
GLUCOSE SERPL-MCNC: 87 MG/DL — SIGNIFICANT CHANGE UP (ref 70–99)
GLUCOSE SERPL-MCNC: 88 MG/DL
GLUCOSE SERPL-MCNC: 88 MG/DL
GLUCOSE SERPL-MCNC: 90 MG/DL
GLUCOSE SERPL-MCNC: 99 MG/DL
GLUCOSE SERPL-MCNC: 99 MG/DL — SIGNIFICANT CHANGE UP (ref 70–99)
GLUCOSE UR QL: NEGATIVE — SIGNIFICANT CHANGE UP
HCO3 BLDV-SCNC: 26 MMOL/L — SIGNIFICANT CHANGE UP (ref 22–29)
HCO3 BLDV-SCNC: 27 MMOL/L — SIGNIFICANT CHANGE UP (ref 22–29)
HCO3 BLDV-SCNC: 28 MMOL/L — SIGNIFICANT CHANGE UP (ref 22–29)
HCO3 BLDV-SCNC: 29 MMOL/L — SIGNIFICANT CHANGE UP (ref 22–29)
HCT VFR BLD CALC: 33 % — LOW (ref 39–50)
HCT VFR BLD CALC: 33.4 % — LOW (ref 39–50)
HCT VFR BLD CALC: 33.8 % — LOW (ref 39–50)
HCT VFR BLD CALC: 34 % — LOW (ref 39–50)
HCT VFR BLD CALC: 35 %
HCT VFR BLD CALC: 35.2 %
HCT VFR BLD CALC: 35.5 % — LOW (ref 39–50)
HCT VFR BLD CALC: 35.7 % — LOW (ref 39–50)
HCT VFR BLD CALC: 35.9 % — LOW (ref 39–50)
HCT VFR BLD CALC: 37.2 % — LOW (ref 39–50)
HCT VFR BLD CALC: 38.1 % — LOW (ref 39–50)
HCT VFR BLD CALC: 38.5 % — LOW (ref 39–50)
HCT VFR BLD CALC: 39 %
HCT VFR BLD CALC: 39.4 % — SIGNIFICANT CHANGE UP (ref 39–50)
HCT VFR BLD CALC: 39.5 % — SIGNIFICANT CHANGE UP (ref 39–50)
HCT VFR BLD CALC: 40.9 % — SIGNIFICANT CHANGE UP (ref 39–50)
HCT VFR BLD CALC: 41.5 %
HCT VFR BLD CALC: 42.2 %
HCT VFR BLD CALC: 43.4 %
HCT VFR BLDA CALC: 35 % — LOW (ref 39–51)
HCT VFR BLDA CALC: 36 % — LOW (ref 39–51)
HCT VFR BLDA CALC: 37 % — LOW (ref 39–51)
HCT VFR BLDA CALC: 38 % — LOW (ref 39–51)
HDLC SERPL-MCNC: 88 MG/DL
HGB BLD CALC-MCNC: 11.6 G/DL — LOW (ref 13–17)
HGB BLD CALC-MCNC: 11.9 G/DL — LOW (ref 13–17)
HGB BLD CALC-MCNC: 12.2 G/DL — LOW (ref 13–17)
HGB BLD CALC-MCNC: 12.8 G/DL — LOW (ref 13–17)
HGB BLD-MCNC: 10.3 G/DL
HGB BLD-MCNC: 10.6 G/DL — LOW (ref 13–17)
HGB BLD-MCNC: 10.6 G/DL — LOW (ref 13–17)
HGB BLD-MCNC: 10.7 G/DL — LOW (ref 13–17)
HGB BLD-MCNC: 10.8 G/DL
HGB BLD-MCNC: 11.1 G/DL — LOW (ref 13–17)
HGB BLD-MCNC: 11.2 G/DL — LOW (ref 13–17)
HGB BLD-MCNC: 11.3 G/DL — LOW (ref 13–17)
HGB BLD-MCNC: 11.6 G/DL — LOW (ref 13–17)
HGB BLD-MCNC: 11.7 G/DL
HGB BLD-MCNC: 11.7 G/DL — LOW (ref 13–17)
HGB BLD-MCNC: 11.9 G/DL — LOW (ref 13–17)
HGB BLD-MCNC: 12.1 G/DL — LOW (ref 13–17)
HGB BLD-MCNC: 12.2 G/DL — LOW (ref 13–17)
HGB BLD-MCNC: 12.6 G/DL
HGB BLD-MCNC: 12.7 G/DL — LOW (ref 13–17)
HGB BLD-MCNC: 12.9 G/DL — LOW (ref 13–17)
HGB BLD-MCNC: 13 G/DL
HGB BLD-MCNC: 13.4 G/DL
HYALINE CASTS # UR AUTO: 1 /LPF — SIGNIFICANT CHANGE UP (ref 0–7)
HYALINE CASTS # UR AUTO: 3 /LPF — SIGNIFICANT CHANGE UP (ref 0–7)
IANC: 12.11 K/UL — HIGH (ref 1.8–7.4)
IANC: 16.46 K/UL — HIGH (ref 1.8–7.4)
IANC: 22.51 K/UL — HIGH (ref 1.8–7.4)
IANC: 4.55 K/UL — SIGNIFICANT CHANGE UP (ref 1.8–7.4)
IANC: 5.08 K/UL — SIGNIFICANT CHANGE UP (ref 1.8–7.4)
IANC: 5.26 K/UL — SIGNIFICANT CHANGE UP (ref 1.8–7.4)
IANC: 8.45 K/UL — HIGH (ref 1.8–7.4)
IANC: 9.8 K/UL — HIGH (ref 1.8–7.4)
IMM GRANULOCYTES NFR BLD AUTO: 0.1 %
IMM GRANULOCYTES NFR BLD AUTO: 0.2 %
IMM GRANULOCYTES NFR BLD AUTO: 0.3 % — SIGNIFICANT CHANGE UP (ref 0–0.9)
IMM GRANULOCYTES NFR BLD AUTO: 0.5 %
IMM GRANULOCYTES NFR BLD AUTO: 0.5 % — SIGNIFICANT CHANGE UP (ref 0–0.9)
IMM GRANULOCYTES NFR BLD AUTO: 0.7 % — SIGNIFICANT CHANGE UP (ref 0–0.9)
INR BLD: 0.99 RATIO — SIGNIFICANT CHANGE UP (ref 0.88–1.16)
INR BLD: 1.03 RATIO — SIGNIFICANT CHANGE UP (ref 0.88–1.16)
INR PPP: 1.11 RATIO
KETONES UR-MCNC: ABNORMAL
KETONES UR-MCNC: ABNORMAL
KETONES UR-MCNC: NEGATIVE — SIGNIFICANT CHANGE UP
LACTATE BLDV-MCNC: 1.1 MMOL/L — SIGNIFICANT CHANGE UP (ref 0.5–2)
LACTATE BLDV-MCNC: 1.4 MMOL/L — SIGNIFICANT CHANGE UP (ref 0.5–2)
LACTATE BLDV-MCNC: 1.6 MMOL/L — SIGNIFICANT CHANGE UP (ref 0.5–2)
LACTATE BLDV-MCNC: 1.6 MMOL/L — SIGNIFICANT CHANGE UP (ref 0.5–2)
LACTATE BLDV-MCNC: 2.7 MMOL/L — HIGH (ref 0.5–2)
LDLC SERPL CALC-MCNC: 104 MG/DL
LEUKOCYTE ESTERASE UR-ACNC: ABNORMAL
LEUKOCYTE ESTERASE UR-ACNC: NEGATIVE — SIGNIFICANT CHANGE UP
LEUKOCYTE ESTERASE UR-ACNC: NEGATIVE — SIGNIFICANT CHANGE UP
LIDOCAIN IGE QN: 17 U/L — SIGNIFICANT CHANGE UP (ref 7–60)
LIDOCAIN IGE QN: 23 U/L — SIGNIFICANT CHANGE UP (ref 7–60)
LIDOCAIN IGE QN: 26 U/L — SIGNIFICANT CHANGE UP (ref 7–60)
LIDOCAIN IGE QN: 27 U/L — SIGNIFICANT CHANGE UP (ref 7–60)
LYMPHOCYTES # BLD AUTO: 1.16 K/UL — SIGNIFICANT CHANGE UP (ref 1–3.3)
LYMPHOCYTES # BLD AUTO: 1.26 K/UL — SIGNIFICANT CHANGE UP (ref 1–3.3)
LYMPHOCYTES # BLD AUTO: 1.31 K/UL — SIGNIFICANT CHANGE UP (ref 1–3.3)
LYMPHOCYTES # BLD AUTO: 1.33 K/UL — SIGNIFICANT CHANGE UP (ref 1–3.3)
LYMPHOCYTES # BLD AUTO: 1.68 K/UL — SIGNIFICANT CHANGE UP (ref 1–3.3)
LYMPHOCYTES # BLD AUTO: 1.74 K/UL — SIGNIFICANT CHANGE UP (ref 1–3.3)
LYMPHOCYTES # BLD AUTO: 15 % — SIGNIFICANT CHANGE UP (ref 13–44)
LYMPHOCYTES # BLD AUTO: 18.9 % — SIGNIFICANT CHANGE UP (ref 13–44)
LYMPHOCYTES # BLD AUTO: 19.2 % — SIGNIFICANT CHANGE UP (ref 13–44)
LYMPHOCYTES # BLD AUTO: 19.6 % — SIGNIFICANT CHANGE UP (ref 13–44)
LYMPHOCYTES # BLD AUTO: 2.36 K/UL — SIGNIFICANT CHANGE UP (ref 1–3.3)
LYMPHOCYTES # BLD AUTO: 2.86 K/UL — SIGNIFICANT CHANGE UP (ref 1–3.3)
LYMPHOCYTES # BLD AUTO: 27.4 % — SIGNIFICANT CHANGE UP (ref 13–44)
LYMPHOCYTES # BLD AUTO: 3.02 K/UL
LYMPHOCYTES # BLD AUTO: 3.08 K/UL
LYMPHOCYTES # BLD AUTO: 3.77 K/UL
LYMPHOCYTES # BLD AUTO: 3.84 K/UL
LYMPHOCYTES # BLD AUTO: 4.16 K/UL
LYMPHOCYTES # BLD AUTO: 4.4 % — LOW (ref 13–44)
LYMPHOCYTES # BLD AUTO: 4.54 K/UL
LYMPHOCYTES # BLD AUTO: 6.7 % — LOW (ref 13–44)
LYMPHOCYTES # BLD AUTO: 9.7 % — LOW (ref 13–44)
LYMPHOCYTES NFR BLD AUTO: 17 %
LYMPHOCYTES NFR BLD AUTO: 24.3 %
LYMPHOCYTES NFR BLD AUTO: 41.3 %
LYMPHOCYTES NFR BLD AUTO: 41.9 %
LYMPHOCYTES NFR BLD AUTO: 42.1 %
LYMPHOCYTES NFR BLD AUTO: 42.6 %
MACROCYTES BLD QL: SLIGHT — SIGNIFICANT CHANGE UP
MAGNESIUM SERPL-MCNC: 1.7 MG/DL — SIGNIFICANT CHANGE UP (ref 1.6–2.6)
MAGNESIUM SERPL-MCNC: 1.8 MG/DL — SIGNIFICANT CHANGE UP (ref 1.6–2.6)
MAGNESIUM SERPL-MCNC: 1.8 MG/DL — SIGNIFICANT CHANGE UP (ref 1.6–2.6)
MAGNESIUM SERPL-MCNC: 1.9 MG/DL — SIGNIFICANT CHANGE UP (ref 1.6–2.6)
MAGNESIUM SERPL-MCNC: 1.9 MG/DL — SIGNIFICANT CHANGE UP (ref 1.6–2.6)
MAGNESIUM SERPL-MCNC: 2 MG/DL — SIGNIFICANT CHANGE UP (ref 1.6–2.6)
MAN DIFF?: NORMAL
MANUAL SMEAR VERIFICATION: SIGNIFICANT CHANGE UP
MANUAL SMEAR VERIFICATION: SIGNIFICANT CHANGE UP
MCHC RBC-ENTMCNC: 27.1 PG — SIGNIFICANT CHANGE UP (ref 27–34)
MCHC RBC-ENTMCNC: 27.2 PG
MCHC RBC-ENTMCNC: 27.3 PG — SIGNIFICANT CHANGE UP (ref 27–34)
MCHC RBC-ENTMCNC: 27.4 PG
MCHC RBC-ENTMCNC: 27.6 PG
MCHC RBC-ENTMCNC: 27.6 PG — SIGNIFICANT CHANGE UP (ref 27–34)
MCHC RBC-ENTMCNC: 27.6 PG — SIGNIFICANT CHANGE UP (ref 27–34)
MCHC RBC-ENTMCNC: 27.7 PG
MCHC RBC-ENTMCNC: 27.7 PG
MCHC RBC-ENTMCNC: 27.8 PG — SIGNIFICANT CHANGE UP (ref 27–34)
MCHC RBC-ENTMCNC: 27.9 PG — SIGNIFICANT CHANGE UP (ref 27–34)
MCHC RBC-ENTMCNC: 28 PG — SIGNIFICANT CHANGE UP (ref 27–34)
MCHC RBC-ENTMCNC: 28.3 PG — SIGNIFICANT CHANGE UP (ref 27–34)
MCHC RBC-ENTMCNC: 29.1 PG
MCHC RBC-ENTMCNC: 29.3 GM/DL
MCHC RBC-ENTMCNC: 30 GM/DL
MCHC RBC-ENTMCNC: 30.4 GM/DL
MCHC RBC-ENTMCNC: 30.8 GM/DL
MCHC RBC-ENTMCNC: 30.9 GM/DL
MCHC RBC-ENTMCNC: 30.9 GM/DL
MCHC RBC-ENTMCNC: 31 GM/DL — LOW (ref 32–36)
MCHC RBC-ENTMCNC: 31.2 GM/DL — LOW (ref 32–36)
MCHC RBC-ENTMCNC: 31.4 GM/DL — LOW (ref 32–36)
MCHC RBC-ENTMCNC: 31.4 GM/DL — LOW (ref 32–36)
MCHC RBC-ENTMCNC: 31.5 GM/DL — LOW (ref 32–36)
MCHC RBC-ENTMCNC: 31.5 GM/DL — LOW (ref 32–36)
MCHC RBC-ENTMCNC: 31.7 GM/DL — LOW (ref 32–36)
MCHC RBC-ENTMCNC: 31.7 GM/DL — LOW (ref 32–36)
MCHC RBC-ENTMCNC: 31.8 GM/DL — LOW (ref 32–36)
MCHC RBC-ENTMCNC: 32.1 GM/DL — SIGNIFICANT CHANGE UP (ref 32–36)
MCHC RBC-ENTMCNC: 32.2 GM/DL — SIGNIFICANT CHANGE UP (ref 32–36)
MCHC RBC-ENTMCNC: 32.3 GM/DL — SIGNIFICANT CHANGE UP (ref 32–36)
MCHC RBC-ENTMCNC: 32.6 GM/DL — SIGNIFICANT CHANGE UP (ref 32–36)
MCV RBC AUTO: 85.9 FL — SIGNIFICANT CHANGE UP (ref 80–100)
MCV RBC AUTO: 87.1 FL — SIGNIFICANT CHANGE UP (ref 80–100)
MCV RBC AUTO: 87.1 FL — SIGNIFICANT CHANGE UP (ref 80–100)
MCV RBC AUTO: 87.2 FL — SIGNIFICANT CHANGE UP (ref 80–100)
MCV RBC AUTO: 87.3 FL — SIGNIFICANT CHANGE UP (ref 80–100)
MCV RBC AUTO: 87.4 FL — SIGNIFICANT CHANGE UP (ref 80–100)
MCV RBC AUTO: 87.6 FL — SIGNIFICANT CHANGE UP (ref 80–100)
MCV RBC AUTO: 87.6 FL — SIGNIFICANT CHANGE UP (ref 80–100)
MCV RBC AUTO: 87.9 FL — SIGNIFICANT CHANGE UP (ref 80–100)
MCV RBC AUTO: 88.3 FL — SIGNIFICANT CHANGE UP (ref 80–100)
MCV RBC AUTO: 88.4 FL — SIGNIFICANT CHANGE UP (ref 80–100)
MCV RBC AUTO: 88.6 FL — SIGNIFICANT CHANGE UP (ref 80–100)
MCV RBC AUTO: 89.4 FL — SIGNIFICANT CHANGE UP (ref 80–100)
MCV RBC AUTO: 89.7 FL
MCV RBC AUTO: 90 FL
MCV RBC AUTO: 91 FL
MCV RBC AUTO: 91.3 FL
MCV RBC AUTO: 93.1 FL
MCV RBC AUTO: 94.3 FL
MEV IGG FLD QL IA: 211 AU/ML
MEV IGG+IGM SER-IMP: POSITIVE
MONOCYTES # BLD AUTO: 0.34 K/UL — SIGNIFICANT CHANGE UP (ref 0–0.9)
MONOCYTES # BLD AUTO: 0.35 K/UL — SIGNIFICANT CHANGE UP (ref 0–0.9)
MONOCYTES # BLD AUTO: 0.79 K/UL — SIGNIFICANT CHANGE UP (ref 0–0.9)
MONOCYTES # BLD AUTO: 0.88 K/UL
MONOCYTES # BLD AUTO: 0.96 K/UL
MONOCYTES # BLD AUTO: 1.23 K/UL
MONOCYTES # BLD AUTO: 1.23 K/UL — HIGH (ref 0–0.9)
MONOCYTES # BLD AUTO: 1.23 K/UL — HIGH (ref 0–0.9)
MONOCYTES # BLD AUTO: 1.26 K/UL
MONOCYTES # BLD AUTO: 1.29 K/UL — HIGH (ref 0–0.9)
MONOCYTES # BLD AUTO: 1.33 K/UL — HIGH (ref 0–0.9)
MONOCYTES # BLD AUTO: 1.38 K/UL
MONOCYTES # BLD AUTO: 1.49 K/UL
MONOCYTES # BLD AUTO: 1.84 K/UL — HIGH (ref 0–0.9)
MONOCYTES NFR BLD AUTO: 10 %
MONOCYTES NFR BLD AUTO: 11.1 % — SIGNIFICANT CHANGE UP (ref 2–14)
MONOCYTES NFR BLD AUTO: 13.6 %
MONOCYTES NFR BLD AUTO: 13.8 %
MONOCYTES NFR BLD AUTO: 15.4 % — HIGH (ref 2–14)
MONOCYTES NFR BLD AUTO: 4.2 % — SIGNIFICANT CHANGE UP (ref 2–14)
MONOCYTES NFR BLD AUTO: 4.8 % — SIGNIFICANT CHANGE UP (ref 2–14)
MONOCYTES NFR BLD AUTO: 5.1 % — SIGNIFICANT CHANGE UP (ref 2–14)
MONOCYTES NFR BLD AUTO: 7 % — SIGNIFICANT CHANGE UP (ref 2–14)
MONOCYTES NFR BLD AUTO: 7.1 % — SIGNIFICANT CHANGE UP (ref 2–14)
MONOCYTES NFR BLD AUTO: 7.8 %
MONOCYTES NFR BLD AUTO: 8.4 % — SIGNIFICANT CHANGE UP (ref 2–14)
MONOCYTES NFR BLD AUTO: 9.7 %
MONOCYTES NFR BLD AUTO: 9.7 %
MUV AB SER-ACNC: POSITIVE
MUV IGG SER QL IA: >300 AU/ML
NEUTROPHILS # BLD AUTO: 13.04 K/UL — HIGH (ref 1.8–7.4)
NEUTROPHILS # BLD AUTO: 13.12 K/UL
NEUTROPHILS # BLD AUTO: 16.46 K/UL — HIGH (ref 1.8–7.4)
NEUTROPHILS # BLD AUTO: 23.09 K/UL — HIGH (ref 1.8–7.4)
NEUTROPHILS # BLD AUTO: 3.36 K/UL
NEUTROPHILS # BLD AUTO: 3.93 K/UL
NEUTROPHILS # BLD AUTO: 4.45 K/UL
NEUTROPHILS # BLD AUTO: 4.54 K/UL
NEUTROPHILS # BLD AUTO: 4.55 K/UL — SIGNIFICANT CHANGE UP (ref 1.8–7.4)
NEUTROPHILS # BLD AUTO: 5.08 K/UL — SIGNIFICANT CHANGE UP (ref 1.8–7.4)
NEUTROPHILS # BLD AUTO: 5.26 K/UL — SIGNIFICANT CHANGE UP (ref 1.8–7.4)
NEUTROPHILS # BLD AUTO: 7.98 K/UL
NEUTROPHILS # BLD AUTO: 8.45 K/UL — HIGH (ref 1.8–7.4)
NEUTROPHILS # BLD AUTO: 9.8 K/UL — HIGH (ref 1.8–7.4)
NEUTROPHILS NFR BLD AUTO: 36.9 %
NEUTROPHILS NFR BLD AUTO: 41.3 %
NEUTROPHILS NFR BLD AUTO: 44.5 %
NEUTROPHILS NFR BLD AUTO: 44.9 %
NEUTROPHILS NFR BLD AUTO: 52.8 % — SIGNIFICANT CHANGE UP (ref 43–77)
NEUTROPHILS NFR BLD AUTO: 62.8 %
NEUTROPHILS NFR BLD AUTO: 67.4 % — SIGNIFICANT CHANGE UP (ref 43–77)
NEUTROPHILS NFR BLD AUTO: 72.8 % — SIGNIFICANT CHANGE UP (ref 43–77)
NEUTROPHILS NFR BLD AUTO: 73.8 %
NEUTROPHILS NFR BLD AUTO: 74.6 % — SIGNIFICANT CHANGE UP (ref 43–77)
NEUTROPHILS NFR BLD AUTO: 74.7 % — SIGNIFICANT CHANGE UP (ref 43–77)
NEUTROPHILS NFR BLD AUTO: 75.2 % — SIGNIFICANT CHANGE UP (ref 43–77)
NEUTROPHILS NFR BLD AUTO: 87.7 % — HIGH (ref 43–77)
NEUTROPHILS NFR BLD AUTO: 88.1 % — HIGH (ref 43–77)
NIDUS STONE QN: SIGNIFICANT CHANGE UP
NITRITE UR-MCNC: NEGATIVE — SIGNIFICANT CHANGE UP
NONHDLC SERPL-MCNC: 124 MG/DL
NRBC # BLD: 0 /100 WBCS — SIGNIFICANT CHANGE UP (ref 0–0)
NRBC # BLD: 1 /100 — HIGH (ref 0–0)
NRBC # FLD: 0 K/UL — SIGNIFICANT CHANGE UP (ref 0–0)
NRBC # FLD: 0.02 K/UL — HIGH (ref 0–0)
OVALOCYTES BLD QL SMEAR: SLIGHT — SIGNIFICANT CHANGE UP
OVALOCYTES BLD QL SMEAR: SLIGHT — SIGNIFICANT CHANGE UP
PCO2 BLDV: 49 MMHG — SIGNIFICANT CHANGE UP (ref 42–55)
PCO2 BLDV: 49 MMHG — SIGNIFICANT CHANGE UP (ref 42–55)
PCO2 BLDV: 50 MMHG — SIGNIFICANT CHANGE UP (ref 42–55)
PCO2 BLDV: 56 MMHG — HIGH (ref 42–55)
PH BLDV: 7.27 — LOW (ref 7.32–7.43)
PH BLDV: 7.34 — SIGNIFICANT CHANGE UP (ref 7.32–7.43)
PH BLDV: 7.37 — SIGNIFICANT CHANGE UP (ref 7.32–7.43)
PH BLDV: 7.38 — SIGNIFICANT CHANGE UP (ref 7.32–7.43)
PH UR: 6 — SIGNIFICANT CHANGE UP (ref 5–8)
PHOSPHATE SERPL-MCNC: 2.8 MG/DL — SIGNIFICANT CHANGE UP (ref 2.5–4.5)
PHOSPHATE SERPL-MCNC: 2.9 MG/DL — SIGNIFICANT CHANGE UP (ref 2.5–4.5)
PHOSPHATE SERPL-MCNC: 3 MG/DL — SIGNIFICANT CHANGE UP (ref 2.5–4.5)
PHOSPHATE SERPL-MCNC: 3.3 MG/DL — SIGNIFICANT CHANGE UP (ref 2.5–4.5)
PHOSPHATE SERPL-MCNC: 3.5 MG/DL — SIGNIFICANT CHANGE UP (ref 2.5–4.5)
PHOSPHATE SERPL-MCNC: 3.7 MG/DL — SIGNIFICANT CHANGE UP (ref 2.5–4.5)
PHOSPHATE SERPL-MCNC: 4 MG/DL — SIGNIFICANT CHANGE UP (ref 2.5–4.5)
PHOSPHATE SERPL-MCNC: 4 MG/DL — SIGNIFICANT CHANGE UP (ref 2.5–4.5)
PHOSPHATE SERPL-MCNC: 4.4 MG/DL — SIGNIFICANT CHANGE UP (ref 2.5–4.5)
PHOSPHATE SERPL-MCNC: 4.7 MG/DL — HIGH (ref 2.5–4.5)
PHOSPHATE SERPL-MCNC: 4.9 MG/DL — HIGH (ref 2.5–4.5)
PLAT MORPH BLD: ABNORMAL
PLAT MORPH BLD: NORMAL — SIGNIFICANT CHANGE UP
PLATELET # BLD AUTO: 358 K/UL
PLATELET # BLD AUTO: 400 K/UL — SIGNIFICANT CHANGE UP (ref 150–400)
PLATELET # BLD AUTO: 402 K/UL
PLATELET # BLD AUTO: 424 K/UL — HIGH (ref 150–400)
PLATELET # BLD AUTO: 428 K/UL — HIGH (ref 150–400)
PLATELET # BLD AUTO: 438 K/UL — HIGH (ref 150–400)
PLATELET # BLD AUTO: 441 K/UL
PLATELET # BLD AUTO: 446 K/UL — HIGH (ref 150–400)
PLATELET # BLD AUTO: 459 K/UL — HIGH (ref 150–400)
PLATELET # BLD AUTO: 466 K/UL — HIGH (ref 150–400)
PLATELET # BLD AUTO: 492 K/UL — HIGH (ref 150–400)
PLATELET # BLD AUTO: 493 K/UL
PLATELET # BLD AUTO: 501 K/UL — HIGH (ref 150–400)
PLATELET # BLD AUTO: 506 K/UL — HIGH (ref 150–400)
PLATELET # BLD AUTO: 515 K/UL — HIGH (ref 150–400)
PLATELET # BLD AUTO: 541 K/UL — HIGH (ref 150–400)
PLATELET # BLD AUTO: 554 K/UL — HIGH (ref 150–400)
PLATELET # BLD AUTO: 813 K/UL
PLATELET # BLD AUTO: 989 K/UL
PLATELET COUNT - ESTIMATE: ABNORMAL
PLATELET COUNT - ESTIMATE: NORMAL — SIGNIFICANT CHANGE UP
PO2 BLDV: 24 MMHG — SIGNIFICANT CHANGE UP
PO2 BLDV: 27 MMHG — SIGNIFICANT CHANGE UP
PO2 BLDV: 32 MMHG — SIGNIFICANT CHANGE UP
PO2 BLDV: 32 MMHG — SIGNIFICANT CHANGE UP
POIKILOCYTOSIS BLD QL AUTO: SIGNIFICANT CHANGE UP
POIKILOCYTOSIS BLD QL AUTO: SLIGHT — SIGNIFICANT CHANGE UP
POLYCHROMASIA BLD QL SMEAR: SLIGHT — SIGNIFICANT CHANGE UP
POLYCHROMASIA BLD QL SMEAR: SLIGHT — SIGNIFICANT CHANGE UP
POTASSIUM BLDV-SCNC: 3.9 MMOL/L — SIGNIFICANT CHANGE UP (ref 3.5–5.1)
POTASSIUM BLDV-SCNC: 4.3 MMOL/L — SIGNIFICANT CHANGE UP (ref 3.5–5.1)
POTASSIUM BLDV-SCNC: 4.3 MMOL/L — SIGNIFICANT CHANGE UP (ref 3.5–5.1)
POTASSIUM BLDV-SCNC: 4.7 MMOL/L — SIGNIFICANT CHANGE UP (ref 3.5–5.1)
POTASSIUM SERPL-MCNC: 3.8 MMOL/L — SIGNIFICANT CHANGE UP (ref 3.5–5.3)
POTASSIUM SERPL-MCNC: 3.9 MMOL/L — SIGNIFICANT CHANGE UP (ref 3.5–5.3)
POTASSIUM SERPL-MCNC: 4 MMOL/L — SIGNIFICANT CHANGE UP (ref 3.5–5.3)
POTASSIUM SERPL-MCNC: 4 MMOL/L — SIGNIFICANT CHANGE UP (ref 3.5–5.3)
POTASSIUM SERPL-MCNC: 4.1 MMOL/L — SIGNIFICANT CHANGE UP (ref 3.5–5.3)
POTASSIUM SERPL-MCNC: 4.2 MMOL/L — SIGNIFICANT CHANGE UP (ref 3.5–5.3)
POTASSIUM SERPL-MCNC: 4.3 MMOL/L — SIGNIFICANT CHANGE UP (ref 3.5–5.3)
POTASSIUM SERPL-MCNC: 4.4 MMOL/L — SIGNIFICANT CHANGE UP (ref 3.5–5.3)
POTASSIUM SERPL-MCNC: 4.4 MMOL/L — SIGNIFICANT CHANGE UP (ref 3.5–5.3)
POTASSIUM SERPL-MCNC: 4.5 MMOL/L — SIGNIFICANT CHANGE UP (ref 3.5–5.3)
POTASSIUM SERPL-MCNC: 4.6 MMOL/L — SIGNIFICANT CHANGE UP (ref 3.5–5.3)
POTASSIUM SERPL-MCNC: 4.8 MMOL/L — SIGNIFICANT CHANGE UP (ref 3.5–5.3)
POTASSIUM SERPL-MCNC: 4.9 MMOL/L — SIGNIFICANT CHANGE UP (ref 3.5–5.3)
POTASSIUM SERPL-MCNC: 5 MMOL/L — SIGNIFICANT CHANGE UP (ref 3.5–5.3)
POTASSIUM SERPL-MCNC: 5.6 MMOL/L — HIGH (ref 3.5–5.3)
POTASSIUM SERPL-SCNC: 3.8 MMOL/L — SIGNIFICANT CHANGE UP (ref 3.5–5.3)
POTASSIUM SERPL-SCNC: 3.9 MMOL/L — SIGNIFICANT CHANGE UP (ref 3.5–5.3)
POTASSIUM SERPL-SCNC: 4 MMOL/L — SIGNIFICANT CHANGE UP (ref 3.5–5.3)
POTASSIUM SERPL-SCNC: 4 MMOL/L — SIGNIFICANT CHANGE UP (ref 3.5–5.3)
POTASSIUM SERPL-SCNC: 4.1 MMOL/L — SIGNIFICANT CHANGE UP (ref 3.5–5.3)
POTASSIUM SERPL-SCNC: 4.2 MMOL/L — SIGNIFICANT CHANGE UP (ref 3.5–5.3)
POTASSIUM SERPL-SCNC: 4.3 MMOL/L — SIGNIFICANT CHANGE UP (ref 3.5–5.3)
POTASSIUM SERPL-SCNC: 4.4 MMOL/L — SIGNIFICANT CHANGE UP (ref 3.5–5.3)
POTASSIUM SERPL-SCNC: 4.4 MMOL/L — SIGNIFICANT CHANGE UP (ref 3.5–5.3)
POTASSIUM SERPL-SCNC: 4.5 MMOL/L
POTASSIUM SERPL-SCNC: 4.5 MMOL/L
POTASSIUM SERPL-SCNC: 4.5 MMOL/L — SIGNIFICANT CHANGE UP (ref 3.5–5.3)
POTASSIUM SERPL-SCNC: 4.6 MMOL/L
POTASSIUM SERPL-SCNC: 4.6 MMOL/L
POTASSIUM SERPL-SCNC: 4.6 MMOL/L — SIGNIFICANT CHANGE UP (ref 3.5–5.3)
POTASSIUM SERPL-SCNC: 4.7 MMOL/L
POTASSIUM SERPL-SCNC: 4.8 MMOL/L — SIGNIFICANT CHANGE UP (ref 3.5–5.3)
POTASSIUM SERPL-SCNC: 4.9 MMOL/L
POTASSIUM SERPL-SCNC: 4.9 MMOL/L — SIGNIFICANT CHANGE UP (ref 3.5–5.3)
POTASSIUM SERPL-SCNC: 5 MMOL/L — SIGNIFICANT CHANGE UP (ref 3.5–5.3)
POTASSIUM SERPL-SCNC: 5.2 MMOL/L
POTASSIUM SERPL-SCNC: 5.6 MMOL/L — HIGH (ref 3.5–5.3)
PROT SERPL-MCNC: 6.7 G/DL
PROT SERPL-MCNC: 6.8 G/DL
PROT SERPL-MCNC: 6.8 G/DL — SIGNIFICANT CHANGE UP (ref 6–8.3)
PROT SERPL-MCNC: 6.9 G/DL
PROT SERPL-MCNC: 6.9 G/DL — SIGNIFICANT CHANGE UP (ref 6–8.3)
PROT SERPL-MCNC: 7.3 G/DL — SIGNIFICANT CHANGE UP (ref 6–8.3)
PROT SERPL-MCNC: 7.4 G/DL
PROT SERPL-MCNC: 7.4 G/DL — SIGNIFICANT CHANGE UP (ref 6–8.3)
PROT SERPL-MCNC: 7.5 G/DL
PROT SERPL-MCNC: 7.8 G/DL
PROT UR-MCNC: ABNORMAL
PROTHROM AB SERPL-ACNC: 11.5 SEC — SIGNIFICANT CHANGE UP (ref 10.5–13.4)
PROTHROM AB SERPL-ACNC: 11.9 SEC — SIGNIFICANT CHANGE UP (ref 10.5–13.4)
PSA SERPL-MCNC: 3.56 NG/ML
PT BLD: 12.9 SEC
RBC # BLD: 3.78 M/UL
RBC # BLD: 3.78 M/UL — LOW (ref 4.2–5.8)
RBC # BLD: 3.78 M/UL — LOW (ref 4.2–5.8)
RBC # BLD: 3.88 M/UL — LOW (ref 4.2–5.8)
RBC # BLD: 3.9 M/UL
RBC # BLD: 3.96 M/UL — LOW (ref 4.2–5.8)
RBC # BLD: 4.06 M/UL — LOW (ref 4.2–5.8)
RBC # BLD: 4.1 M/UL — LOW (ref 4.2–5.8)
RBC # BLD: 4.1 M/UL — LOW (ref 4.2–5.8)
RBC # BLD: 4.2 M/UL — SIGNIFICANT CHANGE UP (ref 4.2–5.8)
RBC # BLD: 4.26 M/UL — SIGNIFICANT CHANGE UP (ref 4.2–5.8)
RBC # BLD: 4.27 M/UL
RBC # BLD: 4.38 M/UL — SIGNIFICANT CHANGE UP (ref 4.2–5.8)
RBC # BLD: 4.5 M/UL — SIGNIFICANT CHANGE UP (ref 4.2–5.8)
RBC # BLD: 4.53 M/UL — SIGNIFICANT CHANGE UP (ref 4.2–5.8)
RBC # BLD: 4.56 M/UL
RBC # BLD: 4.6 M/UL
RBC # BLD: 4.63 M/UL — SIGNIFICANT CHANGE UP (ref 4.2–5.8)
RBC # BLD: 4.69 M/UL
RBC # FLD: 14 %
RBC # FLD: 14.4 % — SIGNIFICANT CHANGE UP (ref 10.3–14.5)
RBC # FLD: 14.5 % — SIGNIFICANT CHANGE UP (ref 10.3–14.5)
RBC # FLD: 14.5 % — SIGNIFICANT CHANGE UP (ref 10.3–14.5)
RBC # FLD: 14.8 %
RBC # FLD: 14.9 % — HIGH (ref 10.3–14.5)
RBC # FLD: 15 % — HIGH (ref 10.3–14.5)
RBC # FLD: 15.1 % — HIGH (ref 10.3–14.5)
RBC # FLD: 15.2 % — HIGH (ref 10.3–14.5)
RBC # FLD: 15.2 % — HIGH (ref 10.3–14.5)
RBC # FLD: 15.6 % — HIGH (ref 10.3–14.5)
RBC # FLD: 15.7 % — HIGH (ref 10.3–14.5)
RBC # FLD: 15.9 %
RBC # FLD: 15.9 % — HIGH (ref 10.3–14.5)
RBC # FLD: 16 % — HIGH (ref 10.3–14.5)
RBC # FLD: 16.1 %
RBC # FLD: 16.2 % — HIGH (ref 10.3–14.5)
RBC # FLD: 17.3 %
RBC # FLD: 18.7 %
RBC BLD AUTO: ABNORMAL
RBC BLD AUTO: ABNORMAL
RBC CASTS # UR COMP ASSIST: 2 /HPF — SIGNIFICANT CHANGE UP (ref 0–4)
RBC CASTS # UR COMP ASSIST: 8 /HPF — HIGH (ref 0–4)
RH IG SCN BLD-IMP: POSITIVE — SIGNIFICANT CHANGE UP
RSV RNA NPH QL NAA+NON-PROBE: SIGNIFICANT CHANGE UP
RUBV IGG FLD-ACNC: 25.3 INDEX
RUBV IGG SER-IMP: POSITIVE
SAO2 % BLDV: 32.3 % — SIGNIFICANT CHANGE UP
SAO2 % BLDV: 32.9 % — SIGNIFICANT CHANGE UP
SAO2 % BLDV: 50.4 % — SIGNIFICANT CHANGE UP
SAO2 % BLDV: 53.5 % — SIGNIFICANT CHANGE UP
SARS-COV-2 N GENE NPH QL NAA+PROBE: NOT DETECTED
SARS-COV-2 RNA SPEC QL NAA+PROBE: SIGNIFICANT CHANGE UP
SCHISTOCYTES BLD QL AUTO: SIGNIFICANT CHANGE UP
SODIUM SERPL-SCNC: 134 MMOL/L — LOW (ref 135–145)
SODIUM SERPL-SCNC: 135 MMOL/L — SIGNIFICANT CHANGE UP (ref 135–145)
SODIUM SERPL-SCNC: 137 MMOL/L — SIGNIFICANT CHANGE UP (ref 135–145)
SODIUM SERPL-SCNC: 138 MMOL/L
SODIUM SERPL-SCNC: 138 MMOL/L — SIGNIFICANT CHANGE UP (ref 135–145)
SODIUM SERPL-SCNC: 138 MMOL/L — SIGNIFICANT CHANGE UP (ref 135–145)
SODIUM SERPL-SCNC: 139 MMOL/L
SODIUM SERPL-SCNC: 139 MMOL/L — SIGNIFICANT CHANGE UP (ref 135–145)
SODIUM SERPL-SCNC: 140 MMOL/L
SODIUM SERPL-SCNC: 140 MMOL/L
SODIUM SERPL-SCNC: 140 MMOL/L — SIGNIFICANT CHANGE UP (ref 135–145)
SODIUM SERPL-SCNC: 141 MMOL/L
SODIUM SERPL-SCNC: 141 MMOL/L — SIGNIFICANT CHANGE UP (ref 135–145)
SODIUM SERPL-SCNC: 141 MMOL/L — SIGNIFICANT CHANGE UP (ref 135–145)
SODIUM SERPL-SCNC: 142 MMOL/L
SODIUM SERPL-SCNC: 142 MMOL/L
SP GR SPEC: 1.02 — SIGNIFICANT CHANGE UP (ref 1.01–1.05)
SP GR SPEC: 1.03 — SIGNIFICANT CHANGE UP (ref 1.01–1.05)
SP GR SPEC: >1.05 (ref 1.01–1.05)
SPECIMEN SOURCE: SIGNIFICANT CHANGE UP
TARGETS BLD QL SMEAR: SLIGHT — SIGNIFICANT CHANGE UP
TESTOST SERPL-MCNC: 755 NG/DL
TRIGL SERPL-MCNC: 99 MG/DL
UROBILINOGEN FLD QL: SIGNIFICANT CHANGE UP
VARIANT LYMPHS # BLD: 0.9 % — SIGNIFICANT CHANGE UP (ref 0–6)
VARIANT LYMPHS # BLD: 8 % — HIGH (ref 0–6)
WBC # BLD: 10.69 K/UL — HIGH (ref 3.8–10.5)
WBC # BLD: 11.61 K/UL — HIGH (ref 3.8–10.5)
WBC # BLD: 14.57 K/UL — HIGH (ref 3.8–10.5)
WBC # BLD: 17.34 K/UL — HIGH (ref 3.8–10.5)
WBC # BLD: 18.71 K/UL — HIGH (ref 3.8–10.5)
WBC # BLD: 26.33 K/UL — HIGH (ref 3.8–10.5)
WBC # BLD: 6.65 K/UL — SIGNIFICANT CHANGE UP (ref 3.8–10.5)
WBC # BLD: 6.81 K/UL — SIGNIFICANT CHANGE UP (ref 3.8–10.5)
WBC # BLD: 7.05 K/UL — SIGNIFICANT CHANGE UP (ref 3.8–10.5)
WBC # BLD: 7.73 K/UL — SIGNIFICANT CHANGE UP (ref 3.8–10.5)
WBC # BLD: 8.36 K/UL — SIGNIFICANT CHANGE UP (ref 3.8–10.5)
WBC # BLD: 8.47 K/UL — SIGNIFICANT CHANGE UP (ref 3.8–10.5)
WBC # BLD: 8.62 K/UL — SIGNIFICANT CHANGE UP (ref 3.8–10.5)
WBC # FLD AUTO: 10.69 K/UL — HIGH (ref 3.8–10.5)
WBC # FLD AUTO: 10.99 K/UL
WBC # FLD AUTO: 11.61 K/UL — HIGH (ref 3.8–10.5)
WBC # FLD AUTO: 12.69 K/UL
WBC # FLD AUTO: 14.57 K/UL — HIGH (ref 3.8–10.5)
WBC # FLD AUTO: 17.34 K/UL — HIGH (ref 3.8–10.5)
WBC # FLD AUTO: 17.77 K/UL
WBC # FLD AUTO: 18.71 K/UL — HIGH (ref 3.8–10.5)
WBC # FLD AUTO: 26.33 K/UL — HIGH (ref 3.8–10.5)
WBC # FLD AUTO: 6.65 K/UL — SIGNIFICANT CHANGE UP (ref 3.8–10.5)
WBC # FLD AUTO: 6.81 K/UL — SIGNIFICANT CHANGE UP (ref 3.8–10.5)
WBC # FLD AUTO: 7.05 K/UL — SIGNIFICANT CHANGE UP (ref 3.8–10.5)
WBC # FLD AUTO: 7.73 K/UL — SIGNIFICANT CHANGE UP (ref 3.8–10.5)
WBC # FLD AUTO: 8.36 K/UL — SIGNIFICANT CHANGE UP (ref 3.8–10.5)
WBC # FLD AUTO: 8.47 K/UL — SIGNIFICANT CHANGE UP (ref 3.8–10.5)
WBC # FLD AUTO: 8.62 K/UL — SIGNIFICANT CHANGE UP (ref 3.8–10.5)
WBC # FLD AUTO: 8.84 K/UL
WBC # FLD AUTO: 9.12 K/UL
WBC # FLD AUTO: 9.92 K/UL
WBC UR QL: 37 /HPF — HIGH (ref 0–5)
WBC UR QL: 5 /HPF — SIGNIFICANT CHANGE UP (ref 0–5)

## 2022-01-01 PROCEDURE — 71045 X-RAY EXAM CHEST 1 VIEW: CPT | Mod: 26

## 2022-01-01 PROCEDURE — 99223 1ST HOSP IP/OBS HIGH 75: CPT | Mod: GC

## 2022-01-01 PROCEDURE — 99239 HOSP IP/OBS DSCHRG MGMT >30: CPT | Mod: GC

## 2022-01-01 PROCEDURE — 99221 1ST HOSP IP/OBS SF/LOW 40: CPT

## 2022-01-01 PROCEDURE — 36415 COLL VENOUS BLD VENIPUNCTURE: CPT | Mod: 59

## 2022-01-01 PROCEDURE — 76775 US EXAM ABDO BACK WALL LIM: CPT | Mod: 26

## 2022-01-01 PROCEDURE — G0439: CPT

## 2022-01-01 PROCEDURE — 74177 CT ABD & PELVIS W/CONTRAST: CPT | Mod: 26,MG

## 2022-01-01 PROCEDURE — 99232 SBSQ HOSP IP/OBS MODERATE 35: CPT

## 2022-01-01 PROCEDURE — 71045 X-RAY EXAM CHEST 1 VIEW: CPT | Mod: 26,76

## 2022-01-01 PROCEDURE — 99213 OFFICE O/P EST LOW 20 MIN: CPT

## 2022-01-01 PROCEDURE — 99223 1ST HOSP IP/OBS HIGH 75: CPT

## 2022-01-01 PROCEDURE — 99285 EMERGENCY DEPT VISIT HI MDM: CPT

## 2022-01-01 PROCEDURE — 52310 CYSTOSCOPY AND TREATMENT: CPT | Mod: 58

## 2022-01-01 PROCEDURE — 99285 EMERGENCY DEPT VISIT HI MDM: CPT | Mod: GC

## 2022-01-01 PROCEDURE — 74177 CT ABD & PELVIS W/CONTRAST: CPT | Mod: 26

## 2022-01-01 PROCEDURE — 74177 CT ABD & PELVIS W/CONTRAST: CPT | Mod: 26,MA

## 2022-01-01 PROCEDURE — G0402 INITIAL PREVENTIVE EXAM: CPT

## 2022-01-01 PROCEDURE — 52310 CYSTOSCOPY AND TREATMENT: CPT

## 2022-01-01 PROCEDURE — 99215 OFFICE O/P EST HI 40 MIN: CPT

## 2022-01-01 PROCEDURE — 76775 US EXAM ABDO BACK WALL LIM: CPT

## 2022-01-01 PROCEDURE — 99231 SBSQ HOSP IP/OBS SF/LOW 25: CPT

## 2022-01-01 PROCEDURE — 99213 OFFICE O/P EST LOW 20 MIN: CPT | Mod: PD

## 2022-01-01 PROCEDURE — 74177 CT ABD & PELVIS W/CONTRAST: CPT

## 2022-01-01 PROCEDURE — 99284 EMERGENCY DEPT VISIT MOD MDM: CPT | Mod: FS

## 2022-01-01 PROCEDURE — G1004: CPT

## 2022-01-01 DEVICE — GUIDEWIRE AMPLATZ SUPER-STIFF .038" X 145CM 3.5CM FLEXIBLE
Type: IMPLANTABLE DEVICE | Site: LEFT | Status: NON-FUNCTIONAL
Removed: 2022-12-15

## 2022-01-01 DEVICE — URETERAL SHEATH NAVIGATOR HD 11/13FR X 36CM
Type: IMPLANTABLE DEVICE | Site: LEFT | Status: NON-FUNCTIONAL
Removed: 2022-12-15

## 2022-01-01 DEVICE — URETERAL CATH OPEN END 5FR 70CM
Type: IMPLANTABLE DEVICE | Site: LEFT | Status: NON-FUNCTIONAL
Removed: 2022-12-15

## 2022-01-01 DEVICE — IMPLANTABLE DEVICE
Type: IMPLANTABLE DEVICE | Site: LEFT | Status: NON-FUNCTIONAL
Removed: 2022-12-15

## 2022-01-01 DEVICE — GUIDEWIRE SENSOR NITINOL ANGLED .038" X 150CM
Type: IMPLANTABLE DEVICE | Site: LEFT | Status: NON-FUNCTIONAL
Removed: 2022-12-15

## 2022-01-01 DEVICE — KIT URET PERFLX PLUS 6FRX24CM
Type: IMPLANTABLE DEVICE | Site: LEFT | Status: NON-FUNCTIONAL
Removed: 2022-12-15

## 2022-01-01 RX ORDER — TAMSULOSIN HYDROCHLORIDE 0.4 MG/1
0.4 CAPSULE ORAL AT BEDTIME
Refills: 0 | Status: DISCONTINUED | OUTPATIENT
Start: 2022-01-01 | End: 2022-01-01

## 2022-01-01 RX ORDER — TAMSULOSIN HYDROCHLORIDE 0.4 MG/1
1 CAPSULE ORAL
Qty: 0 | Refills: 0 | DISCHARGE

## 2022-01-01 RX ORDER — HYDROMORPHONE HYDROCHLORIDE 2 MG/ML
0.5 INJECTION INTRAMUSCULAR; INTRAVENOUS; SUBCUTANEOUS EVERY 6 HOURS
Refills: 0 | Status: DISCONTINUED | OUTPATIENT
Start: 2022-01-01 | End: 2022-01-01

## 2022-01-01 RX ORDER — ONDANSETRON 4 MG/1
4 TABLET ORAL EVERY 6 HOURS
Qty: 30 | Refills: 1 | Status: ACTIVE | COMMUNITY
Start: 2022-01-01 | End: 1900-01-01

## 2022-01-01 RX ORDER — SODIUM CHLORIDE 9 MG/ML
1000 INJECTION INTRAMUSCULAR; INTRAVENOUS; SUBCUTANEOUS ONCE
Refills: 0 | Status: COMPLETED | OUTPATIENT
Start: 2022-01-01 | End: 2022-01-01

## 2022-01-01 RX ORDER — OCTREOTIDE ACETATE 200 UG/ML
100 INJECTION, SOLUTION INTRAVENOUS; SUBCUTANEOUS EVERY 8 HOURS
Refills: 0 | Status: DISCONTINUED | OUTPATIENT
Start: 2022-01-01 | End: 2022-01-01

## 2022-01-01 RX ORDER — SODIUM CHLORIDE 9 MG/ML
1000 INJECTION, SOLUTION INTRAVENOUS
Refills: 0 | Status: DISCONTINUED | OUTPATIENT
Start: 2022-01-01 | End: 2022-01-01

## 2022-01-01 RX ORDER — ESCITALOPRAM OXALATE 10 MG/1
1 TABLET, FILM COATED ORAL
Qty: 0 | Refills: 0 | DISCHARGE
Start: 2022-01-01

## 2022-01-01 RX ORDER — DEXAMETHASONE 0.5 MG/5ML
4 ELIXIR ORAL EVERY 12 HOURS
Refills: 0 | Status: DISCONTINUED | OUTPATIENT
Start: 2022-01-01 | End: 2022-01-01

## 2022-01-01 RX ORDER — HYDROMORPHONE HYDROCHLORIDE 2 MG/ML
1 INJECTION INTRAMUSCULAR; INTRAVENOUS; SUBCUTANEOUS ONCE
Refills: 0 | Status: DISCONTINUED | OUTPATIENT
Start: 2022-01-01 | End: 2022-01-01

## 2022-01-01 RX ORDER — IOHEXOL 300 MG/ML
30 INJECTION, SOLUTION INTRAVENOUS ONCE
Refills: 0 | Status: COMPLETED | OUTPATIENT
Start: 2022-01-01 | End: 2022-01-01

## 2022-01-01 RX ORDER — ESCITALOPRAM OXALATE 10 MG/1
5 TABLET, FILM COATED ORAL DAILY
Refills: 0 | Status: DISCONTINUED | OUTPATIENT
Start: 2022-01-01 | End: 2022-01-01

## 2022-01-01 RX ORDER — SODIUM CHLORIDE 9 MG/ML
500 INJECTION, SOLUTION INTRAVENOUS ONCE
Refills: 0 | Status: COMPLETED | OUTPATIENT
Start: 2022-01-01 | End: 2022-01-01

## 2022-01-01 RX ORDER — FAMOTIDINE 10 MG/ML
40 INJECTION INTRAVENOUS DAILY
Refills: 0 | Status: DISCONTINUED | OUTPATIENT
Start: 2022-01-01 | End: 2022-01-01

## 2022-01-01 RX ORDER — TAMSULOSIN HYDROCHLORIDE 0.4 MG/1
0.4 CAPSULE ORAL ONCE
Refills: 0 | Status: COMPLETED | OUTPATIENT
Start: 2022-01-01 | End: 2022-01-01

## 2022-01-01 RX ORDER — PANTOPRAZOLE SODIUM 20 MG/1
1 TABLET, DELAYED RELEASE ORAL
Qty: 0 | Refills: 0 | DISCHARGE

## 2022-01-01 RX ORDER — FAMOTIDINE 10 MG/ML
20 INJECTION INTRAVENOUS DAILY
Refills: 0 | Status: DISCONTINUED | OUTPATIENT
Start: 2022-01-01 | End: 2022-01-01

## 2022-01-01 RX ORDER — HYDROMORPHONE HYDROCHLORIDE 2 MG/ML
0.5 INJECTION INTRAMUSCULAR; INTRAVENOUS; SUBCUTANEOUS ONCE
Refills: 0 | Status: DISCONTINUED | OUTPATIENT
Start: 2022-01-01 | End: 2022-01-01

## 2022-01-01 RX ORDER — PANTOPRAZOLE SODIUM 20 MG/1
40 TABLET, DELAYED RELEASE ORAL
Refills: 0 | Status: DISCONTINUED | OUTPATIENT
Start: 2022-01-01 | End: 2022-01-01

## 2022-01-01 RX ORDER — SODIUM CHLORIDE 9 MG/ML
500 INJECTION INTRAMUSCULAR; INTRAVENOUS; SUBCUTANEOUS ONCE
Refills: 0 | Status: COMPLETED | OUTPATIENT
Start: 2022-01-01 | End: 2022-01-01

## 2022-01-01 RX ORDER — ONDANSETRON 8 MG/1
4 TABLET, FILM COATED ORAL ONCE
Refills: 0 | Status: COMPLETED | OUTPATIENT
Start: 2022-01-01 | End: 2022-01-01

## 2022-01-01 RX ORDER — FAMOTIDINE 10 MG/ML
20 INJECTION INTRAVENOUS ONCE
Refills: 0 | Status: COMPLETED | OUTPATIENT
Start: 2022-01-01 | End: 2022-01-01

## 2022-01-01 RX ORDER — POTASSIUM CHLORIDE 750 MG/1
10 TABLET, FILM COATED, EXTENDED RELEASE ORAL 3 TIMES DAILY
Qty: 540 | Refills: 0 | Status: ACTIVE | COMMUNITY
Start: 2022-01-01 | End: 1900-01-01

## 2022-01-01 RX ORDER — MAGNESIUM SULFATE 500 MG/ML
2 VIAL (ML) INJECTION ONCE
Refills: 0 | Status: COMPLETED | OUTPATIENT
Start: 2022-01-01 | End: 2022-01-01

## 2022-01-01 RX ORDER — METOCLOPRAMIDE HCL 10 MG
10 TABLET ORAL EVERY 8 HOURS
Refills: 0 | Status: DISCONTINUED | OUTPATIENT
Start: 2022-01-01 | End: 2022-01-01

## 2022-01-01 RX ORDER — ENOXAPARIN SODIUM 100 MG/ML
40 INJECTION SUBCUTANEOUS EVERY 24 HOURS
Refills: 0 | Status: DISCONTINUED | OUTPATIENT
Start: 2022-01-01 | End: 2022-01-01

## 2022-01-01 RX ORDER — ALPRAZOLAM 0.25 MG
1 TABLET ORAL
Qty: 21 | Refills: 0
Start: 2022-01-01 | End: 2023-01-01

## 2022-01-01 RX ORDER — PANTOPRAZOLE 40 MG/1
40 TABLET, DELAYED RELEASE ORAL
Qty: 90 | Refills: 1 | Status: ACTIVE | COMMUNITY
Start: 2021-12-06 | End: 1900-01-01

## 2022-01-01 RX ORDER — OXYCODONE HYDROCHLORIDE 5 MG/1
1 TABLET ORAL
Qty: 9 | Refills: 0
Start: 2022-01-01 | End: 2022-01-01

## 2022-01-01 RX ORDER — FAMOTIDINE 10 MG/ML
1 INJECTION INTRAVENOUS
Qty: 0 | Refills: 0 | DISCHARGE

## 2022-01-01 RX ORDER — FAMOTIDINE 10 MG/ML
20 INJECTION INTRAVENOUS EVERY 24 HOURS
Refills: 0 | Status: DISCONTINUED | OUTPATIENT
Start: 2022-01-01 | End: 2022-01-01

## 2022-01-01 RX ORDER — PANTOPRAZOLE SODIUM 20 MG/1
40 TABLET, DELAYED RELEASE ORAL DAILY
Refills: 0 | Status: DISCONTINUED | OUTPATIENT
Start: 2022-01-01 | End: 2022-01-01

## 2022-01-01 RX ORDER — PANTOPRAZOLE SODIUM 20 MG/1
1 TABLET, DELAYED RELEASE ORAL
Qty: 0 | Refills: 0 | DISCHARGE
Start: 2022-01-01

## 2022-01-01 RX ORDER — MORPHINE SULFATE 50 MG/1
4 CAPSULE, EXTENDED RELEASE ORAL ONCE
Refills: 0 | Status: DISCONTINUED | OUTPATIENT
Start: 2022-01-01 | End: 2022-01-01

## 2022-01-01 RX ORDER — ESCITALOPRAM OXALATE 10 MG/1
1 TABLET, FILM COATED ORAL
Qty: 0 | Refills: 0 | DISCHARGE

## 2022-01-01 RX ORDER — ACETAMINOPHEN 500 MG
1000 TABLET ORAL ONCE
Refills: 0 | Status: DISCONTINUED | OUTPATIENT
Start: 2022-01-01 | End: 2022-01-01

## 2022-01-01 RX ORDER — SIMVASTATIN 20 MG/1
10 TABLET, FILM COATED ORAL AT BEDTIME
Refills: 0 | Status: DISCONTINUED | OUTPATIENT
Start: 2022-01-01 | End: 2022-01-01

## 2022-01-01 RX ORDER — TESTOSTERONE 16.2 MG/G
20.25 MG/ACT GEL TRANSDERMAL
Qty: 6 | Refills: 0 | Status: ACTIVE | COMMUNITY
Start: 2021-12-06

## 2022-01-01 RX ORDER — TRIMETHOPRIM HYDROCHLORIDE 50 MG/5ML
1 SOLUTION ORAL
Qty: 14 | Refills: 0
Start: 2022-01-01 | End: 2022-01-01

## 2022-01-01 RX ORDER — FAMOTIDINE 10 MG/ML
20 INJECTION INTRAVENOUS AT BEDTIME
Refills: 0 | Status: DISCONTINUED | OUTPATIENT
Start: 2022-01-01 | End: 2022-01-01

## 2022-01-01 RX ORDER — FAMOTIDINE 10 MG/ML
40 INJECTION INTRAVENOUS AT BEDTIME
Refills: 0 | Status: DISCONTINUED | OUTPATIENT
Start: 2022-01-01 | End: 2022-01-01

## 2022-01-01 RX ORDER — HYDROMORPHONE HYDROCHLORIDE 2 MG/ML
2 INJECTION INTRAMUSCULAR; INTRAVENOUS; SUBCUTANEOUS
Qty: 168 | Refills: 0
Start: 2022-01-01 | End: 2023-01-01

## 2022-01-01 RX ORDER — SIMVASTATIN 20 MG/1
1 TABLET, FILM COATED ORAL
Qty: 0 | Refills: 0 | DISCHARGE

## 2022-01-01 RX ORDER — TAMSULOSIN HYDROCHLORIDE 0.4 MG/1
1 CAPSULE ORAL
Qty: 0 | Refills: 0 | DISCHARGE
Start: 2022-01-01

## 2022-01-01 RX ORDER — ONDANSETRON 8 MG/1
1 TABLET, FILM COATED ORAL
Qty: 0 | Refills: 1 | DISCHARGE
Start: 2022-01-01

## 2022-01-01 RX ORDER — TAMSULOSIN HYDROCHLORIDE 0.4 MG/1
0.4 CAPSULE ORAL
Qty: 90 | Refills: 3 | Status: ACTIVE | COMMUNITY
Start: 2021-03-29 | End: 1900-01-01

## 2022-01-01 RX ORDER — SODIUM CHLORIDE 9 MG/ML
1000 INJECTION, SOLUTION INTRAVENOUS ONCE
Refills: 0 | Status: COMPLETED | OUTPATIENT
Start: 2022-01-01 | End: 2022-01-01

## 2022-01-01 RX ORDER — FENTANYL CITRATE 50 UG/ML
50 INJECTION INTRAVENOUS ONCE
Refills: 0 | Status: DISCONTINUED | OUTPATIENT
Start: 2022-01-01 | End: 2022-01-01

## 2022-01-01 RX ORDER — ATROPA BELLADONNA AND OPIUM 16.2; 6 MG/1; MG/1
1 SUPPOSITORY RECTAL ONCE
Refills: 0 | Status: DISCONTINUED | OUTPATIENT
Start: 2022-01-01 | End: 2022-01-01

## 2022-01-01 RX ORDER — FAMOTIDINE 10 MG/ML
1 INJECTION INTRAVENOUS
Qty: 0 | Refills: 0 | DISCHARGE
Start: 2022-01-01

## 2022-01-01 RX ORDER — HEPARIN SODIUM 5000 [USP'U]/ML
5000 INJECTION INTRAVENOUS; SUBCUTANEOUS EVERY 8 HOURS
Refills: 0 | Status: DISCONTINUED | OUTPATIENT
Start: 2022-01-01 | End: 2022-01-01

## 2022-01-01 RX ORDER — SIMVASTATIN 20 MG/1
1 TABLET, FILM COATED ORAL
Qty: 0 | Refills: 0 | DISCHARGE
Start: 2022-01-01

## 2022-01-01 RX ORDER — ACETAMINOPHEN 500 MG
1000 TABLET ORAL EVERY 6 HOURS
Refills: 0 | Status: DISCONTINUED | OUTPATIENT
Start: 2022-01-01 | End: 2022-01-01

## 2022-01-01 RX ORDER — PANTOPRAZOLE SODIUM 20 MG/1
40 TABLET, DELAYED RELEASE ORAL EVERY 24 HOURS
Refills: 0 | Status: DISCONTINUED | OUTPATIENT
Start: 2022-01-01 | End: 2022-01-01

## 2022-01-01 RX ORDER — NALOXONE HYDROCHLORIDE 4 MG/.1ML
4 SPRAY NASAL
Qty: 1 | Refills: 0
Start: 2022-01-01 | End: 2022-01-01

## 2022-01-01 RX ORDER — FENTANYL CITRATE 50 UG/ML
75 INJECTION INTRAVENOUS ONCE
Refills: 0 | Status: DISCONTINUED | OUTPATIENT
Start: 2022-01-01 | End: 2022-01-01

## 2022-01-01 RX ADMIN — SODIUM CHLORIDE 500 MILLILITER(S): 9 INJECTION, SOLUTION INTRAVENOUS at 02:39

## 2022-01-01 RX ADMIN — OCTREOTIDE ACETATE 100 MICROGRAM(S): 200 INJECTION, SOLUTION INTRAVENOUS; SUBCUTANEOUS at 22:17

## 2022-01-01 RX ADMIN — FAMOTIDINE 20 MILLIGRAM(S): 10 INJECTION INTRAVENOUS at 11:31

## 2022-01-01 RX ADMIN — SODIUM CHLORIDE 125 MILLILITER(S): 9 INJECTION, SOLUTION INTRAVENOUS at 05:17

## 2022-01-01 RX ADMIN — MORPHINE SULFATE 4 MILLIGRAM(S): 50 CAPSULE, EXTENDED RELEASE ORAL at 08:00

## 2022-01-01 RX ADMIN — FENTANYL CITRATE 75 MICROGRAM(S): 50 INJECTION INTRAVENOUS at 10:11

## 2022-01-01 RX ADMIN — FAMOTIDINE 40 MILLIGRAM(S): 10 INJECTION INTRAVENOUS at 22:05

## 2022-01-01 RX ADMIN — Medication 4 MILLIGRAM(S): at 05:06

## 2022-01-01 RX ADMIN — Medication 25 GRAM(S): at 09:47

## 2022-01-01 RX ADMIN — ATROPA BELLADONNA AND OPIUM 1 SUPPOSITORY(S): 16.2; 6 SUPPOSITORY RECTAL at 15:18

## 2022-01-01 RX ADMIN — ENOXAPARIN SODIUM 40 MILLIGRAM(S): 100 INJECTION SUBCUTANEOUS at 06:39

## 2022-01-01 RX ADMIN — ENOXAPARIN SODIUM 40 MILLIGRAM(S): 100 INJECTION SUBCUTANEOUS at 05:06

## 2022-01-01 RX ADMIN — IOHEXOL 30 MILLILITER(S): 300 INJECTION, SOLUTION INTRAVENOUS at 05:58

## 2022-01-01 RX ADMIN — Medication 4 MILLIGRAM(S): at 22:46

## 2022-01-01 RX ADMIN — SODIUM CHLORIDE 1000 MILLILITER(S): 9 INJECTION INTRAMUSCULAR; INTRAVENOUS; SUBCUTANEOUS at 05:37

## 2022-01-01 RX ADMIN — SODIUM CHLORIDE 75 MILLILITER(S): 9 INJECTION, SOLUTION INTRAVENOUS at 17:56

## 2022-01-01 RX ADMIN — Medication 10 MILLIGRAM(S): at 15:30

## 2022-01-01 RX ADMIN — MORPHINE SULFATE 4 MILLIGRAM(S): 50 CAPSULE, EXTENDED RELEASE ORAL at 05:37

## 2022-01-01 RX ADMIN — Medication 4 MILLIGRAM(S): at 05:32

## 2022-01-01 RX ADMIN — Medication 10 MILLIGRAM(S): at 13:55

## 2022-01-01 RX ADMIN — HYDROMORPHONE HYDROCHLORIDE 0.5 MILLIGRAM(S): 2 INJECTION INTRAMUSCULAR; INTRAVENOUS; SUBCUTANEOUS at 13:43

## 2022-01-01 RX ADMIN — FAMOTIDINE 40 MILLIGRAM(S): 10 INJECTION INTRAVENOUS at 23:19

## 2022-01-01 RX ADMIN — Medication 4 MILLIGRAM(S): at 06:33

## 2022-01-01 RX ADMIN — SODIUM CHLORIDE 100 MILLILITER(S): 9 INJECTION, SOLUTION INTRAVENOUS at 22:46

## 2022-01-01 RX ADMIN — FAMOTIDINE 40 MILLIGRAM(S): 10 INJECTION INTRAVENOUS at 22:33

## 2022-01-01 RX ADMIN — FENTANYL CITRATE 50 MICROGRAM(S): 50 INJECTION INTRAVENOUS at 17:05

## 2022-01-01 RX ADMIN — OCTREOTIDE ACETATE 100 MICROGRAM(S): 200 INJECTION, SOLUTION INTRAVENOUS; SUBCUTANEOUS at 21:20

## 2022-01-01 RX ADMIN — PANTOPRAZOLE SODIUM 40 MILLIGRAM(S): 20 TABLET, DELAYED RELEASE ORAL at 06:39

## 2022-01-01 RX ADMIN — ESCITALOPRAM OXALATE 5 MILLIGRAM(S): 10 TABLET, FILM COATED ORAL at 22:30

## 2022-01-01 RX ADMIN — SODIUM CHLORIDE 75 MILLILITER(S): 9 INJECTION, SOLUTION INTRAVENOUS at 09:40

## 2022-01-01 RX ADMIN — ATROPA BELLADONNA AND OPIUM 1 SUPPOSITORY(S): 16.2; 6 SUPPOSITORY RECTAL at 14:45

## 2022-01-01 RX ADMIN — Medication 25 GRAM(S): at 11:46

## 2022-01-01 RX ADMIN — ESCITALOPRAM OXALATE 5 MILLIGRAM(S): 10 TABLET, FILM COATED ORAL at 22:24

## 2022-01-01 RX ADMIN — SIMVASTATIN 10 MILLIGRAM(S): 20 TABLET, FILM COATED ORAL at 23:19

## 2022-01-01 RX ADMIN — SODIUM CHLORIDE 1000 MILLILITER(S): 9 INJECTION INTRAMUSCULAR; INTRAVENOUS; SUBCUTANEOUS at 07:14

## 2022-01-01 RX ADMIN — SODIUM CHLORIDE 125 MILLILITER(S): 9 INJECTION, SOLUTION INTRAVENOUS at 15:29

## 2022-01-01 RX ADMIN — TAMSULOSIN HYDROCHLORIDE 0.4 MILLIGRAM(S): 0.4 CAPSULE ORAL at 22:31

## 2022-01-01 RX ADMIN — Medication 25 GRAM(S): at 12:13

## 2022-01-01 RX ADMIN — OCTREOTIDE ACETATE 100 MICROGRAM(S): 200 INJECTION, SOLUTION INTRAVENOUS; SUBCUTANEOUS at 06:17

## 2022-01-01 RX ADMIN — FENTANYL CITRATE 50 MICROGRAM(S): 50 INJECTION INTRAVENOUS at 16:48

## 2022-01-01 RX ADMIN — Medication 10 MILLIGRAM(S): at 21:09

## 2022-01-01 RX ADMIN — TAMSULOSIN HYDROCHLORIDE 0.4 MILLIGRAM(S): 0.4 CAPSULE ORAL at 23:24

## 2022-01-01 RX ADMIN — SODIUM CHLORIDE 100 MILLILITER(S): 9 INJECTION, SOLUTION INTRAVENOUS at 19:14

## 2022-01-01 RX ADMIN — HYDROMORPHONE HYDROCHLORIDE 1 MILLIGRAM(S): 2 INJECTION INTRAMUSCULAR; INTRAVENOUS; SUBCUTANEOUS at 23:18

## 2022-01-01 RX ADMIN — ONDANSETRON 4 MILLIGRAM(S): 8 TABLET, FILM COATED ORAL at 15:43

## 2022-01-01 RX ADMIN — Medication 4 MILLIGRAM(S): at 06:40

## 2022-01-01 RX ADMIN — Medication 10 MILLIGRAM(S): at 06:30

## 2022-01-01 RX ADMIN — FAMOTIDINE 20 MILLIGRAM(S): 10 INJECTION INTRAVENOUS at 23:50

## 2022-01-01 RX ADMIN — ESCITALOPRAM OXALATE 5 MILLIGRAM(S): 10 TABLET, FILM COATED ORAL at 22:05

## 2022-01-01 RX ADMIN — PANTOPRAZOLE SODIUM 40 MILLIGRAM(S): 20 TABLET, DELAYED RELEASE ORAL at 11:22

## 2022-01-01 RX ADMIN — OCTREOTIDE ACETATE 100 MICROGRAM(S): 200 INJECTION, SOLUTION INTRAVENOUS; SUBCUTANEOUS at 13:55

## 2022-01-01 RX ADMIN — Medication 4 MILLIGRAM(S): at 17:39

## 2022-01-01 RX ADMIN — FAMOTIDINE 40 MILLIGRAM(S): 10 INJECTION INTRAVENOUS at 13:47

## 2022-01-01 RX ADMIN — ENOXAPARIN SODIUM 40 MILLIGRAM(S): 100 INJECTION SUBCUTANEOUS at 22:25

## 2022-01-01 RX ADMIN — Medication 4 MILLIGRAM(S): at 17:56

## 2022-01-01 RX ADMIN — TAMSULOSIN HYDROCHLORIDE 0.4 MILLIGRAM(S): 0.4 CAPSULE ORAL at 22:46

## 2022-01-01 RX ADMIN — TAMSULOSIN HYDROCHLORIDE 0.4 MILLIGRAM(S): 0.4 CAPSULE ORAL at 21:59

## 2022-01-01 RX ADMIN — PANTOPRAZOLE SODIUM 40 MILLIGRAM(S): 20 TABLET, DELAYED RELEASE ORAL at 08:49

## 2022-01-01 RX ADMIN — SODIUM CHLORIDE 100 MILLILITER(S): 9 INJECTION, SOLUTION INTRAVENOUS at 17:40

## 2022-01-01 RX ADMIN — HYDROMORPHONE HYDROCHLORIDE 0.5 MILLIGRAM(S): 2 INJECTION INTRAMUSCULAR; INTRAVENOUS; SUBCUTANEOUS at 22:58

## 2022-01-01 RX ADMIN — SIMVASTATIN 10 MILLIGRAM(S): 20 TABLET, FILM COATED ORAL at 22:31

## 2022-01-01 RX ADMIN — SODIUM CHLORIDE 100 MILLILITER(S): 9 INJECTION, SOLUTION INTRAVENOUS at 19:16

## 2022-01-01 RX ADMIN — ESCITALOPRAM OXALATE 5 MILLIGRAM(S): 10 TABLET, FILM COATED ORAL at 23:24

## 2022-01-01 RX ADMIN — ENOXAPARIN SODIUM 40 MILLIGRAM(S): 100 INJECTION SUBCUTANEOUS at 05:13

## 2022-01-01 RX ADMIN — SIMVASTATIN 10 MILLIGRAM(S): 20 TABLET, FILM COATED ORAL at 21:59

## 2022-01-01 RX ADMIN — Medication 10 MILLIGRAM(S): at 14:04

## 2022-01-01 RX ADMIN — HYDROMORPHONE HYDROCHLORIDE 0.5 MILLIGRAM(S): 2 INJECTION INTRAMUSCULAR; INTRAVENOUS; SUBCUTANEOUS at 23:33

## 2022-01-01 RX ADMIN — Medication 4 MILLIGRAM(S): at 05:15

## 2022-01-01 RX ADMIN — ESCITALOPRAM OXALATE 5 MILLIGRAM(S): 10 TABLET, FILM COATED ORAL at 12:13

## 2022-01-01 RX ADMIN — TAMSULOSIN HYDROCHLORIDE 0.4 MILLIGRAM(S): 0.4 CAPSULE ORAL at 01:16

## 2022-01-01 RX ADMIN — Medication 10 MILLIGRAM(S): at 21:41

## 2022-01-01 RX ADMIN — Medication 4 MILLIGRAM(S): at 11:23

## 2022-01-01 RX ADMIN — ONDANSETRON 4 MILLIGRAM(S): 8 TABLET, FILM COATED ORAL at 02:12

## 2022-01-01 RX ADMIN — Medication 300 UNIT(S): at 10:43

## 2022-01-01 RX ADMIN — OCTREOTIDE ACETATE 100 MICROGRAM(S): 200 INJECTION, SOLUTION INTRAVENOUS; SUBCUTANEOUS at 05:18

## 2022-01-01 RX ADMIN — Medication 10 MILLIGRAM(S): at 06:09

## 2022-01-01 RX ADMIN — Medication 4 MILLIGRAM(S): at 17:57

## 2022-01-01 RX ADMIN — FAMOTIDINE 20 MILLIGRAM(S): 10 INJECTION INTRAVENOUS at 17:57

## 2022-01-01 RX ADMIN — HYDROMORPHONE HYDROCHLORIDE 1 MILLIGRAM(S): 2 INJECTION INTRAMUSCULAR; INTRAVENOUS; SUBCUTANEOUS at 18:35

## 2022-01-01 RX ADMIN — Medication 10 MILLIGRAM(S): at 21:02

## 2022-01-01 RX ADMIN — SODIUM CHLORIDE 125 MILLILITER(S): 9 INJECTION, SOLUTION INTRAVENOUS at 09:12

## 2022-01-01 RX ADMIN — PANTOPRAZOLE SODIUM 40 MILLIGRAM(S): 20 TABLET, DELAYED RELEASE ORAL at 05:13

## 2022-01-01 RX ADMIN — SODIUM CHLORIDE 100 MILLILITER(S): 9 INJECTION, SOLUTION INTRAVENOUS at 00:16

## 2022-01-01 RX ADMIN — MORPHINE SULFATE 4 MILLIGRAM(S): 50 CAPSULE, EXTENDED RELEASE ORAL at 15:43

## 2022-01-01 RX ADMIN — SODIUM CHLORIDE 75 MILLILITER(S): 9 INJECTION, SOLUTION INTRAVENOUS at 22:01

## 2022-01-01 RX ADMIN — OCTREOTIDE ACETATE 100 MICROGRAM(S): 200 INJECTION, SOLUTION INTRAVENOUS; SUBCUTANEOUS at 07:50

## 2022-01-01 RX ADMIN — OCTREOTIDE ACETATE 100 MICROGRAM(S): 200 INJECTION, SOLUTION INTRAVENOUS; SUBCUTANEOUS at 14:04

## 2022-01-01 RX ADMIN — SODIUM CHLORIDE 75 MILLILITER(S): 9 INJECTION, SOLUTION INTRAVENOUS at 05:32

## 2022-01-01 RX ADMIN — ENOXAPARIN SODIUM 40 MILLIGRAM(S): 100 INJECTION SUBCUTANEOUS at 06:20

## 2022-01-01 RX ADMIN — Medication 10 MILLIGRAM(S): at 05:18

## 2022-01-01 RX ADMIN — SODIUM CHLORIDE 2000 MILLILITER(S): 9 INJECTION INTRAMUSCULAR; INTRAVENOUS; SUBCUTANEOUS at 13:26

## 2022-01-01 RX ADMIN — OCTREOTIDE ACETATE 100 MICROGRAM(S): 200 INJECTION, SOLUTION INTRAVENOUS; SUBCUTANEOUS at 15:30

## 2022-01-01 RX ADMIN — Medication 10 MILLIGRAM(S): at 22:19

## 2022-01-01 RX ADMIN — PANTOPRAZOLE SODIUM 40 MILLIGRAM(S): 20 TABLET, DELAYED RELEASE ORAL at 05:07

## 2022-01-01 RX ADMIN — OCTREOTIDE ACETATE 100 MICROGRAM(S): 200 INJECTION, SOLUTION INTRAVENOUS; SUBCUTANEOUS at 21:09

## 2022-01-01 RX ADMIN — FAMOTIDINE 20 MILLIGRAM(S): 10 INJECTION INTRAVENOUS at 21:20

## 2022-01-01 RX ADMIN — TAMSULOSIN HYDROCHLORIDE 0.4 MILLIGRAM(S): 0.4 CAPSULE ORAL at 23:03

## 2022-01-01 RX ADMIN — SODIUM CHLORIDE 100 MILLILITER(S): 9 INJECTION, SOLUTION INTRAVENOUS at 05:18

## 2022-01-01 RX ADMIN — OCTREOTIDE ACETATE 100 MICROGRAM(S): 200 INJECTION, SOLUTION INTRAVENOUS; SUBCUTANEOUS at 05:14

## 2022-01-01 RX ADMIN — ENOXAPARIN SODIUM 40 MILLIGRAM(S): 100 INJECTION SUBCUTANEOUS at 22:46

## 2022-01-01 RX ADMIN — Medication 4 MILLIGRAM(S): at 22:55

## 2022-01-01 RX ADMIN — OCTREOTIDE ACETATE 100 MICROGRAM(S): 200 INJECTION, SOLUTION INTRAVENOUS; SUBCUTANEOUS at 12:13

## 2022-01-01 RX ADMIN — TAMSULOSIN HYDROCHLORIDE 0.4 MILLIGRAM(S): 0.4 CAPSULE ORAL at 22:25

## 2022-01-01 RX ADMIN — Medication 10 MILLIGRAM(S): at 06:40

## 2022-01-01 RX ADMIN — ONDANSETRON 4 MILLIGRAM(S): 8 TABLET, FILM COATED ORAL at 05:37

## 2022-01-01 RX ADMIN — SODIUM CHLORIDE 1000 MILLILITER(S): 9 INJECTION INTRAMUSCULAR; INTRAVENOUS; SUBCUTANEOUS at 15:43

## 2022-01-01 RX ADMIN — OCTREOTIDE ACETATE 100 MICROGRAM(S): 200 INJECTION, SOLUTION INTRAVENOUS; SUBCUTANEOUS at 06:09

## 2022-01-01 RX ADMIN — PANTOPRAZOLE SODIUM 40 MILLIGRAM(S): 20 TABLET, DELAYED RELEASE ORAL at 06:19

## 2022-01-01 RX ADMIN — OCTREOTIDE ACETATE 100 MICROGRAM(S): 200 INJECTION, SOLUTION INTRAVENOUS; SUBCUTANEOUS at 21:01

## 2022-01-01 RX ADMIN — PANTOPRAZOLE SODIUM 40 MILLIGRAM(S): 20 TABLET, DELAYED RELEASE ORAL at 06:35

## 2022-01-01 RX ADMIN — HYDROMORPHONE HYDROCHLORIDE 1 MILLIGRAM(S): 2 INJECTION INTRAMUSCULAR; INTRAVENOUS; SUBCUTANEOUS at 02:08

## 2022-01-01 RX ADMIN — SODIUM CHLORIDE 1000 MILLILITER(S): 9 INJECTION INTRAMUSCULAR; INTRAVENOUS; SUBCUTANEOUS at 02:20

## 2022-01-01 RX ADMIN — Medication 4 MILLIGRAM(S): at 21:41

## 2022-01-01 RX ADMIN — FAMOTIDINE 20 MILLIGRAM(S): 10 INJECTION INTRAVENOUS at 22:57

## 2022-01-01 RX ADMIN — Medication 75 MILLIGRAM(S): at 13:47

## 2022-01-01 RX ADMIN — HYDROMORPHONE HYDROCHLORIDE 1 MILLIGRAM(S): 2 INJECTION INTRAMUSCULAR; INTRAVENOUS; SUBCUTANEOUS at 18:19

## 2022-01-01 RX ADMIN — MORPHINE SULFATE 4 MILLIGRAM(S): 50 CAPSULE, EXTENDED RELEASE ORAL at 16:05

## 2022-01-01 RX ADMIN — OCTREOTIDE ACETATE 100 MICROGRAM(S): 200 INJECTION, SOLUTION INTRAVENOUS; SUBCUTANEOUS at 21:41

## 2022-01-01 RX ADMIN — ESCITALOPRAM OXALATE 5 MILLIGRAM(S): 10 TABLET, FILM COATED ORAL at 22:46

## 2022-01-01 RX ADMIN — ENOXAPARIN SODIUM 40 MILLIGRAM(S): 100 INJECTION SUBCUTANEOUS at 06:38

## 2022-01-01 RX ADMIN — ONDANSETRON 4 MILLIGRAM(S): 8 TABLET, FILM COATED ORAL at 23:50

## 2022-01-01 RX ADMIN — MORPHINE SULFATE 4 MILLIGRAM(S): 50 CAPSULE, EXTENDED RELEASE ORAL at 07:40

## 2022-01-01 RX ADMIN — SODIUM CHLORIDE 1000 MILLILITER(S): 9 INJECTION INTRAMUSCULAR; INTRAVENOUS; SUBCUTANEOUS at 17:00

## 2022-01-01 RX ADMIN — OCTREOTIDE ACETATE 100 MICROGRAM(S): 200 INJECTION, SOLUTION INTRAVENOUS; SUBCUTANEOUS at 05:32

## 2022-01-01 RX ADMIN — Medication 10 MILLIGRAM(S): at 05:15

## 2022-01-01 RX ADMIN — FENTANYL CITRATE 75 MICROGRAM(S): 50 INJECTION INTRAVENOUS at 09:19

## 2022-01-01 RX ADMIN — SODIUM CHLORIDE 100 MILLILITER(S): 9 INJECTION, SOLUTION INTRAVENOUS at 05:13

## 2022-01-01 RX ADMIN — OCTREOTIDE ACETATE 100 MICROGRAM(S): 200 INJECTION, SOLUTION INTRAVENOUS; SUBCUTANEOUS at 16:22

## 2022-01-01 RX ADMIN — Medication 10 MILLIGRAM(S): at 16:25

## 2022-01-01 RX ADMIN — Medication 10 MILLIGRAM(S): at 21:20

## 2022-01-01 RX ADMIN — PANTOPRAZOLE SODIUM 40 MILLIGRAM(S): 20 TABLET, DELAYED RELEASE ORAL at 06:20

## 2022-01-01 RX ADMIN — PANTOPRAZOLE SODIUM 40 MILLIGRAM(S): 20 TABLET, DELAYED RELEASE ORAL at 05:01

## 2022-01-01 RX ADMIN — SODIUM CHLORIDE 500 MILLILITER(S): 9 INJECTION, SOLUTION INTRAVENOUS at 16:23

## 2022-01-01 RX ADMIN — OCTREOTIDE ACETATE 100 MICROGRAM(S): 200 INJECTION, SOLUTION INTRAVENOUS; SUBCUTANEOUS at 22:58

## 2022-01-01 RX ADMIN — Medication 10 MILLIGRAM(S): at 06:17

## 2022-01-01 RX ADMIN — OCTREOTIDE ACETATE 100 MICROGRAM(S): 200 INJECTION, SOLUTION INTRAVENOUS; SUBCUTANEOUS at 06:39

## 2022-01-01 RX ADMIN — ENOXAPARIN SODIUM 40 MILLIGRAM(S): 100 INJECTION SUBCUTANEOUS at 22:56

## 2022-01-01 RX ADMIN — FAMOTIDINE 20 MILLIGRAM(S): 10 INJECTION INTRAVENOUS at 18:51

## 2022-01-01 RX ADMIN — ENOXAPARIN SODIUM 40 MILLIGRAM(S): 100 INJECTION SUBCUTANEOUS at 22:31

## 2022-01-01 RX ADMIN — Medication 10 MILLIGRAM(S): at 22:57

## 2022-01-01 RX ADMIN — Medication 10 MILLIGRAM(S): at 05:32

## 2022-01-01 RX ADMIN — SODIUM CHLORIDE 100 MILLILITER(S): 9 INJECTION, SOLUTION INTRAVENOUS at 01:23

## 2022-01-01 RX ADMIN — SODIUM CHLORIDE 125 MILLILITER(S): 9 INJECTION, SOLUTION INTRAVENOUS at 17:39

## 2022-01-01 RX ADMIN — SODIUM CHLORIDE 75 MILLILITER(S): 9 INJECTION, SOLUTION INTRAVENOUS at 00:04

## 2022-01-06 ENCOUNTER — TRANSCRIPTION ENCOUNTER (OUTPATIENT)
Age: 62
End: 2022-01-06

## 2022-01-06 ENCOUNTER — OUTPATIENT (OUTPATIENT)
Dept: OUTPATIENT SERVICES | Facility: HOSPITAL | Age: 62
LOS: 1 days | Discharge: HOME | End: 2022-01-06

## 2022-01-06 DIAGNOSIS — Z87.438 PERSONAL HISTORY OF OTHER DISEASES OF MALE GENITAL ORGANS: Chronic | ICD-10-CM

## 2022-01-06 DIAGNOSIS — R19.00 INTRA-ABDOMINAL AND PELVIC SWELLING, MASS AND LUMP, UNSPECIFIED SITE: Chronic | ICD-10-CM

## 2022-01-06 DIAGNOSIS — C26.9 MALIGNANT NEOPLASM OF ILL-DEFINED SITES WITHIN THE DIGESTIVE SYSTEM: ICD-10-CM

## 2022-01-06 DIAGNOSIS — Z90.49 ACQUIRED ABSENCE OF OTHER SPECIFIED PARTS OF DIGESTIVE TRACT: Chronic | ICD-10-CM

## 2022-01-06 DIAGNOSIS — Z98.890 OTHER SPECIFIED POSTPROCEDURAL STATES: Chronic | ICD-10-CM

## 2022-01-06 DIAGNOSIS — C18.1 MALIGNANT NEOPLASM OF APPENDIX: Chronic | ICD-10-CM

## 2022-01-06 LAB
ALBUMIN SERPL ELPH-MCNC: 4.2 G/DL
ALBUMIN SERPL ELPH-MCNC: 4.3 G/DL
ALP BLD-CCNC: 149 U/L
ALP BLD-CCNC: 158 U/L
ALT SERPL-CCNC: 20 U/L
ALT SERPL-CCNC: 20 U/L
ANION GAP SERPL CALC-SCNC: 10 MMOL/L
ANION GAP SERPL CALC-SCNC: 15 MMOL/L
AST SERPL-CCNC: 19 U/L
AST SERPL-CCNC: 20 U/L
BILIRUB SERPL-MCNC: 0.2 MG/DL
BILIRUB SERPL-MCNC: 0.2 MG/DL
BUN SERPL-MCNC: 14 MG/DL
BUN SERPL-MCNC: 18 MG/DL
CALCIUM SERPL-MCNC: 9.4 MG/DL
CALCIUM SERPL-MCNC: 9.9 MG/DL
CEA SERPL-MCNC: 29 NG/ML
CHLORIDE SERPL-SCNC: 105 MMOL/L
CHLORIDE SERPL-SCNC: 106 MMOL/L
CO2 SERPL-SCNC: 22 MMOL/L
CO2 SERPL-SCNC: 26 MMOL/L
CREAT SERPL-MCNC: 0.83 MG/DL
CREAT SERPL-MCNC: 0.93 MG/DL
GLUCOSE SERPL-MCNC: 114 MG/DL
GLUCOSE SERPL-MCNC: 96 MG/DL
MAGNESIUM SERPL-MCNC: 2 MG/DL
PHOSPHATE SERPL-MCNC: 4.6 MG/DL
POTASSIUM SERPL-SCNC: 5.1 MMOL/L
POTASSIUM SERPL-SCNC: 5.3 MMOL/L
PROT SERPL-MCNC: 6.8 G/DL
PROT SERPL-MCNC: 7 G/DL
SODIUM SERPL-SCNC: 141 MMOL/L
SODIUM SERPL-SCNC: 143 MMOL/L

## 2022-01-06 NOTE — PATIENT PROFILE ADULT - NSPROPOAURINARYCATHETER_GEN_A_NUR
Spoke with Emelia RN from patients Pediatrician office.  The referral that is in place for the time being has not been completed since patient was originally due in April.    After reviewing with Emelia she is going to work with her referral team and have them process this asap.  They from the office is just looking for codes that might be attached to the visit with Dr. Schwartz so they can add those to the referral as well.    If there are any codes that their office is needed to fax to [phone]  Can [facility] to Emelia and she will get this to the referral team with patients pcp office.    Holding open until fully completed.   no

## 2022-01-07 ENCOUNTER — TRANSCRIPTION ENCOUNTER (OUTPATIENT)
Age: 62
End: 2022-01-07

## 2022-01-07 ENCOUNTER — RESULT REVIEW (OUTPATIENT)
Age: 62
End: 2022-01-07

## 2022-01-07 ENCOUNTER — APPOINTMENT (OUTPATIENT)
Dept: INFUSION THERAPY | Facility: CLINIC | Age: 62
End: 2022-01-07

## 2022-01-07 LAB
BASOPHILS # BLD AUTO: 0.07 K/UL — SIGNIFICANT CHANGE UP (ref 0–0.2)
BASOPHILS NFR BLD AUTO: 0.8 % — SIGNIFICANT CHANGE UP (ref 0–2)
EOSINOPHIL # BLD AUTO: 0.2 K/UL — SIGNIFICANT CHANGE UP (ref 0–0.5)
EOSINOPHIL NFR BLD AUTO: 2.2 % — SIGNIFICANT CHANGE UP (ref 0–6)
ERYTHROCYTE [SEDIMENTATION RATE] IN BLOOD: 12 MM/HR — SIGNIFICANT CHANGE UP (ref 0–20)
FERRITIN SERPL-MCNC: 21 NG/ML — LOW (ref 30–400)
HCT VFR BLD CALC: 38.2 % — LOW (ref 39–50)
HGB BLD-MCNC: 12.4 G/DL — LOW (ref 13–17)
IMM GRANULOCYTES NFR BLD AUTO: 0.1 % — SIGNIFICANT CHANGE UP (ref 0–1.5)
IRON SATN MFR SERPL: 17 % — SIGNIFICANT CHANGE UP (ref 16–55)
IRON SATN MFR SERPL: 57 UG/DL — SIGNIFICANT CHANGE UP (ref 45–165)
LYMPHOCYTES # BLD AUTO: 3.4 K/UL — HIGH (ref 1–3.3)
LYMPHOCYTES # BLD AUTO: 37.4 % — SIGNIFICANT CHANGE UP (ref 13–44)
MCHC RBC-ENTMCNC: 30.4 PG — SIGNIFICANT CHANGE UP (ref 27–34)
MCHC RBC-ENTMCNC: 32.5 GM/DL — SIGNIFICANT CHANGE UP (ref 32–36)
MCV RBC AUTO: 93.6 FL — SIGNIFICANT CHANGE UP (ref 80–100)
MONOCYTES # BLD AUTO: 1.05 K/UL — HIGH (ref 0–0.9)
MONOCYTES NFR BLD AUTO: 11.6 % — SIGNIFICANT CHANGE UP (ref 2–14)
NEUTROPHILS # BLD AUTO: 4.36 K/UL — SIGNIFICANT CHANGE UP (ref 1.8–7.4)
NEUTROPHILS NFR BLD AUTO: 47.9 % — SIGNIFICANT CHANGE UP (ref 43–77)
NRBC # BLD: 0 /100 WBCS — SIGNIFICANT CHANGE UP (ref 0–0)
PLATELET # BLD AUTO: 361 K/UL — SIGNIFICANT CHANGE UP (ref 150–400)
RBC # BLD: 4.08 M/UL — LOW (ref 4.2–5.8)
RBC # FLD: 14.4 % — SIGNIFICANT CHANGE UP (ref 10.3–14.5)
TIBC SERPL-MCNC: 334 UG/DL — SIGNIFICANT CHANGE UP (ref 220–430)
UIBC SERPL-MCNC: 277 UG/DL — SIGNIFICANT CHANGE UP (ref 110–370)
WBC # BLD: 9.09 K/UL — SIGNIFICANT CHANGE UP (ref 3.8–10.5)
WBC # FLD AUTO: 9.09 K/UL — SIGNIFICANT CHANGE UP (ref 3.8–10.5)

## 2022-01-20 LAB
APPEARANCE: CLEAR
BACTERIA: NEGATIVE
BILIRUBIN URINE: NEGATIVE
BLOOD URINE: NEGATIVE
COLOR: YELLOW
GLUCOSE QUALITATIVE U: NEGATIVE
HYALINE CASTS: 0 /LPF
KETONES URINE: NEGATIVE
LEUKOCYTE ESTERASE URINE: NEGATIVE
MICROSCOPIC-UA: NORMAL
NITRITE URINE: NEGATIVE
PH URINE: 6
PROTEIN URINE: NORMAL
RED BLOOD CELLS URINE: 3 /HPF
SPECIFIC GRAVITY URINE: 1.03
SQUAMOUS EPITHELIAL CELLS: 1 /HPF
UROBILINOGEN URINE: NORMAL
WHITE BLOOD CELLS URINE: 5 /HPF

## 2022-01-23 LAB — BACTERIA UR CULT: ABNORMAL

## 2022-01-25 ENCOUNTER — APPOINTMENT (OUTPATIENT)
Dept: UROLOGY | Facility: CLINIC | Age: 62
End: 2022-01-25
Payer: COMMERCIAL

## 2022-01-25 ENCOUNTER — TRANSCRIPTION ENCOUNTER (OUTPATIENT)
Age: 62
End: 2022-01-25

## 2022-01-25 PROCEDURE — 99214 OFFICE O/P EST MOD 30 MIN: CPT

## 2022-01-25 NOTE — ASSESSMENT
[FreeTextEntry1] : 61 year old male presents for follow up of hydronephrosis\par \par Hx of Metastatic Appendiceal Ca s/p ex-lap R colectomy, Cholecystectomy, Splenectomy May 2020\par Post-op Chemo, s/p Pressurized Intraperitoneal Chemotherapy, now approved with compassionate care getting 5th PIPEC treatment\par \par Went to ER Feb 2021 for ab pain -> Questionable hydro down to bladder\par Has had bacteriuria, with persistent with Enterococcus. \par Feels pressure in the bladder area, but no dysuria, hematuria, flank pain.\par LUTS Symptoms on Flomax, Voiding well.\par \par Cr 0.97 Dec 2021\par UCx 1/19/22 -> 50-99k E. Faecalis\par CT Dec 2021 -> Mild to mod R hydro slightly increased since 10/18/21?  Unchanged peritoneal implants and/or loculated pockets of ascites.  Reviewed images personally.\par \par -- Would continue to be conservative unless hydronephrosis worsens or Cr worsens\par -- r/b/a discussed regarding treating the colonized enterococcus.  no pain on LIZZETTE or bogginess of the prostate.  Given no symptology, would continue observation.

## 2022-01-25 NOTE — PHYSICAL EXAM
[General Appearance - Well Developed] : well developed [General Appearance - Well Nourished] : well nourished [Normal Appearance] : normal appearance [Well Groomed] : well groomed [General Appearance - In No Acute Distress] : no acute distress [Abdomen Soft] : soft [Prostate Tenderness] : the prostate was not tender [No Prostate Nodules] : no prostate nodules [Prostate Size ___ gm] : prostate size [unfilled] gm [FreeTextEntry1] : flat [Skin Color & Pigmentation] : normal skin color and pigmentation [Heart Rate And Rhythm] : Heart rate and rhythm were normal [] : no respiratory distress [Exaggerated Use Of Accessory Muscles For Inspiration] : no accessory muscle use [Oriented To Time, Place, And Person] : oriented to person, place, and time [Normal Station and Gait] : the gait and station were normal for the patient's age

## 2022-01-25 NOTE — HISTORY OF PRESENT ILLNESS
[FreeTextEntry1] : 61 year old male presents for follow up of hydronephrosis\par \par Last seen Jun 2021\par \par Hx of Metastatic Appendiceal Ca s/p ex-lap R colectomy, Cholecystectomy, Splenectomy.  \par Post-op Chemo, s/p Pressurized Intraperitoneal Chemotherapy, now approved with compassionate care getting 5th PIPEC treatment\par \par Went to ER Feb 2021 for ab pain -> Questionable hydro down to bladder\par Has had bacteriuria, with persistent with Enterococcus. \par Feels pressure in the bladder area, but no dysuria, hematuria, flank pain.\par LUTS Symptoms on Flomax, Voiding well.\par \par Cr 0.97 Dec 2021\par UCx 1/19/22 -> 50-99k E. Faecalis\par CT Dec 2021 -> Mild to mod R hydro slightly increased since 10/18/21?  Unchanged peritoneal implants and/or loculated pockets of ascites.  Reviewed images personally. [Urinary Incontinence] : no urinary incontinence [Urinary Retention] : no urinary retention [Urinary Urgency] : no urinary urgency [Urinary Frequency] : urinary frequency [Nocturia] : no nocturia [Straining] : no straining [Dysuria] : no dysuria [Hematuria - Gross] : no gross hematuria [Fever] : no fever [Fatigue] : no fatigue [Nausea] : no nausea

## 2022-01-28 LAB — BACTERIA UR CULT: ABNORMAL

## 2022-02-01 ENCOUNTER — OUTPATIENT (OUTPATIENT)
Dept: OUTPATIENT SERVICES | Facility: HOSPITAL | Age: 62
LOS: 1 days | End: 2022-02-01
Payer: COMMERCIAL

## 2022-02-01 VITALS
OXYGEN SATURATION: 99 % | DIASTOLIC BLOOD PRESSURE: 75 MMHG | TEMPERATURE: 97 F | RESPIRATION RATE: 16 BRPM | HEART RATE: 60 BPM | SYSTOLIC BLOOD PRESSURE: 122 MMHG | HEIGHT: 66 IN | WEIGHT: 132.06 LBS

## 2022-02-01 DIAGNOSIS — C18.1 MALIGNANT NEOPLASM OF APPENDIX: ICD-10-CM

## 2022-02-01 DIAGNOSIS — Z90.49 ACQUIRED ABSENCE OF OTHER SPECIFIED PARTS OF DIGESTIVE TRACT: Chronic | ICD-10-CM

## 2022-02-01 DIAGNOSIS — Z98.890 OTHER SPECIFIED POSTPROCEDURAL STATES: Chronic | ICD-10-CM

## 2022-02-01 DIAGNOSIS — K21.9 GASTRO-ESOPHAGEAL REFLUX DISEASE WITHOUT ESOPHAGITIS: ICD-10-CM

## 2022-02-01 DIAGNOSIS — Z87.438 PERSONAL HISTORY OF OTHER DISEASES OF MALE GENITAL ORGANS: ICD-10-CM

## 2022-02-01 DIAGNOSIS — G47.33 OBSTRUCTIVE SLEEP APNEA (ADULT) (PEDIATRIC): ICD-10-CM

## 2022-02-01 DIAGNOSIS — C18.1 MALIGNANT NEOPLASM OF APPENDIX: Chronic | ICD-10-CM

## 2022-02-01 DIAGNOSIS — R19.00 INTRA-ABDOMINAL AND PELVIC SWELLING, MASS AND LUMP, UNSPECIFIED SITE: Chronic | ICD-10-CM

## 2022-02-01 DIAGNOSIS — F41.9 ANXIETY DISORDER, UNSPECIFIED: ICD-10-CM

## 2022-02-01 DIAGNOSIS — Z87.438 PERSONAL HISTORY OF OTHER DISEASES OF MALE GENITAL ORGANS: Chronic | ICD-10-CM

## 2022-02-01 LAB
ALBUMIN SERPL ELPH-MCNC: 4 G/DL — SIGNIFICANT CHANGE UP (ref 3.3–5)
ALP SERPL-CCNC: 102 U/L — SIGNIFICANT CHANGE UP (ref 40–120)
ALT FLD-CCNC: 19 U/L — SIGNIFICANT CHANGE UP (ref 4–41)
ANION GAP SERPL CALC-SCNC: 10 MMOL/L — SIGNIFICANT CHANGE UP (ref 7–14)
AST SERPL-CCNC: 20 U/L — SIGNIFICANT CHANGE UP (ref 4–40)
BILIRUB SERPL-MCNC: 0.4 MG/DL — SIGNIFICANT CHANGE UP (ref 0.2–1.2)
BLD GP AB SCN SERPL QL: NEGATIVE — SIGNIFICANT CHANGE UP
BUN SERPL-MCNC: 16 MG/DL — SIGNIFICANT CHANGE UP (ref 7–23)
CALCIUM SERPL-MCNC: 9.2 MG/DL — SIGNIFICANT CHANGE UP (ref 8.4–10.5)
CHLORIDE SERPL-SCNC: 102 MMOL/L — SIGNIFICANT CHANGE UP (ref 98–107)
CO2 SERPL-SCNC: 28 MMOL/L — SIGNIFICANT CHANGE UP (ref 22–31)
CREAT SERPL-MCNC: 1.01 MG/DL — SIGNIFICANT CHANGE UP (ref 0.5–1.3)
GLUCOSE SERPL-MCNC: 78 MG/DL — SIGNIFICANT CHANGE UP (ref 70–99)
HCT VFR BLD CALC: 42 % — SIGNIFICANT CHANGE UP (ref 39–50)
HGB BLD-MCNC: 13.1 G/DL — SIGNIFICANT CHANGE UP (ref 13–17)
MCHC RBC-ENTMCNC: 29.7 PG — SIGNIFICANT CHANGE UP (ref 27–34)
MCHC RBC-ENTMCNC: 31.2 GM/DL — LOW (ref 32–36)
MCV RBC AUTO: 95.2 FL — SIGNIFICANT CHANGE UP (ref 80–100)
NRBC # BLD: 0 /100 WBCS — SIGNIFICANT CHANGE UP
NRBC # FLD: 0 K/UL — SIGNIFICANT CHANGE UP
PLATELET # BLD AUTO: 441 K/UL — HIGH (ref 150–400)
POTASSIUM SERPL-MCNC: 4.4 MMOL/L — SIGNIFICANT CHANGE UP (ref 3.5–5.3)
POTASSIUM SERPL-SCNC: 4.4 MMOL/L — SIGNIFICANT CHANGE UP (ref 3.5–5.3)
PROT SERPL-MCNC: 7.1 G/DL — SIGNIFICANT CHANGE UP (ref 6–8.3)
RBC # BLD: 4.41 M/UL — SIGNIFICANT CHANGE UP (ref 4.2–5.8)
RBC # FLD: 14 % — SIGNIFICANT CHANGE UP (ref 10.3–14.5)
RH IG SCN BLD-IMP: POSITIVE — SIGNIFICANT CHANGE UP
SODIUM SERPL-SCNC: 140 MMOL/L — SIGNIFICANT CHANGE UP (ref 135–145)
WBC # BLD: 10.79 K/UL — HIGH (ref 3.8–10.5)
WBC # FLD AUTO: 10.79 K/UL — HIGH (ref 3.8–10.5)

## 2022-02-01 PROCEDURE — 93010 ELECTROCARDIOGRAM REPORT: CPT

## 2022-02-01 RX ORDER — FAMOTIDINE 10 MG/ML
20 INJECTION INTRAVENOUS
Qty: 0 | Refills: 0 | DISCHARGE

## 2022-02-01 NOTE — H&P PST ADULT - ATTENDING COMMENTS
PIPAC under compassionate use. Risks, benefits, and alternatives discussed and patient consent in chart.

## 2022-02-01 NOTE — H&P PST ADULT - NSICDXPASTMEDICALHX_GEN_ALL_CORE_FT
PAST MEDICAL HISTORY:  Anxiety     Appendix carcinoma s/p abdominal surgery and chemotherapy, completed 12/2020    BPH (benign prostatic hyperplasia)     Cancer of appendix     GERD (gastroesophageal reflux disease) pt denes recent endoscopy    HLD (hyperlipidemia)     Malignant neoplasm of appendix     Neuropathic pain hands and feet    UTI (urinary tract infection) pt denes recent infection

## 2022-02-01 NOTE — H&P PST ADULT - GASTROINTESTINAL COMMENTS
hx of appendix carcinoma, s/p 5 PIPAC Rx, most recently Oct 2021.  Hx of  Cytoreduction surgery with HIPEC 5/2020 and chemotherapy. Now scheduled for diagnostic Laparoscopy Peritoneal Biopsy, Pressurized Intraperitoneal chemotherapy. Abdominal suture line well healed hx of appendix carcinoma, s/p 5 PIPAC Rx, most recently Dec 2021.  Hx of  Cytoreduction surgery with HIPEC 5/2020 and chemotherapy. Now scheduled for diagnostic Laparoscopy Peritoneal Biopsy, PIPAC .

## 2022-02-01 NOTE — H&P PST ADULT - PROBLEM SELECTOR PLAN 1
Diagnostic Laparoscopy Peritoneal Biopsies, PIPAC    Pre op instructions reviewed with pt ; including Hibiclens with teach back ;pt verbalized good understanding of pre op instructions

## 2022-02-01 NOTE — H&P PST ADULT - NSSUBSTANCEUSE_GEN_ALL_CORE_SD
Patient Education        Ankle Sprain: Care Instructions  Your Care Instructions    An ankle sprain can happen when you twist your ankle. The ligaments that support the ankle can get stretched and torn. Often the ankle is swollen and painful. Ankle sprains may take from several weeks to several months to heal. Usually, the more pain and swelling you have, the more severe your ankle sprain is and the longer it will take to heal. You can heal faster and regain strength in your ankle with good home treatment. It is very important to give your ankle time to heal completely, so that you do not easily hurt your ankle again. Follow-up care is a key part of your treatment and safety. Be sure to make and go to all appointments, and call your doctor if you are having problems. It's also a good idea to know your test results and keep a list of the medicines you take. How can you care for yourself at home? · Prop up your foot on pillows as much as possible for the next 3 days. Try to keep your ankle above the level of your heart. This will help reduce the swelling. · Follow your doctor's directions for wearing a splint or elastic bandage. Wrapping the ankle may help reduce or prevent swelling. · Your doctor may give you a splint, a brace, an air stirrup, or another form of ankle support to protect your ankle until it is healed. Wear it as directed while your ankle is healing. Do not remove it unless your doctor tells you to. After your ankle has healed, ask your doctor whether you should wear the brace when you exercise. · Put ice or cold packs on your injured ankle for 10 to 20 minutes at a time. Try to do this every 1 to 2 hours for the next 3 days (when you are awake) or until the swelling goes down. Put a thin cloth between the ice and your skin. · You may need to use crutches until you can walk without pain. If you do use crutches, try to bear some weight on your injured ankle if you can do so without pain.  This helps the ankle heal.  · Take pain medicines exactly as directed. ? If the doctor gave you a prescription medicine for pain, take it as prescribed. ? If you are not taking a prescription pain medicine, ask your doctor if you can take an over-the-counter medicine. · If you have been given ankle exercises to do at home, do them exactly as instructed. These can promote healing and help prevent lasting weakness. When should you call for help? Call your doctor now or seek immediate medical care if:    · Your pain is getting worse.     · Your swelling is getting worse.     · Your splint feels too tight or you are unable to loosen it.    Watch closely for changes in your health, and be sure to contact your doctor if:    · You are not getting better after 1 week. Where can you learn more? Go to http://nesha-clint.info/  Enter N539 in the search box to learn more about \"Ankle Sprain: Care Instructions. \"  Current as of: June 26, 2019Content Version: 12.4  © 8027-5503 [employer]. Care instructions adapted under license by [employer] (which disclaims liability or warranty for this information). If you have questions about a medical condition or this instruction, always ask your healthcare professional. [place] [address] any warranty or liability for your use of this information. Patient Education        Ankle Sprain: Rehab Exercises  Introduction  Here are some examples of exercises for you to try. The exercises may be suggested for a condition or for rehabilitation. Start each exercise slowly. Ease off the exercises if you start to have pain. You will be told when to start these exercises and which ones will work best for you. How to do the exercises  \"Alphabet\" exercise   1. Trace the alphabet with your toe. This helps your ankle move in all directions. Side-to-side knee swing exercise   1.  Sit in a chair with your foot flat on the floor.  2. Slowly move your knee from side to side. Keep your foot pressed flat. 3. Continue this exercise for 2 to 3 minutes. Towel curl   1. While sitting, place your foot on a towel on the floor. Scrunch the towel toward you with your toes. 2. Then use your toes to push the towel away from you. 3. To make this exercise more challenging you can put something on the other end of the towel. A can of soup is about the right weight for this. Towel stretch   1. Sit with your legs extended and knees straight. 2. Place a towel around your foot just under the toes. 3. Hold each end of the towel in each hand, with your hands above your knees. 4. Pull back with the towel so that your foot stretches toward you. 5. Hold the position for at least 15 to 30 seconds. 6. Repeat 2 to 4 times a session. Do up to 5 sessions a day. Ankle eversion exercise   1. Start by sitting with your foot flat on the floor. Push your foot outward against a wall or a piece of furniture that doesn't move. Hold for about 6 seconds, and relax. Repeat 8 to 12 times. 2. After you feel comfortable with this, try using rubber tubing looped around the outside of your feet for resistance. Push your foot out to the side against the tubing, and then count to 10 as you slowly bring your foot back to the middle. Repeat 8 to 12 times. Isometric opposition exercises   1. While sitting, put your feet together flat on the floor. 2. Press your injured foot inward against your other foot. Hold for about 6 seconds, and relax. Repeat 8 to 12 times. 3. Then place the heel of your other foot on top of the injured one. Push down with the top heel while trying to push up with your injured foot. Hold for about 6 seconds, and relax. Repeat 8 to 12 times. Resisted ankle inversion   1. Sit on the floor with your good leg crossed over your other leg. 2. Hold both ends of an exercise band and loop the band around the inside of your affected foot.  Then press your other foot against the band. 3. Keeping your legs crossed, slowly push your affected foot against the band so that foot moves away from your other foot. Then slowly relax. 4. Repeat 8 to 12 times. Resisted ankle eversion   1. Sit on the floor with your legs straight. 2. Hold both ends of an exercise band and loop the band around the outside of your affected foot. Then press your other foot against the band. 3. Keeping your leg straight, slowly push your affected foot outward against the band and away from your other foot without letting your leg rotate. Then slowly relax. 4. Repeat 8 to 12 times. Resisted ankle dorsiflexion   1. Tie the ends of an exercise band together to form a loop. Attach one end of the loop to a secure object or shut a door on it to hold it in place. (Or you can have someone hold one end of the loop to provide resistance.)  2. While sitting on the floor or in a chair, loop the other end of the band over the top of your affected foot. 3. Keeping your knee and leg straight, slowly flex your foot to pull back on the exercise band, and then slowly relax. 4. Repeat 8 to 12 times. Single-leg balance   1. Stand on a flat surface with your arms stretched out to your sides like you are making the letter \"T. \" Then lift your good leg off the floor, bending it at the knee. If you are not steady on your feet, use one hand to hold on to a chair, counter, or wall. 2. Standing on the leg with your affected ankle, keep that knee straight. Try to balance on that leg for up to 30 seconds. Then rest for up to 10 seconds. 3. Repeat 6 to 8 times. 4. When you can balance on your affected leg for 30 seconds with your eyes open, try to balance on it with your eyes closed. 5. When you can do this exercise with your eyes closed for 30 seconds and with ease and no pain, try standing on a pillow or piece of foam, and repeat steps 1 through 4.     Follow-up care is a key part of your treatment and safety. Be sure to make and go to all appointments, and call your doctor if you are having problems. It's also a good idea to know your test results and keep a list of the medicines you take. Where can you learn more? Go to http://nesha-clint.info/  Enter Shira Olszewski in the search box to learn more about \"Ankle Sprain: Rehab Exercises. \"  Current as of: June 26, 2019Content Version: 12.4  © 1999-0038 [employer]. Care instructions adapted under license by [employer] (which disclaims liability or warranty for this information). If you have questions about a medical condition or this instruction, always ask your healthcare professional. [address] any warranty or liability for your use of this information. Patient Education        Learning About RICE (Rest, Ice, Compression, and Elevation)  What is RICE? RICE is a way to care for an injury. RICE helps relieve pain and swelling. It may also help with healing and flexibility. RICE stands for:  · R est and protect the injured or sore area. · I ce or a cold pack used as soon as possible. · C ompression, or wrapping the injured or sore area with an elastic bandage. · E levation (propping up) the injured or sore area. How do you do RICE? You can use RICE for home treatment when you have general aches and pains or after an injury or surgery. Rest  · Do not put weight on the injury for at least 24 to 48 hours. · Use crutches for a badly sprained knee or ankle. · Support a sprained wrist, elbow, or shoulder with a sling. Ice  · Put ice or a cold pack on the injury right away to reduce pain and swelling. Frozen vegetables will also work as an ice pack. Put a thin cloth between the ice or cold pack and your skin. The cloth protects the injured area from getting too cold. · Use ice for 10 to 15 minutes at a time for the first 48 to 72 hours.   Compression  · Use compression for sprains, strains, and surgeries of the arms and legs. · Wrap the injured area with an elastic bandage or compression sleeve to reduce swelling. · Don't wrap it too tightly. If the area below it feels numb, tingles, or feels cool, loosen the wrap. Elevation  · Use elevation for areas of the body that can be propped up, such as arms and legs. · Prop up the injured area on pillows whenever you use ice. Keep it propped up anytime you sit or lie down. · Try to keep the injured area at or above the level of your heart. This will help reduce swelling and bruising. Where can you learn more? Go to http://nesha-clint.info/  Enter I463 in the search box to learn more about \"Learning About RICE (Rest, Ice, Compression, and Elevation). \"  Current as of: June 26, 2019Content Version: 12.4  © 6786-1558 [employer]. Care instructions adapted under license by [employer] (which disclaims liability or warranty for this information). If you have questions about a medical condition or this instruction, always ask your healthcare professional. [place] [address] any warranty or liability for your use of this information. never used

## 2022-02-01 NOTE — H&P PST ADULT - HISTORY OF PRESENT ILLNESS
Pt is a 59 yo male  with hx of appendix carcinoma, s/p 5 PIPAC Rx, most recently Dec 2021 .  Hx of Cytoreduction surgery with HIPEC 5/2020 and chemotherapy. Now scheduled for diagnostic Laparoscopy Peritoneal Biopsy, Pressurized Intraperitoneal chemotherapy.     Dr. Jenkins to add codes         Pt is a 59 yo male  with hx of appendix carcinoma, s/p Diagnostic Laparoscopy X 5 PIPAC Rx, most recently Dec 2021 .  Hx of Cytoreduction surgery with HIPEC 5/2020 and chemotherapy. Now scheduled for diagnostic Laparoscopy Peritoneal Biopsy, PIPAC

## 2022-02-01 NOTE — H&P PST ADULT - NSICDXPASTSURGICALHX_GEN_ALL_CORE_FT
PAST SURGICAL HISTORY:  Abdominal mass surgery 5/2020  remove mass, spleen, partial colectomy, lymph nodes, part small intestines, omentum, apendix, gall bladder, part stomach. HIPEC    H/O colectomy Splenectomy , Cholecystectomy   5/2020. Insertion of Mediport    History of abdominal paracentesis x3    History of undescended testicle age 7    Malignant neoplasm of appendix chemo 10/2021; s/p Pipac x 5 last 12/21

## 2022-02-07 ENCOUNTER — OUTPATIENT (OUTPATIENT)
Dept: OUTPATIENT SERVICES | Facility: HOSPITAL | Age: 62
LOS: 1 days | Discharge: HOME | End: 2022-02-07

## 2022-02-07 DIAGNOSIS — Z87.438 PERSONAL HISTORY OF OTHER DISEASES OF MALE GENITAL ORGANS: Chronic | ICD-10-CM

## 2022-02-07 DIAGNOSIS — Z90.49 ACQUIRED ABSENCE OF OTHER SPECIFIED PARTS OF DIGESTIVE TRACT: Chronic | ICD-10-CM

## 2022-02-07 DIAGNOSIS — C26.9 MALIGNANT NEOPLASM OF ILL-DEFINED SITES WITHIN THE DIGESTIVE SYSTEM: ICD-10-CM

## 2022-02-07 DIAGNOSIS — C18.1 MALIGNANT NEOPLASM OF APPENDIX: Chronic | ICD-10-CM

## 2022-02-07 DIAGNOSIS — Z98.890 OTHER SPECIFIED POSTPROCEDURAL STATES: Chronic | ICD-10-CM

## 2022-02-07 DIAGNOSIS — R19.00 INTRA-ABDOMINAL AND PELVIC SWELLING, MASS AND LUMP, UNSPECIFIED SITE: Chronic | ICD-10-CM

## 2022-02-07 PROBLEM — N39.0 URINARY TRACT INFECTION, SITE NOT SPECIFIED: Chronic | Status: ACTIVE | Noted: 2021-05-07

## 2022-02-07 PROBLEM — K21.9 GASTRO-ESOPHAGEAL REFLUX DISEASE WITHOUT ESOPHAGITIS: Chronic | Status: ACTIVE | Noted: 2022-02-01

## 2022-02-08 ENCOUNTER — APPOINTMENT (OUTPATIENT)
Dept: HEMATOLOGY ONCOLOGY | Facility: CLINIC | Age: 62
End: 2022-02-08

## 2022-02-08 ENCOUNTER — APPOINTMENT (OUTPATIENT)
Dept: HEMATOLOGY ONCOLOGY | Facility: CLINIC | Age: 62
End: 2022-02-08
Payer: COMMERCIAL

## 2022-02-08 PROCEDURE — 99213 OFFICE O/P EST LOW 20 MIN: CPT | Mod: 95

## 2022-02-08 NOTE — HISTORY OF PRESENT ILLNESS
[Verbal consent obtained from patient] : the patient, [unfilled] [Disease: _____________________] : Disease: [unfilled] [de-identified] : Mr. Arias is a 60 year old gentlemen with past medical history of BPH presenting to the office for an initial consultation of appendiceal neoplasm.\par \par Patient was being seen by Dr. Mendoza at [facility] for low grade appendiceal tumor diagnosed in 2020.  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 During 4/2020 noted bloating and ascites was found. CT scan showed the malignancy.\par \par 5/7/2020 CRS and HIPEC for LAMN ( Dr. Herberth Pink at [facility] ). Surgery included a splenectomy and distal gastrectomy, omentectomy, cholecystectomy, subtotal colectomy and diaphragm stripping. 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 R2b resection. 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 There was some dose reduction. Neuropathy started continually after chemotherapy was completed and is grade 1, with no impairment of ADLs. \par \par He moved from [place] back to [place] for more family support.\par In April 2021, he noted increase in abdominal fluid, and saw Oncology, Dr. Garcia.\par CT scan ordered and ascites drained.\par There was recurrence of disease on CT scan.\par He was referred to Dr. Mason, who has recommended pressurized intraperitoneal aerosolized chemotherapy trial. (PIPAC).\par \par 7/21/2021\par Underwent diagnostic laparoscopy on 5/19/21. PCI approx 25. Biopsies consistent with mucinous adenocarcinoma. \par 6/3: PIPAC/oxaliplatin treatment #1\par 7/12 - 7/14/21: Patient admitted for transient, self-limited SBO, that was not deemed to be treatment-related.\par 7/22/21: Plan for PIPAC #2 + 5FU/LV\par Normally, has has up to 4-6 BM daily due to short colon.\par Stools are loose.\par Currently no bloating.\par He is active, has no pain, and looking forward to next cycle tomorrow.\par CEA dropping as below.\par \par CEA:\par 6/5/2020: 3.8\par 8/12/2020: 6.1\par 4/7/2021: 10.1 \par 5/26/21: 29\par 6/17/21: 19.9\par 7/21/21: 15.2\par \par 9/2/21\par Patient here for C#3 PIPAC \par Patient has GERD and requests more omeprazole.\par Definitely needs to have it.\par CEA has been declining.\par \par 10/20/21\par Patient has been approved for compassionate use treatment with PIPAC.\par He feels well and does want to proceed.\par Today will be his EOT visit, as well as a pre-treatment visit for continued use of PIIPAC.\par Will have CBC today.\par 5FU and LV will be administered within 24 hour window of planned PIPAC.\par \par 2/8/2022:\par 1) Appendiceal mucinous CA: \par Patient is scheduled for C6D1 5-FU injectable 400 mg/m2 and Leucovorin tomorrow, 2/9/2022.\par He is scheduled for PIPAC with Dr. Mason on 2/10/2022.\par He is feeling well today and voices no adverse events.\par Denies abdominal pain, nausea or vomiting.\par Patient will have labs today at [facility].\par No changes in his medications. [de-identified] : CEA [de-identified] : patient with spouse today \par planned for PIPAC tomorrow\par to get 5FU/LV today \par no abdominal pain, fevers or chills

## 2022-02-08 NOTE — PHYSICAL EXAM
[Restricted in physically strenuous activity but ambulatory and able to carry out work of a light or sedentary nature] : Status 1- Restricted in physically strenuous activity but ambulatory and able to carry out work of a light or sedentary nature, e.g., light house work, office work [Normal] : affect appropriate [de-identified] : anicteric  [de-identified] : incision site shows small granulating suture; right lateral linda-umbilical.

## 2022-02-09 ENCOUNTER — RESULT REVIEW (OUTPATIENT)
Age: 62
End: 2022-02-09

## 2022-02-09 ENCOUNTER — TRANSCRIPTION ENCOUNTER (OUTPATIENT)
Age: 62
End: 2022-02-09

## 2022-02-09 ENCOUNTER — APPOINTMENT (OUTPATIENT)
Dept: INFUSION THERAPY | Facility: HOSPITAL | Age: 62
End: 2022-02-09

## 2022-02-09 LAB
ALBUMIN SERPL ELPH-MCNC: 4.2 G/DL
ALP BLD-CCNC: 100 U/L
ALT SERPL-CCNC: 19 U/L
ANION GAP SERPL CALC-SCNC: 15 MMOL/L
AST SERPL-CCNC: 20 U/L
BASOPHILS # BLD AUTO: 0.08 K/UL — SIGNIFICANT CHANGE UP (ref 0–0.2)
BASOPHILS NFR BLD AUTO: 0.7 % — SIGNIFICANT CHANGE UP (ref 0–2)
BILIRUB SERPL-MCNC: 0.4 MG/DL
BUN SERPL-MCNC: 18 MG/DL
CALCIUM SERPL-MCNC: 9.3 MG/DL
CEA SERPL-MCNC: 7.7 NG/ML
CHLORIDE SERPL-SCNC: 105 MMOL/L
CO2 SERPL-SCNC: 24 MMOL/L
CREAT SERPL-MCNC: 1.06 MG/DL
EOSINOPHIL # BLD AUTO: 0.22 K/UL — SIGNIFICANT CHANGE UP (ref 0–0.5)
EOSINOPHIL NFR BLD AUTO: 2 % — SIGNIFICANT CHANGE UP (ref 0–6)
GLUCOSE SERPL-MCNC: 89 MG/DL
HCT VFR BLD CALC: 37.1 % — LOW (ref 39–50)
HGB BLD-MCNC: 12.1 G/DL — LOW (ref 13–17)
IMM GRANULOCYTES NFR BLD AUTO: 0.2 % — SIGNIFICANT CHANGE UP (ref 0–1.5)
LYMPHOCYTES # BLD AUTO: 27.5 % — SIGNIFICANT CHANGE UP (ref 13–44)
LYMPHOCYTES # BLD AUTO: 3.06 K/UL — SIGNIFICANT CHANGE UP (ref 1–3.3)
MCHC RBC-ENTMCNC: 30.5 PG — SIGNIFICANT CHANGE UP (ref 27–34)
MCHC RBC-ENTMCNC: 32.6 G/DL — SIGNIFICANT CHANGE UP (ref 32–36)
MCV RBC AUTO: 93.5 FL — SIGNIFICANT CHANGE UP (ref 80–100)
MONOCYTES # BLD AUTO: 1.34 K/UL — HIGH (ref 0–0.9)
MONOCYTES NFR BLD AUTO: 12 % — SIGNIFICANT CHANGE UP (ref 2–14)
NEUTROPHILS # BLD AUTO: 6.42 K/UL — SIGNIFICANT CHANGE UP (ref 1.8–7.4)
NEUTROPHILS NFR BLD AUTO: 57.6 % — SIGNIFICANT CHANGE UP (ref 43–77)
NRBC # BLD: 0 /100 WBCS — SIGNIFICANT CHANGE UP (ref 0–0)
PLATELET # BLD AUTO: 433 K/UL — HIGH (ref 150–400)
POTASSIUM SERPL-SCNC: 5.2 MMOL/L
PROT SERPL-MCNC: 6.6 G/DL
RBC # BLD: 3.97 M/UL — LOW (ref 4.2–5.8)
RBC # FLD: 13.6 % — SIGNIFICANT CHANGE UP (ref 10.3–14.5)
SODIUM SERPL-SCNC: 144 MMOL/L
WBC # BLD: 11.14 K/UL — HIGH (ref 3.8–10.5)
WBC # FLD AUTO: 11.14 K/UL — HIGH (ref 3.8–10.5)

## 2022-02-09 NOTE — ASU PATIENT PROFILE, ADULT - NSSUBSTANCEUSE_GEN_ALL_CORE_SD
Problem: Falls - Risk of  Goal: *Absence of Falls  Document Tomy Fall Risk and appropriate interventions in the flowsheet.    Outcome: Progressing Towards Goal  Fall Risk Interventions:  Mobility Interventions: Patient to call before getting OOB, Utilize walker, cane, or other assitive device    Mentation Interventions: Adequate sleep, hydration, pain control    Medication Interventions: Patient to call before getting OOB, Teach patient to arise slowly    Elimination Interventions: Call light in reach never used

## 2022-02-09 NOTE — ASU PATIENT PROFILE, ADULT - FALL HARM RISK - UNIVERSAL INTERVENTIONS
Bed in lowest position, wheels locked, appropriate side rails in place/Call bell, personal items and telephone in reach/Instruct patient to call for assistance before getting out of bed or chair/Non-slip footwear when patient is out of bed/[place] to call system/Physically safe environment - no spills, clutter or unnecessary equipment/Purposeful Proactive Rounding/Room/bathroom lighting operational, light cord in reach

## 2022-02-10 ENCOUNTER — NON-APPOINTMENT (OUTPATIENT)
Age: 62
End: 2022-02-10

## 2022-02-10 ENCOUNTER — RESULT REVIEW (OUTPATIENT)
Age: 62
End: 2022-02-10

## 2022-02-10 ENCOUNTER — APPOINTMENT (OUTPATIENT)
Dept: SURGICAL ONCOLOGY | Facility: HOSPITAL | Age: 62
End: 2022-02-10

## 2022-02-10 ENCOUNTER — INPATIENT (INPATIENT)
Facility: HOSPITAL | Age: 62
LOS: 0 days | Discharge: HOME | End: 2022-02-11
Attending: SURGERY | Admitting: SURGERY
Payer: COMMERCIAL

## 2022-02-10 ENCOUNTER — TRANSCRIPTION ENCOUNTER (OUTPATIENT)
Age: 62
End: 2022-02-10

## 2022-02-10 VITALS
HEART RATE: 50 BPM | DIASTOLIC BLOOD PRESSURE: 65 MMHG | RESPIRATION RATE: 16 BRPM | TEMPERATURE: 98 F | WEIGHT: 130.07 LBS | SYSTOLIC BLOOD PRESSURE: 116 MMHG | HEIGHT: 67 IN | OXYGEN SATURATION: 100 %

## 2022-02-10 DIAGNOSIS — R19.00 INTRA-ABDOMINAL AND PELVIC SWELLING, MASS AND LUMP, UNSPECIFIED SITE: Chronic | ICD-10-CM

## 2022-02-10 DIAGNOSIS — Z90.49 ACQUIRED ABSENCE OF OTHER SPECIFIED PARTS OF DIGESTIVE TRACT: Chronic | ICD-10-CM

## 2022-02-10 DIAGNOSIS — C18.1 MALIGNANT NEOPLASM OF APPENDIX: Chronic | ICD-10-CM

## 2022-02-10 DIAGNOSIS — Z98.890 OTHER SPECIFIED POSTPROCEDURAL STATES: Chronic | ICD-10-CM

## 2022-02-10 DIAGNOSIS — Z87.438 PERSONAL HISTORY OF OTHER DISEASES OF MALE GENITAL ORGANS: Chronic | ICD-10-CM

## 2022-02-10 DIAGNOSIS — Z51.11 ENCOUNTER FOR ANTINEOPLASTIC CHEMOTHERAPY: ICD-10-CM

## 2022-02-10 DIAGNOSIS — R11.2 NAUSEA WITH VOMITING, UNSPECIFIED: ICD-10-CM

## 2022-02-10 DIAGNOSIS — C18.1 MALIGNANT NEOPLASM OF APPENDIX: ICD-10-CM

## 2022-02-10 PROCEDURE — 88305 TISSUE EXAM BY PATHOLOGIST: CPT | Mod: 26

## 2022-02-10 PROCEDURE — 88305 TISSUE EXAM BY PATHOLOGIST: CPT | Mod: 26,59

## 2022-02-10 PROCEDURE — 88112 CYTOPATH CELL ENHANCE TECH: CPT | Mod: 26

## 2022-02-10 PROCEDURE — 88341 IMHCHEM/IMCYTCHM EA ADD ANTB: CPT | Mod: 26

## 2022-02-10 PROCEDURE — 49321 LAPAROSCOPY BIOPSY: CPT

## 2022-02-10 PROCEDURE — 88342 IMHCHEM/IMCYTCHM 1ST ANTB: CPT | Mod: 26

## 2022-02-10 RX ORDER — ESCITALOPRAM OXALATE 10 MG/1
5 TABLET, FILM COATED ORAL AT BEDTIME
Refills: 0 | Status: DISCONTINUED | OUTPATIENT
Start: 2022-02-10 | End: 2022-02-11

## 2022-02-10 RX ORDER — FAMOTIDINE 10 MG/ML
40 INJECTION INTRAVENOUS AT BEDTIME
Refills: 0 | Status: DISCONTINUED | OUTPATIENT
Start: 2022-02-10 | End: 2022-02-11

## 2022-02-10 RX ORDER — PANTOPRAZOLE SODIUM 20 MG/1
40 TABLET, DELAYED RELEASE ORAL
Refills: 0 | Status: DISCONTINUED | OUTPATIENT
Start: 2022-02-11 | End: 2022-02-11

## 2022-02-10 RX ORDER — HYDROMORPHONE HYDROCHLORIDE 2 MG/ML
1 INJECTION INTRAMUSCULAR; INTRAVENOUS; SUBCUTANEOUS
Refills: 0 | Status: DISCONTINUED | OUTPATIENT
Start: 2022-02-10 | End: 2022-02-10

## 2022-02-10 RX ORDER — TAMSULOSIN HYDROCHLORIDE 0.4 MG/1
0.4 CAPSULE ORAL AT BEDTIME
Refills: 0 | Status: DISCONTINUED | OUTPATIENT
Start: 2022-02-10 | End: 2022-02-11

## 2022-02-10 RX ORDER — SODIUM CHLORIDE 9 MG/ML
1000 INJECTION, SOLUTION INTRAVENOUS
Refills: 0 | Status: DISCONTINUED | OUTPATIENT
Start: 2022-02-10 | End: 2022-02-11

## 2022-02-10 RX ORDER — SIMVASTATIN 20 MG/1
10 TABLET, FILM COATED ORAL AT BEDTIME
Refills: 0 | Status: DISCONTINUED | OUTPATIENT
Start: 2022-02-10 | End: 2022-02-11

## 2022-02-10 RX ORDER — HEPARIN SODIUM 5000 [USP'U]/ML
5000 INJECTION INTRAVENOUS; SUBCUTANEOUS EVERY 8 HOURS
Refills: 0 | Status: DISCONTINUED | OUTPATIENT
Start: 2022-02-10 | End: 2022-02-11

## 2022-02-10 RX ORDER — CALCIUM CARBONATE 500(1250)
1 TABLET ORAL ONCE
Refills: 0 | Status: COMPLETED | OUTPATIENT
Start: 2022-02-10 | End: 2022-02-10

## 2022-02-10 RX ORDER — OXALIPLATIN 5 MG/ML
146 INJECTION, SOLUTION INTRAVENOUS ONCE
Refills: 0 | Status: DISCONTINUED | OUTPATIENT
Start: 2022-02-10 | End: 2022-02-11

## 2022-02-10 RX ORDER — ONDANSETRON 8 MG/1
4 TABLET, FILM COATED ORAL ONCE
Refills: 0 | Status: DISCONTINUED | OUTPATIENT
Start: 2022-02-10 | End: 2022-02-10

## 2022-02-10 RX ADMIN — SODIUM CHLORIDE 40 MILLILITER(S): 9 INJECTION, SOLUTION INTRAVENOUS at 19:46

## 2022-02-10 RX ADMIN — SODIUM CHLORIDE 40 MILLILITER(S): 9 INJECTION, SOLUTION INTRAVENOUS at 16:41

## 2022-02-10 RX ADMIN — SIMVASTATIN 10 MILLIGRAM(S): 20 TABLET, FILM COATED ORAL at 21:43

## 2022-02-10 RX ADMIN — SODIUM CHLORIDE 30 MILLILITER(S): 9 INJECTION, SOLUTION INTRAVENOUS at 10:51

## 2022-02-10 RX ADMIN — TAMSULOSIN HYDROCHLORIDE 0.4 MILLIGRAM(S): 0.4 CAPSULE ORAL at 21:42

## 2022-02-10 RX ADMIN — Medication 1 TABLET(S): at 23:15

## 2022-02-10 NOTE — DISCHARGE NOTE PROVIDER - NSDCMRMEDTOKEN_GEN_ALL_CORE_FT
AndroGel Pump: Apply topically to affected area once a day  escitalopram 10 mg oral tablet: 1 tab(s) orally once a day  simvastatin 10 mg oral tablet: 1 tab(s) orally once a day (at bedtime)  tamsulosin 0.4 mg oral capsule: 1 cap(s) orally once a day (at bedtime)  tumeric: 1 tab(s)  once a day; d c d 2/02/22   acetaminophen 325 mg oral tablet: 2 tab(s) orally every 6 hours AS NEEDED for pain control.  ***DO NOT EXCEED 4,000MG IN A 24 HOUR PERIOD***  AndroGel Pump: Apply topically to affected area once a day  escitalopram 10 mg oral tablet: 1 tab(s) orally once a day  simvastatin 10 mg oral tablet: 1 tab(s) orally once a day (at bedtime)  tamsulosin 0.4 mg oral capsule: 1 cap(s) orally once a day (at bedtime)  tumeric: 1 tab(s)  once a day; d c d 2/02/22

## 2022-02-10 NOTE — DISCHARGE NOTE PROVIDER - NSDCCPTREATMENT_GEN_ALL_CORE_FT
PRINCIPAL PROCEDURE  Procedure: Diagnostic laparoscopy  Findings and Treatment:       SECONDARY PROCEDURE  Procedure: Intraperitoneal chemotherapy  Findings and Treatment:

## 2022-02-10 NOTE — BRIEF OPERATIVE NOTE - NSICDXBRIEFPROCEDURE_GEN_ALL_CORE_FT
PROCEDURES:  Diagnostic laparoscopy 10-Feb-2022 16:05:14  Inocencio Rome  Intraperitoneal chemotherapy 10-Feb-2022 16:05:26  Inocencio Rome

## 2022-02-10 NOTE — DISCHARGE NOTE PROVIDER - HOSPITAL COURSE
Patient is a 60 year old male with a PMHx of anxiety, BPH, GERD, HLD and appendiceal carcinoma (S/P diagnostic laparoscopy x5, PIPAC Rx - most recently Dec 2021) who presented to pre-surgical testing for evaluation for PIPAC.    On 2/10 patient went to the OR for diagnostic laparoscopic with peritoneal biopsy and PIPAC.  Post operatively patient was advanced to a regular diet, which he tolerated well.    At the time of discharge, the patient was hemodynamically stable, was tolerating PO diet, was voiding urine and passing stool.  He was ambulating and was comfortable with adequate pain control.  The patient was instructed to follow up with Dr. Mason within 1 week after discharge from the hospital.  The patient / family felt comfortable with discharge.  The patient had no other issues.    Per attending, patient deemed medically stable and cleared for discharge at this time. Patient is a 60 year old male with a PMHx of anxiety, BPH, GERD, HLD and appendiceal carcinoma (S/P diagnostic laparoscopy and PIPAC X5 - most recently Dec 2nd 2021) who presented to pre-surgical testing for evaluation for PIPAC.    On 2/10 patient went to the OR for diagnostic laparoscopic with peritoneal biopsy and PIPAC.  Post operatively patient was advanced to a regular diet, which he tolerated well.    At the time of discharge, the patient was hemodynamically stable, was tolerating PO diet, was voiding urine and passing stool.  He was ambulating and was comfortable with adequate pain control.  The patient was instructed to follow up with Dr. Rosas within 1 week after discharge from the hospital.  The patient / family felt comfortable with discharge. The night after surgery the patient noted some bloody drainage involving the left lateral thigh with approximately 1 cm of bruising. A sterile dressing was applied. There was no evidence of infection. The patient had no other issues.    Per attending, patient deemed medically stable and cleared for discharge at this time.

## 2022-02-10 NOTE — CHART NOTE - NSCHARTNOTEFT_GEN_A_CORE
POST-OP NOTE    RYANNE YOUNG | [id number] | [facility] [id number] [id number] A    Procedure: s/p Intraperitoneal chemotherapy    Subjective: Patient seen and examined at bedside, patient denies pain, nausea or vomiting. Denies SOB or chest pain. Has not passed gas or had BMs, voided 150 cc     Vital Signs Last 24 Hrs  T(C): 36.9 (10 Feb 2022 20:38), Max: 36.9 (10 Feb 2022 10:00)  T(F): 98.5 (10 Feb 2022 20:38), Max: 98.5 (10 Feb 2022 20:38)  HR: 85 (10 Feb 2022 20:38) (50 - 90)  BP: 124/68 (10 Feb 2022 20:38) (99/82 - 151/73)  BP(mean): 75 (10 Feb 2022 19:45) (65 - 110)  RR: 16 (10 Feb 2022 20:38) (8 - 20)  SpO2: 100% (10 Feb 2022 20:38) (96% - 100%)  I&O's Summary    10 Feb 2022 07:01  -  10 Feb 2022 23:06  --------------------------------------------------------  IN: 570 mL / OUT: 0 mL / NET: 570 mL                            12.1   11.14 )-----------( 433      ( 09 Feb 2022 16:18 )             37.1              PHYSICAL EXAM:  Gen: NAD  Resp: breathing easily, no stridor  CV: RRR  Abdomen: soft, appropriate tenderness to palpation. nondistended. 2 port incisions cover red with derma bond.   Skin: Incision c/d/i. Normal color, no rashes or lesions    PLAN  - Discharge home tomorrow.

## 2022-02-10 NOTE — DISCHARGE NOTE PROVIDER - NSDCFUADDINST_GEN_ALL_CORE_FT
WOUND CARE: Please keep incisions clean and dry.  Please do not scrub or rub incisions.  Do not use lotion or powder on incisions.   BATHING: Please do not submerge wound underwater.  You may shower and / or sponge bathe.  ACTIVITY: No heavy lifting or straining.  Otherwise, you may return to your usual level of physical activity.  If you are taking narcotic pain medication (such as Oxycodone or Percocet) DO NOT drive a car, operate machinery or make important decisions.  DIET: Resume a regular diet as tolerated.  NOTIFY YOUR SURGEON IF: You have any bleeding that does not stop, any pus draining from your wound(s), any fever (over 100.4 F) or chills, persistent nausea / vomiting, persistent diarrhea or if your pain is not controlled on your discharge pain medications.  FOLLOW-UP: Please follow up with your primary care physician in one week regarding your hospitalization.  Please follow-up with your surgeon, within 7 days following discharge.  Please call [phone] to schedule an appointment.

## 2022-02-10 NOTE — DISCHARGE NOTE PROVIDER - CARE PROVIDERS DIRECT ADDRESSES
Clinic hours for Dr. Carbone:  Monday 8am - 5pm  Tuesday 9am - 6pm  Wednesday 8am - 12pm  Thursday 8am - 5pm  Friday  7am - 4pm  3rd Saturday of the month 8-12pm  Not in the Wednesday before that Saturday    If you need a refill on your prescription please call your pharmacy and let them know. Please be proactive and call before your medication runs out. The pharmacy will then contact us for the refill. Please allow 24-48 hours for the refill to be processed.     If your physician has ordered additional laboratory or radiology testing as part of your ongoing plan of care, please allow 5-7 business days from the day of your lab draw or test for the results to be sent and reviewed by your provider. If your results are critical and require more immediate intervention, you will be contacted sooner. Your results will be conveyed to you via a phone call or letter.    You may be receiving a patient satisfaction survey in the mail. Please take the time to complete, as your feedback is very important to us. We strive to make your experience exceptional and your comments help us with that goal. We look forward to hearing from you.     ,DirectAddress_Unknown

## 2022-02-10 NOTE — BRIEF OPERATIVE NOTE - NSICDXBRIEFPREOP_GEN_ALL_CORE_FT
PRE-OP DIAGNOSIS:  Low grade mucinous neoplasm of appendix 10-Feb-2022 16:06:07  Inocencio Rome  Mucinous adenocarcinoma of appendix 10-Feb-2022 16:06:15  Inocencio Rome

## 2022-02-10 NOTE — DISCHARGE NOTE PROVIDER - CARE PROVIDER_API CALL
Florecita Mason)  [facility] General Surgical Oncology; Surgery  [address] [place] [address]  Phone: [phone]  Fax: [phone]  Follow Up Time: 1 week

## 2022-02-11 ENCOUNTER — TRANSCRIPTION ENCOUNTER (OUTPATIENT)
Age: 62
End: 2022-02-11

## 2022-02-11 VITALS
OXYGEN SATURATION: 100 % | SYSTOLIC BLOOD PRESSURE: 130 MMHG | TEMPERATURE: 98 F | DIASTOLIC BLOOD PRESSURE: 80 MMHG | HEART RATE: 99 BPM | RESPIRATION RATE: 18 BRPM

## 2022-02-11 RX ORDER — ACETAMINOPHEN 500 MG
2 TABLET ORAL
Qty: 0 | Refills: 0 | DISCHARGE
Start: 2022-02-11

## 2022-02-11 RX ORDER — ACETAMINOPHEN 500 MG
650 TABLET ORAL EVERY 6 HOURS
Refills: 0 | Status: DISCONTINUED | OUTPATIENT
Start: 2022-02-11 | End: 2022-02-11

## 2022-02-11 RX ADMIN — Medication 650 MILLIGRAM(S): at 06:27

## 2022-02-11 RX ADMIN — FAMOTIDINE 40 MILLIGRAM(S): 10 INJECTION INTRAVENOUS at 00:51

## 2022-02-11 RX ADMIN — PANTOPRAZOLE SODIUM 40 MILLIGRAM(S): 20 TABLET, DELAYED RELEASE ORAL at 06:28

## 2022-02-11 RX ADMIN — Medication 650 MILLIGRAM(S): at 06:57

## 2022-02-11 NOTE — PROGRESS NOTE ADULT - SUBJECTIVE AND OBJECTIVE BOX
TEAM D Surgery Progress Note    INTERVAL EVENTS:   No acute events overnight, patient has oozing from a small wound on the anterior part on the midthigh.     SUBJECTIVE: Patient seen and examined at bedside with surgical team    OBJECTIVE:    Vital Signs Last 24 Hrs  T(C): 37.2 (11 Feb 2022 01:08), Max: 37.2 (11 Feb 2022 01:08)  T(F): 98.9 (11 Feb 2022 01:08), Max: 98.9 (11 Feb 2022 01:08)  HR: 60 (11 Feb 2022 01:08) (50 - 90)  BP: 116/65 (11 Feb 2022 01:08) (99/82 - 151/73)  BP(mean): 75 (10 Feb 2022 19:45) (65 - 110)  RR: 16 (11 Feb 2022 01:08) (8 - 20)  SpO2: 100% (11 Feb 2022 01:08) (96% - 100%)I&O's Detail    10 Feb 2022 07:01  -  11 Feb 2022 01:49  --------------------------------------------------------  IN:    Lactated Ringers: 160 mL    Oral Fluid: 410 mL  Total IN: 570 mL    OUT:    Voided (mL): 300 mL  Total OUT: 300 mL    Total NET: 270 mL      MEDICATIONS  (STANDING):  escitalopram 5 milliGRAM(s) Oral at bedtime  famotidine    Tablet 40 milliGRAM(s) Oral at bedtime  heparin   Injectable 5000 Unit(s) SubCutaneous every 8 hours  lactated ringers. 1000 milliLiter(s) (40 mL/Hr) IV Continuous <Continuous>  oxaliplatin INTRAPERITONEAL (eMAR) 146 milliGRAM(s) IntraPeritoneal once  pantoprazole    Tablet 40 milliGRAM(s) Oral before breakfast  simvastatin 10 milliGRAM(s) Oral at bedtime  tamsulosin 0.4 milliGRAM(s) Oral at bedtime    MEDICATIONS  (PRN):      PHYSICAL EXAM:  Constitutional: A&Ox3, NAD  Respiratory: Unlabored breathing  Abdomen:   Extremities: WWP, CLARK spontaneously    LABS:                        12.1   11.14 )-----------( 433      ( 09 Feb 2022 16:18 )             37.1                     IMAGING:     Surgery Daily Progress Note  =====================================================  Interval / Overnight Events: Oozing at site of left thigh.      HPI:  Patient is a 60 year old male with a PMHx of anxiety, BPH, GERD, HLD and appendiceal carcinoma (S/P diagnostic laparoscopy x5, PIPAC Rx - most recently Dec 2021) who presented to pre-surgical testing for evaluation for PIPAC.      PAST MEDICAL & SURGICAL HISTORY:  HLD (hyperlipidemia)  BPH (benign prostatic hyperplasia)  Neuropathic pain  hands and feet  UTI (urinary tract infection)  pt denes recent infection  Appendix carcinoma  s/p abdominal surgery and chemotherapy, completed 12/2020  Anxiety  Cancer of appendix  Malignant neoplasm of appendix  GERD (gastroesophageal reflux disease)  pt denes recent endoscopy  History of undescended testicle  age 7  Abdominal mass  surgery 5/2020  remove mass, spleen, partial colectomy, lymph nodes, part small intestines, omentum, apendix, gall bladder, part stomach. HIPEC  History of abdominal paracentesis  x3  H/O colectomy  Splenectomy , Cholecystectomy   5/2020. Insertion of Mediport  Malignant neoplasm of appendix  chemo 10/2021; s/p Pipac x 5 last 12/21      ALLERGIES:  No Known Allergies    --------------------------------------------------------------------------------------    MEDICATIONS:    Neurologic Medications  acetaminophen     Tablet .. 650 milliGRAM(s) Oral every 6 hours  escitalopram 5 milliGRAM(s) Oral at bedtime    Cardiovascular Medications  tamsulosin 0.4 milliGRAM(s) Oral at bedtime    Gastrointestinal Medications  famotidine    Tablet 40 milliGRAM(s) Oral at bedtime  pantoprazole    Tablet 40 milliGRAM(s) Oral before breakfast    Hematologic/Oncologic Medications  heparin   Injectable 5000 Unit(s) SubCutaneous every 8 hours    Endocrine/Metabolic Medications  simvastatin 10 milliGRAM(s) Oral at bedtime    --------------------------------------------------------------------------------------    VITAL SIGNS:  ICU Vital Signs Last 24 Hrs  T(C): 37 (11 Feb 2022 06:01), Max: 37.2 (11 Feb 2022 01:08)  T(F): 98.6 (11 Feb 2022 06:01), Max: 98.9 (11 Feb 2022 01:08)  HR: 59 (11 Feb 2022 06:01) (50 - 90)  BP: 126/69 (11 Feb 2022 06:01) (99/82 - 151/73)  BP(mean): 75 (10 Feb 2022 19:45) (65 - 110)  RR: 16 (11 Feb 2022 06:01) (8 - 20)  SpO2: 99% (11 Feb 2022 06:01) (96% - 100%)    --------------------------------------------------------------------------------------    INS AND OUTS:    10 Feb 2022 07:01  -  11 Feb 2022 07:00  --------------------------------------------------------  IN:    Lactated Ringers: 160 mL    Oral Fluid: 410 mL  Total IN: 570 mL    OUT:    Voided (mL): 400 mL  Total OUT: 400 mL    Total NET: 170 mL    --------------------------------------------------------------------------------------    EXAM    NEUROLOGY  Exam: Normal, in no acute distress.    HEENT  Exam: Normocephalic, atraumatic.    RESPIRATORY  Exam: Normal expansion / effort.    CARDIOVASCULAR  Exam: S1, S2.  Regular rate and rhythm.    GI/NUTRITION  Exam: Abdomen soft, Non-tender, Non-distended.  Current Diet: Regular diet    MUSCULOSKELETAL  Exam: All extremities moving spontaneously without limitations.      HEMATOLOGIC  [x] VTE Prophylaxis: heparin   Injectable 5000 Unit(s) SubCutaneous every 8 hours    --------------------------------------------------------------------------------------

## 2022-02-11 NOTE — PROGRESS NOTE ADULT - NUTRITIONAL ASSESSMENT
s/p Intraperitoneal chemotherapy on 02/11    Plan;  - Discharge home            D team surgery   [id number]

## 2022-02-11 NOTE — DISCHARGE NOTE NURSING/CASE MANAGEMENT/SOCIAL WORK - PATIENT PORTAL LINK FT
You can access the FollowMyHealth Patient Portal offered by [facility] by registering at the following website: [email or web address]. By joining [facility]’s FollowMyHealth portal, you will also be able to view your health information using other applications (apps) compatible with our system.

## 2022-02-11 NOTE — PROGRESS NOTE ADULT - ASSESSMENT
Patient is a 60 year old male with a PMHx of anxiety, BPH, GERD, HLD and appendiceal carcinoma (S/P diagnostic laparoscopy x5, PIPAC Rx - most recently Dec 2021) who presented to pre-surgical testing for evaluation for PIPAC.  Patient is now S/P diagnostic laparoscopic with peritoneal biopsy and PIPAC.  Post operatively patient was advanced to a regular diet, which he tolerated well on 2/10/22.      PLAN:  - Regular diet  - Out of bed  - Pain control  - VTE prophylaxis with Lovenox subcutaneous  - Discharge planning home today      #[id number]  D Team Surgery

## 2022-02-11 NOTE — DISCHARGE NOTE NURSING/CASE MANAGEMENT/SOCIAL WORK - NSDCPEFALRISK_GEN_ALL_CORE
For information on Fall & Injury Prevention, visit: [email or web address] OR  [email or web address] OR  https://www.cdc.gov/steadi/patient.html

## 2022-02-14 LAB — NON-GYNECOLOGICAL CYTOLOGY STUDY: SIGNIFICANT CHANGE UP

## 2022-02-15 LAB — SURGICAL PATHOLOGY STUDY: SIGNIFICANT CHANGE UP

## 2022-02-16 LAB
NON-GYNECOLOGICAL CYTOLOGY STUDY: SIGNIFICANT CHANGE UP
NON-GYNECOLOGICAL CYTOLOGY STUDY: SIGNIFICANT CHANGE UP

## 2022-02-18 NOTE — ASU PATIENT PROFILE, ADULT - ABILITY TO HEAR (WITH HEARING AID OR HEARING APPLIANCE IF NORMALLY USED):
"Mary is a 49 year old who is being evaluated via a billable video visit.      How would you like to obtain your AVS? MyChart  If the video visit is dropped, the invitation should be resent by: Text to cell phone:   Will anyone else be joining your video visit? Wife       Video Start Time: 4:20 PM     Elmo Hernandez is a 49 year old who presents for the following health issues     HPI     ED/UC Followup:    Facility:  [facility]   Date of visit: 2/8/2022  Reason for visit: weakness   Current Status: improved      Keaton South MD - 02/08/2022 3:51 AM CST  Formatting of this note is different from the original.  Images from the original note were not included.  CHIEF COMPLAINT:  Weakness    HPI:  Initial history obtained at 3:51 AM 02/08/22.     Mary Lopez is a 49 y.o. male with a history of hypertension who presents to the emergency department for evaluation of fatigue. The patient reports he had blood work done in December that returned with an elevated Creatinine of 1.43. He is scheduled for a follow-up appointment with his doctor on 3/24, however, the patient presents here tonight because he has been having fatigue for the last 3 days. The patient states he is also having a productive cough with phlegm, vomiting secondary to coughing, abdominal pain, and urgency. The patient states he feels the urge to use the bathroom, but when he goes, he is have little urine. The patient states his urine is yellow in coloration. The patient states he has also been feeling a bit bloated and notes he is not really passing any gas. When he is resting, he rates his abdominal pain a 1/10 and expresses it is more of a discomfort rather than pain. Additionally, the patient's wife reports that she is concerned about his high blood pressure. The patient states he occasionally has \"spider-like\" chest pain, but he does not have pain currently. The patient is on Lisinopril for his blood pressure. The patient " states he has been trying to drink 64 ounces of water a day. The patient denies shortness of breath, flank pain, back pain, dysuria, or hematuria.     MDM:   Mary Lopez is a 49 y.o. male who presents here with not feeling well for the past 2 or 3 days or so. The patient reports that he has vomited for the past couple days. The patient states that he does feel bloated in his abdomen. He has not been passing much gas. He states that he has not been urinating as much as normally. The patient reports that his pain in his abdomen rates as maybe a 1 out of 10. His pain is on both sides of his upper abdomen in location. The patient's wife is also worried because his blood pressure was 190/140 at home. He does take 30 mg of lisinopril a day for this. The patient also has a history of renal insufficiency and his last creatinine that was checked was 0.43.    The patient did have an IV established. The patient's EKG shows no acute findings. The patient has a white count of 7.7, hemoglobin 14.2 and a platelet 145. The patient's sodium is 34, potassium 2.6, chloride 93 and CO2 is 25. Creatinine is 0.74. Glucose 112. Liver function tests are elevated with an AST of 152, ALT of 86 and a bilirubin 1.3. The patient does have a history of cholecystectomy in the past and I suspect that the patient liver function tests are elevated because of the patient's alcohol abuse. The patient reports that he does drink multiple drinks about 4 times per week. The patient's high-sensitivity troponin came back at 9. The patient's urine shows negative nitrite, leukocyte Estrace and a trace amount of blood. This does not look infected.    The patient's blood pressure is quite elevated at this time. The patient did receive a total of 2 doses of 20 mg of IV labetalol and patient's blood pressure remains in the 170s over 90s. The patient's blood pressure has been this high for quite a period of time and this is not acute. I think we can slowly  bring this down. The patient is already on 30 mg of IV lisinopril and we can increase this to 40 mg p.o. lisinopril per day and add 10 mg p.o. amlodipine to be taken once a day. I suggested taking the blood pressure medications at different times per day.    The patient I believe has an alcohol problem and needs to quit drinking. The patient reports that he drinks because he cannot sleep. We will give the patient a prescription for trazodone to try but he should not be using alcohol to help fall asleep. We will need the patient to follow-up with his primary care physician also. He should check his blood pressures at least twice a day and bring those numbers to his primary care physician. If those blood pressures are consistently over 140/90 the patient will need an adjustment of his blood pressure medication. The patient does look well and the patient be discharged when this time.    <<Critical care time greater than 30 minutes excluding procedures.>>    Nursing notes and ER flow sheets were all reviewed by myself. The patient did have a high degree of complex medical decision making performed. There were significant risks present and assessed.    DIAGNOSIS:    ICD-10-CM   1. Hypertensive urgency I16.0   2. Alcohol abuse F10.10   3. Insomnia, unspecified type G47.00       Hypertension Follow-up      Do you check your blood pressure regularly outside of the clinic? Yes     Are you following a low salt diet? Yes    Are your blood pressures ever more than 140 on the top number (systolic) OR more   than 90 on the bottom number (diastolic), for example 140/90? No  Has been taking bp every day since being in the ER   Just got back from [place] yesterday for a week   Was drinking about 3 beers a night         Labs reviewed in EPIC  BP Readings from Last 3 Encounters:   12/07/20 (!) 142/83   03/11/20 (!) 157/105   10/16/15 (!) 169/115    Wt Readings from Last 3 Encounters:   03/11/20 104.2 kg (229 lb 11.2 oz)   10/16/15  99.8 kg (220 lb)   05/02/10 101.2 kg (223 lb)                  Patient Active Problem List   Diagnosis     Concussion without loss of consciousness     Essential hypertension     Tobacco abuse disorder     Alcohol use     IFG (impaired fasting glucose)     Mild hyperlipidemia     No past surgical history on file.    Social History     Tobacco Use     Smoking status: Never Smoker     Smokeless tobacco: Current User     Types: Chew   Substance Use Topics     Alcohol use: Not on file     No family history on file.      Current Outpatient Medications   Medication Sig Dispense Refill     amLODIPine (NORVASC) 10 MG tablet Take 1 tablet (10 mg) by mouth daily 90 tablet 0     lisinopril (ZESTRIL) 40 MG tablet Take 1 tablet (40 mg) by mouth daily Last refill. Need in person clinic visit with Dr. Hoyt.       Allergies   Allergen Reactions     Fish Swelling     Food      baked beans         Review of Systems   Constitutional, HEENT, cardiovascular, pulmonary, gi and gu systems are negative, except as otherwise noted.      Objective           Vitals:  No vitals were obtained today due to virtual visit.    Physical Exam   GENERAL: Healthy, alert and no distress  EYES: Eyes grossly normal to inspection and conjunctivae and sclerae normal  HENT: normal cephalic/atraumatic and oral mucous membranes moist  RESP: No audible wheeze, cough, or visible cyanosis.  No visible retractions or increased work of breathing.    MS: extremities normal- no gross deformities noted  SKIN: Visible skin clear. No significant rash, abnormal pigmentation or lesions.  NEURO: mentation intact  PSYCH: Mentation appears normal, affect normal/bright, judgement and insight intact, normal speech and appearance well-groomed.  Diagnostic Test Results:   Diagnostic Test Results:  Pending orders and results     Labs reviewed in [facility]  none   Lab on 12/04/2021   Component Date Value Ref Range Status     Sodium 12/04/2021 134  133 - 144 mmol/L Final     Potassium  12/04/2021 3.8  3.4 - 5.3 mmol/L Final     Chloride 12/04/2021 97  94 - 109 mmol/L Final     Carbon Dioxide (CO2) 12/04/2021 25  20 - 32 mmol/L Final     Anion Gap 12/04/2021 12  3 - 14 mmol/L Final     Urea Nitrogen 12/04/2021 21  7 - 30 mg/dL Final     Creatinine 12/04/2021 1.43 (A) 0.66 - 1.25 mg/dL Final     Calcium 12/04/2021 9.5  8.5 - 10.1 mg/dL Final     Glucose 12/04/2021 117 (A) 70 - 99 mg/dL Final     GFR Estimate 12/04/2021 57 (A) >60 mL/min/1.73m2 Final    As of July 11, 2021, eGFR is calculated by the CKD-EPI creatinine equation, without race adjustment. eGFR can be influenced by muscle mass, exercise, and diet. The reported eGFR is an estimation only and is only applicable if the renal function is stable.     Cholesterol 12/04/2021 231 (A) <200 mg/dL Final     Triglycerides 12/04/2021 174 (A) <150 mg/dL Final     Direct Measure HDL 12/04/2021 76  >=40 mg/dL Final     LDL Cholesterol Calculated 12/04/2021 120 (A) <=100 mg/dL Final     Non HDL Cholesterol 12/04/2021 155 (A) <130 mg/dL Final     Patient Fasting > 8hrs? 12/04/2021 Yes   Final         Assessment & Plan     Essential hypertension  HTN Plan:  1)  Medication: continue current medication regimen unchanged  2)  Dietary sodium restriction  3)  Regular aerobic exercise  4)  Recheck in 1 month, sooner should new symptoms or   problems arise.  5) See todays orders.  Patient Education: Reviewed risks of hypertension and principles of   treatment.  - amLODIPine (NORVASC) 10 MG tablet  Dispense: 90 tablet; Refill: 0  - lisinopril (ZESTRIL) 40 MG tablet  - Albumin Random Urine Quantitative with Creat Ratio  - Comprehensive metabolic panel    CARDIOVASCULAR SCREENING; LDL GOAL LESS THAN 130  Will follow up and/or notify patient of  results via My Chart to determine further need for followup  - Lipid panel reflex to direct LDL Fasting    Screening for diabetes mellitus (DM)  Will follow up and/or notify patient of  results via My Chart to determine  further need for followup  - Comprehensive metabolic panel    Screening for prostate cancer  - Prostate Specific Antigen Screen    Screening for thyroid disorder  - TSH with free T4 reflex    Elevated glucose  - Hemoglobin A1c      Review of prior external note(s) from - [facility] from [facility] reviewed  Ordering of each unique test  Prescription drug management   Time spent doing chart review, history and exam, documentation and further activities per the note       See Patient Instructions  Patient Instructions     PLAN:   1.   Symptomatic therapy suggested: Continue current medications as prescribed.     2.  Orders Placed This Encounter   Medications     amLODIPine (NORVASC) 10 MG tablet     Sig: Take 1 tablet (10 mg) by mouth daily     Dispense:  90 tablet     Refill:  0     lisinopril (ZESTRIL) 40 MG tablet     Sig: Take 1 tablet (40 mg) by mouth daily Last refill. Need in person clinic visit with Dr. Hoyt.     Orders Placed This Encounter   Procedures     Lipid panel reflex to direct LDL Fasting     Albumin Random Urine Quantitative with Creat Ratio     Comprehensive metabolic panel     TSH with free T4 reflex     Prostate Specific Antigen Screen     Hemoglobin A1c       3. Patient needs to follow up in if no improvement,or sooner if worsening of symptoms or other symptoms develop.  FUTURE LABS:       - Schedule a fasting blood draw   Will follow up and/or notify patient of  results via My Chart to determine further need for followup  Keep appointment for physical as planned         Return in about 1 month (around 3/18/2022), or if symptoms worsen or fail to improve.    LESA Hines CNP  [facility]          Video-Visit Details    Type of service:  Video Visit    Video End Time:4:44 PM    Originating Location (pt. Location): Home    Distant Location (provider location):  [facility]     Platform used for Video Visit:  AmWell       Adequate: hears normal conversation without difficulty

## 2022-03-03 ENCOUNTER — APPOINTMENT (OUTPATIENT)
Dept: SURGICAL ONCOLOGY | Facility: CLINIC | Age: 62
End: 2022-03-03
Payer: COMMERCIAL

## 2022-03-03 VITALS
HEART RATE: 73 BPM | RESPIRATION RATE: 15 BRPM | TEMPERATURE: 98.8 F | SYSTOLIC BLOOD PRESSURE: 121 MMHG | HEIGHT: 67 IN | DIASTOLIC BLOOD PRESSURE: 82 MMHG | WEIGHT: 130 LBS | OXYGEN SATURATION: 95 % | BODY MASS INDEX: 20.4 KG/M2

## 2022-03-03 PROCEDURE — 99024 POSTOP FOLLOW-UP VISIT: CPT

## 2022-03-03 NOTE — ASSESSMENT
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 \par No extraperitoneal disease \par Performance status- ecog 1\par \par He is feeling well at this point. \par Disease was reasonably stable over the last year, now with progression requiring paracentesis-- 1.6L drained May 2021. \par \par Given his progression within 1 year of CRS/ HIPEC, as well as R2b/ CC 2 resection at first operation I do not think additional cytoreductive surgery is feasible. I have notified his surgeon from [place], Dr. Peters who agrees. \par \par I discussed case with patients medical oncologist who favors regional approach over additional systemic therapy.\par \par Doing well after 6th PIPAC. PCI 24. Pre-PIPAC was 29. \par Last restaging scans 12/20/21: Stable disease. No extraperitoneal metastasis\par \par PCI PIPAC 1: 29\par PCI PIPAC 2: 28\par PCI PIPAC 3: 24\par PCI PIPAC 4: \par PCI PIPAC 5: 22\par PIPAC#6: 24\par \par Plan: \par [] Blood work  pending\par [] Will coordinate compassionate use PIPAC #7\par \par \par \par

## 2022-03-03 NOTE — PHYSICAL EXAM
[Normal] : oriented to person, place and time, with appropriate affect [de-identified] : abdomen soft, non-distended, non-tender. no masses. laparotomy and drain incisions well healed. Port sites healing well.

## 2022-03-03 NOTE — HISTORY OF PRESENT ILLNESS
[Post-Op Complications] : No post-op complications reported [de-identified] : Mr. Arias is a 60 year old gentleman referred to me by Dr. 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 Herberth Peters. \par \par PCI: 34\par CC: R2b\par \par Mr. Arias has since moved to [place] to be closer to family and has established care in [facility]. Lives in [place]. \par \par Cytoreduction included a splenectomy and distal gastrectomy, omentectomy, cholecystectomy, subtotal colectomy and diaphragm stripping. 4L of mucinous ascites was drained. 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 As his tumor was felt to be low grade, he did not receive any systemic therapy up front. He now reports feeling well. He reports 3-4 loose but not watery BM's/day. He reports improving weight and energy levels. He denies any PO intolerance. He plays golf and does nature walks close to [facility]. \par \par CEA \par 6/5/20: 3.8\par 8/12/20: 6.1\par 4/7: 10.1 \par \par Pathology: \par 5/7/21: Paracentesis, cytology negative for malignant cells. 1.6 Liters removed. 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 He underwent subtotal colectomy with positive margins. 2/14 nodes involved. [facility] review: low grade appendiceal mucinous neoplasm\par \par Scans were obtained at [facility] and demonstrated: \par 4/30/21: loculated intraperitoneal fluid. No bowel obstruction. \par \par Last colonoscopy May 2020. \par \par Underwent diagnostic laparoscopy by me on 5/19/21. PCI approx 25. Evacuated 1.4 liters ascites. Biopsies consistent with mucinous adenocarcinoma. \par 6/3: PIPAC #1 \par 6/17: Doing well. No pain. Mild reflux/ dyspepsia. Relieved with tums. Eating well. \par 7/15/21: Doing well. Admitted earlier this week 7/12-7/13 to [facility] with small bowel obstruction. Dramatically improved after 24 hours nasogastric decompression. Feels well since discharge. Multiple BM last 48 hours. Tolerating full liquid diet. No abdominal pain. \par 8/19/21: Doing well. No adverse events. No complaints. Wants to go swimming. Eating well. Weight is stable. Moving bowels at least daily. \par 12/16/21: Doing well. Completed compassionate use PIPAC X 2. No complaints. Wants to lift weights, go scuba diving, and skiing. Plan for travel to [place] next month\par 2/10/22: PIPAC #6\par 3/3/22: Continues to travel, scuba dive, ski, lift weights. \par

## 2022-03-03 NOTE — REASON FOR VISIT
[Post-Op Visit] : a post-op visit for [Spouse] : spouse [de-identified] : diagnostic laparoscopy 5/19/21, PIPAC 6/3/21, 7/22/21, 9/2/21, 10/2021, 12/2/21, 2/10/22 [FreeTextEntry2] : appendiceal carcinoma-  Here for enrollment visit for [facility] clinical trial

## 2022-03-14 ENCOUNTER — OUTPATIENT (OUTPATIENT)
Dept: OUTPATIENT SERVICES | Facility: HOSPITAL | Age: 62
LOS: 1 days | Discharge: HOME | End: 2022-03-14

## 2022-03-14 DIAGNOSIS — Z87.438 PERSONAL HISTORY OF OTHER DISEASES OF MALE GENITAL ORGANS: Chronic | ICD-10-CM

## 2022-03-14 DIAGNOSIS — C26.9 MALIGNANT NEOPLASM OF ILL-DEFINED SITES WITHIN THE DIGESTIVE SYSTEM: ICD-10-CM

## 2022-03-14 DIAGNOSIS — Z98.890 OTHER SPECIFIED POSTPROCEDURAL STATES: Chronic | ICD-10-CM

## 2022-03-14 DIAGNOSIS — Z90.49 ACQUIRED ABSENCE OF OTHER SPECIFIED PARTS OF DIGESTIVE TRACT: Chronic | ICD-10-CM

## 2022-03-14 DIAGNOSIS — R19.00 INTRA-ABDOMINAL AND PELVIC SWELLING, MASS AND LUMP, UNSPECIFIED SITE: Chronic | ICD-10-CM

## 2022-03-14 DIAGNOSIS — C18.1 MALIGNANT NEOPLASM OF APPENDIX: Chronic | ICD-10-CM

## 2022-03-16 ENCOUNTER — RX RENEWAL (OUTPATIENT)
Age: 62
End: 2022-03-16

## 2022-03-17 ENCOUNTER — EMERGENCY (EMERGENCY)
Facility: HOSPITAL | Age: 62
LOS: 1 days | Discharge: HOME | End: 2022-03-17
Attending: EMERGENCY MEDICINE | Admitting: EMERGENCY MEDICINE
Payer: COMMERCIAL

## 2022-03-17 VITALS
HEART RATE: 63 BPM | DIASTOLIC BLOOD PRESSURE: 89 MMHG | HEIGHT: 67 IN | TEMPERATURE: 98 F | RESPIRATION RATE: 20 BRPM | OXYGEN SATURATION: 100 % | SYSTOLIC BLOOD PRESSURE: 114 MMHG

## 2022-03-17 VITALS
OXYGEN SATURATION: 100 % | TEMPERATURE: 98 F | DIASTOLIC BLOOD PRESSURE: 77 MMHG | RESPIRATION RATE: 18 BRPM | HEART RATE: 55 BPM | SYSTOLIC BLOOD PRESSURE: 126 MMHG

## 2022-03-17 DIAGNOSIS — Z98.890 OTHER SPECIFIED POSTPROCEDURAL STATES: Chronic | ICD-10-CM

## 2022-03-17 DIAGNOSIS — C18.1 MALIGNANT NEOPLASM OF APPENDIX: Chronic | ICD-10-CM

## 2022-03-17 DIAGNOSIS — R19.00 INTRA-ABDOMINAL AND PELVIC SWELLING, MASS AND LUMP, UNSPECIFIED SITE: Chronic | ICD-10-CM

## 2022-03-17 DIAGNOSIS — Z87.438 PERSONAL HISTORY OF OTHER DISEASES OF MALE GENITAL ORGANS: Chronic | ICD-10-CM

## 2022-03-17 DIAGNOSIS — Z90.49 ACQUIRED ABSENCE OF OTHER SPECIFIED PARTS OF DIGESTIVE TRACT: Chronic | ICD-10-CM

## 2022-03-17 LAB
ALBUMIN SERPL ELPH-MCNC: 4 G/DL — SIGNIFICANT CHANGE UP (ref 3.3–5)
ALP SERPL-CCNC: 97 U/L — SIGNIFICANT CHANGE UP (ref 40–120)
ALT FLD-CCNC: 16 U/L — SIGNIFICANT CHANGE UP (ref 4–41)
ANION GAP SERPL CALC-SCNC: 10 MMOL/L — SIGNIFICANT CHANGE UP (ref 7–14)
APPEARANCE UR: CLEAR — SIGNIFICANT CHANGE UP
APTT BLD: 26.5 SEC — LOW (ref 27–36.3)
AST SERPL-CCNC: 21 U/L — SIGNIFICANT CHANGE UP (ref 4–40)
BACTERIA # UR AUTO: NEGATIVE — SIGNIFICANT CHANGE UP
BASE EXCESS BLDV CALC-SCNC: 2.1 MMOL/L — SIGNIFICANT CHANGE UP (ref -2–3)
BASOPHILS # BLD AUTO: 0.07 K/UL — SIGNIFICANT CHANGE UP (ref 0–0.2)
BASOPHILS NFR BLD AUTO: 0.6 % — SIGNIFICANT CHANGE UP (ref 0–2)
BILIRUB SERPL-MCNC: 0.5 MG/DL — SIGNIFICANT CHANGE UP (ref 0.2–1.2)
BILIRUB UR-MCNC: NEGATIVE — SIGNIFICANT CHANGE UP
BLD GP AB SCN SERPL QL: NEGATIVE — SIGNIFICANT CHANGE UP
BLOOD GAS VENOUS COMPREHENSIVE RESULT: SIGNIFICANT CHANGE UP
BUN SERPL-MCNC: 14 MG/DL — SIGNIFICANT CHANGE UP (ref 7–23)
CALCIUM SERPL-MCNC: 9 MG/DL — SIGNIFICANT CHANGE UP (ref 8.4–10.5)
CHLORIDE BLDV-SCNC: 102 MMOL/L — SIGNIFICANT CHANGE UP (ref 96–108)
CHLORIDE SERPL-SCNC: 104 MMOL/L — SIGNIFICANT CHANGE UP (ref 98–107)
CO2 BLDV-SCNC: 30.3 MMOL/L — HIGH (ref 22–26)
CO2 SERPL-SCNC: 25 MMOL/L — SIGNIFICANT CHANGE UP (ref 22–31)
COLOR SPEC: YELLOW — SIGNIFICANT CHANGE UP
CREAT SERPL-MCNC: 0.99 MG/DL — SIGNIFICANT CHANGE UP (ref 0.5–1.3)
DIFF PNL FLD: NEGATIVE — SIGNIFICANT CHANGE UP
EGFR: 86 ML/MIN/1.73M2 — SIGNIFICANT CHANGE UP
EOSINOPHIL # BLD AUTO: 0.25 K/UL — SIGNIFICANT CHANGE UP (ref 0–0.5)
EOSINOPHIL NFR BLD AUTO: 2.1 % — SIGNIFICANT CHANGE UP (ref 0–6)
EPI CELLS # UR: 1 /HPF — SIGNIFICANT CHANGE UP (ref 0–5)
GAS PNL BLDV: 137 MMOL/L — SIGNIFICANT CHANGE UP (ref 136–145)
GLUCOSE BLDV-MCNC: 95 MG/DL — SIGNIFICANT CHANGE UP (ref 70–99)
GLUCOSE SERPL-MCNC: 107 MG/DL — HIGH (ref 70–99)
GLUCOSE UR QL: NEGATIVE — SIGNIFICANT CHANGE UP
HCO3 BLDV-SCNC: 29 MMOL/L — SIGNIFICANT CHANGE UP (ref 22–29)
HCT VFR BLD CALC: 39.8 % — SIGNIFICANT CHANGE UP (ref 39–50)
HCT VFR BLDA CALC: 39 % — SIGNIFICANT CHANGE UP (ref 39–51)
HGB BLD CALC-MCNC: 12.9 G/DL — LOW (ref 13–17)
HGB BLD-MCNC: 13 G/DL — SIGNIFICANT CHANGE UP (ref 13–17)
HYALINE CASTS # UR AUTO: 3 /LPF — SIGNIFICANT CHANGE UP (ref 0–7)
IANC: 6.71 K/UL — SIGNIFICANT CHANGE UP (ref 1.5–8.5)
IMM GRANULOCYTES NFR BLD AUTO: 0.2 % — SIGNIFICANT CHANGE UP (ref 0–1.5)
INR BLD: 0.97 RATIO — SIGNIFICANT CHANGE UP (ref 0.88–1.16)
KETONES UR-MCNC: NEGATIVE — SIGNIFICANT CHANGE UP
LACTATE BLDV-MCNC: 1.1 MMOL/L — SIGNIFICANT CHANGE UP (ref 0.5–2)
LEUKOCYTE ESTERASE UR-ACNC: ABNORMAL
LIDOCAIN IGE QN: 18 U/L — SIGNIFICANT CHANGE UP (ref 7–60)
LYMPHOCYTES # BLD AUTO: 28.4 % — SIGNIFICANT CHANGE UP (ref 13–44)
LYMPHOCYTES # BLD AUTO: 3.35 K/UL — HIGH (ref 1–3.3)
MCHC RBC-ENTMCNC: 29.4 PG — SIGNIFICANT CHANGE UP (ref 27–34)
MCHC RBC-ENTMCNC: 32.7 GM/DL — SIGNIFICANT CHANGE UP (ref 32–36)
MCV RBC AUTO: 90 FL — SIGNIFICANT CHANGE UP (ref 80–100)
MONOCYTES # BLD AUTO: 1.39 K/UL — HIGH (ref 0–0.9)
MONOCYTES NFR BLD AUTO: 11.8 % — SIGNIFICANT CHANGE UP (ref 2–14)
NEUTROPHILS # BLD AUTO: 6.71 K/UL — SIGNIFICANT CHANGE UP (ref 1.8–7.4)
NEUTROPHILS NFR BLD AUTO: 56.9 % — SIGNIFICANT CHANGE UP (ref 43–77)
NITRITE UR-MCNC: NEGATIVE — SIGNIFICANT CHANGE UP
NRBC # BLD: 0 /100 WBCS — SIGNIFICANT CHANGE UP
NRBC # FLD: 0 K/UL — SIGNIFICANT CHANGE UP
PCO2 BLDV: 52 MMHG — SIGNIFICANT CHANGE UP (ref 42–55)
PH BLDV: 7.35 — SIGNIFICANT CHANGE UP (ref 7.32–7.43)
PH UR: 6 — SIGNIFICANT CHANGE UP (ref 5–8)
PLATELET # BLD AUTO: 439 K/UL — HIGH (ref 150–400)
PO2 BLDV: 31 MMHG — SIGNIFICANT CHANGE UP
POTASSIUM BLDV-SCNC: 4.3 MMOL/L — SIGNIFICANT CHANGE UP (ref 3.5–5.1)
POTASSIUM SERPL-MCNC: 4.3 MMOL/L — SIGNIFICANT CHANGE UP (ref 3.5–5.3)
POTASSIUM SERPL-SCNC: 4.3 MMOL/L — SIGNIFICANT CHANGE UP (ref 3.5–5.3)
PROT SERPL-MCNC: 6.6 G/DL — SIGNIFICANT CHANGE UP (ref 6–8.3)
PROT UR-MCNC: ABNORMAL
PROTHROM AB SERPL-ACNC: 11.2 SEC — SIGNIFICANT CHANGE UP (ref 10.5–13.4)
RBC # BLD: 4.42 M/UL — SIGNIFICANT CHANGE UP (ref 4.2–5.8)
RBC # FLD: 13.8 % — SIGNIFICANT CHANGE UP (ref 10.3–14.5)
RBC CASTS # UR COMP ASSIST: 1 /HPF — SIGNIFICANT CHANGE UP (ref 0–4)
RH IG SCN BLD-IMP: POSITIVE — SIGNIFICANT CHANGE UP
SAO2 % BLDV: 42.6 % — SIGNIFICANT CHANGE UP
SARS-COV-2 RNA SPEC QL NAA+PROBE: SIGNIFICANT CHANGE UP
SODIUM SERPL-SCNC: 139 MMOL/L — SIGNIFICANT CHANGE UP (ref 135–145)
SP GR SPEC: 1.03 — SIGNIFICANT CHANGE UP (ref 1–1.05)
UROBILINOGEN FLD QL: SIGNIFICANT CHANGE UP
WBC # BLD: 11.79 K/UL — HIGH (ref 3.8–10.5)
WBC # FLD AUTO: 11.79 K/UL — HIGH (ref 3.8–10.5)
WBC UR QL: 15 /HPF — HIGH (ref 0–5)

## 2022-03-17 PROCEDURE — 99285 EMERGENCY DEPT VISIT HI MDM: CPT | Mod: 25

## 2022-03-17 PROCEDURE — 93010 ELECTROCARDIOGRAM REPORT: CPT | Mod: NC

## 2022-03-17 PROCEDURE — 71260 CT THORAX DX C+: CPT | Mod: 26,MA

## 2022-03-17 PROCEDURE — 74177 CT ABD & PELVIS W/CONTRAST: CPT | Mod: 26,MA

## 2022-03-17 RX ORDER — SODIUM CHLORIDE 9 MG/ML
1000 INJECTION, SOLUTION INTRAVENOUS ONCE
Refills: 0 | Status: COMPLETED | OUTPATIENT
Start: 2022-03-17 | End: 2022-03-17

## 2022-03-17 RX ORDER — ACETAMINOPHEN 500 MG
1000 TABLET ORAL ONCE
Refills: 0 | Status: COMPLETED | OUTPATIENT
Start: 2022-03-17 | End: 2022-03-17

## 2022-03-17 RX ADMIN — SODIUM CHLORIDE 1000 MILLILITER(S): 9 INJECTION, SOLUTION INTRAVENOUS at 09:07

## 2022-03-17 RX ADMIN — Medication 400 MILLIGRAM(S): at 08:51

## 2022-03-17 NOTE — ED PROVIDER NOTE - NS ED ROS FT
Constitutional: (-) fever (-) vomiting  Eyes/ENT: (-) vision changes, (-) hearing changes  Cardiovascular: (-) chest pain, (-) wheezing  Respiratory: (-) cough, (-) shortness of breath  Gastrointestinal: (-) vomiting, (-) diarrhea, (+) abdominal pain  : (-) dysuria   Musculoskeletal: (-) back pain  Integumentary: (-) rash, (-) edema  Neurological: (-)loc  Allergic/Immunologic: (-) pruritus  Endocrine: No history of thyroid disease

## 2022-03-17 NOTE — ED PROVIDER NOTE - PHYSICAL EXAMINATION
Vitals: I have reviewed the patients vital signs  General: Well dressed, well appearing, no acute distress  HEENT: Atraumatic, normocephalic, airway patent  Eyes: EOMI, tracking appropriately  Neck: no tracheal deviation, no JVD  Chest/Lungs: no trauma, symmetric chest rise, speaking in complete sentences, no WOB  Heart: skin and extremities well perfused, regular rate and rhythm  Neuro: A+Ox3, ambulating without difficulty, CN grossly intact  MSK: strength at baseline in all extremities, no muscle wasting or atrophy  Skin: no cyanosis, no jaundice, no new emergent lesions   Abd: numerous surgical site scars noted, minimal epigastric tenderness, no peritoneal signs Vitals: I have reviewed the patients vital signs  General: Well dressed, well appearing, no acute distress  HEENT: Atraumatic, normocephalic, airway patent  Eyes: EOMI, tracking appropriately  Neck: no tracheal deviation, no JVD  Chest/Lungs: no trauma, symmetric chest rise, speaking in complete sentences, no WOB  Heart: skin and extremities well perfused, regular rate and rhythm  Neuro: A+Ox3, ambulating without difficulty, CN grossly intact  MSK: strength at baseline in all extremities, no muscle wasting or atrophy  Skin: no cyanosis, no jaundice, no new emergent lesions   Abd: numerous surgical site scars noted, minimal epigastric tenderness, no peritoneal signs    Attending/Rhonda: NAD; PERRL/EOMI, non-icterus, supple, no EMERY, no JVD, RRR, CTAB; Abd-soft, mild mid-epigastric PT, no rebound or guarding, no HSM; no LE edema, A&Ox3, nonfocal; Skin-warm/dry

## 2022-03-17 NOTE — CONSULT NOTE ADULT - SUBJECTIVE AND OBJECTIVE BOX
SURGERY CONSULT NOTE    HPI: Patient is a 60 year old male with a PMHx of anxiety, BPH, GERD, HLD and appendiceal carcinoma (S/P diagnostic laparoscopy and PIPAC X5 - most recently 2022) presenting with abdominal/epigastric pain. Patient s/p diagnostic laparoscopic with peritoneal biopsy and PIPAC. States since midnight he has been having epigastric radiating pain and that his stomach ha felt more full. Tolerated dinner last night (fish and potatoes). Reports normal lBM, passing flatus. No episodes of nausea or emesis.       PAST MEDICAL & SURGICAL HISTORY:  HLD (hyperlipidemia)    BPH (benign prostatic hyperplasia)    Neuropathic pain  hands and feet    UTI (urinary tract infection)  pt denes recent infection    Appendix carcinoma  s/p abdominal surgery and chemotherapy, completed 2020    Anxiety    Cancer of appendix    Malignant neoplasm of appendix    GERD (gastroesophageal reflux disease)  pt denes recent endoscopy    History of undescended testicle  age 7    Abdominal mass  surgery 2020  remove mass, spleen, partial colectomy, lymph nodes, part small intestines, omentum, apendix, gall bladder, part stomach. HIPEC    History of abdominal paracentesis  x3    H/O colectomy  Splenectomy , Cholecystectomy   2020. Insertion of Mediport    Malignant neoplasm of appendix  chemo 10/2021; s/p Pipac x 5 last         MEDICATIONS  (STANDING):    MEDICATIONS  (PRN):      Allergies    No Known Allergies    Intolerances        SOCIAL HISTORY:    FAMILY HISTORY:  FH: diabetes mellitus (Father)    FH: HTN (hypertension) (Mother)        Physical Exam:  General: NAD, resting comfortably  Pulmonary: nonlabored breathing  Cardiovascular: NSR, no murmurs  Abdominal: soft, non tender to palpation    Vital Signs Last 24 Hrs  T(C): 36.6 (17 Mar 2022 11:19), Max: 36.6 (17 Mar 2022 08:04)  T(F): 97.9 (17 Mar 2022 11:19), Max: 97.9 (17 Mar 2022 11:19)  HR: 55 (17 Mar 2022 11:19) (55 - 63)  BP: 126/77 (17 Mar 2022 11:19) (114/89 - 129/80)  BP(mean): --  RR: 18 (17 Mar 2022 11:19) (18 - 20)  SpO2: 100% (17 Mar 2022 11:19) (100% - 100%)    I&O's Summary          LABS:                        13.0   11.79 )-----------( 439      ( 17 Mar 2022 09:03 )             39.8     -    139  |  104  |  14  ----------------------------<  107<H>  4.3   |  25  |  0.99    Ca    9.0      17 Mar 2022 09:03    TPro  6.6  /  Alb  4.0  /  TBili  0.5  /  DBili  x   /  AST  21  /  ALT  16  /  AlkPhos  97  -    PT/INR - ( 17 Mar 2022 09:03 )   PT: 11.2 sec;   INR: 0.97 ratio         PTT - ( 17 Mar 2022 09:03 )  PTT:26.5 sec  Urinalysis Basic - ( 17 Mar 2022 10:57 )    Color: Yellow / Appearance: Clear / S.027 / pH: x  Gluc: x / Ketone: Negative  / Bili: Negative / Urobili: <2 mg/dL   Blood: x / Protein: Trace / Nitrite: Negative   Leuk Esterase: Moderate / RBC: 1 /HPF / WBC 15 /HPF   Sq Epi: x / Non Sq Epi: 1 /HPF / Bacteria: Negative      CAPILLARY BLOOD GLUCOSE        LIVER FUNCTIONS - ( 17 Mar 2022 09:03 )  Alb: 4.0 g/dL / Pro: 6.6 g/dL / ALK PHOS: 97 U/L / ALT: 16 U/L / AST: 21 U/L / GGT: x             Cultures:      RADIOLOGY & ADDITIONAL STUDIES:  < from: CT Chest w/ IV Cont (22 @ 10:28) >  FINDINGS:  CHEST:  LUNGS AND LARGE AIRWAYS: Patent central airways. Left upper lobe lobe   calcified granuloma. A 2 mm right middle lobe nodule (series 4 image 294)   is unchanged since 10/18/2021. Minimal bilateral lower lobe linear   atelectasis.  PLEURA: No pleural effusion.  VESSELS: Left chest port with tip in the lower SVC. Calcifications in the   coronary arteries and aortic root.  HEART: Heart size is normal. No pericardial effusion.  MEDIASTINUM AND VICKI: No lymphadenopathy.  CHEST WALL AND LOWER NECK: Within normal limits.    ABDOMEN AND PELVIS:  LIVER: Within normal limits.  BILE DUCTS: Mild intrahepatic biliary duct dilation is unchanged.  GALLBLADDER: Cholecystectomy.  SPLEEN: Splenectomy.  PANCREAS: Within normal limits.  ADRENALS: Within normal limits.  KIDNEYS/URETERS: Mild right hydronephrosis, unchanged from 2021.   Nonobstructing 2 mm left upperpole calculus. No left hydronephrosis.    BLADDER: Within normal limits.  REPRODUCTIVE ORGANS: Prostate is enlarged.    BOWEL: Status post partial gastrectomy, gastrojejunostomy, and partial   colectomy. No bowel obstruction.  PERITONEUM: Redemonstration of  loculated pockets of fluid versus   mucinous implants, similar to 2021, with reference lesions below:  *  Focus at the anterior liver dome measuring 3.4 x 1.4 cm (series 2   image 62), previously 4.1 x 0.9 cm.  *  Focus in the left mid abdomen measuring 3.3 x 3.1 cm (series 2 image   101), previously 3.2 x 2.4 cm.  *  Focus along the posterior right hemiabdomen measuring 2.5 x 1.7 cm   (series 2 image 106), previously 2.7 x 1.7 cm.    VESSELS: Atherosclerotic changes.  RETROPERITONEUM/LYMPH NODES: No lymphadenopathy.  ABDOMINAL WALL: Postsurgical changes in the anterior abdominal wall.  BONES: Degenerative changes.    IMPRESSION:  No significant interval change in mucinous peritoneal implants and/or   loculated ascites since theprior exam of 2021.    Mild right hydronephrosis is also unchanged.    < end of copied text >        Plan:  Patient is a 60 year old male with a PMHx of anxiety, BPH, GERD, HLD and appendiceal carcinoma (S/P diagnostic laparoscopy and PIPAC X5 - most recently 2022) presenting with abdominal/epigastric pain. CT scan without acute finding and unchanged from December.     - patient can follow up with Dr. Rosas in office on Monday if comfortable  - no acute surgical intervention at this time  - surgery to come and speak with patient  - dispo per ED    d/w Dr. Rosas, surgical oncology attending    D Team Surgery, [id number]

## 2022-03-17 NOTE — ED PROVIDER NOTE - OBJECTIVE STATEMENT
62 y/o M hx metastatic appendiceal CA getting intraperitoneal chemo (last round end of feb) follows with Dr. Mason, s/p numerous surgeries (colectomy, sb resection, appy, cholecystectomy) here now with abd pain since last night, upper. Has had abd pain before but this is less severe. Hx previous obstruction, but today able to tolerate PO, minimal nausea, passing gas/stool. Has diarrhea chronically at baseline - unchanged, nonbloody. No fevers. Endorses some heartburn, ongoing for the last month, unchanged. No focal cp, sob. 60 y/o M hx metastatic appendiceal CA getting intraperitoneal chemo (last round end of feb) follows with Dr. Mason, s/p numerous surgeries (colectomy, sb resection, appy, cholecystectomy) here now with abd pain since last night, upper. Has had abd pain before but this is less severe. Hx previous obstruction, but today able to tolerate PO, minimal nausea, passing gas/stool. Has diarrhea chronically at baseline - unchanged, nonbloody. No fevers. Endorses some heartburn, ongoing for the last month, unchanged. No focal cp, sob.    Attending/Rhonda: 60 yo M history as noted above, p/w acute pain, mid-epigastric, assocated with nausea, sour mash taste. He denies fever/chills, change in urinary/bowel habits. He notes his abdomen as "rigid" last night but this morning somewhat approved though symptoms persist. Further denies weakness or lightheadedness.

## 2022-03-17 NOTE — ED PROVIDER NOTE - CLINICAL SUMMARY MEDICAL DECISION MAKING FREE TEXT BOX
Pt with metastatic appendiceal CA getting intraperitoneal chemo here with abd pain since last night, mostly upper, does not feel like previous obstruction. no fevers. minimally tender, well appearing, tolerating PO, passing gas. spoke with his surgeon who instructed him to come to ER for evaluation. high risk patient will get labs with pre op, ct c/a/p with contrast, pain control, d/w surg prior to dispo.

## 2022-03-17 NOTE — ED PROVIDER NOTE - PROGRESS NOTE DETAILS
Blake: CTr unchanged from december, pain impoved, surg aware, pending recs. Blake: spoke with surg and they planned to see patient before DC, pts pain was improved. However over time pt and family became more upset about waiting to see surgery. eventually pt and family left without dc papers despite being urged to stay. at this time pt was HD stable, well appearing, ambulatory under his own power, had capacity, and knew to see his surgeon outpatient.

## 2022-03-17 NOTE — ED ADULT NURSE REASSESSMENT NOTE - NS ED NURSE REASSESS COMMENT FT1
Pt wants to leave, pt refuses to wait for surgery team. Pt demands to have IV removed. Resusing V//S MD aware. 20g PIV removed from R AC, catheter intact. Pt left without discharge papers, refusing to wait to be discharged by MD.

## 2022-03-17 NOTE — ED ADULT TRIAGE NOTE - CHIEF COMPLAINT QUOTE
Pt endorsing sharp RUQ abdominal pain and heartburn since last night. Endorsing nausea, states he has diarrhea as per baseline with cancer diagnosis, abdominal rigidity and guarding noted. Pt is a chemo/surgical patient of MD Rivera, currently receiving PIPAC (clinical trial of internal laparoscopic chemotherapy treatments.) PMHx stage IV appendix cancer with metastasis.

## 2022-03-17 NOTE — ED PROVIDER NOTE - PATIENT PORTAL LINK FT
You can access the FollowMyHealth Patient Portal offered by [facility] by registering at the following website: [email or web address]. By joining [facility]’s FollowMyHealth portal, you will also be able to view your health information using other applications (apps) compatible with our system.

## 2022-03-17 NOTE — ED ADULT NURSE NOTE - OBJECTIVE STATEMENT
No, the patient is not being discharged from [facility] 62 y/o M arrives to E.D. rm 18 c/o abd pain, nausea since last night. PMH: appendiceal cancer. Pt a&ox4, ambulatory, neg SOB, denies CP, endorses midline abdominal pain with nausea, neg vomiting, pt endorses he has diarrhea at baseline, denies fevers. R 20g IV placed to AC. NSR on tele. Pending CT. Frequent monitoring in place.

## 2022-03-18 ENCOUNTER — APPOINTMENT (OUTPATIENT)
Dept: INFUSION THERAPY | Facility: CLINIC | Age: 62
End: 2022-03-18

## 2022-03-18 VITALS
HEART RATE: 64 BPM | RESPIRATION RATE: 17 BRPM | OXYGEN SATURATION: 99 % | SYSTOLIC BLOOD PRESSURE: 107 MMHG | DIASTOLIC BLOOD PRESSURE: 66 MMHG | TEMPERATURE: 97.2 F

## 2022-03-21 ENCOUNTER — TRANSCRIPTION ENCOUNTER (OUTPATIENT)
Age: 62
End: 2022-03-21

## 2022-03-22 NOTE — ED POST DISCHARGE NOTE - ADDITIONAL DOCUMENTATION
No antibiotics on file.  SPoke with patient and informed of results, states was already aware of the results and has seen his urologist Dr. Sanford who stated patient is likely colonized and since has no urinary symptoms does not need antibiotics at this time.

## 2022-03-29 ENCOUNTER — TRANSCRIPTION ENCOUNTER (OUTPATIENT)
Age: 62
End: 2022-03-29

## 2022-03-30 ENCOUNTER — OUTPATIENT (OUTPATIENT)
Dept: OUTPATIENT SERVICES | Facility: HOSPITAL | Age: 62
LOS: 1 days | End: 2022-03-30

## 2022-03-30 ENCOUNTER — APPOINTMENT (OUTPATIENT)
Dept: SURGICAL ONCOLOGY | Facility: CLINIC | Age: 62
End: 2022-03-30
Payer: COMMERCIAL

## 2022-03-30 VITALS
HEIGHT: 63.75 IN | RESPIRATION RATE: 16 BRPM | WEIGHT: 134.04 LBS | TEMPERATURE: 98 F | DIASTOLIC BLOOD PRESSURE: 64 MMHG | SYSTOLIC BLOOD PRESSURE: 98 MMHG | OXYGEN SATURATION: 99 % | HEART RATE: 62 BPM

## 2022-03-30 DIAGNOSIS — Z98.890 OTHER SPECIFIED POSTPROCEDURAL STATES: Chronic | ICD-10-CM

## 2022-03-30 DIAGNOSIS — Z87.438 PERSONAL HISTORY OF OTHER DISEASES OF MALE GENITAL ORGANS: Chronic | ICD-10-CM

## 2022-03-30 DIAGNOSIS — C18.1 MALIGNANT NEOPLASM OF APPENDIX: ICD-10-CM

## 2022-03-30 DIAGNOSIS — R19.00 INTRA-ABDOMINAL AND PELVIC SWELLING, MASS AND LUMP, UNSPECIFIED SITE: Chronic | ICD-10-CM

## 2022-03-30 DIAGNOSIS — Z90.49 ACQUIRED ABSENCE OF OTHER SPECIFIED PARTS OF DIGESTIVE TRACT: Chronic | ICD-10-CM

## 2022-03-30 DIAGNOSIS — C18.1 MALIGNANT NEOPLASM OF APPENDIX: Chronic | ICD-10-CM

## 2022-03-30 LAB
BLD GP AB SCN SERPL QL: NEGATIVE — SIGNIFICANT CHANGE UP
HCT VFR BLD CALC: 41 % — SIGNIFICANT CHANGE UP (ref 39–50)
HGB BLD-MCNC: 12.8 G/DL — LOW (ref 13–17)
MCHC RBC-ENTMCNC: 28.3 PG — SIGNIFICANT CHANGE UP (ref 27–34)
MCHC RBC-ENTMCNC: 31.2 GM/DL — LOW (ref 32–36)
MCV RBC AUTO: 90.7 FL — SIGNIFICANT CHANGE UP (ref 80–100)
NRBC # BLD: 0 /100 WBCS — SIGNIFICANT CHANGE UP
NRBC # FLD: 0 K/UL — SIGNIFICANT CHANGE UP
PLATELET # BLD AUTO: 421 K/UL — HIGH (ref 150–400)
RBC # BLD: 4.52 M/UL — SIGNIFICANT CHANGE UP (ref 4.2–5.8)
RBC # FLD: 14.2 % — SIGNIFICANT CHANGE UP (ref 10.3–14.5)
RH IG SCN BLD-IMP: POSITIVE — SIGNIFICANT CHANGE UP
WBC # BLD: 9.05 K/UL — SIGNIFICANT CHANGE UP (ref 3.8–10.5)
WBC # FLD AUTO: 9.05 K/UL — SIGNIFICANT CHANGE UP (ref 3.8–10.5)

## 2022-03-30 PROCEDURE — 99213 OFFICE O/P EST LOW 20 MIN: CPT

## 2022-03-30 RX ORDER — SODIUM CHLORIDE 9 MG/ML
1000 INJECTION, SOLUTION INTRAVENOUS
Refills: 0 | Status: DISCONTINUED | OUTPATIENT
Start: 2022-04-07 | End: 2022-04-07

## 2022-03-30 RX ORDER — ESCITALOPRAM OXALATE 10 MG/1
1 TABLET, FILM COATED ORAL
Qty: 0 | Refills: 0 | DISCHARGE

## 2022-03-30 NOTE — H&P PST ADULT - GASTROINTESTINAL COMMENTS
hx of appendix carcinoma, s/p 5 PIPAC Rx, most recently Dec 2021.  Hx of  Cytoreduction surgery with HIPEC 5/2020 and chemotherapy. Now scheduled for diagnostic Laparoscopy Peritoneal Biopsy, PIPAC . hx of appendix carcinoma, s/p 6 PIPAC Rx, most recently Feb 2022.  Hx of  Cytoreduction surgery with HIPEC 5/2020 and chemotherapy. Preop dx malignant neoplasm of appendix

## 2022-03-30 NOTE — H&P PST ADULT - PROBLEM SELECTOR PLAN 1
Scheduled for diagnostic laparoscopy, peritoneal biopsies pressurized intraperitoneal aerolized chemotherapy -  PIPAC  on 4/7/2022.  Written & verbal preop instructions, gi prophylaxis pt to use own & surgical soap given  Pt verbalized good understanding.  Teach back done on surgical soap instructions.  per pt scheduled within 72 hrs of this surgery Scheduled for diagnostic laparoscopy, peritoneal biopsies pressurized intraperitoneal aerolized chemotherapy -  PIPAC  on 4/7/2022.  Written & verbal preop instructions, gi prophylaxis pt to use own & surgical soap given  Pt verbalized good understanding.  Teach back done on surgical soap instructions.  MALISSA precautions recommended.   per pt scheduled within 72 hrs of this surgery

## 2022-03-30 NOTE — H&P PST ADULT - ATTENDING COMMENTS
Plan for diagnostic laparoscopy and PIPAC #7 today. Risks, benefits, and alternatives discussed in detail.

## 2022-03-30 NOTE — H&P PST ADULT - HISTORY OF PRESENT ILLNESS
Pt is a 59 yo male  with hx of appendix carcinoma, s/p Diagnostic Laparoscopy X 5 PIPAC Rx, most recently Dec 2021 .  Hx of Cytoreduction surgery with HIPEC 5/2020 and chemotherapy. Now scheduled for diagnostic Laparoscopy Peritoneal Biopsy, PIPAC             60 yo male  with hx of appendix carcinoma, s/p Diagnostic Laparoscopy X 6 PIPAC Rx, most recently February 2022 .  Hx of Cytoreduction surgery with HIPEC 5/2020 and chemotherapy. Now scheduled for diagnostic Laparoscopy Peritoneal Biopsy, PIPAC.

## 2022-03-30 NOTE — H&P PST ADULT - NEGATIVE GENERAL GENITOURINARY SYMPTOMS
no hematuria/no flank pain R/no bladder infections/no dysuria/no urinary hesitancy/normal urinary frequency

## 2022-03-30 NOTE — H&P PST ADULT - NSICDXPASTSURGICALHX_GEN_ALL_CORE_FT
PAST SURGICAL HISTORY:  Abdominal mass surgery 5/2020  remove mass, spleen, partial colectomy, lymph nodes, part small intestines, omentum, apendix, gall bladder, part stomach. HIPEC    H/O colectomy Splenectomy , Cholecystectomy   5/2020. Insertion of Mediport    History of abdominal paracentesis x3    History of undescended testicle age 7    Malignant neoplasm of appendix chemo 10/2021; s/p Pipac x 5 last 12/21     PAST SURGICAL HISTORY:  Abdominal mass surgery 5/2020  remove mass, spleen, partial colectomy, lymph nodes, part small intestines, omentum, apendix, gall bladder, part stomach. HIPEC    H/O colectomy Splenectomy , Cholecystectomy   5/2020. Insertion of Mediport    History of abdominal paracentesis x3    History of undescended testicle age 7    Malignant neoplasm of appendix chemo 10/2021; s/p Pipac x 6 last 02/2022

## 2022-03-31 NOTE — PHYSICAL EXAM
[Normal] : well developed, well nourished, in no acute distress [de-identified] : Midline incision healed. small suture protruding from incision. No redness, tenderness or drainage.

## 2022-03-31 NOTE — HISTORY OF PRESENT ILLNESS
[de-identified] : Mr. Arias is a 60 year old gentleman referred to me by Dr. 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 He underwent  CRS and HIPEC for presumed LAMN, which turned out to node positive.([facility] pathology review well-differentiated mucinous adenocarcinoma) on 5/7/2020 at [place] with Dr. Herberth Peters. \par \par PCI: 34\par CC: R2b\par \par Mr. Arias has since moved to [place] to be closer to family and has established care in [facility]. Lives in [place]. \par \par Cytoreduction included a splenectomy and distal gastrectomy, omentectomy, cholecystectomy, subtotal colectomy and diaphragm stripping. 4L of mucinous ascites was drained. 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 As his tumor was felt to be low grade, he did not receive any systemic therapy up front. He now reports feeling well. He reports 3-4 loose but not watery BM's/day. He reports improving weight and energy levels. He denies any PO intolerance. He plays golf and does nature walks close to [facility]. \par \par CEA \par 6/5/20: 3.8\par 8/12/20: 6.1\par 4/7: 10.1 \par \par Pathology: \par 5/7/21: Paracentesis, cytology negative for malignant cells. 1.6 Liters removed. 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 He underwent subtotal colectomy with positive margins. 2/14 nodes involved. [place] review: low grade appendiceal mucinous neoplasm\par \par Scans were obtained at [facility] and demonstrated: \par 4/30/21: loculated intraperitoneal fluid. No bowel obstruction. \par \par Last colonoscopy May 2020. \par \par Underwent diagnostic laparoscopy by me on 5/19/21. PCI approx 25. Evacuated 1.4 liters ascites. Biopsies consistent with mucinous adenocarcinoma. \par 6/3: PIPAC #1 \par 6/17: Doing well. No pain. Mild reflux/ dyspepsia. Relieved with tums. Eating well. \par 7/15/21: Doing well. Admitted earlier this week 7/12-7/13 to [facility] with small bowel obstruction. Dramatically improved after 24 hours nasogastric decompression. Feels well since discharge. Multiple BM last 48 hours. Tolerating full liquid diet. No abdominal pain. \par 8/19/21: Doing well. No adverse events. No complaints. Wants to go swimming. Eating well. Weight is stable. Moving bowels at least daily. \par 12/16/21: Doing well. Completed compassionate use PIPAC X 2. No complaints. Wants to lift weights, go scuba diving, and skiing. Plan for travel to [place] next month\par 2/10/22: PIPAC #6\par 3/3/22: Continues to travel, scuba dive, ski, lift weights. \par 3/30/22: Presents today with suture protruding from midline incision. No redness, tenderness or drainage. \par

## 2022-03-31 NOTE — ASSESSMENT
[FreeTextEntry1] : Presents today with suture protruding from midline incision. No redness, tenderness or drainage. Suture snipped in the office without any issue. Will follow up with Dr. Rosas for ongoing PIPAC.

## 2022-03-31 NOTE — REASON FOR VISIT
[de-identified] : diagnostic laparoscopy 5/19/21, PIPAC 6/3/21, 7/22/21, 9/2/21, 10/2021, 12/2/21, 2/10/22

## 2022-04-01 ENCOUNTER — TRANSCRIPTION ENCOUNTER (OUTPATIENT)
Age: 62
End: 2022-04-01

## 2022-04-01 ENCOUNTER — OUTPATIENT (OUTPATIENT)
Dept: OUTPATIENT SERVICES | Facility: HOSPITAL | Age: 62
LOS: 1 days | Discharge: HOME | End: 2022-04-01

## 2022-04-01 DIAGNOSIS — Z98.890 OTHER SPECIFIED POSTPROCEDURAL STATES: Chronic | ICD-10-CM

## 2022-04-01 DIAGNOSIS — R19.00 INTRA-ABDOMINAL AND PELVIC SWELLING, MASS AND LUMP, UNSPECIFIED SITE: Chronic | ICD-10-CM

## 2022-04-01 DIAGNOSIS — C18.1 MALIGNANT NEOPLASM OF APPENDIX: Chronic | ICD-10-CM

## 2022-04-01 DIAGNOSIS — Z90.49 ACQUIRED ABSENCE OF OTHER SPECIFIED PARTS OF DIGESTIVE TRACT: Chronic | ICD-10-CM

## 2022-04-01 DIAGNOSIS — Z87.438 PERSONAL HISTORY OF OTHER DISEASES OF MALE GENITAL ORGANS: Chronic | ICD-10-CM

## 2022-04-01 DIAGNOSIS — C26.9 MALIGNANT NEOPLASM OF ILL-DEFINED SITES WITHIN THE DIGESTIVE SYSTEM: ICD-10-CM

## 2022-04-05 LAB
ALBUMIN SERPL ELPH-MCNC: 4.3 G/DL
ALP BLD-CCNC: 107 U/L
ALT SERPL-CCNC: 23 U/L
ANION GAP SERPL CALC-SCNC: 9 MMOL/L
AST SERPL-CCNC: 23 U/L
BASOPHILS # BLD AUTO: 0.08 K/UL
BASOPHILS NFR BLD AUTO: 0.9 %
BILIRUB SERPL-MCNC: 0.5 MG/DL
BUN SERPL-MCNC: 16 MG/DL
CALCIUM SERPL-MCNC: 9.2 MG/DL
CHLORIDE SERPL-SCNC: 107 MMOL/L
CO2 SERPL-SCNC: 25 MMOL/L
CREAT SERPL-MCNC: 1.04 MG/DL
EGFR: 81 ML/MIN/1.73M2
EOSINOPHIL # BLD AUTO: 0.18 K/UL
EOSINOPHIL NFR BLD AUTO: 2 %
GLUCOSE SERPL-MCNC: 107 MG/DL
HCT VFR BLD CALC: 43.7 %
HGB BLD-MCNC: 13.1 G/DL
IMM GRANULOCYTES NFR BLD AUTO: 0.3 %
LYMPHOCYTES # BLD AUTO: 3.82 K/UL
LYMPHOCYTES NFR BLD AUTO: 41.5 %
MAN DIFF?: NORMAL
MCHC RBC-ENTMCNC: 28.6 PG
MCHC RBC-ENTMCNC: 30 GM/DL
MCV RBC AUTO: 95.4 FL
MONOCYTES # BLD AUTO: 0.93 K/UL
MONOCYTES NFR BLD AUTO: 10.1 %
NEUTROPHILS # BLD AUTO: 4.17 K/UL
NEUTROPHILS NFR BLD AUTO: 45.2 %
PLATELET # BLD AUTO: 491 K/UL
POTASSIUM SERPL-SCNC: 4.5 MMOL/L
PROT SERPL-MCNC: 6.4 G/DL
RBC # BLD: 4.58 M/UL
RBC # FLD: 14.9 %
SODIUM SERPL-SCNC: 141 MMOL/L
WBC # FLD AUTO: 9.21 K/UL

## 2022-04-06 ENCOUNTER — RESULT REVIEW (OUTPATIENT)
Age: 62
End: 2022-04-06

## 2022-04-06 ENCOUNTER — APPOINTMENT (OUTPATIENT)
Dept: HEMATOLOGY ONCOLOGY | Facility: CLINIC | Age: 62
End: 2022-04-06
Payer: COMMERCIAL

## 2022-04-06 ENCOUNTER — TRANSCRIPTION ENCOUNTER (OUTPATIENT)
Age: 62
End: 2022-04-06

## 2022-04-06 ENCOUNTER — APPOINTMENT (OUTPATIENT)
Dept: INFUSION THERAPY | Facility: HOSPITAL | Age: 62
End: 2022-04-06

## 2022-04-06 VITALS
HEART RATE: 52 BPM | DIASTOLIC BLOOD PRESSURE: 71 MMHG | TEMPERATURE: 97.7 F | WEIGHT: 134.48 LBS | HEIGHT: 66.93 IN | BODY MASS INDEX: 21.11 KG/M2 | OXYGEN SATURATION: 97 % | SYSTOLIC BLOOD PRESSURE: 112 MMHG | RESPIRATION RATE: 16 BRPM

## 2022-04-06 LAB
BASOPHILS # BLD AUTO: 0.07 K/UL — SIGNIFICANT CHANGE UP (ref 0–0.2)
BASOPHILS NFR BLD AUTO: 0.7 % — SIGNIFICANT CHANGE UP (ref 0–2)
EOSINOPHIL # BLD AUTO: 0.19 K/UL — SIGNIFICANT CHANGE UP (ref 0–0.5)
EOSINOPHIL NFR BLD AUTO: 1.9 % — SIGNIFICANT CHANGE UP (ref 0–6)
HCT VFR BLD CALC: 38.5 % — LOW (ref 39–50)
HGB BLD-MCNC: 12.2 G/DL — LOW (ref 13–17)
IMM GRANULOCYTES NFR BLD AUTO: 0.1 % — SIGNIFICANT CHANGE UP (ref 0–1.5)
LYMPHOCYTES # BLD AUTO: 4.16 K/UL — HIGH (ref 1–3.3)
LYMPHOCYTES # BLD AUTO: 41.4 % — SIGNIFICANT CHANGE UP (ref 13–44)
MCHC RBC-ENTMCNC: 28.8 PG — SIGNIFICANT CHANGE UP (ref 27–34)
MCHC RBC-ENTMCNC: 31.7 G/DL — LOW (ref 32–36)
MCV RBC AUTO: 90.8 FL — SIGNIFICANT CHANGE UP (ref 80–100)
MONOCYTES # BLD AUTO: 1.21 K/UL — HIGH (ref 0–0.9)
MONOCYTES NFR BLD AUTO: 12.1 % — SIGNIFICANT CHANGE UP (ref 2–14)
NEUTROPHILS # BLD AUTO: 4.4 K/UL — SIGNIFICANT CHANGE UP (ref 1.8–7.4)
NEUTROPHILS NFR BLD AUTO: 43.8 % — SIGNIFICANT CHANGE UP (ref 43–77)
NRBC # BLD: 0 /100 WBCS — SIGNIFICANT CHANGE UP (ref 0–0)
PLATELET # BLD AUTO: 420 K/UL — HIGH (ref 150–400)
RBC # BLD: 4.24 M/UL — SIGNIFICANT CHANGE UP (ref 4.2–5.8)
RBC # FLD: 14.5 % — SIGNIFICANT CHANGE UP (ref 10.3–14.5)
WBC # BLD: 10.04 K/UL — SIGNIFICANT CHANGE UP (ref 3.8–10.5)
WBC # FLD AUTO: 10.04 K/UL — SIGNIFICANT CHANGE UP (ref 3.8–10.5)

## 2022-04-06 PROCEDURE — 99213 OFFICE O/P EST LOW 20 MIN: CPT

## 2022-04-06 NOTE — ASU PATIENT PROFILE, ADULT - NSICDXPASTSURGICALHX_GEN_ALL_CORE_FT
PAST SURGICAL HISTORY:  Abdominal mass surgery 5/2020  remove mass, spleen, partial colectomy, lymph nodes, part small intestines, omentum, apendix, gall bladder, part stomach. HIPEC    H/O colectomy Splenectomy , Cholecystectomy   5/2020. Insertion of Mediport    History of abdominal paracentesis x3    History of undescended testicle age 7    Malignant neoplasm of appendix chemo 10/2021; s/p Pipac x 6 last 02/2022

## 2022-04-06 NOTE — ASU PATIENT PROFILE, ADULT - FALL HARM RISK - UNIVERSAL INTERVENTIONS
Bed in lowest position, wheels locked, appropriate side rails in place/Call bell, personal items and telephone in reach/Instruct patient to call for assistance before getting out of bed or chair/Non-slip footwear when patient is out of bed/[place] to call system/Physically safe environment - no spills, clutter or unnecessary equipment/Purposeful Proactive Rounding/Room/bathroom lighting operational, light cord in reach

## 2022-04-06 NOTE — HISTORY OF PRESENT ILLNESS
[Disease: _____________________] : Disease: [unfilled] [Verbal consent obtained from patient] : the patient, [unfilled] [de-identified] : Mr. Arias is a 60 year old gentlemen with past medical history of BPH presenting to the office for an initial consultation of appendiceal neoplasm.\par \par Patient was being seen by Dr. Mendoza at [facility] for low grade appendiceal tumor diagnosed in 2020.  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 During 4/2020 noted bloating and ascites was found. CT scan showed the malignancy.\par \par 5/7/2020 CRS and HIPEC for LAMN ( Dr. Herberth Pink at [facility] ). Surgery included a splenectomy and distal gastrectomy, omentectomy, cholecystectomy, subtotal colectomy and diaphragm stripping. 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 R2b resection. 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 There was some dose reduction. Neuropathy started continually after chemotherapy was completed and is grade 1, with no impairment of ADLs. \par \par He moved from [place] back to [place] for more family support.\par In April 2021, he noted increase in abdominal fluid, and saw Oncology, Dr. Garcia.\par CT scan ordered and ascites drained.\par There was recurrence of disease on CT scan.\par He was referred to Dr. Mason, who has recommended pressurized intraperitoneal aerosolized chemotherapy trial. (PIPAC).\par \par 7/21/2021\par Underwent diagnostic laparoscopy on 5/19/21. PCI approx 25. Biopsies consistent with mucinous adenocarcinoma. \par 6/3: PIPAC/oxaliplatin treatment #1\par 7/12 - 7/14/21: Patient admitted for transient, self-limited SBO, that was not deemed to be treatment-related.\par 7/22/21: Plan for PIPAC #2 + 5FU/LV\par Normally, has has up to 4-6 BM daily due to short colon.\par Stools are loose.\par Currently no bloating.\par He is active, has no pain, and looking forward to next cycle tomorrow.\par CEA dropping as below.\par \par CEA:\par 6/5/2020: 3.8\par 8/12/2020: 6.1\par 4/7/2021: 10.1 \par 5/26/21: 29\par 6/17/21: 19.9\par 7/21/21: 15.2\par \par 9/2/21\par Patient here for C#3 PIPAC \par Patient has GERD and requests more omeprazole.\par Definitely needs to have it.\par CEA has been declining.\par \par 10/20/21\par Patient has been approved for compassionate use treatment with PIPAC.\par He feels well and does want to proceed.\par Today will be his EOT visit, as well as a pre-treatment visit for continued use of PIIPAC.\par Will have CBC today.\par 5FU and LV will be administered within 24 hour window of planned PIPAC.\par \par 2/8/2022:\par 1) Appendiceal mucinous CA: \par Patient is scheduled for C6D1 5-FU injectable 400 mg/m2 and Leucovorin tomorrow, 2/9/2022.\par He is scheduled for PIPAC with Dr. Mason on 2/10/2022.\par He is feeling well today and voices no adverse events.\par Denies abdominal pain, nausea or vomiting.\par Patient will [facility] labs today at [facility].\par No changes in his medications.\par \par 4/6/2022:\par 1) Appendiceal mucinous CA:\par PIPAC #1 6/3/2021\par C7D1 5- mg/m2 + Leucovorin 20 mg/m2 today.\par He is scheduled for PIPAC with Dr. Mason tomorrow (4/7/2022).\par He is feeling well today and voices no new complaints.\par Denies abdominal pain or nausea/vomiting.\par Appetite and energy level remains stable.\par No changes in his medications.\par  [de-identified] : CEA [de-identified] : patient with spouse today \par planned for PIPAC tomorrow\par to get 5FU/LV today \par no abdominal pain, fevers or chills

## 2022-04-06 NOTE — ASU PATIENT PROFILE, ADULT - NSICDXPASTMEDICALHX_GEN_ALL_CORE_FT
PAST MEDICAL HISTORY:  Anxiety     Appendix carcinoma s/p abdominal surgery and chemotherapy, completed 12/2020    BPH (benign prostatic hyperplasia)     Cancer of appendix     GERD (gastroesophageal reflux disease) pt denes recent endoscopy    HLD (hyperlipidemia)     Malignant neoplasm of appendix     Neuropathic pain hands and feet    UTI (urinary tract infection) pt denes recent infection     No

## 2022-04-06 NOTE — ASU PATIENT PROFILE, ADULT - BILL OF RIGHTS/ADMISSION INFORMATION PROVIDED TO:
1.  Antibiotics per Epic orders  2.  Symptomatic care with Tylenol or Motrin.  3.  Follow up if not improving as expected.  [facility]- Pediatric Department    If you have any questions regarding to your visit please contact:   Team Jhonathan:   Clinic Hours Telephone Number   MARISOL Yarbrough, PADILLA Simental PA-C, MS    Beatriz Issa, RN  Neelima Reyes,    7am - 7pm Mon - Thurs 7am - 5pm Fri [phone]    After hours and weekends, call [phone]   To make an appointment at any location anytime, please call [phone] or  [email or web address].   Pediatric Walk-in Clinic* 8am-11am  Mon- Fri    [facility]   8:00am - 7pm  Mon- Thurs  8:00am - 5:30 pm Friday  9am - 1pm Saturday [phone]   Urgent Care - [place]      [facility] [place]       11pm-9pm Monday - Friday   9am-5pm Saturday - Sunday    5pm-9pm Monday - Friday  9am-5pm Saturday - Sunday [phone] - [place]      [phone] [facility]   *Pediatric Walk-In Clinic is available for children/adolescents age 0-21 for the following symptoms:  Cough/Cold symptoms   Rashes/Itchy Skin  Sore throat    Urinary tract infection  Diarrhea    Ringworm  Ear pain    Sinus infection  Fever     Pink eye       If your provider has ordered a CT, MRI, or ultrasound for you, please call to schedule:  [facility], phone [phone]  [facility] radiology, [phone]  [facility], phone [phone]    If you need a medication refill please contact your pharmacy.   Please allow 3 business days for your refills to be completed.  **For ADHD medication, patient will need a follow up clinic or Evisit at least every 3 months to obtain refills.**    Use myaNUMBER (secure email communication and access to your chart) to send your primary care provider a message or make an appointment.  Ask someone on your Team how to sign up for [employer]  "or call the [employer] help line at [phone]  To view your child's test results online: Log into your own [employer] account, select your child's name from the tabs on the right hand side, select \"My medical record\" and select \"Test results\"  Do you have options for a visit without coming into the clinic?  [facility] offers electronic visits (E-visits) and telephone visits for certain medical concerns as well as Zipnosis online.    E-visits via [employer]- generally incur a $45.00 fee  Telephone visits- These are billed based on time spent (in 10-minute increments) on the phone with your provider.   5-10 minutes $30.00 fee   11-20 minutes $59.00 fee   21-30 minutes $85.00 fee  Zipnosis- $25.00 fee.  More information and link available on [email or web address] homepage.         " Patient

## 2022-04-06 NOTE — ASU PATIENT PROFILE, ADULT - TEACHING/LEARNING LEARNING PREFERENCES
audio/computer/internet/individual instruction/skill demonstration/verbal instruction/written material

## 2022-04-06 NOTE — PHYSICAL EXAM
[Restricted in physically strenuous activity but ambulatory and able to carry out work of a light or sedentary nature] : Status 1- Restricted in physically strenuous activity but ambulatory and able to carry out work of a light or sedentary nature, e.g., light house work, office work [Normal] : normal appearance, no rash, nodules, vesicles, ulcers, erythema [de-identified] : anicteric  [de-identified] : laparoscopic incisional site noted.  No signs of infections noted.  His abdomen is otherwise soft, NT/ND.  No organomegaly noted.

## 2022-04-07 ENCOUNTER — RESULT REVIEW (OUTPATIENT)
Age: 62
End: 2022-04-07

## 2022-04-07 ENCOUNTER — TRANSCRIPTION ENCOUNTER (OUTPATIENT)
Age: 62
End: 2022-04-07

## 2022-04-07 ENCOUNTER — NON-APPOINTMENT (OUTPATIENT)
Age: 62
End: 2022-04-07

## 2022-04-07 ENCOUNTER — APPOINTMENT (OUTPATIENT)
Dept: SURGICAL ONCOLOGY | Facility: HOSPITAL | Age: 62
End: 2022-04-07

## 2022-04-07 ENCOUNTER — OUTPATIENT (OUTPATIENT)
Dept: INPATIENT UNIT | Facility: HOSPITAL | Age: 62
LOS: 1 days | Discharge: HOME | End: 2022-04-07
Payer: COMMERCIAL

## 2022-04-07 VITALS
WEIGHT: 132.06 LBS | HEART RATE: 52 BPM | OXYGEN SATURATION: 100 % | DIASTOLIC BLOOD PRESSURE: 61 MMHG | HEIGHT: 67 IN | SYSTOLIC BLOOD PRESSURE: 118 MMHG | RESPIRATION RATE: 17 BRPM | TEMPERATURE: 98 F

## 2022-04-07 DIAGNOSIS — C18.1 MALIGNANT NEOPLASM OF APPENDIX: Chronic | ICD-10-CM

## 2022-04-07 DIAGNOSIS — R11.2 NAUSEA WITH VOMITING, UNSPECIFIED: ICD-10-CM

## 2022-04-07 DIAGNOSIS — Z51.11 ENCOUNTER FOR ANTINEOPLASTIC CHEMOTHERAPY: ICD-10-CM

## 2022-04-07 DIAGNOSIS — Z98.890 OTHER SPECIFIED POSTPROCEDURAL STATES: Chronic | ICD-10-CM

## 2022-04-07 DIAGNOSIS — Z87.438 PERSONAL HISTORY OF OTHER DISEASES OF MALE GENITAL ORGANS: Chronic | ICD-10-CM

## 2022-04-07 DIAGNOSIS — R19.00 INTRA-ABDOMINAL AND PELVIC SWELLING, MASS AND LUMP, UNSPECIFIED SITE: Chronic | ICD-10-CM

## 2022-04-07 DIAGNOSIS — Z90.49 ACQUIRED ABSENCE OF OTHER SPECIFIED PARTS OF DIGESTIVE TRACT: Chronic | ICD-10-CM

## 2022-04-07 DIAGNOSIS — C18.1 MALIGNANT NEOPLASM OF APPENDIX: ICD-10-CM

## 2022-04-07 LAB
ALBUMIN SERPL ELPH-MCNC: 3.9 G/DL — SIGNIFICANT CHANGE UP (ref 3.3–5)
ALP SERPL-CCNC: 101 U/L — SIGNIFICANT CHANGE UP (ref 40–120)
ALT FLD-CCNC: 24 U/L — SIGNIFICANT CHANGE UP (ref 10–45)
ANION GAP SERPL CALC-SCNC: 14 MMOL/L — SIGNIFICANT CHANGE UP (ref 5–17)
AST SERPL-CCNC: 26 U/L — SIGNIFICANT CHANGE UP (ref 10–40)
BILIRUB SERPL-MCNC: 0.4 MG/DL — SIGNIFICANT CHANGE UP (ref 0.2–1.2)
BUN SERPL-MCNC: 15 MG/DL — SIGNIFICANT CHANGE UP (ref 7–23)
CALCIUM SERPL-MCNC: 8.8 MG/DL — SIGNIFICANT CHANGE UP (ref 8.4–10.5)
CEA SERPL-MCNC: 6.4 NG/ML — HIGH (ref 0–3.8)
CHLORIDE SERPL-SCNC: 105 MMOL/L — SIGNIFICANT CHANGE UP (ref 96–108)
CO2 SERPL-SCNC: 22 MMOL/L — SIGNIFICANT CHANGE UP (ref 22–31)
CREAT SERPL-MCNC: 0.92 MG/DL — SIGNIFICANT CHANGE UP (ref 0.5–1.3)
EGFR: 94 ML/MIN/1.73M2 — SIGNIFICANT CHANGE UP
GLUCOSE SERPL-MCNC: 76 MG/DL — SIGNIFICANT CHANGE UP (ref 70–99)
POTASSIUM SERPL-MCNC: 4.7 MMOL/L — SIGNIFICANT CHANGE UP (ref 3.5–5.3)
POTASSIUM SERPL-SCNC: 4.7 MMOL/L — SIGNIFICANT CHANGE UP (ref 3.5–5.3)
PROT SERPL-MCNC: 6.2 G/DL — SIGNIFICANT CHANGE UP (ref 6–8.3)
SODIUM SERPL-SCNC: 141 MMOL/L — SIGNIFICANT CHANGE UP (ref 135–145)

## 2022-04-07 PROCEDURE — 88305 TISSUE EXAM BY PATHOLOGIST: CPT | Mod: 26,59

## 2022-04-07 PROCEDURE — 88112 CYTOPATH CELL ENHANCE TECH: CPT | Mod: 26

## 2022-04-07 PROCEDURE — 88305 TISSUE EXAM BY PATHOLOGIST: CPT | Mod: 26

## 2022-04-07 RX ORDER — HYDROMORPHONE HYDROCHLORIDE 2 MG/ML
0.25 INJECTION INTRAMUSCULAR; INTRAVENOUS; SUBCUTANEOUS EVERY 4 HOURS
Refills: 0 | Status: DISCONTINUED | OUTPATIENT
Start: 2022-04-07 | End: 2022-04-08

## 2022-04-07 RX ORDER — ACETAMINOPHEN 500 MG
1000 TABLET ORAL EVERY 6 HOURS
Refills: 0 | Status: DISCONTINUED | OUTPATIENT
Start: 2022-04-07 | End: 2022-04-08

## 2022-04-07 RX ORDER — PANTOPRAZOLE SODIUM 20 MG/1
40 TABLET, DELAYED RELEASE ORAL
Refills: 0 | Status: DISCONTINUED | OUTPATIENT
Start: 2022-04-07 | End: 2022-04-08

## 2022-04-07 RX ORDER — HYDROMORPHONE HYDROCHLORIDE 2 MG/ML
0.5 INJECTION INTRAMUSCULAR; INTRAVENOUS; SUBCUTANEOUS
Refills: 0 | Status: DISCONTINUED | OUTPATIENT
Start: 2022-04-07 | End: 2022-04-07

## 2022-04-07 RX ORDER — ONDANSETRON 8 MG/1
4 TABLET, FILM COATED ORAL ONCE
Refills: 0 | Status: COMPLETED | OUTPATIENT
Start: 2022-04-07 | End: 2022-04-07

## 2022-04-07 RX ORDER — SIMVASTATIN 20 MG/1
10 TABLET, FILM COATED ORAL AT BEDTIME
Refills: 0 | Status: DISCONTINUED | OUTPATIENT
Start: 2022-04-07 | End: 2022-04-08

## 2022-04-07 RX ORDER — PANTOPRAZOLE SODIUM 20 MG/1
1 TABLET, DELAYED RELEASE ORAL
Qty: 0 | Refills: 0 | DISCHARGE

## 2022-04-07 RX ORDER — OXALIPLATIN 5 MG/ML
152 INJECTION, SOLUTION INTRAVENOUS ONCE
Refills: 0 | Status: DISCONTINUED | OUTPATIENT
Start: 2022-04-07 | End: 2022-04-08

## 2022-04-07 RX ORDER — FENTANYL CITRATE 50 UG/ML
50 INJECTION INTRAVENOUS
Refills: 0 | Status: DISCONTINUED | OUTPATIENT
Start: 2022-04-07 | End: 2022-04-07

## 2022-04-07 RX ORDER — ESCITALOPRAM OXALATE 10 MG/1
5 TABLET, FILM COATED ORAL DAILY
Refills: 0 | Status: DISCONTINUED | OUTPATIENT
Start: 2022-04-07 | End: 2022-04-08

## 2022-04-07 RX ORDER — SODIUM CHLORIDE 9 MG/ML
1000 INJECTION, SOLUTION INTRAVENOUS
Refills: 0 | Status: DISCONTINUED | OUTPATIENT
Start: 2022-04-07 | End: 2022-04-08

## 2022-04-07 RX ORDER — CALCIUM CARBONATE 500(1250)
1 TABLET ORAL EVERY 4 HOURS
Refills: 0 | Status: DISCONTINUED | OUTPATIENT
Start: 2022-04-07 | End: 2022-04-08

## 2022-04-07 RX ADMIN — Medication 400 MILLIGRAM(S): at 21:18

## 2022-04-07 RX ADMIN — SODIUM CHLORIDE 50 MILLILITER(S): 9 INJECTION, SOLUTION INTRAVENOUS at 21:17

## 2022-04-07 RX ADMIN — Medication 1 TABLET(S): at 21:18

## 2022-04-07 RX ADMIN — ONDANSETRON 4 MILLIGRAM(S): 8 TABLET, FILM COATED ORAL at 19:45

## 2022-04-07 RX ADMIN — SIMVASTATIN 10 MILLIGRAM(S): 20 TABLET, FILM COATED ORAL at 21:18

## 2022-04-07 NOTE — DISCHARGE NOTE PROVIDER - NSDCCPCAREPLAN_GEN_ALL_CORE_FT
PRINCIPAL DISCHARGE DIAGNOSIS  Diagnosis: Malignant neoplasm of appendix  Assessment and Plan of Treatment: WOUND CARE:  Please keep incisions clean and dry. Please do not Scrub or rub incisions. Do not use lotion or powder on incisions.   BATHING: You may shower and/or sponge bathe. You may use warm soapy water in the shower and rinse, pat dry.  ACTIVITY: No heavy lifting or straining. Otherwise, you may return to your usual level of physical activity. If you are taking narcotic pain medication DO NOT drive a car, operate machinery or make important decisions.  DIET: Return to your usual diet.  NOTIFY YOUR SURGEON IF: You have any bleeding that does not stop, any pus draining from your wound(s), increased pain at surgical site, any fever (over 100.4 F) persistent nausea/vomiting, or if your pain is not controlled on your discharge pain medications.  Please follow up with your primary care physician in 1-2 weeks regarding your hospitalization.  Please follow up with your surgeon, Dr. Mason in 1 week. Please call office to make an appointment.

## 2022-04-07 NOTE — DISCHARGE NOTE PROVIDER - NSDCMRMEDTOKEN_GEN_ALL_CORE_FT
AndroGel Pump: Apply topically to affected area once a day  escitalopram 5 mg oral tablet: 1 tab(s) orally once a day  famotidine 40 mg oral tablet: 1 tab(s) orally once a day  pantoprazole 40 mg oral delayed release tablet: 1 tab(s) orally once a day  simvastatin 10 mg oral tablet: 1 tab(s) orally once a day (at bedtime)  tamsulosin 0.4 mg oral capsule: 1 cap(s) orally once a day (at bedtime)   acetaminophen 325 mg oral tablet: 3 tab(s) orally every 6 hours, As needed, Mild Pain (1 - 3)  AndroGel Pump: Apply topically to affected area once a day  escitalopram 5 mg oral tablet: 1 tab(s) orally once a day  famotidine 40 mg oral tablet: 1 tab(s) orally once a day  pantoprazole 40 mg oral delayed release tablet: 1 tab(s) orally once a day  simvastatin 10 mg oral tablet: 1 tab(s) orally once a day (at bedtime)

## 2022-04-07 NOTE — DISCHARGE NOTE PROVIDER - CARE PROVIDER_API CALL
Florecita Mason)  Complex General Surgical Oncology; Surgery  [address] [place] [address]  Phone: [phone]  Fax: [phone]  Follow Up Time:

## 2022-04-07 NOTE — DISCHARGE NOTE PROVIDER - HOSPITAL COURSE
Patient is a 60 year old male with a PMHx of anxiety, BPH, GERD, HLD and appendiceal carcinoma (S/P diagnostic laparoscopy and PIPAC X6- most recently Feb 2022). Patient is now s/p diagnostic laparoscopy, peritoneal biopsies, and PIPAC. on 4/7. Patient tolerated procedure well....    At the time of discharge, the patient was hemodynamically stable, was tolerating PO diet, was voiding urine and passing stool.  He was ambulating and was comfortable with adequate pain control.  The patient was instructed to follow up with Dr. Rosas within 1 week after discharge from the hospital. Patient is a 60 year old male with a PMHx of anxiety, BPH, GERD, HLD and appendiceal carcinoma (S/P diagnostic laparoscopy and PIPAC X6- most recently Feb 2022). Patient is now s/p diagnostic laparoscopy, peritoneal biopsies, and PIPAC. on 4/7. Patient tolerated procedure well.    At the time of discharge, the patient was hemodynamically stable, was tolerating PO diet, was voiding urine and passing stool.  He was ambulating and was comfortable with adequate pain control.  The patient was instructed to follow up with Dr. Rosas within 1 week after discharge from the hospital.

## 2022-04-07 NOTE — ASU PREOP CHECKLIST - DENTURES
"Subjective:       Patient ID: Ksenia Martinez is a 38 y.o. female.    Vitals:  height is 5' 5" (1.651 m) and weight is 89.8 kg (198 lb). Her oral temperature is 99.1 °F (37.3 °C). Her blood pressure is 134/95 (abnormal) and her pulse is 87. Her respiration is 18 and oxygen saturation is 99%.     Chief Complaint: Nasal Congestion; Cough; Sore Throat; and Fever    Pt presents today with fever, cough and nasal congestion X's 4 days. Pt is taking Day Quil with no relief.     Cough   This is a new problem. The current episode started in the past 7 days. The problem has been unchanged. The problem occurs constantly. The cough is non-productive. Associated symptoms include a fever, headaches, nasal congestion, postnasal drip and a sore throat. Pertinent negatives include no chest pain, chills, ear congestion, ear pain, eye redness, heartburn, hemoptysis, myalgias, rash, rhinorrhea, shortness of breath, sweats, weight loss or wheezing. Nothing aggravates the symptoms. Risk factors for lung disease include animal exposure. Treatments tried: OTC meds. The treatment provided no relief. There is no history of asthma, bronchiectasis, bronchitis, COPD, emphysema, environmental allergies or pneumonia.   Sore Throat    Associated symptoms include congestion, coughing and headaches. Pertinent negatives include no ear pain, shortness of breath, stridor or vomiting.   Fever    Associated symptoms include congestion, coughing, headaches and a sore throat. Pertinent negatives include no chest pain, ear pain, nausea, rash, vomiting or wheezing.    Patient does have a history of moderate persistent asthma requiring daily use steroid inhaler and Symbicort.    Constitution: Positive for fever. Negative for chills, sweating and fatigue.   HENT: Positive for congestion, postnasal drip and sore throat. Negative for ear pain, sinus pain, sinus pressure and voice change.    Neck: Negative for painful lymph nodes.   Cardiovascular: Negative " for chest pain.   Eyes: Negative for eye redness.   Respiratory: Positive for cough. Negative for chest tightness, sputum production, bloody sputum, COPD, shortness of breath, stridor, wheezing and asthma.    Gastrointestinal: Negative for nausea, vomiting and heartburn.   Musculoskeletal: Negative for muscle ache.   Skin: Negative for rash.   Allergic/Immunologic: Negative for environmental allergies, seasonal allergies and asthma.   Neurological: Positive for headaches.   Hematologic/Lymphatic: Negative for swollen lymph nodes.       Objective:      Physical Exam   Constitutional: She is oriented to person, place, and time. She appears well-developed and well-nourished. She is cooperative.  Non-toxic appearance. She does not have a sickly appearance. She does not appear ill. No distress.   HENT:   Head: Normocephalic and atraumatic.   Right Ear: Hearing, tympanic membrane, external ear and ear canal normal.   Left Ear: Hearing, tympanic membrane, external ear and ear canal normal.   Nose: Nose normal. No mucosal edema, rhinorrhea or nasal deformity. No epistaxis. Right sinus exhibits no maxillary sinus tenderness and no frontal sinus tenderness. Left sinus exhibits no maxillary sinus tenderness and no frontal sinus tenderness.   Mouth/Throat: Uvula is midline, oropharynx is clear and moist and mucous membranes are normal. No trismus in the jaw. Normal dentition. No uvula swelling. No oropharyngeal exudate, posterior oropharyngeal edema or posterior oropharyngeal erythema.   Eyes: Conjunctivae and lids are normal. No scleral icterus.   Neck: Trachea normal, full passive range of motion without pain and phonation normal. Neck supple. No neck rigidity. No edema and no erythema present.   Cardiovascular: Normal rate, regular rhythm, normal heart sounds, intact distal pulses and normal pulses.   Pulmonary/Chest: Effort normal. No stridor. No respiratory distress. She has no decreased breath sounds. She has wheezes. She  has no rhonchi. She has no rales.   Abdominal: Normal appearance.   Musculoskeletal: Normal range of motion. She exhibits no edema or deformity.   Neurological: She is alert and oriented to person, place, and time. She exhibits normal muscle tone. Coordination normal.   Skin: Skin is warm, dry, intact, not diaphoretic and not pale.   Psychiatric: She has a normal mood and affect. Her speech is normal and behavior is normal. Judgment and thought content normal. Cognition and memory are normal.   Nursing note and vitals reviewed.        Assessment:       1. Flu-like symptoms    2. Moderate persistent asthma with acute exacerbation    3. Acute bacterial sinusitis        Plan:         Flu-like symptoms  -     POCT Influenza A/B    Moderate persistent asthma with acute exacerbation  -     albuterol (VENTOLIN HFA) 90 mcg/actuation inhaler; INHALE 2 PUFFS INTO THE LUNGS EVERY 4 (FOUR) HOURS AS NEEDED FOR WHEEZING. RESCUE  Dispense: 18 Inhaler; Refill: 3    Acute bacterial sinusitis    Other orders  -     amoxicillin (AMOXIL) 875 MG tablet; Take 1 tablet (875 mg total) by mouth 2 (two) times daily. for 10 days  Dispense: 20 tablet; Refill: 0  -     predniSONE (DELTASONE) 20 MG tablet; Take 2 tablets (40 mg total) by mouth once daily. for 7 days  Dispense: 14 tablet; Refill: 0  -     SYMBICORT 80-4.5 mcg/actuation HFAA; Inhale 2 puffs into the lungs 2 (two) times daily.  Dispense: 10.2 g; Refill: 6            no

## 2022-04-07 NOTE — ASU PREOP CHECKLIST - ADVANCE DIRECTIVE ADDRESSED/READDRESSED
----- Message from Michael D D'Amico, MD sent at 6/26/2017 10:02 PM CDT -----  Thyroid dose appears a bit low-increase levothyroxine to 100 mcg daily and repeat TSH in 6-8 weeks    Lipids look good    done

## 2022-04-07 NOTE — CHART NOTE - NSCHARTNOTEFT_GEN_A_CORE
POST-OPERATIVE NOTE    Subjective:  Patient is s/p Diagnostic Laparoscopy, Peritoneal Biopsies, and Pressured Intraperitoneal Aerosolized Chemotherapy. Recovering appropriately.     Vital Signs Last 24 Hrs  T(C): 37.5 (07 Apr 2022 20:30), Max: 37.5 (07 Apr 2022 20:30)  T(F): 99.5 (07 Apr 2022 20:30), Max: 99.5 (07 Apr 2022 20:30)  HR: 67 (07 Apr 2022 20:30) (52 - 78)  BP: 126/68 (07 Apr 2022 20:30) (116/58 - 135/73)  BP(mean): 85 (07 Apr 2022 19:30) (65 - 85)  RR: 18 (07 Apr 2022 20:30) (11 - 20)  SpO2: 100% (07 Apr 2022 20:30) (98% - 100%)  I&O's Detail    07 Apr 2022 07:01  -  07 Apr 2022 21:18  --------------------------------------------------------  IN:    Lactated Ringers: 100 mL    Oral Fluid: 240 mL  Total IN: 340 mL    OUT:  Total OUT: 0 mL    Total NET: 340 mL          PAST MEDICAL & SURGICAL HISTORY:  HLD (hyperlipidemia)    BPH (benign prostatic hyperplasia)    Neuropathic pain  hands and feet    UTI (urinary tract infection)  pt denes recent infection    Appendix carcinoma  s/p abdominal surgery and chemotherapy, completed 12/2020    Anxiety    Cancer of appendix    Malignant neoplasm of appendix    GERD (gastroesophageal reflux disease)  pt denes recent endoscopy    History of undescended testicle  age 7    Abdominal mass  surgery 5/2020  remove mass, spleen, partial colectomy, lymph nodes, part small intestines, omentum, apendix, gall bladder, part stomach. HIPEC    History of abdominal paracentesis  x3    H/O colectomy  Splenectomy , Cholecystectomy   5/2020. Insertion of Mediport    Malignant neoplasm of appendix  chemo 10/2021; s/p Pipac x 6 last 02/2022          Physical Exam:  General: NAD, resting comfortably in bed  Pulmonary: Nonlabored breathing, no respiratory distress  Cardiovascular: NSR  Abdominal: soft, NT/ND. Incisions c/d/i.  Extremities: WWP      LABS:                        12.2   10.04 )-----------( 420      ( 06 Apr 2022 17:00 )             38.5     04-06    141  |  105  |  15  ----------------------------<  76  4.7   |  22  |  0.92    Ca    8.8      06 Apr 2022 20:01    TPro  6.2  /  Alb  3.9  /  TBili  0.4  /  DBili  x   /  AST  26  /  ALT  24  /  AlkPhos  101  04-06      CAPILLARY BLOOD GLUCOSE          Radiology and Additional Studies:    Assessment:  The patient is a 61y Male who is now several hours post-op from a Diagnostic Laparoscopy, Peritoneal Biopsies, and Pressured Intraperitoneal Aerosolized Chemotherapy. Recovering well on the floor.    Plan:  - Pain control as needed  - DVT ppx  - OOB and ambulating as tolerated  - F/u AM labs  - Reg diet  - TOV 1am

## 2022-04-08 ENCOUNTER — TRANSCRIPTION ENCOUNTER (OUTPATIENT)
Age: 62
End: 2022-04-08

## 2022-04-08 VITALS
SYSTOLIC BLOOD PRESSURE: 103 MMHG | DIASTOLIC BLOOD PRESSURE: 57 MMHG | RESPIRATION RATE: 18 BRPM | OXYGEN SATURATION: 99 % | TEMPERATURE: 99 F | HEART RATE: 75 BPM

## 2022-04-08 LAB
ANION GAP SERPL CALC-SCNC: 13 MMOL/L — SIGNIFICANT CHANGE UP (ref 7–14)
BUN SERPL-MCNC: 16 MG/DL — SIGNIFICANT CHANGE UP (ref 7–23)
CALCIUM SERPL-MCNC: 8.3 MG/DL — LOW (ref 8.4–10.5)
CHLORIDE SERPL-SCNC: 103 MMOL/L — SIGNIFICANT CHANGE UP (ref 98–107)
CO2 SERPL-SCNC: 22 MMOL/L — SIGNIFICANT CHANGE UP (ref 22–31)
CREAT SERPL-MCNC: 1 MG/DL — SIGNIFICANT CHANGE UP (ref 0.5–1.3)
EGFR: 85 ML/MIN/1.73M2 — SIGNIFICANT CHANGE UP
GLUCOSE SERPL-MCNC: 120 MG/DL — HIGH (ref 70–99)
HCT VFR BLD CALC: 37.8 % — LOW (ref 39–50)
HGB BLD-MCNC: 12.3 G/DL — LOW (ref 13–17)
MAGNESIUM SERPL-MCNC: 1.6 MG/DL — SIGNIFICANT CHANGE UP (ref 1.6–2.6)
MCHC RBC-ENTMCNC: 29 PG — SIGNIFICANT CHANGE UP (ref 27–34)
MCHC RBC-ENTMCNC: 32.5 GM/DL — SIGNIFICANT CHANGE UP (ref 32–36)
MCV RBC AUTO: 89.2 FL — SIGNIFICANT CHANGE UP (ref 80–100)
NRBC # BLD: 0 /100 WBCS — SIGNIFICANT CHANGE UP
NRBC # FLD: 0 K/UL — SIGNIFICANT CHANGE UP
PHOSPHATE SERPL-MCNC: 3.7 MG/DL — SIGNIFICANT CHANGE UP (ref 2.5–4.5)
PLATELET # BLD AUTO: 390 K/UL — SIGNIFICANT CHANGE UP (ref 150–400)
POTASSIUM SERPL-MCNC: 4 MMOL/L — SIGNIFICANT CHANGE UP (ref 3.5–5.3)
POTASSIUM SERPL-SCNC: 4 MMOL/L — SIGNIFICANT CHANGE UP (ref 3.5–5.3)
RBC # BLD: 4.24 M/UL — SIGNIFICANT CHANGE UP (ref 4.2–5.8)
RBC # FLD: 14.6 % — HIGH (ref 10.3–14.5)
SODIUM SERPL-SCNC: 138 MMOL/L — SIGNIFICANT CHANGE UP (ref 135–145)
WBC # BLD: 19.23 K/UL — HIGH (ref 3.8–10.5)
WBC # FLD AUTO: 19.23 K/UL — HIGH (ref 3.8–10.5)

## 2022-04-08 RX ORDER — FAMOTIDINE 10 MG/ML
40 INJECTION INTRAVENOUS DAILY
Refills: 0 | Status: DISCONTINUED | OUTPATIENT
Start: 2022-04-08 | End: 2022-04-08

## 2022-04-08 RX ORDER — ACETAMINOPHEN 500 MG
975 TABLET ORAL EVERY 6 HOURS
Refills: 0 | Status: DISCONTINUED | OUTPATIENT
Start: 2022-04-08 | End: 2022-04-08

## 2022-04-08 RX ORDER — TAMSULOSIN HYDROCHLORIDE 0.4 MG/1
0.4 CAPSULE ORAL AT BEDTIME
Refills: 0 | Status: DISCONTINUED | OUTPATIENT
Start: 2022-04-08 | End: 2022-04-08

## 2022-04-08 RX ORDER — ACETAMINOPHEN 500 MG
3 TABLET ORAL
Qty: 0 | Refills: 0 | DISCHARGE
Start: 2022-04-08

## 2022-04-08 RX ORDER — TAMSULOSIN HYDROCHLORIDE 0.4 MG/1
1 CAPSULE ORAL
Qty: 0 | Refills: 0 | DISCHARGE

## 2022-04-08 RX ADMIN — Medication 975 MILLIGRAM(S): at 03:26

## 2022-04-08 RX ADMIN — Medication 1 TABLET(S): at 03:26

## 2022-04-08 RX ADMIN — Medication 1 TABLET(S): at 06:39

## 2022-04-08 RX ADMIN — Medication 975 MILLIGRAM(S): at 10:36

## 2022-04-08 NOTE — DISCHARGE NOTE NURSING/CASE MANAGEMENT/SOCIAL WORK - NSDCPEFALRISK_GEN_ALL_CORE
For information on Fall & Injury Prevention, visit: [email or web address] OR  [email or web address] OR  https://www.cdc.gov/steadi/patient.html

## 2022-04-08 NOTE — DISCHARGE NOTE NURSING/CASE MANAGEMENT/SOCIAL WORK - PATIENT PORTAL LINK FT
You can access the FollowMyHealth Patient Portal offered by [facility] by registering at the following website: [email or web address]. By joining [employer]’s FollowMyHealth portal, you will also be able to view your health information using other applications (apps) compatible with our system.

## 2022-04-08 NOTE — PROGRESS NOTE ADULT - SUBJECTIVE AND OBJECTIVE BOX
Surgical Oncology Progress Note    SUBJECTIVE  No acute events overnight. Pt seen and examined on mornings rounds.    OBJECTIVE  ___________________________________________________  VITAL SIGNS / I&O's   Vital Signs Last 24 Hrs  T(C): 37.5 (07 Apr 2022 20:30), Max: 37.5 (07 Apr 2022 20:30)  T(F): 99.5 (07 Apr 2022 20:30), Max: 99.5 (07 Apr 2022 20:30)  HR: 67 (07 Apr 2022 20:30) (52 - 78)  BP: 126/68 (07 Apr 2022 20:30) (116/58 - 135/73)  BP(mean): 85 (07 Apr 2022 19:30) (65 - 85)  RR: 18 (07 Apr 2022 20:30) (11 - 20)  SpO2: 100% (07 Apr 2022 20:30) (98% - 100%)      07 Apr 2022 07:01  -  08 Apr 2022 00:39  --------------------------------------------------------  IN:    Lactated Ringers: 100 mL    Oral Fluid: 240 mL  Total IN: 340 mL    OUT:    Voided (mL): 150 mL  Total OUT: 150 mL    Total NET: 190 mL        ___________________________________________________  PHYSICAL EXAM    General: NAD, resting comfortably in bed  Pulmonary: Nonlabored breathing, no respiratory distress  Cardiovascular: NSR  Abdominal: soft, NT/ND. Incisions c/d/i.  Extremities: WWP    ___________________________________________________  LABS                        12.2   10.04 )-----------( 420      ( 06 Apr 2022 17:00 )             38.5     06 Apr 2022 20:01    141    |  105    |  15     ----------------------------<  76     4.7     |  22     |  0.92     Ca    8.8        06 Apr 2022 20:01    TPro  6.2    /  Alb  3.9    /  TBili  0.4    /  DBili  x      /  AST  26     /  ALT  24     /  AlkPhos  101    06 Apr 2022 20:01    ___________________________________________________  MEDICATIONS  (STANDING):  acetaminophen   IVPB .. 1000 milliGRAM(s) IV Intermittent every 6 hours  calcium carbonate    500 mG (Tums) Chewable 1 Tablet(s) Chew every 4 hours  escitalopram 5 milliGRAM(s) Oral daily  lactated ringers. 1000 milliLiter(s) (50 mL/Hr) IV Continuous <Continuous>  oxaliplatin INTRAPERITONEAL (eMAR) 152 milliGRAM(s) IntraPeritoneal once  pantoprazole    Tablet 40 milliGRAM(s) Oral before breakfast  simvastatin 10 milliGRAM(s) Oral at bedtime    MEDICATIONS  (PRN):  HYDROmorphone  Injectable 0.25 milliGRAM(s) IV Push every 4 hours PRN Moderate Pain (4 - 6)   Surgical Oncology Progress Note    SUBJECTIVE  Pt seen and examined on AM rounds. No acute events overnight. Denies abdominal pain, N/V, fever, chills, SOB, chest pain.       OBJECTIVE  ___________________________________________________  VITAL SIGNS / I&O's   Vital Signs Last 24 Hrs  T(C): 37.1 (04-08-22 @ 05:26), Max: 37.5 (04-07-22 @ 20:30)  HR: 61 (04-08-22 @ 05:26) (52 - 78)  BP: 113/59 (04-08-22 @ 05:26) (113/59 - 135/73)  ABP: --  ABP(mean): --  RR: 18 (04-08-22 @ 05:26) (11 - 20)  SpO2: 97% (04-08-22 @ 05:26) (97% - 100%)  Wt(kg): --  CVP(mm Hg): --      04-07 @ 07:01  -  04-08 @ 07:00  --------------------------------------------------------  IN:    Lactated Ringers: 100 mL    Oral Fluid: 240 mL  Total IN: 340 mL    OUT:    Voided (mL): 150 mL  Total OUT: 150 mL    Total NET: 190 mL  ___________________________________________________  PHYSICAL EXAM    General: NAD, resting comfortably in bed  Pulmonary: Nonlabored breathing, no respiratory distress  Cardiovascular: NSR  Abdominal: soft, NT/ND. Incisions c/d/i.  Extremities: WWP    ___________________________________________________  LABS      CBC (04-08 @ 06:03)                              12.3<L>                         19.23<H>  )----------------(  390        --    % Neutrophils, --    % Lymphocytes, ANC: --                                  37.8<L>    BMP (04-08 @ 06:03)             138     |  103     |  16    		Ca++ --      Ca 8.3<L>             ---------------------------------( 120<H>		Mg 1.60               4.0     |  22      |  1.00  			Ph 3.7

## 2022-04-08 NOTE — PROGRESS NOTE ADULT - ASSESSMENT
The patient is a 61y Male who is now several hours post-op from a Diagnostic Laparoscopy, Peritoneal Biopsies, and Pressured Intraperitoneal Aerosolized Chemotherapy. Recovering well on the floor.    Plan:  - Pain control as needed  - DVT ppx  - OOB and ambulating as tolerated  - F/u AM labs  - Reg diet  - TOV 1am. 61y Male who is now several hours post-op from a Diagnostic Laparoscopy, Peritoneal Biopsies, and Pressured Intraperitoneal Aerosolized Chemotherapy. Recovering well on the floor.    Plan:  -Diet Regular   - Pain control as needed  - OOB and ambulating as tolerated  -DVT PPX   -DISPO: home      61y Male who is now several hours post-op from a Diagnostic Laparoscopy, Peritoneal Biopsies, and Pressured Intraperitoneal Aerosolized Chemotherapy. Recovering well on the floor.    Plan:  -Diet Regular   - Pain control as needed  - OOB and ambulating as tolerated  -DISPO: home

## 2022-04-12 LAB — NON-GYNECOLOGICAL CYTOLOGY STUDY: SIGNIFICANT CHANGE UP

## 2022-04-13 LAB — SURGICAL PATHOLOGY STUDY: SIGNIFICANT CHANGE UP

## 2022-04-16 ENCOUNTER — TRANSCRIPTION ENCOUNTER (OUTPATIENT)
Age: 62
End: 2022-04-16

## 2022-04-18 ENCOUNTER — RX RENEWAL (OUTPATIENT)
Age: 62
End: 2022-04-18

## 2022-05-05 ENCOUNTER — APPOINTMENT (OUTPATIENT)
Dept: SURGICAL ONCOLOGY | Facility: CLINIC | Age: 62
End: 2022-05-05
Payer: COMMERCIAL

## 2022-05-05 PROCEDURE — 99213 OFFICE O/P EST LOW 20 MIN: CPT | Mod: 95

## 2022-05-06 ENCOUNTER — OUTPATIENT (OUTPATIENT)
Dept: OUTPATIENT SERVICES | Facility: HOSPITAL | Age: 62
LOS: 1 days | Discharge: HOME | End: 2022-05-06

## 2022-05-06 DIAGNOSIS — Z98.890 OTHER SPECIFIED POSTPROCEDURAL STATES: Chronic | ICD-10-CM

## 2022-05-06 DIAGNOSIS — R19.00 INTRA-ABDOMINAL AND PELVIC SWELLING, MASS AND LUMP, UNSPECIFIED SITE: Chronic | ICD-10-CM

## 2022-05-06 DIAGNOSIS — Z01.818 ENCOUNTER FOR OTHER PREPROCEDURAL EXAMINATION: ICD-10-CM

## 2022-05-06 DIAGNOSIS — C26.9 MALIGNANT NEOPLASM OF ILL-DEFINED SITES WITHIN THE DIGESTIVE SYSTEM: ICD-10-CM

## 2022-05-06 DIAGNOSIS — Z90.49 ACQUIRED ABSENCE OF OTHER SPECIFIED PARTS OF DIGESTIVE TRACT: Chronic | ICD-10-CM

## 2022-05-06 DIAGNOSIS — Z87.438 PERSONAL HISTORY OF OTHER DISEASES OF MALE GENITAL ORGANS: Chronic | ICD-10-CM

## 2022-05-06 DIAGNOSIS — C18.1 MALIGNANT NEOPLASM OF APPENDIX: Chronic | ICD-10-CM

## 2022-05-06 DIAGNOSIS — Z86.39 PERSONAL HISTORY OF OTHER ENDOCRINE, NUTRITIONAL AND METABOLIC DISEASE: ICD-10-CM
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
## 2022-05-06 NOTE — HISTORY OF PRESENT ILLNESS
[Post-Op Complications] : No post-op complications reported [de-identified] : Mr. Arias is a 60 year old gentleman referred to me by Dr. 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 He underwent  CRS and HIPEC for presumed LAMN, which turned out to node positive.([facility] pathology review well-differentiated mucinous adenocarcinoma) on 5/7/2020 at [facility] with Dr. Herberth Peters. \par \par PCI: 34\par CC: R2b\par \par Mr. Arias has since moved to [place] to be closer to family and has established care in [facility]. Lives in [place]. \par \par Cytoreduction included a splenectomy and distal gastrectomy, omentectomy, cholecystectomy, subtotal colectomy and diaphragm stripping. 4L of mucinous ascites was drained. 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 As his tumor was felt to be low grade, he did not receive any systemic therapy up front. He now reports feeling well. He reports 3-4 loose but not watery BM's/day. He reports improving weight and energy levels. He denies any PO intolerance. He plays golf and does nature walks close to [facility]. \par \par CEA \par 6/5/20: 3.8\par 8/12/20: 6.1\par 4/7: 10.1 \par \par Pathology: \par 5/7/21: Paracentesis, cytology negative for malignant cells. 1.6 Liters removed. 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 He underwent subtotal colectomy with positive margins. 2/14 nodes involved. [facility] review: low grade appendiceal mucinous neoplasm\par \par Scans were obtained at [facility] and demonstrated: \par 4/30/21: loculated intraperitoneal fluid. No bowel obstruction. \par \par Last colonoscopy May 2020. \par \par Underwent diagnostic laparoscopy by me on 5/19/21. PCI approx 25. Evacuated 1.4 liters ascites. Biopsies consistent with mucinous adenocarcinoma. \par 6/3: PIPAC #1 \par 6/17: Doing well. No pain. Mild reflux/ dyspepsia. Relieved with tums. Eating well. \par 7/15/21: Doing well. Admitted earlier this week 7/12-7/13 to [facility] with small bowel obstruction. Dramatically improved after 24 hours nasogastric decompression. Feels well since discharge. Multiple BM last 48 hours. Tolerating full liquid diet. No abdominal pain. \par 8/19/21: Doing well. No adverse events. No complaints. Wants to go swimming. Eating well. Weight is stable. Moving bowels at least daily. \par 12/16/21: Doing well. Completed compassionate use PIPAC X 2. No complaints. Wants to lift weights, go scuba diving, and skiing. Plan for travel to [place] next month\par 2/10/22: PIPAC #6\par 3/3/22: Continues to travel, scuba dive, ski, lift weights. \par 4/7/22: PIPAC #7. Sons wedding was 4/30/22\par

## 2022-05-06 NOTE — ASSESSMENT
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 \par No extraperitoneal disease \par Performance status- ecog 1\par \par He is feeling well at this point. \par Disease was reasonably stable over the last year, now with progression requiring paracentesis-- 1.6L drained May 2021. \par \par Given his progression within 1 year of CRS/ HIPEC, as well as R2b/ CC 2 resection at first operation I do not think additional cytoreductive surgery is feasible. I have notified his surgeon from [place], Dr. Peters who agrees. \par \par I discussed case with patients medical oncologist who favors regional approach over additional systemic therapy.\par \par Doing well after 7th PIPAC. \par \par PCI PIPAC 1: 29\par PCI PIPAC 2: 28\par PCI PIPAC 3: 24\par PCI PIPAC 4: \par PCI PIPAC 5: 22\par PIPAC#6: 24\par PIPAC 7: \par \par Plan: \par [] Have requested approval for PIPAC #8- target June 3rd\par \par \par \par

## 2022-05-06 NOTE — REASON FOR VISIT
[de-identified] : diagnostic laparoscopy 5/19/21, PIPAC 6/3/21, 7/22/21, 9/2/21, 10/2021, 12/2/21, 2/10/22, 4/7/22 [Post-Op Visit] : a post-op visit for [Spouse] : spouse [FreeTextEntry2] : appendiceal carcinoma-  Here for enrollment visit for [facility] clinical trial

## 2022-05-06 NOTE — PHYSICAL EXAM
[Normal] : oriented to person, place and time, with appropriate affect [de-identified] : abdomen soft, non-distended, non-tender. no masses. laparotomy and drain incisions well healed. Port sites healing well.

## 2022-05-09 ENCOUNTER — APPOINTMENT (OUTPATIENT)
Dept: INFUSION THERAPY | Facility: CLINIC | Age: 62
End: 2022-05-09

## 2022-05-09 VITALS
RESPIRATION RATE: 7 BRPM | OXYGEN SATURATION: 98 % | TEMPERATURE: 98.7 F | HEART RATE: 61 BPM | SYSTOLIC BLOOD PRESSURE: 119 MMHG | DIASTOLIC BLOOD PRESSURE: 70 MMHG

## 2022-05-17 ENCOUNTER — RX RENEWAL (OUTPATIENT)
Age: 62
End: 2022-05-17

## 2022-05-22 ENCOUNTER — RX RENEWAL (OUTPATIENT)
Age: 62
End: 2022-05-22

## 2022-06-02 ENCOUNTER — RESULT REVIEW (OUTPATIENT)
Age: 62
End: 2022-06-02

## 2022-06-02 ENCOUNTER — APPOINTMENT (OUTPATIENT)
Dept: HEMATOLOGY ONCOLOGY | Facility: CLINIC | Age: 62
End: 2022-06-02
Payer: COMMERCIAL

## 2022-06-02 ENCOUNTER — OUTPATIENT (OUTPATIENT)
Dept: OUTPATIENT SERVICES | Facility: HOSPITAL | Age: 62
LOS: 1 days | End: 2022-06-02

## 2022-06-02 ENCOUNTER — APPOINTMENT (OUTPATIENT)
Dept: INFUSION THERAPY | Facility: HOSPITAL | Age: 62
End: 2022-06-02

## 2022-06-02 ENCOUNTER — TRANSCRIPTION ENCOUNTER (OUTPATIENT)
Age: 62
End: 2022-06-02

## 2022-06-02 VITALS
HEIGHT: 66 IN | WEIGHT: 128.09 LBS | OXYGEN SATURATION: 98 % | HEART RATE: 50 BPM | TEMPERATURE: 98 F | SYSTOLIC BLOOD PRESSURE: 118 MMHG | RESPIRATION RATE: 16 BRPM | DIASTOLIC BLOOD PRESSURE: 78 MMHG

## 2022-06-02 DIAGNOSIS — C18.1 MALIGNANT NEOPLASM OF APPENDIX: ICD-10-CM

## 2022-06-02 DIAGNOSIS — Z90.49 ACQUIRED ABSENCE OF OTHER SPECIFIED PARTS OF DIGESTIVE TRACT: Chronic | ICD-10-CM

## 2022-06-02 DIAGNOSIS — K21.9 GASTRO-ESOPHAGEAL REFLUX DISEASE WITHOUT ESOPHAGITIS: ICD-10-CM

## 2022-06-02 DIAGNOSIS — Z98.890 OTHER SPECIFIED POSTPROCEDURAL STATES: Chronic | ICD-10-CM

## 2022-06-02 DIAGNOSIS — C18.1 MALIGNANT NEOPLASM OF APPENDIX: Chronic | ICD-10-CM

## 2022-06-02 DIAGNOSIS — Z87.438 PERSONAL HISTORY OF OTHER DISEASES OF MALE GENITAL ORGANS: Chronic | ICD-10-CM

## 2022-06-02 DIAGNOSIS — R19.00 INTRA-ABDOMINAL AND PELVIC SWELLING, MASS AND LUMP, UNSPECIFIED SITE: Chronic | ICD-10-CM

## 2022-06-02 LAB
ALBUMIN SERPL ELPH-MCNC: 4.3 G/DL — SIGNIFICANT CHANGE UP (ref 3.3–5)
ALP SERPL-CCNC: 95 U/L — SIGNIFICANT CHANGE UP (ref 40–120)
ALT FLD-CCNC: 19 U/L — SIGNIFICANT CHANGE UP (ref 10–45)
ANION GAP SERPL CALC-SCNC: 10 MMOL/L — SIGNIFICANT CHANGE UP (ref 5–17)
AST SERPL-CCNC: 22 U/L — SIGNIFICANT CHANGE UP (ref 10–40)
BASOPHILS # BLD AUTO: 0.08 K/UL — SIGNIFICANT CHANGE UP (ref 0–0.2)
BASOPHILS NFR BLD AUTO: 1 % — SIGNIFICANT CHANGE UP (ref 0–2)
BILIRUB SERPL-MCNC: 0.6 MG/DL — SIGNIFICANT CHANGE UP (ref 0.2–1.2)
BLD GP AB SCN SERPL QL: NEGATIVE — SIGNIFICANT CHANGE UP
BUN SERPL-MCNC: 20 MG/DL — SIGNIFICANT CHANGE UP (ref 7–23)
CALCIUM SERPL-MCNC: 9.4 MG/DL — SIGNIFICANT CHANGE UP (ref 8.4–10.5)
CEA SERPL-MCNC: 7 NG/ML — HIGH (ref 0–3.8)
CHLORIDE SERPL-SCNC: 105 MMOL/L — SIGNIFICANT CHANGE UP (ref 96–108)
CO2 SERPL-SCNC: 26 MMOL/L — SIGNIFICANT CHANGE UP (ref 22–31)
CREAT SERPL-MCNC: 0.95 MG/DL — SIGNIFICANT CHANGE UP (ref 0.5–1.3)
EGFR: 90 ML/MIN/1.73M2 — SIGNIFICANT CHANGE UP
EOSINOPHIL # BLD AUTO: 0.12 K/UL — SIGNIFICANT CHANGE UP (ref 0–0.5)
EOSINOPHIL NFR BLD AUTO: 1.5 % — SIGNIFICANT CHANGE UP (ref 0–6)
GLUCOSE SERPL-MCNC: 85 MG/DL — SIGNIFICANT CHANGE UP (ref 70–99)
HCT VFR BLD CALC: 40.2 % — SIGNIFICANT CHANGE UP (ref 39–50)
HGB BLD-MCNC: 13 G/DL — SIGNIFICANT CHANGE UP (ref 13–17)
IMM GRANULOCYTES NFR BLD AUTO: 0.1 % — SIGNIFICANT CHANGE UP (ref 0–1.5)
LYMPHOCYTES # BLD AUTO: 2.95 K/UL — SIGNIFICANT CHANGE UP (ref 1–3.3)
LYMPHOCYTES # BLD AUTO: 37.5 % — SIGNIFICANT CHANGE UP (ref 13–44)
MCHC RBC-ENTMCNC: 28 PG — SIGNIFICANT CHANGE UP (ref 27–34)
MCHC RBC-ENTMCNC: 32.3 G/DL — SIGNIFICANT CHANGE UP (ref 32–36)
MCV RBC AUTO: 86.6 FL — SIGNIFICANT CHANGE UP (ref 80–100)
MONOCYTES # BLD AUTO: 0.98 K/UL — HIGH (ref 0–0.9)
MONOCYTES NFR BLD AUTO: 12.5 % — SIGNIFICANT CHANGE UP (ref 2–14)
NEUTROPHILS # BLD AUTO: 3.73 K/UL — SIGNIFICANT CHANGE UP (ref 1.8–7.4)
NEUTROPHILS NFR BLD AUTO: 47.4 % — SIGNIFICANT CHANGE UP (ref 43–77)
NRBC # BLD: 0 /100 WBCS — SIGNIFICANT CHANGE UP (ref 0–0)
PLATELET # BLD AUTO: 411 K/UL — HIGH (ref 150–400)
POTASSIUM SERPL-MCNC: 4.6 MMOL/L — SIGNIFICANT CHANGE UP (ref 3.5–5.3)
POTASSIUM SERPL-SCNC: 4.6 MMOL/L — SIGNIFICANT CHANGE UP (ref 3.5–5.3)
PROT SERPL-MCNC: 6.2 G/DL — SIGNIFICANT CHANGE UP (ref 6–8.3)
RBC # BLD: 4.64 M/UL — SIGNIFICANT CHANGE UP (ref 4.2–5.8)
RBC # FLD: 16.2 % — HIGH (ref 10.3–14.5)
RH IG SCN BLD-IMP: POSITIVE — SIGNIFICANT CHANGE UP
SODIUM SERPL-SCNC: 141 MMOL/L — SIGNIFICANT CHANGE UP (ref 135–145)
WBC # BLD: 7.87 K/UL — SIGNIFICANT CHANGE UP (ref 3.8–10.5)
WBC # FLD AUTO: 7.87 K/UL — SIGNIFICANT CHANGE UP (ref 3.8–10.5)

## 2022-06-02 PROCEDURE — 99213 OFFICE O/P EST LOW 20 MIN: CPT | Mod: 95

## 2022-06-02 RX ORDER — ESCITALOPRAM OXALATE 10 MG/1
1 TABLET, FILM COATED ORAL
Qty: 0 | Refills: 0 | DISCHARGE

## 2022-06-02 NOTE — ASU PATIENT PROFILE, ADULT - FALL HARM RISK - UNIVERSAL INTERVENTIONS
Bed in lowest position, wheels locked, appropriate side rails in place/Call bell, personal items and telephone in reach/Instruct patient to call for assistance before getting out of bed or chair/Non-slip footwear when patient is out of bed/[address] to call system/Physically safe environment - no spills, clutter or unnecessary equipment/Purposeful Proactive Rounding/Room/bathroom lighting operational, light cord in reach

## 2022-06-02 NOTE — H&P PST ADULT - HISTORY OF PRESENT ILLNESS
62 yo male  with hx of appendix carcinoma, s/p Diagnostic Laparoscopy X 7 PIPAC Rx, most recently April 2022 . Pt received IV chemo preop today 6/2/22. Pt is scheduled for diagnostic Laparoscopy Peritoneal Biopsy, PIPAC.             62 yo male  with hx of appendix carcinoma, s/p Diagnostic Laparoscopy X 7 PIPAC Rx, most recently April 2022 . Pt received IV chemo preop today 6/2/22.  Pt is scheduled for diagnostic Laparoscopy Peritoneal Biopsy, PIPAC.

## 2022-06-02 NOTE — H&P PST ADULT - PROBLEM SELECTOR PLAN 2
Pt instructed to take pantoprazole AM of surgery with a sip of water, pt able to verbalize understanding.

## 2022-06-02 NOTE — H&P PST ADULT - NEGATIVE NEUROLOGICAL SYMPTOMS
no transient paralysis/no weakness/no generalized seizures/no focal seizures/no syncope/no tremors/no vertigo

## 2022-06-02 NOTE — H&P PST ADULT - NSICDXPASTMEDICALHX_GEN_ALL_CORE_FT
PAST MEDICAL HISTORY:  Anxiety     Appendix carcinoma PIPAC x 7, last done 4/2022    BPH (benign prostatic hyperplasia)     Cancer of appendix     GERD (gastroesophageal reflux disease) pt denes recent endoscopy    HLD (hyperlipidemia)     Malignant neoplasm of appendix     Neuropathic pain hands and feet    UTI (urinary tract infection) pt denes recent infection

## 2022-06-02 NOTE — H&P PST ADULT - NEGATIVE OPHTHALMOLOGIC SYMPTOMS
no diplopia/no photophobia/no lacrimation L/no lacrimation R/no blurred vision L/no blurred vision R/no pain L/no pain R

## 2022-06-02 NOTE — H&P PST ADULT - NSICDXPASTSURGICALHX_GEN_ALL_CORE_FT
PAST SURGICAL HISTORY:  Abdominal mass surgery 5/2020  remove mass, spleen, partial colectomy, lymph nodes, part small intestines, omentum, apendix, gall bladder, part stomach. HIPEC    H/O colectomy Splenectomy , Cholecystectomy   5/2020. Insertion of Mediport    History of abdominal paracentesis x3    History of undescended testicle age 7    Malignant neoplasm of appendix chemo 10/2021; s/p Pipac x 7 last 04/2022

## 2022-06-02 NOTE — H&P PST ADULT - [facility]
Telemetry Bed?: No   Admitting Physician: KIRTI SMITH [[id number]]   Is this a telephone or verbal order?: This is a telephone order from the admitting physician   Transferring Patient to? Only adjust for transfers between [facility] ([facility], [facility], [facility]). **PLEASE ONLY USE BUTTON OPTIONS**: [facility] [[id number]]  
regular rate and rhythm/normal PMI

## 2022-06-02 NOTE — HISTORY OF PRESENT ILLNESS
[Disease: _____________________] : Disease: [unfilled] [de-identified] : Mr. Arias is a 60 year old gentlemen with past medical history of BPH presenting to the office for an initial consultation of appendiceal neoplasm.\par \par Patient was being seen by Dr. Mendoza at [facility] for low grade appendiceal tumor diagnosed in 2020.  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 During 4/2020 noted bloating and ascites was found. CT scan showed the malignancy.\par \par 5/7/2020 CRS and HIPEC for LAMN ( Dr. Herberth Pink at [facility] ). Surgery included a splenectomy and distal gastrectomy, omentectomy, cholecystectomy, subtotal colectomy and diaphragm stripping. 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 R2b resection. 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 There was some dose reduction. Neuropathy started continually after chemotherapy was completed and is grade 1, with no impairment of ADLs. \par \par He moved from [place] back to [place] for more family support.\par In April 2021, he noted increase in abdominal fluid, and saw Oncology, Dr. Garcia.\par CT scan ordered and ascites drained.\par There was recurrence of disease on CT scan.\par He was referred to Dr. Mason, who has recommended pressurized intraperitoneal aerosolized chemotherapy trial. (PIPAC).\par \par 7/21/2021\par Underwent diagnostic laparoscopy on 5/19/21. PCI approx 25. Biopsies consistent with mucinous adenocarcinoma. \par 6/3: PIPAC/oxaliplatin treatment #1\par 7/12 - 7/14/21: Patient admitted for transient, self-limited SBO, that was not deemed to be treatment-related.\par 7/22/21: Plan for PIPAC #2 + 5FU/LV\par Normally, has has up to 4-6 BM daily due to short colon.\par Stools are loose.\par Currently no bloating.\par He is active, has no pain, and looking forward to next cycle tomorrow.\par CEA dropping as below.\par \par CEA:\par 6/5/2020: 3.8\par 8/12/2020: 6.1\par 4/7/2021: 10.1 \par 5/26/21: 29\par 6/17/21: 19.9\par 7/21/21: 15.2\par \par 9/2/21\par Patient here for C#3 PIPAC \par Patient has GERD and requests more omeprazole.\par Definitely needs to have it.\par CEA has been declining.\par \par 10/20/21\par Patient has been approved for compassionate use treatment with PIPAC.\par He feels well and does want to proceed.\par Today will be his EOT visit, as well as a pre-treatment visit for continued use of PIIPAC.\par Will have CBC today.\par 5FU and LV will be administered within 24 hour window of planned PIPAC.\par \par 2/8/2022:\par 1) Appendiceal mucinous CA: \par Patient is scheduled for C6D1 5-FU injectable 400 mg/m2 and Leucovorin tomorrow, 2/9/2022.\par He is scheduled for PIPAC with Dr. Mason on 2/10/2022.\par He is feeling well today and voices no adverse events.\par Denies abdominal pain, nausea or vomiting.\par Patient will have labs today at [facility].\par No changes in his medications.\par \par 4/6/2022:\par 1) Appendiceal mucinous CA:\par PIPAC #1 6/3/2021\par C7D1 5- mg/m2 + Leucovorin 20 mg/m2 today.\par He is scheduled for PIPAC with Dr. Mason tomorrow (4/7/2022).\par He is feeling well today and voices no new complaints.\par Denies abdominal pain or nausea/vomiting.\par Appetite and energy level remains stable.\par No changes in his medications.\par \par 6/2/2022:\par C8D1 5- mg/m2 + Leucovorin 20 mg/m2.\par He is scheduled for PIPAC with Dr. Mason tomorrow, 6/3/2022.\par He is feeling well today and voices no new complaints.\par Denies abdominal pain or nausea/vomiting.\par Appetite and energy level remains stable.\par No changes in his medications.\par  [de-identified] : CEA [de-identified] : patient with spouse today \par planned for PIPAC tomorrow\par to get 5FU/LV today \par no abdominal pain, fevers or chills  [Verbal consent obtained from patient] : the patient, [unfilled]

## 2022-06-02 NOTE — PHYSICAL EXAM
[Restricted in physically strenuous activity but ambulatory and able to carry out work of a light or sedentary nature] : Status 1- Restricted in physically strenuous activity but ambulatory and able to carry out work of a light or sedentary nature, e.g., light house work, office work [Normal] : affect appropriate [de-identified] : anicteric  [de-identified] : laparoscopic incisional site noted.  No signs of infections noted.  His abdomen is otherwise soft, NT/ND.  No organomegaly noted.

## 2022-06-02 NOTE — H&P PST ADULT - GASTROINTESTINAL COMMENTS
hx of appendix carcinoma, s/p 7 PIPAC Rx, most recently Apr 2022.  Hx of Cytoreduction surgery with HIPEC 5/2020 and chemotherapy. Preop dx malignant neoplasm of appendix

## 2022-06-02 NOTE — H&P PST ADULT - PROBLEM SELECTOR PLAN 1
Pt is scheduled for diagnostic Laparoscopy Peritoneal Biopsy, PIPAC on 6/3/22.  Verbal and written pre op instructions reviewed with patient and pt able to verbalize understanding.   Verbal and written instructions given with chlorhexidine wash, pt able to verbalize understanding via teach back method.  Pt instructed to follow surgeon's guidelines regarding COVID testing preop.

## 2022-06-03 ENCOUNTER — INPATIENT (INPATIENT)
Facility: HOSPITAL | Age: 62
LOS: 0 days | Discharge: HOME | End: 2022-06-04
Attending: SURGERY | Admitting: SURGERY
Payer: COMMERCIAL

## 2022-06-03 ENCOUNTER — RESULT REVIEW (OUTPATIENT)
Age: 62
End: 2022-06-03

## 2022-06-03 ENCOUNTER — TRANSCRIPTION ENCOUNTER (OUTPATIENT)
Age: 62
End: 2022-06-03

## 2022-06-03 ENCOUNTER — APPOINTMENT (OUTPATIENT)
Dept: SURGICAL ONCOLOGY | Facility: HOSPITAL | Age: 62
End: 2022-06-03

## 2022-06-03 VITALS
DIASTOLIC BLOOD PRESSURE: 61 MMHG | RESPIRATION RATE: 17 BRPM | HEIGHT: 66 IN | OXYGEN SATURATION: 100 % | TEMPERATURE: 99 F | HEART RATE: 48 BPM | WEIGHT: 127.87 LBS | SYSTOLIC BLOOD PRESSURE: 124 MMHG

## 2022-06-03 DIAGNOSIS — Z51.11 ENCOUNTER FOR ANTINEOPLASTIC CHEMOTHERAPY: ICD-10-CM

## 2022-06-03 DIAGNOSIS — C18.1 MALIGNANT NEOPLASM OF APPENDIX: ICD-10-CM

## 2022-06-03 DIAGNOSIS — Z98.890 OTHER SPECIFIED POSTPROCEDURAL STATES: Chronic | ICD-10-CM

## 2022-06-03 DIAGNOSIS — Z87.438 PERSONAL HISTORY OF OTHER DISEASES OF MALE GENITAL ORGANS: Chronic | ICD-10-CM

## 2022-06-03 DIAGNOSIS — R19.00 INTRA-ABDOMINAL AND PELVIC SWELLING, MASS AND LUMP, UNSPECIFIED SITE: Chronic | ICD-10-CM

## 2022-06-03 DIAGNOSIS — Z90.49 ACQUIRED ABSENCE OF OTHER SPECIFIED PARTS OF DIGESTIVE TRACT: Chronic | ICD-10-CM

## 2022-06-03 DIAGNOSIS — R11.2 NAUSEA WITH VOMITING, UNSPECIFIED: ICD-10-CM

## 2022-06-03 DIAGNOSIS — C18.1 MALIGNANT NEOPLASM OF APPENDIX: Chronic | ICD-10-CM

## 2022-06-03 PROCEDURE — 88112 CYTOPATH CELL ENHANCE TECH: CPT | Mod: 26

## 2022-06-03 PROCEDURE — 49321 LAPAROSCOPY BIOPSY: CPT

## 2022-06-03 PROCEDURE — 88304 TISSUE EXAM BY PATHOLOGIST: CPT | Mod: 26

## 2022-06-03 PROCEDURE — 88305 TISSUE EXAM BY PATHOLOGIST: CPT | Mod: 26

## 2022-06-03 RX ORDER — ENOXAPARIN SODIUM 100 MG/ML
40 INJECTION SUBCUTANEOUS EVERY 24 HOURS
Refills: 0 | Status: DISCONTINUED | OUTPATIENT
Start: 2022-06-03 | End: 2022-06-04

## 2022-06-03 RX ORDER — PANTOPRAZOLE SODIUM 20 MG/1
40 TABLET, DELAYED RELEASE ORAL ONCE
Refills: 0 | Status: COMPLETED | OUTPATIENT
Start: 2022-06-03 | End: 2022-06-03

## 2022-06-03 RX ORDER — ESCITALOPRAM OXALATE 10 MG/1
5 TABLET, FILM COATED ORAL DAILY
Refills: 0 | Status: DISCONTINUED | OUTPATIENT
Start: 2022-06-03 | End: 2022-06-04

## 2022-06-03 RX ORDER — OXYCODONE HYDROCHLORIDE 5 MG/1
5 TABLET ORAL ONCE
Refills: 0 | Status: DISCONTINUED | OUTPATIENT
Start: 2022-06-03 | End: 2022-06-03

## 2022-06-03 RX ORDER — SODIUM CHLORIDE 9 MG/ML
1000 INJECTION, SOLUTION INTRAVENOUS
Refills: 0 | Status: DISCONTINUED | OUTPATIENT
Start: 2022-06-03 | End: 2022-06-03

## 2022-06-03 RX ORDER — ONDANSETRON 8 MG/1
4 TABLET, FILM COATED ORAL EVERY 8 HOURS
Refills: 0 | Status: DISCONTINUED | OUTPATIENT
Start: 2022-06-03 | End: 2022-06-04

## 2022-06-03 RX ORDER — ACETAMINOPHEN 500 MG
1000 TABLET ORAL EVERY 6 HOURS
Refills: 0 | Status: DISCONTINUED | OUTPATIENT
Start: 2022-06-03 | End: 2022-06-04

## 2022-06-03 RX ORDER — FAMOTIDINE 10 MG/ML
40 INJECTION INTRAVENOUS DAILY
Refills: 0 | Status: DISCONTINUED | OUTPATIENT
Start: 2022-06-03 | End: 2022-06-04

## 2022-06-03 RX ORDER — SIMVASTATIN 20 MG/1
10 TABLET, FILM COATED ORAL AT BEDTIME
Refills: 0 | Status: DISCONTINUED | OUTPATIENT
Start: 2022-06-03 | End: 2022-06-04

## 2022-06-03 RX ORDER — OXALIPLATIN 5 MG/ML
152 INJECTION, SOLUTION INTRAVENOUS ONCE
Refills: 0 | Status: DISCONTINUED | OUTPATIENT
Start: 2022-06-03 | End: 2022-06-04

## 2022-06-03 RX ORDER — INFLUENZA VIRUS VACCINE 15; 15; 15; 15 UG/.5ML; UG/.5ML; UG/.5ML; UG/.5ML
0.5 SUSPENSION INTRAMUSCULAR ONCE
Refills: 0 | Status: DISCONTINUED | OUTPATIENT
Start: 2022-06-03 | End: 2022-06-04

## 2022-06-03 RX ORDER — ONDANSETRON 8 MG/1
4 TABLET, FILM COATED ORAL ONCE
Refills: 0 | Status: DISCONTINUED | OUTPATIENT
Start: 2022-06-03 | End: 2022-06-03

## 2022-06-03 RX ORDER — KETOROLAC TROMETHAMINE 30 MG/ML
15 SYRINGE (ML) INJECTION EVERY 6 HOURS
Refills: 0 | Status: DISCONTINUED | OUTPATIENT
Start: 2022-06-03 | End: 2022-06-04

## 2022-06-03 RX ORDER — PANTOPRAZOLE SODIUM 20 MG/1
40 TABLET, DELAYED RELEASE ORAL
Refills: 0 | Status: DISCONTINUED | OUTPATIENT
Start: 2022-06-03 | End: 2022-06-04

## 2022-06-03 RX ORDER — CALCIUM CARBONATE 500(1250)
2 TABLET ORAL EVERY 6 HOURS
Refills: 0 | Status: DISCONTINUED | OUTPATIENT
Start: 2022-06-03 | End: 2022-06-04

## 2022-06-03 RX ORDER — TAMSULOSIN HYDROCHLORIDE 0.4 MG/1
0.4 CAPSULE ORAL AT BEDTIME
Refills: 0 | Status: DISCONTINUED | OUTPATIENT
Start: 2022-06-03 | End: 2022-06-04

## 2022-06-03 RX ADMIN — PANTOPRAZOLE SODIUM 40 MILLIGRAM(S): 20 TABLET, DELAYED RELEASE ORAL at 13:22

## 2022-06-03 RX ADMIN — Medication 15 MILLIGRAM(S): at 22:19

## 2022-06-03 RX ADMIN — TAMSULOSIN HYDROCHLORIDE 0.4 MILLIGRAM(S): 0.4 CAPSULE ORAL at 22:14

## 2022-06-03 RX ADMIN — ESCITALOPRAM OXALATE 5 MILLIGRAM(S): 10 TABLET, FILM COATED ORAL at 22:19

## 2022-06-03 RX ADMIN — SODIUM CHLORIDE 75 MILLILITER(S): 9 INJECTION, SOLUTION INTRAVENOUS at 13:23

## 2022-06-03 RX ADMIN — Medication 2 TABLET(S): at 18:04

## 2022-06-03 RX ADMIN — SIMVASTATIN 10 MILLIGRAM(S): 20 TABLET, FILM COATED ORAL at 22:14

## 2022-06-03 RX ADMIN — Medication 15 MILLIGRAM(S): at 22:49

## 2022-06-03 RX ADMIN — FAMOTIDINE 40 MILLIGRAM(S): 10 INJECTION INTRAVENOUS at 22:19

## 2022-06-03 NOTE — CHART NOTE - NSCHARTNOTEFT_GEN_A_CORE
TEAM POST-OPERATIVE NOTE    Patient is s/p dx lap, PIPAC#8. Seen and examined at bedside, recovering appropriately. Pain well controlled. Denies n/v after regular food. Raygoza removed, voided spontaneously.    Vital Signs Last 24 Hrs  T(C): 36.6 (03 Jun 2022 14:16), Max: 37.2 (03 Jun 2022 12:30)  T(F): 97.9 (03 Jun 2022 14:16), Max: 99 (03 Jun 2022 12:30)  HR: 69 (03 Jun 2022 14:16) (48 - 79)  BP: 129/64 (03 Jun 2022 14:16) (113/52 - 129/64)  BP(mean): 70 (03 Jun 2022 13:30) (64 - 72)  RR: 18 (03 Jun 2022 14:16) (12 - 18)  SpO2: 99% (03 Jun 2022 14:16) (96% - 100%)  I&O's Detail    enoxaparin Injectable 40  tamsulosin 0.4    PAST MEDICAL & SURGICAL HISTORY:  HLD (hyperlipidemia)      BPH (benign prostatic hyperplasia)      Neuropathic pain  hands and feet      UTI (urinary tract infection)  pt denes recent infection      Appendix carcinoma  PIPAC x 7, last done 4/2022      Anxiety      Cancer of appendix      Malignant neoplasm of appendix      GERD (gastroesophageal reflux disease)  pt denes recent endoscopy      History of undescended testicle  age 7      Abdominal mass  surgery 5/2020  remove mass, spleen, partial colectomy, lymph nodes, part small intestines, omentum, apendix, gall bladder, part stomach. HIPEC      History of abdominal paracentesis  x3      H/O colectomy  Splenectomy , Cholecystectomy   5/2020. Insertion of Mediport      Malignant neoplasm of appendix  chemo 10/2021; s/p Pipac x 7 last 04/2022          Physical Exam:  General: Awake, alert & fully oriented, no acute distress   Pulmonary: Respirations unlabored, clear bilaterally  Cardiovascular: s1/s2, no murmur   Abdominal: Soft, NT/ND. Incisions c/d/i.   Extremities: WWP    LABS:                        13.0   7.87  )-----------( 411      ( 02 Jun 2022 08:48 )             40.2     06-02    141  |  105  |  20  ----------------------------<  85  4.6   |  26  |  0.95    Ca    9.4      02 Jun 2022 09:19    TPro  6.2  /  Alb  4.3  /  TBili  0.6  /  DBili  x   /  AST  22  /  ALT  19  /  AlkPhos  95  06-02      CAPILLARY BLOOD GLUCOSE          Radiology and Additional Studies:    Assessment:  The patient is a 61y Male who is now several hours post-op from a     Plan:  - Pain control as needed  - Reg diet  - c/w home meds  - DVT ppx  - OOB and ambulating as tolerated  - F/u AM labs  - Discharge home tomorrow AM.

## 2022-06-03 NOTE — DISCHARGE NOTE PROVIDER - NSDCCPCAREPLAN_GEN_ALL_CORE_FT
PRINCIPAL DISCHARGE DIAGNOSIS  Diagnosis: Malignant neoplasm of appendix  Assessment and Plan of Treatment: DIET: You may resume your regular diet.  BATHING: Please do not submerge wound underwater for one week. You may shower and/or sponge bathe after 48 hours.  ACTIVITY: No heavy lifting or straining for 2 weeks. Otherwise, you may return to your usual level of physical activity.  NOTIFY YOUR SURGEON IF: You have any bleeding that does not stop, any pus draining from your wound(s), any fever (over 100.4) or chills, persistent nausea/vomiting, persistent diarrhea, or if your pain is not controlled on your discharge pain medications.  WOUND CARE: Over your incision you have surgical glue. Please let glue fall off on its own. Please keep incisions clean and dry. Please do not scrub or rub incisions. Do not use lotion or powder on incisions.

## 2022-06-03 NOTE — DISCHARGE NOTE PROVIDER - NSDCFUADDINST_GEN_ALL_CORE_FT
Please take Tylenol 1000mg every 6 hours as needed, take Motrin 400mg every 6 hours as needed for pain.

## 2022-06-03 NOTE — BRIEF OPERATIVE NOTE - NSICDXBRIEFPROCEDURE_GEN_ALL_CORE_FT
PROCEDURES:  Diagnostic laparoscopy 03-Jun-2022 12:19:41  Aung Gallegos   PROCEDURES:  Diagnostic laparoscopy 03-Jun-2022 12:19:41  Aung Gallegos  Intraperitoneal chemotherapy 03-Jun-2022 12:21:32  Aung Gallegos

## 2022-06-03 NOTE — DISCHARGE NOTE PROVIDER - NSDCMRMEDTOKEN_GEN_ALL_CORE_FT
escitalopram 5 mg oral tablet: 1 tab(s) orally once a day, pm  famotidine 40 mg oral tablet: 1 tab(s) orally once a day  pantoprazole 40 mg oral delayed release tablet: 1 tab(s) orally once a day  simvastatin 10 mg oral tablet: 1 tab(s) orally once a day (at bedtime)  tamsulosin 0.4 mg oral capsule: 1 cap(s) orally once a day, pm  testosterone topical: daily

## 2022-06-03 NOTE — DISCHARGE NOTE PROVIDER - CARE PROVIDER_API CALL
Florecita Mason)  Complex General Surgical Oncology; Surgery  [address] [place] [address]  Phone: [phone]  Fax: [phone]  Follow Up Time: 2 weeks

## 2022-06-03 NOTE — PATIENT PROFILE ADULT - FALL HARM RISK - HARM RISK INTERVENTIONS

## 2022-06-03 NOTE — DISCHARGE NOTE PROVIDER - HOSPITAL COURSE
62 yo male  with hx of appendix carcinoma, s/p Diagnostic Laparoscopy X 7 PIPAC Rx, most recently April 2022 . Pt received IV chemo preop today 6/2/22.  Pt is scheduled for diagnostic Laparoscopy Peritoneal Biopsy, PIPAC.      Underwent planned PIPAC x8    The patient tolerated the procedure well without complications. The patient was then extubated to PACU, and then deemed stable for transfer to a floor unit. On the floor, the patient's pain was well controlled, tolerating a regular diet, ambulating, and voiding spontaneously. The patient was then discharged to home with instructions to follow up with Dr. Rosas.

## 2022-06-04 ENCOUNTER — TRANSCRIPTION ENCOUNTER (OUTPATIENT)
Age: 62
End: 2022-06-04

## 2022-06-04 VITALS
HEART RATE: 56 BPM | RESPIRATION RATE: 17 BRPM | SYSTOLIC BLOOD PRESSURE: 119 MMHG | TEMPERATURE: 99 F | OXYGEN SATURATION: 100 % | DIASTOLIC BLOOD PRESSURE: 63 MMHG

## 2022-06-04 LAB
ANION GAP SERPL CALC-SCNC: 11 MMOL/L — SIGNIFICANT CHANGE UP (ref 7–14)
BUN SERPL-MCNC: 21 MG/DL — SIGNIFICANT CHANGE UP (ref 7–23)
CALCIUM SERPL-MCNC: 8.4 MG/DL — SIGNIFICANT CHANGE UP (ref 8.4–10.5)
CHLORIDE SERPL-SCNC: 104 MMOL/L — SIGNIFICANT CHANGE UP (ref 98–107)
CO2 SERPL-SCNC: 24 MMOL/L — SIGNIFICANT CHANGE UP (ref 22–31)
CREAT SERPL-MCNC: 1.05 MG/DL — SIGNIFICANT CHANGE UP (ref 0.5–1.3)
EGFR: 80 ML/MIN/1.73M2 — SIGNIFICANT CHANGE UP
GLUCOSE SERPL-MCNC: 109 MG/DL — HIGH (ref 70–99)
HCT VFR BLD CALC: 36.1 % — LOW (ref 39–50)
HGB BLD-MCNC: 11.6 G/DL — LOW (ref 13–17)
MAGNESIUM SERPL-MCNC: 1.7 MG/DL — SIGNIFICANT CHANGE UP (ref 1.6–2.6)
MCHC RBC-ENTMCNC: 28 PG — SIGNIFICANT CHANGE UP (ref 27–34)
MCHC RBC-ENTMCNC: 32.1 GM/DL — SIGNIFICANT CHANGE UP (ref 32–36)
MCV RBC AUTO: 87.2 FL — SIGNIFICANT CHANGE UP (ref 80–100)
NRBC # BLD: 0 /100 WBCS — SIGNIFICANT CHANGE UP
NRBC # FLD: 0 K/UL — SIGNIFICANT CHANGE UP
PHOSPHATE SERPL-MCNC: 3.6 MG/DL — SIGNIFICANT CHANGE UP (ref 2.5–4.5)
PLATELET # BLD AUTO: 351 K/UL — SIGNIFICANT CHANGE UP (ref 150–400)
POTASSIUM SERPL-MCNC: 3.8 MMOL/L — SIGNIFICANT CHANGE UP (ref 3.5–5.3)
POTASSIUM SERPL-SCNC: 3.8 MMOL/L — SIGNIFICANT CHANGE UP (ref 3.5–5.3)
RBC # BLD: 4.14 M/UL — LOW (ref 4.2–5.8)
RBC # FLD: 16.4 % — HIGH (ref 10.3–14.5)
SODIUM SERPL-SCNC: 139 MMOL/L — SIGNIFICANT CHANGE UP (ref 135–145)
WBC # BLD: 14.36 K/UL — HIGH (ref 3.8–10.5)
WBC # FLD AUTO: 14.36 K/UL — HIGH (ref 3.8–10.5)

## 2022-06-04 RX ADMIN — Medication 2 TABLET(S): at 05:39

## 2022-06-04 RX ADMIN — Medication 400 MILLIGRAM(S): at 05:43

## 2022-06-04 RX ADMIN — Medication 2 TABLET(S): at 00:31

## 2022-06-04 RX ADMIN — ENOXAPARIN SODIUM 40 MILLIGRAM(S): 100 INJECTION SUBCUTANEOUS at 05:40

## 2022-06-04 RX ADMIN — PANTOPRAZOLE SODIUM 40 MILLIGRAM(S): 20 TABLET, DELAYED RELEASE ORAL at 05:39

## 2022-06-04 NOTE — DISCHARGE NOTE NURSING/CASE MANAGEMENT/SOCIAL WORK - PATIENT PORTAL LINK FT
You can access the FollowMyHealth Patient Portal offered by [facility] by registering at the following website: [email or web address]. By joining [facility]’s FollowMyHealth portal, you will also be able to view your health information using other applications (apps) compatible with our system.

## 2022-06-04 NOTE — PROGRESS NOTE ADULT - ASSESSMENT
Assessment:  The patient is a 61y Male who is now several hours post-op from a     Plan:  - Pain control as needed  - Reg diet  - c/w home meds  - DVT ppx  - OOB and ambulating as tolerated  - F/u AM labs  - Discharge today Assessment:  The patient is a 61y Male who is now several hours post-op from a PIPAC     Plan:  - Pain control as needed  - Reg diet  - c/w home meds  - DVT ppx  - OOB and ambulating as tolerated  - F/u AM labs  - Discharge today

## 2022-06-04 NOTE — PROGRESS NOTE ADULT - SUBJECTIVE AND OBJECTIVE BOX
Subjective:   Patient seen at bedside this AM. Reports feeling well, without complaints. Denies chest pain, SOB. Tolerating diet without N/V.     24h Events:   - Overnight, no acute events    Objective:  Vital Signs  T(C): 37.1 (06-04 @ 00:05), Max: 37.2 (06-03 @ 12:30)  HR: 57 (06-04 @ 00:05) (48 - 79)  BP: 124/68 (06-04 @ 00:05) (113/52 - 129/64)  RR: 18 (06-04 @ 00:05) (12 - 18)  SpO2: 100% (06-04 @ 00:05) (96% - 100%)  06-03-22 @ 07:01  -  06-04-22 @ 02:20  --------------------------------------------------------  IN:  Total IN: 0 mL    OUT:    Voided (mL): 600 mL  Total OUT: 600 mL    Total NET: -600 mL        Physical Exam:  General: Awake, alert & fully oriented, no acute distress   Pulmonary: Respirations unlabored, clear bilaterally  Cardiovascular: s1/s2, no murmur   Abdominal: Soft, NT/ND. Incisions c/d/i.   Extremities: WW      Labs:                        13.0   7.87  )-----------( 411      ( 02 Jun 2022 08:48 )             40.2   06-02    141  |  105  |  20  ----------------------------<  85  4.6   |  26  |  0.95    Ca    9.4      02 Jun 2022 09:19    TPro  6.2  /  Alb  4.3  /  TBili  0.6  /  DBili  x   /  AST  22  /  ALT  19  /  AlkPhos  95  06-02    CAPILLARY BLOOD GLUCOSE          Medications:   MEDICATIONS  (STANDING):  acetaminophen   IVPB .. 1000 milliGRAM(s) IV Intermittent every 6 hours  calcium carbonate    500 mG (Tums) Chewable 2 Tablet(s) Chew every 6 hours  enoxaparin Injectable 40 milliGRAM(s) SubCutaneous every 24 hours  escitalopram 5 milliGRAM(s) Oral daily  famotidine    Tablet 40 milliGRAM(s) Oral daily  influenza   Vaccine 0.5 milliLiter(s) IntraMuscular once  ketorolac   Injectable 15 milliGRAM(s) IV Push every 6 hours  oxaliplatin INTRAPERITONEAL (eMAR) 152 milliGRAM(s) IntraPeritoneal once  pantoprazole    Tablet 40 milliGRAM(s) Oral before breakfast  simvastatin 10 milliGRAM(s) Oral at bedtime  simvastatin 10 milliGRAM(s) Oral at bedtime  tamsulosin 0.4 milliGRAM(s) Oral at bedtime    MEDICATIONS  (PRN):  ondansetron    Tablet 4 milliGRAM(s) Oral every 8 hours PRN Nausea and/or Vomiting      Imaging:

## 2022-06-07 LAB
NON-GYNECOLOGICAL CYTOLOGY STUDY: SIGNIFICANT CHANGE UP
SURGICAL PATHOLOGY STUDY: SIGNIFICANT CHANGE UP

## 2022-06-10 PROBLEM — C18.1 MALIGNANT NEOPLASM OF APPENDIX: Chronic | Status: ACTIVE | Noted: 2021-05-07

## 2022-06-20 ENCOUNTER — APPOINTMENT (OUTPATIENT)
Dept: CT IMAGING | Facility: CLINIC | Age: 62
End: 2022-06-20
Payer: COMMERCIAL

## 2022-06-20 ENCOUNTER — OUTPATIENT (OUTPATIENT)
Dept: OUTPATIENT SERVICES | Facility: HOSPITAL | Age: 62
LOS: 1 days | End: 2022-06-20
Payer: COMMERCIAL

## 2022-06-20 DIAGNOSIS — Z00.8 ENCOUNTER FOR OTHER GENERAL EXAMINATION: ICD-10-CM

## 2022-06-20 DIAGNOSIS — C18.1 MALIGNANT NEOPLASM OF APPENDIX: ICD-10-CM

## 2022-06-20 DIAGNOSIS — Z87.438 PERSONAL HISTORY OF OTHER DISEASES OF MALE GENITAL ORGANS: Chronic | ICD-10-CM

## 2022-06-20 DIAGNOSIS — Z98.890 OTHER SPECIFIED POSTPROCEDURAL STATES: Chronic | ICD-10-CM

## 2022-06-20 DIAGNOSIS — R19.00 INTRA-ABDOMINAL AND PELVIC SWELLING, MASS AND LUMP, UNSPECIFIED SITE: Chronic | ICD-10-CM

## 2022-06-20 DIAGNOSIS — C18.1 MALIGNANT NEOPLASM OF APPENDIX: Chronic | ICD-10-CM

## 2022-06-20 DIAGNOSIS — Z90.49 ACQUIRED ABSENCE OF OTHER SPECIFIED PARTS OF DIGESTIVE TRACT: Chronic | ICD-10-CM

## 2022-06-20 PROCEDURE — 71260 CT THORAX DX C+: CPT

## 2022-06-20 PROCEDURE — 74177 CT ABD & PELVIS W/CONTRAST: CPT

## 2022-06-20 PROCEDURE — 74177 CT ABD & PELVIS W/CONTRAST: CPT | Mod: 26

## 2022-06-20 PROCEDURE — 71260 CT THORAX DX C+: CPT | Mod: 26

## 2022-06-21 ENCOUNTER — INPATIENT (INPATIENT)
Facility: HOSPITAL | Age: 62
LOS: 8 days | Discharge: HOME HEALTH | End: 2022-06-30
Attending: SURGERY | Admitting: SURGERY
Payer: COMMERCIAL

## 2022-06-21 ENCOUNTER — RESULT REVIEW (OUTPATIENT)
Age: 62
End: 2022-06-21

## 2022-06-21 VITALS
SYSTOLIC BLOOD PRESSURE: 115 MMHG | DIASTOLIC BLOOD PRESSURE: 72 MMHG | HEIGHT: 66 IN | HEART RATE: 76 BPM | RESPIRATION RATE: 15 BRPM | OXYGEN SATURATION: 100 % | TEMPERATURE: 99 F

## 2022-06-21 DIAGNOSIS — Z87.438 PERSONAL HISTORY OF OTHER DISEASES OF MALE GENITAL ORGANS: Chronic | ICD-10-CM

## 2022-06-21 DIAGNOSIS — R19.00 INTRA-ABDOMINAL AND PELVIC SWELLING, MASS AND LUMP, UNSPECIFIED SITE: Chronic | ICD-10-CM

## 2022-06-21 DIAGNOSIS — Z90.49 ACQUIRED ABSENCE OF OTHER SPECIFIED PARTS OF DIGESTIVE TRACT: Chronic | ICD-10-CM

## 2022-06-21 DIAGNOSIS — Z98.890 OTHER SPECIFIED POSTPROCEDURAL STATES: Chronic | ICD-10-CM

## 2022-06-21 DIAGNOSIS — C18.1 MALIGNANT NEOPLASM OF APPENDIX: Chronic | ICD-10-CM

## 2022-06-21 DIAGNOSIS — R10.9 UNSPECIFIED ABDOMINAL PAIN: ICD-10-CM

## 2022-06-21 LAB
ALBUMIN SERPL ELPH-MCNC: 4.2 G/DL — SIGNIFICANT CHANGE UP (ref 3.3–5)
ALP SERPL-CCNC: 112 U/L — SIGNIFICANT CHANGE UP (ref 40–120)
ALT FLD-CCNC: 28 U/L — SIGNIFICANT CHANGE UP (ref 4–41)
ANION GAP SERPL CALC-SCNC: 12 MMOL/L — SIGNIFICANT CHANGE UP (ref 7–14)
ANION GAP SERPL CALC-SCNC: 12 MMOL/L — SIGNIFICANT CHANGE UP (ref 7–14)
APTT BLD: 34.1 SEC — SIGNIFICANT CHANGE UP (ref 27–36.3)
AST SERPL-CCNC: 21 U/L — SIGNIFICANT CHANGE UP (ref 4–40)
B PERT DNA SPEC QL NAA+PROBE: SIGNIFICANT CHANGE UP
B PERT+PARAPERT DNA PNL SPEC NAA+PROBE: SIGNIFICANT CHANGE UP
BASOPHILS # BLD AUTO: 0.2 K/UL — SIGNIFICANT CHANGE UP (ref 0–0.2)
BASOPHILS NFR BLD AUTO: 0.9 % — SIGNIFICANT CHANGE UP (ref 0–2)
BILIRUB SERPL-MCNC: 0.4 MG/DL — SIGNIFICANT CHANGE UP (ref 0.2–1.2)
BLD GP AB SCN SERPL QL: NEGATIVE — SIGNIFICANT CHANGE UP
BLOOD GAS VENOUS COMPREHENSIVE RESULT: SIGNIFICANT CHANGE UP
BORDETELLA PARAPERTUSSIS (RAPRVP): SIGNIFICANT CHANGE UP
BUN SERPL-MCNC: 12 MG/DL — SIGNIFICANT CHANGE UP (ref 7–23)
BUN SERPL-MCNC: 18 MG/DL — SIGNIFICANT CHANGE UP (ref 7–23)
C PNEUM DNA SPEC QL NAA+PROBE: SIGNIFICANT CHANGE UP
CALCIUM SERPL-MCNC: 8.5 MG/DL — SIGNIFICANT CHANGE UP (ref 8.4–10.5)
CALCIUM SERPL-MCNC: 9.3 MG/DL — SIGNIFICANT CHANGE UP (ref 8.4–10.5)
CHLORIDE SERPL-SCNC: 100 MMOL/L — SIGNIFICANT CHANGE UP (ref 98–107)
CHLORIDE SERPL-SCNC: 102 MMOL/L — SIGNIFICANT CHANGE UP (ref 98–107)
CO2 SERPL-SCNC: 24 MMOL/L — SIGNIFICANT CHANGE UP (ref 22–31)
CO2 SERPL-SCNC: 25 MMOL/L — SIGNIFICANT CHANGE UP (ref 22–31)
CREAT SERPL-MCNC: 0.97 MG/DL — SIGNIFICANT CHANGE UP (ref 0.5–1.3)
CREAT SERPL-MCNC: 0.98 MG/DL — SIGNIFICANT CHANGE UP (ref 0.5–1.3)
EGFR: 87 ML/MIN/1.73M2 — SIGNIFICANT CHANGE UP
EGFR: 88 ML/MIN/1.73M2 — SIGNIFICANT CHANGE UP
EOSINOPHIL # BLD AUTO: 0.2 K/UL — SIGNIFICANT CHANGE UP (ref 0–0.5)
EOSINOPHIL NFR BLD AUTO: 0.9 % — SIGNIFICANT CHANGE UP (ref 0–6)
FLUAV SUBTYP SPEC NAA+PROBE: SIGNIFICANT CHANGE UP
FLUBV RNA SPEC QL NAA+PROBE: SIGNIFICANT CHANGE UP
GLUCOSE SERPL-MCNC: 137 MG/DL — HIGH (ref 70–99)
GLUCOSE SERPL-MCNC: 171 MG/DL — HIGH (ref 70–99)
GRAM STN FLD: SIGNIFICANT CHANGE UP
HADV DNA SPEC QL NAA+PROBE: SIGNIFICANT CHANGE UP
HCOV 229E RNA SPEC QL NAA+PROBE: SIGNIFICANT CHANGE UP
HCOV HKU1 RNA SPEC QL NAA+PROBE: SIGNIFICANT CHANGE UP
HCOV NL63 RNA SPEC QL NAA+PROBE: SIGNIFICANT CHANGE UP
HCOV OC43 RNA SPEC QL NAA+PROBE: SIGNIFICANT CHANGE UP
HCT VFR BLD CALC: 42 % — SIGNIFICANT CHANGE UP (ref 39–50)
HCT VFR BLD CALC: 44.8 % — SIGNIFICANT CHANGE UP (ref 39–50)
HGB BLD-MCNC: 13.5 G/DL — SIGNIFICANT CHANGE UP (ref 13–17)
HGB BLD-MCNC: 14 G/DL — SIGNIFICANT CHANGE UP (ref 13–17)
HMPV RNA SPEC QL NAA+PROBE: SIGNIFICANT CHANGE UP
HPIV1 RNA SPEC QL NAA+PROBE: SIGNIFICANT CHANGE UP
HPIV2 RNA SPEC QL NAA+PROBE: SIGNIFICANT CHANGE UP
HPIV3 RNA SPEC QL NAA+PROBE: SIGNIFICANT CHANGE UP
HPIV4 RNA SPEC QL NAA+PROBE: SIGNIFICANT CHANGE UP
IANC: 16.42 K/UL — HIGH (ref 1.8–7.4)
INR BLD: 0.97 RATIO — SIGNIFICANT CHANGE UP (ref 0.88–1.16)
LACTATE SERPL-SCNC: 2.9 MMOL/L — HIGH (ref 0.5–2)
LACTATE SERPL-SCNC: 3 MMOL/L — HIGH (ref 0.5–2)
LIDOCAIN IGE QN: 17 U/L — SIGNIFICANT CHANGE UP (ref 7–60)
LYMPHOCYTES # BLD AUTO: 2.09 K/UL — SIGNIFICANT CHANGE UP (ref 1–3.3)
LYMPHOCYTES # BLD AUTO: 9.6 % — LOW (ref 13–44)
M PNEUMO DNA SPEC QL NAA+PROBE: SIGNIFICANT CHANGE UP
MAGNESIUM SERPL-MCNC: 1.5 MG/DL — LOW (ref 1.6–2.6)
MCHC RBC-ENTMCNC: 27.2 PG — SIGNIFICANT CHANGE UP (ref 27–34)
MCHC RBC-ENTMCNC: 28.1 PG — SIGNIFICANT CHANGE UP (ref 27–34)
MCHC RBC-ENTMCNC: 31.3 GM/DL — LOW (ref 32–36)
MCHC RBC-ENTMCNC: 32.1 GM/DL — SIGNIFICANT CHANGE UP (ref 32–36)
MCV RBC AUTO: 87.2 FL — SIGNIFICANT CHANGE UP (ref 80–100)
MCV RBC AUTO: 87.5 FL — SIGNIFICANT CHANGE UP (ref 80–100)
MONOCYTES # BLD AUTO: 0.96 K/UL — HIGH (ref 0–0.9)
MONOCYTES NFR BLD AUTO: 4.4 % — SIGNIFICANT CHANGE UP (ref 2–14)
NEUTROPHILS # BLD AUTO: 17.37 K/UL — HIGH (ref 1.8–7.4)
NEUTROPHILS NFR BLD AUTO: 79.8 % — HIGH (ref 43–77)
NRBC # BLD: 0 /100 WBCS — SIGNIFICANT CHANGE UP
NRBC # FLD: 0 K/UL — SIGNIFICANT CHANGE UP
PHOSPHATE SERPL-MCNC: 4.3 MG/DL — SIGNIFICANT CHANGE UP (ref 2.5–4.5)
PLATELET # BLD AUTO: 570 K/UL — HIGH (ref 150–400)
PLATELET # BLD AUTO: 684 K/UL — HIGH (ref 150–400)
POTASSIUM SERPL-MCNC: 4.3 MMOL/L — SIGNIFICANT CHANGE UP (ref 3.5–5.3)
POTASSIUM SERPL-MCNC: 5.1 MMOL/L — SIGNIFICANT CHANGE UP (ref 3.5–5.3)
POTASSIUM SERPL-SCNC: 4.3 MMOL/L — SIGNIFICANT CHANGE UP (ref 3.5–5.3)
POTASSIUM SERPL-SCNC: 5.1 MMOL/L — SIGNIFICANT CHANGE UP (ref 3.5–5.3)
PROT SERPL-MCNC: 7.1 G/DL — SIGNIFICANT CHANGE UP (ref 6–8.3)
PROTHROM AB SERPL-ACNC: 11.2 SEC — SIGNIFICANT CHANGE UP (ref 10.5–13.4)
RAPID RVP RESULT: SIGNIFICANT CHANGE UP
RBC # BLD: 4.8 M/UL — SIGNIFICANT CHANGE UP (ref 4.2–5.8)
RBC # BLD: 5.14 M/UL — SIGNIFICANT CHANGE UP (ref 4.2–5.8)
RBC # FLD: 15.9 % — HIGH (ref 10.3–14.5)
RBC # FLD: 15.9 % — HIGH (ref 10.3–14.5)
RH IG SCN BLD-IMP: POSITIVE — SIGNIFICANT CHANGE UP
RSV RNA SPEC QL NAA+PROBE: SIGNIFICANT CHANGE UP
RV+EV RNA SPEC QL NAA+PROBE: SIGNIFICANT CHANGE UP
SARS-COV-2 RNA SPEC QL NAA+PROBE: SIGNIFICANT CHANGE UP
SODIUM SERPL-SCNC: 136 MMOL/L — SIGNIFICANT CHANGE UP (ref 135–145)
SODIUM SERPL-SCNC: 139 MMOL/L — SIGNIFICANT CHANGE UP (ref 135–145)
SPECIMEN SOURCE: SIGNIFICANT CHANGE UP
WBC # BLD: 21.77 K/UL — HIGH (ref 3.8–10.5)
WBC # BLD: 25.13 K/UL — HIGH (ref 3.8–10.5)
WBC # FLD AUTO: 21.77 K/UL — HIGH (ref 3.8–10.5)
WBC # FLD AUTO: 25.13 K/UL — HIGH (ref 3.8–10.5)

## 2022-06-21 PROCEDURE — 99285 EMERGENCY DEPT VISIT HI MDM: CPT

## 2022-06-21 PROCEDURE — 88112 CYTOPATH CELL ENHANCE TECH: CPT | Mod: 26

## 2022-06-21 PROCEDURE — 88305 TISSUE EXAM BY PATHOLOGIST: CPT | Mod: 26

## 2022-06-21 PROCEDURE — 49083 ABD PARACENTESIS W/IMAGING: CPT

## 2022-06-21 PROCEDURE — 74177 CT ABD & PELVIS W/CONTRAST: CPT | Mod: 26,MA

## 2022-06-21 RX ORDER — CEFTRIAXONE 500 MG/1
2000 INJECTION, POWDER, FOR SOLUTION INTRAMUSCULAR; INTRAVENOUS ONCE
Refills: 0 | Status: DISCONTINUED | OUTPATIENT
Start: 2022-06-21 | End: 2022-06-21

## 2022-06-21 RX ORDER — ESCITALOPRAM OXALATE 10 MG/1
5 TABLET, FILM COATED ORAL DAILY
Refills: 0 | Status: DISCONTINUED | OUTPATIENT
Start: 2022-06-21 | End: 2022-06-30

## 2022-06-21 RX ORDER — FAMOTIDINE 10 MG/ML
20 INJECTION INTRAVENOUS DAILY
Refills: 0 | Status: DISCONTINUED | OUTPATIENT
Start: 2022-06-21 | End: 2022-06-30

## 2022-06-21 RX ORDER — PANTOPRAZOLE SODIUM 20 MG/1
40 TABLET, DELAYED RELEASE ORAL EVERY 24 HOURS
Refills: 0 | Status: DISCONTINUED | OUTPATIENT
Start: 2022-06-21 | End: 2022-06-30

## 2022-06-21 RX ORDER — TAMSULOSIN HYDROCHLORIDE 0.4 MG/1
0.4 CAPSULE ORAL AT BEDTIME
Refills: 0 | Status: DISCONTINUED | OUTPATIENT
Start: 2022-06-21 | End: 2022-06-30

## 2022-06-21 RX ORDER — PIPERACILLIN AND TAZOBACTAM 4; .5 G/20ML; G/20ML
3.38 INJECTION, POWDER, LYOPHILIZED, FOR SOLUTION INTRAVENOUS ONCE
Refills: 0 | Status: COMPLETED | OUTPATIENT
Start: 2022-06-21 | End: 2022-06-21

## 2022-06-21 RX ORDER — FAMOTIDINE 10 MG/ML
20 INJECTION INTRAVENOUS DAILY
Refills: 0 | Status: DISCONTINUED | OUTPATIENT
Start: 2022-06-21 | End: 2022-06-21

## 2022-06-21 RX ORDER — SODIUM CHLORIDE 9 MG/ML
1000 INJECTION INTRAMUSCULAR; INTRAVENOUS; SUBCUTANEOUS ONCE
Refills: 0 | Status: COMPLETED | OUTPATIENT
Start: 2022-06-21 | End: 2022-06-21

## 2022-06-21 RX ORDER — ACETAMINOPHEN 500 MG
1000 TABLET ORAL EVERY 6 HOURS
Refills: 0 | Status: COMPLETED | OUTPATIENT
Start: 2022-06-21 | End: 2022-06-22

## 2022-06-21 RX ORDER — HEPARIN SODIUM 5000 [USP'U]/ML
5000 INJECTION INTRAVENOUS; SUBCUTANEOUS EVERY 8 HOURS
Refills: 0 | Status: DISCONTINUED | OUTPATIENT
Start: 2022-06-21 | End: 2022-06-21

## 2022-06-21 RX ORDER — INFLUENZA VIRUS VACCINE 15; 15; 15; 15 UG/.5ML; UG/.5ML; UG/.5ML; UG/.5ML
0.5 SUSPENSION INTRAMUSCULAR ONCE
Refills: 0 | Status: COMPLETED | OUTPATIENT
Start: 2022-06-21 | End: 2022-06-21

## 2022-06-21 RX ORDER — SIMVASTATIN 20 MG/1
10 TABLET, FILM COATED ORAL AT BEDTIME
Refills: 0 | Status: DISCONTINUED | OUTPATIENT
Start: 2022-06-21 | End: 2022-06-30

## 2022-06-21 RX ORDER — MORPHINE SULFATE 50 MG/1
4 CAPSULE, EXTENDED RELEASE ORAL ONCE
Refills: 0 | Status: DISCONTINUED | OUTPATIENT
Start: 2022-06-21 | End: 2022-06-21

## 2022-06-21 RX ORDER — MAGNESIUM SULFATE 500 MG/ML
2 VIAL (ML) INJECTION ONCE
Refills: 0 | Status: COMPLETED | OUTPATIENT
Start: 2022-06-21 | End: 2022-06-21

## 2022-06-21 RX ORDER — SODIUM CHLORIDE 9 MG/ML
500 INJECTION, SOLUTION INTRAVENOUS ONCE
Refills: 0 | Status: COMPLETED | OUTPATIENT
Start: 2022-06-21 | End: 2022-06-21

## 2022-06-21 RX ORDER — ENOXAPARIN SODIUM 100 MG/ML
40 INJECTION SUBCUTANEOUS EVERY 24 HOURS
Refills: 0 | Status: DISCONTINUED | OUTPATIENT
Start: 2022-06-21 | End: 2022-06-30

## 2022-06-21 RX ORDER — ONDANSETRON 8 MG/1
4 TABLET, FILM COATED ORAL ONCE
Refills: 0 | Status: COMPLETED | OUTPATIENT
Start: 2022-06-21 | End: 2022-06-21

## 2022-06-21 RX ORDER — PIPERACILLIN AND TAZOBACTAM 4; .5 G/20ML; G/20ML
3.38 INJECTION, POWDER, LYOPHILIZED, FOR SOLUTION INTRAVENOUS EVERY 8 HOURS
Refills: 0 | Status: COMPLETED | OUTPATIENT
Start: 2022-06-21 | End: 2022-06-28

## 2022-06-21 RX ORDER — SODIUM CHLORIDE 9 MG/ML
1000 INJECTION, SOLUTION INTRAVENOUS
Refills: 0 | Status: DISCONTINUED | OUTPATIENT
Start: 2022-06-21 | End: 2022-06-23

## 2022-06-21 RX ADMIN — MORPHINE SULFATE 4 MILLIGRAM(S): 50 CAPSULE, EXTENDED RELEASE ORAL at 08:00

## 2022-06-21 RX ADMIN — PIPERACILLIN AND TAZOBACTAM 200 GRAM(S): 4; .5 INJECTION, POWDER, LYOPHILIZED, FOR SOLUTION INTRAVENOUS at 10:02

## 2022-06-21 RX ADMIN — MORPHINE SULFATE 4 MILLIGRAM(S): 50 CAPSULE, EXTENDED RELEASE ORAL at 09:00

## 2022-06-21 RX ADMIN — SODIUM CHLORIDE 500 MILLILITER(S): 9 INJECTION, SOLUTION INTRAVENOUS at 10:30

## 2022-06-21 RX ADMIN — Medication 400 MILLIGRAM(S): at 23:56

## 2022-06-21 RX ADMIN — Medication 25 GRAM(S): at 22:30

## 2022-06-21 RX ADMIN — SODIUM CHLORIDE 1000 MILLILITER(S): 9 INJECTION INTRAMUSCULAR; INTRAVENOUS; SUBCUTANEOUS at 09:45

## 2022-06-21 RX ADMIN — FAMOTIDINE 20 MILLIGRAM(S): 10 INJECTION INTRAVENOUS at 22:30

## 2022-06-21 RX ADMIN — ESCITALOPRAM OXALATE 5 MILLIGRAM(S): 10 TABLET, FILM COATED ORAL at 23:56

## 2022-06-21 RX ADMIN — MORPHINE SULFATE 4 MILLIGRAM(S): 50 CAPSULE, EXTENDED RELEASE ORAL at 08:32

## 2022-06-21 RX ADMIN — ONDANSETRON 4 MILLIGRAM(S): 8 TABLET, FILM COATED ORAL at 03:48

## 2022-06-21 RX ADMIN — SIMVASTATIN 10 MILLIGRAM(S): 20 TABLET, FILM COATED ORAL at 23:56

## 2022-06-21 RX ADMIN — SODIUM CHLORIDE 1000 MILLILITER(S): 9 INJECTION INTRAMUSCULAR; INTRAVENOUS; SUBCUTANEOUS at 08:33

## 2022-06-21 RX ADMIN — Medication 400 MILLIGRAM(S): at 14:49

## 2022-06-21 RX ADMIN — MORPHINE SULFATE 4 MILLIGRAM(S): 50 CAPSULE, EXTENDED RELEASE ORAL at 03:48

## 2022-06-21 RX ADMIN — PIPERACILLIN AND TAZOBACTAM 25 GRAM(S): 4; .5 INJECTION, POWDER, LYOPHILIZED, FOR SOLUTION INTRAVENOUS at 17:08

## 2022-06-21 RX ADMIN — SODIUM CHLORIDE 1000 MILLILITER(S): 9 INJECTION INTRAMUSCULAR; INTRAVENOUS; SUBCUTANEOUS at 08:00

## 2022-06-21 RX ADMIN — MORPHINE SULFATE 4 MILLIGRAM(S): 50 CAPSULE, EXTENDED RELEASE ORAL at 04:47

## 2022-06-21 RX ADMIN — SODIUM CHLORIDE 1000 MILLILITER(S): 9 INJECTION INTRAMUSCULAR; INTRAVENOUS; SUBCUTANEOUS at 04:53

## 2022-06-21 RX ADMIN — TAMSULOSIN HYDROCHLORIDE 0.4 MILLIGRAM(S): 0.4 CAPSULE ORAL at 22:28

## 2022-06-21 NOTE — H&P ADULT - NSHPPHYSICALEXAM_GEN_ALL_CORE
-VITALS:  Vital Signs Last 24 Hrs  T(C): 36.6 (21 Jun 2022 08:39), Max: 37.6 (21 Jun 2022 03:40)  T(F): 97.8 (21 Jun 2022 08:39), Max: 99.7 (21 Jun 2022 03:40)  HR: 77 (21 Jun 2022 08:39) (76 - 82)  BP: 127/88 (21 Jun 2022 08:39) (115/72 - 142/83)  BP(mean): --  RR: 16 (21 Jun 2022 08:39) (15 - 16)  SpO2: 100% (21 Jun 2022 08:39) (100% - 100%)    PHYSICAL EXAM:  Gen: No acute distress, but appears frustrated  Abd: Soft, moderately tender over right and left abdomen at the level of the umbilicus, no rebound or guarding, nondistended, midline incision without erythema, scant amount of purulent drainage, no fluctuance  Ext: Warm and well-perfused

## 2022-06-21 NOTE — ED PROVIDER NOTE - CLINICAL SUMMARY MEDICAL DECISION MAKING FREE TEXT BOX
62 yo M with a pmhx of anxiety, BPH, GERD, HLD and appendiceal carcinoma (S/P diagnostic laparoscopy and PIPAC X6- most recently Feb 2022) presents to the ED with acute abdominal pain that started earlier this evening. vitals non actionable at this time. PE as noted above. diffuse apin with guarding. ddx includes but not limited to: colitis vs SBO vs procedural complications. will order labs, imaging, ekg, meds, reassess 60 yo M with a pmhx of anxiety, BPH, GERD, HLD and appendiceal carcinoma (S/P diagnostic laparoscopy and PIPAC X8) presents to the ED with acute abdominal pain that started earlier this evening. vitals non actionable at this time. PE as noted above. diffuse apin with guarding. ddx includes but not limited to: colitis vs SBO vs procedural complications. will order labs, imaging, ekg, meds, reassess

## 2022-06-21 NOTE — ED ADULT NURSE REASSESSMENT NOTE - NS ED NURSE REASSESS COMMENT FT1
pt landin placed with cudee catheter, clear yellow urine noted., LR infusing 500 cc bolus, report given to IR DARON Lennon. pt  and wife to IR at 1000. called IR, bed assignment updated to 827-A, RN stated they will transfer pt and give report.

## 2022-06-21 NOTE — ED PROVIDER NOTE - PROGRESS NOTE DETAILS
CHUYITA: pt pain at this time is 3/10, will hold off on further pain medications for now per pt request. Still pending labs. Paged to surgery. CHUYITA: pt pain at this time is 3/10, will hold off on further pain medications for now per pt request. Still pending labs. Paged to surgery.  Would obtain PO contrast CT but pt is extremely nauseas so likely cannot tolerate PO contrast and do not want pt to aspirate. Will provide zofran and monitor. Omar Galvan MD (PGY1): Sign-out received from Dr. Alfonso. CT A/P with new rim-enhancing fluid collection measuring 1.3 x 21.3 x 17.6 cm deep to the anterior peritoneum concerning for an abscess. Evaluated by general surgery team, pending final recommendations. Patient reports pain and nausea under control at this time. Will continue to reassess.

## 2022-06-21 NOTE — H&P ADULT - HISTORY OF PRESENT ILLNESS
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 Had CT ab pelvis with PO and IV contrast 6/20 as part of routine surveillance, no SBO or abdominal fluid collection then. Then developed severe diffuse ab pain at 9pm with nausea and retching. Last passed flatus and had BM at 11pm. Also c/o scant purulent drainage from just below supraumbilical incision x few days. Denies fevers, chest pain, sob.    In the ED, patient afebrile. Labs showed WBC 21. Lactate 3.5->3 after 1L NS. CT showed a new 1.3cmx21.3cmx17.6cm rim-enhancing fluid collection deep to the anterior peritoneum concerning for abscess, as well as a rim-enhancing left sided fluid collection.

## 2022-06-21 NOTE — CONSULT NOTE ADULT - ASSESSMENT
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 Mason    Discussed with Dr. 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 Isabella SHELTON TEAM SURGERY  [id number]

## 2022-06-21 NOTE — H&P ADULT - NSHPLABSRESULTS_GEN_ALL_CORE
LABS:                          14.0   21.77 )-----------( 684      ( 21 Jun 2022 03:47 )             44.8       06-21    136  |  100  |  18  ----------------------------<  171<H>  4.3   |  24  |  0.98    Ca    9.3      21 Jun 2022 03:47    TPro  7.1  /  Alb  4.2  /  TBili  0.4  /  DBili  x   /  AST  21  /  ALT  28  /  AlkPhos  112  06-21        PT/INR - ( 21 Jun 2022 03:47 )   PT: 11.2 sec;   INR: 0.97 ratio      PTT - ( 21 Jun 2022 03:47 )  PTT:34.1 sec    Lactate Trend  06-21 @ 06:11 Lactate:3.0     _______________________________________  RADIOLOGY & ADDITIONAL STUDIES:    < from: CT Abdomen and Pelvis w/ IV Cont (06.21.22 @ 05:54) >    ACC: [id number] EXAM:  CT ABDOMEN AND PELVIS IC                          PROCEDURE DATE:  06/21/2022      INTERPRETATION:  CLINICAL INFORMATION: Abdominal pain, nausea and no   bowel movement status post surgery. Rule out small bowel obstruction.    COMPARISON: CT chest, abdomen and pelvis 6/20/2022    CONTRAST/COMPLICATIONS:  IV Contrast: Omnipaque 350  60 cc administered  Oral Contrast: NONE  Complications: None reported at time of study completion  Due to the shortage of iodinated intravenous contrast, the protocol has   been modified to reduce the amount of contrast used.    PROCEDURE:  CT of the Abdomen and Pelvis was performed.  Sagittal and coronal reformats were performed.    FINDINGS:  LOWER CHEST: Bilateral lower lobe linear atelectasis.    LIVER: Within normal limits.  BILE DUCTS: Normal caliber.  GALLBLADDER: Cholecystectomy.  SPLEEN: Absent.  PANCREAS: Within normal limits.  ADRENALS: Within normal limits.  KIDNEYS/URETERS: Nonobstructing 2 mm left renal calculus. No   hydronephrosis. Right kidney within normal limits.    BLADDER: Distended with excreted intravenous contrast.  REPRODUCTIVE ORGANS: Enlarged prostate to 5.5 cm.    BOWEL: Status post partial gastrectomy, gastrojejunostomy, subtotal   colectomy and ileosigmoid anastomosis. No bowel obstruction or   inflammation.  PERITONEUM: New rim-enhancing fluid collection measuring 1.3 x 21.3 x   17.6 cm deep to the anterior peritoneum concerning for an abscess.   Increased small volume abdominal ascites.  VESSELS: Mild atherosclerotic changes.  RETROPERITONEUM/LYMPH NODES: No lymphadenopathy.  ABDOMINAL WALL: Within normal limits.  BONES: Mild degenerative changes of the spine.    IMPRESSION:  1. No bowel obstruction or inflammation.  2. New rim-enhancing fluid collection deep to the anterior peritoneum   concerning for an abscess.  3. Increased small volume abdominal ascites.    < end of copied text >

## 2022-06-21 NOTE — PROCEDURE NOTE - PLAN
-monitor for signs of bleeding, monitor AM CBC  -follow up culture/cyto results  -advance diet/restart all other orders per primary team

## 2022-06-21 NOTE — H&P ADULT - ASSESSMENT
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 No evidence of bowel obstruction.    PLAN:  - Admit to D Team surgery, Dr. Mason, floor bed  - NPO/IVF  - New ascites and fluid collections after undergoing CT with barium contrast is concerning for possible perforation  - IR consult for drainage of left fluid collection  - Zosyn  - Hold DVT ppx pending IR input  - Monitor abdominal exam prior to pain meds    D/w Dr. 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 No evidence of bowel obstruction.    PLAN:  - Admit to D Team surgery, Dr. Mason, floor bed  - NPO/IVF  - New ascites and fluid collections after undergoing CT with barium contrast is concerning for possible perforation  - IR consult for drainage of left fluid collection  - Zosyn  - Bladder scan (has not voided in ED and distended bladder on CT)  - Will give additional 500cc IVF bolus, repeat labs and lactate at 2pm  - Hold DVT ppx pending IR input  - Monitor abdominal exam prior to pain meds    D/w Dr. Alison Gold, PGY2  D Team Surgery   [id number]

## 2022-06-21 NOTE — ED PROVIDER NOTE - PHYSICAL EXAMINATION
GENERAL: mild acute distress, non-toxic appearing  HEAD: normocephalic, atraumatic  HEENT: normal conjunctiva, oral mucosa moist, neck supple  CARDIAC: regular rate and rhythm, normal S1 and S2,  no appreciable murmurs  PULM: clear to ascultation bilaterally, no crackles, rales, rhonchi, or wheezing  GI: abdomen diffusely tender to palpation with guarding, well healed surgical scars, umbilical scar draining fluid    : no CVA tenderness, no suprapubic tenderness  NEURO: alert and oriented x 3, normal speech, PERRLA, EOMI  MSK: no visible deformities, no peripheral edema, calf tenderness/redness/swelling  SKIN: no visible rashes, dry, well-perfused  PSYCH: appropriate mood and affect

## 2022-06-21 NOTE — PROCEDURE NOTE - PROCEDURE FINDINGS AND DETAILS
120cc straw-colored fluid aspirated from left abdominal collection, sent for cytology and culture. No drainage catheter left in place due to nature of fluid

## 2022-06-21 NOTE — CHART NOTE - NSCHARTNOTEFT_GEN_A_CORE
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 Now with abdominal pain, sepsis and new abdominal fluid collection, presents to IR for aspiration, possible drainage of a new abdominal fluid collection.     Allergies:   Medications (Abx/Cardiac/Anticoagulation/Blood Products)    piperacillin/tazobactam IVPB.: 200 mL/Hr IV Intermittent (06-21 @ 10:02)    Data:  167.6  T(C): 36.6  HR: 77  BP: 127/88  RR: 16  SpO2: 100%    Exam  General: No acute distress  Chest: Non labored breathing  Abdomen: Non-distended  Extremities: No swelling, warm    -WBC 21.77 / HgB 14.0 / Hct 44.8 / Plt 684  -Na 136 / Cl 100 / BUN 18 / Glucose 171  -K 4.3 / CO2 24 / Cr 0.98  -ALT 28 / Alk Phos 112 / T.Bili 0.4  -INR0.97    Imaging:     Plan: 61y Male presents for above procedure  -Risks/Benefits/alternatives explained with the patient and/or healthcare proxy and witnessed informed consent obtained.

## 2022-06-21 NOTE — CONSULT NOTE ADULT - SUBJECTIVE AND OBJECTIVE BOX
General Surgery Consult  Consulting surgical team: D TEAM SURGERY  Consulting attending: Dr. 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 Had CT ab pelvis with PO and IV contrast 6/20 as part of routine surveillance, no SBO or abdominal fluid collection then. Then developed severe diffuse ab pain at 9pm with nausea and retching. Last passed flatus and had BM at 11pm. Also c/o scant purulent drainage from just below supraumbilical incision x few days. Denies fevers, chest pain, sob.    PAST MEDICAL HISTORY:  No pertinent past medical history    HLD (hyperlipidemia)    BPH (benign prostatic hyperplasia)    Neuropathic pain    UTI (urinary tract infection)    Appendix carcinoma    UTI (urinary tract infection)    Anxiety    Cancer of appendix    Malignant neoplasm of appendix    GERD (gastroesophageal reflux disease)        PAST SURGICAL HISTORY:  No significant past surgical history    History of undescended testicle    Abdominal mass    History of abdominal paracentesis    H/O colectomy    Malignant neoplasm of appendix        MEDICATIONS:  ondansetron Injectable 4 milliGRAM(s) IV Push once      ALLERGIES:  No Known Allergies      VITALS & I/Os:  Vital Signs Last 24 Hrs  T(C): 37.6 (21 Jun 2022 03:40), Max: 37.6 (21 Jun 2022 03:40)  T(F): 99.7 (21 Jun 2022 03:40), Max: 99.7 (21 Jun 2022 03:40)  HR: 81 (21 Jun 2022 04:42) (76 - 82)  BP: 124/65 (21 Jun 2022 04:42) (115/72 - 142/83)  BP(mean): --  RR: 15 (21 Jun 2022 04:42) (15 - 16)  SpO2: 100% (21 Jun 2022 04:42) (100% - 100%)    I&O's Summary      PHYSICAL EXAM:  General: No acute distress  Respiratory: Nonlabored  Cardiovascular: RRR  Abdominal: Soft, nondistended, diffuse tenderness. No rebound or guarding.  scant amount of purulent drainage milked from under incision just above the umbilicus, minimal erythema, no surrounding fluctuance or induration  Extremities: Warm    LABS:                        14.0   21.77 )-----------( 684      ( 21 Jun 2022 03:47 )             44.8     06-21    136  |  100  |  18  ----------------------------<  171<H>  4.3   |  24  |  0.98    Ca    9.3      21 Jun 2022 03:47    TPro  7.1  /  Alb  4.2  /  TBili  0.4  /  DBili  x   /  AST  21  /  ALT  28  /  AlkPhos  112  06-21    Lactate:  06-21 @ 03:47  3.5    PT/INR - ( 21 Jun 2022 03:47 )   PT: 11.2 sec;   INR: 0.97 ratio         PTT - ( 21 Jun 2022 03:47 )  PTT:34.1 sec              IMAGING:    EXAM: [id number] - CT ABDOMEN AND PELVIS IC - ORDERED BY: RAH CHOI    EXAM: [id number] - CT CHEST IC - ORDERED BY: RAH CHOI      PROCEDURE DATE: 06/20/2022        INTERPRETATION: CLINICAL INFORMATION: Appendiceal cancer with peritoneal metastases.    COMPARISON: March 17, 2022, December 30, 2021    CONTRAST/COMPLICATIONS:  IV Contrast: Omnipaque 350 (accession [id number]), IV contrast documented in associated exam (accession [id number]) 90 cc administered 10 cc discarded  Oral Contrast: Smoothie Readi-Cat 2 (accession [id number]), NONE (accession [id number])  Complications: None reported at time of study completion    PROCEDURE:  CT of the Chest, Abdomen and Pelvis was performed.  Dual phase, arterial and portal venous phase imaging was performed through the upper abdomen.  Sagittal and coronal reformats were performed.    FINDINGS:  CHEST:  LUNGS AND LARGE AIRWAYS: Patent central airways. Tiny left upper lobe calcified granuloma.  PLEURA: No pleural effusion.  VESSELS: Atherosclerotic changes of the aorta and coronary arteries. Left-sided central venous port with tip in the superior vena cava.  HEART: Heart size is normal. No pericardial effusion.  MEDIASTINUM AND VICKI: No lymphadenopathy.  CHEST WALL AND LOWER NECK: Within normal limits.    ABDOMEN AND PELVIS:  LIVER: Subcentimeter hypervascular nodule at the dome in segment 8 (4; 18) without washout or capsule. No other suspicious lesion.  BILE DUCTS: Normal caliber.  GALLBLADDER: Cholecystectomy.  SPLEEN: Splenectomy.  PANCREAS: Within normal limits.  ADRENALS: Within normal limits.  KIDNEYS/URETERS: 3 mm nonobstructing left kidney stone. Mild right hydronephrosis.    BLADDER: Within normal limits.  REPRODUCTIVE ORGANS: Prostate within normal limits.    BOWEL: Status post partial gastrectomy, gastrojejunostomy, subtotal colectomy and ileosigmoid anastomosis. No bowel obstruction.  PERITONEUM: Multiple small pockets of perihepatic interloop fluid, overall not significantly changed.  VESSELS: Within normal limits.  RETROPERITONEUM/LYMPH NODES: No lymphadenopathy.  ABDOMINAL WALL: Within normal limits.  BONES: Within normal limits.    IMPRESSION:    Multiple small pockets of intraperitoneal fluid compatible with pseudomyxoma peritonei, unchanged since March 17, 2022.  Mild right hydronephrosis.        --- End of Report ---      KAREN DAWN MD; Attending Radiologist Phone #: [phone]  This document has been electronically signed. Jun 20 2022 3:32PM

## 2022-06-21 NOTE — H&P ADULT - PATIENT'S GENDER IDENTITY
Referred To Otolaryngology For Closure Text (Leave Blank If You Do Not Want): After obtaining clear surgical margins the patient was sent to otolaryngology for surgical repair.  The patient understands they will receive post-surgical care and follow-up from the referring physician's office. Male

## 2022-06-21 NOTE — ED PROVIDER NOTE - OBJECTIVE STATEMENT
60 yo M with a pmhx of anxiety, BPH, GERD, HLD and appendiceal carcinoma (S/P diagnostic laparoscopy and PIPAC X6- most recently Feb 2022) presents to the ED with acute abdominal pain that started earlier this evening. His pain started acutely a few hours prior to arrival to the ED. His pain is severe in nature, diffuse throughout his abdomen with associated N/V. no fevers chills, Cp, SOB. Usual state of health prior to onset of pain. was to be follow up with Isabella this week. No other symptoms noted at bedside. 60 yo M with a pmhx of anxiety, BPH, GERD, HLD and appendiceal carcinoma (S/P diagnostic laparoscopy and PIPAC X8) presents to the ED with acute abdominal pain that started earlier this evening. His pain started acutely a few hours prior to arrival to the ED. His pain is severe in nature, diffuse throughout his abdomen with associated N/V. no fevers chills, Cp, SOB. Usual state of health prior to onset of pain. was to be follow up with Isabella this week. No other symptoms noted at bedside.

## 2022-06-21 NOTE — ED ADULT NURSE NOTE - OBJECTIVE STATEMENT
pt received to rm 10  , a&ox4 , ambulatory , pmh of BPH, GERD, HLD, p/w acute abd pain starting this evening pt states he is post laparoscopy and PIPAC. Pt breathing even and unlabored on room air. NSR on cardiac monitor. Denies fever, chills, cough, SOB, chest pain, palpitations, dizziness, constipation, numbness, tingling. IV placed. Labs collected and sent. pending CT . pt educated on fall precautions and confirms understanding via teach back method. Stretcher locked in lowest position with siderails up x2. Call bell and personal items within reach.

## 2022-06-21 NOTE — CHART NOTE - NSCHARTNOTEFT_GEN_A_CORE
PRE-INTERVENTIONAL RADIOLOGY PROCEDURE NOTE      Patient Age: 61    Patient Gender: M    Procedure: abd fluid aspiration, possible drain placement    Diagnosis/Indication: intra-abdominal fluid, possible abscess    Interventional Radiology Attending Physician: Dr. Leo    Ordering Attending Physician: Dr. Mason    Pertinent Medical History: PIPEC    Pertinent labs:                      14.0   21.77 )-----------( 684      ( 21 Jun 2022 03:47 )             44.8       06-21    136  |  100  |  18  ----------------------------<  171<H>  4.3   |  24  |  0.98    Ca    9.3      21 Jun 2022 03:47    TPro  7.1  /  Alb  4.2  /  TBili  0.4  /  DBili  x   /  AST  21  /  ALT  28  /  AlkPhos  112  06-21      PT/INR - ( 21 Jun 2022 03:47 )   PT: 11.2 sec;   INR: 0.97 ratio         PTT - ( 21 Jun 2022 03:47 )  PTT:34.1 sec        Patient and Family Aware ? Yes

## 2022-06-22 LAB
ANION GAP SERPL CALC-SCNC: 14 MMOL/L — SIGNIFICANT CHANGE UP (ref 7–14)
BUN SERPL-MCNC: 12 MG/DL — SIGNIFICANT CHANGE UP (ref 7–23)
CALCIUM SERPL-MCNC: 8.7 MG/DL — SIGNIFICANT CHANGE UP (ref 8.4–10.5)
CHLORIDE SERPL-SCNC: 101 MMOL/L — SIGNIFICANT CHANGE UP (ref 98–107)
CO2 SERPL-SCNC: 20 MMOL/L — LOW (ref 22–31)
CREAT SERPL-MCNC: 0.94 MG/DL — SIGNIFICANT CHANGE UP (ref 0.5–1.3)
EGFR: 92 ML/MIN/1.73M2 — SIGNIFICANT CHANGE UP
GLUCOSE SERPL-MCNC: 124 MG/DL — HIGH (ref 70–99)
HCT VFR BLD CALC: 43 % — SIGNIFICANT CHANGE UP (ref 39–50)
HGB BLD-MCNC: 13.3 G/DL — SIGNIFICANT CHANGE UP (ref 13–17)
LACTATE SERPL-SCNC: 3 MMOL/L — HIGH (ref 0.5–2)
MAGNESIUM SERPL-MCNC: 2.1 MG/DL — SIGNIFICANT CHANGE UP (ref 1.6–2.6)
MCHC RBC-ENTMCNC: 27.4 PG — SIGNIFICANT CHANGE UP (ref 27–34)
MCHC RBC-ENTMCNC: 30.9 GM/DL — LOW (ref 32–36)
MCV RBC AUTO: 88.7 FL — SIGNIFICANT CHANGE UP (ref 80–100)
NON-GYNECOLOGICAL CYTOLOGY STUDY: SIGNIFICANT CHANGE UP
NRBC # BLD: 0 /100 WBCS — SIGNIFICANT CHANGE UP
NRBC # FLD: 0 K/UL — SIGNIFICANT CHANGE UP
PHOSPHATE SERPL-MCNC: 4.1 MG/DL — SIGNIFICANT CHANGE UP (ref 2.5–4.5)
PLATELET # BLD AUTO: 494 K/UL — HIGH (ref 150–400)
POTASSIUM SERPL-MCNC: 4.9 MMOL/L — SIGNIFICANT CHANGE UP (ref 3.5–5.3)
POTASSIUM SERPL-SCNC: 4.9 MMOL/L — SIGNIFICANT CHANGE UP (ref 3.5–5.3)
RBC # BLD: 4.85 M/UL — SIGNIFICANT CHANGE UP (ref 4.2–5.8)
RBC # FLD: 16.3 % — HIGH (ref 10.3–14.5)
SODIUM SERPL-SCNC: 135 MMOL/L — SIGNIFICANT CHANGE UP (ref 135–145)
WBC # BLD: 28.72 K/UL — HIGH (ref 3.8–10.5)
WBC # FLD AUTO: 28.72 K/UL — HIGH (ref 3.8–10.5)

## 2022-06-22 PROCEDURE — 74018 RADEX ABDOMEN 1 VIEW: CPT | Mod: 26

## 2022-06-22 RX ORDER — VANCOMYCIN HCL 1 G
750 VIAL (EA) INTRAVENOUS EVERY 12 HOURS
Refills: 0 | Status: DISCONTINUED | OUTPATIENT
Start: 2022-06-23 | End: 2022-06-24

## 2022-06-22 RX ORDER — ACETAMINOPHEN 500 MG
1000 TABLET ORAL EVERY 6 HOURS
Refills: 0 | Status: DISCONTINUED | OUTPATIENT
Start: 2022-06-22 | End: 2022-06-23

## 2022-06-22 RX ORDER — VANCOMYCIN HCL 1 G
750 VIAL (EA) INTRAVENOUS ONCE
Refills: 0 | Status: COMPLETED | OUTPATIENT
Start: 2022-06-22 | End: 2022-06-22

## 2022-06-22 RX ORDER — VANCOMYCIN HCL 1 G
VIAL (EA) INTRAVENOUS
Refills: 0 | Status: DISCONTINUED | OUTPATIENT
Start: 2022-06-22 | End: 2022-06-24

## 2022-06-22 RX ADMIN — PIPERACILLIN AND TAZOBACTAM 25 GRAM(S): 4; .5 INJECTION, POWDER, LYOPHILIZED, FOR SOLUTION INTRAVENOUS at 18:13

## 2022-06-22 RX ADMIN — SODIUM CHLORIDE 125 MILLILITER(S): 9 INJECTION, SOLUTION INTRAVENOUS at 08:27

## 2022-06-22 RX ADMIN — ESCITALOPRAM OXALATE 5 MILLIGRAM(S): 10 TABLET, FILM COATED ORAL at 11:49

## 2022-06-22 RX ADMIN — Medication 250 MILLIGRAM(S): at 16:37

## 2022-06-22 RX ADMIN — PIPERACILLIN AND TAZOBACTAM 25 GRAM(S): 4; .5 INJECTION, POWDER, LYOPHILIZED, FOR SOLUTION INTRAVENOUS at 01:03

## 2022-06-22 RX ADMIN — Medication 400 MILLIGRAM(S): at 11:49

## 2022-06-22 RX ADMIN — Medication 400 MILLIGRAM(S): at 18:46

## 2022-06-22 RX ADMIN — PANTOPRAZOLE SODIUM 40 MILLIGRAM(S): 20 TABLET, DELAYED RELEASE ORAL at 06:01

## 2022-06-22 RX ADMIN — SODIUM CHLORIDE 125 MILLILITER(S): 9 INJECTION, SOLUTION INTRAVENOUS at 16:37

## 2022-06-22 RX ADMIN — FAMOTIDINE 20 MILLIGRAM(S): 10 INJECTION INTRAVENOUS at 11:49

## 2022-06-22 RX ADMIN — SIMVASTATIN 10 MILLIGRAM(S): 20 TABLET, FILM COATED ORAL at 21:58

## 2022-06-22 RX ADMIN — Medication 400 MILLIGRAM(S): at 06:00

## 2022-06-22 RX ADMIN — TAMSULOSIN HYDROCHLORIDE 0.4 MILLIGRAM(S): 0.4 CAPSULE ORAL at 21:57

## 2022-06-22 RX ADMIN — PIPERACILLIN AND TAZOBACTAM 25 GRAM(S): 4; .5 INJECTION, POWDER, LYOPHILIZED, FOR SOLUTION INTRAVENOUS at 08:28

## 2022-06-22 RX ADMIN — ENOXAPARIN SODIUM 40 MILLIGRAM(S): 100 INJECTION SUBCUTANEOUS at 06:01

## 2022-06-22 NOTE — PROGRESS NOTE ADULT - SUBJECTIVE AND OBJECTIVE BOX
SURGERY DAILY PROGRESS NOTE:     SUBJECTIVE/ROS: No acute events overnight. Patient seen and examined bedside on AM rounds.     OBJECTIVE:  Vital Signs Last 24 Hrs  T(C): 37 (21 Jun 2022 23:39), Max: 38.1 (21 Jun 2022 17:18)  T(F): 98.6 (21 Jun 2022 23:39), Max: 100.6 (21 Jun 2022 17:18)  HR: 97 (21 Jun 2022 23:39) (76 - 99)  BP: 143/79 (21 Jun 2022 23:39) (115/72 - 144/79)  BP(mean): --  RR: 18 (21 Jun 2022 23:39) (15 - 18)  SpO2: 97% (21 Jun 2022 23:39) (97% - 100%)    PHYSICAL EXAM:  Gen: No acute distress, but appears frustrated  Abd: Soft, moderately tender over right and left abdomen at the level of the umbilicus, no rebound or guarding, nondistended, midline incision without erythema, scant amount of purulent drainage, no fluctuance  Ext: Warm and well-perfused                        13.5   25.13 )-----------( 570      ( 21 Jun 2022 15:15 )             42.0     06-21    139  |  102  |  12  ----------------------------<  137<H>  5.1   |  25  |  0.97    Ca    8.5      21 Jun 2022 15:15  Phos  4.3     06-21  Mg     1.50     06-21    TPro  7.1  /  Alb  4.2  /  TBili  0.4  /  DBili  x   /  AST  21  /  ALT  28  /  AlkPhos  112  06-21   PT/INR - ( 21 Jun 2022 03:47 )   PT: 11.2 sec;   INR: 0.97 ratio         PTT - ( 21 Jun 2022 03:47 )  PTT:34.1 sec  I&O's Detail    21 Jun 2022 07:01  -  22 Jun 2022 01:09  --------------------------------------------------------  IN:  Total IN: 0 mL    OUT:    Indwelling Catheter - Urethral (mL): 800 mL  Total OUT: 800 mL    Total NET: -800 mL          IMAGING:               SURGERY DAILY PROGRESS NOTE:     SUBJECTIVE/ROS: No acute events overnight. Consistent abd exams x2. Mild tenderness around umbilicus. Patient seen and examined bedside on AM rounds.     OBJECTIVE:  Vital Signs Last 24 Hrs  T(C): 37 (21 Jun 2022 23:39), Max: 38.1 (21 Jun 2022 17:18)  T(F): 98.6 (21 Jun 2022 23:39), Max: 100.6 (21 Jun 2022 17:18)  HR: 97 (21 Jun 2022 23:39) (76 - 99)  BP: 143/79 (21 Jun 2022 23:39) (115/72 - 144/79)  BP(mean): --  RR: 18 (21 Jun 2022 23:39) (15 - 18)  SpO2: 97% (21 Jun 2022 23:39) (97% - 100%)    PHYSICAL EXAM:  Gen: No acute distress, but appears frustrated  Abd: Soft, moderately tender over right and left abdomen at the level of the umbilicus, no rebound or guarding, nondistended, midline incision without erythema, scant amount of purulent drainage, no fluctuance  Ext: Warm and well-perfused                        13.5   25.13 )-----------( 570      ( 21 Jun 2022 15:15 )             42.0     06-21    139  |  102  |  12  ----------------------------<  137<H>  5.1   |  25  |  0.97    Ca    8.5      21 Jun 2022 15:15  Phos  4.3     06-21  Mg     1.50     06-21    TPro  7.1  /  Alb  4.2  /  TBili  0.4  /  DBili  x   /  AST  21  /  ALT  28  /  AlkPhos  112  06-21   PT/INR - ( 21 Jun 2022 03:47 )   PT: 11.2 sec;   INR: 0.97 ratio         PTT - ( 21 Jun 2022 03:47 )  PTT:34.1 sec  I&O's Detail    21 Jun 2022 07:01  -  22 Jun 2022 01:09  --------------------------------------------------------  IN:  Total IN: 0 mL    OUT:    Indwelling Catheter - Urethral (mL): 800 mL  Total OUT: 800 mL    Total NET: -800 mL          IMAGING:               SURGERY DAILY PROGRESS NOTE:     SUBJECTIVE/ROS: No acute events overnight. Consistent abd exams x2. Mild tenderness around umbilicus. Patient seen and examined bedside on AM rounds. Patient reports pain is consistent. Denies N/V, chills, chest pain, SOB.      OBJECTIVE:  Vital Signs Last 24 Hrs  ICU Vital Signs Last 24 Hrs  T(C): 37.5 (22 Jun 2022 06:00), Max: 38.1 (21 Jun 2022 17:18)  T(F): 99.5 (22 Jun 2022 06:00), Max: 100.6 (21 Jun 2022 17:18)  HR: 82 (22 Jun 2022 06:00) (80 - 99)  BP: 141/77 (22 Jun 2022 06:00) (125/70 - 144/79)  BP(mean): --  ABP: --  ABP(mean): --  RR: 18 (22 Jun 2022 06:00) (18 - 18)  SpO2: 97% (22 Jun 2022 06:00) (97% - 98%)    INS AND OUTS  I&O's Detail    21 Jun 2022 07:01  -  22 Jun 2022 07:00  --------------------------------------------------------  IN:    IV PiggyBack: 250 mL    Lactated Ringers: 1000 mL  Total IN: 1250 mL    OUT:    Indwelling Catheter - Urethral (mL): 1950 mL  Total OUT: 1950 mL    Total NET: -700 mL        PHYSICAL EXAM:  Gen: No acute distress, but appears frustrated  Abd: Soft, diffuse TTP, nondistended, no rebound or guarding, previous port sites with scant amount of drainage at umbilicus, no fluctuance/erythema  Ext: Warm and well-perfused    LABS                        13.3   28.72 )-----------( 494      ( 22 Jun 2022 05:41 )             43.0   06-22    135  |  101  |  12  ----------------------------<  124<H>  4.9   |  20<L>  |  0.94    Ca    8.7      22 Jun 2022 05:41  Phos  4.1     06-22  Mg     2.10     06-22    TPro  7.1  /  Alb  4.2  /  TBili  0.4  /  DBili  x   /  AST  21  /  ALT  28  /  AlkPhos  112  06-21          IMAGING:

## 2022-06-22 NOTE — PHYSICAL THERAPY INITIAL EVALUATION ADULT - LEVEL OF INDEPENDENCE: SIT/SUPINE, REHAB EVAL
left sitting comfortable in bedside chair with RN Stan aware and call bell in reach/unable to perform

## 2022-06-22 NOTE — PHYSICAL THERAPY INITIAL EVALUATION ADULT - PERTINENT HX OF CURRENT PROBLEM, REHAB EVAL
patient is a 61 year old male who presented with with acute onset abdominal pain, nausea after an outpatient surveillance CT with barium contrast. No evidence of bowel obstruction. s/p IR drainage

## 2022-06-22 NOTE — PROGRESS NOTE ADULT - ASSESSMENT
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 No evidence of bowel obstruction. s/p IR drainage.    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 No evidence of bowel obstruction. s/p IR drainage.    PLAN:  - Abdominal xray  - Serial abdominal exams  - DVT PPx: LVX  - Diet: NPO w meds/IVF  - Zosyn  - f/u lactate and AM labs    D Team Surgery   [id number]

## 2022-06-22 NOTE — CHART NOTE - NSCHARTNOTEFT_GEN_A_CORE
Serial abdominal exam was performed with patient comfortably sitting in chair. Abdominal exam remains unchanged from this morning. Patient is soft, diffuse/mildly tender to palpation, nondistended. Previous abdominal incisions c/d/i. Patient denies fever, chills, nausea, vomiting.

## 2022-06-23 ENCOUNTER — TRANSCRIPTION ENCOUNTER (OUTPATIENT)
Age: 62
End: 2022-06-23

## 2022-06-23 ENCOUNTER — APPOINTMENT (OUTPATIENT)
Dept: SURGICAL ONCOLOGY | Facility: CLINIC | Age: 62
End: 2022-06-23

## 2022-06-23 LAB
-  AMPICILLIN/SULBACTAM: SIGNIFICANT CHANGE UP
-  CEFAZOLIN: SIGNIFICANT CHANGE UP
-  CLINDAMYCIN: SIGNIFICANT CHANGE UP
-  DAPTOMYCIN: SIGNIFICANT CHANGE UP
-  ERYTHROMYCIN: SIGNIFICANT CHANGE UP
-  GENTAMICIN: SIGNIFICANT CHANGE UP
-  LINEZOLID: SIGNIFICANT CHANGE UP
-  OXACILLIN: SIGNIFICANT CHANGE UP
-  PENICILLIN: SIGNIFICANT CHANGE UP
-  RIFAMPIN: SIGNIFICANT CHANGE UP
-  TETRACYCLINE: SIGNIFICANT CHANGE UP
-  TRIMETHOPRIM/SULFAMETHOXAZOLE: SIGNIFICANT CHANGE UP
-  VANCOMYCIN: SIGNIFICANT CHANGE UP
ANION GAP SERPL CALC-SCNC: 9 MMOL/L — SIGNIFICANT CHANGE UP (ref 7–14)
APPEARANCE UR: CLEAR — SIGNIFICANT CHANGE UP
BACTERIA # UR AUTO: NEGATIVE — SIGNIFICANT CHANGE UP
BILIRUB UR-MCNC: NEGATIVE — SIGNIFICANT CHANGE UP
BUN SERPL-MCNC: 11 MG/DL — SIGNIFICANT CHANGE UP (ref 7–23)
CALCIUM SERPL-MCNC: 8.8 MG/DL — SIGNIFICANT CHANGE UP (ref 8.4–10.5)
CHLORIDE SERPL-SCNC: 100 MMOL/L — SIGNIFICANT CHANGE UP (ref 98–107)
CO2 SERPL-SCNC: 27 MMOL/L — SIGNIFICANT CHANGE UP (ref 22–31)
COLOR SPEC: YELLOW — SIGNIFICANT CHANGE UP
CREAT SERPL-MCNC: 1.05 MG/DL — SIGNIFICANT CHANGE UP (ref 0.5–1.3)
DIFF PNL FLD: ABNORMAL
EGFR: 80 ML/MIN/1.73M2 — SIGNIFICANT CHANGE UP
EPI CELLS # UR: 1 /HPF — SIGNIFICANT CHANGE UP (ref 0–5)
GLUCOSE SERPL-MCNC: 114 MG/DL — HIGH (ref 70–99)
GLUCOSE UR QL: ABNORMAL
HCT VFR BLD CALC: 37 % — LOW (ref 39–50)
HGB BLD-MCNC: 11.9 G/DL — LOW (ref 13–17)
HYALINE CASTS # UR AUTO: 2 /LPF — SIGNIFICANT CHANGE UP (ref 0–7)
KETONES UR-MCNC: NEGATIVE — SIGNIFICANT CHANGE UP
LEUKOCYTE ESTERASE UR-ACNC: NEGATIVE — SIGNIFICANT CHANGE UP
MAGNESIUM SERPL-MCNC: 1.9 MG/DL — SIGNIFICANT CHANGE UP (ref 1.6–2.6)
MCHC RBC-ENTMCNC: 27.5 PG — SIGNIFICANT CHANGE UP (ref 27–34)
MCHC RBC-ENTMCNC: 32.2 GM/DL — SIGNIFICANT CHANGE UP (ref 32–36)
MCV RBC AUTO: 85.5 FL — SIGNIFICANT CHANGE UP (ref 80–100)
METHOD TYPE: SIGNIFICANT CHANGE UP
NITRITE UR-MCNC: NEGATIVE — SIGNIFICANT CHANGE UP
NRBC # BLD: 0 /100 WBCS — SIGNIFICANT CHANGE UP
NRBC # FLD: 0 K/UL — SIGNIFICANT CHANGE UP
PH UR: 5.5 — SIGNIFICANT CHANGE UP (ref 5–8)
PHOSPHATE SERPL-MCNC: 2.6 MG/DL — SIGNIFICANT CHANGE UP (ref 2.5–4.5)
PLATELET # BLD AUTO: 462 K/UL — HIGH (ref 150–400)
POTASSIUM SERPL-MCNC: 4.5 MMOL/L — SIGNIFICANT CHANGE UP (ref 3.5–5.3)
POTASSIUM SERPL-SCNC: 4.5 MMOL/L — SIGNIFICANT CHANGE UP (ref 3.5–5.3)
PROT UR-MCNC: ABNORMAL
RBC # BLD: 4.33 M/UL — SIGNIFICANT CHANGE UP (ref 4.2–5.8)
RBC # FLD: 16.4 % — HIGH (ref 10.3–14.5)
RBC CASTS # UR COMP ASSIST: 17 /HPF — HIGH (ref 0–4)
SODIUM SERPL-SCNC: 136 MMOL/L — SIGNIFICANT CHANGE UP (ref 135–145)
SP GR SPEC: 1.02 — SIGNIFICANT CHANGE UP (ref 1–1.05)
UROBILINOGEN FLD QL: SIGNIFICANT CHANGE UP
WBC # BLD: 24.67 K/UL — HIGH (ref 3.8–10.5)
WBC # FLD AUTO: 24.67 K/UL — HIGH (ref 3.8–10.5)
WBC UR QL: 2 /HPF — SIGNIFICANT CHANGE UP (ref 0–5)

## 2022-06-23 PROCEDURE — 74018 RADEX ABDOMEN 1 VIEW: CPT | Mod: 26

## 2022-06-23 RX ORDER — DEXTROSE MONOHYDRATE, SODIUM CHLORIDE, AND POTASSIUM CHLORIDE 50; .745; 4.5 G/1000ML; G/1000ML; G/1000ML
1000 INJECTION, SOLUTION INTRAVENOUS
Refills: 0 | Status: DISCONTINUED | OUTPATIENT
Start: 2022-06-23 | End: 2022-06-23

## 2022-06-23 RX ORDER — MAGNESIUM SULFATE 500 MG/ML
2 VIAL (ML) INJECTION ONCE
Refills: 0 | Status: COMPLETED | OUTPATIENT
Start: 2022-06-23 | End: 2022-06-23

## 2022-06-23 RX ORDER — ACETAMINOPHEN 500 MG
1000 TABLET ORAL EVERY 6 HOURS
Refills: 0 | Status: DISCONTINUED | OUTPATIENT
Start: 2022-06-23 | End: 2022-06-23

## 2022-06-23 RX ORDER — SODIUM CHLORIDE 9 MG/ML
1000 INJECTION, SOLUTION INTRAVENOUS
Refills: 0 | Status: DISCONTINUED | OUTPATIENT
Start: 2022-06-23 | End: 2022-06-25

## 2022-06-23 RX ORDER — ACETAMINOPHEN 500 MG
1000 TABLET ORAL EVERY 6 HOURS
Refills: 0 | Status: COMPLETED | OUTPATIENT
Start: 2022-06-23 | End: 2022-06-24

## 2022-06-23 RX ORDER — ONDANSETRON 8 MG/1
4 TABLET, FILM COATED ORAL ONCE
Refills: 0 | Status: COMPLETED | OUTPATIENT
Start: 2022-06-23 | End: 2022-06-23

## 2022-06-23 RX ORDER — ONDANSETRON 8 MG/1
4 TABLET, FILM COATED ORAL EVERY 6 HOURS
Refills: 0 | Status: DISCONTINUED | OUTPATIENT
Start: 2022-06-23 | End: 2022-06-30

## 2022-06-23 RX ORDER — SUCRALFATE 1 G
1 TABLET ORAL
Refills: 0 | Status: DISCONTINUED | OUTPATIENT
Start: 2022-06-23 | End: 2022-06-30

## 2022-06-23 RX ADMIN — Medication 250 MILLIGRAM(S): at 06:47

## 2022-06-23 RX ADMIN — Medication 1 GRAM(S): at 11:59

## 2022-06-23 RX ADMIN — ENOXAPARIN SODIUM 40 MILLIGRAM(S): 100 INJECTION SUBCUTANEOUS at 06:01

## 2022-06-23 RX ADMIN — PIPERACILLIN AND TAZOBACTAM 25 GRAM(S): 4; .5 INJECTION, POWDER, LYOPHILIZED, FOR SOLUTION INTRAVENOUS at 00:12

## 2022-06-23 RX ADMIN — Medication 400 MILLIGRAM(S): at 06:01

## 2022-06-23 RX ADMIN — ONDANSETRON 4 MILLIGRAM(S): 8 TABLET, FILM COATED ORAL at 22:05

## 2022-06-23 RX ADMIN — Medication 400 MILLIGRAM(S): at 00:11

## 2022-06-23 RX ADMIN — PIPERACILLIN AND TAZOBACTAM 25 GRAM(S): 4; .5 INJECTION, POWDER, LYOPHILIZED, FOR SOLUTION INTRAVENOUS at 17:08

## 2022-06-23 RX ADMIN — Medication 25 GRAM(S): at 12:08

## 2022-06-23 RX ADMIN — Medication 63.75 MILLIMOLE(S): at 12:08

## 2022-06-23 RX ADMIN — Medication 1000 MILLIGRAM(S): at 17:30

## 2022-06-23 RX ADMIN — TAMSULOSIN HYDROCHLORIDE 0.4 MILLIGRAM(S): 0.4 CAPSULE ORAL at 22:05

## 2022-06-23 RX ADMIN — PIPERACILLIN AND TAZOBACTAM 25 GRAM(S): 4; .5 INJECTION, POWDER, LYOPHILIZED, FOR SOLUTION INTRAVENOUS at 08:47

## 2022-06-23 RX ADMIN — Medication 1 GRAM(S): at 22:07

## 2022-06-23 RX ADMIN — Medication 10 MILLIGRAM(S): at 11:59

## 2022-06-23 RX ADMIN — Medication 400 MILLIGRAM(S): at 17:07

## 2022-06-23 RX ADMIN — ONDANSETRON 4 MILLIGRAM(S): 8 TABLET, FILM COATED ORAL at 16:48

## 2022-06-23 RX ADMIN — PANTOPRAZOLE SODIUM 40 MILLIGRAM(S): 20 TABLET, DELAYED RELEASE ORAL at 06:01

## 2022-06-23 RX ADMIN — SIMVASTATIN 10 MILLIGRAM(S): 20 TABLET, FILM COATED ORAL at 22:05

## 2022-06-23 RX ADMIN — DEXTROSE MONOHYDRATE, SODIUM CHLORIDE, AND POTASSIUM CHLORIDE 100 MILLILITER(S): 50; .745; 4.5 INJECTION, SOLUTION INTRAVENOUS at 06:49

## 2022-06-23 RX ADMIN — Medication 250 MILLIGRAM(S): at 17:07

## 2022-06-23 NOTE — DISCHARGE NOTE PROVIDER - NSDCMRMEDTOKEN_GEN_ALL_CORE_FT
escitalopram 5 mg oral tablet: 1 tab(s) orally once a day, pm  famotidine 40 mg oral tablet: 1 tab(s) orally once a day  pantoprazole 40 mg oral delayed release tablet: 1 tab(s) orally once a day  simvastatin 10 mg oral tablet: 1 tab(s) orally once a day (at bedtime)  tamsulosin 0.4 mg oral capsule: 1 cap(s) orally once a day, pm  testosterone topical: daily   acetaminophen 325 mg oral tablet: 3 tab(s) orally every 6 hours, As needed, Mild Pain (1 - 3)  escitalopram 5 mg oral tablet: 1 tab(s) orally once a day, pm  famotidine 40 mg oral tablet: 1 tab(s) orally once a day  pantoprazole 40 mg oral delayed release tablet: 1 tab(s) orally once a day  simvastatin 10 mg oral tablet: 1 tab(s) orally once a day (at bedtime)  tamsulosin 0.4 mg oral capsule: 1 cap(s) orally once a day, pm  testosterone topical: daily   acetaminophen 325 mg oral tablet: 3 tab(s) orally every 6 hours, As needed, Mild Pain (1 - 3)  doxycycline monohydrate 50 mg oral capsule: 2 cap(s) orally every 12 hours  escitalopram 5 mg oral tablet: 1 tab(s) orally once a day, pm  famotidine 40 mg oral tablet: 1 tab(s) orally once a day  pantoprazole 40 mg oral delayed release tablet: 1 tab(s) orally once a day  simvastatin 10 mg oral tablet: 1 tab(s) orally once a day (at bedtime)  tamsulosin 0.4 mg oral capsule: 1 cap(s) orally once a day, pm  testosterone topical: daily   acetaminophen 325 mg oral tablet: 3 tab(s) orally every 6 hours, As needed, Mild Pain (1 - 3)  CBC with differential (complete blood count): Please have labs drawn prior to your follow up visit.  Please bring results to [facility] in 1 week after discharge.  doxycycline monohydrate 50 mg oral capsule: 2 cap(s) orally every 12 hours  escitalopram 5 mg oral tablet: 1 tab(s) orally once a day, pm  famotidine 40 mg oral tablet: 1 tab(s) orally once a day  pantoprazole 40 mg oral delayed release tablet: 1 tab(s) orally once a day  simvastatin 10 mg oral tablet: 1 tab(s) orally once a day (at bedtime)  tamsulosin 0.4 mg oral capsule: 1 cap(s) orally once a day, pm  testosterone topical: daily   acetaminophen 325 mg oral tablet: 3 tab(s) orally every 6 hours, As needed, Mild Pain (1 - 3)  CBC with differential (complete blood count): Please have labs drawn prior to your follow up visit.  Please bring results to [facility] in 1 week after discharge.  doxycycline monohydrate 100 mg oral capsule: 1 cap(s) orally 2 times a day   doxycycline monohydrate 50 mg oral capsule: 2 cap(s) orally every 12 hours  escitalopram 5 mg oral tablet: 1 tab(s) orally once a day, pm  famotidine 40 mg oral tablet: 1 tab(s) orally once a day  pantoprazole 40 mg oral delayed release tablet: 1 tab(s) orally once a day  simvastatin 10 mg oral tablet: 1 tab(s) orally once a day (at bedtime)  tamsulosin 0.4 mg oral capsule: 1 cap(s) orally once a day, pm  testosterone topical: daily

## 2022-06-23 NOTE — PROGRESS NOTE ADULT - SUBJECTIVE AND OBJECTIVE BOX
SURGERY DAILY PROGRESS NOTE:     OVERNIGHT: Abd x2 remained soft, mildly distended and tympanic with linda-umbilical tenderness. No ROBF. No nausea/vomiting.    SUBJECTIVE/ROS: No acute events overnight. Patient seen and examined bedside on AM rounds.     OBJECTIVE:  Vital Signs Last 24 Hrs  T(C): 37.4 (22 Jun 2022 23:44), Max: 38.3 (22 Jun 2022 15:36)  T(F): 99.4 (22 Jun 2022 23:44), Max: 100.9 (22 Jun 2022 15:36)  HR: 92 (22 Jun 2022 23:44) (82 - 102)  BP: 138/73 (22 Jun 2022 23:44) (111/58 - 141/77)  BP(mean): --  RR: 18 (22 Jun 2022 23:44) (18 - 18)  SpO2: 97% (22 Jun 2022 23:44) (96% - 100%)    PHYSICAL EXAM:  Gen: No acute distress, but appears frustrated  Abd: Soft, diffuse TTP, nondistended, no rebound or guarding, previous port sites with scant amount of drainage at umbilicus, no fluctuance/erythema  Ext: Warm and well-perfused                          13.3   28.72 )-----------( 494      ( 22 Jun 2022 05:41 )             43.0     06-22    135  |  101  |  12  ----------------------------<  124<H>  4.9   |  20<L>  |  0.94    Ca    8.7      22 Jun 2022 05:41  Phos  4.1     06-22  Mg     2.10     06-22    TPro  7.1  /  Alb  4.2  /  TBili  0.4  /  DBili  x   /  AST  21  /  ALT  28  /  AlkPhos  112  06-21   PT/INR - ( 21 Jun 2022 03:47 )   PT: 11.2 sec;   INR: 0.97 ratio         PTT - ( 21 Jun 2022 03:47 )  PTT:34.1 sec  I&O's Detail    21 Jun 2022 07:01  -  22 Jun 2022 07:00  --------------------------------------------------------  IN:    IV PiggyBack: 250 mL    Lactated Ringers: 1000 mL  Total IN: 1250 mL    OUT:    Indwelling Catheter - Urethral (mL): 1950 mL  Total OUT: 1950 mL    Total NET: -700 mL      22 Jun 2022 07:01  -  23 Jun 2022 01:58  --------------------------------------------------------  IN:    IV PiggyBack: 450 mL    Lactated Ringers: 1375 mL  Total IN: 1825 mL    OUT:    Indwelling Catheter - Urethral (mL): 1000 mL  Total OUT: 1000 mL    Total NET: 825 mL          IMAGING:        < from: Xray Abdomen 1 View PORTABLE -Urgent (Xray Abdomen 1 View PORTABLE -Urgent .) (06.22.22 @ 09:38) >    ACC: [id number] EXAM:  XR ABDOMEN PORTABLE URGENT 1V                          PROCEDURE DATE:  06/22/2022          INTERPRETATION:  CLINICAL INFORMATION: Evaluate for pneumatosis.    TECHNIQUE: 1 view of the abdomen.    COMPARISON: Intraprocedural imaging from 6/21/2022    FINDINGS:  No dilated loops of small or large bowel.  No free air.  The lung bases are unremarkable.  Visualized osseous structures are unremarkable.    IMPRESSION:  No dilated loops of small or large bowel.    No evidence of pneumatosis.      < end of copied text >         SURGERY DAILY PROGRESS NOTE:     OVERNIGHT: Abd x2 remained soft, mildly distended and tympanic with linda-umbilical tenderness. No ROBF. No nausea/vomiting.    SUBJECTIVE/ROS: Patient is seen and examined at bedside during AM rounds. Patient is not passing gas and BM (last BM monday). Patient states he is experiencing acid reflux throughout the night. Denies fever, chills, N/V, chest pain, SOB     OBJECTIVE:  Vital Signs Last 24 Hrs  ICU Vital Signs Last 24 Hrs  T(C): 37.7 (23 Jun 2022 06:00), Max: 38.3 (22 Jun 2022 15:36)  T(F): 99.9 (23 Jun 2022 06:00), Max: 100.9 (22 Jun 2022 15:36)  HR: 84 (23 Jun 2022 06:00) (82 - 102)  BP: 142/77 (23 Jun 2022 06:00) (111/58 - 142/77)  BP(mean): --  ABP: --  ABP(mean): --  RR: 16 (23 Jun 2022 06:00) (16 - 18)  SpO2: 98% (23 Jun 2022 06:00) (96% - 100%)    INS AND OUTS  I&O's Detail    22 Jun 2022 07:01  -  23 Jun 2022 07:00  --------------------------------------------------------  IN:    IV PiggyBack: 450 mL    Lactated Ringers: 1375 mL  Total IN: 1825 mL    OUT:    Indwelling Catheter - Urethral (mL): 1500 mL  Total OUT: 1500 mL    Total NET: 325 mL      PHYSICAL EXAM:  Gen: No acute distress, but appears frustrated  Abd: Soft, diffuse TTP, mildly distended, no rebound or guarding, previous port sites with scant amount of drainage at umbilicus, no fluctuance/erythema  Ext: Warm and well-perfused    LABS                        11.9   24.67 )-----------( 462      ( 23 Jun 2022 06:00 )             37.0   06-23    136  |  100  |  11  ----------------------------<  114<H>  4.5   |  27  |  1.05    Ca    8.8      23 Jun 2022 06:00  Phos  2.6     06-23  Mg     1.90     06-23          IMAGING:        < from: Xray Abdomen 1 View PORTABLE -Urgent (Xray Abdomen 1 View PORTABLE -Urgent .) (06.22.22 @ 09:38) >    ACC: [id number] EXAM:  XR ABDOMEN PORTABLE URGENT 1V                          PROCEDURE DATE:  06/22/2022          INTERPRETATION:  CLINICAL INFORMATION: Evaluate for pneumatosis.    TECHNIQUE: 1 view of the abdomen.    COMPARISON: Intraprocedural imaging from 6/21/2022    FINDINGS:  No dilated loops of small or large bowel.  No free air.  The lung bases are unremarkable.  Visualized osseous structures are unremarkable.    IMPRESSION:  No dilated loops of small or large bowel.    No evidence of pneumatosis.      < end of copied text >

## 2022-06-23 NOTE — DISCHARGE NOTE PROVIDER - PROVIDER TOKENS
PROVIDER:[TOKEN:[[id number]],FOLLOWUP:[2 weeks]] PROVIDER:[TOKEN:[[id number]],FOLLOWUP:[1 week]] PROVIDER:[TOKEN:[[id number]],FOLLOWUP:[1 week]],PROVIDER:[TOKEN:[[id number]],FOLLOWUP:[1 week]]

## 2022-06-23 NOTE — DISCHARGE NOTE PROVIDER - NSDCCPCAREPLAN_GEN_ALL_CORE_FT
PRINCIPAL DISCHARGE DIAGNOSIS  Diagnosis: Abscess of abdominal cavity  Assessment and Plan of Treatment:        PRINCIPAL DISCHARGE DIAGNOSIS  Diagnosis: Abscess of abdominal cavity  Assessment and Plan of Treatment: You were treated with IV antibiotics during your admission.  Prior to discharge, this medication was changed to Doxycycline. Please continue this as prescribed, a prescription was sent to your pharmacy.  WOUND CARE:   umbilicus wound -- pack with small amount dry sterile gauze. Cover with paper tape. Change daily or as needed.

## 2022-06-23 NOTE — DISCHARGE NOTE PROVIDER - HOSPITAL COURSE
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 Had CT ab pelvis with PO and IV contrast 6/20 as part of routine surveillance, no SBO or abdominal fluid collection then. Then developed severe diffuse ab pain at 9pm with nausea and retching. Last passed flatus and had BM at 11pm. Also c/o scant purulent drainage from just below supraumbilical incision x few days. Denies fevers, chest pain, sob.    In the ED, patient afebrile. Labs showed WBC 21. Lactate 3.5->3 after 1L NS. CT showed a new 1.3cmx21.3cmx17.6cm rim-enhancing fluid collection deep to the anterior peritoneum concerning for abscess, as well as a rim-enhancing left sided fluid collection.    Patient was admitted to surgical oncology service under the care of Dr. Nash    6/21 IR was consulted for drainage of left intraabdominal collection. 120 CC of straw colored fluid was aspirated.  6/22 Serial abdominal exam revealed belly soft, nondistended, mildly diffuse TTP. No gas or bowel movements  6/23 Patient is soft, mildly distended, and less tender compared to 6/22. No gas or bowel movements. Patient received dulcolax suppository x 1.   ****      Diet was restarted and advanced as tolerated. Pain control was transitioned from IV to PO pain meds. At this time, patient is currently ambulating, voiding, tolerating a regular diet. Pain well controlled on PO pain meds. Patient has been deemed stable for discharge home with follow up as an outpatient. 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 Had CT ab pelvis with PO and IV contrast 6/20 as part of routine surveillance, no SBO or abdominal fluid collection then. Then developed severe diffuse ab pain at 9pm with nausea and retching. Last passed flatus and had BM at 11pm. Also c/o scant purulent drainage from just below supraumbilical incision x few days. Denies fevers, chest pain, sob.    In the ED, patient afebrile. Labs showed WBC 21. Lactate 3.5->3 after 1L NS. CT showed a new 1.3cmx21.3cmx17.6cm rim-enhancing fluid collection deep to the anterior peritoneum concerning for abscess, as well as a rim-enhancing left sided fluid collection.    Patient was admitted to surgical oncology service under the care of Dr. Mason    6/21 IR was consulted for drainage of left intraabdominal collection. 120 CC of straw colored fluid was aspirated.  6/22 Serial abdominal exam revealed belly soft, nondistended, mildly diffuse TTP. No gas or bowel movements  6/23 Patient is soft, mildly distended, and less tender compared to 6/22. No gas or bowel movements. Patient received dulcolax suppository x 1.   ****    Diet was restarted and advanced as tolerated. Pain control was transitioned from IV to PO pain meds. At this time, patient is currently ambulating, voiding, tolerating a regular diet. Pain well controlled on PO pain meds. Patient has been deemed stable for discharge home with follow up as an outpatient. 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 Had CT ab pelvis with PO and IV contrast 6/20 as part of routine surveillance, no SBO or abdominal fluid collection then. Then developed severe diffuse ab pain at 9pm with nausea and retching. Last passed flatus and had BM at 11pm. Also c/o scant purulent drainage from just below supraumbilical incision x few days. Denies fevers, chest pain, sob.    In the ED, patient afebrile. Labs showed WBC 21. Lactate 3.5->3 after 1L NS. CT showed a new 1.3cmx21.3cmx17.6cm rim-enhancing fluid collection deep to the anterior peritoneum concerning for abscess, as well as a rim-enhancing left sided fluid collection.    Patient was admitted to surgical oncology service under the care of Dr. Mason    6/21 IR was consulted for drainage of left intraabdominal collection. 120 CC of straw colored fluid was aspirated.  6/22 Serial abdominal exam revealed belly soft, nondistended, mildly diffuse TTP. No gas or bowel movements  6/23 Patient is soft, mildly distended, and less tender compared to 6/22. No gas or bowel movements. Patient received dulcolax suppository x 1.   06/25 PAtient is soft, less distended, and tolerating a diet adequately. He feels comfortable and is having multiple BMs and gas a day. He had wound cultures positive for MRSA. Discussed with ID about starting on PO bactrim and dc    Diet was restarted and advanced as tolerated. Pain control was transitioned from IV to PO pain meds. At this time, patient is currently ambulating, voiding, tolerating a regular diet. Pain well controlled on PO pain meds. Patient has been deemed stable for discharge home with follow up as an outpatient. 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 Had CT ab pelvis with PO and IV contrast 6/20 as part of routine surveillance, no SBO or abdominal fluid collection then. Then developed severe diffuse ab pain at 9pm with nausea and retching. Last passed flatus and had BM at 11pm. Also c/o scant purulent drainage from just below supraumbilical incision x few days. Denies fevers, chest pain, sob.    In the ED, patient afebrile. Labs showed WBC 21. Lactate 3.5->3 after 1L NS. CT showed a new 1.3cmx21.3cmx17.6cm rim-enhancing fluid collection deep to the anterior peritoneum concerning for abscess, as well as a rim-enhancing left sided fluid collection.    Patient was admitted to surgical oncology service under the care of Dr. Mason    6/21 IR was consulted for drainage of left intraabdominal collection. 120 CC of straw colored fluid was aspirated.  6/22 Serial abdominal exam revealed belly soft, nondistended, mildly diffuse TTP. No gas or bowel movements  6/23 Patient is soft, mildly distended, and less tender compared to 6/22. No gas or bowel movements. Patient received dulcolax suppository x 1.   06/25 PAtient is soft, less distended, and tolerating a diet adequately. He feels comfortable and is having multiple BMs and gas a day. He had wound cultures positive for MRSA.    Diet was restarted and advanced as tolerated. Pain control was transitioned from IV to PO pain meds. At this time, patient is currently ambulating, voiding, tolerating a regular diet. Pain well controlled on PO pain meds. Patient has been deemed stable for discharge home with follow up as an outpatient. 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 Had CT ab pelvis with PO and IV contrast 6/20 as part of routine surveillance, no SBO or abdominal fluid collection then. Then developed severe diffuse ab pain at 9pm with nausea and retching. Last passed flatus and had BM at 11pm. Also c/o scant purulent drainage from just below supraumbilical incision x few days. Denies fevers, chest pain, sob.    In the ED, patient afebrile. Labs showed WBC 21. Lactate 3.5->3 after 1L NS. CT showed a new 1.3cmx21.3cmx17.6cm rim-enhancing fluid collection deep to the anterior peritoneum concerning for abscess, as well as a rim-enhancing left sided fluid collection.    Patient was admitted to surgical oncology service under the care of Dr. Mason    6/21 IR was consulted for drainage of left intraabdominal collection. 120 CC of straw colored fluid was aspirated.  6/22 Serial abdominal exam revealed belly soft, nondistended, mildly diffuse TTP. No gas or bowel movements  6/23 Patient is soft, mildly distended, and less tender compared to 6/22. No gas or bowel movements. Patient received dulcolax suppository x 1.   06/25 PAtient is soft, less distended, and tolerating a diet adequately. He feels comfortable and is having multiple BMs and gas a day. He had wound cultures positive for MRSA.  Patient was treated with IV Vancomycin. ID seen prior to discharge, recommended dc w/ Doxycyline.   Diet was restarted and advanced as tolerated. Pain control was transitioned from IV to PO pain meds. At this time, patient is currently ambulating, voiding, tolerating a regular diet. Pain well controlled on PO pain meds. Patient has been deemed stable for discharge home with follow up as an outpatient. 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 Had CT ab pelvis with PO and IV contrast 6/20 as part of routine surveillance, no SBO or abdominal fluid collection then. Then developed severe diffuse ab pain at 9pm with nausea and retching. Last passed flatus and had BM at 11pm. Also c/o scant purulent drainage from just below supraumbilical incision x few days. Denies fevers, chest pain, sob.    In the ED, patient afebrile. Labs showed WBC 21. Lactate 3.5->3 after 1L NS. CT showed a new 1.3cmx21.3cmx17.6cm rim-enhancing fluid collection deep to the anterior peritoneum concerning for abscess, as well as a rim-enhancing left sided fluid collection.    Patient was admitted to surgical oncology service under the care of Dr. Mason    6/21 IR was consulted for drainage of left intraabdominal collection. 120 CC of straw colored fluid was aspirated.  6/22 Serial abdominal exam revealed belly soft, nondistended, mildly diffuse TTP. No gas or bowel movements  6/23 Patient is soft, mildly distended, and less tender compared to 6/22. No gas or bowel movements. Patient received dulcolax suppository x 1.   06/25 PAtient is soft, less distended, and tolerating a diet adequately. He feels comfortable and is having multiple BMs and gas a day. He had wound cultures positive for MRSA.  Patient was treated with IV Vancomycin. ID seen prior to discharge, recommended dc w/ Doxycyline and follow up with Dr. Chau in 1 week.  Diet was restarted and advanced as tolerated. Pain control was transitioned from IV to PO pain meds. At this time, patient is currently ambulating, voiding, tolerating a regular diet. Pain well controlled on PO pain meds. Patient has been deemed stable for discharge home with follow up as an outpatient. 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 Had CT ab pelvis with PO and IV contrast 6/20 as part of routine surveillance, no SBO or abdominal fluid collection then. Then developed severe diffuse ab pain at 9pm with nausea and retching. Last passed flatus and had BM at 11pm. Also c/o scant purulent drainage from just below supraumbilical incision x few days. Denies fevers, chest pain, sob.    In the ED, patient afebrile. Labs showed WBC 21. Lactate 3.5->3 after 1L NS. CT showed interval development of ascites.     Patient was admitted to surgical oncology service under the care of Dr. Mason    6/21 IR was consulted for diagnostic paracentesis. 120 CC of straw colored fluid was aspirated.  6/22 Serial abdominal exam revealed belly soft, nondistended, mildly diffuse TTP. No gas or bowel movements  6/23 Patient is soft, mildly distended, and less tender compared to 6/22. No gas or bowel movements. Patient received dulcolax suppository x 1.   06/25 Patient is soft, less distended, and tolerating a diet adequately. He feels comfortable and is having multiple BMs and gas a day. He had wound cultures positive for MRSA.  Patient was treated with IV Vancomycin and Zosyn while cultures were pending. ID seen prior to discharge, recommended dc w/ Doxycyline and follow up with Dr. Chau in 1 week.  Diet was restarted and advanced as tolerated. Pain control was transitioned from IV to PO pain meds. At this time, patient is currently ambulating, voiding, tolerating a regular diet. Pain well controlled on PO pain meds. Patient has been deemed stable for discharge home with follow up as an outpatient.     ***Condition occurred in the postoperative period, unrelated to surgery. Potentially related to barium administration for routine surveillance CT scan.

## 2022-06-23 NOTE — DISCHARGE NOTE PROVIDER - NSDCFUSCHEDAPPT_GEN_ALL_CORE_FT
Payal Chau  [place] [facility]  INFDISEASE [address]  Scheduled Appointment: 07/07/2022    Florecita Mason  [place] [facility]  SURGONC [address] [facility]  Scheduled Appointment: 07/07/2022    Payal Chau  [place] [facility]  INFDISEASE [address]  Scheduled Appointment: 08/25/2022

## 2022-06-23 NOTE — PROGRESS NOTE ADULT - ASSESSMENT
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 No evidence of bowel obstruction. s/p IR drainage.    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 No evidence of bowel obstruction. s/p IR drainage.    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 No evidence of bowel obstruction. s/p IR drainage.    PLAN:  - Suppository  - Serial abdominal exams  - Monitor bowel function  - DVT PPx: LVX  - Diet: NPO w meds/sips, IVF  - Zosyn + vanc for staph coverage  - Vanc trough before 4th dose  - f/u lactate and AM labs      D Team Surgery   [id number]

## 2022-06-23 NOTE — DISCHARGE NOTE PROVIDER - CARE PROVIDERS DIRECT ADDRESSES
,DirectAddress_Unknown ,DirectAddress_Unknown,[email or web address]

## 2022-06-24 LAB
ANION GAP SERPL CALC-SCNC: 10 MMOL/L — SIGNIFICANT CHANGE UP (ref 7–14)
BUN SERPL-MCNC: 8 MG/DL — SIGNIFICANT CHANGE UP (ref 7–23)
CALCIUM SERPL-MCNC: 8.6 MG/DL — SIGNIFICANT CHANGE UP (ref 8.4–10.5)
CHLORIDE SERPL-SCNC: 103 MMOL/L — SIGNIFICANT CHANGE UP (ref 98–107)
CO2 SERPL-SCNC: 23 MMOL/L — SIGNIFICANT CHANGE UP (ref 22–31)
CREAT SERPL-MCNC: 0.87 MG/DL — SIGNIFICANT CHANGE UP (ref 0.5–1.3)
EGFR: 98 ML/MIN/1.73M2 — SIGNIFICANT CHANGE UP
GLUCOSE SERPL-MCNC: 140 MG/DL — HIGH (ref 70–99)
HCT VFR BLD CALC: 33.2 % — LOW (ref 39–50)
HGB BLD-MCNC: 10.7 G/DL — LOW (ref 13–17)
MAGNESIUM SERPL-MCNC: 2 MG/DL — SIGNIFICANT CHANGE UP (ref 1.6–2.6)
MCHC RBC-ENTMCNC: 27.6 PG — SIGNIFICANT CHANGE UP (ref 27–34)
MCHC RBC-ENTMCNC: 32.2 GM/DL — SIGNIFICANT CHANGE UP (ref 32–36)
MCV RBC AUTO: 85.8 FL — SIGNIFICANT CHANGE UP (ref 80–100)
NRBC # BLD: 0 /100 WBCS — SIGNIFICANT CHANGE UP
NRBC # FLD: 0 K/UL — SIGNIFICANT CHANGE UP
PHOSPHATE SERPL-MCNC: 2.1 MG/DL — LOW (ref 2.5–4.5)
PLATELET # BLD AUTO: 391 K/UL — SIGNIFICANT CHANGE UP (ref 150–400)
POTASSIUM SERPL-MCNC: 3.7 MMOL/L — SIGNIFICANT CHANGE UP (ref 3.5–5.3)
POTASSIUM SERPL-SCNC: 3.7 MMOL/L — SIGNIFICANT CHANGE UP (ref 3.5–5.3)
RBC # BLD: 3.87 M/UL — LOW (ref 4.2–5.8)
RBC # FLD: 16.3 % — HIGH (ref 10.3–14.5)
SODIUM SERPL-SCNC: 136 MMOL/L — SIGNIFICANT CHANGE UP (ref 135–145)
VANCOMYCIN TROUGH SERPL-MCNC: 4.1 UG/ML — LOW (ref 10–20)
WBC # BLD: 18.61 K/UL — HIGH (ref 3.8–10.5)
WBC # FLD AUTO: 18.61 K/UL — HIGH (ref 3.8–10.5)

## 2022-06-24 PROCEDURE — 99222 1ST HOSP IP/OBS MODERATE 55: CPT

## 2022-06-24 PROCEDURE — 74177 CT ABD & PELVIS W/CONTRAST: CPT | Mod: 26

## 2022-06-24 RX ORDER — ACETAMINOPHEN 500 MG
1000 TABLET ORAL EVERY 6 HOURS
Refills: 0 | Status: COMPLETED | OUTPATIENT
Start: 2022-06-24 | End: 2022-06-25

## 2022-06-24 RX ORDER — POTASSIUM PHOSPHATE, MONOBASIC POTASSIUM PHOSPHATE, DIBASIC 236; 224 MG/ML; MG/ML
15 INJECTION, SOLUTION INTRAVENOUS ONCE
Refills: 0 | Status: COMPLETED | OUTPATIENT
Start: 2022-06-24 | End: 2022-06-24

## 2022-06-24 RX ORDER — VANCOMYCIN HCL 1 G
1000 VIAL (EA) INTRAVENOUS EVERY 12 HOURS
Refills: 0 | Status: DISCONTINUED | OUTPATIENT
Start: 2022-06-24 | End: 2022-06-27

## 2022-06-24 RX ADMIN — Medication 1 GRAM(S): at 05:34

## 2022-06-24 RX ADMIN — PIPERACILLIN AND TAZOBACTAM 25 GRAM(S): 4; .5 INJECTION, POWDER, LYOPHILIZED, FOR SOLUTION INTRAVENOUS at 08:41

## 2022-06-24 RX ADMIN — ENOXAPARIN SODIUM 40 MILLIGRAM(S): 100 INJECTION SUBCUTANEOUS at 05:34

## 2022-06-24 RX ADMIN — PIPERACILLIN AND TAZOBACTAM 25 GRAM(S): 4; .5 INJECTION, POWDER, LYOPHILIZED, FOR SOLUTION INTRAVENOUS at 00:55

## 2022-06-24 RX ADMIN — SODIUM CHLORIDE 84 MILLILITER(S): 9 INJECTION, SOLUTION INTRAVENOUS at 08:42

## 2022-06-24 RX ADMIN — Medication 250 MILLIGRAM(S): at 18:04

## 2022-06-24 RX ADMIN — Medication 400 MILLIGRAM(S): at 05:33

## 2022-06-24 RX ADMIN — SIMVASTATIN 10 MILLIGRAM(S): 20 TABLET, FILM COATED ORAL at 21:27

## 2022-06-24 RX ADMIN — Medication 400 MILLIGRAM(S): at 21:22

## 2022-06-24 RX ADMIN — POTASSIUM PHOSPHATE, MONOBASIC POTASSIUM PHOSPHATE, DIBASIC 62.5 MILLIMOLE(S): 236; 224 INJECTION, SOLUTION INTRAVENOUS at 11:16

## 2022-06-24 RX ADMIN — PIPERACILLIN AND TAZOBACTAM 25 GRAM(S): 4; .5 INJECTION, POWDER, LYOPHILIZED, FOR SOLUTION INTRAVENOUS at 18:00

## 2022-06-24 RX ADMIN — TAMSULOSIN HYDROCHLORIDE 0.4 MILLIGRAM(S): 0.4 CAPSULE ORAL at 21:26

## 2022-06-24 RX ADMIN — FAMOTIDINE 20 MILLIGRAM(S): 10 INJECTION INTRAVENOUS at 11:16

## 2022-06-24 RX ADMIN — PANTOPRAZOLE SODIUM 40 MILLIGRAM(S): 20 TABLET, DELAYED RELEASE ORAL at 05:34

## 2022-06-24 RX ADMIN — SODIUM CHLORIDE 42 MILLILITER(S): 9 INJECTION, SOLUTION INTRAVENOUS at 18:00

## 2022-06-24 RX ADMIN — Medication 1000 MILLIGRAM(S): at 12:22

## 2022-06-24 RX ADMIN — Medication 400 MILLIGRAM(S): at 11:26

## 2022-06-24 RX ADMIN — ESCITALOPRAM OXALATE 5 MILLIGRAM(S): 10 TABLET, FILM COATED ORAL at 12:56

## 2022-06-24 RX ADMIN — Medication 400 MILLIGRAM(S): at 00:56

## 2022-06-24 NOTE — CONSULT NOTE ADULT - ATTENDING COMMENTS
60 yo man with metastatic appendiceal cancer with pseudomyxoma peritonei s/p extensive resection including splenectomy on chemo  presented 6/20 with abdominal pain found to have intraabdominal abscess 21.3 x 17.6 x 1.3 cm which was aspirated 6/21 growing MRSA  Has additional mid abd incision drainage- culture testing.    Receiving vanco and zosyn    Would continue vanco and zosyn  follow incision culture  follow repeat CT schedule today

## 2022-06-24 NOTE — PROGRESS NOTE ADULT - SUBJECTIVE AND OBJECTIVE BOX
SURGERY DAILY PROGRESS NOTE:     INTERVAL: Additional purulence from umbilicus. Sent for culture.    OVERNIGHT: Abd exams x2 remained soft with improving distension and tenderness. Pt reports improved pain with BM x3.    SUBJECTIVE/ROS: No acute events overnight. Patient seen and examined bedside on AM rounds.     OBJECTIVE:  Vital Signs Last 24 Hrs  T(C): 37.6 (23 Jun 2022 21:20), Max: 38.7 (23 Jun 2022 16:55)  T(F): 99.6 (23 Jun 2022 21:20), Max: 101.7 (23 Jun 2022 16:55)  HR: 85 (23 Jun 2022 21:20) (80 - 105)  BP: 143/85 (23 Jun 2022 21:20) (124/70 - 145/87)  BP(mean): --  RR: 19 (23 Jun 2022 21:20) (16 - 19)  SpO2: 98% (23 Jun 2022 21:20) (98% - 100%)    PHYSICAL EXAM:  Gen: No acute distress, but appears frustrated  Abd: Soft, diffuse TTP, mildly distended, no rebound or guarding, previous port sites with scant amount of drainage at umbilicus, no fluctuance/erythema  Ext: Warm and well-perfused                          11.9   24.67 )-----------( 462      ( 23 Jun 2022 06:00 )             37.0     06-23    136  |  100  |  11  ----------------------------<  114<H>  4.5   |  27  |  1.05    Ca    8.8      23 Jun 2022 06:00  Phos  2.6     06-23  Mg     1.90     06-23       I&O's Detail    22 Jun 2022 07:01  -  23 Jun 2022 07:00  --------------------------------------------------------  IN:    IV PiggyBack: 450 mL    Lactated Ringers: 1375 mL  Total IN: 1825 mL    OUT:    Indwelling Catheter - Urethral (mL): 1500 mL  Total OUT: 1500 mL    Total NET: 325 mL      23 Jun 2022 07:01  -  24 Jun 2022 01:27  --------------------------------------------------------  IN:    dextrose 5% + sodium chloride 0.45% w/ Additives: 400 mL    dextrose 5% + sodium chloride 0.9%: 1300 mL    IV PiggyBack: 900 mL    Oral Fluid: 70 mL  Total IN: 2670 mL    OUT:    Indwelling Catheter - Urethral (mL): 1775 mL  Total OUT: 1775 mL    Total NET: 895 mL          IMAGING:               SURGERY DAILY PROGRESS NOTE:     INTERVAL: Additional purulence from umbilicus. Sent for culture.    OVERNIGHT: Abd exams x2 remained soft with improving distension and tenderness. Pt reports improved pain with BM x3.    SUBJECTIVE/ROS: No acute events overnight. Patient seen and examined bedside on AM rounds.     OBJECTIVE:  Vital Signs Last 24 Hrs  T(C): 37.6 (23 Jun 2022 21:20), Max: 38.7 (23 Jun 2022 16:55)  T(F): 99.6 (23 Jun 2022 21:20), Max: 101.7 (23 Jun 2022 16:55)  HR: 85 (23 Jun 2022 21:20) (80 - 105)  BP: 143/85 (23 Jun 2022 21:20) (124/70 - 145/87)  BP(mean): --  RR: 19 (23 Jun 2022 21:20) (16 - 19)  SpO2: 98% (23 Jun 2022 21:20) (98% - 100%)    PHYSICAL EXAM:  Gen: No acute distress, but appears frustrated  Abd: Soft, diffuse TTP, mildly distended, no rebound or guarding, previous port sites with scant amount of drainage at umbilicus, no fluctuance/erythema  Ext: Warm and well-perfused  : landin                          11.9   24.67 )-----------( 462      ( 23 Jun 2022 06:00 )             37.0     06-23    136  |  100  |  11  ----------------------------<  114<H>  4.5   |  27  |  1.05    Ca    8.8      23 Jun 2022 06:00  Phos  2.6     06-23  Mg     1.90     06-23       I&O's Detail    22 Jun 2022 07:01  -  23 Jun 2022 07:00  --------------------------------------------------------  IN:    IV PiggyBack: 450 mL    Lactated Ringers: 1375 mL  Total IN: 1825 mL    OUT:    Indwelling Catheter - Urethral (mL): 1500 mL  Total OUT: 1500 mL    Total NET: 325 mL      23 Jun 2022 07:01  -  24 Jun 2022 01:27  --------------------------------------------------------  IN:    dextrose 5% + sodium chloride 0.45% w/ Additives: 400 mL    dextrose 5% + sodium chloride 0.9%: 1300 mL    IV PiggyBack: 900 mL    Oral Fluid: 70 mL  Total IN: 2670 mL    OUT:    Indwelling Catheter - Urethral (mL): 1775 mL  Total OUT: 1775 mL    Total NET: 895 mL          IMAGING:

## 2022-06-24 NOTE — PROGRESS NOTE ADULT - ASSESSMENT
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 No evidence of bowel obstruction. s/p IR drainage.    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 No evidence of bowel obstruction. s/p IR drainage.    PLAN:  - ct ab/pelv with iv contrast  - Suppository  - Serial abdominal exams  - Monitor bowel function  - DVT PPx: LVX  - Diet: NPO w meds/sips, IVF  - Zosyn + vanc for staph coverage  - Vanc trough before 4th dose  - f/u lactate and AM labs    D Team Surgery   [id number]

## 2022-06-24 NOTE — CONSULT NOTE ADULT - SUBJECTIVE AND OBJECTIVE BOX
Patient is a 61y old  Male who presents with a chief complaint of Abdominal pain and intraabdominal fluid (2022 01:26)    HPI: Mr. Arias is a 61 year old gentleman with metastatic appendiceal carcinoma with pseudomyxoma peritonei s/p cytoreductive surgery  including partial gastrectomy, cholecystectomy, subtotal colectomy, diaphragm stripping, omentectomy, splenectomy s/p FOLFOX x 6 months and HIPEC and now s/p PIPAC x 8 most recently 6/3. Had CT ab pelvis with PO and IV contrast  as part of routine surveillance, no SBO or abdominal fluid collection then. Then developed severe diffuse ab pain at 9pm with nausea and retching. Last passed flatus and had BM at 11pm. Also c/o scant purulent drainage from just below supraumbilical incision x few days. Denies fevers, chest pain, sob. He presented early morning on  to ED where he was initially afebrile, WBC 21. Lactate 3.5->3 after 1L NS. CT showed a new 1.3cmx21.3cmx17.6cm rim-enhancing fluid collection deep to the anterior peritoneum concerning for abscess s/p IR guided drainage of 120cc straw-colored fluid aspirated from left abdominal collection, sent for cytology and culture. No drainage catheter left in place due to nature of fluid. Pt started on vanc and zosyn. Abscess cx now growing MRSA and ID consulted for the same.       REVIEW OF SYSTEMS  [  ] ROS unobtainable because:    [ x ] All other systems negative except as noted below    Constitutional:  [ ] fever [ ] chills  [ ] weight loss  [ ]night sweat  [ ]poor appetite/PO intake [ ]fatigue   Skin:  [ ] rash [ ] phlebitis	  Eyes: [ ] icterus [ ] pain  [ ] discharge	  ENMT: [ ] sore throat  [ ] thrush [ ] ulcers [ ] exudates [ ]anosmia  Respiratory: [ ] dyspnea [ ] hemoptysis [ ] cough [ ] sputum	  Cardiovascular:  [ ] chest pain [ ] palpitations [ ] edema	  Gastrointestinal:  [ ] nausea [ ] vomiting [ ] diarrhea [ ] constipation [ ] pain	  Genitourinary:  [ ] dysuria [ ] frequency [ ] hematuria [ ] discharge [ ] flank pain  [ ] incontinence  Musculoskeletal:  [ ] myalgias [ ] arthralgias [ ] arthritis  [ ] back pain  Neurological:  [ ] headache [ ] weakness [ ] seizures  [ ] confusion/altered mental status    prior hospital charts reviewed [V]  primary team notes reviewed [V]  other consultant notes reviewed [V]    PAST MEDICAL & SURGICAL HISTORY:  HLD (hyperlipidemia)  BPH (benign prostatic hyperplasia)  Neuropathic pain hands and feet  UTI (urinary tract infection) pt denes recent infection  Appendix carcinoma PIPAC x 7, last done 2022  Anxiety  Cancer of appendix  Malignant neoplasm of appendix  GERD (gastroesophageal reflux disease) pt denes recent endoscopy  History of undescended testicle age 7  Abdominal mass surgery 2020  remove mass, spleen, partial colectomy, lymph nodes, part small intestines, omentum, apendix, gall bladder, part stomach. HIPEC  History of abdominal paracentesis x3  H/O colectomy Splenectomy , Cholecystectomy   2020. Insertion of Mediport  Malignant neoplasm of appendix chemo 10/2021; s/p Pipac x 7 last 2022    SOCIAL HISTORY:  - Denied smoking/vaping/alcohol/recreational drug use    FAMILY HISTORY:  FH: diabetes mellitus (Father)    FH: HTN (hypertension) (Mother)        Allergies  barium sulfate (Unknown)        ANTIMICROBIALS:  piperacillin/tazobactam IVPB.. 3.375 every 8 hours  vancomycin  IVPB 1000 every 12 hours      ANTIMICROBIALS (past 90 days):  MEDICATIONS  (STANDING):    piperacillin/tazobactam IVPB.   200 mL/Hr IV Intermittent (22 @ 10:02)    piperacillin/tazobactam IVPB..   25 mL/Hr IV Intermittent (22 @ 08:41)   25 mL/Hr IV Intermittent (22 @ 00:55)   25 mL/Hr IV Intermittent (22 @ 17:08)   25 mL/Hr IV Intermittent (22 @ 08:47)   25 mL/Hr IV Intermittent (22 @ 00:12)   25 mL/Hr IV Intermittent (22 @ 18:13)   25 mL/Hr IV Intermittent (22 @ 08:28)   25 mL/Hr IV Intermittent (22 @ 01:03)   25 mL/Hr IV Intermittent (22 @ 17:08)    vancomycin  IVPB   250 mL/Hr IV Intermittent (22 @ 16:37)    vancomycin  IVPB   250 mL/Hr IV Intermittent (22 @ 17:07)   250 mL/Hr IV Intermittent (22 @ 06:47)        OTHER MEDS:   MEDICATIONS  (STANDING):  acetaminophen   IVPB .. 1000 every 6 hours  bisacodyl Suppository 10 daily  enoxaparin Injectable 40 every 24 hours  escitalopram 5 daily  famotidine Injectable 20 daily  influenza   Vaccine 0.5 once  ondansetron Injectable 4 every 6 hours  pantoprazole  Injectable 40 every 24 hours  simvastatin 10 at bedtime  sucralfate suspension 1 four times a day  tamsulosin 0.4 at bedtime      VITALS:  Vital Signs Last 24 Hrs  T(F): 98.7 (22 @ 05:44), Max: 101.7 (22 @ 16:55)    Vital Signs Last 24 Hrs  HR: 80 (22 @ 05:44) (80 - 105)  BP: 145/84 (22 @ 05:44) (142/83 - 145/87)  RR: 18 (22 @ 05:44)  SpO2: 98% (22 @ 05:44) (98% - 100%)  Wt(kg): --    EXAM:    GA: NAD, AOx3  HEENT: oral cavity no lesion  CV: nl S1/S2, no RMG  Lungs: CTAB, No distress  Abd: BS+, soft, nontender, no rebounding pain  Ext: no edema  Neuro: No focal deficits  Skin: Intact  IV: no phlebitis    Labs:                        10.7   18.61 )-----------( 391      ( 2022 05:23 )             33.2         136  |  103  |  8   ----------------------------<  140<H>  3.7   |  23  |  0.87    Ca    8.6      2022 05:23  Phos  2.1       Mg     2.00             WBC Trend:  WBC Count: 18.61 (22 @ 05:23)  WBC Count: 24.67 (22 @ 06:00)  WBC Count: 28.72 (22 @ 05:41)  WBC Count: 25.13 (22 @ 15:15)      Auto Neutrophil #: 17.37 K/uL (22 @ 03:47)  Auto Neutrophil #: 3.73 K/uL (22 @ 08:48)  Auto Neutrophil #: 4.40 K/uL (22 @ 17:00)  Auto Neutrophil #: 6.71 K/uL (22 @ 09:03)  Auto Neutrophil #: 6.42 K/uL (22 @ 16:18)      Creatine Trend:  Creatinine, Serum: 0.87 ()  Creatinine, Serum: 1.05 ()  Creatinine, Serum: 0.94 ()  Creatinine, Serum: 0.97 ()      Liver Biochemical Testing Trend:  Alanine Aminotransferase (ALT/SGPT): 28 ()  Alanine Aminotransferase (ALT/SGPT): 19 ()  Alanine Aminotransferase (ALT/SGPT): 24 ()  Alanine Aminotransferase (ALT/SGPT): 16 ()  Alanine Aminotransferase (ALT/SGPT): 19 ()  Aspartate Aminotransferase (AST/SGOT): 21 (22 @ 03:47)  Aspartate Aminotransferase (AST/SGOT): 22 (22 @ 09:19)  Aspartate Aminotransferase (AST/SGOT): 26 (22 @ 20:01)  Aspartate Aminotransferase (AST/SGOT): 21 (22 @ 09:03)  Aspartate Aminotransferase (AST/SGOT): 20 (22 @ 15:36)  Bilirubin Total, Serum: 0.4 ()  Bilirubin Total, Serum: 0.6 ()  Bilirubin Total, Serum: 0.4 ()  Bilirubin Total, Serum: 0.5 ()  Bilirubin Total, Serum: 0.4 ()      Trend LDH          Urinalysis Basic - ( 2022 17:30 )    Color: Yellow / Appearance: Clear / S.024 / pH: x  Gluc: x / Ketone: Negative  / Bili: Negative / Urobili: <2 mg/dL   Blood: x / Protein: 100 mg/dL / Nitrite: Negative   Leuk Esterase: Negative / RBC: 17 /HPF / WBC 2 /HPF   Sq Epi: x / Non Sq Epi: 1 /HPF / Bacteria: Negative        MICROBIOLOGY:  Vancomycin Level, Trough: 4.1 (06-24 @ 05:23)        Culture - Fungal, Body Fluid (collected 2022 13:36)  Source: .Body Fluid abdominal fluid drainage  Preliminary Report:    Testing in progress    Culture - Body Fluid with Gram Stain (collected 2022 13:36)  Source: .Body Fluid abdominal fluid drainage  Preliminary Report:    Rare Staphylococcus aureus  Organism: Methicillin resistant Staphylococcus aureus  Organism: Methicillin resistant Staphylococcus aureus    Sensitivities:      -  Ampicillin/Sulbactam: R >16/8      -  Cefazolin: R 8      -  Clindamycin: S <=0.25      -  Daptomycin: S 1      -  Erythromycin: R >4      -  Gentamicin: S <=1 Should not be used as monotherapy      -  Linezolid: S 4      -  Oxacillin: R >2      -  Penicillin: R >8      -  Rifampin: S <=1 Should not be used as monotherapy      -  Tetra/Doxy: S <=1      -  Trimethoprim/Sulfamethoxazole: S <=0.5/9.5      -  Vancomycin: S 2      Method Type: MEGHAN    Culture - Blood (collected 2022 07:16)  Source: .Blood Blood  Preliminary Report:    No growth to date.    Culture - Blood (collected 2022 06:00)  Source: .Blood Blood  Preliminary Report:    No growth to date.    Culture - Urine (collected 17 Mar 2022 10:49)  Source: Clean Catch Clean Catch (Midstream)  Final Report:    50,000 - 99,000 CFU/mL Enterococcus faecalis  Organism: Enterococcus faecalis  Organism: Enterococcus faecalis    Sensitivities:      -  Ampicillin: S <=2 Predicts results to ampicillin/sulbactam, amoxacillin-clavulanate and  piperacillin-tazobactam.      -  Ciprofloxacin: S <=1      -  Levofloxacin: S <=1      -  Nitrofurantoin: S <=32 Should not be used to treat pyelonephritis.      -  Tetra/Doxy: R >8      -  Vancomycin: S 2      Method Type: MEGHAN    Culture - Fungal, Body Fluid (collected 07 May 2021 09:15)  Source: .Body Fluid Peritoneal Fluid  Final Report:    No fungus isolated after 4 weeks.    Culture - Body Fluid with Gram Stain (collected 07 May 2021 09:15)  Source: .Body Fluid Peritoneal Fluid  Final Report:    No growth at 5 days    Culture - Urine (collected 2021 18:15)  Source: .Urine Clean Catch (Midstream)  Final Report:    50,000 - 99,000 CFU/mL Enterococcus faecalis  Organism: Enterococcus faecalis  Organism: Enterococcus faecalis    Sensitivities:      -  Ampicillin: S <=2 Predicts results to ampicillin/sulbactam, amoxacillin-clavulanate and  piperacillin-tazobactam.      -  Ciprofloxacin: S <=1      -  Levofloxacin: S <=1      -  Nitrofurantoin: S <=32 Should not be used to treat pyelonephritis.      -  Tetra/Doxy: R >8      -  Vancomycin: S 2      Method Type: MEGHAN      Rapid RVP Result: NotDetec ( @ 03:35)    Lactate, Blood: 3.0 ( @ 05:41)  Lactate, Blood: 2.9 ( @ 15:15)      Sedimentation Rate, Erythrocyte: 12 mm/hr (22 @ 20:13)    RADIOLOGY:  imaging below personally reviewed    < from: CT Abdomen and Pelvis w/ IV Cont (22 @ 05:54) >  1. No bowel obstruction or inflammation. New  1.3cmx21.3cmx17.6cm  rim-enhancing fluid collection deep to the anterior peritoneum concerning for an abscess. Increased small volume abdominal ascites.  < end of copied text >   Patient is a 61y old  Male who presents with a chief complaint of Abdominal pain and intraabdominal fluid (2022 01:26)    HPI: Mr. Arias is a 61 year old gentleman with metastatic appendiceal carcinoma with pseudomyxoma peritonei s/p cytoreductive surgery  including partial gastrectomy, cholecystectomy, subtotal colectomy, diaphragm stripping, omentectomy, splenectomy s/p FOLFOX x 6 months and HIPEC and now s/p PIPAC x 8 most recently 6/3. Had CT ab pelvis with PO and IV contrast  as part of routine surveillance, no SBO or abdominal fluid collection then. Then developed severe diffuse ab pain at 9pm with nausea and retching. Last passed flatus and had BM at 11pm. Also c/o scant purulent drainage from just below supraumbilical incision x few days. Denies fevers, chest pain, sob. He presented early morning on  to ED where he was initially afebrile, WBC 21. Lactate 3.5->3 after 1L NS. CT showed a new 1.3cmx21.3cmx17.6cm rim-enhancing fluid collection deep to the anterior peritoneum concerning for abscess s/p IR guided drainage of 120cc straw-colored fluid aspirated from left abdominal collection, sent for cytology and culture. No drainage catheter left in place due to nature of fluid. Pt started on vanc and zosyn. Abscess cx now growing MRSA and ID consulted for the same. Pt continued to have abdominal incision site drainage which was manually expressed out, sent for culture and packed yesterday. Pt reprots feeling better since that pus was expressed yesterday and he had 3 bowel movements yesterday.        REVIEW OF SYSTEMS  [  ] ROS unobtainable because:    [ x ] All other systems negative except as noted below    Constitutional:  [ ] fever [ ] chills  [ ] weight loss  [ ]night sweat  [ ]poor appetite/PO intake [ ]fatigue   Skin:  [ ] rash [ ] phlebitis	  Eyes: [ ] icterus [ ] pain  [ ] discharge	  ENMT: [ ] sore throat  [ ] thrush [ ] ulcers [ ] exudates [ ]anosmia  Respiratory: [ ] dyspnea [ ] hemoptysis [ ] cough [ ] sputum	  Cardiovascular:  [ ] chest pain [ ] palpitations [ ] edema	  Gastrointestinal:  [ ] nausea [ ] vomiting [ ] diarrhea [ ] constipation [ ] pain	  Genitourinary:  [ ] dysuria [ ] frequency [ ] hematuria [ ] discharge [ ] flank pain  [ ] incontinence  Musculoskeletal:  [ ] myalgias [ ] arthralgias [ ] arthritis  [ ] back pain  Neurological:  [ ] headache [ ] weakness [ ] seizures  [ ] confusion/altered mental status    prior hospital charts reviewed [V]  primary team notes reviewed [V]  other consultant notes reviewed [V]    PAST MEDICAL & SURGICAL HISTORY:  HLD (hyperlipidemia)  BPH (benign prostatic hyperplasia)  Neuropathic pain hands and feet  UTI (urinary tract infection) pt denes recent infection  Appendix carcinoma PIPAC x 7, last done 2022  Anxiety  Cancer of appendix  Malignant neoplasm of appendix  GERD (gastroesophageal reflux disease) pt denes recent endoscopy  History of undescended testicle age 7  Abdominal mass surgery 2020  remove mass, spleen, partial colectomy, lymph nodes, part small intestines, omentum, apendix, gall bladder, part stomach. HIPEC  History of abdominal paracentesis x3  H/O colectomy Splenectomy , Cholecystectomy   2020. Insertion of Mediport  Malignant neoplasm of appendix chemo 10/2021; s/p Pipac x 7 last 2022    SOCIAL HISTORY:  - Denied smoking/vaping/alcohol/recreational drug use    FAMILY HISTORY:  FH: diabetes mellitus (Father)    FH: HTN (hypertension) (Mother)        Allergies  barium sulfate (Unknown)        ANTIMICROBIALS:  piperacillin/tazobactam IVPB.. 3.375 every 8 hours  vancomycin  IVPB 1000 every 12 hours      ANTIMICROBIALS (past 90 days):  MEDICATIONS  (STANDING):    piperacillin/tazobactam IVPB.   200 mL/Hr IV Intermittent (22 @ 10:02)    piperacillin/tazobactam IVPB..   25 mL/Hr IV Intermittent (22 @ 08:41)   25 mL/Hr IV Intermittent (22 @ 00:55)   25 mL/Hr IV Intermittent (22 @ 17:08)   25 mL/Hr IV Intermittent (22 @ 08:47)   25 mL/Hr IV Intermittent (22 @ 00:12)   25 mL/Hr IV Intermittent (22 @ 18:13)   25 mL/Hr IV Intermittent (22 @ 08:28)   25 mL/Hr IV Intermittent (22 @ 01:03)   25 mL/Hr IV Intermittent (22 @ 17:08)    vancomycin  IVPB   250 mL/Hr IV Intermittent (22 @ 16:37)    vancomycin  IVPB   250 mL/Hr IV Intermittent (22 @ 17:07)   250 mL/Hr IV Intermittent (22 @ 06:47)        OTHER MEDS:   MEDICATIONS  (STANDING):  acetaminophen   IVPB .. 1000 every 6 hours  bisacodyl Suppository 10 daily  enoxaparin Injectable 40 every 24 hours  escitalopram 5 daily  famotidine Injectable 20 daily  influenza   Vaccine 0.5 once  ondansetron Injectable 4 every 6 hours  pantoprazole  Injectable 40 every 24 hours  simvastatin 10 at bedtime  sucralfate suspension 1 four times a day  tamsulosin 0.4 at bedtime      VITALS:  Vital Signs Last 24 Hrs  T(F): 98.7 (22 @ 05:44), Max: 101.7 (22 @ 16:55)    Vital Signs Last 24 Hrs  HR: 80 (22 @ 05:44) (80 - 105)  BP: 145/84 (22 @ 05:44) (142/83 - 145/87)  RR: 18 (22 @ 05:44)  SpO2: 98% (22 @ 05:44) (98% - 100%)  Wt(kg): --    EXAM:    Constitutional: Not in acute distress  Eyes: pupils bilaterally reactive to light. No icterus.  Oral cavity: Clear, no lesions  Neck: No neck vein distension noted  RS: Chest clear to auscultation bilaterally. No wheeze/rhonchi/crepitations. Rt chest wall mediport  CVS: S1, S2 heard. Regular rate and rhythm. No murmurs/rubs/gallops.  Abdomen: Soft. No guarding/rigidity/tenderness. Midline insion site opening with packing. NO surroundign eyrthema  : No acute abnormalities  Extremities: Warm. No pedal edema  Skin: No lesions noted  Vascular: No evidence of phlebitis  Neuro: Alert, oriented to time/place/person  Cranial nerves 2-12 grossly normal. No focal abnormalities      Labs:                        10.7   18.61 )-----------( 391      ( 2022 05:23 )             33.2     06-24    136  |  103  |  8   ----------------------------<  140<H>  3.7   |  23  |  0.87    Ca    8.6      2022 05:23  Phos  2.1       Mg     2.00             WBC Trend:  WBC Count: 18.61 (22 @ 05:23)  WBC Count: 24.67 (22 @ 06:00)  WBC Count: 28.72 (22 @ 05:41)  WBC Count: 25.13 (22 @ 15:15)      Auto Neutrophil #: 17.37 K/uL (22 @ 03:47)  Auto Neutrophil #: 3.73 K/uL (22 @ 08:48)  Auto Neutrophil #: 4.40 K/uL (22 @ 17:00)  Auto Neutrophil #: 6.71 K/uL (22 @ 09:03)  Auto Neutrophil #: 6.42 K/uL (22 @ 16:18)      Creatine Trend:  Creatinine, Serum: 0.87 ()  Creatinine, Serum: 1.05 ()  Creatinine, Serum: 0.94 ()  Creatinine, Serum: 0.97 ()      Liver Biochemical Testing Trend:  Alanine Aminotransferase (ALT/SGPT): 28 ()  Alanine Aminotransferase (ALT/SGPT): 19 ()  Alanine Aminotransferase (ALT/SGPT): 24 ()  Alanine Aminotransferase (ALT/SGPT): 16 ()  Alanine Aminotransferase (ALT/SGPT): 19 ()  Aspartate Aminotransferase (AST/SGOT): 21 (22 @ 03:47)  Aspartate Aminotransferase (AST/SGOT): 22 (22 @ 09:19)  Aspartate Aminotransferase (AST/SGOT): 26 (22 @ 20:01)  Aspartate Aminotransferase (AST/SGOT): 21 (22 @ 09:03)  Aspartate Aminotransferase (AST/SGOT): 20 (22 @ 15:36)  Bilirubin Total, Serum: 0.4 ()  Bilirubin Total, Serum: 0.6 (06-02)  Bilirubin Total, Serum: 0.4 (04-06)  Bilirubin Total, Serum: 0.5 (03-17)  Bilirubin Total, Serum: 0.4 (02-01)      Trend LDH          Urinalysis Basic - ( 2022 17:30 )    Color: Yellow / Appearance: Clear / S.024 / pH: x  Gluc: x / Ketone: Negative  / Bili: Negative / Urobili: <2 mg/dL   Blood: x / Protein: 100 mg/dL / Nitrite: Negative   Leuk Esterase: Negative / RBC: 17 /HPF / WBC 2 /HPF   Sq Epi: x / Non Sq Epi: 1 /HPF / Bacteria: Negative        MICROBIOLOGY:  Vancomycin Level, Trough: 4.1 (06-24 @ 05:23)        Culture - Fungal, Body Fluid (collected 2022 13:36)  Source: .Body Fluid abdominal fluid drainage  Preliminary Report:    Testing in progress    Culture - Body Fluid with Gram Stain (collected 2022 13:36)  Source: .Body Fluid abdominal fluid drainage  Preliminary Report:    Rare Staphylococcus aureus  Organism: Methicillin resistant Staphylococcus aureus  Organism: Methicillin resistant Staphylococcus aureus    Sensitivities:      -  Ampicillin/Sulbactam: R >16/8      -  Cefazolin: R 8      -  Clindamycin: S <=0.25      -  Daptomycin: S 1      -  Erythromycin: R >4      -  Gentamicin: S <=1 Should not be used as monotherapy      -  Linezolid: S 4      -  Oxacillin: R >2      -  Penicillin: R >8      -  Rifampin: S <=1 Should not be used as monotherapy      -  Tetra/Doxy: S <=1      -  Trimethoprim/Sulfamethoxazole: S <=0.5/9.5      -  Vancomycin: S 2      Method Type: MEGHAN    Culture - Blood (collected 2022 07:16)  Source: .Blood Blood  Preliminary Report:    No growth to date.    Culture - Blood (collected 2022 06:00)  Source: .Blood Blood  Preliminary Report:    No growth to date.    Culture - Urine (collected 17 Mar 2022 10:49)  Source: Clean Catch Clean Catch (Midstream)  Final Report:    50,000 - 99,000 CFU/mL Enterococcus faecalis  Organism: Enterococcus faecalis  Organism: Enterococcus faecalis    Sensitivities:      -  Ampicillin: S <=2 Predicts results to ampicillin/sulbactam, amoxacillin-clavulanate and  piperacillin-tazobactam.      -  Ciprofloxacin: S <=1      -  Levofloxacin: S <=1      -  Nitrofurantoin: S <=32 Should not be used to treat pyelonephritis.      -  Tetra/Doxy: R >8      -  Vancomycin: S 2      Method Type: MEGHAN    Culture - Fungal, Body Fluid (collected 07 May 2021 09:15)  Source: .Body Fluid Peritoneal Fluid  Final Report:    No fungus isolated after 4 weeks.    Culture - Body Fluid with Gram Stain (collected 07 May 2021 09:15)  Source: .Body Fluid Peritoneal Fluid  Final Report:    No growth at 5 days    Culture - Urine (collected 2021 18:15)  Source: .Urine Clean Catch (Midstream)  Final Report:    50,000 - 99,000 CFU/mL Enterococcus faecalis  Organism: Enterococcus faecalis  Organism: Enterococcus faecalis    Sensitivities:      -  Ampicillin: S <=2 Predicts results to ampicillin/sulbactam, amoxacillin-clavulanate and  piperacillin-tazobactam.      -  Ciprofloxacin: S <=1      -  Levofloxacin: S <=1      -  Nitrofurantoin: S <=32 Should not be used to treat pyelonephritis.      -  Tetra/Doxy: R >8      -  Vancomycin: S 2      Method Type: MEGHAN      Rapid RVP Result: NotDetec ( @ 03:35)    Lactate, Blood: 3.0 ( @ 05:41)  Lactate, Blood: 2.9 ( @ 15:15)      Sedimentation Rate, Erythrocyte: 12 mm/hr (22 @ 20:13)    RADIOLOGY:  imaging below personally reviewed        < from: CT Abdomen and Pelvis w/ IV Cont (22 @ 05:54) >  1. No bowel obstruction or inflammation. New  1.3cmx21.3cmx17.6cm  rim-enhancing fluid collection deep to the anterior peritoneum concerning for an abscess. Increased small volume abdominal ascites.  < end of copied text >

## 2022-06-24 NOTE — CONSULT NOTE ADULT - ASSESSMENT
Mr. 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 Had CT ab pelvis with PO and IV contrast 6/20 as part of routine surveillance, no SBO or abdominal fluid collection then. Then developed severe diffuse ab pain at 9pm with nausea and retching. Last passed flatus and had BM at 11pm. Also c/o scant purulent drainage from just below supraumbilical incision x few days. Denies fevers, chest pain, sob. He presented early morning on 6/21 to ED where he was initially afebrile, WBC 21. Lactate 3.5->3 after 1L NS. CT showed a new 1.3cmx21.3cmx17.6cm rim-enhancing fluid collection deep to the anterior peritoneum concerning for abscess, as well as a rim-enhancing left sided fluid collection. s/p IR guided drainage of 120cc straw-colored fluid aspirated from left abdominal collection, sent for cytology and culture. No drainage catheter left in place due to nature of fluid. Pt started on vanc and zosyn. Abscess cx now growing MRSA and ID consulted for the same.      WORKUP  Blood cx (6/21): Negative  Body fluid cx (6/21): MRSA  CT Abdomen and Pelvis w/ IV Cont (06.21): No bowel obstruction or inflammation. New  1.3cmx21.3cmx17.6cm  rim-enhancing fluid collection deep to the anterior peritoneum concerning for an abscess. Increased small volume abdominal ascites.  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 PRELIM NOTE  PENDING RECS. PLEASE WAIT FOR FINAL RECS AFTER DISCUSSION WITH ATTENDING#    Luis Land MD, PGY4   ID fellow  [employer] Teams Preferred  After 5pm/weekends call [phone]   Mr. 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 Had CT ab pelvis with PO and IV contrast 6/20 as part of routine surveillance, no SBO or abdominal fluid collection then. Then developed severe diffuse ab pain at 9pm with nausea and retching. Last passed flatus and had BM at 11pm. Also c/o scant purulent drainage from just below supraumbilical incision x few days. Denies fevers, chest pain, sob. He presented early morning on 6/21 to ED where he was initially afebrile, WBC 21. Lactate 3.5->3 after 1L NS. CT showed a new 1.3cmx21.3cmx17.6cm rim-enhancing fluid collection deep to the anterior peritoneum concerning for abscess, as well as a rim-enhancing left sided fluid collection. s/p IR guided drainage of 120cc straw-colored fluid aspirated from left abdominal collection, sent for cytology and culture. No drainage catheter left in place due to nature of fluid. Pt started on vanc and zosyn. Abscess cx now growing MRSA and ID consulted for the same.    WORKUP  Blood cx (6/21): Negative  Body fluid cx (6/21): MRSA  CT Abdomen and Pelvis w/ IV Cont (06.21): No bowel obstruction or inflammation. New  1.3cmx21.3cmx17.6cm  rim-enhancing fluid collection deep to the anterior peritoneum concerning for an abscess. Increased small volume abdominal ascites.  Pt has a mediport  Pt reports having gotten splenectomy vaccines    DIAGNOSIS and IMPRESSION  # Intraabdominal Abscess s/p IR guided Drainage (6/21)  # Midline Incision site Drainage  # Persistent Fevers  # Leukocytosis  # Hx of metastatic appendiceal carcinoma with pseudomyxoma peritonei s/p cytoreductive surgery 2020 s/p Chemo and Now on PIPAC (Last administered 6/3)  # Hx of Splenectomy  Persistent Daily fevers including fever to 101.7 yesterday  Leukocytosis to 25k on admission thats downtrending  Pt on Vanc 750 BID (6/22 - 6/24) -> Vanc T this am4.1 -> Dose increased to Vanc 1gm BID (6/24 - )  Pt on ZOsyn (6/21 - )    RECOMMENDATIONS  Will f/u abdominal incision site culture  WIll f/u repeat CT a/p  Agree with empiric Zosyn  c/w Vanc 1 gm BID. Please check Vancomycin trough before 4th sequential dose. Target trough levels 15-20  Trend WBC and Monitor for fevers    Pt seen and examined Case d/w attending and primary team    Luis Land MD, PGY4   ID fellow  [employer] Teams Preferred  After 5pm/weekends call [phone]

## 2022-06-25 LAB
ANION GAP SERPL CALC-SCNC: 10 MMOL/L — SIGNIFICANT CHANGE UP (ref 7–14)
BUN SERPL-MCNC: 6 MG/DL — LOW (ref 7–23)
CALCIUM SERPL-MCNC: 8.4 MG/DL — SIGNIFICANT CHANGE UP (ref 8.4–10.5)
CHLORIDE SERPL-SCNC: 104 MMOL/L — SIGNIFICANT CHANGE UP (ref 98–107)
CO2 SERPL-SCNC: 24 MMOL/L — SIGNIFICANT CHANGE UP (ref 22–31)
CREAT SERPL-MCNC: 0.82 MG/DL — SIGNIFICANT CHANGE UP (ref 0.5–1.3)
EGFR: 99 ML/MIN/1.73M2 — SIGNIFICANT CHANGE UP
GLUCOSE SERPL-MCNC: 108 MG/DL — HIGH (ref 70–99)
HCT VFR BLD CALC: 29.9 % — LOW (ref 39–50)
HGB BLD-MCNC: 9.9 G/DL — LOW (ref 13–17)
MAGNESIUM SERPL-MCNC: 1.8 MG/DL — SIGNIFICANT CHANGE UP (ref 1.6–2.6)
MCHC RBC-ENTMCNC: 28 PG — SIGNIFICANT CHANGE UP (ref 27–34)
MCHC RBC-ENTMCNC: 33.1 GM/DL — SIGNIFICANT CHANGE UP (ref 32–36)
MCV RBC AUTO: 84.7 FL — SIGNIFICANT CHANGE UP (ref 80–100)
NRBC # BLD: 0 /100 WBCS — SIGNIFICANT CHANGE UP
NRBC # FLD: 0 K/UL — SIGNIFICANT CHANGE UP
PHOSPHATE SERPL-MCNC: 2.9 MG/DL — SIGNIFICANT CHANGE UP (ref 2.5–4.5)
PLATELET # BLD AUTO: 392 K/UL — SIGNIFICANT CHANGE UP (ref 150–400)
POTASSIUM SERPL-MCNC: 3.5 MMOL/L — SIGNIFICANT CHANGE UP (ref 3.5–5.3)
POTASSIUM SERPL-SCNC: 3.5 MMOL/L — SIGNIFICANT CHANGE UP (ref 3.5–5.3)
RBC # BLD: 3.53 M/UL — LOW (ref 4.2–5.8)
RBC # FLD: 16.7 % — HIGH (ref 10.3–14.5)
SODIUM SERPL-SCNC: 138 MMOL/L — SIGNIFICANT CHANGE UP (ref 135–145)
WBC # BLD: 13.65 K/UL — HIGH (ref 3.8–10.5)
WBC # FLD AUTO: 13.65 K/UL — HIGH (ref 3.8–10.5)

## 2022-06-25 PROCEDURE — 99232 SBSQ HOSP IP/OBS MODERATE 35: CPT

## 2022-06-25 RX ORDER — OXYCODONE HYDROCHLORIDE 5 MG/1
2.5 TABLET ORAL EVERY 4 HOURS
Refills: 0 | Status: DISCONTINUED | OUTPATIENT
Start: 2022-06-25 | End: 2022-06-30

## 2022-06-25 RX ORDER — OXYCODONE HYDROCHLORIDE 5 MG/1
5 TABLET ORAL EVERY 4 HOURS
Refills: 0 | Status: DISCONTINUED | OUTPATIENT
Start: 2022-06-25 | End: 2022-06-30

## 2022-06-25 RX ORDER — ACETAMINOPHEN 500 MG
975 TABLET ORAL EVERY 6 HOURS
Refills: 0 | Status: DISCONTINUED | OUTPATIENT
Start: 2022-06-25 | End: 2022-06-30

## 2022-06-25 RX ADMIN — Medication 1000 MILLIGRAM(S): at 11:35

## 2022-06-25 RX ADMIN — PIPERACILLIN AND TAZOBACTAM 25 GRAM(S): 4; .5 INJECTION, POWDER, LYOPHILIZED, FOR SOLUTION INTRAVENOUS at 18:48

## 2022-06-25 RX ADMIN — Medication 1 GRAM(S): at 05:11

## 2022-06-25 RX ADMIN — Medication 250 MILLIGRAM(S): at 17:23

## 2022-06-25 RX ADMIN — Medication 400 MILLIGRAM(S): at 11:23

## 2022-06-25 RX ADMIN — ESCITALOPRAM OXALATE 5 MILLIGRAM(S): 10 TABLET, FILM COATED ORAL at 11:28

## 2022-06-25 RX ADMIN — PIPERACILLIN AND TAZOBACTAM 25 GRAM(S): 4; .5 INJECTION, POWDER, LYOPHILIZED, FOR SOLUTION INTRAVENOUS at 00:10

## 2022-06-25 RX ADMIN — Medication 975 MILLIGRAM(S): at 17:23

## 2022-06-25 RX ADMIN — Medication 975 MILLIGRAM(S): at 17:34

## 2022-06-25 RX ADMIN — SIMVASTATIN 10 MILLIGRAM(S): 20 TABLET, FILM COATED ORAL at 23:44

## 2022-06-25 RX ADMIN — Medication 250 MILLIGRAM(S): at 05:10

## 2022-06-25 RX ADMIN — Medication 400 MILLIGRAM(S): at 00:11

## 2022-06-25 RX ADMIN — ENOXAPARIN SODIUM 40 MILLIGRAM(S): 100 INJECTION SUBCUTANEOUS at 05:10

## 2022-06-25 RX ADMIN — Medication 400 MILLIGRAM(S): at 05:10

## 2022-06-25 RX ADMIN — PIPERACILLIN AND TAZOBACTAM 25 GRAM(S): 4; .5 INJECTION, POWDER, LYOPHILIZED, FOR SOLUTION INTRAVENOUS at 10:24

## 2022-06-25 RX ADMIN — Medication 975 MILLIGRAM(S): at 23:45

## 2022-06-25 RX ADMIN — PANTOPRAZOLE SODIUM 40 MILLIGRAM(S): 20 TABLET, DELAYED RELEASE ORAL at 05:11

## 2022-06-25 RX ADMIN — TAMSULOSIN HYDROCHLORIDE 0.4 MILLIGRAM(S): 0.4 CAPSULE ORAL at 23:44

## 2022-06-25 RX ADMIN — FAMOTIDINE 20 MILLIGRAM(S): 10 INJECTION INTRAVENOUS at 11:27

## 2022-06-25 NOTE — PROGRESS NOTE ADULT - SUBJECTIVE AND OBJECTIVE BOX
SURGERY DAILY PROGRESS NOTE:     INTERVAL: febrile to 101.4. Resolved with IV tylenol.    SUBJECTIVE/ROS: No acute events overnight. Patient seen and examined bedside on AM rounds.     OBJECTIVE:  Vital Signs Last 24 Hrs  T(C): 36.9 (25 Jun 2022 00:09), Max: 38.6 (24 Jun 2022 20:19)  T(F): 98.5 (25 Jun 2022 00:09), Max: 101.4 (24 Jun 2022 20:19)  HR: 64 (25 Jun 2022 00:09) (63 - 80)  BP: 118/58 (25 Jun 2022 00:09) (118/58 - 145/84)  BP(mean): --  RR: 18 (25 Jun 2022 00:09) (18 - 18)  SpO2: 98% (25 Jun 2022 00:09) (96% - 100%)    PHYSICAL EXAM:  Gen: No acute distress, but appears frustrated  Abd: Soft, diffuse TTP, mildly distended, no rebound or guarding, previous port sites with scant amount of drainage at umbilicus, no fluctuance/erythema  Ext: Warm and well-perfused  : landin                          10.7   18.61 )-----------( 391      ( 24 Jun 2022 05:23 )             33.2     06-24    136  |  103  |  8   ----------------------------<  140<H>  3.7   |  23  |  0.87    Ca    8.6      24 Jun 2022 05:23  Phos  2.1     06-24  Mg     2.00     06-24       I&O's Detail    23 Jun 2022 07:01  -  24 Jun 2022 07:00  --------------------------------------------------------  IN:    dextrose 5% + sodium chloride 0.45% w/ Additives: 400 mL    dextrose 5% + sodium chloride 0.9%: 1800 mL    IV PiggyBack: 900 mL    Oral Fluid: 70 mL  Total IN: 3170 mL    OUT:    Indwelling Catheter - Urethral (mL): 1975 mL  Total OUT: 1975 mL    Total NET: 1195 mL      24 Jun 2022 07:01  -  25 Jun 2022 02:15  --------------------------------------------------------  IN:    dextrose 5% + sodium chloride 0.9%: 420 mL  Total IN: 420 mL    OUT:    Indwelling Catheter - Urethral (mL): 500 mL    Oral Fluid: 0 mL    Voided (mL): 1300 mL  Total OUT: 1800 mL    Total NET: -1380 mL

## 2022-06-25 NOTE — PROGRESS NOTE ADULT - ASSESSMENT
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 No evidence of bowel obstruction. s/p IR drainage.    PLAN:  - Suppository  - Serial abdominal exams  - Monitor bowel function  - DVT PPx: LVX  - Diet: NPO w meds/sips, IVF  - Zosyn + vanc for staph coverage - appreciate ID recommendations  - Vanc adjusted - trough before 4th adjusted dose  - f/u lactate and AM labs    D Team Surgery   [id number]

## 2022-06-25 NOTE — PROGRESS NOTE ADULT - ASSESSMENT
60 yo man with metastatic appendiceal cancer with pseudomyxoma peritonei s/p extensive resection including splenectomy on chemo  presented 6/20 with abdominal pain found to have intraabdominal abscess 21.3 x 17.6 x 1.3 cm which was aspirated 6/21 growing MRSA  Has additional mid abd incision drainage- culture with staph aureus    Post splenectomy patient   S/p PIPAC    Receiving vanco and zosyn    Would continue vanco and zosyn    Repeat CT with decreased ascites    Febrile to 38.6 last night. WBC elevated to 13    Continue current antibiotics  Would not consider de-escelation in a asplenic pt with high fever    Continue to observe

## 2022-06-25 NOTE — PROVIDER CONTACT NOTE (OTHER) - ASSESSMENT
Pt vitals stable, except for temperature of 100.5. Pt denies chest pain and shortness of breath. Pt denies nausea. Pt complains of pain and feeling sweaty.

## 2022-06-25 NOTE — PROGRESS NOTE ADULT - SUBJECTIVE AND OBJECTIVE BOX
Follow Up:      Inverval History/ROS:Patient is a 61y old  Male who presents with a chief complaint of Abdominal pain and intraabdominal fluid (2022 02:07)    + fever to 38.6    Allergies    barium sulfate (Unknown)    Intolerances        ANTIMICROBIALS:  piperacillin/tazobactam IVPB.. 3.375 every 8 hours  vancomycin  IVPB 1000 every 12 hours      OTHER MEDS:  acetaminophen   IVPB .. 1000 milliGRAM(s) IV Intermittent every 6 hours  bisacodyl Suppository 10 milliGRAM(s) Rectal daily  enoxaparin Injectable 40 milliGRAM(s) SubCutaneous every 24 hours  escitalopram 5 milliGRAM(s) Oral daily  famotidine Injectable 20 milliGRAM(s) IV Push daily  influenza   Vaccine 0.5 milliLiter(s) IntraMuscular once  ondansetron Injectable 4 milliGRAM(s) IV Push every 6 hours  pantoprazole  Injectable 40 milliGRAM(s) IV Push every 24 hours  simvastatin 10 milliGRAM(s) Oral at bedtime  sucralfate suspension 1 Gram(s) Oral four times a day  tamsulosin 0.4 milliGRAM(s) Oral at bedtime      Vital Signs Last 24 Hrs  T(C): 36.6 (2022 09:37), Max: 38.6 (2022 20:19)  T(F): 97.9 (2022 09:37), Max: 101.4 (2022 20:19)  HR: 58 (2022 09:37) (58 - 87)  BP: 126/69 (2022 09:37) (118/58 - 135/71)  BP(mean): --  RR: 18 (2022 09:37) (18 - 19)  SpO2: 99% (2022 09:37) (96% - 100%)    PHYSICAL EXAM:  General: [ ] non-toxic  HEAD/EYES: [ ] PERRL [ ] white sclera [ ] icterus  ENT:  [ ] normal [ ] supple [ ] thrush [ ] pharyngeal exudate  Cardiovascular:   [ ] murmur [ ] normal [ ] PPM/AICD  Respiratory:  [ ] clear to ausculation bilaterally  GI:  [ ] soft, non-tender, normal bowel sounds  :  [ ] landin [ ] no CVA tenderness   Musculoskeletal:  [ ] no synovitis  Neurologic:  [ ] non-focal exam   Skin:  [ ] no rash  Lymph: [ ] no lymphadenopathy  Psychiatric:  [ ] appropriate affect [ ] alert & oriented  Lines:  [ ] no phlebitis [ ] central line                                9.9    13.65 )-----------( 392      ( 2022 05:59 )             29.9       -    138  |  104  |  6<L>  ----------------------------<  108<H>  3.5   |  24  |  0.82    Ca    8.4      2022 05:59  Phos  2.9       Mg     1.80             Urinalysis Basic - ( 2022 17:30 )    Color: Yellow / Appearance: Clear / S.024 / pH: x  Gluc: x / Ketone: Negative  / Bili: Negative / Urobili: <2 mg/dL   Blood: x / Protein: 100 mg/dL / Nitrite: Negative   Leuk Esterase: Negative / RBC: 17 /HPF / WBC 2 /HPF   Sq Epi: x / Non Sq Epi: 1 /HPF / Bacteria: Negative        MICROBIOLOGY:Culture Results:   Few Staphylococcus aureus (22 @ 19:53)  Culture Results:   Rare Methicillin Resistant Staphylococcus aureus (22 @ 13:36)  Culture Results:   Testing in progress (22 @ 13:36)  Culture Results:   No growth to date. (22 @ 07:16)  Culture Results:   No growth to date. (22 @ 06:00)      RADIOLOGY:  < from: CT Abdomen and Pelvis w/ IV Cont (22 @ 14:19) >  ACC: [id number] EXAM:  CT ABDOMEN AND PELVIS IC                          PROCEDURE DATE:  2022          INTERPRETATION:  CLINICAL INFORMATION: Abdominal distention. Ascites.   Peritoneum carcinomatosis with intraperitoneal chemotherapy.    COMPARISON: 2022    CONTRAST/COMPLICATIONS:  IV Contrast: Omnipaque 350  65 cc administered   0 cc discarded  Oral Contrast: NONE  Complications: None reported at time of study completion    PROCEDURE:  CT of the Abdomen and Pelvis was performed.  Sagittal and coronal reformats were performed.    FINDINGS:  LOWER CHEST: Small bilateral pleural effusions.    LIVER: Within normal limits.  BILE DUCTS: Normal caliber.  GALLBLADDER: Cholecystectomy.  SPLEEN: Splenectomy.  PANCREAS: Within normal limits.  ADRENALS: Within normal limits.  KIDNEYS/URETERS: Within normal limits.    BLADDER: Within normal limits.  REPRODUCTIVE ORGANS: Enlarged prostate    BOWEL: Fluid-filled nondilated bowel loops without transition point. No   bowel obstruction. Distal gastrectomy and gastrojejunostomy. Colectomy.  PERITONEUM: Small amount of abdominal and pelvic ascites with peritoneal   thickening and enhancement. No pneumoperitoneum.  VESSELS: Atherosclerotic calcifications.  RETROPERITONEUM/LYMPH NODES: No lymphadenopathy.  ABDOMINAL WALL: Within normal limits.  BONES: Degenerative changes.    IMPRESSION:  Decrease in amount of small abdominal and pelvic ascites since prior   examination 2022 with peritoneal thickening and enhancement,   consistent with reactive change.    < end of copied text >

## 2022-06-26 LAB
-  AMPICILLIN/SULBACTAM: SIGNIFICANT CHANGE UP
-  CEFAZOLIN: SIGNIFICANT CHANGE UP
-  CLINDAMYCIN: SIGNIFICANT CHANGE UP
-  DAPTOMYCIN: SIGNIFICANT CHANGE UP
-  ERYTHROMYCIN: SIGNIFICANT CHANGE UP
-  GENTAMICIN: SIGNIFICANT CHANGE UP
-  LINEZOLID: SIGNIFICANT CHANGE UP
-  OXACILLIN: SIGNIFICANT CHANGE UP
-  PENICILLIN: SIGNIFICANT CHANGE UP
-  RIFAMPIN: SIGNIFICANT CHANGE UP
-  TETRACYCLINE: SIGNIFICANT CHANGE UP
-  TRIMETHOPRIM/SULFAMETHOXAZOLE: SIGNIFICANT CHANGE UP
-  VANCOMYCIN: SIGNIFICANT CHANGE UP
ANION GAP SERPL CALC-SCNC: 12 MMOL/L — SIGNIFICANT CHANGE UP (ref 7–14)
BUN SERPL-MCNC: 7 MG/DL — SIGNIFICANT CHANGE UP (ref 7–23)
CALCIUM SERPL-MCNC: 8.4 MG/DL — SIGNIFICANT CHANGE UP (ref 8.4–10.5)
CHLORIDE SERPL-SCNC: 99 MMOL/L — SIGNIFICANT CHANGE UP (ref 98–107)
CO2 SERPL-SCNC: 22 MMOL/L — SIGNIFICANT CHANGE UP (ref 22–31)
CREAT SERPL-MCNC: 0.93 MG/DL — SIGNIFICANT CHANGE UP (ref 0.5–1.3)
CULTURE RESULTS: SIGNIFICANT CHANGE UP
EGFR: 93 ML/MIN/1.73M2 — SIGNIFICANT CHANGE UP
GLUCOSE SERPL-MCNC: 136 MG/DL — HIGH (ref 70–99)
HCT VFR BLD CALC: 32 % — LOW (ref 39–50)
HGB BLD-MCNC: 10.4 G/DL — LOW (ref 13–17)
MAGNESIUM SERPL-MCNC: 1.8 MG/DL — SIGNIFICANT CHANGE UP (ref 1.6–2.6)
MCHC RBC-ENTMCNC: 27.1 PG — SIGNIFICANT CHANGE UP (ref 27–34)
MCHC RBC-ENTMCNC: 32.5 GM/DL — SIGNIFICANT CHANGE UP (ref 32–36)
MCV RBC AUTO: 83.3 FL — SIGNIFICANT CHANGE UP (ref 80–100)
METHOD TYPE: SIGNIFICANT CHANGE UP
NRBC # BLD: 0 /100 WBCS — SIGNIFICANT CHANGE UP
NRBC # FLD: 0 K/UL — SIGNIFICANT CHANGE UP
ORGANISM # SPEC MICROSCOPIC CNT: SIGNIFICANT CHANGE UP
ORGANISM # SPEC MICROSCOPIC CNT: SIGNIFICANT CHANGE UP
PHOSPHATE SERPL-MCNC: 3.3 MG/DL — SIGNIFICANT CHANGE UP (ref 2.5–4.5)
PLATELET # BLD AUTO: 419 K/UL — HIGH (ref 150–400)
POTASSIUM SERPL-MCNC: 3.6 MMOL/L — SIGNIFICANT CHANGE UP (ref 3.5–5.3)
POTASSIUM SERPL-SCNC: 3.6 MMOL/L — SIGNIFICANT CHANGE UP (ref 3.5–5.3)
RBC # BLD: 3.84 M/UL — LOW (ref 4.2–5.8)
RBC # FLD: 16.8 % — HIGH (ref 10.3–14.5)
SODIUM SERPL-SCNC: 133 MMOL/L — LOW (ref 135–145)
SPECIMEN SOURCE: SIGNIFICANT CHANGE UP
VANCOMYCIN TROUGH SERPL-MCNC: 6.8 UG/ML — LOW (ref 10–20)
WBC # BLD: 14.93 K/UL — HIGH (ref 3.8–10.5)
WBC # FLD AUTO: 14.93 K/UL — HIGH (ref 3.8–10.5)

## 2022-06-26 RX ORDER — MAGNESIUM SULFATE 500 MG/ML
1 VIAL (ML) INJECTION ONCE
Refills: 0 | Status: COMPLETED | OUTPATIENT
Start: 2022-06-26 | End: 2022-06-26

## 2022-06-26 RX ORDER — POTASSIUM CHLORIDE 20 MEQ
20 PACKET (EA) ORAL ONCE
Refills: 0 | Status: COMPLETED | OUTPATIENT
Start: 2022-06-26 | End: 2022-06-26

## 2022-06-26 RX ADMIN — Medication 975 MILLIGRAM(S): at 23:28

## 2022-06-26 RX ADMIN — Medication 975 MILLIGRAM(S): at 11:00

## 2022-06-26 RX ADMIN — Medication 975 MILLIGRAM(S): at 00:30

## 2022-06-26 RX ADMIN — OXYCODONE HYDROCHLORIDE 5 MILLIGRAM(S): 5 TABLET ORAL at 18:35

## 2022-06-26 RX ADMIN — Medication 975 MILLIGRAM(S): at 17:30

## 2022-06-26 RX ADMIN — Medication 250 MILLIGRAM(S): at 06:34

## 2022-06-26 RX ADMIN — PANTOPRAZOLE SODIUM 40 MILLIGRAM(S): 20 TABLET, DELAYED RELEASE ORAL at 06:13

## 2022-06-26 RX ADMIN — SIMVASTATIN 10 MILLIGRAM(S): 20 TABLET, FILM COATED ORAL at 22:02

## 2022-06-26 RX ADMIN — OXYCODONE HYDROCHLORIDE 5 MILLIGRAM(S): 5 TABLET ORAL at 22:43

## 2022-06-26 RX ADMIN — OXYCODONE HYDROCHLORIDE 5 MILLIGRAM(S): 5 TABLET ORAL at 12:00

## 2022-06-26 RX ADMIN — OXYCODONE HYDROCHLORIDE 5 MILLIGRAM(S): 5 TABLET ORAL at 10:58

## 2022-06-26 RX ADMIN — ESCITALOPRAM OXALATE 5 MILLIGRAM(S): 10 TABLET, FILM COATED ORAL at 11:56

## 2022-06-26 RX ADMIN — Medication 100 GRAM(S): at 14:42

## 2022-06-26 RX ADMIN — Medication 10 MILLIGRAM(S): at 06:13

## 2022-06-26 RX ADMIN — PIPERACILLIN AND TAZOBACTAM 25 GRAM(S): 4; .5 INJECTION, POWDER, LYOPHILIZED, FOR SOLUTION INTRAVENOUS at 11:56

## 2022-06-26 RX ADMIN — FAMOTIDINE 20 MILLIGRAM(S): 10 INJECTION INTRAVENOUS at 11:56

## 2022-06-26 RX ADMIN — OXYCODONE HYDROCHLORIDE 5 MILLIGRAM(S): 5 TABLET ORAL at 19:30

## 2022-06-26 RX ADMIN — Medication 975 MILLIGRAM(S): at 10:01

## 2022-06-26 RX ADMIN — OXYCODONE HYDROCHLORIDE 5 MILLIGRAM(S): 5 TABLET ORAL at 06:09

## 2022-06-26 RX ADMIN — Medication 975 MILLIGRAM(S): at 17:10

## 2022-06-26 RX ADMIN — Medication 250 MILLIGRAM(S): at 17:11

## 2022-06-26 RX ADMIN — Medication 20 MILLIEQUIVALENT(S): at 14:42

## 2022-06-26 RX ADMIN — PIPERACILLIN AND TAZOBACTAM 25 GRAM(S): 4; .5 INJECTION, POWDER, LYOPHILIZED, FOR SOLUTION INTRAVENOUS at 18:36

## 2022-06-26 RX ADMIN — Medication 975 MILLIGRAM(S): at 23:58

## 2022-06-26 RX ADMIN — ENOXAPARIN SODIUM 40 MILLIGRAM(S): 100 INJECTION SUBCUTANEOUS at 06:13

## 2022-06-26 RX ADMIN — TAMSULOSIN HYDROCHLORIDE 0.4 MILLIGRAM(S): 0.4 CAPSULE ORAL at 22:02

## 2022-06-26 RX ADMIN — PIPERACILLIN AND TAZOBACTAM 25 GRAM(S): 4; .5 INJECTION, POWDER, LYOPHILIZED, FOR SOLUTION INTRAVENOUS at 01:44

## 2022-06-26 NOTE — PROGRESS NOTE ADULT - ASSESSMENT
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 No evidence of bowel obstruction. s/p IR drainage.    PLAN:  - Suppository  - Serial abdominal exams  - Monitor bowel function  - DVT PPx: LVX  - Diet: NPO w meds/sips, IVF  - Zosyn + vanc for staph coverage - appreciate ID recommendations  - Vanc adjusted - trough before 4th adjusted dose  - f/u lactate and AM labs    D Team Surgery   [id number]    61M metastatic appendiceal carcinoma with pseudomyxoma peritonei s/p cytoreductive surgery 2020 including partial gastrectomy, cholecystectomy, subtotal colectomy, diaphragm stripping, omentectomy, splenectomy s/p FOLFOX x 6 months and HIPEC and now s/p PIPAC x 8 most recently 6/3 presenting with acute onset abdominal pain, nausea after an outpatient surveillance CT with barium contrast. No evidence of bowel obstruction. s/p IR drainage.    PLAN:  - Suppository  - Serial abdominal exams  - Monitor bowel function  - DVT PPx: LVX  - Diet: regular  - f/u blood clx  - Zosyn + vanc for staph coverage - appreciate ID recommendations  - Vanc adjusted - trough before 4th adjusted dose  - f/u lactate and AM labs    D Team Surgery   [id number]

## 2022-06-26 NOTE — PROGRESS NOTE ADULT - SUBJECTIVE AND OBJECTIVE BOX
Subjective:   Patient seen at bedside this AM. Reports feeling well, without complaints. Denies chest pain, SOB. Tolerating diet without N/V.     24h Events:   - Overnight, no acute events    Objective:  Vital Signs  T(C): 37.6 (06-25 @ 21:05), Max: 38.1 (06-25 @ 16:30)  HR: 76 (06-25 @ 21:05) (58 - 87)  BP: 134/68 (06-25 @ 21:05) (114/58 - 134/68)  RR: 18 (06-25 @ 21:05) (17 - 19)  SpO2: 99% (06-25 @ 21:05) (98% - 100%)  06-24-22 @ 07:01  -  06-25-22 @ 07:00  --------------------------------------------------------  IN:  Total IN: 0 mL    OUT:    Indwelling Catheter - Urethral (mL): 500 mL    Voided (mL): 1850 mL  Total OUT: 2350 mL    Total NET: -2350 mL      06-25-22 @ 07:01  -  06-26-22 @ 00:29  --------------------------------------------------------  IN:  Total IN: 0 mL    OUT:    Voided (mL): 930 mL  Total OUT: 930 mL    Total NET: -930 mL        PHYSICAL EXAM:  Gen: No acute distress, but appears frustrated  Abd: Soft, diffuse TTP, mildly distended, no rebound or guarding, previous port sites with scant amount of drainage at umbilicus, no fluctuance/erythema  Ext: Warm and well-perfused  : landin      Labs:                        9.9    13.65 )-----------( 392      ( 25 Jun 2022 05:59 )             29.9   06-25    138  |  104  |  6<L>  ----------------------------<  108<H>  3.5   |  24  |  0.82    Ca    8.4      25 Jun 2022 05:59  Phos  2.9     06-25  Mg     1.80     06-25      CAPILLARY BLOOD GLUCOSE          Medications:   MEDICATIONS  (STANDING):  bisacodyl Suppository 10 milliGRAM(s) Rectal daily  enoxaparin Injectable 40 milliGRAM(s) SubCutaneous every 24 hours  escitalopram 5 milliGRAM(s) Oral daily  famotidine Injectable 20 milliGRAM(s) IV Push daily  influenza   Vaccine 0.5 milliLiter(s) IntraMuscular once  ondansetron Injectable 4 milliGRAM(s) IV Push every 6 hours  pantoprazole  Injectable 40 milliGRAM(s) IV Push every 24 hours  piperacillin/tazobactam IVPB.. 3.375 Gram(s) IV Intermittent every 8 hours  simvastatin 10 milliGRAM(s) Oral at bedtime  sucralfate suspension 1 Gram(s) Oral four times a day  tamsulosin 0.4 milliGRAM(s) Oral at bedtime  vancomycin  IVPB 1000 milliGRAM(s) IV Intermittent every 12 hours    MEDICATIONS  (PRN):  acetaminophen     Tablet .. 975 milliGRAM(s) Oral every 6 hours PRN Mild Pain (1 - 3)  oxyCODONE    IR 2.5 milliGRAM(s) Oral every 4 hours PRN Moderate Pain (4 - 6)  oxyCODONE    IR 5 milliGRAM(s) Oral every 4 hours PRN Severe Pain (7 - 10)      Imaging:

## 2022-06-27 ENCOUNTER — TRANSCRIPTION ENCOUNTER (OUTPATIENT)
Age: 62
End: 2022-06-27

## 2022-06-27 LAB
ANION GAP SERPL CALC-SCNC: 9 MMOL/L — SIGNIFICANT CHANGE UP (ref 7–14)
BUN SERPL-MCNC: 9 MG/DL — SIGNIFICANT CHANGE UP (ref 7–23)
CALCIUM SERPL-MCNC: 8.9 MG/DL — SIGNIFICANT CHANGE UP (ref 8.4–10.5)
CHLORIDE SERPL-SCNC: 102 MMOL/L — SIGNIFICANT CHANGE UP (ref 98–107)
CO2 SERPL-SCNC: 28 MMOL/L — SIGNIFICANT CHANGE UP (ref 22–31)
CREAT SERPL-MCNC: 1.2 MG/DL — SIGNIFICANT CHANGE UP (ref 0.5–1.3)
EGFR: 68 ML/MIN/1.73M2 — SIGNIFICANT CHANGE UP
GLUCOSE SERPL-MCNC: 122 MG/DL — HIGH (ref 70–99)
HCT VFR BLD CALC: 30.5 % — LOW (ref 39–50)
HGB BLD-MCNC: 10.1 G/DL — LOW (ref 13–17)
MAGNESIUM SERPL-MCNC: 2 MG/DL — SIGNIFICANT CHANGE UP (ref 1.6–2.6)
MCHC RBC-ENTMCNC: 27.3 PG — SIGNIFICANT CHANGE UP (ref 27–34)
MCHC RBC-ENTMCNC: 33.1 GM/DL — SIGNIFICANT CHANGE UP (ref 32–36)
MCV RBC AUTO: 82.4 FL — SIGNIFICANT CHANGE UP (ref 80–100)
NRBC # BLD: 0 /100 WBCS — SIGNIFICANT CHANGE UP
NRBC # FLD: 0.02 K/UL — HIGH
PHOSPHATE SERPL-MCNC: 3.7 MG/DL — SIGNIFICANT CHANGE UP (ref 2.5–4.5)
PLATELET # BLD AUTO: 448 K/UL — HIGH (ref 150–400)
POTASSIUM SERPL-MCNC: 3.7 MMOL/L — SIGNIFICANT CHANGE UP (ref 3.5–5.3)
POTASSIUM SERPL-SCNC: 3.7 MMOL/L — SIGNIFICANT CHANGE UP (ref 3.5–5.3)
RBC # BLD: 3.7 M/UL — LOW (ref 4.2–5.8)
RBC # FLD: 16.8 % — HIGH (ref 10.3–14.5)
SODIUM SERPL-SCNC: 139 MMOL/L — SIGNIFICANT CHANGE UP (ref 135–145)
VANCOMYCIN TROUGH SERPL-MCNC: 12.5 UG/ML — SIGNIFICANT CHANGE UP (ref 10–20)
WBC # BLD: 18.12 K/UL — HIGH (ref 3.8–10.5)
WBC # FLD AUTO: 18.12 K/UL — HIGH (ref 3.8–10.5)

## 2022-06-27 PROCEDURE — 99232 SBSQ HOSP IP/OBS MODERATE 35: CPT

## 2022-06-27 RX ORDER — POTASSIUM CHLORIDE 20 MEQ
40 PACKET (EA) ORAL ONCE
Refills: 0 | Status: COMPLETED | OUTPATIENT
Start: 2022-06-27 | End: 2022-06-27

## 2022-06-27 RX ORDER — VANCOMYCIN HCL 1 G
1250 VIAL (EA) INTRAVENOUS EVERY 12 HOURS
Refills: 0 | Status: DISCONTINUED | OUTPATIENT
Start: 2022-06-27 | End: 2022-06-27

## 2022-06-27 RX ORDER — VANCOMYCIN HCL 1 G
1250 VIAL (EA) INTRAVENOUS EVERY 12 HOURS
Refills: 0 | Status: DISCONTINUED | OUTPATIENT
Start: 2022-06-27 | End: 2022-06-28

## 2022-06-27 RX ADMIN — Medication 10 MILLIGRAM(S): at 01:21

## 2022-06-27 RX ADMIN — FAMOTIDINE 20 MILLIGRAM(S): 10 INJECTION INTRAVENOUS at 11:53

## 2022-06-27 RX ADMIN — PANTOPRAZOLE SODIUM 40 MILLIGRAM(S): 20 TABLET, DELAYED RELEASE ORAL at 06:30

## 2022-06-27 RX ADMIN — Medication 975 MILLIGRAM(S): at 17:05

## 2022-06-27 RX ADMIN — Medication 166.67 MILLIGRAM(S): at 06:27

## 2022-06-27 RX ADMIN — Medication 975 MILLIGRAM(S): at 11:52

## 2022-06-27 RX ADMIN — SIMVASTATIN 10 MILLIGRAM(S): 20 TABLET, FILM COATED ORAL at 21:42

## 2022-06-27 RX ADMIN — Medication 975 MILLIGRAM(S): at 06:34

## 2022-06-27 RX ADMIN — ENOXAPARIN SODIUM 40 MILLIGRAM(S): 100 INJECTION SUBCUTANEOUS at 06:29

## 2022-06-27 RX ADMIN — ESCITALOPRAM OXALATE 5 MILLIGRAM(S): 10 TABLET, FILM COATED ORAL at 11:52

## 2022-06-27 RX ADMIN — TAMSULOSIN HYDROCHLORIDE 0.4 MILLIGRAM(S): 0.4 CAPSULE ORAL at 21:42

## 2022-06-27 RX ADMIN — PIPERACILLIN AND TAZOBACTAM 25 GRAM(S): 4; .5 INJECTION, POWDER, LYOPHILIZED, FOR SOLUTION INTRAVENOUS at 01:11

## 2022-06-27 RX ADMIN — Medication 975 MILLIGRAM(S): at 23:37

## 2022-06-27 RX ADMIN — Medication 975 MILLIGRAM(S): at 12:30

## 2022-06-27 RX ADMIN — Medication 40 MILLIEQUIVALENT(S): at 09:14

## 2022-06-27 RX ADMIN — PIPERACILLIN AND TAZOBACTAM 25 GRAM(S): 4; .5 INJECTION, POWDER, LYOPHILIZED, FOR SOLUTION INTRAVENOUS at 21:43

## 2022-06-27 RX ADMIN — Medication 166.67 MILLIGRAM(S): at 17:51

## 2022-06-27 RX ADMIN — Medication 975 MILLIGRAM(S): at 07:30

## 2022-06-27 RX ADMIN — PIPERACILLIN AND TAZOBACTAM 25 GRAM(S): 4; .5 INJECTION, POWDER, LYOPHILIZED, FOR SOLUTION INTRAVENOUS at 11:02

## 2022-06-27 RX ADMIN — Medication 975 MILLIGRAM(S): at 18:00

## 2022-06-27 NOTE — PROGRESS NOTE ADULT - SUBJECTIVE AND OBJECTIVE BOX
SURGERY DAILY PROGRESS NOTE:     SUBJECTIVE/ROS: No acute events overnight. Requested suppository. Patient seen and examined bedside on AM rounds.     OBJECTIVE:  Vital Signs Last 24 Hrs  T(C): 37.4 (26 Jun 2022 21:29), Max: 37.6 (26 Jun 2022 10:00)  T(F): 99.4 (26 Jun 2022 21:29), Max: 99.7 (26 Jun 2022 10:00)  HR: 80 (26 Jun 2022 21:29) (71 - 88)  BP: 125/74 (26 Jun 2022 21:29) (125/74 - 149/79)  BP(mean): --  RR: 18 (26 Jun 2022 21:29) (18 - 18)  SpO2: 98% (26 Jun 2022 21:29) (97% - 99%)    PHYSICAL EXAM:  Gen: No acute distress, but appears frustrated  Abd: Soft, diffuse TTP, mildly distended, no rebound or guarding, previous port sites with scant amount of drainage at umbilicus, no fluctuance/erythema  Ext: Warm and well-perfused  : landin                            10.4   14.93 )-----------( 419      ( 26 Jun 2022 12:09 )             32.0     06-26    133<L>  |  99  |  7   ----------------------------<  136<H>  3.6   |  22  |  0.93    Ca    8.4      26 Jun 2022 12:09  Phos  3.3     06-26  Mg     1.80     06-26       I&O's Detail    25 Jun 2022 07:01  -  26 Jun 2022 07:00  --------------------------------------------------------  IN:    Oral Fluid: 420 mL  Total IN: 420 mL    OUT:    Voided (mL): 1730 mL  Total OUT: 1730 mL    Total NET: -1310 mL      26 Jun 2022 07:01  -  27 Jun 2022 01:03  --------------------------------------------------------  IN:    Oral Fluid: 660 mL  Total IN: 660 mL    OUT:    Voided (mL): 1850 mL  Total OUT: 1850 mL    Total NET: -1190 mL       SURGERY DAILY PROGRESS NOTE:     SUBJECTIVE/ROS: No acute events overnight. Requested suppository. Patient seen and examined bedside on AM rounds. Packing changed at bedside.     OBJECTIVE:  Vital Signs Last 24 Hrs  T(C): 37.4 (26 Jun 2022 21:29), Max: 37.6 (26 Jun 2022 10:00)  T(F): 99.4 (26 Jun 2022 21:29), Max: 99.7 (26 Jun 2022 10:00)  HR: 80 (26 Jun 2022 21:29) (71 - 88)  BP: 125/74 (26 Jun 2022 21:29) (125/74 - 149/79)  BP(mean): --  RR: 18 (26 Jun 2022 21:29) (18 - 18)  SpO2: 98% (26 Jun 2022 21:29) (97% - 99%)    PHYSICAL EXAM:  Gen: No acute distress, but appears frustrated  Abd: Soft, diffuse TTP, mildly distended, no rebound or guarding, previous port sites with scant amount of drainage at umbilicus, no fluctuance/erythema  Ext: Warm and well-perfused  : landin                            10.4   14.93 )-----------( 419      ( 26 Jun 2022 12:09 )             32.0     06-26    133<L>  |  99  |  7   ----------------------------<  136<H>  3.6   |  22  |  0.93    Ca    8.4      26 Jun 2022 12:09  Phos  3.3     06-26  Mg     1.80     06-26       I&O's Detail    25 Jun 2022 07:01  -  26 Jun 2022 07:00  --------------------------------------------------------  IN:    Oral Fluid: 420 mL  Total IN: 420 mL    OUT:    Voided (mL): 1730 mL  Total OUT: 1730 mL    Total NET: -1310 mL      26 Jun 2022 07:01  -  27 Jun 2022 01:03  --------------------------------------------------------  IN:    Oral Fluid: 660 mL  Total IN: 660 mL    OUT:    Voided (mL): 1850 mL  Total OUT: 1850 mL    Total NET: -1190 mL

## 2022-06-27 NOTE — PROGRESS NOTE ADULT - ASSESSMENT
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 No evidence of bowel obstruction. s/p IR drainage.    PLAN:  - Suppository  - Serial abdominal exams  - Monitor bowel function  - DVT PPx: LVX  - Diet: regular  - f/u blood clx  - Zosyn + vanc for staph coverage - appreciate ID recommendations  - Vanc adjusted - trough before 4th adjusted dose  - f/u lactate and AM labs    D Team Surgery   [id number]    61M metastatic appendiceal carcinoma with pseudomyxoma peritonei s/p cytoreductive surgery 2020 including partial gastrectomy, cholecystectomy, subtotal colectomy, diaphragm stripping, omentectomy, splenectomy s/p FOLFOX x 6 months and HIPEC and now s/p PIPAC x 8 most recently 6/3 presenting with acute onset abdominal pain, nausea after an outpatient surveillance CT with barium contrast. No evidence of bowel obstruction. s/p IR drainage.    PLAN:  - Suppository  - Serial abdominal exams  - Monitor bowel function  - DVT PPx: LVX  - Diet: regular  - f/u blood clx  - Zosyn + vanc for staph coverage - appreciate ID recommendations  - Vanc adjusted - trough before 4th dose  - f/u lactate and AM labs    D Team Surgery   [id number]    61M metastatic appendiceal carcinoma with pseudomyxoma peritonei s/p cytoreductive surgery 2020 including partial gastrectomy, cholecystectomy, subtotal colectomy, diaphragm stripping, omentectomy, splenectomy s/p FOLFOX x 6 months and HIPEC and now s/p PIPAC x 8 most recently 6/3 presenting with acute onset abdominal pain, nausea after an outpatient surveillance CT with barium contrast. No evidence of bowel obstruction. s/p IR drainage.    PLAN:  - Iodoform packing to ABD wound  - F/u ID recs  - Suppository  - Serial abdominal exams  - Monitor bowel function  - DVT PPx: LVX  - Diet: regular  - f/u blood clx  - Zosyn + vanc for staph coverage - appreciate ID recommendations  - Vanc adjusted - trough before 4th dose  - f/u lactate and AM labs    D Team Surgery   [id number]

## 2022-06-27 NOTE — PROGRESS NOTE ADULT - SUBJECTIVE AND OBJECTIVE BOX
Follow Up:      Inverval History/ROS:Patient is a 61y old  Male who presents with a chief complaint of Abdominal pain and intraabdominal fluid (2022 02:07)    afebrile x 24 h    feels abdomen is distended     had suppository this AM    Allergies    barium sulfate (Unknown)    Intolerances        ANTIMICROBIALS:  piperacillin/tazobactam IVPB.. 3.375 every 8 hours  vancomycin  IVPB 1250 every 12 hours      OTHER MEDS:  acetaminophen   IVPB .. 1000 milliGRAM(s) IV Intermittent every 6 hours  bisacodyl Suppository 10 milliGRAM(s) Rectal daily  enoxaparin Injectable 40 milliGRAM(s) SubCutaneous every 24 hours  escitalopram 5 milliGRAM(s) Oral daily  famotidine Injectable 20 milliGRAM(s) IV Push daily  influenza   Vaccine 0.5 milliLiter(s) IntraMuscular once  ondansetron Injectable 4 milliGRAM(s) IV Push every 6 hours  pantoprazole  Injectable 40 milliGRAM(s) IV Push every 24 hours  simvastatin 10 milliGRAM(s) Oral at bedtime  sucralfate suspension 1 Gram(s) Oral four times a day  tamsulosin 0.4 milliGRAM(s) Oral at bedtime      Vital Signs Last 24 Hrs  T(F): 99.3 (22 @ 13:00), Max: 99.6 (22 @ 06:22)  HR: 93 (22 @ 13:00)  BP: 120/66 (22 @ 13:00)  RR: 18 (22 @ 13:00)  SpO2: 98% (22 @ 13:00) (98% - 100%)    PHYSICAL EXAM:  General: [x ] non-toxic  HEAD/EYES: [ ] PERRL [x ] white sclera [ ] icterus  ENT:  [ ] normal [ ] supple [ ] thrush [ ] pharyngeal exudate  Cardiovascular:   [ ] murmur [ ] normal [ ] PPM/AICD  Respiratory:  [ ] clear to ausculation bilaterally  GI:  slight firm, non tender, midline wound with packing dressing  :  [ ] landin [ ] no CVA tenderness   Musculoskeletal:  [ ] no synovitis  Neurologic:  [x ] non-focal exam   Skin:  [x ] no rash  Psychiatric:  [x ] appropriate affect [x ] alert & oriented  Lines:  peripheral iv right arm                          10.1   18.12 )-----------( 448      ( 2022 04:52 )             30.5     139  |  102  |  9   ----------------------------<  122  3.7   |  28  |  1.20  Ca    8.9      2022 04:52Phos  3.7     Mg     2.00                   Urinalysis Basic - ( 2022 17:30 )    Color: Yellow / Appearance: Clear / S.024 / pH: x  Gluc: x / Ketone: Negative  / Bili: Negative / Urobili: <2 mg/dL   Blood: x / Protein: 100 mg/dL / Nitrite: Negative   Leuk Esterase: Negative / RBC: 17 /HPF / WBC 2 /HPF   Sq Epi: x / Non Sq Epi: 1 /HPF / Bacteria: Negative        MICROBIOLOGY:Culture Results:   Few Staphylococcus aureus (22 @ 19:53)  Culture Results:   Rare Methicillin Resistant Staphylococcus aureus (22 @ 13:36)  Culture Results:   Testing in progress (22 @ 13:36)  Culture Results:   No growth to date. (22 @ 07:16)  Culture Results:   No growth to date. (22 @ 06:00)      RADIOLOGY:  < from: CT Abdomen and Pelvis w/ IV Cont (22 @ 14:19) >  ACC: [id number] EXAM:  CT ABDOMEN AND PELVIS IC                          PROCEDURE DATE:  2022          INTERPRETATION:  CLINICAL INFORMATION: Abdominal distention. Ascites.   Peritoneum carcinomatosis with intraperitoneal chemotherapy.    COMPARISON: 2022    CONTRAST/COMPLICATIONS:  IV Contrast: Omnipaque 350  65 cc administered   0 cc discarded  Oral Contrast: NONE  Complications: None reported at time of study completion    PROCEDURE:  CT of the Abdomen and Pelvis was performed.  Sagittal and coronal reformats were performed.    FINDINGS:  LOWER CHEST: Small bilateral pleural effusions.    LIVER: Within normal limits.  BILE DUCTS: Normal caliber.  GALLBLADDER: Cholecystectomy.  SPLEEN: Splenectomy.  PANCREAS: Within normal limits.  ADRENALS: Within normal limits.  KIDNEYS/URETERS: Within normal limits.    BLADDER: Within normal limits.  REPRODUCTIVE ORGANS: Enlarged prostate    BOWEL: Fluid-filled nondilated bowel loops without transition point. No   bowel obstruction. Distal gastrectomy and gastrojejunostomy. Colectomy.  PERITONEUM: Small amount of abdominal and pelvic ascites with peritoneal   thickening and enhancement. No pneumoperitoneum.  VESSELS: Atherosclerotic calcifications.  RETROPERITONEUM/LYMPH NODES: No lymphadenopathy.  ABDOMINAL WALL: Within normal limits.  BONES: Degenerative changes.    IMPRESSION:  Decrease in amount of small abdominal and pelvic ascites since prior   examination 2022 with peritoneal thickening and enhancement,   consistent with reactive change.    < end of copied text >

## 2022-06-27 NOTE — DISCHARGE NOTE NURSING/CASE MANAGEMENT/SOCIAL WORK - PATIENT PORTAL LINK FT
You can access the FollowMyHealth Patient Portal offered by [facility] by registering at the following website: [email or web address]. By joining [facility]’s FollowMyHealth portal, you will also be able to view your health information using other applications (apps) compatible with our system.

## 2022-06-27 NOTE — DISCHARGE NOTE NURSING/CASE MANAGEMENT/SOCIAL WORK - NSSCNAMETXT_GEN_ALL_CORE
[facility] [place] [phone] initial visit will be day after discharge home. A nurse will call prior to the home visit.

## 2022-06-27 NOTE — DISCHARGE NOTE NURSING/CASE MANAGEMENT/SOCIAL WORK - NSDCPEFALRISK_GEN_ALL_CORE
For information on Fall & Injury Prevention, visit: [email or web address] OR  [email or web address] OR  https://www.cdc.gov/steadi/patient.html

## 2022-06-27 NOTE — DISCHARGE NOTE NURSING/CASE MANAGEMENT/SOCIAL WORK - NSDPDISTO_GEN_ALL_CORE
Home Home with home care Pt vitals stable. Pt denies chest pain and shortness of breath. Pt denies nausea. Pt pain assessed and remains at acceptable level. Dressing C/D/I. Safety maintained. Discharge education explained./Home with home care

## 2022-06-27 NOTE — PROGRESS NOTE ADULT - ASSESSMENT
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

## 2022-06-28 LAB
ANION GAP SERPL CALC-SCNC: 11 MMOL/L — SIGNIFICANT CHANGE UP (ref 7–14)
BUN SERPL-MCNC: 10 MG/DL — SIGNIFICANT CHANGE UP (ref 7–23)
CALCIUM SERPL-MCNC: 9 MG/DL — SIGNIFICANT CHANGE UP (ref 8.4–10.5)
CHLORIDE SERPL-SCNC: 102 MMOL/L — SIGNIFICANT CHANGE UP (ref 98–107)
CO2 SERPL-SCNC: 26 MMOL/L — SIGNIFICANT CHANGE UP (ref 22–31)
CREAT SERPL-MCNC: 1.18 MG/DL — SIGNIFICANT CHANGE UP (ref 0.5–1.3)
EGFR: 70 ML/MIN/1.73M2 — SIGNIFICANT CHANGE UP
GLUCOSE SERPL-MCNC: 117 MG/DL — HIGH (ref 70–99)
HCT VFR BLD CALC: 31 % — LOW (ref 39–50)
HGB BLD-MCNC: 10.5 G/DL — LOW (ref 13–17)
MAGNESIUM SERPL-MCNC: 2.1 MG/DL — SIGNIFICANT CHANGE UP (ref 1.6–2.6)
MCHC RBC-ENTMCNC: 28 PG — SIGNIFICANT CHANGE UP (ref 27–34)
MCHC RBC-ENTMCNC: 33.9 GM/DL — SIGNIFICANT CHANGE UP (ref 32–36)
MCV RBC AUTO: 82.7 FL — SIGNIFICANT CHANGE UP (ref 80–100)
NRBC # BLD: 0 /100 WBCS — SIGNIFICANT CHANGE UP
NRBC # FLD: 0.03 K/UL — HIGH
PHOSPHATE SERPL-MCNC: 3.5 MG/DL — SIGNIFICANT CHANGE UP (ref 2.5–4.5)
PLATELET # BLD AUTO: 537 K/UL — HIGH (ref 150–400)
POTASSIUM SERPL-MCNC: 3.7 MMOL/L — SIGNIFICANT CHANGE UP (ref 3.5–5.3)
POTASSIUM SERPL-SCNC: 3.7 MMOL/L — SIGNIFICANT CHANGE UP (ref 3.5–5.3)
RBC # BLD: 3.75 M/UL — LOW (ref 4.2–5.8)
RBC # FLD: 16.6 % — HIGH (ref 10.3–14.5)
SARS-COV-2 RNA SPEC QL NAA+PROBE: SIGNIFICANT CHANGE UP
SODIUM SERPL-SCNC: 139 MMOL/L — SIGNIFICANT CHANGE UP (ref 135–145)
VANCOMYCIN TROUGH SERPL-MCNC: 12.9 UG/ML — SIGNIFICANT CHANGE UP (ref 10–20)
WBC # BLD: 18.3 K/UL — HIGH (ref 3.8–10.5)
WBC # FLD AUTO: 18.3 K/UL — HIGH (ref 3.8–10.5)

## 2022-06-28 PROCEDURE — 99232 SBSQ HOSP IP/OBS MODERATE 35: CPT

## 2022-06-28 RX ORDER — VANCOMYCIN HCL 1 G
1000 VIAL (EA) INTRAVENOUS EVERY 8 HOURS
Refills: 0 | Status: DISCONTINUED | OUTPATIENT
Start: 2022-06-28 | End: 2022-06-30

## 2022-06-28 RX ADMIN — Medication 975 MILLIGRAM(S): at 12:57

## 2022-06-28 RX ADMIN — Medication 166.67 MILLIGRAM(S): at 07:06

## 2022-06-28 RX ADMIN — Medication 975 MILLIGRAM(S): at 19:30

## 2022-06-28 RX ADMIN — FAMOTIDINE 20 MILLIGRAM(S): 10 INJECTION INTRAVENOUS at 12:05

## 2022-06-28 RX ADMIN — TAMSULOSIN HYDROCHLORIDE 0.4 MILLIGRAM(S): 0.4 CAPSULE ORAL at 22:42

## 2022-06-28 RX ADMIN — SIMVASTATIN 10 MILLIGRAM(S): 20 TABLET, FILM COATED ORAL at 22:41

## 2022-06-28 RX ADMIN — Medication 975 MILLIGRAM(S): at 00:00

## 2022-06-28 RX ADMIN — Medication 975 MILLIGRAM(S): at 07:05

## 2022-06-28 RX ADMIN — Medication 250 MILLIGRAM(S): at 19:40

## 2022-06-28 RX ADMIN — Medication 975 MILLIGRAM(S): at 18:44

## 2022-06-28 RX ADMIN — PIPERACILLIN AND TAZOBACTAM 25 GRAM(S): 4; .5 INJECTION, POWDER, LYOPHILIZED, FOR SOLUTION INTRAVENOUS at 08:47

## 2022-06-28 RX ADMIN — ENOXAPARIN SODIUM 40 MILLIGRAM(S): 100 INJECTION SUBCUTANEOUS at 05:20

## 2022-06-28 RX ADMIN — PANTOPRAZOLE SODIUM 40 MILLIGRAM(S): 20 TABLET, DELAYED RELEASE ORAL at 05:20

## 2022-06-28 RX ADMIN — ESCITALOPRAM OXALATE 5 MILLIGRAM(S): 10 TABLET, FILM COATED ORAL at 12:05

## 2022-06-28 NOTE — PROGRESS NOTE ADULT - SUBJECTIVE AND OBJECTIVE BOX
Follow Up:      Inverval History/ROS:Patient is a 61y old  Male who presents with a chief complaint of Abdominal pain and intraabdominal fluid (25 Jun 2022 02:07)    afebrile     feels abdomen is softer        Allergies    barium sulfate (Unknown)    Intolerances        ANTIMICROBIALS:  vancomycin  IVPB 1250 every 12 hours        OTHER MEDS:  acetaminophen   IVPB .. 1000 milliGRAM(s) IV Intermittent every 6 hours  bisacodyl Suppository 10 milliGRAM(s) Rectal daily  enoxaparin Injectable 40 milliGRAM(s) SubCutaneous every 24 hours  escitalopram 5 milliGRAM(s) Oral daily  famotidine Injectable 20 milliGRAM(s) IV Push daily  influenza   Vaccine 0.5 milliLiter(s) IntraMuscular once  ondansetron Injectable 4 milliGRAM(s) IV Push every 6 hours  pantoprazole  Injectable 40 milliGRAM(s) IV Push every 24 hours  simvastatin 10 milliGRAM(s) Oral at bedtime  sucralfate suspension 1 Gram(s) Oral four times a day  tamsulosin 0.4 milliGRAM(s) Oral at bedtime      Vital Signs Last 24 Hrs  T(F): 100.1 (06-28-22 @ 16:26), Max: 100.1 (06-28-22 @ 16:26)  HR: 76 (06-28-22 @ 16:26)  BP: 135/76 (06-28-22 @ 16:26)  RR: 18 (06-28-22 @ 16:26)  SpO2: 99% (06-28-22 @ 16:26) (96% - 100%)    PHYSICAL EXAM:  General: [x ] non-toxic  HEAD/EYES: [ ] PERRL [x ] white sclera [ ] icterus  ENT:  [ ] normal [ ] supple [ ] thrush [ ] pharyngeal exudate  Cardiovascular:   [ ] murmur [ ] normal [ ] PPM/AICD  Respiratory:  [ ] clear to ausculation bilaterally  GI:  soft, non tender, midline wound with packing dressing  :  [ ] landin [ ] no CVA tenderness   Musculoskeletal:  [ ] no synovitis  Neurologic:  [x ] non-focal exam   Skin:  [x ] no rash  Psychiatric:  [x ] appropriate affect [x ] alert & oriented  Lines:  peripheral iv right arm                                     10.5   18.30 )-----------( 537      ( 28 Jun 2022 06:00 )             31.0 06-28    139  |  102  |  10  ----------------------------<  117  3.7   |  26  |  1.18  Ca    9.0      28 Jun 2022 06:00Phos  3.5     06-28Mg     2.10     06-28      vanco 12.5      MICROBIOLOGY:Culture Results:   Few Staphylococcus aureus (06-23-22 @ 19:53)  Culture Results:   Rare Methicillin Resistant Staphylococcus aureus (06-21-22 @ 13:36)  Culture Results:   Testing in progress (06-21-22 @ 13:36)  Culture Results:   No growth to date. (06-21-22 @ 07:16)  Culture Results:   No growth to date. (06-21-22 @ 06:00)      RADIOLOGY:  < from: CT Abdomen and Pelvis w/ IV Cont (06.24.22 @ 14:19) >  ACC: [id number] EXAM:  CT ABDOMEN AND PELVIS IC                          PROCEDURE DATE:  06/24/2022          INTERPRETATION:  CLINICAL INFORMATION: Abdominal distention. Ascites.   Peritoneum carcinomatosis with intraperitoneal chemotherapy.    COMPARISON: 6/21/2022    CONTRAST/COMPLICATIONS:  IV Contrast: Omnipaque 350  65 cc administered   0 cc discarded  Oral Contrast: NONE  Complications: None reported at time of study completion    PROCEDURE:  CT of the Abdomen and Pelvis was performed.  Sagittal and coronal reformats were performed.    FINDINGS:  LOWER CHEST: Small bilateral pleural effusions.    LIVER: Within normal limits.  BILE DUCTS: Normal caliber.  GALLBLADDER: Cholecystectomy.  SPLEEN: Splenectomy.  PANCREAS: Within normal limits.  ADRENALS: Within normal limits.  KIDNEYS/URETERS: Within normal limits.    BLADDER: Within normal limits.  REPRODUCTIVE ORGANS: Enlarged prostate    BOWEL: Fluid-filled nondilated bowel loops without transition point. No   bowel obstruction. Distal gastrectomy and gastrojejunostomy. Colectomy.  PERITONEUM: Small amount of abdominal and pelvic ascites with peritoneal   thickening and enhancement. No pneumoperitoneum.  VESSELS: Atherosclerotic calcifications.  RETROPERITONEUM/LYMPH NODES: No lymphadenopathy.  ABDOMINAL WALL: Within normal limits.  BONES: Degenerative changes.    IMPRESSION:  Decrease in amount of small abdominal and pelvic ascites since prior   examination 6/21/2022 with peritoneal thickening and enhancement,   consistent with reactive change.    < end of copied text >

## 2022-06-28 NOTE — PROGRESS NOTE ADULT - SUBJECTIVE AND OBJECTIVE BOX
SURGERY DAILY PROGRESS NOTE:     SUBJECTIVE/ROS: No acute events overnight. Patient seen and examined bedside on AM rounds.     OBJECTIVE:  Vital Signs Last 24 Hrs  T(C): 37.7 (27 Jun 2022 21:00), Max: 38.1 (27 Jun 2022 16:11)  T(F): 99.8 (27 Jun 2022 21:00), Max: 100.5 (27 Jun 2022 16:11)  HR: 83 (27 Jun 2022 21:00) (75 - 94)  BP: 146/81 (27 Jun 2022 21:00) (120/66 - 146/83)  BP(mean): --  RR: 18 (27 Jun 2022 21:00) (16 - 18)  SpO2: 100% (27 Jun 2022 21:00) (98% - 100%)    PHYSICAL EXAM:  Gen: No acute distress, but appears frustrated  Abd: Soft, diffuse TTP, mildly distended, no rebound or guarding, previous port sites with scant amount of drainage at umbilicus, no fluctuance/erythema  Ext: Warm and well-perfused  : urvashi                        10.1   18.12 )-----------( 448      ( 27 Jun 2022 04:52 )             30.5     06-27    139  |  102  |  9   ----------------------------<  122<H>  3.7   |  28  |  1.20    Ca    8.9      27 Jun 2022 04:52  Phos  3.7     06-27  Mg     2.00     06-27       I&O's Detail    26 Jun 2022 07:01  -  27 Jun 2022 07:00  --------------------------------------------------------  IN:    IV PiggyBack: 350 mL    Oral Fluid: 880 mL  Total IN: 1230 mL    OUT:    Voided (mL): 2400 mL  Total OUT: 2400 mL    Total NET: -1170 mL      27 Jun 2022 07:01  -  28 Jun 2022 01:34  --------------------------------------------------------  IN:    Oral Fluid: 660 mL  Total IN: 660 mL    OUT:    Voided (mL): 1450 mL  Total OUT: 1450 mL    Total NET: -790 mL          IMAGING:               SURGERY DAILY PROGRESS NOTE:     SUBJECTIVE/ROS: No acute events overnight. Patient seen and examined bedside on AM rounds. Patient states his pain is still there, denies N/V or any other complaints at this time     OBJECTIVE:  Vital Signs Last 24 Hrs    T(C): 37.1 (06-28-22 @ 05:21), Max: 38.1 (06-27-22 @ 16:11)  HR: 78 (06-28-22 @ 05:21) (75 - 94)  BP: 156/85 (06-28-22 @ 05:21) (120/66 - 156/85)  BP(mean): --  ABP: --  ABP(mean): --  RR: 18 (06-28-22 @ 05:21) (16 - 18)  SpO2: 99% (06-28-22 @ 05:21) (96% - 100%)  Wt(kg): --  CVP(mm Hg): --  CI: --  CAPILLARY BLOOD GLUCOSE       N/A      06-27 @ 07:01  -  06-28 @ 07:00  --------------------------------------------------------  IN:    Oral Fluid: 930 mL  Total IN: 930 mL    OUT:    Voided (mL): 2075 mL  Total OUT: 2075 mL    Total NET: -1145 mL          PHYSICAL EXAM:  Gen: No acute distress, but appears frustrated  Abd: Soft, some tenderness, mildly distended, no rebound or guarding, gauze packing changed in umbilicus wound  Ext: Warm and well-perfused     LABS    CBC (06-28 @ 06:00)                              10.5<L>                         18.30<H>  )----------------(  537<H>     --    % Neutrophils, --    % Lymphocytes, ANC: --                                  31.0<L>  CBC (06-27 @ 04:52)                              10.1<L>                         18.12<H>  )----------------(  448<H>     --    % Neutrophils, --    % Lymphocytes, ANC: --                                  30.5<L>    BMP (06-28 @ 06:00)             139     |  102     |  10    		Ca++ --      Ca 9.0                ---------------------------------( 117<H>		Mg 2.10               3.7     |  26      |  1.18  			Ph 3.5     BMP (06-27 @ 04:52)             139     |  102     |  9     		Ca++ --      Ca 8.9                ---------------------------------( 122<H>		Mg 2.00               3.7     |  28      |  1.20  			Ph 3.7                   -> .Blood Blood-Venous Culture (06-26 @ 00:15)     NG    NG    No growth to date.    -> .Blood Blood-Peripheral Culture (06-25 @ 18:35)     NG    NG    No growth to date.    -> .Abscess umbilicus Culture (06-23 @ 19:53)     NG    Methicillin resistant Staphylococcus aureus    Few Methicillin Resistant Staphylococcus aureus    -> .Body Fluid abdominal fluid drainage Culture (06-21 @ 13:36)       polymorphonuclear leukocytes seen  No organisms seen  by cytocentrifuge    Methicillin resistant Staphylococcus aureus    Rare Methicillin Resistant Staphylococcus aureus    -> .Blood Blood Culture (06-21 @ 07:16)     NG    NG    No Growth Final    -> .Blood Blood Culture (06-21 @ 06:00)     NG    NG    No Growth Final

## 2022-06-28 NOTE — PROGRESS NOTE ADULT - ASSESSMENT
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

## 2022-06-28 NOTE — PROGRESS NOTE ADULT - ASSESSMENT
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 No evidence of bowel obstruction. s/p IR drainage.    PLAN:  - Iodoform packing to ABD wound  - F/u ID recs  - Suppository  - Serial abdominal exams  - Monitor bowel function  - DVT PPx: LVX  - Diet: regular  - f/u blood clx  - Zosyn + vanc for staph coverage - appreciate ID recommendations  - Vanc adjusted - trough before 4th dose  - f/u lactate and AM labs    D Team Surgery   [id number]    61M metastatic appendiceal carcinoma with pseudomyxoma peritonei s/p cytoreductive surgery 2020 including partial gastrectomy, cholecystectomy, subtotal colectomy, diaphragm stripping, omentectomy, splenectomy s/p FOLFOX x 6 months and HIPEC and now s/p PIPAC x 8 most recently 6/3 presenting with acute onset abdominal pain, nausea after an outpatient surveillance CT with barium contrast. No evidence of bowel obstruction. s/p IR drainage.    PLAN:  - packing to ABD wound  - F/u ID recs  - Suppository  - Serial abdominal exams  - Monitor bowel function  - DVT PPx: LVX  - Diet: regular  - f/u blood clx  - Zosyn + vanc for staph coverage - appreciate ID recommendations  - Vanc adjusted - trough before 4th dose  - f/u lactate and AM labs    D Team Surgery   [id number]

## 2022-06-29 LAB
ANION GAP SERPL CALC-SCNC: 12 MMOL/L — SIGNIFICANT CHANGE UP (ref 7–14)
BUN SERPL-MCNC: 11 MG/DL — SIGNIFICANT CHANGE UP (ref 7–23)
CALCIUM SERPL-MCNC: 9.1 MG/DL — SIGNIFICANT CHANGE UP (ref 8.4–10.5)
CHLORIDE SERPL-SCNC: 103 MMOL/L — SIGNIFICANT CHANGE UP (ref 98–107)
CO2 SERPL-SCNC: 26 MMOL/L — SIGNIFICANT CHANGE UP (ref 22–31)
CREAT SERPL-MCNC: 1.09 MG/DL — SIGNIFICANT CHANGE UP (ref 0.5–1.3)
CULTURE RESULTS: SIGNIFICANT CHANGE UP
EGFR: 77 ML/MIN/1.73M2 — SIGNIFICANT CHANGE UP
GLUCOSE SERPL-MCNC: 115 MG/DL — HIGH (ref 70–99)
HCT VFR BLD CALC: 31.3 % — LOW (ref 39–50)
HGB BLD-MCNC: 10.5 G/DL — LOW (ref 13–17)
MAGNESIUM SERPL-MCNC: 2.1 MG/DL — SIGNIFICANT CHANGE UP (ref 1.6–2.6)
MCHC RBC-ENTMCNC: 27.8 PG — SIGNIFICANT CHANGE UP (ref 27–34)
MCHC RBC-ENTMCNC: 33.5 GM/DL — SIGNIFICANT CHANGE UP (ref 32–36)
MCV RBC AUTO: 82.8 FL — SIGNIFICANT CHANGE UP (ref 80–100)
NRBC # BLD: 0 /100 WBCS — SIGNIFICANT CHANGE UP
NRBC # FLD: 0.03 K/UL — HIGH
ORGANISM # SPEC MICROSCOPIC CNT: SIGNIFICANT CHANGE UP
ORGANISM # SPEC MICROSCOPIC CNT: SIGNIFICANT CHANGE UP
PHOSPHATE SERPL-MCNC: 3.4 MG/DL — SIGNIFICANT CHANGE UP (ref 2.5–4.5)
PLATELET # BLD AUTO: 649 K/UL — HIGH (ref 150–400)
POTASSIUM SERPL-MCNC: 3.6 MMOL/L — SIGNIFICANT CHANGE UP (ref 3.5–5.3)
POTASSIUM SERPL-SCNC: 3.6 MMOL/L — SIGNIFICANT CHANGE UP (ref 3.5–5.3)
RBC # BLD: 3.78 M/UL — LOW (ref 4.2–5.8)
RBC # FLD: 16.7 % — HIGH (ref 10.3–14.5)
SODIUM SERPL-SCNC: 141 MMOL/L — SIGNIFICANT CHANGE UP (ref 135–145)
SPECIMEN SOURCE: SIGNIFICANT CHANGE UP
VANCOMYCIN TROUGH SERPL-MCNC: 19.2 UG/ML — SIGNIFICANT CHANGE UP (ref 10–20)
WBC # BLD: 16.94 K/UL — HIGH (ref 3.8–10.5)
WBC # FLD AUTO: 16.94 K/UL — HIGH (ref 3.8–10.5)

## 2022-06-29 RX ADMIN — Medication 250 MILLIGRAM(S): at 06:01

## 2022-06-29 RX ADMIN — SIMVASTATIN 10 MILLIGRAM(S): 20 TABLET, FILM COATED ORAL at 21:57

## 2022-06-29 RX ADMIN — TAMSULOSIN HYDROCHLORIDE 0.4 MILLIGRAM(S): 0.4 CAPSULE ORAL at 21:57

## 2022-06-29 RX ADMIN — Medication 250 MILLIGRAM(S): at 12:01

## 2022-06-29 RX ADMIN — PANTOPRAZOLE SODIUM 40 MILLIGRAM(S): 20 TABLET, DELAYED RELEASE ORAL at 06:02

## 2022-06-29 RX ADMIN — Medication 250 MILLIGRAM(S): at 20:58

## 2022-06-29 RX ADMIN — Medication 975 MILLIGRAM(S): at 00:53

## 2022-06-29 RX ADMIN — ENOXAPARIN SODIUM 40 MILLIGRAM(S): 100 INJECTION SUBCUTANEOUS at 06:02

## 2022-06-29 RX ADMIN — ESCITALOPRAM OXALATE 5 MILLIGRAM(S): 10 TABLET, FILM COATED ORAL at 12:01

## 2022-06-29 RX ADMIN — Medication 975 MILLIGRAM(S): at 18:28

## 2022-06-29 RX ADMIN — FAMOTIDINE 20 MILLIGRAM(S): 10 INJECTION INTRAVENOUS at 12:01

## 2022-06-29 RX ADMIN — Medication 975 MILLIGRAM(S): at 06:03

## 2022-06-29 RX ADMIN — Medication 975 MILLIGRAM(S): at 12:00

## 2022-06-29 RX ADMIN — Medication 975 MILLIGRAM(S): at 01:55

## 2022-06-29 NOTE — PROGRESS NOTE ADULT - ASSESSMENT
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 No evidence of bowel obstruction. s/p IR drainage.    PLAN:  - packing to ABD wound  - F/u ID recs  - Suppository  - Serial abdominal exams  - Monitor bowel function  - DVT PPx: LVX  - Diet: regular  - f/u blood clx  - Zosyn + vanc for staph coverage - appreciate ID recommendations  - Vanc adjusted - trough before 4th dose  - f/u lactate and AM labs    D Team Surgery   [id number]

## 2022-06-29 NOTE — PROGRESS NOTE ADULT - SUBJECTIVE AND OBJECTIVE BOX
SURGERY DAILY PROGRESS NOTE:     INTERVAL: Vanc trough, subtherapeutic. Increaesd vanc to 1000mg Q8 from 1250mg Q12    SUBJECTIVE/ROS: No acute events overnight. Patient seen and examined bedside on AM rounds.     OBJECTIVE:  Vital Signs Last 24 Hrs  T(C): 37.5 (29 Jun 2022 00:49), Max: 38.3 (28 Jun 2022 18:48)  T(F): 99.5 (29 Jun 2022 00:49), Max: 100.9 (28 Jun 2022 18:48)  HR: 79 (29 Jun 2022 00:49) (65 - 85)  BP: 165/93 (29 Jun 2022 00:49) (118/63 - 165/93)  BP(mean): --  RR: 18 (29 Jun 2022 00:49) (18 - 18)  SpO2: 98% (29 Jun 2022 00:49) (96% - 99%)    PHYSICAL EXAM:  Gen: No acute distress, but appears frustrated  Abd: Soft, some tenderness, mildly distended, no rebound or guarding, gauze packing changed in umbilicus wound  Ext: Warm and well-perfused                        10.5   18.30 )-----------( 537      ( 28 Jun 2022 06:00 )             31.0     06-28    139  |  102  |  10  ----------------------------<  117<H>  3.7   |  26  |  1.18    Ca    9.0      28 Jun 2022 06:00  Phos  3.5     06-28  Mg     2.10     06-28       I&O's Detail    27 Jun 2022 07:01  -  28 Jun 2022 07:00  --------------------------------------------------------  IN:    Oral Fluid: 930 mL  Total IN: 930 mL    OUT:    Voided (mL): 2075 mL  Total OUT: 2075 mL    Total NET: -1145 mL      28 Jun 2022 07:01  -  29 Jun 2022 01:14  --------------------------------------------------------  IN:    IV PiggyBack: 250 mL    Oral Fluid: 270 mL  Total IN: 520 mL    OUT:    Voided (mL): 1400 mL  Total OUT: 1400 mL    Total NET: -880 mL          IMAGING:               SURGERY DAILY PROGRESS NOTE:     INTERVAL: Vanc trough, subtherapeutic. Increaesd vanc to 1000mg Q8 from 1250mg Q12    SUBJECTIVE/ROS: No acute events overnight. Patient seen and examined bedside on AM rounds.     OBJECTIVE:  Vital Signs Last 24 Hrs  ICU Vital Signs Last 24 Hrs  T(C): 37.3 (29 Jun 2022 06:06), Max: 38.3 (28 Jun 2022 18:48)  T(F): 99.2 (29 Jun 2022 06:06), Max: 100.9 (28 Jun 2022 18:48)  HR: 81 (29 Jun 2022 06:06) (65 - 85)  BP: 159/93 (29 Jun 2022 06:06) (118/63 - 165/93)  BP(mean): --  ABP: --  ABP(mean): --  RR: 17 (29 Jun 2022 06:06) (17 - 18)  SpO2: 100% (29 Jun 2022 06:06) (96% - 100%)    INS AND OUTS  I&O's Detail    28 Jun 2022 07:01  -  29 Jun 2022 07:00  --------------------------------------------------------  IN:    IV PiggyBack: 250 mL    Oral Fluid: 720 mL  Total IN: 970 mL    OUT:    Voided (mL): 1900 mL  Total OUT: 1900 mL    Total NET: -930 mL      PHYSICAL EXAM:  Gen: No acute distress, but appears frustrated  Abd: Soft, some tenderness, mildly distended, no rebound or guarding, gauze packing changed in umbilicus wound  Ext: Warm and well-perfused              LABS                        10.5   16.94 )-----------( 649      ( 29 Jun 2022 05:20 )             31.3   06-29    141  |  103  |  11  ----------------------------<  115<H>  3.6   |  26  |  1.09    Ca    9.1      29 Jun 2022 05:20  Phos  3.4     06-29  Mg     2.10     06-29            IMAGING:

## 2022-06-30 VITALS
TEMPERATURE: 100 F | RESPIRATION RATE: 17 BRPM | OXYGEN SATURATION: 100 % | SYSTOLIC BLOOD PRESSURE: 139 MMHG | HEART RATE: 76 BPM | DIASTOLIC BLOOD PRESSURE: 80 MMHG

## 2022-06-30 LAB
ANION GAP SERPL CALC-SCNC: 12 MMOL/L — SIGNIFICANT CHANGE UP (ref 7–14)
APTT BLD: 30.9 SEC — SIGNIFICANT CHANGE UP (ref 27–36.3)
BUN SERPL-MCNC: 12 MG/DL — SIGNIFICANT CHANGE UP (ref 7–23)
CALCIUM SERPL-MCNC: 9.2 MG/DL — SIGNIFICANT CHANGE UP (ref 8.4–10.5)
CHLORIDE SERPL-SCNC: 99 MMOL/L — SIGNIFICANT CHANGE UP (ref 98–107)
CO2 SERPL-SCNC: 26 MMOL/L — SIGNIFICANT CHANGE UP (ref 22–31)
CREAT SERPL-MCNC: 1.03 MG/DL — SIGNIFICANT CHANGE UP (ref 0.5–1.3)
EGFR: 82 ML/MIN/1.73M2 — SIGNIFICANT CHANGE UP
GLUCOSE SERPL-MCNC: 146 MG/DL — HIGH (ref 70–99)
HCT VFR BLD CALC: 32.9 % — LOW (ref 39–50)
HGB BLD-MCNC: 10.6 G/DL — LOW (ref 13–17)
INR BLD: 1.25 RATIO — HIGH (ref 0.88–1.16)
MAGNESIUM SERPL-MCNC: 2 MG/DL — SIGNIFICANT CHANGE UP (ref 1.6–2.6)
MCHC RBC-ENTMCNC: 27.2 PG — SIGNIFICANT CHANGE UP (ref 27–34)
MCHC RBC-ENTMCNC: 32.2 GM/DL — SIGNIFICANT CHANGE UP (ref 32–36)
MCV RBC AUTO: 84.4 FL — SIGNIFICANT CHANGE UP (ref 80–100)
NRBC # BLD: 0 /100 WBCS — SIGNIFICANT CHANGE UP
NRBC # FLD: 0.02 K/UL — HIGH
PHOSPHATE SERPL-MCNC: 3.5 MG/DL — SIGNIFICANT CHANGE UP (ref 2.5–4.5)
PLATELET # BLD AUTO: 737 K/UL — HIGH (ref 150–400)
POTASSIUM SERPL-MCNC: 3.9 MMOL/L — SIGNIFICANT CHANGE UP (ref 3.5–5.3)
POTASSIUM SERPL-SCNC: 3.9 MMOL/L — SIGNIFICANT CHANGE UP (ref 3.5–5.3)
PROTHROM AB SERPL-ACNC: 14.5 SEC — HIGH (ref 10.5–13.4)
RBC # BLD: 3.9 M/UL — LOW (ref 4.2–5.8)
RBC # FLD: 16.7 % — HIGH (ref 10.3–14.5)
SODIUM SERPL-SCNC: 137 MMOL/L — SIGNIFICANT CHANGE UP (ref 135–145)
WBC # BLD: 18.01 K/UL — HIGH (ref 3.8–10.5)
WBC # FLD AUTO: 18.01 K/UL — HIGH (ref 3.8–10.5)

## 2022-06-30 PROCEDURE — 99232 SBSQ HOSP IP/OBS MODERATE 35: CPT

## 2022-06-30 RX ORDER — ACETAMINOPHEN 500 MG
3 TABLET ORAL
Qty: 0 | Refills: 0 | DISCHARGE
Start: 2022-06-30

## 2022-06-30 RX ADMIN — Medication 250 MILLIGRAM(S): at 12:04

## 2022-06-30 RX ADMIN — Medication 100 MILLIGRAM(S): at 18:14

## 2022-06-30 RX ADMIN — Medication 250 MILLIGRAM(S): at 05:39

## 2022-06-30 RX ADMIN — Medication 975 MILLIGRAM(S): at 07:03

## 2022-06-30 RX ADMIN — Medication 975 MILLIGRAM(S): at 19:39

## 2022-06-30 RX ADMIN — FAMOTIDINE 20 MILLIGRAM(S): 10 INJECTION INTRAVENOUS at 12:04

## 2022-06-30 RX ADMIN — Medication 975 MILLIGRAM(S): at 05:55

## 2022-06-30 RX ADMIN — ENOXAPARIN SODIUM 40 MILLIGRAM(S): 100 INJECTION SUBCUTANEOUS at 05:41

## 2022-06-30 RX ADMIN — Medication 975 MILLIGRAM(S): at 13:23

## 2022-06-30 RX ADMIN — ESCITALOPRAM OXALATE 5 MILLIGRAM(S): 10 TABLET, FILM COATED ORAL at 12:04

## 2022-06-30 RX ADMIN — Medication 975 MILLIGRAM(S): at 06:59

## 2022-06-30 RX ADMIN — Medication 975 MILLIGRAM(S): at 14:45

## 2022-06-30 RX ADMIN — PANTOPRAZOLE SODIUM 40 MILLIGRAM(S): 20 TABLET, DELAYED RELEASE ORAL at 05:42

## 2022-06-30 RX ADMIN — Medication 975 MILLIGRAM(S): at 00:54

## 2022-06-30 NOTE — PROGRESS NOTE ADULT - ASSESSMENT
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 on chemo  presented 6/20 with abdominal pain found to have intraabdominal abscess 21.3 x 17.6 x 1.3 cm which was aspirated 6/21 growing MRSA  Has additional mid abd incision drainage- culture with MRSA as well    Post splenectomy patient   S/p PIPAC    Received  vanco and zosyn; now vanco alone    Repeat CT with decreased ascites    WBC 18K       planning d/c soon    MRSA sensitive doxy, bactrim    can transition to doxycycline 100 mg po Q 12 h x 10 days    can f/u in ID clinic next week to check WBC    if fever and or WBC remain elevated repeat imaging to look for un drained focus of infection

## 2022-06-30 NOTE — PROGRESS NOTE ADULT - PROVIDER SPECIALTY LIST ADULT
Infectious Disease
Surgery
Infectious Disease
Surgery
Infectious Disease
Infectious Disease
Surgery
Surgery

## 2022-06-30 NOTE — PROGRESS NOTE ADULT - ASSESSMENT
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 No evidence of bowel obstruction. s/p IR drainage.    PLAN:  - packing to ABD wound  - F/u ID recs  - Suppository  - Serial abdominal exams  - Monitor bowel function  - DVT PPx: LVX  - Diet: regular  - f/u blood clx  - Zosyn + vanc for staph coverage - appreciate ID recommendations  - Vanc adjusted - trough before 4th dose  - f/u lactate and AM labs    D Team Surgery   [id number]      61M metastatic appendiceal carcinoma with pseudomyxoma peritonei s/p cytoreductive surgery 2020 including partial gastrectomy, cholecystectomy, subtotal colectomy, diaphragm stripping, omentectomy, splenectomy s/p FOLFOX x 6 months and HIPEC and now s/p PIPAC x 8 most recently 6/3 presenting with acute onset abdominal pain, nausea after an outpatient surveillance CT with barium contrast. No evidence of bowel obstruction. s/p IR drainage.    PLAN:  - packing to ABD wound (corner of gauze, cover with paper tape)  - F/u ID recs re: vancomycin  - Serial abdominal exams  - Monitor bowel function - Suppository  - DVT PPx: LVX  - Diet: regular  - vanc for staph coverage - appreciate ID recommendations    D Team Surgery   [id number]      61M metastatic appendiceal carcinoma with pseudomyxoma peritonei s/p cytoreductive surgery 2020 including partial gastrectomy, cholecystectomy, subtotal colectomy, diaphragm stripping, omentectomy, splenectomy s/p FOLFOX x 6 months and HIPEC and now s/p PIPAC x 8 most recently 6/3 presenting with acute onset abdominal pain, nausea after an outpatient surveillance CT with barium contrast. No evidence of bowel obstruction. s/p IR drainage.    PLAN:  - packing to ABD wound (corner of gauze, cover with paper tape)  - F/u ID recs re: vancomycin  - Serial abdominal exams  - Monitor bowel function - Suppository  - DVT PPx: LVX  - Diet: regular  - vanc for staph coverage - appreciate ID recommendations, discharge recommendations  - VNS for wound care    D Team Surgery   [id number]

## 2022-06-30 NOTE — PROGRESS NOTE ADULT - REASON FOR ADMISSION
Abdominal pain and intraabdominal fluid

## 2022-06-30 NOTE — PROGRESS NOTE ADULT - SUBJECTIVE AND OBJECTIVE BOX
SURGERY DAILY PROGRESS NOTE:     INTERVAL: Vanc trough therapeutic at 19.2. Continued vanc to 1000mg Q8.      SUBJECTIVE/ROS: No acute events overnight. Patient seen and examined bedside on AM rounds.     OBJECTIVE:  Vital Signs Last 24 Hrs  T(C): 37.4 (30 Jun 2022 00:37), Max: 37.9 (29 Jun 2022 20:26)  T(F): 99.3 (30 Jun 2022 00:37), Max: 100.2 (29 Jun 2022 20:26)  HR: 79 (30 Jun 2022 00:37) (75 - 99)  BP: 150/89 (30 Jun 2022 00:37) (150/89 - 159/93)  BP(mean): --  RR: 17 (30 Jun 2022 00:37) (17 - 17)  SpO2: 98% (30 Jun 2022 00:37) (97% - 100%)    PHYSICAL EXAM:  Gen: No acute distress, but appears frustrated  Abd: Soft, some tenderness, mildly distended, no rebound or guarding, gauze packing changed in umbilicus wound  Ext: Warm and well-perfused                        10.5   16.94 )-----------( 649      ( 29 Jun 2022 05:20 )             31.3     06-29    141  |  103  |  11  ----------------------------<  115<H>  3.6   |  26  |  1.09    Ca    9.1      29 Jun 2022 05:20  Phos  3.4     06-29  Mg     2.10     06-29       I&O's Detail    28 Jun 2022 07:01  -  29 Jun 2022 07:00  --------------------------------------------------------  IN:    IV PiggyBack: 250 mL    Oral Fluid: 720 mL  Total IN: 970 mL    OUT:    Voided (mL): 1900 mL  Total OUT: 1900 mL    Total NET: -930 mL      29 Jun 2022 07:01  -  30 Jun 2022 03:27  --------------------------------------------------------  IN:    IV PiggyBack: 250 mL    Oral Fluid: 510 mL  Total IN: 760 mL    OUT:    Voided (mL): 1500 mL  Total OUT: 1500 mL    Total NET: -740 mL     SURGERY DAILY PROGRESS NOTE:     INTERVAL: Vanc trough therapeutic at 19.2. Continued vanc to 1000mg Q8.      SUBJECTIVE/ROS: No acute events overnight. Patient seen and examined bedside on AM rounds. Patient seen and examined on AM rounds. Patient reports that they're feeling well. Denies fever, chills. Reports pain as controlled. No complaints at this time.      OBJECTIVE:  Vital Signs Last 24 Hrs  T(C): 37.4 (30 Jun 2022 00:37), Max: 37.9 (29 Jun 2022 20:26)  T(F): 99.3 (30 Jun 2022 00:37), Max: 100.2 (29 Jun 2022 20:26)  HR: 79 (30 Jun 2022 00:37) (75 - 99)  BP: 150/89 (30 Jun 2022 00:37) (150/89 - 159/93)  BP(mean): --  RR: 17 (30 Jun 2022 00:37) (17 - 17)  SpO2: 98% (30 Jun 2022 00:37) (97% - 100%)    PHYSICAL EXAM:  Gen: No acute distress, but appears frustrated  Abd: Soft, nontender, mildly distended, no rebound or guarding, gauze packing changed in umbilicus wound  Ext: Warm and well-perfused                        10.5   16.94 )-----------( 649      ( 29 Jun 2022 05:20 )             31.3     06-29    141  |  103  |  11  ----------------------------<  115<H>  3.6   |  26  |  1.09    Ca    9.1      29 Jun 2022 05:20  Phos  3.4     06-29  Mg     2.10     06-29       I&O's Detail    28 Jun 2022 07:01  -  29 Jun 2022 07:00  --------------------------------------------------------  IN:    IV PiggyBack: 250 mL    Oral Fluid: 720 mL  Total IN: 970 mL    OUT:    Voided (mL): 1900 mL  Total OUT: 1900 mL    Total NET: -930 mL      29 Jun 2022 07:01  -  30 Jun 2022 03:27  --------------------------------------------------------  IN:    IV PiggyBack: 250 mL    Oral Fluid: 510 mL  Total IN: 760 mL    OUT:    Voided (mL): 1500 mL  Total OUT: 1500 mL    Total NET: -740 mL

## 2022-06-30 NOTE — PROGRESS NOTE ADULT - SUBJECTIVE AND OBJECTIVE BOX
Follow Up:      Inverval History/ROS:Patient is a 61y old  Male who presents with a chief complaint of Abdominal pain and intraabdominal fluid (25 Jun 2022 02:07)    feels Ok.  IV changed to new site.   some infiltration at prior IV site    feels some throat discomfort when eating    Allergies    barium sulfate (Unknown)    Intolerances        ANTIMICROBIALS:  vancomycin  IVPB 1250 every 12 hours        OTHER MEDS:  acetaminophen   IVPB .. 1000 milliGRAM(s) IV Intermittent every 6 hours  bisacodyl Suppository 10 milliGRAM(s) Rectal daily  enoxaparin Injectable 40 milliGRAM(s) SubCutaneous every 24 hours  escitalopram 5 milliGRAM(s) Oral daily  famotidine Injectable 20 milliGRAM(s) IV Push daily  influenza   Vaccine 0.5 milliLiter(s) IntraMuscular once  ondansetron Injectable 4 milliGRAM(s) IV Push every 6 hours  pantoprazole  Injectable 40 milliGRAM(s) IV Push every 24 hours  simvastatin 10 milliGRAM(s) Oral at bedtime  sucralfate suspension 1 Gram(s) Oral four times a day  tamsulosin 0.4 milliGRAM(s) Oral at bedtime    Vital Signs Last 24 Hrs  T(F): 99 (06-30-22 @ 13:07), Max: 100.2 (06-29-22 @ 20:26)  HR: 78 (06-30-22 @ 13:07)  BP: 140/55 (06-30-22 @ 13:07)  RR: 16 (06-30-22 @ 13:07)  SpO2: 100% (06-30-22 @ 13:07) (97% - 100%)    PHYSICAL EXAM:  General: [x ] non-toxic  HEAD/EYES: [ ] PERRL [x ] white sclera [ ] icterus  ENT:  [ ] normal [ ] supple [ ] thrush [ ] pharyngeal exudate  Cardiovascular:   [ ] murmur [ ] normal [ ] PPM/AICD  Respiratory:  no respiratory distress  GI:  soft, non tender, midline wound with packing dressing  :  [ ] landin [ ] no CVA tenderness   Musculoskeletal:  [ ] no synovitis  Neurologic:  [x ] non-focal exam   Skin:  [x ] no rash  Psychiatric:  [x ] appropriate affect [x ] alert & oriented  Lines:  peripheral iv right arm   left arm 2 cm induration prior iv                                     10.6   18.01 )-----------( 737      ( 30 Jun 2022 06:43 )             32.9 06-30    137  |  99  |  12  ----------------------------<  146  3.9   |  26  |  1.03  Ca    9.2      30 Jun 2022 06:43Phos  3.5     06-30Mg     2.00     06-30    vanco 19.2      MICROBIOLOGY:Culture Results:   Few Staphylococcus aureus (06-23-22 @ 19:53)  Culture Results:   Rare Methicillin Resistant Staphylococcus aureus (06-21-22 @ 13:36)  Culture Results:   Testing in progress (06-21-22 @ 13:36)  Culture Results:   No growth to date. (06-21-22 @ 07:16)  Culture Results:   No growth to date. (06-21-22 @ 06:00)      RADIOLOGY:  < from: CT Abdomen and Pelvis w/ IV Cont (06.24.22 @ 14:19) >  ACC: [id number] EXAM:  CT ABDOMEN AND PELVIS IC                          PROCEDURE DATE:  06/24/2022          INTERPRETATION:  CLINICAL INFORMATION: Abdominal distention. Ascites.   Peritoneum carcinomatosis with intraperitoneal chemotherapy.    COMPARISON: 6/21/2022    CONTRAST/COMPLICATIONS:  IV Contrast: Omnipaque 350  65 cc administered   0 cc discarded  Oral Contrast: NONE  Complications: None reported at time of study completion    PROCEDURE:  CT of the Abdomen and Pelvis was performed.  Sagittal and coronal reformats were performed.    FINDINGS:  LOWER CHEST: Small bilateral pleural effusions.    LIVER: Within normal limits.  BILE DUCTS: Normal caliber.  GALLBLADDER: Cholecystectomy.  SPLEEN: Splenectomy.  PANCREAS: Within normal limits.  ADRENALS: Within normal limits.  KIDNEYS/URETERS: Within normal limits.    BLADDER: Within normal limits.  REPRODUCTIVE ORGANS: Enlarged prostate    BOWEL: Fluid-filled nondilated bowel loops without transition point. No   bowel obstruction. Distal gastrectomy and gastrojejunostomy. Colectomy.  PERITONEUM: Small amount of abdominal and pelvic ascites with peritoneal   thickening and enhancement. No pneumoperitoneum.  VESSELS: Atherosclerotic calcifications.  RETROPERITONEUM/LYMPH NODES: No lymphadenopathy.  ABDOMINAL WALL: Within normal limits.  BONES: Degenerative changes.    IMPRESSION:  Decrease in amount of small abdominal and pelvic ascites since prior   examination 6/21/2022 with peritoneal thickening and enhancement,   consistent with reactive change.    < end of copied text >

## 2022-07-01 ENCOUNTER — NON-APPOINTMENT (OUTPATIENT)
Age: 62
End: 2022-07-01

## 2022-07-01 LAB
CULTURE RESULTS: SIGNIFICANT CHANGE UP
CULTURE RESULTS: SIGNIFICANT CHANGE UP
SPECIMEN SOURCE: SIGNIFICANT CHANGE UP
SPECIMEN SOURCE: SIGNIFICANT CHANGE UP

## 2022-07-04 NOTE — H&P PST ADULT - NEUROLOGICAL SYMPTOMS
FAMILY HISTORY:  No pertinent family history in first degree relatives neuropathy to bilateral hands and feet/paresthesias

## 2022-07-07 ENCOUNTER — APPOINTMENT (OUTPATIENT)
Dept: INFECTIOUS DISEASE | Facility: CLINIC | Age: 62
End: 2022-07-07

## 2022-07-07 ENCOUNTER — APPOINTMENT (OUTPATIENT)
Dept: SURGICAL ONCOLOGY | Facility: CLINIC | Age: 62
End: 2022-07-07

## 2022-07-07 VITALS
HEART RATE: 83 BPM | WEIGHT: 119 LBS | TEMPERATURE: 98.5 F | HEIGHT: 66 IN | OXYGEN SATURATION: 97 % | BODY MASS INDEX: 19.13 KG/M2 | SYSTOLIC BLOOD PRESSURE: 135 MMHG | DIASTOLIC BLOOD PRESSURE: 84 MMHG

## 2022-07-07 VITALS
RESPIRATION RATE: 16 BRPM | DIASTOLIC BLOOD PRESSURE: 85 MMHG | HEART RATE: 61 BPM | HEIGHT: 66 IN | TEMPERATURE: 97.6 F | SYSTOLIC BLOOD PRESSURE: 136 MMHG | OXYGEN SATURATION: 100 % | BODY MASS INDEX: 18.96 KG/M2 | WEIGHT: 118 LBS

## 2022-07-07 PROCEDURE — 99213 OFFICE O/P EST LOW 20 MIN: CPT

## 2022-07-07 PROCEDURE — 99212 OFFICE O/P EST SF 10 MIN: CPT

## 2022-07-07 NOTE — ASSESSMENT
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 Mason after f/u today

## 2022-07-07 NOTE — HISTORY OF PRESENT ILLNESS
[FreeTextEntry1] : Feels OK.  No fever. sweats at times.\par Notes left sided abdominal discomfort relieved by tylenol. \par Tolerating doxycycline BID\par Seeing Dr. Mason today.\par Lab work done on 7/5 discussed

## 2022-07-07 NOTE — REASON FOR VISIT
[Follow-Up Visit] : a follow-up visit for [de-identified] : diagnostic laparoscopy 5/19/21, PIPAC 6/3/21, 7/22/21, 9/2/21, 10/2021, 12/2/21, 2/10/22, 4/7/22, 6/3/22 [Spouse] : spouse

## 2022-07-07 NOTE — PHYSICAL EXAM
[Normal] : oriented to person, place and time, with appropriate affect [de-identified] : abdomen soft, non-distended, non-tender. no masses. laparotomy and drain incisions well healed. Port sites healing well.

## 2022-07-07 NOTE — REVIEW OF SYSTEMS
[As Noted in HPI] : as noted in HPI [Fever] : no fever [Chills] : no chills [Skin Wound] : skin wound [Negative] : Psychiatric [FreeTextEntry2] : sweats

## 2022-07-07 NOTE — PHYSICAL EXAM
[General Appearance - In No Acute Distress] : in no acute distress [General Appearance - Alert] : alert [General Appearance - Well-Appearing] : healthy appearing [Sclera] : the sclera and conjunctiva were normal [Respiration, Rhythm And Depth] : normal respiratory rhythm and effort [Auscultation Breath Sounds / Voice Sounds] : lungs were clear to auscultation bilaterally [Heart Rate And Rhythm] : heart rate was normal and rhythm regular [Heart Sounds] : normal S1 and S2 [Abdomen Soft] : soft [FreeTextEntry1] : midline wound with packing no drainage [Motor Tone] : muscle strength and tone were normal [] : no rash [Oriented To Time, Place, And Person] : oriented to person, place, and time [Affect] : the affect was normal

## 2022-07-07 NOTE — ASSESSMENT
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 \par No extraperitoneal disease \par Performance status- ecog 1\par \par He is feeling well at this point. \par Disease was reasonably stable over the last year, now with progression requiring paracentesis-- 1.6L drained May 2021. \par \par Given his progression within 1 year of CRS/ HIPEC, as well as R2b/ CC 2 resection at first operation I do not think additional cytoreductive surgery is feasible. I have notified his surgeon from [place], Dr. Pteers who agrees. \par \par I discussed case with patients medical oncologist who favors regional approach over additional systemic therapy.\par \par Doing well after 7th PIPAC. \par \par PCI PIPAC 1: 29\par PCI PIPAC 2: 28\par PCI PIPAC 3: 24\par PCI PIPAC 4: \par PCI PIPAC 5: 22\par PIPAC#6: 24\par PIPAC 7: \par PIPAC 8:  unable to determine \par \par Plan: \par [] Labs next week\par [] CT in 1-2 weeks \par [] Continue doxycycline PO\par [] DIscuss with ID\par \par \par \par

## 2022-07-07 NOTE — HISTORY OF PRESENT ILLNESS
[de-identified] : Mr. Arias is a 60 year old gentleman referred to me by Dr. 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 Herberth Peters. \par \par PCI: 34\par CC: R2b\par \par Mr. Arias has since moved to [place] to be closer to family and has established care in [facility]. Lives in [place]. \par \par Cytoreduction included a splenectomy and distal gastrectomy, omentectomy, cholecystectomy, subtotal colectomy and diaphragm stripping. 4L of mucinous ascites was drained. 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 As his tumor was felt to be low grade, he did not receive any systemic therapy up front. He now reports feeling well. He reports 3-4 loose but not watery BM's/day. He reports improving weight and energy levels. He denies any PO intolerance. He plays golf and does nature walks close to [facility]. \par \par CEA \par 6/5/20: 3.8\par 8/12/20: 6.1\par 4/7: 10.1 \par \par Pathology: \par 5/7/21: Paracentesis, cytology negative for malignant cells. 1.6 Liters removed. \par 4/27/21: Paracentesis, positive for malignancy, most consistent with low grade mucinous carcinoma \par 5/7/2020: CRS/ HIPEC: ([facility] review)  Well-differentiated mucinous adenocarcinoma of appendix with invasion through serosa, perineural invasion. He underwent subtotal colectomy with positive margins. 2/14 nodes involved. [facility] review: low grade appendiceal mucinous neoplasm\par \par Scans were obtained at [facility] and demonstrated: \par 4/30/21: loculated intraperitoneal fluid. No bowel obstruction. \par \par Last colonoscopy May 2020. \par \par Underwent diagnostic laparoscopy by me on 5/19/21. PCI approx 25. Evacuated 1.4 liters ascites. Biopsies consistent with mucinous adenocarcinoma. \par 6/3: PIPAC #1 \par 6/17: Doing well. No pain. Mild reflux/ dyspepsia. Relieved with tums. Eating well. \par 7/15/21: Doing well. Admitted earlier this week 7/12-7/13 to [facility] with small bowel obstruction. Dramatically improved after 24 hours nasogastric decompression. Feels well since discharge. Multiple BM last 48 hours. Tolerating full liquid diet. No abdominal pain. \par 8/19/21: Doing well. No adverse events. No complaints. Wants to go swimming. Eating well. Weight is stable. Moving bowels at least daily. \par 12/16/21: Doing well. Completed compassionate use PIPAC X 2. No complaints. Wants to lift weights, go scuba diving, and skiing. Plan for travel to [place] next month\par 2/10/22: PIPAC #6\par 3/3/22: Continues to travel, scuba dive, ski, lift weights. \par 4/7/22: PIPAC #7. Sons wedding was 4/30/22\par 6/3/22: PIPAC #8\par 6/20/22: CT CAP w smoothi ready stable from March. Developed severe abdominal pain after the scan, with interval development of ascites. Diagnostic paracentesis with MRSA. Hospitalized X 10 days. \par 7/7/22: Improving s/p hospitalization. Has lost 10 pounds, but is not eating better. Labs w wbc 17. platelets 1000, CEA 4.3 [Post-Op Complications] : No post-op complications reported

## 2022-07-10 ENCOUNTER — NON-APPOINTMENT (OUTPATIENT)
Age: 62
End: 2022-07-10

## 2022-07-14 ENCOUNTER — OUTPATIENT (OUTPATIENT)
Dept: OUTPATIENT SERVICES | Facility: HOSPITAL | Age: 62
LOS: 1 days | End: 2022-07-14
Payer: COMMERCIAL

## 2022-07-14 ENCOUNTER — APPOINTMENT (OUTPATIENT)
Dept: CT IMAGING | Facility: CLINIC | Age: 62
End: 2022-07-14

## 2022-07-14 DIAGNOSIS — Z98.890 OTHER SPECIFIED POSTPROCEDURAL STATES: Chronic | ICD-10-CM

## 2022-07-14 DIAGNOSIS — C18.1 MALIGNANT NEOPLASM OF APPENDIX: Chronic | ICD-10-CM

## 2022-07-14 DIAGNOSIS — Z00.8 ENCOUNTER FOR OTHER GENERAL EXAMINATION: ICD-10-CM

## 2022-07-14 DIAGNOSIS — C18.1 MALIGNANT NEOPLASM OF APPENDIX: ICD-10-CM

## 2022-07-14 DIAGNOSIS — Z87.438 PERSONAL HISTORY OF OTHER DISEASES OF MALE GENITAL ORGANS: Chronic | ICD-10-CM

## 2022-07-14 DIAGNOSIS — R19.00 INTRA-ABDOMINAL AND PELVIC SWELLING, MASS AND LUMP, UNSPECIFIED SITE: Chronic | ICD-10-CM

## 2022-07-14 DIAGNOSIS — Z90.49 ACQUIRED ABSENCE OF OTHER SPECIFIED PARTS OF DIGESTIVE TRACT: Chronic | ICD-10-CM

## 2022-07-14 PROCEDURE — 74177 CT ABD & PELVIS W/CONTRAST: CPT

## 2022-07-14 PROCEDURE — 74177 CT ABD & PELVIS W/CONTRAST: CPT | Mod: 26

## 2022-07-17 ENCOUNTER — OUTPATIENT (OUTPATIENT)
Dept: OUTPATIENT SERVICES | Facility: HOSPITAL | Age: 62
LOS: 1 days | Discharge: HOME | End: 2022-07-17

## 2022-07-17 DIAGNOSIS — Z98.890 OTHER SPECIFIED POSTPROCEDURAL STATES: Chronic | ICD-10-CM

## 2022-07-17 DIAGNOSIS — C26.9 MALIGNANT NEOPLASM OF ILL-DEFINED SITES WITHIN THE DIGESTIVE SYSTEM: ICD-10-CM

## 2022-07-17 DIAGNOSIS — C18.1 MALIGNANT NEOPLASM OF APPENDIX: Chronic | ICD-10-CM

## 2022-07-17 DIAGNOSIS — Z90.49 ACQUIRED ABSENCE OF OTHER SPECIFIED PARTS OF DIGESTIVE TRACT: Chronic | ICD-10-CM

## 2022-07-17 DIAGNOSIS — R19.00 INTRA-ABDOMINAL AND PELVIC SWELLING, MASS AND LUMP, UNSPECIFIED SITE: Chronic | ICD-10-CM

## 2022-07-17 DIAGNOSIS — Z87.438 PERSONAL HISTORY OF OTHER DISEASES OF MALE GENITAL ORGANS: Chronic | ICD-10-CM

## 2022-07-18 ENCOUNTER — APPOINTMENT (OUTPATIENT)
Dept: SURGICAL ONCOLOGY | Facility: CLINIC | Age: 62
End: 2022-07-18

## 2022-07-19 ENCOUNTER — APPOINTMENT (OUTPATIENT)
Dept: SURGICAL ONCOLOGY | Facility: CLINIC | Age: 62
End: 2022-07-19

## 2022-07-19 VITALS
HEIGHT: 66 IN | DIASTOLIC BLOOD PRESSURE: 91 MMHG | RESPIRATION RATE: 16 BRPM | OXYGEN SATURATION: 98 % | SYSTOLIC BLOOD PRESSURE: 135 MMHG | BODY MASS INDEX: 18.64 KG/M2 | WEIGHT: 116 LBS | HEART RATE: 69 BPM | TEMPERATURE: 97.6 F

## 2022-07-19 PROCEDURE — 99213 OFFICE O/P EST LOW 20 MIN: CPT

## 2022-07-20 LAB
CULTURE RESULTS: SIGNIFICANT CHANGE UP
SPECIMEN SOURCE: SIGNIFICANT CHANGE UP

## 2022-07-21 ENCOUNTER — APPOINTMENT (OUTPATIENT)
Dept: INFUSION THERAPY | Facility: CLINIC | Age: 62
End: 2022-07-21

## 2022-07-22 ENCOUNTER — NON-APPOINTMENT (OUTPATIENT)
Age: 62
End: 2022-07-22

## 2022-07-25 ENCOUNTER — NON-APPOINTMENT (OUTPATIENT)
Age: 62
End: 2022-07-25

## 2022-07-25 NOTE — REASON FOR VISIT
[de-identified] : diagnostic laparoscopy 5/19/21, PIPAC 6/3/21, 7/22/21, 9/2/21, 10/2021, 12/2/21, 2/10/22, 4/7/22, 6/3/22 [Follow-Up Visit] : a follow-up visit for [Spouse] : spouse

## 2022-07-25 NOTE — ASSESSMENT
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 \par No extraperitoneal disease \par Performance status- ecog 1\par \par He is feeling well at this point. \par Presented with progression requiring paracentesis-- 1.6L drained May 2021. \par \par Given his progression within 1 year of CRS/ HIPEC, as well as R2b/ CC 2 resection at first operation I do not think additional cytoreductive surgery is feasible. I have notified his surgeon from [place], Dr. Peters who agrees. \par \par I discussed case with patients medical oncologist who favors regional approach over additional systemic therapy.\par \par Completed 8 PIPAC cycles. \par \par PCI PIPAC 1: 29\par PCI PIPAC 2: 28\par PCI PIPAC 3: 24\par PCI PIPAC 4: \par PCI PIPAC 5: 22\par PIPAC#6: 24\par PIPAC 7: \par PIPAC 8:  unable to determine \par \par Plan: \par [] Dietician referral\par [] Interval labs and imaging pending progress\par [] Follow up with medical oncology\par [] Not a candidate for further PIPAC at this time-- should large volume ascites reaccumulate we can consider at that time \par \par \par \par

## 2022-07-25 NOTE — PHYSICAL EXAM
[Normal] : oriented to person, place and time, with appropriate affect [de-identified] : abdomen soft, non-distended, non-tender. no masses. laparotomy and drain incisions well healed. Port sites healing well.

## 2022-07-25 NOTE — HISTORY OF PRESENT ILLNESS
[de-identified] : Mr. Arias is a 60 year old gentleman referred to me by Dr. 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 Herberth Peters. \par \par PCI: 34\par CC: R2b\par \par Mr. Arias has since moved to [place] to be closer to family and has established care in [facility]. Lives in [place]. \par \par Cytoreduction included a splenectomy and distal gastrectomy, omentectomy, cholecystectomy, subtotal colectomy and diaphragm stripping. 4L of mucinous ascites was drained. 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 As his tumor was felt to be low grade, he did not receive any systemic therapy up front. He now reports feeling well. He reports 3-4 loose but not watery BM's/day. He reports improving weight and energy levels. He denies any PO intolerance. He plays golf and does nature walks close to [facility]. \par \par CEA \par 6/5/20: 3.8\par 8/12/20: 6.1\par 4/7: 10.1 \par \par Pathology: \par 5/7/21: Paracentesis, cytology negative for malignant cells. 1.6 Liters removed. \par 4/27/21: Paracentesis, positive for malignancy, most consistent with low grade mucinous carcinoma \par 5/7/2020: CRS/ HIPEC: ([facility] review)  Well-differentiated mucinous adenocarcinoma of appendix with invasion through serosa, perineural invasion. He underwent subtotal colectomy with positive margins. 2/14 nodes involved. [facility] review: low grade appendiceal mucinous neoplasm\par \par Scans were obtained at [facility] and demonstrated: \par 4/30/21: loculated intraperitoneal fluid. No bowel obstruction. \par \par Last colonoscopy May 2020. \par \par Underwent diagnostic laparoscopy by me on 5/19/21. PCI approx 25. Evacuated 1.4 liters ascites. Biopsies consistent with mucinous adenocarcinoma. \par 6/3: PIPAC #1 \par 6/17: Doing well. No pain. Mild reflux/ dyspepsia. Relieved with tums. Eating well. \par 7/15/21: Doing well. Admitted earlier this week 7/12-7/13 to [facility] with small bowel obstruction. Dramatically improved after 24 hours nasogastric decompression. Feels well since discharge. Multiple BM last 48 hours. Tolerating full liquid diet. No abdominal pain. \par 8/19/21: Doing well. No adverse events. No complaints. Wants to go swimming. Eating well. Weight is stable. Moving bowels at least daily. \par 12/16/21: Doing well. Completed compassionate use PIPAC X 2. No complaints. Wants to lift weights, go scuba diving, and skiing. Plan for travel to [place] next month\par 2/10/22: PIPAC #6\par 3/3/22: Continues to travel, scuba dive, ski, lift weights. \par 4/7/22: PIPAC #7. Sons wedding was 4/30/22\par 6/3/22: PIPAC #8\par 6/20/22: CT CAP w smoothi ready stable from March. Developed severe abdominal pain after the scan, with interval development of ascites. Diagnostic paracentesis with MRSA. Hospitalized X 10 days. \par 7/7/22: Improving s/p hospitalization. Has lost 10 pounds, but is not eating better. Labs w wbc 17. platelets 1000, CEA 4.3\par 7/20/22: Doing better. Weight stabilizing. WBC 12.7

## 2022-07-26 ENCOUNTER — NON-APPOINTMENT (OUTPATIENT)
Age: 62
End: 2022-07-26

## 2022-07-27 ENCOUNTER — TRANSCRIPTION ENCOUNTER (OUTPATIENT)
Age: 62
End: 2022-07-27

## 2022-09-02 NOTE — HISTORY OF PRESENT ILLNESS
[de-identified] : Mr. Arias is a 60 year old gentleman referred to me by Dr. 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 Herberth Peters. \par \par PCI: 34\par CC: R2b\par \par Mr. Arias has since moved to [place] to be closer to family and has established care in [facility]. Lives in [place]. \par \par Cytoreduction included a splenectomy and distal gastrectomy, omentectomy, cholecystectomy, subtotal colectomy and diaphragm stripping. 4L of mucinous ascites was drained. 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 As his tumor was felt to be low grade, he did not receive any systemic therapy up front. He now reports feeling well. He reports 3-4 loose but not watery BM's/day. He reports improving weight and energy levels. He denies any PO intolerance. He plays golf and does nature walks close to [facility]. \par \par CEA \par 6/5/20: 3.8\par 8/12/20: 6.1\par 4/7: 10.1 \par \par Pathology: \par 5/7/21: Paracentesis, cytology negative for malignant cells. 1.6 Liters removed. \par 4/27/21: Paracentesis, positive for malignancy, most consistent with low grade mucinous carcinoma \par 5/7/2020: CRS/ HIPEC: ([facility] review)  Well-differentiated mucinous adenocarcinoma of appendix with invasion through serosa, perineural invasion. He underwent subtotal colectomy with positive margins. 2/14 nodes involved. [place] review: low grade appendiceal mucinous neoplasm\par \par Scans were obtained at [facility] and demonstrated: \par 4/30/21: loculated intraperitoneal fluid. No bowel obstruction. \par \par Last colonoscopy May 2020. \par \par Underwent diagnostic laparoscopy by me on 5/19/21. PCI approx 25. Evacuated 1.4 liters ascites. Biopsies consistent with mucinous adenocarcinoma. \par 6/3: PIPAC #1 \par 6/17: Doing well. No pain. Mild reflux/ dyspepsia. Relieved with tums. Eating well. \par 7/15/21: Doing well. Admitted earlier this week 7/12-7/13 to [facility] with small bowel obstruction. Dramatically improved after 24 hours nasogastric decompression. Feels well since discharge. Multiple BM last 48 hours. Tolerating full liquid diet. No abdominal pain. \par 8/19/21: Doing well. No adverse events. No complaints. Wants to go swimming. Eating well. Weight is stable. Moving bowels at least daily. \par 12/16/21: Doing well. Completed compassionate use PIPAC X 2. No complaints. Wants to lift weights, go scuba diving, and skiing. Plan for travel to [place] next month\par 2/10/22: PIPAC #6\par 3/3/22: Continues to travel, scuba dive, ski, lift weights. \par 4/7/22: PIPAC #7. Sons wedding was 4/30/22\par 6/3/22: PIPAC #8\par 6/20/22: CT CAP w smoothi ready stable from March. Developed severe abdominal pain after the scan, with interval development of ascites. Diagnostic paracentesis with MRSA. Hospitalized X 10 days. \par 7/7/22: Improving s/p hospitalization. Has lost 10 pounds, but is not eating better. Labs w wbc 17. platelets 1000, CEA 4.3\par 7/20/22: Doing better. Weight stabilizing. WBC 12.7\par 9/2/22: WBC normalized 8.8. CEA slightly increased to 7.7.

## 2022-09-02 NOTE — REASON FOR VISIT
[Follow-Up Visit] : a follow-up visit for [Spouse] : spouse [de-identified] : diagnostic laparoscopy 5/19/21, PIPAC 6/3/21, 7/22/21, 9/2/21, 10/2021, 12/2/21, 2/10/22, 4/7/22, 6/3/22

## 2022-09-02 NOTE — PHYSICAL EXAM
[Normal] : oriented to person, place and time, with appropriate affect [de-identified] : abdomen soft, non-distended, non-tender. no masses. laparotomy and drain incisions well healed. Port sites well healed

## 2022-09-02 NOTE — ASSESSMENT
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 \par No extraperitoneal disease \par Performance status- ecog 1\par \par Presented with progression requiring paracentesis-- 1.6L drained May 2021. \par \par Given his progression within 1 year of CRS/ HIPEC, as well as R2b/ CC 2 resection at first operation I do not think additional cytoreductive surgery is feasible. I have notified his surgeon from [place], Dr. Peters who agrees. \par \par I discussed case with patients medical oncologist who favors regional approach over additional systemic therapy.\par \par Completed 8 PIPAC cycles. \par \par PCI PIPAC 1: 29\par PCI PIPAC 2: 28\par PCI PIPAC 3: 24\par PCI PIPAC 4: \par PCI PIPAC 5: 22\par PIPAC#6: 24\par PIPAC 7: \par PIPAC 8:  unable to determine \par \par Plan: \par [] Follow up with medical oncology\par [] Not a candidate for further PIPAC at this time-- should large volume ascites reaccumulate we can consider at that time \par [] Patient remains interested in investigational therapies and will keep in touch\par \par \par \par

## 2022-09-13 NOTE — REVIEW OF SYSTEMS
[Patient Intake Form Reviewed] : Patient intake form was reviewed [Negative] : Allergic/Immunologic [FreeTextEntry7] : per HPI [de-identified] : mild  neuropathy  stable

## 2022-09-13 NOTE — HISTORY OF PRESENT ILLNESS
[Disease: _____________________] : Disease: [unfilled] [T: ___] : T[unfilled] [N: ___] : N[unfilled] [M: ___] : M[unfilled] [de-identified] : 62 y/o male with low grade appendiceal tumor diagnosed in 2020.  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 \par \par 5/7/20 CRS and HIPEC for LAMN  ( Dr. Herberth Pink at [place] ).  Surgery included  a splenectomy and distal gastrectomy, omentectomy, cholecystectomy, subtotal colectomy and diaphragm stripping. 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  R2b resection. 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 \par \par Moved back to  . \par \par Scans 4/30/21 POD worsening ascites. S/p paracentesis\par \par Seen by medical oncology at [facility] (Dr Daniels) - no role for systemic tx at present time. \par Referred for PIPAC ( Dr Mason) \par \par 6/3/21 - PIPAC #1 \par 7/12/21 - 7/13/21 - Admitted with small bowel obstruction. Dramatically improved after 24 hours nasogastric decompression.   \par 2/10/22: PIPAC #6\par 3/3/22: Continues to travel, scuba dive, ski, lift weights. \par 4/7/22: PIPAC #7. Sons wedding was 4/30/22\par 6/3/22: PIPAC #8\par 6/20/22: CT CAP stable from March. \par Developed severe abdominal pain after the scan, with interval development of ascites. Diagnostic paracentesis with MRSA. Hospitalized X 10 days. \par 7/7/22: Improving s/p hospitalization. Has lost 10 pounds, but is not eating better.  Labs  wbc 17. platelets 1000, CEA 4.3\par 7/20/22: Doing better. Weight stabilizing. WBC 12.7\par \par 8/29/22 - CT A/P - Pseudomyxoma peritonei with low density throughout the central mesentery and scalloping of the right hepatic lobe without significant change. Tumor in the pb hepatis which obliterates the left portal vein has progressed when compared with prior imaging dating to March 2022.\par 9/2/22: CEA slightly increased to 7.7.  Per Dr. Mason patient not a candidate for further PIPAC.  Would reconsider if large volume ascites should reaccumulate.  [de-identified] : low grade appendiceal mucinous neoplasm LAMN [de-identified] : [facility] [place] ( done in May 2021-  tumor tissue from May 2020:  PDL1 0 MS stable  TMB 3 muts/Mb  NILO     [id number]  GNAS R201C  and SOX9 H396f  [de-identified] : HAs some bloating and intermittent diarrhea but overall feels well. Continues to be very active

## 2022-09-13 NOTE — PHYSICAL EXAM
[Restricted in physically strenuous activity but ambulatory and able to carry out work of a light or sedentary nature] : Status 1- Restricted in physically strenuous activity but ambulatory and able to carry out work of a light or sedentary nature, e.g., light house work, office work [Normal] : full range of motion and no deformities appreciated [de-identified] : looks well  [de-identified] : not distended NT no overt ascites

## 2022-09-13 NOTE — ASSESSMENT
[FreeTextEntry1] : 62 y/o male with low grade  mucinous neoplasm of appendix s/p CRS and HIPC  5/7/20 ( Dr Herberth Pink at [place]) R2 resection, tumor was metastatic to 2/14 regional lymph nodes. S/p 12 cycles ( 6 months ) of adjuvant FOLFOX - completed  in Dec 2020.\par Recurrent disease ( ascites/ pseudomyxoma peritonei)  in less than 6 months after completing adjuvant chemotherapy. \par S/p PIPAC x 6 ( last in June 2022 ). 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 Unfortunately due to postsurgical adhesions he is no longer a candidate for PIPAC.\par \par [employer] NGS showed few mutations including NILO and ARID mutations. PDL10 and MSI stable, low TMB- not a candidate for single agent immunotherapy. \par \par Options include clinical trial with targeted therapy versus standard chemotherapy- FOLFIRI/Bevacizumab.\par \par Patient is interested in clinical trial. He had poor response to FOLFOX and chance for response with another standard cytotoxic chemotherapy is not high in low proliferative tumors.\par \par Discussed trial available in [facility]- spoke with Dr Daniels and clinical trial coordinators. Unfortunately no clinical trial available for him.\par \par Referred to [facility] and to [facility] for consideration for clinical ( [id number] targeting ATR +/- PARP or immunotherapy in patients with NILO mutation) \par \par While awaiting trial evaluation- will continue monitoring. Prefers to hold off with standard chemo if low rate of progression and minimal sx for now.\par \par D/w patient and his wife. \par \par \par \par \par Addendum;  Patient Intake Form reviewed. no intervention necessary\par

## 2022-10-10 NOTE — PHYSICAL EXAM
[Restricted in physically strenuous activity but ambulatory and able to carry out work of a light or sedentary nature] : Status 1- Restricted in physically strenuous activity but ambulatory and able to carry out work of a light or sedentary nature, e.g., light house work, office work [Normal] : full range of motion and no deformities appreciated [de-identified] : looks well  [de-identified] : not distended NT no overt ascites

## 2022-10-10 NOTE — HISTORY OF PRESENT ILLNESS
[Disease: _____________________] : Disease: [unfilled] [T: ___] : T[unfilled] [N: ___] : N[unfilled] [M: ___] : M[unfilled] [de-identified] : 60 y/o male with low grade appendiceal tumor diagnosed in 2020.  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 \par \par 5/7/20 CRS and HIPEC for LAMN  ( Dr. Herberth Pink at [place] ).  Surgery included  a splenectomy and distal gastrectomy, omentectomy, cholecystectomy, subtotal colectomy and diaphragm stripping. 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  R2b resection. 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 \par \par Moved back to  . \par \par Scans 4/30/21 POD worsening ascites. S/p paracentesis\par \par Seen by medical oncology at [facility] (Dr aDniels) - no role for systemic tx at present time. \par Referred for PIPAC ( Dr Mason) \par \par 6/3/21 - PIPAC #1 \par 7/12/21 - 7/13/21 - Admitted with small bowel obstruction. Dramatically improved after 24 hours nasogastric decompression.   \par 2/10/22: PIPAC #6\par 3/3/22: Continues to travel, scuba dive, ski, lift weights. \par 4/7/22: PIPAC #7. Sons wedding was 4/30/22\par 6/3/22: PIPAC #8\par 6/20/22: CT CAP stable from March. \par Developed severe abdominal pain after the scan, with interval development of ascites. Diagnostic paracentesis with MRSA. Hospitalized X 10 days. \par 7/7/22: Improving s/p hospitalization. Has lost 10 pounds, but is not eating better.  Labs  wbc 17. platelets 1000, CEA 4.3\par 7/20/22: Doing better. Weight stabilizing. WBC 12.7\par \par 8/29/22 - CT A/P - Pseudomyxoma peritonei with low density throughout the central mesentery and scalloping of the right hepatic lobe without significant change. Tumor in the pb hepatis which obliterates the left portal vein has progressed when compared with prior imaging dating to March 2022.\par 9/2/22: CEA slightly increased to 7.7.  Per Dr. Mason patient not a candidate for further PIPAC.  Would reconsider if large volume ascites should reaccumulate.  [de-identified] : low grade appendiceal mucinous neoplasm LAMN [de-identified] : [facility] ( done in May 2021-  tumor tissue from May 2020:  PDL1 0 MS stable  TMB 3 muts/Mb  NILO     [id number]  GNAS R201C  and SOX9 H396f  [de-identified] : HAs some bloating and intermittent diarrhea but overall feels well. Continues to be very active . Abdomen is not distended.\par \par Seen at [facility]- no clinical trial available. ?? trial in [facility]- but no response from trial center.\par

## 2022-10-10 NOTE — REVIEW OF SYSTEMS
[Patient Intake Form Reviewed] : Patient intake form was reviewed [Negative] : Allergic/Immunologic [FreeTextEntry7] : per HPI [de-identified] : mild  neuropathy  stable

## 2022-10-10 NOTE — ASSESSMENT
[FreeTextEntry1] : 60 y/o male with low grade  mucinous neoplasm of appendix s/p CRS and HIPC  5/7/20 ( Dr Herberth Pink at [place]) R2 resection, tumor was metastatic to 2/14 regional lymph nodes. S/p 12 cycles ( 6 months ) of adjuvant FOLFOX - completed  in Dec 2020.\par Recurrent disease ( ascites/ pseudomyxoma peritonei)  in less than 6 months after completing adjuvant chemotherapy. \par S/p PIPAC x 6 ( last in June 2022 ). 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 Unfortunately due to postsurgical adhesions he is no longer a candidate for PIPAC.\par \par [employer] NGS showed few mutations including NILO and ARID mutations. PDL10 and MSI stable, low TMB- not a candidate for single agent immunotherapy. \par \par Options include clinical trial with targeted therapy versus standard chemotherapy- FOLFIRI/Bevacizumab.\par \par Patient is interested in targeted therapy . He had poor response to FOLFOX and chance for response with another standard cytotoxic chemotherapy is not high in low proliferative tumors.\par \par No clinical trials available in [place] area.\par \par he is very interested in trying PARP inhibitor outside setting of clinical trial.\par Long discussion. Limited data but there are  reports of activity of PRP inhibitors in GI cancers with HRD / BRCA/ NILO mutations.\par Discussed side effects of PARP inhibitors.\par \par Plan :\par will try olaparib ( Lynparza) 300 mg bid.\par \par He will come back in few weeks when drug available - to discuss details/ monitoring.\par If PARP inhibitors not available for him- continue monitoring.\par CT abd pelvis with iv contrast 3 months from prior ( due end of Novemebr)]\par D/w patient and his wife . \par \par \par

## 2022-10-19 NOTE — CONSULT NOTE ADULT - SUBJECTIVE AND OBJECTIVE BOX
HPI:  Per ED:   62 year old male with history of metastatic appendiceal carcinoma s/p partial gastrectomy, cholecystectomy, subtotal colectomy, diaphragm stripping, omentectomy, splenectomy and HIPEC, presenting with L flank and abdominal pain x 1-2d. States that since Monday having progressive pain to L flank radiating to LLQ, associated with dry heaving and some chills, denies fevers, chest pain, cough, dysuria, hematuria, bloody stools. Chronically has diarrhea.    Oncologic History  - 2020 diagnosed with low grade appendiceal tumor who presented with peritoneal carcinomatosis and pseudomyxoma peritonei   - 5/7/2020 CRS (cytoreductive surgery) and HIPEC (hyperthermic intraperitoneal chemotherapy) for LAMN (low-grade appendiceal mucinous neoplasm). Surgery included a splenectomy and distal gastrectomy, omentectomy, cholecystectomy, subtotal colectomy and diaphragm stripping. 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 R2b resection.   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 Mason)   - 6/3/21 started C1 PIPAC, completed C8 6/3/22- course complicated by SBO however continued to have excellent PS travelling, scuba diving, skiing, weight lifting. After 8 cycles CT A/P showing stability of disease   - 8/29/22 - CT A/P - Pseudomyxoma peritonei with low density throughout the central mesentery and scalloping of the right hepatic lobe without significant change. Tumor in the pb hepatis which obliterates the left portal vein has progressed when compared with prior imaging dating to March 2022.  - 9/2/22: CEA slightly increased to 7.7. Per Dr. Mason patient not a candidate for further PIPAC. Would reconsider if large volume ascites should reaccumulate.  - Evaluated at [facility] [place]- no trial available. Pending [facility] to see if trial there   - Unfortunately due to postsurgical adhesions he is no longer a candidate for PIPAC.  - [facility] NGS showed few mutations including NILO and ARID mutations. PDL10 and MSI stable, low TMB- not a candidate for single agent immunotherapy.   - Options include clinical trial with targeted therapy versus standard chemotherapy- FOLFIRI/Bevacizumab. The patient was interested in targeted therapy considering poor response to FOLFOX and chance for response with another standard chemotherapy is not high in low proliferative tumors. No trials available in [place] area. Limited data but there are reports of activity of PRP inhibitors in GI cancers with HRD / BRCA/ NILO mutations.  - Started on olaparib 300mg BID approx 1 week ago     PAST MEDICAL & SURGICAL HISTORY:  HLD (hyperlipidemia)      BPH (benign prostatic hyperplasia)      Neuropathic pain  hands and feet      UTI (urinary tract infection)  pt denes recent infection      Appendix carcinoma  PIPAC x 7, last done 4/2022      Anxiety      Cancer of appendix      Malignant neoplasm of appendix      GERD (gastroesophageal reflux disease)  pt denes recent endoscopy      History of undescended testicle  age 7      Abdominal mass  surgery 5/2020  remove mass, spleen, partial colectomy, lymph nodes, part small intestines, omentum, apendix, gall bladder, part stomach. HIPEC      History of abdominal paracentesis  x3      H/O colectomy  Splenectomy , Cholecystectomy   5/2020. Insertion of Mediport      Malignant neoplasm of appendix  chemo 10/2021; s/p Pipac x 7 last 04/2022          Allergies    barium sulfate (Unknown)    Intolerances        MEDICATIONS  (STANDING):    MEDICATIONS  (PRN):      FAMILY HISTORY:  FH: diabetes mellitus (Father)    FH: HTN (hypertension) (Mother)        SOCIAL HISTORY: No EtOH, no tobacco    REVIEW OF SYSTEMS:    CONSTITUTIONAL: No weakness, fevers or chills  EYES/ENT: No visual changes;  No vertigo or throat pain   NECK: No pain or stiffness  RESPIRATORY: No cough, wheezing, hemoptysis; No shortness of breath  CARDIOVASCULAR: No chest pain or palpitations  GASTROINTESTINAL: No abdominal or epigastric pain. No nausea, vomiting, or hematemesis; No diarrhea or constipation. No melena or hematochezia.  GENITOURINARY: No dysuria, frequency or hematuria  NEUROLOGICAL: No numbness or weakness  SKIN: No itching, burning, rashes, or lesions   All other review of systems is negative unless indicated above.    Height (cm): 170.2 (10-19 @ 03:55)    T(F): 98.6 (10-19-22 @ 09:00), Max: 98.6 (10-19-22 @ 09:00)  HR: 74 (10-19-22 @ 09:00)  BP: 131/84 (10-19-22 @ 09:00)  RR: 16 (10-19-22 @ 09:00)  SpO2: 99% (10-19-22 @ 09:00)  Wt(kg): --    GENERAL: NAD, well-developed  HEAD:  Atraumatic, Normocephalic  EYES: EOMI, PERRLA, conjunctiva and sclera clear  NECK: Supple, No JVD  CHEST/LUNG: Clear to auscultation bilaterally; No wheeze  HEART: Regular rate and rhythm; No murmurs, rubs, or gallops  ABDOMEN: Soft, Nontender, Nondistended; Bowel sounds present  EXTREMITIES:  2+ Peripheral Pulses, No clubbing, cyanosis, or edema  NEUROLOGY: non-focal  SKIN: No rashes or lesions                          12.7   26.33 )-----------( 466      ( 19 Oct 2022 05:06 )             39.5       10-19    137  |  100  |  26<H>  ----------------------------<  150<H>  4.9   |  24  |  2.19<H>    Ca    9.3      19 Oct 2022 05:06    TPro  7.4  /  Alb  4.2  /  TBili  0.7  /  DBili  x   /  AST  20  /  ALT  19  /  AlkPhos  92  10-19               HPI:  Per ED:   62 year old male with history of metastatic appendiceal carcinoma s/p partial gastrectomy, cholecystectomy, subtotal colectomy, diaphragm stripping, omentectomy, splenectomy and HIPEC, presenting with L flank and abdominal pain x 1-2d. States that since Monday having progressive pain to L flank radiating to LLQ, associated with dry heaving and some chills, denies fevers, chest pain, cough, dysuria, hematuria, bloody stools. Chronically has diarrhea.    Oncologic History  - 2020 diagnosed with low grade appendiceal tumor who presented with peritoneal carcinomatosis and pseudomyxoma peritonei   - 5/7/2020 CRS (cytoreductive surgery) and HIPEC (hyperthermic intraperitoneal chemotherapy) for LAMN (low-grade appendiceal mucinous neoplasm). Surgery included a splenectomy and distal gastrectomy, omentectomy, cholecystectomy, subtotal colectomy and diaphragm stripping. 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 R2b resection.   - Pathology : LAMN ( low grade G 1 appendiceal mucinous neoplasm) invading into periappendiceal adipose tissue, present on serosa of colon and spleen 2/14 lymph nodes with metastatic disease pT4a, pN1b pM1b  - [facility] (done in May 2021- tumor tissue from May 2020: PDL1 0 MS stable TMB 3 muts/Mb NILO  [id number] GNAS R201C and SOX9 H396f)   - Adjuvant chemotherapy FOLFOX x 6 months (completed in December 2020)  - 4/30/21 POD- worsening ascites s/p paracentesis: referred for PIPAC (Dr. Mason)   - 6/3/21 started C1 PIPAC, completed C8 6/3/22- course complicated by SBO however continued to have excellent PS travelling, scuba diving, skiing, weight lifting. After 8 cycles CT A/P showing stability of disease   - 8/29/22 - CT A/P - Pseudomyxoma peritonei with low density throughout the central mesentery and scalloping of the right hepatic lobe without significant change. Tumor in the pb hepatis which obliterates the left portal vein has progressed when compared with prior imaging dating to March 2022.  - 9/2/22: CEA slightly increased to 7.7. Per Dr. Mason patient not a candidate for further PIPAC. Would reconsider if large volume ascites should reaccumulate.  - Evaluated at [facility] [place]- no trial available. Pending [facility] to see if trial there   - Unfortunately due to postsurgical adhesions he is no longer a candidate for PIPAC.  - [facility] [place] NGS showed few mutations including NILO and ARID mutations. PDL10 and MSI stable, low TMB- not a candidate for single agent immunotherapy.   - Options include clinical trial with targeted therapy versus standard chemotherapy- FOLFIRI/Bevacizumab. The patient was interested in targeted therapy considering poor response to FOLFOX and chance for response with another standard chemotherapy is not high in low proliferative tumors. No trials available in [place] area. Limited data but there are reports of activity of PRP inhibitors in GI cancers with HRD / BRCA/ NILO mutations.  - HAS NOT STARTED OLAPARIB YET- AWAITING AUTH     PAST MEDICAL & SURGICAL HISTORY:  HLD (hyperlipidemia)      BPH (benign prostatic hyperplasia)      Neuropathic pain  hands and feet      UTI (urinary tract infection)  pt denes recent infection      Appendix carcinoma  PIPAC x 7, last done 4/2022      Anxiety      Cancer of appendix      Malignant neoplasm of appendix      GERD (gastroesophageal reflux disease)  pt denes recent endoscopy      History of undescended testicle  age 7      Abdominal mass  surgery 5/2020  remove mass, spleen, partial colectomy, lymph nodes, part small intestines, omentum, apendix, gall bladder, part stomach. HIPEC      History of abdominal paracentesis  x3      H/O colectomy  Splenectomy , Cholecystectomy   5/2020. Insertion of Mediport      Malignant neoplasm of appendix  chemo 10/2021; s/p Pipac x 7 last 04/2022          Allergies    barium sulfate (Unknown)    Intolerances        MEDICATIONS  (STANDING):    MEDICATIONS  (PRN):      FAMILY HISTORY:  FH: diabetes mellitus (Father)    FH: HTN (hypertension) (Mother)        SOCIAL HISTORY: No EtOH, no tobacco    REVIEW OF SYSTEMS:    CONSTITUTIONAL: No weakness, fevers or chills  EYES/ENT: No visual changes;  No vertigo or throat pain   NECK: No pain or stiffness  RESPIRATORY: No cough, wheezing, hemoptysis; No shortness of breath  CARDIOVASCULAR: No chest pain or palpitations  GASTROINTESTINAL: No abdominal or epigastric pain. No nausea, vomiting, or hematemesis; No diarrhea or constipation. No melena or hematochezia.  GENITOURINARY: No dysuria, frequency or hematuria  NEUROLOGICAL: No numbness or weakness  SKIN: No itching, burning, rashes, or lesions   All other review of systems is negative unless indicated above.    Height (cm): 170.2 (10-19 @ 03:55)    T(F): 98.6 (10-19-22 @ 09:00), Max: 98.6 (10-19-22 @ 09:00)  HR: 74 (10-19-22 @ 09:00)  BP: 131/84 (10-19-22 @ 09:00)  RR: 16 (10-19-22 @ 09:00)  SpO2: 99% (10-19-22 @ 09:00)  Wt(kg): --    GENERAL: NAD, well-developed  HEAD:  Atraumatic, Normocephalic  EYES: EOMI, PERRLA, conjunctiva and sclera clear  NECK: Supple, No JVD  CHEST/LUNG: Clear to auscultation bilaterally; No wheeze  HEART: Regular rate and rhythm; No murmurs, rubs, or gallops  ABDOMEN: Soft, Nontender, Nondistended; Bowel sounds present  EXTREMITIES:  2+ Peripheral Pulses, No clubbing, cyanosis, or edema  NEUROLOGY: non-focal  SKIN: No rashes or lesions                          12.7   26.33 )-----------( 466      ( 19 Oct 2022 05:06 )             39.5       10-19    137  |  100  |  26<H>  ----------------------------<  150<H>  4.9   |  24  |  2.19<H>    Ca    9.3      19 Oct 2022 05:06    TPro  7.4  /  Alb  4.2  /  TBili  0.7  /  DBili  x   /  AST  20  /  ALT  19  /  AlkPhos  92  10-19

## 2022-10-19 NOTE — CONSULT NOTE ADULT - ASSESSMENT
62 year old male with history of metastatic appendiceal carcinoma s/p partial gastrectomy, cholecystectomy, subtotal colectomy, diaphragm stripping, omentectomy, splenectomy and HIPEC, presenting with L flank and abdominal pain x 1-2d. Found to have KIM and leukocytosis with imaging showing known peritoneal carcinomatosis and left hydronephrosis with possible passed ureteral stone. Urology consulted. Medical oncology consulted for further recommendations       #KIM  - CT A/P 10/19/22: Findings consistent with pseudomyxoma peritonei, overall similar to the prior exam. Redemonstrated tumor in the pb hepatis with obliteration of the left portal vein, and atrophy of the left hepatic lobe. New moderate left hydroureteronephrosis to the level of the urinary bladder, possibly related to a recently passed stone. There is a new 1.0cm urinary bladder calculus, which has likely migrated from the distal left ureter since the prior CT exam. No ureteral calculi are identified on the current exam. Mild wall thickening of the left posterior aspect of the urinary bladder, in theregion of the left UVJ. Findings are possibly inflammatory related to the presumed recently passed stone, with neoplasm not excluded. Consider further assessment with cystoscopy. Mild urothelial thickening and hyperenhancement of the distal left ureter, also possibly inflammatory. Unchanged moderate right hydroureteronephrosis.No bowel obstruction.  - Cr 2.19- baseline around 1  - urology evaluation  - IVF, consider nephrology evaluation if creatinine not improving   - unclear if olaparib is contributing to rise in creatinine however is possible. Likely multifactorial in the setting of ureteral stone, medication induced, ?prerenal   - CrCl calculated as 27- hold olaparib for now     #Leukocytosis   - unclear etiology, 26K neutrophil predominant   - possible reactive leukocytosis if had recent ureteral stone that passed which could be more dramatic considering had splenectomy however need to rule out infection   - afebrile, nontoxic appearing per ED  - UA negative  - f/u blood and urine cultures  - CBC with diff daily     #Metastatic Low-Grade Appendiceal Mucinous Neoplasm   - 2020 diagnosed with low grade appendiceal tumor who presented with peritoneal carcinomatosis and pseudomyxoma peritonei, s/p 5/7/2020 CRS (cytoreductive surgery) and HIPEC (hyperthermic intraperitoneal chemotherapy) for LAMN (low-grade appendiceal mucinous neoplasm). Surgery included a splenectomy and distal gastrectomy, omentectomy, cholecystectomy, subtotal colectomy and diaphragm stripping. 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 R2b resection. Path confirming LAMN pT4a, pN1b pM1b  - [facility] (done in May 2021- tumor tissue from May 2020: PDL1 0 MS stable TMB 3 muts/Mb NILO  [id number] GNAS R201C and SOX9 H396f)   - s/p Adjuvant chemotherapy FOLFOX x 6 months (completed in December 2020) with POD on 4/30/21 - worsening ascites s/p paracentesis: referred for PIPAC (Dr. Mason)   - 6/3/21 started C1 PIPAC, completed C8 6/3/22- course complicated by SBO, after 8 cycles stability of disease on imaging  - 8/29/22 - CT A/P - Pseudomyxoma peritonei with low density throughout the central mesentery and scalloping of the right hepatic lobe without significant change. Tumor in the pb hepatis which obliterates the left portal vein has progressed when compared with prior imaging dating to March 2022. Not a candidate for further PIPAC.  - no clinical trials available- decision made to start PARP inhibitor- Limited data but there are reports of activity of PARP inhibitors in GI cancers with HRD / BRCA/ NILO mutations.  - Started on olaparib 300mg BID approx 1 week ago   - given current CrCl 27 would hold olaparib for now   - will discuss with outpatient oncologist Dr. Sadie Mendoza and after medically optimized can follow up with her for further oncologic care     Medicine admission- Oncology will continue to follow     Matias Bland MD, PGY6  Hematology/Oncology Fellow   pager [phone]  After 5pm and on weekends page on call fellow    62 year old male with history of metastatic appendiceal carcinoma s/p partial gastrectomy, cholecystectomy, subtotal colectomy, diaphragm stripping, omentectomy, splenectomy and HIPEC, presenting with L flank and abdominal pain x 1-2d. Found to have KIM and leukocytosis with imaging showing known peritoneal carcinomatosis and left hydronephrosis with possible passed ureteral stone. Urology consulted. Medical oncology consulted for further recommendations       #KIM  - CT A/P 10/19/22: Findings consistent with pseudomyxoma peritonei, overall similar to the prior exam. Redemonstrated tumor in the pb hepatis with obliteration of the left portal vein, and atrophy of the left hepatic lobe. New moderate left hydroureteronephrosis to the level of the urinary bladder, possibly related to a recently passed stone. There is a new 1.0cm urinary bladder calculus, which has likely migrated from the distal left ureter since the prior CT exam. No ureteral calculi are identified on the current exam. Mild wall thickening of the left posterior aspect of the urinary bladder, in theregion of the left UVJ. Findings are possibly inflammatory related to the presumed recently passed stone, with neoplasm not excluded. Consider further assessment with cystoscopy. Mild urothelial thickening and hyperenhancement of the distal left ureter, also possibly inflammatory. Unchanged moderate right hydroureteronephrosis.No bowel obstruction.  - Cr 2.19- baseline around 1: of note outpatient creatine starting to rise 1.25 noted on 8/31/22 prior to olaparib administration   - urology evaluation  - IVF, consider nephrology evaluation if creatinine not improving   - unclear if olaparib is contributing to rise in creatinine however is possible. Likely multifactorial in the setting of ureteral stone, medication induced, ?prerenal   - CrCl calculated as 27- hold olaparib for now     #Leukocytosis   - unclear etiology, 26K neutrophil predominant   - possible reactive leukocytosis if had recent ureteral stone that passed which could be more dramatic considering had splenectomy however need to rule out infection   - afebrile, nontoxic appearing per ED  - UA negative  - f/u blood and urine cultures  - CBC with diff daily     #Metastatic Low-Grade Appendiceal Mucinous Neoplasm   - 2020 diagnosed with low grade appendiceal tumor who presented with peritoneal carcinomatosis and pseudomyxoma peritonei, s/p 5/7/2020 CRS (cytoreductive surgery) and HIPEC (hyperthermic intraperitoneal chemotherapy) for LAMN (low-grade appendiceal mucinous neoplasm). Surgery included a splenectomy and distal gastrectomy, omentectomy, cholecystectomy, subtotal colectomy and diaphragm stripping. 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 R2b resection. Path confirming LAMN pT4a, pN1b pM1b  - [facility] (done in May 2021- tumor tissue from May 2020: PDL1 0 MS stable TMB 3 muts/Mb NILO  [id number] GNAS R201C and SOX9 H396f)   - s/p Adjuvant chemotherapy FOLFOX x 6 months (completed in December 2020) with POD on 4/30/21 - worsening ascites s/p paracentesis: referred for PIPAC (Dr. Mason)   - 6/3/21 started C1 PIPAC, completed C8 6/3/22- course complicated by SBO, after 8 cycles stability of disease on imaging  - 8/29/22 - CT A/P - Pseudomyxoma peritonei with low density throughout the central mesentery and scalloping of the right hepatic lobe without significant change. Tumor in the pb hepatis which obliterates the left portal vein has progressed when compared with prior imaging dating to March 2022. Not a candidate for further PIPAC.  - no clinical trials available- decision made to start PARP inhibitor- Limited data but there are reports of activity of PARP inhibitors in GI cancers with HRD / BRCA/ NILO mutations.  - 10/3/22 CEA 8.7  - Started on olaparib 300mg BID approx 1 week ago   - given current CrCl 27 would hold olaparib for now   - will discuss with outpatient oncologist Dr. Sadie Mendoza and after medically optimized can follow up with her for further oncologic care     Medicine admission- Oncology will continue to follow     Matias Bland MD, PGY6  Hematology/Oncology Fellow   pager [phone]  After 5pm and on weekends page on call fellow    62 year old male with history of metastatic appendiceal carcinoma s/p partial gastrectomy, cholecystectomy, subtotal colectomy, diaphragm stripping, omentectomy, splenectomy and HIPEC, presenting with L flank and abdominal pain x 1-2d. Found to have KIM and leukocytosis with imaging showing known peritoneal carcinomatosis and left hydronephrosis with possible passed ureteral stone. Urology consulted. Medical oncology consulted for further recommendations       #KIM  - CT A/P 10/19/22: Findings consistent with pseudomyxoma peritonei, overall similar to the prior exam. Redemonstrated tumor in the pb hepatis with obliteration of the left portal vein, and atrophy of the left hepatic lobe. New moderate left hydroureteronephrosis to the level of the urinary bladder, possibly related to a recently passed stone. There is a new 1.0cm urinary bladder calculus, which has likely migrated from the distal left ureter since the prior CT exam. No ureteral calculi are identified on the current exam. Mild wall thickening of the left posterior aspect of the urinary bladder, in theregion of the left UVJ. Findings are possibly inflammatory related to the presumed recently passed stone, with neoplasm not excluded. Consider further assessment with cystoscopy. Mild urothelial thickening and hyperenhancement of the distal left ureter, also possibly inflammatory. Unchanged moderate right hydroureteronephrosis.No bowel obstruction.  - Cr 2.19- baseline around 1: of note outpatient creatine starting to rise 1.25 noted on 8/31/22 prior to olaparib administration   - urology evaluation  - IVF, consider nephrology evaluation if creatinine not improving   - HAS NOT STARTED OLAPARIB YET- AWAITING AUTH     #Leukocytosis   - unclear etiology, 26K neutrophil predominant   - possible reactive leukocytosis if had recent ureteral stone that passed which could be more dramatic considering had splenectomy however need to rule out infection   - afebrile, nontoxic appearing per ED  - UA negative  - f/u blood and urine cultures  - CBC with diff daily     #Metastatic Low-Grade Appendiceal Mucinous Neoplasm   - 2020 diagnosed with low grade appendiceal tumor who presented with peritoneal carcinomatosis and pseudomyxoma peritonei, s/p 5/7/2020 CRS (cytoreductive surgery) and HIPEC (hyperthermic intraperitoneal chemotherapy) for LAMN (low-grade appendiceal mucinous neoplasm). Surgery included a splenectomy and distal gastrectomy, omentectomy, cholecystectomy, subtotal colectomy and diaphragm stripping. 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 R2b resection. Path confirming LAMN pT4a, pN1b pM1b  - [facility] (done in May 2021- tumor tissue from May 2020: PDL1 0 MS stable TMB 3 muts/Mb NILO  [id number] GNAS R201C and SOX9 H396f)   - s/p Adjuvant chemotherapy FOLFOX x 6 months (completed in December 2020) with POD on 4/30/21 - worsening ascites s/p paracentesis: referred for PIPAC (Dr. Mason)   - 6/3/21 started C1 PIPAC, completed C8 6/3/22- course complicated by SBO, after 8 cycles stability of disease on imaging  - 8/29/22 - CT A/P - Pseudomyxoma peritonei with low density throughout the central mesentery and scalloping of the right hepatic lobe without significant change. Tumor in the pb hepatis which obliterates the left portal vein has progressed when compared with prior imaging dating to March 2022. Not a candidate for further PIPAC.  - no clinical trials available- decision made to start PARP inhibitor- Limited data but there are reports of activity of PARP inhibitors in GI cancers with HRD / BRCA/ NILO mutations.  - 10/3/22 CEA 8.7  - HAS NOT STARTED OLAPARIB YET- AWAITING AUTH   - will discuss with outpatient oncologist Dr. Sadie Mendoza and after medically optimized can follow up with her for further oncologic care     Medicine admission- Oncology will continue to follow     Matias Bland MD, PGY6  Hematology/Oncology Fellow   pager [phone]  After 5pm and on weekends page on call fellow

## 2022-10-19 NOTE — ED PROVIDER NOTE - PHYSICAL EXAMINATION
Gen - Nontoxic appearing; AOx3  HEENT - NCAT, EOMI, dry mucous membranes  Neck - supple  Resp - CTAB, no increased WOB  CV -  RRR, no m/r/g  Abd - soft, ND, +TTP LLQ/L lateral abdomen, no rebound or guarding, multiple surgical scars  Back - ++L CVA tenderness  MSK - FROM of b/l UE and LE, no gross deformities  Extrem - no LE edema/erythema/tenderness  Neuro - no focal motor or sensation deficits  Skin - warm, well perfused

## 2022-10-19 NOTE — H&P ADULT - PROBLEM SELECTOR PLAN 3
Angela sharma - patient's last treatment with HIPEC was this past June   - oncology following - patient's last treatment with HIPEC was this past June   - oncology following  - CT showed findings consistent with pseudomyxoma peritonei, overall similar to the prior exam. Re-demonstrated tumor in the pb hepatis with obliteration of the left portal vein, and atrophy of the left hepatic lobe.

## 2022-10-19 NOTE — H&P ADULT - NSHPPHYSICALEXAM_GEN_ALL_CORE
General: Not in any acute distress, well groomed, well developed  Head: atraumatic, normocephalic   Eyes: pupils equal, round, reactive to light, extraocular muscles intact, anicteric sclera   ENT: moist mucous membranes  Neck: no thyromegaly, no JVD, no tender lymphadenopathy   Lungs: clear lung fields bilaterally, no wheezes, rales, or rhonchi   Cardiovascular: regular rate and rhythm, normal s1,s2, no murmurs, rubs, or gallops   Abdomen: soft, non-tender, slightly distended,  Extremities: warm, well perfused, no lower extremity edema  Back: + CVA tenderness on left side  Skin: no rashes or areas of ecchymosis

## 2022-10-19 NOTE — H&P ADULT - NSHPLABSRESULTS_GEN_ALL_CORE
LABS:                          12.7   26.33 )-----------( 466      ( 19 Oct 2022 05:06 )             39.5     10-    137  |  100  |  26<H>  ----------------------------<  150<H>  4.9   |  24  |  2.19<H>    Ca    9.3      19 Oct 2022 05:06    TPro  7.4  /  Alb  4.2  /  TBili  0.7  /  DBili  x   /  AST  20  /  ALT  19  /  AlkPhos  92  10-    LIVER FUNCTIONS - ( 19 Oct 2022 05:06 )  Alb: 4.2 g/dL / Pro: 7.4 g/dL / ALK PHOS: 92 U/L / ALT: 19 U/L / AST: 20 U/L / GGT: x             Urinalysis Basic - ( 19 Oct 2022 05:50 )    Color: Light Yellow / Appearance: Clear / S.025 / pH: x  Gluc: x / Ketone: Trace  / Bili: Negative / Urobili: <2 mg/dL   Blood: x / Protein: 30 mg/dL / Nitrite: Negative   Leuk Esterase: Negative / RBC: 8 /HPF / WBC 5 /HPF   Sq Epi: x / Non Sq Epi: 4 /HPF / Bacteria: Negative

## 2022-10-19 NOTE — H&P ADULT - HISTORY OF PRESENT ILLNESS
What Type Of Note Output Would You Prefer (Optional)?: Standard Output How Severe Are Your Spot(S)?: mild Have Your Spot(S) Been Treated In The Past?: has not been treated 62 year old male with history of metastatic appendiceal carcinoma s/p partial gastrectomy, cholecystectomy, subtotal colectomy, diaphragm stripping, omentectomy, splenectomy and HIPEC, presenting with L flank and abdominal pain x 1-2d. States that since Monday having progressive pain to L flank radiating to LLQ, associated with dry heaving and some chills, denies fevers, chest pain, cough, dysuria, hematuria, bloody stools. Chronically has diarrhea. Hpi Title: Evaluation of Skin Lesions 62 year old male with history of metastatic appendiceal carcinoma s/p partial gastrectomy, cholecystectomy, subtotal colectomy, diaphragm stripping, omentectomy, splenectomy and HIPEC, presenting with L flank and abdominal pain x 1-2d. States that since Monday he was having progressive pain to L flank radiating to LLQ. The pain was associated with dry heaving and some chills. Patient also states that he was having an increase in urination frequency over the last few days. Patient's last PIPAC was this past June. Of note patient has a history of right hydronephrosis and states that he is a chronic colonizer of enterococcus. Additionally, patient states that he has a history kidney stones, but never one this large.     In the ED, patient was found to have new L hydronephrosis, a stone in his bladder, and an KIM. Patient was given morphine for the pain, which greatly reduced his symptoms     Patient denies having any fever, chills, weakness, chest pain, palpitations, shortness of breath, abdominal pain, nausea vomiting, diarrhea, constipation, or lower extremity edema.

## 2022-10-19 NOTE — CONSULT NOTE ADULT - SUBJECTIVE AND OBJECTIVE BOX
GENERAL SURGERY CONSULT NOTE  Consulting surgical team: D team surgery  Consulting attending: Dr. Mason     HPI:  HPI: 61M metastatic appendiceal carcinoma with pseudomyxoma peritonei s/p cytoreductive surgery  including partial gastrectomy, cholecystectomy, subtotal colectomy, diaphragm stripping, omentectomy, splenectomy s/p FOLFOX x 6 months and HIPEC and now s/p PIPAC x 8 most recently 6/3 presenting with worsening L flank. Reports that pain started on Monday and progressively worsened. 1 episode of vomiting and dry heaving yesterday, otherwise tolerating food. Passing gas and stool. Denies CP, SOB, dysuria, urinary retention.       PAST MEDICAL HISTORY:  No pertinent past medical history    HLD (hyperlipidemia)    BPH (benign prostatic hyperplasia)    Neuropathic pain    UTI (urinary tract infection)    Appendix carcinoma    UTI (urinary tract infection)    Anxiety    Cancer of appendix    Malignant neoplasm of appendix    GERD (gastroesophageal reflux disease)        PAST SURGICAL HISTORY:  No significant past surgical history    History of undescended testicle    Abdominal mass    History of abdominal paracentesis    H/O colectomy    Malignant neoplasm of appendix        SOCIAL HISTORY:  - Denies EtOH abuse, smoking, IVDA    MEDICATIONS:  lactated ringers Bolus 1000 milliLiter(s) IV Bolus once      ALLERGIES:  barium sulfate (Unknown)      VITALS & I/Os:  Vital Signs Last 24 Hrs  T(C): 37 (19 Oct 2022 09:00), Max: 37 (19 Oct 2022 09:00)  T(F): 98.6 (19 Oct 2022 09:00), Max: 98.6 (19 Oct 2022 09:00)  HR: 74 (19 Oct 2022 09:00) (74 - 87)  BP: 131/84 (19 Oct 2022 09:00) (131/84 - 171/95)  BP(mean): --  RR: 16 (19 Oct 2022 09:00) (16 - 19)  SpO2: 99% (19 Oct 2022 09:00) (99% - 100%)    Parameters below as of 19 Oct 2022 09:00  Patient On (Oxygen Delivery Method): room air        I&O's Summary      PHYSICAL EXAM:  GEN: resting comfortably in bed, in NAD  RESP: no acute respiratory distress, breathing comfortably   ABD: soft, non-distended, non-tender   EXT:  WWP, CLARK   NEURO:  no focal neuro deficits     LABS:                        12.7   26.33 )-----------( 466      ( 19 Oct 2022 05:06 )             39.5     10    137  |  100  |  26<H>  ----------------------------<  150<H>  4.9   |  24  |  2.19<H>    Ca    9.3      19 Oct 2022 05:06    TPro  7.4  /  Alb  4.2  /  TBili  0.7  /  DBili  x   /  AST  20  /  ALT  19  /  AlkPhos  92  10-19    Lactate:  10-19 @ 10:30  1.4              Urinalysis Basic - ( 19 Oct 2022 05:50 )    Color: Light Yellow / Appearance: Clear / S.025 / pH: x  Gluc: x / Ketone: Trace  / Bili: Negative / Urobili: <2 mg/dL   Blood: x / Protein: 30 mg/dL / Nitrite: Negative   Leuk Esterase: Negative / RBC: 8 /HPF / WBC 5 /HPF   Sq Epi: x / Non Sq Epi: 4 /HPF / Bacteria: Negative        IMAGING:  < from: CT Abdomen and Pelvis w/ Oral Cont and w/ IV Cont (10.19.22 @ 07:57) >  ROBERT: Redemonstration of scalloping of the right hepatic lobe from   pseudomyxoma, similar to the previous exam. Left hepatic lobe atrophy   again seen and likely secondary to occluded left portal vein, also   similar to the prior exam.  BILE DUCTS: Normal caliber.  GALLBLADDER: Cholecystectomy.  SPLEEN: Splenectomy.  PANCREAS: Within normal limits.  ADRENALS: Within normal limits.  KIDNEYS/URETERS: Moderate right hydroureteronephrosis, similar to prior   CT. Moderate left hydroureteronephrosis to the level of the bladder, new   since the prior prior CT. No evidence of an obstructing stone within   either ureter. Mild urothelialthickening and hyperenhancement of the   distal left ureter. Normal and symmetric renal parenchymal enhancement.    BLADDER: There is a 1.0 x 0.5 cm calculus in the dependent portion of the   urinary bladder (series 2 image 91), which is new since this prior CT   exam. In retrospect, this stone was likely located within the distal left   ureter on the prior exam, having since migrated into the bladder. Mild   wall thickening along the left posterior bladder, including in the region   of the left ureterovesicular junction (series 2 image 94).  REPRODUCTIVE ORGANS: Prostate is enlarged.    BOWEL: Status post distal gastrectomy with gastrojejunostomy. Subtotal   colectomy. No bowel obstruction.  PERITONEUM: Similar appearance of mild scalloping along the inferior   right hepatic lobe, likely related to pseudomyxoma. Again seen is   ill-defined and lobulated low density throughout the peritoneum,   consistent with pseudomyxoma, overall similar in appearance to the prior   CT. Similar appearance of low-density tumor within the pb hepatis with   obliteration of the left portal vein.  VESSELS: Atherosclerotic changes. Nonvisualization of the left portal   vein, presumably occluded, unchanged from the prior exam. The hepatic   veins are patent.  RETROPERITONEUM/LYMPH NODES: No lymphadenopathy.  ABDOMINAL WALL: Within normal limits.  BONES: Degenerative changes.    IMPRESSION:  Findings consistent with pseudomyxoma peritonei, overall similar to the   prior exam. Redemonstrated tumor in the pb hepatis with obliteration   of the left portal vein, and atrophy of the left hepatic lobe.    New moderate left hydroureteronephrosis to the level of the urinary   bladder, possibly related to a recently passed stone. There is a new 1.0   cmurinary bladder calculus, which has likely migrated from the distal   left ureter since the prior CT exam. No ureteral calculi are identified   on the current exam.    Mild wall thickening of the left posterior aspect of the urinary bladder,   in theregion of the left UVJ. Findings are possibly inflammatory related   to the presumed recently passed stone, with neoplasm not excluded.   Consider further assessment with cystoscopy. Mild urothelial thickening   and hyperenhancement of the distal left ureter, also possibly   inflammatory.    Unchanged moderate right hydroureteronephrosis.    No bowel obstruction.    < end of copied text >

## 2022-10-19 NOTE — H&P ADULT - ASSESSMENT
62 year old male with history of metastatic appendiceal carcinoma s/p partial gastrectomy, cholecystectomy, subtotal colectomy, diaphragm stripping, omentectomy, splenectomy and HIPEC, presenting with L flank and abdominal pain x 1-2d. Found to have KIM and leukocytosis with imaging showing known peritoneal carcinomatosis and left hydronephrosis with possible passed ureteral stone.

## 2022-10-19 NOTE — CONSULT NOTE ADULT - ASSESSMENT
62 y male with acute onset of L flank pain and findings of L hydronephrosis and KIM most likely consistent with recently passed stone

## 2022-10-19 NOTE — ED ADULT NURSE REASSESSMENT NOTE - NS ED NURSE REASSESS COMMENT FT1
received report form night RN. received pt in room 2, pt is A+Ox4, ambulatory. VSS, denies chest pain and SOB. RR even and unlabored. pt denies pain at this time. will continue to monitor.
Pt appears comfortable sitting up in bed. Verbalizes improvement in pain level. Reports 2/10 pain level. NSR on bedside cardiac monitor. Respirations even and unlabored. No apparent distress noted. Pt speaking in full and complete sentences. Family at bedside. Bed in lowest position, call bell in reach, wheels locked, side rails up, safety maintained. Awaiting CT.

## 2022-10-19 NOTE — ED ADULT NURSE NOTE - OBJECTIVE STATEMENT
Patient received in ED room Patient received in ED room 2. A&Ox4 and ambulatory. C/o left sided flank pain, radiating to general abdomen area x 2 days. Reports 8/10 pain at this time. Also admits to nausea with dry heaving, but denies vomiting. Pt denies CP, SOB, headache, nausea, vomiting, dizziness, lightheadedness, vision changes, fever or chills. Respirations even and unlabored with equal chest rise. Pt appears to have episodes of discomfort with moaning in pain. Besides that, pt speaking in full and complete sentences. Pulses palpable in all extremities. V/S charted. Elevated BP noted, MD resident Aung Valerio made aware. Placed on bedside cardiac monitor - NSR. Family at bedside. Bed in lowest position, call bell in reach, wheels locked, side rails up, safety maintained. Awaiting orders.

## 2022-10-19 NOTE — CONSULT NOTE ADULT - PROBLEM SELECTOR RECOMMENDATION 9
Most likely cause of pain, hydro, and KIM is passed ureteral stone  Would recommend:    -Repeat labs stat  -Med Admit       -AM labs to trend creatinine  -Keep NPO; in case no improvement of creatinine which could point to other causes of ureteral obstruction, and would necessitate placement of Left Nephrostomy tube via IR.  -f/u Heme and Gen Surg reccs  -strain voids and send Calculus for analysis  -D/W Dr. Sanford

## 2022-10-19 NOTE — CONSULT NOTE ADULT - ASSESSMENT
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 Found to have worsening L hydronephrosis on CT scan, ?due to recently passed stone seen in bladder.     Plan:  - No acute surgical intervention  - Recommend urology evaluation (patient follows Dr. Sanford outpatient)  - Surgical oncology will follow     Plan discussed with Dr. 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 Found to have worsening L hydronephrosis on CT scan, ?due to recently passed stone seen in bladder.     Plan:  - No acute surgical intervention  - Recommend medicine admission  - Recommend urology evaluation (patient follows Dr. Sanford outpatient)  - Surgical oncology will follow     Plan discussed with Dr. Isabella Gorman PGY-2   D Team Surgery  [id number]

## 2022-10-19 NOTE — CONSULT NOTE ADULT - SUBJECTIVE AND OBJECTIVE BOX
HPI:  62 year old male with history of metastatic appendiceal carcinoma s/p partial gastrectomy, cholecystectomy, subtotal colectomy, diaphragm stripping, omentectomy, splenectomy and HIPEC, presenting with L flank and abdominal pain x 1-2d. States that since Monday having progressive pain to L flank radiating to LLQ, associated with dry heaving and some chills.  Pt is also s/p PIPAC () and has hx of chronic R hydronephrosis for which  he follows up with Dr. Sanford    Denies fevers, chest pain, cough, dysuria, hematuria, bloody stools. Chronically has diarrhea.  In ER found to have new L hydro, a stone in his bladder, and an KIM.  After one dose of MSO4 and Zofran Pt's symptoms have completely resolved.      PAST MEDICAL & SURGICAL HISTORY:    HLD (hyperlipidemia)      BPH (benign prostatic hyperplasia)      Neuropathic pain  hands and feet      UTI (urinary tract infection)  pt denes recent infection      Appendix carcinoma  PIPAC x 7, last done 2022      Anxiety      Nephrolithiasis      Cancer of appendix      Malignant neoplasm of appendix      GERD (gastroesophageal reflux disease)  pt denes recent endoscopy      History of undescended testicle  age 7      Abdominal mass  surgery 2020  remove mass, spleen, partial colectomy, lymph nodes, part small intestines, omentum, apendix, gall bladder, part stomach. HIPEC      History of abdominal paracentesis  x3      H/O colectomy  Splenectomy , Cholecystectomy   2020. Insertion of Mediport      Malignant neoplasm of appendix  chemo 10/2021; s/p Pipac x 7 last 2022          MEDICATIONS  (STANDING):    · 	doxycycline monohydrate 50 mg oral capsule: 2 cap(s) orally every 12 hours  · 	acetaminophen 325 mg oral tablet: 3 tab(s) orally every 6 hours, As needed, Mild Pain (1 - 3)  · 	simvastatin 10 mg oral tablet: 1 tab(s) orally once a day (at bedtime)  · 	pantoprazole 40 mg oral delayed release tablet: 1 tab(s) orally once a day  · 	famotidine 40 mg oral tablet: 1 tab(s) orally once a day  · 	escitalopram 5 mg oral tablet: 1 tab(s) orally once a day, pm  · 	tamsulosin 0.4 mg oral capsule: 1 cap(s) orally once a day, pm  · 	testosterone topical: daily        FAMILY HISTORY:    FH: diabetes mellitus (Father)    FH: HTN (hypertension) (Mother)        Allergies    barium sulfate (Unknown)        SOCIAL HISTORY:    No Smoking      REVIEW OF SYSTEMS: Otherwise negative as stated in HPI      Vital Signs Last 24 Hrs  T(C): 36.9 (19 Oct 2022 16:28), Max: 37 (19 Oct 2022 09:00)  T(F): 98.4 (19 Oct 2022 16:28), Max: 98.6 (19 Oct 2022 09:00)  HR: 60 (19 Oct 2022 16:28) (60 - 87)  BP: 131/84 (19 Oct 2022 09:00) (131/84 - 171/95)  BP(mean): --  RR: 16 (19 Oct 2022 16:28) (16 - 19)  SpO2: 99% (19 Oct 2022 16:28) (99% - 100%)    Parameters below as of 19 Oct 2022 16:28  Patient On (Oxygen Delivery Method): room air        PHYSICAL EXAM:    General: [ x] Awake and Alert in no acute distress    Respiratory and Thorax: [x  ] no resp distress   	  Cardiovascular: [x ]  Reg Rate    Gastrointestinal: [x ]soft  [x ] non tender, CVAT [x ]Y  [ ]N  /    [x ]L  [ ]R                         	  Musculoskeletal / Extremities:  Edema [ ]Y  x ]N,  AROM x 4 [x ]Y  [ ]N  	        LABS:                        12.7   26.33 )-----------( 466      ( 19 Oct 2022 05:06 )             39.5     10-    137  |  100  |  26<H>  ----------------------------<  150<H>  4.9   |  24  |  2.19<H>    Ca    9.3      19 Oct 2022 05:06    TPro  7.4  /  Alb  4.2  /  TBili  0.7  /  DBili  x   /  AST  20  /  ALT  19  /  AlkPhos  92  10      Urinalysis Basic - ( 19 Oct 2022 05:50 )    Color: Light Yellow / Appearance: Clear / S.025 / pH: x  Gluc: x / Ketone: Trace  / Bili: Negative / Urobili: <2 mg/dL   Blood: x / Protein: 30 mg/dL / Nitrite: Negative   Leuk Esterase: Negative / RBC: 8 /HPF / WBC 5 /HPF   Sq Epi: x / Non Sq Epi: 4 /HPF / Bacteria: Negative        RADIOLOGY:    < from: CT Abdomen and Pelvis w/ Oral Cont and w/ IV Cont (10.19.22 @ 07:57) >    ACC: [id number] EXAM:  CT ABDOMEN AND PELVIS OC IC                          PROCEDURE DATE:  10/19/2022          INTERPRETATION:  CLINICAL INFORMATION: Left flank/lower quadrant pain.   Metastatic appendiceal carcinoma.    COMPARISON: CT abdomen and pelvis 2022    CONTRAST/COMPLICATIONS:  IV Contrast: Omnipaque 350  90 cc administered   0 cc discarded  Oral Contrast: Omnipaque 300   Fruit 2o  Complications: None reported at time of study completion    PROCEDURE:  CT of the Abdomen and Pelviswas performed.  Sagittal and coronal reformats were performed.    FINDINGS:  LOWER CHEST: Within normal limits.    LIVER: Redemonstration of scalloping of the right hepatic lobe from   pseudomyxoma, similar to the previous exam. Left hepatic lobe atrophy   again seen and likely secondary to occluded left portal vein, also   similar to the prior exam.  BILE DUCTS: Normal caliber.  GALLBLADDER: Cholecystectomy.  SPLEEN: Splenectomy.  PANCREAS: Within normal limits.  ADRENALS: Within normal limits.  KIDNEYS/URETERS: Moderate right hydroureteronephrosis, similar to prior   CT. Moderate left hydroureteronephrosis to the level of the bladder, new   since the prior prior CT. No evidence of an obstructing stone within   either ureter. Mild urothelialthickening and hyperenhancement of the   distal left ureter. Normal and symmetric renal parenchymal enhancement.    BLADDER: There is a 1.0 x 0.5 cm calculus in the dependent portion of the   urinary bladder (series 2 image 91), which is new since this prior CT   exam. In retrospect, this stone was likely located within the distal left   ureter on the prior exam, having since migrated into the bladder. Mild   wall thickening along the left posterior bladder, including in the region   of the left ureterovesicular junction (series 2 image 94).  REPRODUCTIVE ORGANS: Prostate is enlarged.    BOWEL: Status post distal gastrectomy with gastrojejunostomy. Subtotal   colectomy. No bowel obstruction.  PERITONEUM: Similar appearance of mild scalloping along the inferior   right hepatic lobe, likely related to pseudomyxoma. Again seen is   ill-defined and lobulated low density throughout the peritoneum,   consistent with pseudomyxoma, overall similar in appearance to the prior   CT. Similar appearance of low-density tumor within the pb hepatis with   obliteration of the left portal vein.  VESSELS: Atherosclerotic changes. Nonvisualization of the left portal   vein, presumably occluded, unchanged from the prior exam. The hepatic   veins are patent.  RETROPERITONEUM/LYMPH NODES: No lymphadenopathy.  ABDOMINAL WALL: Within normal limits.  BONES: Degenerative changes.    IMPRESSION:  Findings consistent with pseudomyxoma peritonei, overall similar to the   prior exam. Redemonstrated tumor in the pb hepatis with obliteration   of the left portal vein, and atrophy of the left hepatic lobe.    New moderate left hydroureteronephrosis to the level of the urinary   bladder, possibly related to a recently passed stone. There is a new 1.0   cmurinary bladder calculus, which has likely migrated from the distal   left ureter since the prior CT exam. No ureteral calculi are identified   on the current exam.    Mild wall thickening of the left posterior aspect of the urinary bladder,   in theregion of the left UVJ. Findings are possibly inflammatory related   to the presumed recently passed stone, with neoplasm not excluded.   Consider further assessment with cystoscopy. Mild urothelial thickening   and hyperenhancement of the distal left ureter, also possibly   inflammatory.    Unchanged moderate right hydroureteronephrosis.    No bowel obstruction.    --- End of Report ---          ZORAIDA PABLO MD; Resident Radiologist  This document has been electronically signed.  MILAGROS SANCHEZ MD; Attending Radiologist  This document has been electronically signed. Oct 19 2022 11:57AM    < end of copied text >

## 2022-10-19 NOTE — H&P ADULT - ATTENDING COMMENTS
62 y/M PMH  of metastatic appendiceal carcinoma s/p partial gastrectomy, cholecystectomy, subtotal colectomy, diaphragm stripping, omentectomy, splenectomy and HIPEC, presenting with L flank and abdominal pain x 1-2d.   # KIM –Suspect obstructive nephropathy. On admission, Cr 2.19. Baseline around 1: of note outpatient creatine starting to rise 1.25 noted on 8/31/22   CT Imaging with known peritoneal carcinomatosis and left hydronephrosis with possible passed   ureteral stone. Urology consulted.   s/p IVF x 2 L s/p Morphine x 1. Continue IVF.Monitor I&Os  Continue  IVF, Trend creatinine, avoid nephrotoxins. Avoid Morphine given KIM  Hold olaparib for now     # Leukocytosis- Suspect reactive, No overt s/s infection. 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 Appreciate Onc note “ s/p cytoreductive surgery in 2020 and HIPEC (hyperthermic intraperitoneal chemotherapy) for LAMN (low-grade appendiceal mucinous neoplasm). Surgery included a splenectomy and distal gastrectomy, omentectomy, cholecystectomy, subtotal colectomy and diaphragm stripping.  s/p FOLFOX x 6 months (completed in December 2020) with POD on 4/30/21 with  worsening ascites s/p paracentesis: referred for PIPAC (Dr. Mason). Sttarted C1 PIPAC, completed C8 6/3/22- course complicated by SBO, after 8 cycles stability of disease on imaging . 8/29/22 - CT A/P - Pseudomyxoma peritonei with low density throughout the central mesentery and scalloping of the right hepatic lobe without significant change. Tumor in the pb hepatis which obliterates the left portal vein has progressed when compared with prior imaging dating to March 2022. Not a candidate for further PIPAC.  No clinical trials available- decision made to start PARP inhibitor- Limited data but there are reports of activity of PARP inhibitors in GI cancers with HRD / BRCA/ NILO mutations.  Started on olaparib 300mg BID approx 1 week ago. Given current CrCl 27 would hold olaparib for now. Sees  Dr. Sadie Mendoza  as outpt  FU as outpt”

## 2022-10-19 NOTE — H&P ADULT - PROBLEM SELECTOR PLAN 1
- baseline creatinine ~1, currently at 2.19  - most likely obstructive due to ureteral stone  - will continue with IV fluids  - CT showed new moderate left hydroureteronephrosis to the level of the urinary bladder, possibly related to a recently passed stone, new 1.0 cm urinary bladder calculus, which has likely migrated from the distal left ureter since the prior CT exam. - baseline creatinine ~1, currently at 2.19  - most likely obstructive due to ureteral stone  - will continue with IV fluids  - CT showed new moderate left hydroureteronephrosis to the level of the urinary bladder, possibly related to a recently passed stone, new 1.0 cm urinary bladder calculus, which has likely migrated from the distal left ureter since the prior CT exam  - will have Dilaudid as PRN for severe pain

## 2022-10-19 NOTE — ED PROVIDER NOTE - ATTENDING CONTRIBUTION TO CARE
HPI: 62 year old male with history of metastatic appendiceal carcinoma s/p partial gastrectomy, cholecystectomy, subtotal colectomy, diaphragm stripping, omentectomy, splenectomy and HIPEC, presenting with L flank and abdominal pain x 1-2d. States that since Monday having progressive pain to L flank radiating to LLQ, associated with dry heaving and some chills, denies fevers, chest pain, cough, dysuria, hematuria, bloody stools. Chronically has diarrhea.  EXAM: Abd - soft, ND, +TTP LLQ/L lateral abdomen, no rebound or guarding, multiple surgical scars  MDM: pt with multiple medical problems that is frequently in hospital that is here for abd pain. Concern for SBO vs kidney infection vs infarct. Will obtain labs and imaging and provide pain meds and reassess.

## 2022-10-19 NOTE — ED PROVIDER NOTE - OBJECTIVE STATEMENT
62 year old male with history of metastatic appendiceal carcinoma s/p partial gastrectomy, cholecystectomy, subtotal colectomy, diaphragm stripping, omentectomy, splenectomy and HIPEC, presenting with L flank and abdominal pain x 1-2d. 62 year old male with history of metastatic appendiceal carcinoma s/p partial gastrectomy, cholecystectomy, subtotal colectomy, diaphragm stripping, omentectomy, splenectomy and HIPEC, presenting with L flank and abdominal pain x 1-2d. States that since Monday having progressive pain to L flank radiating to LLQ, associated with dry heaving and some chills, denies fevers, chest pain, cough, dysuria, hematuria, bloody stools. Chronically has diarrhea.

## 2022-10-19 NOTE — H&P ADULT - PROBLEM SELECTOR PLAN 2
- WBC currently 26.33  - most likely reactionary to passage of stone   - will hold off on antibiotics for now; however, low threshold to start if patient's condition worsens  - urology following

## 2022-10-19 NOTE — H&P ADULT - NSHPSOCIALHISTORY_GEN_ALL_CORE
Patient lives at home with his wife. Patient is retired, previously worked in IT. Patient denies smoking cigarettes currently or in the past. Patient does not drink alcohol or use any illicit substances.

## 2022-10-19 NOTE — H&P ADULT - NSHPREVIEWOFSYSTEMS_GEN_ALL_CORE
REVIEW OF SYSTEMS:    CONSTITUTIONAL: No weakness, fevers or chills  EYES/ENT: No visual changes;  No vertigo or throat pain   NECK: No pain or stiffness  RESPIRATORY: No cough, wheezing, hemoptysis; No shortness of breath  CARDIOVASCULAR: No chest pain or palpitations  GASTROINTESTINAL: No abdominal or epigastric pain. No nausea, vomiting, or hematemesis; + chronic diarrhea, no constipation. No melena or hematochezia.  GENITOURINARY: No dysuria, noticed an increase in frequency, no hematuria  NEUROLOGICAL: + peripheral numbness and tingling, no weakness  All other review of systems is negative unless indicated above.

## 2022-10-19 NOTE — ED PROVIDER NOTE - GASTROINTESTINAL [+], MLM
[employer] message sent to patient.
Have pt see dr Gino wu for vascular access
ABDOMINAL PAIN/DIARRHEA/NAUSEA

## 2022-10-19 NOTE — H&P ADULT - SOCIAL HISTORY
Called pt on home phone  Pt complaining of upper back pain, dark urine, frequency for three weeks.  Pt denies fever, URGENCY, but has  some vaginal pain  Pt was taking cranberry supplement and this resolved vaginal pain.  Pt states she has ankle swelling for 4 wees  Swelling has resolved   Today  Pt states she is taking her B/P meds  Pt denies headache, chest pain dizziness SOB  Pt will elevate her feet when sitting and call if she develops symtpoms  Pt was told this info would be sent to Dr LUIS ALBERTO Smith pt needs a UA order  :[facility]   No

## 2022-10-19 NOTE — CONSULT NOTE ADULT - CONSULT REASON
abdominal pain in setting of metastatic appendiceal cancer
metastatic appendiceal carcinoma
L flank pain, L hydronephrosis, KIM

## 2022-10-19 NOTE — ED PROVIDER NOTE - CLINICAL SUMMARY MEDICAL DECISION MAKING FREE TEXT BOX
62 year old male with history of metastatic appendiceal carcinoma s/p partial gastrectomy, cholecystectomy, subtotal colectomy, diaphragm stripping, omentectomy, splenectomy and HIPEC, presenting with L flank and abdominal pain x 1-2d. 62 year old male with history of metastatic appendiceal carcinoma s/p partial gastrectomy, cholecystectomy, subtotal colectomy, diaphragm stripping, omentectomy, splenectomy and HIPEC, presenting with L flank and abdominal pain x 1-2d. Patient is nontoxic here with reassuring vitals but on exam significantly tender in L flank/lower abdomen, differential includes kidney stone vs bowel obstruction vs colitis vs surgical complication--will further assess with CT imaging as well as labs/UA, IVF/zofran/morphine for symptom control, dispo pending work up and reassesments

## 2022-10-20 NOTE — DISCHARGE NOTE NURSING/CASE MANAGEMENT/SOCIAL WORK - NSDCPEFALRISK_GEN_ALL_CORE
For information on Fall & Injury Prevention, visit: [email or web address] OR  [email or web address] OR  https://www.cdc.gov/steadi/patient.html

## 2022-10-20 NOTE — DISCHARGE NOTE PROVIDER - CARE PROVIDER_API CALL
Pipe Lester)  [facility]  [address], [address]  [place] [address]  Phone: [phone]  Fax: [phone]  Established Patient  Follow Up Time: 1 week    Cesar Sanford; RAMBO)  Urology  [address] [place] [address], [place] [address]  Phone: [phone]  Fax: [phone]  Established Patient  Follow Up Time: 1 week

## 2022-10-20 NOTE — PROGRESS NOTE ADULT - ATTENDING COMMENTS
62 y/M PMH  of metastatic appendiceal carcinoma s/p partial gastrectomy, cholecystectomy, subtotal colectomy, diaphragm stripping, omentectomy, splenectomy and HIPEC, presenting with L flank and abdominal pain x 1-2d.     # KIM –Suspect obstructive nephropathy secondary to possible passed stone   On admission, Cr 2.19, creatinine improving to 1.55. Baseline around 1-1.25 noted on 8/31/22   CT Imaging with known peritoneal carcinomatosis and left hydronephrosis with possible passed  ureteral stone.   s/p IVF x 2 L s/p Morphine x 1. Continue IVF.Monitor I&Os  Continue  IVF, Trend creatinine, avoid nephrotoxins. Avoid Morphine given KIM  Urology recs appreciated - NO need for Nephrostomy tube as creatinine improvng     # Leukocytosis- Suspect reactive, No overt s/s infection. 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 Appreciate Onc note “ s/p cytoreductive surgery in 2020 and HIPEC (hyperthermic intraperitoneal chemotherapy) for LAMN (low-grade appendiceal mucinous neoplasm). Surgery included a splenectomy and distal gastrectomy, omentectomy, cholecystectomy, subtotal colectomy and diaphragm stripping.  s/p FOLFOX x 6 months (completed in December 2020) with POD on 4/30/21 with  worsening ascites s/p paracentesis: referred for PIPAC (Dr. Mason). Sttarted C1 PIPAC, completed C8 6/3/22- course complicated by SBO, after 8 cycles stability of disease on imaging . 8/29/22 - CT A/P - Pseudomyxoma peritonei with low density throughout the central mesentery and scalloping of the right hepatic lobe without significant change. Tumor in the pb hepatis which obliterates the left portal vein has progressed when compared with prior imaging dating to March 2022. Not a candidate for further PIPAC.  No clinical trials available- decision made to start PARP inhibitor- Limited data but there are reports of activity of PARP inhibitors in GI cancers with HRD / BRCA/ NILO mutations.  Started on olaparib 300mg BID approx 1 week ago. Given current CrCl 27 would hold olaparib for now. Sees  Dr. Sadie Mendoza  as outpt  FU as outpt”.    # Dispo- Anticipate dc . Will dw with Urology   DC planning 33mins 62 y/M PMH  of metastatic appendiceal carcinoma s/p partial gastrectomy, cholecystectomy, subtotal colectomy, diaphragm stripping, omentectomy, splenectomy and HIPEC, presenting with L flank and abdominal pain x 1-2d.     # KIM –Suspect obstructive nephropathy secondary to possible passed stone   On admission, Cr 2.19, creatinine improving to 1.55. Baseline around 1-1.25 noted on 8/31/22   CT Imaging with known peritoneal carcinomatosis and left hydronephrosis with possible passed  ureteral stone.   s/p IVF x 2 L s/p Morphine x 1. Continue IVF.Monitor I&Os  Continue  IVF, Trend creatinine, avoid nephrotoxins. Avoid Morphine given KIM. On Oxy PRN.   Urology recs appreciated - NO need for Nephrostomy tube as creatinine improvng     # Leukocytosis- Suspect reactive, No overt s/s infection. 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 Appreciate Onc note “ s/p cytoreductive surgery in 2020 and HIPEC (hyperthermic intraperitoneal chemotherapy) for LAMN (low-grade appendiceal mucinous neoplasm). Surgery included a splenectomy and distal gastrectomy, omentectomy, cholecystectomy, subtotal colectomy and diaphragm stripping.  s/p FOLFOX x 6 months (completed in December 2020) with POD on 4/30/21 with  worsening ascites s/p paracentesis: referred for PIPAC (Dr. Mason). Sttarted C1 PIPAC, completed C8 6/3/22- course complicated by SBO, after 8 cycles stability of disease on imaging . 8/29/22 - CT A/P - Pseudomyxoma peritonei with low density throughout the central mesentery and scalloping of the right hepatic lobe without significant change. Tumor in the pb hepatis which obliterates the left portal vein has progressed when compared with prior imaging dating to March 2022. Not a candidate for further PIPAC.  No clinical trials available- decision made to start PARP inhibitor- Limited data but there are reports of activity of PARP inhibitors in GI cancers with HRD / BRCA/ NILO mutations.  Fu Onc as outpt    # Dispo- DC planning 33mins . Pt to FU with Urology in 1 week for repeat labs and FU Imaging if needed  Wife at bedside

## 2022-10-20 NOTE — PROGRESS NOTE ADULT - PROBLEM SELECTOR PLAN 2
- WBC currently 26.33  - most likely reactionary to passage of stone   - will hold off on antibiotics for now; however, low threshold to start if patient's condition worsens  - urology following - WBC previously was 26.33, currently at 8.62  - most likely reactionary to passage of stone   - will hold off on antibiotics at this time

## 2022-10-20 NOTE — DISCHARGE NOTE PROVIDER - NSDCFUSCHEDAPPT_GEN_ALL_CORE_FT
Sadie Mendoza  [facility]  [place]  Scheduled Appointment: 11/30/2022

## 2022-10-20 NOTE — PROGRESS NOTE ADULT - SUBJECTIVE AND OBJECTIVE BOX
Surgery Progress Note    SUBJECTIVE:   Patient seen and examined at bedside with surgical team.   No acute events overnight.     OBJECTIVE:    Vital Signs Last 24 Hrs  T(C): 37.1 (20 Oct 2022 13:51), Max: 37.1 (19 Oct 2022 21:48)  T(F): 98.7 (20 Oct 2022 13:51), Max: 98.7 (19 Oct 2022 21:48)  HR: 59 (20 Oct 2022 13:51) (59 - 62)  BP: 116/74 (20 Oct 2022 13:51) (111/67 - 125/77)  BP(mean): --  RR: 16 (20 Oct 2022 13:51) (16 - 19)  SpO2: 98% (20 Oct 2022 13:51) (94% - 99%)    Parameters below as of 20 Oct 2022 13:51  Patient On (Oxygen Delivery Method): room air        MEDICATIONS  (STANDING):  escitalopram 5 milliGRAM(s) Oral daily  famotidine    Tablet 40 milliGRAM(s) Oral daily  heparin   Injectable 5000 Unit(s) SubCutaneous every 8 hours  lactated ringers. 1000 milliLiter(s) (50 mL/Hr) IV Continuous <Continuous>  pantoprazole    Tablet 40 milliGRAM(s) Oral before breakfast  simvastatin 10 milliGRAM(s) Oral at bedtime  tamsulosin 0.4 milliGRAM(s) Oral at bedtime  testosterone 1% Gel 75 milliGRAM(s) Topical daily    MEDICATIONS  (PRN):  HYDROmorphone  Injectable 0.5 milliGRAM(s) IV Push every 6 hours PRN Severe Pain (7 - 10)      PHYSICAL EXAM:  Constitutional:  NAD  Respiratory: Unlabored breathing  Abdomen: Soft, nondistended, NTTP. No rebound or guarding.  Extremities: WWP, CLARK spontaneously    LABS:                        10.6   8.62  )-----------( 400      ( 20 Oct 2022 07:40 )             33.0     10-20    139  |  105  |  17  ----------------------------<  107<H>  3.9   |  26  |  1.55<H>    Ca    8.8      20 Oct 2022 07:40  Phos  2.9     10-20  Mg     1.80     10-20    TPro  7.4  /  Alb  4.2  /  TBili  0.7  /  DBili  x   /  AST  20  /  ALT  19  /  AlkPhos  92  10-19      LIVER FUNCTIONS - ( 19 Oct 2022 05:06 )  Alb: 4.2 g/dL / Pro: 7.4 g/dL / ALK PHOS: 92 U/L / ALT: 19 U/L / AST: 20 U/L / GGT: x

## 2022-10-20 NOTE — PATIENT PROFILE ADULT - FALL HARM RISK - UNIVERSAL INTERVENTIONS
Bed in lowest position, wheels locked, appropriate side rails in place/Call bell, personal items and telephone in reach/Instruct patient to call for assistance before getting out of bed or chair/Non-slip footwear when patient is out of bed/[place] to call system/Physically safe environment - no spills, clutter or unnecessary equipment/Purposeful Proactive Rounding/Room/bathroom lighting operational, light cord in reach

## 2022-10-20 NOTE — DISCHARGE NOTE PROVIDER - NSDCCPCAREPLAN_GEN_ALL_CORE_FT
PRINCIPAL DISCHARGE DIAGNOSIS  Diagnosis: Flank pain  Assessment and Plan of Treatment: When you came into the hospital you presented with significant left sided pain for which we gave you fluids and pain medication. We also got a CT scan, which showed a stone that went from your kidney to your bladder. We suspect that there was a period of time when the stone got stuck on the way from your bladder to your kidney, which caused the significant pain that you presented with. The stone was stuck in the ureter, which is the part of your urinary system that connects the kindey to the bladder. When that happens, the portion of the ureter behind the stone becomes filled with urine and gets larger, which is known has hydronephrosis. Additionally, this obstruction can cause an acute injury to your kindey, which results in your creatinine level rising. Once the stone passes, your kindey function usually recovers, and the creatinine level should return to where it was at baseline within 1-2 weeks. If you develope any fever, chills, flank pain, blood in your urine, or difficulty urinating, please call your treating physician. Additionally, please follow up with your primary care physician in 1 to recheck your creatinine level with a blood test and follow up with your urologist in 1 week.       PRINCIPAL DISCHARGE DIAGNOSIS  Diagnosis: Flank pain  Assessment and Plan of Treatment: When you came into the hospital you presented with significant left sided pain for which we gave you fluids and pain medication. We also got a CT scan, which showed a stone that went from your kidney to your bladder. We suspect that there was a period of time when the stone got stuck on the way from your bladder to your kidney, which caused the significant pain that you presented with. The stone was stuck in the ureter, which is the part of your urinary system that connects the kindey to the bladder. When that happens, the portion of the ureter behind the stone becomes filled with urine and gets larger, which is known has hydronephrosis. Additionally, this obstruction can cause an acute injury to your kindey, which results in your creatinine level rising. Once the stone passes, your kindey function usually recovers, and the creatinine level should return to where it was at baseline within 1-2 weeks. If you develope any fever, chills, flank pain, blood in your urine, or difficulty urinating, [facility].  Additionally, please follow up with your primary care physician in 1 week to recheck your creatinine level (basic metabolic panel) and follow up with your urologist in 1 week.

## 2022-10-20 NOTE — PROGRESS NOTE ADULT - SUBJECTIVE AND OBJECTIVE BOX
Patient is a 62y old  Male who presents with a chief complaint of flank pain (19 Oct 2022 17:19)     INTERVAL HPI/OVERNIGHT EVENTS:  - No acute events overnight    SUBJECTIVE  - Patient seen and evaluated at bedside  - Patient reports presence of   - Patient reports absence of fevers, chills, HA, lightheadedness, dizziness, nausea, emesis, chest pain, dyspnea, palpitations, abd pain, diarrhea, urinary symptoms, skin color changes or rashes, or LE edema     MEDICATIONS  (STANDING):  escitalopram 5 milliGRAM(s) Oral daily  famotidine    Tablet 40 milliGRAM(s) Oral daily  heparin   Injectable 5000 Unit(s) SubCutaneous every 8 hours  lactated ringers. 1000 milliLiter(s) (75 mL/Hr) IV Continuous <Continuous>  pantoprazole    Tablet 40 milliGRAM(s) Oral before breakfast  simvastatin 10 milliGRAM(s) Oral at bedtime  tamsulosin 0.4 milliGRAM(s) Oral at bedtime  testosterone 1% Gel 75 milliGRAM(s) Topical daily    MEDICATIONS  (PRN):  HYDROmorphone  Injectable 0.5 milliGRAM(s) IV Push every 6 hours PRN Severe Pain (7 - 10)    Allergies:  barium sulfate (Unknown)    Intolerances:      REVIEW OF SYSTEMS: As indicated above; otherwise, negative    VITAL SIGNS:  T(F): 97.8 (10-20-22 @ 06:15), Max: 98.7 (10-19-22 @ 21:48)  HR: 59 (10-20-22 @ 06:15) (59 - 74)  BP: 123/72 (10-20-22 @ 06:15) (111/67 - 131/84)  RR: 18 (10-20-22 @ 06:15) (16 - 19)  SpO2: 98% (10-20-22 @ 06:15) (94% - 99%)    PHYSICAL EXAM:  General: NAD, well-groomed, well-developed  Eyes: Conjunctiva and sclera clear  ENMT: Moist mucous membranes  Neck: No palpable pre-auricular, post-auricular, occipital, mandibular, submental, supra-clavicular, or infra-clavicular lymph nodes   Chest: Clear to auscultation bilaterally; no rales, rhonchi, or wheezing  Heart: Regular rate and rhythm; normal S1 and S2; no murmurs, rubs, or gallops  Abd: Soft, nontender, nondistended  Nervous System: AAOX3  Psych: Appropriate affect  Ext: no peripheral LE edema bilaterally    LABS:    19 Oct 2022 17:59    139    |  103    |  20     ----------------------------<  168    4.3     |  26     |  1.70     Ca    8.6        19 Oct 2022 17:59      CAPILLARY BLOOD GLUCOSE        BLOOD CULTURE    RADIOLOGY & ADDITIONAL TESTS:    Imaging Personally Reviewed:  [X ] YES     Consultant(s) Notes Reviewed:  Yes    Care Discussed with Consultants/Other Providers: Yes Patient is a 62y old  Male who presents with a chief complaint of flank pain (19 Oct 2022 17:19)     INTERVAL HPI/OVERNIGHT EVENTS:  - No acute events overnight    SUBJECTIVE  - Patient seen and evaluated at bedside. Patient states that his flank pain has significantly improved. Patient states he did not need any pain medication overnight.   - Patient reports absence of fevers, chills, headach, lightheadedness, dizziness, nausea, emesis, chest pain, dyspnea, palpitations, abdominal pain, diarrhea, urinary symptoms, skin color changes or rashes, or LE edema     MEDICATIONS  (STANDING):  escitalopram 5 milliGRAM(s) Oral daily  famotidine    Tablet 40 milliGRAM(s) Oral daily  heparin   Injectable 5000 Unit(s) SubCutaneous every 8 hours  lactated ringers. 1000 milliLiter(s) (75 mL/Hr) IV Continuous <Continuous>  pantoprazole    Tablet 40 milliGRAM(s) Oral before breakfast  simvastatin 10 milliGRAM(s) Oral at bedtime  tamsulosin 0.4 milliGRAM(s) Oral at bedtime  testosterone 1% Gel 75 milliGRAM(s) Topical daily    MEDICATIONS  (PRN):  HYDROmorphone  Injectable 0.5 milliGRAM(s) IV Push every 6 hours PRN Severe Pain (7 - 10)    Allergies:  barium sulfate (Unknown)    Intolerances:      REVIEW OF SYSTEMS: As indicated above; otherwise, negative    VITAL SIGNS:  T(F): 97.8 (10-20-22 @ 06:15), Max: 98.7 (10-19-22 @ 21:48)  HR: 59 (10-20-22 @ 06:15) (59 - 74)  BP: 123/72 (10-20-22 @ 06:15) (111/67 - 131/84)  RR: 18 (10-20-22 @ 06:15) (16 - 19)  SpO2: 98% (10-20-22 @ 06:15) (94% - 99%)    PHYSICAL EXAM:  General: Not in any acute distress, well groomed, well developed  Head: atraumatic, normocephalic   Eyes: pupils equal, round, reactive to light, extraocular muscles intact, anicteric sclera   ENT: moist mucous membranes  Neck: no thyromegaly, no JVD, no tender lymphadenopathy   Lungs: clear lung fields bilaterally, no wheezes, rales, or rhonchi   Cardiovascular: regular rate and rhythm, normal s1,s2, no murmurs, rubs, or gallops   Abdomen: soft, non-tender, non-distended,  Extremities: warm, well perfused, no lower extremity edema  Back: slight CVA tenderness on left side on palpation   Neuro: AOx3. no focal neurological deficits     LABS:    19 Oct 2022 17:59    139    |  103    |  20     ----------------------------<  168    4.3     |  26     |  1.70     Ca    8.6        19 Oct 2022 17:59      CAPILLARY BLOOD GLUCOSE        BLOOD CULTURE    RADIOLOGY & ADDITIONAL TESTS:    Imaging Personally Reviewed:  [X ] YES     Consultant(s) Notes Reviewed:  Yes    Care Discussed with Consultants/Other Providers: Yes

## 2022-10-20 NOTE — DISCHARGE NOTE NURSING/CASE MANAGEMENT/SOCIAL WORK - PATIENT PORTAL LINK FT
You can access the FollowMyHealth Patient Portal offered by [facility] by registering at the following website: [email or web address]. By joining [employer]’s FollowMyHealth portal, you will also be able to view your health information using other applications (apps) compatible with our system.

## 2022-10-20 NOTE — PROGRESS NOTE ADULT - PROBLEM SELECTOR PLAN 3
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 Re-demonstrated tumor in the pb hepatis with obliteration of the left portal vein, and atrophy of the left hepatic lobe. 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 Re-demonstrated tumor in the pb hepatis with obliteration of the left portal vein, and atrophy of the left hepatic lobe.

## 2022-10-20 NOTE — DISCHARGE NOTE PROVIDER - NSDCCPTREATMENT_GEN_ALL_CORE_FT
PRINCIPAL PROCEDURE  Procedure: CT abdomen pelvis  Findings and Treatment:   < end of copied text >  IMPRESSION:  Findings consistent with pseudomyxoma peritonei, overall similar to the prior exam. Redemonstrated tumor in the pb hepatis with obliteration of the left portal vein, and atrophy of the left hepatic lobe.  New moderate left hydroureteronephrosis to the level of the urinary   bladder, possibly related to a recently passed stone. There is a new 1.0 cm urinary bladder calculus, which has likely migrated from the distal left ureter since the prior CT exam. No ureteral calculi are identified on the current exam.  Mild wall thickening of the left posterior aspect of the urinary bladder, in theregion of the left UVJ. Findings are possibly inflammatory related to the presumed recently passed stone, with neoplasm not excluded. Consider further assessment with cystoscopy. Mild urothelial thickening and hyperenhancement of the distal left ureter, also possibly inflammatory. Unchanged moderate right hydroureteronephrosis.  No bowel obstruction

## 2022-10-20 NOTE — DISCHARGE NOTE PROVIDER - HOSPITAL COURSE
62 year old male with history of metastatic appendiceal carcinoma s/p partial gastrectomy, cholecystectomy, subtotal colectomy, diaphragm stripping, omentectomy, splenectomy and HIPEC, presenting with L flank and abdominal pain x 1-2d. States that since Monday he was having progressive pain to L flank radiating to LLQ. The pain was associated with dry heaving and some chills. Patient also states that he was having an increase in urination frequency over the last few days. Patient's last PIPAC was this past June. Of note patient has a history of right hydronephrosis and states that he is a chronic colonizer of enterococcus. Additionally, patient states that he has a history kidney stones, but never one this large. Patient denies having any fever, chills, weakness, chest pain, palpitations, shortness of breath, abdominal pain, nausea vomiting, diarrhea, constipation, or lower extremity edema.    In the ED, patient was found to have new L hydronephrosis, a stone in his bladder, and an KIM. Patient was given fluids, Zofran, and morphine , which significantly improved his presenting symptoms     On admission, urology and oncology were consulted. Urology was consulted due to CT showing new moderate left hydroureteronephrosis to the level of the urinary bladder, possibly related to a recently passed stone and a new 1.0 cm urinary bladder calculus. Patient was given fluids and pain medications, but no invasive urological procedure was performed. Oncology was consulted due to his extensive oncological history, but no changes were made to his current treatment plan. Patient was kept NPO overnight, but the following morning he was able to eat and all of his presenting symptoms dissipated. Patient's creatine continues to downtrend, and the presenting leukocytosis resolved the following morning. At this time patient is medically stable and no longer in need of inpatient management.

## 2022-10-20 NOTE — PROGRESS NOTE ADULT - PROBLEM SELECTOR PLAN 1
- baseline creatinine ~1, currently at 2.19  - most likely obstructive due to ureteral stone  - will continue with IV fluids  - CT showed new moderate left hydroureteronephrosis to the level of the urinary bladder, possibly related to a recently passed stone, new 1.0 cm urinary bladder calculus, which has likely migrated from the distal left ureter since the prior CT exam  - will have Dilaudid as PRN for severe pain -  most likely obstructive due to ureteral stone with left hydronephrosis   - baseline creatinine ~1, currently at 1.55 and continues to downtrend   - most likely obstructive due to ureteral stone  - will continue with IV fluids with 50cc/hr of 12 hours   - CT showed new moderate left hydroureteronephrosis to the level of the urinary bladder, possibly related to a recently passed stone, new 1.0 cm urinary bladder calculus, which has likely migrated from the distal left ureter since the prior CT exam  - patient has Dilaudid as a PRN  - urology following, no need for a nephrostomy tube at this time, can be seen as an outpatient follow up

## 2022-10-20 NOTE — DISCHARGE NOTE PROVIDER - NSDCMRMEDTOKEN_GEN_ALL_CORE_FT
escitalopram 5 mg oral tablet: 1 tab(s) orally once a day  famotidine 40 mg oral tablet: 1 tab(s) orally once a day  oxyCODONE 5 mg oral tablet: 1 tab(s) orally every 8 hours, As Needed -for severe pain MDD:3 tabs  pantoprazole 40 mg oral delayed release tablet: 1 tab(s) orally once a day (before a meal)  simvastatin 10 mg oral tablet: 1 tab(s) orally once a day (at bedtime)  tamsulosin 0.4 mg oral capsule: 1 cap(s) orally once a day (at bedtime)  testosterone topical: daily

## 2022-10-20 NOTE — PROGRESS NOTE ADULT - ASSESSMENT
62 y male with acute onset of L flank pain and findings of L hydronephrosis and KIM most likely consistent with recently passed stone  --Pain has now improved and Cr downtrending. KIM/Flank likely 2/2 recently passed ureteral stone. Given improvement in both flank pain and Cr no rec for acute intervention/NT placement at this time  --Medical management for KIM  --F/u WBC. F/u UCx, Bcx.   --Apppreciate hemonc recs in setting of metastatic dz. 
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 Found to have worsening L hydronephrosis on CT scan, ?due to recently passed stone seen in bladder.     Plan:  - No acute surgical intervention  - Surgical oncology will follow     D Team Surgery  [id number] 
62 year old male with history of metastatic appendiceal carcinoma s/p partial gastrectomy, cholecystectomy, subtotal colectomy, diaphragm stripping, omentectomy, splenectomy and HIPEC, presenting with L flank and abdominal pain x 1-2d. Found to have KIM and leukocytosis with imaging showing known peritoneal carcinomatosis and left hydronephrosis with possible passed ureteral stone.

## 2022-10-20 NOTE — PROGRESS NOTE ADULT - PROBLEM SELECTOR PLAN 6
Diet: regular   Ethics: full code  DVT prophylaxis: subcutaneous heparin Diet: regular   Ethics: full code  DVT prophylaxis: subcutaneous heparin  Dispo: most likely being discharged today, 10/20

## 2022-10-20 NOTE — DISCHARGE NOTE PROVIDER - PROVIDER TOKENS
PROVIDER:[TOKEN:[[id number]],FOLLOWUP:[1 week],ESTABLISHEDPATIENT:[T]],PROVIDER:[TOKEN:[[id number]],FOLLOWUP:[1 week],ESTABLISHEDPATIENT:[T]]

## 2022-10-20 NOTE — PROGRESS NOTE ADULT - SUBJECTIVE AND OBJECTIVE BOX
DAILY PROGRESS NOTE:         24 hr events:  flank pain improved    Objective:    Vital Signs Last 24 Hrs  T(C): 36.6 (20 Oct 2022 06:15), Max: 37.1 (19 Oct 2022 21:48)  T(F): 97.8 (20 Oct 2022 06:15), Max: 98.7 (19 Oct 2022 21:48)  HR: 59 (20 Oct 2022 06:15) (59 - 62)  BP: 123/72 (20 Oct 2022 06:15) (111/67 - 125/77)  BP(mean): --  RR: 18 (20 Oct 2022 06:15) (16 - 19)  SpO2: 98% (20 Oct 2022 06:15) (94% - 99%)    Parameters below as of 20 Oct 2022 06:15  Patient On (Oxygen Delivery Method): room air        I&O's Detail      Physical Exam:    General: NAD, well-nourished  Resp: Breathing comfortably on RA  Psych: AOx3  Neuro: No focal deficits       Laboratory Results:                          10.6   8.62  )-----------( 400      ( 20 Oct 2022 07:40 )             33.0     10-20    139  |  105  |  17  ----------------------------<  107<H>  3.9   |  26  |  1.55<H>    Ca    8.8      20 Oct 2022 07:40  Phos  2.9     10-20  Mg     1.80     10-20    TPro  7.4  /  Alb  4.2  /  TBili  0.7  /  DBili  x   /  AST  20  /  ALT  19  /  AlkPhos  92  10-19      Urinalysis Basic - ( 19 Oct 2022 05:50 )    Color: Light Yellow / Appearance: Clear / S.025 / pH: x  Gluc: x / Ketone: Trace  / Bili: Negative / Urobili: <2 mg/dL   Blood: x / Protein: 30 mg/dL / Nitrite: Negative   Leuk Esterase: Negative / RBC: 8 /HPF / WBC 5 /HPF   Sq Epi: x / Non Sq Epi: 4 /HPF / Bacteria: Negative

## 2022-10-24 PROBLEM — N17.9 AKI (ACUTE KIDNEY INJURY): Status: ACTIVE | Noted: 2022-01-01

## 2022-11-01 NOTE — PHYSICAL EXAM
[General Appearance - Well Developed] : well developed [Abdomen Soft] : soft [Skin Color & Pigmentation] : normal skin color and pigmentation [Heart Rate And Rhythm] : Heart rate and rhythm were normal [] : no respiratory distress [Oriented To Time, Place, And Person] : oriented to person, place, and time [Normal Station and Gait] : the gait and station were normal for the patient's age [No Focal Deficits] : no focal deficits [FreeTextEntry1] : thin

## 2022-11-01 NOTE — ASSESSMENT
[FreeTextEntry1] : We will do an ultrasound  to see if left hydro still exsist  Hydronephrosis is resolving .  ! cm bladder stone PH is about 5.5 We will alkonolize him and repeat ultrasound in 1 month . Start Potassium Citrate \par He will test daily for PH and try to get it to 7 .

## 2022-11-01 NOTE — HISTORY OF PRESENT ILLNESS
[None] : no symptoms [FreeTextEntry1] : Admitted to [facility] for left flank pain .and dry heaves CT scan revealed a 1 cm x 0.5 cm  He has stable right hydro on CT

## 2022-11-09 PROBLEM — F32.A DEPRESSION: Status: ACTIVE | Noted: 2022-01-01

## 2022-11-09 PROBLEM — E78.5 HYPERLIPIDEMIA: Status: ACTIVE | Noted: 2021-02-23

## 2022-11-09 PROBLEM — K21.9 GERD (GASTROESOPHAGEAL REFLUX DISEASE): Status: ACTIVE | Noted: 2021-12-06

## 2022-11-09 NOTE — PHYSICAL EXAM
[No Acute Distress] : no acute distress [No Respiratory Distress] : no respiratory distress  [Clear to Auscultation] : lungs were clear to auscultation bilaterally [Normal Rate] : normal rate  [Regular Rhythm] : with a regular rhythm [No Murmur] : no murmur heard [No Edema] : there was no peripheral edema [No Joint Swelling] : no joint swelling [No Focal Deficits] : no focal deficits [Alert and Oriented x3] : oriented to person, place, and time

## 2022-11-09 NOTE — HISTORY OF PRESENT ILLNESS
[de-identified] : 59-year-old presents for annual wellness visit as well as follow-up fasting labs and prescription renewals.\par He has a history of metastatic appendiceal cancer, hyperlipidemia, BPH, hypogonadism, GERD and depression.\par He will be starting chemotherapy regimen in the near future.\par [place] be [place] outside the country [place] in early December.

## 2022-11-09 NOTE — HEALTH RISK ASSESSMENT
[Never] : Never [No] : In the past 12 months have you used drugs other than those required for medical reasons? No [Medical reason not done] : Medical reason not done [de-identified] : History of depression presently on Lexapro 5 mg

## 2022-11-09 NOTE — ASSESSMENT
[FreeTextEntry1] : Metastatic appendiceal cancer–he does follow regularly with his oncologist.  He will be starting Lynparza when he returns from vacation in December.\par \par Hyperlipidemia–follow-up lipid profile was sent.  He is presently on Zocor 10 mg daily.\par \par Hypogonadism–follow-up testosterone level is sent.  He is presently on AndroGel 4 pumps daily.\par \par History of BPH–he has been generally asymptomatic and is on Flomax 0.4 mg daily.  Follow-up PSA was sent.\par \par History of GERD–he remains on Protonix 40 mg daily and Pepcid 40 mg at bedtime.\par \par History of depression–continues with Lexapro 5 mg daily.  He has been on this for many years.

## 2022-11-09 NOTE — REVIEW OF SYSTEMS
[Fatigue] : fatigue [Chest Pain] : no chest pain [Shortness Of Breath] : no shortness of breath [Headache] : no headache

## 2022-11-29 PROBLEM — N40.1 BPH WITH OBSTRUCTION/LOWER URINARY TRACT SYMPTOMS: Status: ACTIVE | Noted: 2021-02-26

## 2022-11-29 PROBLEM — N21.0 BLADDER STONE: Status: ACTIVE | Noted: 2022-01-01

## 2022-12-01 NOTE — CONSULT NOTE ADULT - SUBJECTIVE AND OBJECTIVE BOX
HPI  62 year old male w/ PMH of metastatic appendiceal carcinoma s/p partial gastrectomy, cholecystectomy, subtotal colectomy, diaphragm stripping, omentectomy, splenectomy, and HIPEC presenting with several days of abdominal pain. Patient also follows with urology (Dr. Sanford) for bladder stones and has known R hydronephrosis.    Patient denies dysuria and hematuria. He is having bowel movements and is passing flatus. Has previously had SBO which was managed conservatively with general surgery. Denies fevers or chills.      PAST MEDICAL & SURGICAL HISTORY:  HLD (hyperlipidemia)      BPH (benign prostatic hyperplasia)      Neuropathic pain  hands and feet      UTI (urinary tract infection)  pt denes recent infection      Appendix carcinoma  PIPAC x 7, last done 2022      Anxiety      Cancer of appendix      Malignant neoplasm of appendix      GERD (gastroesophageal reflux disease)  pt denes recent endoscopy      History of undescended testicle  age 7      Abdominal mass  surgery 2020  remove mass, spleen, partial colectomy, lymph nodes, part small intestines, omentum, apendix, gall bladder, part stomach. HIPEC      History of abdominal paracentesis  x3      H/O colectomy  Splenectomy , Cholecystectomy   2020. Insertion of Mediport      Malignant neoplasm of appendix  chemo 10/2021; s/p Pipac x 7 last 2022          MEDICATIONS  (STANDING):    MEDICATIONS  (PRN):      FAMILY HISTORY:  FH: diabetes mellitus (Father)    FH: HTN (hypertension) (Mother)        Allergies    barium sulfate (Unknown)    Intolerances        SOCIAL HISTORY:    REVIEW OF SYSTEMS:   Otherwise negative as stated in HPI    Physical Exam  Vital signs  T(C): 37.2 (22 @ 12:15), Max: 37.2 (22 @ 12:15)  HR: 60 (22 @ 12:15)  BP: 121/68 (22 @ 12:15)  SpO2: 99% (22 @ 12:15)  Wt(kg): --    Output      Gen: NAD  Pulm: No respiratory distress	  Abd: Soft, distended, tender to palpation      LABS:       @ 07:11    WBC 18.71 / Hct 39.4  / SCr 1.14         138  |  102  |  22  ----------------------------<  110<H>  4.8   |  22  |  1.14    Ca    9.2      01 Dec 2022 07:11    TPro  6.9  /  Alb  4.1  /  TBili  0.4  /  DBili  x   /  AST  23  /  ALT  20  /  AlkPhos  84  12-      Urinalysis Basic - ( 01 Dec 2022 09:48 )    Color: Light Yellow / Appearance: Clear / S.026 / pH: x  Gluc: x / Ketone: Negative  / Bili: Negative / Urobili: <2 mg/dL   Blood: x / Protein: Trace / Nitrite: Negative   Leuk Esterase: Negative / RBC: x / WBC x   Sq Epi: x / Non Sq Epi: x / Bacteria: x        Urine Cx: Pending  Blood Cx: Pending    RADIOLOGY:      ACC: [id number] EXAM: CT ABDOMEN AND PELVIS IC    *** ADDENDUM***    Stone at the left UVJ measures 3mm, likely corresponding with lower pole renal stone present on prior CT.    --- End of Report ---    *** END OF ADDENDUM***      PROCEDURE DATE: 2022        INTERPRETATION: CLINICAL INFORMATION: Metastatic appendiceal carcinoma s/p partial gastrectomy, subtotal colectomy, peritoneal stripping, omentectomy, splenectomy and HIPEC presenting with abdominal pain    COMPARISON: CT abdomen/pelvis 10/19/2022    CONTRAST/COMPLICATIONS:  IV Contrast: Omnipaque 350 90 cc administered 10 cc discarded  Oral Contrast: NONE  Complications: None reported at time of study completion    PROCEDURE:  CT of the Abdomen and Pelvis was performed.  Sagittal and coronal reformats were performed.    FINDINGS:  LOWER CHEST:    LIVER: Right hepatic lobe scalloping and left hepatic lobe atrophy, similar to the prior study.  BILE DUCTS: Normal caliber.  GALLBLADDER: Cholecystectomy.  SPLEEN: Splenectomy.  PANCREAS: Within normal limits.  ADRENALS: Within normal limits.  KIDNEYS/URETERS: Moderate right hydroureteronephrosis, similar to the prior study. Mild left hydroureteronephrosis, decreased since the prior study.    BLADDER: Calculus within the dependent portion of the bladder, measuring 0.5 x 1.2 cm in similar to the prior study.  REPRODUCTIVE ORGANS: Prostate is enlarged.    BOWEL: Distal gastrectomy with gastrojejunostomy. Subtotal colectomy. Multiple dilated small bowel loops with collapsed distal loops with transition point noted midline pelvis (2:81 and 601:34)).  PERITONEUM: Multiple Ill-defined, hypodense regions within the pb hepatis and peritoneum, similar to the prior study and likely related to pseudomyxoma.  VESSELS: Atherosclerotic changes. Occluded left portal vein, similar to the prior study.  RETROPERITONEUM/LYMPH NODES: No lymphadenopathy.  ABDOMINAL WALL: Within normal limits.  BONES: Degenerative changes.    IMPRESSION:  Small bowel obstruction with transition point n the pelvis.    Moderate right hydroureteronephrosis and is unchanged and mild left hydronephrosis is decreased since the prior study.    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    --- End of Report ---    ***Please see the addendum at the top of this report. It may contain additional important information or changes.****

## 2022-12-01 NOTE — PATIENT PROFILE ADULT - FALL HARM RISK - UNIVERSAL INTERVENTIONS
Bed in lowest position, wheels locked, appropriate side rails in place/Call bell, personal items and telephone in reach/Instruct patient to call for assistance before getting out of bed or chair/Non-slip footwear when patient is out of bed/[place] to call system/Physically safe environment - no spills, clutter or unnecessary equipment/Purposeful Proactive Rounding/Room/bathroom lighting operational, light cord in reach

## 2022-12-01 NOTE — PHYSICAL EXAM
[Restricted in physically strenuous activity but ambulatory and able to carry out work of a light or sedentary nature] : Status 1- Restricted in physically strenuous activity but ambulatory and able to carry out work of a light or sedentary nature, e.g., light house work, office work [Normal] : well developed, well nourished, in no acute distress [de-identified] : looks well  [de-identified] : abdomen not distended

## 2022-12-01 NOTE — ED PROVIDER NOTE - NS ED ROS FT
Constitutional: No weakness or fatigue, no fevers or chills  Skin: No rash or pruritis  HEENT: No sore throat  Cardiovascular: No chest pain, no shortness of breath  Respiratory: No shortness of breath, no cough  Gastrointestinal: +abdominal pain, + nausea, + vomiting, no diarrhea, no constipation  Musculoskeletal: No joint pain  Genitourinary: No dysuria or hematuria  Neurological: No focal weakness or tingling

## 2022-12-01 NOTE — ED ADULT NURSE NOTE - OBJECTIVE STATEMENT
Patient is a 63 yo male Patient is a 63 yo male presenting to the ED with c/o abdominal pain. Pt A&Ox4, breathing unlabored, denies SOB, denies chest pain, peripheral pulses strong, skin warm/intact, denies urinary symptoms, denies fever, cough, chills. IV access established, labs sent, plan of care explained, side rails raised, safety measures maintained.

## 2022-12-01 NOTE — H&P ADULT - NSHPLABSRESULTS_GEN_ALL_CORE
12.2   18.71 )-----------( 492      ( 01 Dec 2022 07:11 )             39.4   12-01    138  |  102  |  22  ----------------------------<  110<H>  4.8   |  22  |  1.14    Ca    9.2      01 Dec 2022 07:11    TPro  6.9  /  Alb  4.1  /  TBili  0.4  /  DBili  x   /  AST  23  /  ALT  20  /  AlkPhos  84  12-01  < from: CT Abdomen and Pelvis w/ IV Cont (12.01.22 @ 09:09) >    PROCEDURE DATE:  12/01/2022          INTERPRETATION:  CLINICAL INFORMATION: Metastatic appendiceal carcinoma   s/p partial gastrectomy, subtotal colectomy, peritoneal stripping,   omentectomy, splenectomy and HIPEC presenting with abdominal pain    COMPARISON: CT abdomen/pelvis 10/19/2022    CONTRAST/COMPLICATIONS:  IV Contrast: Omnipaque 350  90 cc administered   10 cc discarded  Oral Contrast: NONE  Complications: None reported at time of study completion    PROCEDURE:  CT of the Abdomen and Pelvis was performed.  Sagittal and coronal reformats were performed.    FINDINGS:  LOWER CHEST:    LIVER: Right hepatic lobe scalloping and left hepatic lobe atrophy,   similar to the prior study.  BILE DUCTS: Normal caliber.  GALLBLADDER: Cholecystectomy.  SPLEEN: Splenectomy.  PANCREAS: Within normal limits.  ADRENALS: Within normal limits.  KIDNEYS/URETERS: Moderate right hydroureteronephrosis, similar to the   prior study. Mild left hydroureteronephrosis, decreased since the prior   study.    BLADDER: Calculus within the dependent portion of the bladder, measuring   0.5 x 1.2 cm in similar to the prior study.  REPRODUCTIVE ORGANS: Prostate is enlarged.    BOWEL: Distal gastrectomy with gastrojejunostomy. Subtotal colectomy.   Multiple dilated small bowel loops with collapsed distal loops with   transition point noted midline pelvis (2:81 and 601:34)).  PERITONEUM: Multiple Ill-defined, hypodense regions within the pb   hepatis and peritoneum, similar to the prior study and likely related to   pseudomyxoma.  VESSELS: Atherosclerotic changes. Occluded left portal vein, similar to   the prior study.  RETROPERITONEUM/LYMPH NODES: No lymphadenopathy.  ABDOMINAL WALL: Within normal limits.  BONES: Degenerative changes.    IMPRESSION:  Small bowel obstruction with transition point n the pelvis.    Moderate right hydroureteronephrosis and is unchanged and mild left   hydronephrosis is decreased since the prior study.    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    --- End of Report ---    ***Please see the addendum at the top of thisreport. It may contain   additional important information or changes.****      SAMIA FREY DO; Resident Radiologist  This document has been electronically signed.  FELA BLANCO MD; Attending Radiologist  This document has been electronically signed. Dec  1 2022 10:23AM  Addend:FELA BLANCO MD; Attending Radiologist  This addendum was electronically signed on: Dec  1 2022 11:37AM.    < end of copied text >

## 2022-12-01 NOTE — ED PROVIDER NOTE - ATTENDING CONTRIBUTION TO CARE
62-year-old male with history of hyperlipidemia, appendiceal cancer with metastases, partial gastrectomy, splenectomy, cholecystectomy, partial colectomy, and current known right-sided kidney stone measuring 1 cm at the largest size, presenting with gradual onset right-sided flank and abdominal pain this evening with nausea and vomiting.  Normal bowel movements without diarrhea.  No fevers    Exam  Abdomen mildly distended, soft, tenderness to the right upper and right lower quadrants    Assessment/plan  Patient has known right-sided kidney stone, however has an extensive surgical history which may be contributing to the clinical picture  ?  Partial SBO versus ureteral stone/[place] versus other  CT abdomen/pelvis with IV contrast, labs, UA, IV fluids, IV morphine for pain  Dispo pending results

## 2022-12-01 NOTE — ASSESSMENT
[FreeTextEntry1] : 62 y/o male with low grade  mucinous neoplasm of appendix s/p CRS and HIPC  5/7/20 ( Dr Herberth Pink at [place]) R2 resection, tumor was metastatic to 2/14 regional lymph nodes. S/p 12 cycles ( 6 months ) of adjuvant FOLFOX - completed  in Dec 2020.\par Recurrent disease ( ascites/ pseudomyxoma peritonei)  in less than 6 months after completing adjuvant chemotherapy. \par S/p PIPAC x 6 ( last in June 2022 ). 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 Unfortunately due to postsurgical adhesions he is no longer a candidate for PIPAC.\par \par [employer] NGS showed few mutations including NILO and ARID mutations. PDL10 and MSI stable, low TMB- not a candidate for single agent immunotherapy. \par \par Options include clinical trial with targeted therapy versus standard chemotherapy- FOLFIRI/Bevacizumab.\par \par Patient is interested in targeted therapy . He had poor response to FOLFOX and chance for response with another standard cytotoxic chemotherapy is not high in low proliferative tumors.\par \par No clinical trials available in [place] area.\par \par he is very interested in trying PARP inhibitor outside setting of clinical trial.\par Long discussion. Limited data but there are  reports of activity of PRP inhibitors in GI cancers with HRD / BRCA/ NILO mutations.\par Discussed side effects of PARP inhibitors.\par \par Plan :\par will try olaparib ( Lynparza) 300 mg bid.\par He was approved for Lynparza compassionate use program ( awaiting shipment of the drug from pharma company) \par \par He will start after Dec 10 ( when he is back from vacation).\par Monitor CBC every 2 weeks x 2 months next monthly CMP every 4 weeks , next PRN.\par \par Reviewed side effects.\par \par Follow up scans end of December 2022 ( new baseline)\par \par Immunization ( post splenectomy) up to date.\par \par Exam after scans- end of december\par \par D/w patient and his wife . \par \par \par

## 2022-12-01 NOTE — ED PROVIDER NOTE - OBJECTIVE STATEMENT
62 year old male with history of metastatic appendiceal carcinoma s/p partial gastrectomy, cholecystectomy, subtotal colectomy, diaphragm stripping, omentectomy, splenectomy and HIPEC, recently had kidney stone Presents to the ED for several days abdominal pain.  Patient denies any change in chronic diarrhea, denies any dysuria, no hematuria.  Patient states he is took an oxycodone at home which did not help pain.  Patient states he is moving his bowels, passing gas at this time.  Patient states because of his prior medical history, he has many adhesions in his abdomen.  Patient denies fever chills, denies shortness of breath, chest pain.  Patient came to ED because pain was getting worse.

## 2022-12-01 NOTE — RESULTS/DATA
[FreeTextEntry1] : reviewed recent labs and recent ( November 2022) CT abd pelvis ( done for evaluation of Kidney stones

## 2022-12-01 NOTE — ED ADULT NURSE REASSESSMENT NOTE - NS ED NURSE REASSESS COMMENT FT1
Pt resting comfortably in stretcher. Pt denies cp, SOB, abdominal pain, N/V, N/T, fever/Chills. Breathing even, unlabored, no signs of distress. Vitals are stable. Pt pending surg consult. Fall precautions in place. Will continue to monitor.
Report received from night RN. Pt c/o r sided abdominal pain. Breathing even, unlabored. Abdomen soft, nontender, nondistended. Vitals are stable. Pt medicated as per MAR. Pt pending CTr. Fall precautions in place. Will continue to monitor.

## 2022-12-01 NOTE — H&P ADULT - ASSESSMENT
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 Plan for non operative conservative management at this time given physical exam findings and low concern for bowel ischemia secondary to SBO at this time.     Plan:  -Admit to D Team Surgery, Dr. Mason  -NPO/IVF  -Malignant SBO protocol   -Exam before pain meds   -Appreciate Urology recs  -Chem VTE ppx  -AM Labs     Discussed with Attending Surgeon Dr. Isabella Malone MD PGY2  D Team Surgery  [id number]

## 2022-12-01 NOTE — H&P ADULT - HISTORY OF PRESENT ILLNESS
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 Pain began last night. One episode of emesis this AM. Last BM/Flatus last night. Denies fevers, chills, CP, SOB, lightheadedness, dizziness, hematuria, dysuria. Notably patient admitted 10/2022 w L flank pain i/s/o passed renal stone. NGT placed at bedside.     In ED, HDS. Labs notable for elevated WBC to 18. Lactate wnl. UA (-). CT A/P demonstrates SBO w TP in pelvis, R hydroureteronephrosis unchanged from prior scan, mild L hydroureteronephrosis decreased from prior, Bladder calculus unchanged, occluded L portal vein, unchanged, and L UVJ 3mm stone.

## 2022-12-01 NOTE — ED PROVIDER NOTE - CLINICAL SUMMARY MEDICAL DECISION MAKING FREE TEXT BOX
With extensive surgical history, concern for possible obstruction although not clinically supported at this time.  Patient had multiple surgeries, at risk for other intra-abdominal process, will get CT abdomen pelvis.  With recent nephrolithiasis, may also contribute to kidney stone as cause of pain, however clinically patient does not have of kidney stone this present moment.  We will give pain control get UA UC.  Depending on labs, imaging, pain, will determine dispo.

## 2022-12-01 NOTE — ED PROVIDER NOTE - PHYSICAL EXAMINATION
CONSTITUTIONAL: uncomfortable appearing  SKIN: Warm dry, normal skin turgor  HEAD: NCAT  NECK: Supple; non tender. Full ROM.  CARD: RRR, no murmurs.  ABD: diffuse tenderness, no rigidity, no rebound or guarding  MSK: no pedal edema, no calf tenderness  PSYCH: Cooperative, appropriate.

## 2022-12-01 NOTE — CONSULT NOTE ADULT - ASSESSMENT
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 CT with 3 mm L UVJ stone, moderate R hydro, mild L hydro. Cr 1.14.    - No acute urologic intervention at this time  - Patient should be admitted to general surgery for management of SBO  - If patient goes to OR for SBO, please contact urology ([phone]) for surgical removal of stone  - If patient does not need to go to OR, patient can be managed with medical expulsive therapy with tamsulosin given asymptomatic presentation and decreasing Cr  - Please start tamsulosin (Flomax) 0.4 mg    Case discussed with Dr. Tripp

## 2022-12-01 NOTE — H&P ADULT - NSHPPHYSICALEXAM_GEN_ALL_CORE
General: NAD  Neuro: A/Ox4  HEENT: NC/AT, NGT LWS   Respiratory: RA, no increased work of breathing  CV: RRR  Abdomen: Soft, Non distended, mildly tender to palpation without rebound tenderness/guarding/rigidity  Extremities: WWP, w/o gross deformity, CLARK  Vascular: b/l radial pulses palpable   Skin: intact, w/o breakdown

## 2022-12-01 NOTE — HISTORY OF PRESENT ILLNESS
[Disease: _____________________] : Disease: [unfilled] [T: ___] : T[unfilled] [N: ___] : N[unfilled] [M: ___] : M[unfilled] [de-identified] : 62 y/o male with low grade appendiceal tumor diagnosed in 2020.  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 \par \par 5/7/20 CRS and HIPEC for LAMN  ( Dr. Herberth Pink at [place] ).  Surgery included  a splenectomy and distal gastrectomy, omentectomy, cholecystectomy, subtotal colectomy and diaphragm stripping. 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  R2b resection. 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 \par \par Moved back to  . \par \par Scans 4/30/21 POD worsening ascites. S/p paracentesis\par \par Seen by medical oncology at [facility] (Dr Daniels) - no role for systemic tx at present time. \par Referred for PIPAC ( Dr Mason) \par \par 6/3/21 - PIPAC #1 \par 7/12/21 - 7/13/21 - Admitted with small bowel obstruction. Dramatically improved after 24 hours nasogastric decompression.   \par 2/10/22: PIPAC #6\par 3/3/22: Continues to travel, scuba dive, ski, lift weights. \par 4/7/22: PIPAC #7. Sons wedding was 4/30/22\par 6/3/22: PIPAC #8\par 6/20/22: CT CAP stable from March. \par Developed severe abdominal pain after the scan, with interval development of ascites. Diagnostic paracentesis with MRSA. Hospitalized X 10 days. \par 7/7/22: Improving s/p hospitalization. Has lost 10 pounds, but is not eating better.  Labs  wbc 17. platelets 1000, CEA 4.3\par 7/20/22: Doing better. Weight stabilizing. WBC 12.7\par \par 8/29/22 - CT A/P - Pseudomyxoma peritonei with low density throughout the central mesentery and scalloping of the right hepatic lobe without significant change. Tumor in the pb hepatis which obliterates the left portal vein has progressed when compared with prior imaging dating to March 2022.\par 9/2/22: CEA slightly increased to 7.7.  Per Dr. Mason patient not a candidate for further PIPAC.  Would reconsider if large volume ascites should reaccumulate. \par \par Next line chemotherapy discussed but chance for response to cytotoxic tx is low in low proliferative tumors.\par Referred for clinical trials- contacted  several centers- no trial options available. Patient was very interested in trying PARP inhibitor outside setting of trial ( potential activity based on small trials ) .\par Plan to start olaparib ( Lynparza 300 mg bid) \par Monitored in meantime. \par \par  [de-identified] : low grade appendiceal mucinous neoplasm LAMN [de-identified] : [facility] ( done in May 2021-  tumor tissue from May 2020:  PDL1 0 MS stable  TMB 3 muts/Mb  NILO     [id number]  GNAS R201C  and SOX9 H396f  [de-identified] : Had kidney stones recently- now stone moved to bladder. On Potassium Citrate . Follows with urology.\par Overall doing OK. Chronic diarrhea mild manageable. Gained few lbs.  Active. Will be going on vacation to [place] later this week.

## 2022-12-01 NOTE — REVIEW OF SYSTEMS
[Patient Intake Form Reviewed] : Patient intake form was reviewed [Negative] : Allergic/Immunologic [FreeTextEntry2] : per HPI  [FreeTextEntry7] : per HPI [de-identified] : mild  neuropathy  stable

## 2022-12-01 NOTE — ED PROVIDER NOTE - PROGRESS NOTE DETAILS
Dr. Catalan: Pt was signed out to me awaiting CT. Pt re-evaluated, noted to have some increased distention to RLQ area of abd. Alerted CT who will take pt now for scan. Offered additional medication for pain. Daquan Valdez MD (PGY-3): CT shows SBO. Spoke to surgery, who will come see pt in ED.

## 2022-12-02 NOTE — ASU PREOP CHECKLIST - PATIENT'S PERSONAL PROPERTY REMOVED
[facility]  1957 76 y.o. Referring Physician:     PCP: Tiara Bernard,     Vitals:    22 0940   BP: 134/65   Pulse: 66   Temp: 97.6 °F (36.4 °C)   SpO2: 94%        Wt Readings from Last 3 Encounters:   22 (!) 301 lb 9.6 oz (136.8 kg)   22 280 lb (127 kg)   10/03/22 298 lb (135.2 kg)        Body mass index is 40.9 kg/m². Chief Complaint:   Chief Complaint   Patient presents with    New Patient           LMP: n/a  Age at first Menses: n/a    : n/a    Para: n/a          Current Outpatient Medications:     Elastic Bandages & Supports (JOBST KNEE HIGH COMPRESSION SM) MISC, Compression stockings 15 to 20 mm knee-high bilateral Dx : Venous Insufficiency, Swelling, Disp: 2 each, Rfl: 2    metFORMIN (GLUCOPHAGE-XR) 500 MG extended release tablet, Take 500 mg by mouth daily, Disp: , Rfl:     methIMAzole (TAPAZOLE) 10 MG tablet, Take 10 mg by mouth Twice a Week, Disp: , Rfl:     nitroGLYCERIN (NITROSTAT) 0.4 MG SL tablet, Dissolve 1 tablet under tongue for chest pain and repeat every 5 min up to max of 3 total doses.   If no relief after 3 doses call 911, Disp: 25 tablet, Rfl: 3    lisinopril (PRINIVIL;ZESTRIL) 5 MG tablet, One tablet daily, Disp: 90 tablet, Rfl: 3    metoprolol succinate (TOPROL XL) 25 MG extended release tablet, Take 1 tablet by mouth daily, Disp: 30 tablet, Rfl: 3    FIBER PO, Take by mouth as needed , Disp: , Rfl:     omeprazole (PRILOSEC) 20 MG delayed release capsule, Take 20 mg by mouth daily, Disp: , Rfl:     atorvastatin (LIPITOR) 80 MG tablet, Take 1 tablet by mouth nightly, Disp: 30 tablet, Rfl: 3    citalopram (CELEXA) 20 MG tablet, Take 20 mg by mouth nightly, Disp: , Rfl:     aspirin 81 MG chewable tablet, Take 81 mg by mouth daily , Disp: , Rfl:     Omega-3 Fatty Acids (FISH OIL) 600 MG CAPS, Take 1,200 mg by mouth 2 times daily , Disp: , Rfl:     Ascorbic Acid (VITAMIN C) 500 MG tablet, Take 500 mg by mouth daily , Disp: , Rfl:     apixaban (ELIQUIS) 5 MG TABS tablet, Take by mouth 2 times daily, Disp: , Rfl:     apixaban (ELIQUIS) 5 MG TABS tablet, Take 1 tablet by mouth in the morning and 1 tablet before bedtime. (Patient not taking: Reported on 2022), Disp: 60 tablet, Rfl: 1       Past Medical History:   Diagnosis Date    CAD (coronary artery disease)     Cardiomyopathy ([facility] [address]     Cerebrovascular disease     DVT (deep venous thrombosis) (HCC)     Hypertension     TIA (transient ischemic attack)        Past Surgical History:   Procedure Laterality Date    CORONARY ANGIOPLASTY WITH STENT PLACEMENT  2016    PCI with stent implant to LAD    HERNIA REPAIR      KNEE SURGERY Bilateral     80s    US BIOPSY SOFT TISSUE NECK/THORAX  11/3/2020    US BIOPSY SOFT TISSUE NECK/THORAX 11/3/2020 [facility] ULTRASOUND       Family History   Problem Relation Age of Onset    Dementia Mother     Cancer Father        Social History     Socioeconomic History    Marital status:      Spouse name: Not on file    Number of children: Not on file    Years of education: Not on file    Highest education level: Not on file   Occupational History    Not on file   Tobacco Use    Smoking status: Former     Packs/day: 1.50     Years: 37.00     Pack years: 55.50     Types: Cigarettes     Start date:      Quit date:      Years since quittin.9    Smokeless tobacco: Never    Tobacco comments:     Patient quit smoking 14 yrs.ago. Vaping Use    Vaping Use: Never used    Passive vaping exposure: Yes   Substance and Sexual Activity    Alcohol use: Not Currently    Drug use: Not Currently     Types: Marijuana [place] Sanket), Cocaine     Comment: past use; none since     Sexual activity: Not Currently     Partners: Female   Other Topics Concern    Not on file   Social History Narrative    Drinks occ cup of coffee. Usually drinks all decaf products.      Social Determinants of Health     Financial Resource Strain: Not on file   Food Insecurity: Not on file   Transportation Needs: Not on file   Physical Activity: Not on file   Stress: Not on file   Social Connections: Not on file   Intimate Partner Violence: Not on file   Housing Stability: Not on file           Occupation:   Retired:  YES: Patient is retired from [employer]. REVIEW OF SYSTEMS:     Pacemaker/Defibulator/ICD:  No    Mediport: No           FALLS RISK SCREENING ASSESSMENT    Instructions:  Assess the patient and [place] the appropriate indicators that are present for fall risk identification. Total the numbers circled and assign a fall risk score from Table 2.  Reassess patient at a minimum every 12 weeks or with status change. Assessment   Date  12/2/2022     1. Mental Ability: confusion/cognitively impaired No - 0       2. Elimination Issues: incontinence, frequency Yes - 3       3. Ambulatory: use of assistive devices (walker, cane, off-loading devices), attached to equipment (IV pole, oxygen) No - 0     4. Sensory Limitations: dizziness, vertigo, impaired vision No - 0       5. Age 72 years or greater - 1       10. Medication: diuretics, strong analgesics, hypnotics, sedatives, antihypertensive agents   Yes - 3   7. Falls:  recent history of falls within the last 3 months (not to include slipping or tripping)   No - 0   TOTAL 7    If score of 4 or greater was education given? Yes       TABLE 2   Risk Score Risk Level Plan of Care   0-3 Little or  No Risk 1. Provide assistance as indicated for ambulation activities  2. Reorient confused/cognitively impaired patient  3. Call-light/bell within patient's reach  4. Chair/bed in low position, stretcher/bed with siderails up except when performing patient care activities  5. Educate patient/family/caregiver on falls prevention  6.  Reassess in 12 weeks or with any noted change in patient condition which places them at a risk for a fall   4-6 Moderate Risk 1.   Provide assistance as indicated for ambulation activities  2. Reorient confused/cognitively impaired patient  3. Call-light/bell within patient's reach  4. Chair/bed in low position, stretcher/bed with siderails up except when performing patient care activities  5. Educate patient/family/caregiver on falls prevention  6. Falls risk precaution (Yellow sticker Level II) placed on patient chart   7 or   Higher High Risk 1. Place patient in easily observable treatment room  2. Patient attended at all times by family member or staff  3. Provide assistance as indicated for ambulation activities  4. Reorient confused/cognitively impaired patient  5. Call-light/bell within patient's reach  6. Chair/bed in low position, stretcher/bed with siderails up except when performing patient care activities  7. Educate patient/family/caregiver on falls prevention  8.   Falls risk precaution (Yellow sticker Level III) placed on patient chart                     Kaley Kerr RN locker/glasses asu locker 8/glasses

## 2022-12-02 NOTE — DIETITIAN INITIAL EVALUATION ADULT - PERTINENT MEDS FT
MEDICATIONS  (STANDING):  dexAMETHasone  Injectable 4 milliGRAM(s) IV Push every 12 hours  enoxaparin Injectable 40 milliGRAM(s) SubCutaneous every 24 hours  famotidine Injectable 20 milliGRAM(s) IV Push every 24 hours  lactated ringers. 1000 milliLiter(s) (100 mL/Hr) IV Continuous <Continuous>  magnesium sulfate  IVPB 2 Gram(s) IV Intermittent once  metoclopramide Injectable 10 milliGRAM(s) IV Push every 8 hours  octreotide  Injectable 100 MICROGram(s) SubCutaneous every 8 hours  pantoprazole  Injectable 40 milliGRAM(s) IV Push every 24 hours    MEDICATIONS  (PRN):  acetaminophen   IVPB .. 1000 milliGRAM(s) IV Intermittent every 6 hours PRN Mild Pain (1 - 3)

## 2022-12-02 NOTE — DISCHARGE NOTE PROVIDER - HOSPITAL COURSE
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 Pain began night prior. One episode of emesis the morning of presentation. Last BM/Flatus night prior. Denied fevers, chills, CP, SOB, lightheadedness, dizziness, hematuria, dysuria. Notably patient admitted 10/2022 w L flank pain i/s/o passed renal stone. NGT placed at bedside.     In ED, HDS. Labs notable for elevated WBC to 18. Lactate wnl. UA (-). CT A/P demonstrates SBO w TP in pelvis, R hydroureteronephrosis unchanged from prior scan, mild L hydroureteronephrosis decreased from prior, Bladder calculus unchanged, occluded L portal vein, unchanged, and L UVJ 3mm stone.   Patient admitted to surgical oncology service for SBO. Started on IVF and NGT connected to LCWS for gastric decompression. Started on malignant SBO protocol with Decadron, Reglan, Octreotide. Urology consulted for 3 mm L UVJ stone, moderate R hydro, mild L hydro who recommended Flomax for nonsurgical management of ureteral stone.       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 Pain began night prior. One episode of emesis the morning of presentation. Last BM/Flatus night prior. Denied fevers, chills, CP, SOB, lightheadedness, dizziness, hematuria, dysuria. Notably patient admitted 10/2022 w L flank pain i/s/o passed renal stone. NGT placed at bedside.     In ED, HDS. Labs notable for elevated WBC to 18. Lactate wnl. UA (-). CT A/P demonstrates SBO w TP in pelvis, R hydroureteronephrosis unchanged from prior scan, mild L hydroureteronephrosis decreased from prior, Bladder calculus unchanged, occluded L portal vein, unchanged, and L UVJ 3mm stone.   Patient admitted to surgical oncology service for SBO. Started on IVF and NGT connected to LCWS for gastric decompression. Started on malignant SBO protocol with Decadron, Reglan, Octreotide. Urology consulted for 3 mm L UVJ stone, moderate R hydro, mild L hydro who recommended Flomax for nonsurgical management of ureteral stone.  On 12/3 patient was having normal bowel movements, tolerating LRD diet, pain is well controlled, ambulating, and voiding adequately. Denying nausea or vomiting. Malignant SBO protocol was Dcd and patient feels ready to be discharged. This plan was discussed with surgical attending. Pt should follow up with urology for L UVJ stone.    ICU Vital Signs Last 24 Hrs  T(C): 36.6 (03 Dec 2022 08:12), Max: 37.1 (03 Dec 2022 00:22)  T(F): 97.8 (03 Dec 2022 08:12), Max: 98.8 (03 Dec 2022 00:22)  HR: 45 (03 Dec 2022 08:12) (45 - 55)  BP: 129/72 (03 Dec 2022 08:12) (104/62 - 148/82)  BP(mean): --  ABP: --  ABP(mean): --  RR: 18 (03 Dec 2022 08:12) (18 - 19)  SpO2: 100% (03 Dec 2022 08:12) (96% - 100%)    O2 Parameters below as of 03 Dec 2022 08:12  Patient On (Oxygen Delivery Method): room air

## 2022-12-02 NOTE — DISCHARGE NOTE PROVIDER - PROVIDER TOKENS
PROVIDER:[TOKEN:[[id number]],FOLLOWUP:[Routine]] PROVIDER:[TOKEN:[[id number]],FOLLOWUP:[Routine]],PROVIDER:[TOKEN:[[id number]],FOLLOWUP:[1-3 days]]

## 2022-12-02 NOTE — DISCHARGE NOTE PROVIDER - NSDCCPCAREPLAN_GEN_ALL_CORE_FT
PRINCIPAL DISCHARGE DIAGNOSIS  Diagnosis: Small bowel obstruction  Assessment and Plan of Treatment: Pt was treated for malignant SBO with steroids, reglan, and octreotide. At the time of discharge having bowel movements.      SECONDARY DISCHARGE DIAGNOSES  Diagnosis: Renal stone  Assessment and Plan of Treatment: Urology consulted and stone treated with tamsulosin

## 2022-12-02 NOTE — DISCHARGE NOTE PROVIDER - NSDCFUSCHEDAPPT_GEN_ALL_CORE_FT
[facility]  [facility]  [facility]  Scheduled Appointment: 12/21/2022    Sadie Mendoza  [facility]  [facility]  Scheduled Appointment: 12/28/2022    [facility]  Scheduled Appointment: 12/28/2022

## 2022-12-02 NOTE — DIETITIAN INITIAL EVALUATION ADULT - NSFNSGIIOFT_GEN_A_CORE
12-01-22 @ 07:01  -  12-02-22 @ 07:00  --------------------------------------------------------  OUT:    Nasogastric/Oral tube (mL): 350 mL  Total OUT: 350 mL    Total NET: -350 mL

## 2022-12-02 NOTE — DISCHARGE NOTE PROVIDER - CARE PROVIDERS DIRECT ADDRESSES
,DirectAddress_Unknown ,DirectAddress_Unknown,[email or web address]

## 2022-12-02 NOTE — DISCHARGE NOTE PROVIDER - CARE PROVIDER_API CALL
Florecita Mason)  Complex General Surgical Oncology; Surgery  [address] [place] [address]  Phone: [phone]  Fax: [phone]  Follow Up Time: Routine   Florecita Mason)  Complex General Surgical Oncology; Surgery  [address] [place] [address]  Phone: [phone]  Fax: [phone]  Follow Up Time: Routine    Cesar Sanford; RAMBO)  Urology  [address] [facility], [address]  [place] [address]  Phone: [phone]  Fax: [phone]  Follow Up Time: 1-3 days

## 2022-12-02 NOTE — DIETITIAN INITIAL EVALUATION ADULT - OTHER INFO
Per chart----61M metastatic appendiceal carcinoma with pseudomyxoma peritonei s/p cytoreductive surgery 2020 including partial gastrectomy, cholecystectomy, subtotal colectomy, diaphragm stripping, omentectomy, splenectomy s/p FOLFOX x 6 months and HIPEC and now s/p PIPAC x 8 most recently 6/3, who presents with acute onset abdominal pain aw nausea, vomiting. Pain began last night. One episode of emesis this AM. Last BM/Flatus last night. Denies fevers, chills, CP, SOB, lightheadedness, dizziness, hematuria, dysuria. Notably patient admitted 10/2022 w L flank pain i/s/o passed renal stone. NGT placed at bedside.     In ED, HDS. Labs notable for elevated WBC to 18. Lactate wnl. UA (-). CT A/P demonstrates SBO w TP in pelvis, R hydroureteronephrosis unchanged from prior scan, mild L hydroureteronephrosis decreased from prior, Bladder calculus unchanged, occluded L portal vein, unchanged, and L UVJ 3mm stone.     Pt remains NPO with NGT to low suction. No BM, is passing flatus. Denies nausea, vomiting. Pt is aware of SBO protocol and current NPO status.   Pt with low BMI 19.2, denies significant weight change/loss and reports current weight is his usual baseline. HIE was reviewed and noted highest weight of 134# 4/2022.

## 2022-12-02 NOTE — DIETITIAN INITIAL EVALUATION ADULT - ORAL INTAKE PTA/DIET HISTORY
Pt reports good appetite/ PO intakes prior to acute onset of abdominal pain, nausea and vomiting. Pt denies weight loss.

## 2022-12-02 NOTE — PROGRESS NOTE ADULT - ASSESSMENT
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 CT A/P demonstrates SBO with transition point in pelvis, and L UVJ 3mm stone.       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 CT A/P demonstrates SBO with transition point in pelvis, and L UVJ 3mm stone.     Plan:  -malignant SBO protocol  -c/w NGT, if continues passing flatus clamp trial in the afternoon  -NPO/IVF  -Monitor bowel function  -Pain control with tylenol, abdominal exam before pain meds if worsening pain  -Urology following  -DVT ppx

## 2022-12-03 NOTE — PROGRESS NOTE ADULT - SUBJECTIVE AND OBJECTIVE BOX
GENERAL SURGERY PROGRESS NOTE    SUBJECTIVE   In no acute distress. Examined by surgical team bedside. (+/+). Tolerating Clears, Voiding, Pain well controlled, ambulating.     OVERNIGHT EVENTS:  NAEON    10-point review of systems completed and negative except as noted above.      OBJECTIVE    MEDICATIONS  acetaminophen   IVPB .. 1000 milliGRAM(s) IV Intermittent every 6 hours PRN  enoxaparin Injectable 40 milliGRAM(s) SubCutaneous every 24 hours  famotidine Injectable 20 milliGRAM(s) IV Push every 24 hours  pantoprazole  Injectable 40 milliGRAM(s) IV Push every 24 hours  tamsulosin 0.4 milliGRAM(s) Oral at bedtime      PHYSICAL EXAM  T(C): 36.6 (12-03-22 @ 08:12), Max: 37.1 (12-03-22 @ 00:22)  HR: 45 (12-03-22 @ 08:12) (45 - 55)  BP: 129/72 (12-03-22 @ 08:12) (104/62 - 148/82)  RR: 18 (12-03-22 @ 08:12) (18 - 19)  SpO2: 100% (12-03-22 @ 08:12) (96% - 100%)    12-02-22 @ 07:01  -  12-03-22 @ 07:00  --------------------------------------------------------  IN: 870 mL / OUT: 1755 mL / NET: -885 mL        General: Appears well, NAD  Neuro: AAOx3  CHEST: Clear to auscultation bilaterally  CV: Regular rate and rhythm  Abdomen: soft, nontender, nondistended, no rebound or guarding  Extremities: Grossly symmetric    LABS                        11.2   10.69 )-----------( 446      ( 03 Dec 2022 05:43 )             35.7     12-03    139  |  102  |  17  ----------------------------<  113<H>  4.2   |  23  |  1.25    Ca    9.0      03 Dec 2022 05:43  Phos  3.5     12-03  Mg     2.00     12-03            RADIOLOGY & ADDITIONAL STUDIES
Interval events:   No acute overnight events   NGT in place           OBJECTIVE:  Vital Signs Last 24 Hrs  T(C): 36.7 (02 Dec 2022 04:46), Max: 37.2 (01 Dec 2022 12:15)  T(F): 98.1 (02 Dec 2022 04:46), Max: 98.9 (01 Dec 2022 12:15)  HR: 56 (02 Dec 2022 04:46) (51 - 64)  BP: 131/76 (02 Dec 2022 04:46) (121/63 - 154/80)  BP(mean): --  RR: 18 (02 Dec 2022 04:46) (18 - 18)  SpO2: 96% (02 Dec 2022 04:46) (96% - 100%)    Parameters below as of 02 Dec 2022 04:46  Patient On (Oxygen Delivery Method): room air            LABS:                        11.6   x     )-----------( 459      ( 02 Dec 2022 06:15 )             35.9       12-02    139  |  103  |  17  ----------------------------<  133<H>  4.5   |  24  |  1.23    Ca    8.8      02 Dec 2022 06:15  Phos  4.4     12-02  Mg     1.70     12-02    TPro  6.9  /  Alb  4.1  /  TBili  0.4  /  DBili  x   /  AST  23  /  ALT  20  /  AlkPhos  84  12-01          
TEAM [ D ] Surgery Daily Progress Note  =====================================================    SUBJECTIVE: Patient seen and examined at bedside on AM rounds. Patient reports that they were able to pass some gas and that their abdominal pain has improved. NPO, NGT to low suction, denies nausea, vomiting.  +  Flatus/ -   BM. OOB as tolerated. Denies fever, chills.      ALLERGIES:  barium sulfate (Unknown)      --------------------------------------------------------------------------------------    MEDICATIONS:    Neurologic Medications  acetaminophen   IVPB .. 1000 milliGRAM(s) IV Intermittent every 6 hours PRN Mild Pain (1 - 3)  metoclopramide Injectable 10 milliGRAM(s) IV Push every 8 hours    Respiratory Medications    Cardiovascular Medications    Gastrointestinal Medications  famotidine Injectable 20 milliGRAM(s) IV Push every 24 hours  lactated ringers. 1000 milliLiter(s) IV Continuous <Continuous>  magnesium sulfate  IVPB 2 Gram(s) IV Intermittent once  pantoprazole  Injectable 40 milliGRAM(s) IV Push every 24 hours    Genitourinary Medications    Hematologic/Oncologic Medications  enoxaparin Injectable 40 milliGRAM(s) SubCutaneous every 24 hours    Antimicrobial/Immunologic Medications    Endocrine/Metabolic Medications  dexAMETHasone  Injectable 4 milliGRAM(s) IV Push every 12 hours  octreotide  Injectable 100 MICROGram(s) SubCutaneous every 8 hours    Topical/Other Medications    --------------------------------------------------------------------------------------    VITAL SIGNS:  T(C): 36.7 (12-02-22 @ 04:46), Max: 37.2 (12-01-22 @ 12:15)  HR: 56 (12-02-22 @ 04:46) (51 - 64)  BP: 131/76 (12-02-22 @ 04:46) (121/63 - 154/80)  RR: 18 (12-02-22 @ 04:46) (18 - 18)  SpO2: 96% (12-02-22 @ 04:46) (96% - 100%)  --------------------------------------------------------------------------------------    EXAM    General: NAD, resting in bed comfortably.  Respiratory: Nonlabored respirations, normal cw expansion.  Abdomen: soft, mildly tender in the RLQ, nondistended.  NGT with bilious output.  Extremities: normal strength, FROM, no deformities    --------------------------------------------------------------------------------------    LABS                        11.6   6.65  )-----------( 459      ( 02 Dec 2022 06:15 )             35.9     --------------------------------------------------------------------------------------    12-02    139  |  103  |  17  ----------------------------<  133<H>  4.5   |  24  |  1.23    Ca    8.8      02 Dec 2022 06:15  Phos  4.4     12-02  Mg     1.70     12-02    TPro  6.9  /  Alb  4.1  /  TBili  0.4  /  DBili  x   /  AST  23  /  ALT  20  /  AlkPhos  84  12-01    INS AND OUTS:    I&O's Detail    01 Dec 2022 07:01  -  02 Dec 2022 07:00  --------------------------------------------------------  IN:    Lactated Ringers: 1500 mL  Total IN: 1500 mL    OUT:    Nasogastric/Oral tube (mL): 350 mL    Voided (mL): 1240 mL  Total OUT: 1590 mL    Total NET: -90 mL            --------------------------------------------------------------------------------------
[NS_DeliveryAttending1_OBGYN_ALL_OB_FT:[email or web address]=],[NS_DeliveryAssist1_OBGYN_ALL_OB_FT:[id number]=]

## 2022-12-03 NOTE — DISCHARGE NOTE NURSING/CASE MANAGEMENT/SOCIAL WORK - PATIENT PORTAL LINK FT
You can access the FollowMyHealth Patient Portal offered by [facility] by registering at the following website: [email or web address]. By joining [facility]’s FollowMyHealth portal, you will also be able to view your health information using other applications (apps) compatible with our system.

## 2022-12-03 NOTE — PROGRESS NOTE ADULT - ASSESSMENT
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 CT A/P demonstrates SBO with transition point in pelvis, and L UVJ 3mm stone.     Plan:  -Pt is having BM --> dc malignant SBO protocol   -Tolerating Clears --> LRD   -Having bowel function  -Urology following  -DVT ppx  - DC 12/3.    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 CT A/P demonstrates SBO with transition point in pelvis, and L UVJ 3mm stone.     Plan:  -Pt is having BM --> dc malignant SBO protocol   -Tolerating Clears --> LRD   -Having bowel function  -Urology - medical expulsive therapy with tamsulosin   -DVT ppx  - DC 12/3.    Team D Surgery [id number]  D/W With Fellow and Chief Resident

## 2022-12-03 NOTE — DISCHARGE NOTE NURSING/CASE MANAGEMENT/SOCIAL WORK - NSDCPEFALRISK_GEN_ALL_CORE
For information on Fall & Injury Prevention, visit: [email or web address] OR  [email or web address] OR  https://www.cdc.gov/steadi/patient.html

## 2022-12-11 NOTE — ED PROVIDER NOTE - OBJECTIVE STATEMENT
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 pt states he has been having worsening abd pain since last night. he states his last BM was last night. he states he is not sure if he is passing gas, pt states the abd pain is associated with nausea but no vomiting. pt states the pain feels like the last time she came in and was diagnosed with SBO. she denies blood in stool. 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 pt states he has been having worsening abd pain since last night. he states his last BM was last night. he states he is not sure if he is passing gas, pt states the abd pain is associated with nausea but no vomiting. pt states the pain feels like the last time he came in and was diagnosed with SBO. she denies blood in stool.

## 2022-12-11 NOTE — ED ADULT TRIAGE NOTE - CHIEF COMPLAINT QUOTE
Pt s/p abdominal obstruction last week pt c/o abdominal pain with nausea since this morning.   PMH : Appendix cancer with mets. . will start treatment soon.

## 2022-12-11 NOTE — ED PROVIDER NOTE - ATTENDING APP SHARED VISIT CONTRIBUTION OF CARE
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 Patient reports he has nausea and decreased flatus. No vomiting. No fevers. No chest pain or shortness of breath. He states that it feels like an SBO. LBM was last night. No blood in stool.     VS noted  Gen. no acute distress, anxious, uncomfortable  HEENT: EOMI, mmm  Lungs: CTAB/L no C/ W /R   CVS: RRR   Abd; Soft, distended, ttp in lower abdomen, more on right side  Ext: no edema  Skin: no rash  Neuro AAOx3 non focal clear speech  a/p: r/o SBO - patient with extensive surgical history and multiple SBO in the past. Will give pain meds, zofran and check labs, CT A/P.   - Caty SILVA

## 2022-12-11 NOTE — H&P ADULT - NSHPLABSRESULTS_GEN_ALL_CORE
12.9   14.57 )-----------( 506      ( 11 Dec 2022 15:30 )             40.9     12-11    140  |  103  |  18  ----------------------------<  108<H>  4.4   |  24  |  1.25    Ca    10.0      11 Dec 2022 15:30    TPro  7.3  /  Alb  4.3  /  TBili  0.6  /  DBili  x   /  AST  20  /  ALT  23  /  AlkPhos  97  12-11    LIVER FUNCTIONS - ( 11 Dec 2022 15:30 )  Alb: 4.3 g/dL / Pro: 7.3 g/dL / ALK PHOS: 97 U/L / ALT: 23 U/L / AST: 20 U/L / GGT: x             < from: CT Abdomen and Pelvis w/ IV Cont (12.11.22 @ 17:07) >    FINDINGS:  LOWER CHEST: Within normal limits.    HEPATOBILIARY: Unchanged mild liver scalloping, left hepatic atrophy and   unchanged mild intrahepatic biliary dilatation, post cholecystectomy.  SPLEEN: Splenectomy..  PANCREAS: Within normal limits.  ADRENALS: Within normal limits.  KIDNEYS/URETERS: Unchanged moderate right and mild left   hydroureteronephrosis and unchanged left UPJ junction 0.3 cm stone.    BLADDER: Within normal limits.  REPRODUCTIVE ORGANS: Enlarged prostate.    BOWEL: Distal gastrectomy and gastrojejunostomy. Subtotal colectomy.   Unchanged moderate small bowel dilatation with transition point in pelvis   consistent with small bowel obstruction.  PERITONEUM: Unchanged hypodense peritoneal thickening, for example left   subdiaphragmatic, pb hepatis and left abdomen, part encasing small   bowel loops (601, 24), consistent with pseudomyxoma peritonei.  VESSELS: Unchanged left portal vein occlusion.  RETROPERITONEUM/LYMPH NODES: No adenopathy  ABDOMINAL WALL: Within normal limits.  BONES: No suspicious lesion.    IMPRESSION:    1. Since December 1, 2022, unchanged small bowel obstruction due to   transition point in pelvis.  2. Unchanged pseudomyxoma peritonei.  3. Unchanged moderate right and mild left hydroureteronephrosis and   unchanged 0.3 cm stone in left ureterovesical junction.  4. Unchanged left portal vein occlusion.    < end of copied text >

## 2022-12-11 NOTE — PATIENT PROFILE ADULT - FUNCTIONAL ASSESSMENT - DAILY ACTIVITY SCORE.
Group Therapy Note    Date: 4/6/2022    Group Start Time: 1330  Group End Time: 5723  Group Topic: Psychoeducation    [facility]    Patient refused to attend coping skills group at 1330 after encouragement from staff. 1:1 talk time offered by staff as alternative to group session. 24

## 2022-12-11 NOTE — H&P ADULT - ASSESSMENT
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     Plan:  -Admit to D Team Surgery, Dr. Mason  -no acute surgical intervention at this time   -NPO/IVF  -NGT to LCWS  -Malignant SBO protocol   -Serial abdominal exams    Discussed with attending Dr. Isabella Silva PGY3  D Team Surgery  [id number]

## 2022-12-11 NOTE — ED PROVIDER NOTE - CLINICAL SUMMARY MEDICAL DECISION MAKING FREE TEXT BOX
63 yo male with abd pain. pt had a extensive history of abd surgery which rises concern for SBO  will obtain labs, CT, pain control 61 yo male with abd pain. pt had a extensive history of abd surgery which rises concern for SBO  will obtain labs, CT, pain control    Patient was found to have an SBO on CT Scan. Surgery was consulted and he was admitted to their service. See consult note for details.

## 2022-12-11 NOTE — ED ADULT NURSE NOTE - OBJECTIVE STATEMENT
patient alert ox4 came in c/o severe abdominal pain with distention started since last night and got worst with n/v. breathing even and unlabored. skin warm and dry. labs done and meds given as ordered. awaiting on CT and re eval.

## 2022-12-11 NOTE — H&P ADULT - NSHPPHYSICALEXAM_GEN_ALL_CORE
T(C): 36.9 (12-11-22 @ 13:45), Max: 36.9 (12-11-22 @ 13:45)  HR: 66 (12-11-22 @ 15:52) (61 - 66)  BP: 169/89 (12-11-22 @ 15:52) (147/84 - 169/89)  RR: 18 (12-11-22 @ 15:52) (16 - 18)  SpO2: 100% (12-11-22 @ 15:52) (100% - 100%)  Wt(kg): --    Physical Exam:    General: WN/WD NAD  Neurology: A&Ox3, nonfocal, follows commands  Eyes: PERRLA/ EOMI  ENT/Neck: Neck supple, trachea midline, No JVD  Respiratory: CTA B/L, No wheezing, rales, rhonchi  CV: Normal rate regular rhythm, S1S2, no murmurs, rubs or gallops  Abdominal: Softly distended, minimally tender, tympanitic   Extremities: No edema, + peripheral pulses  Skin: No Rashes, Hematoma, Ecchymosis

## 2022-12-11 NOTE — ED PROVIDER NOTE - NS ED ATTENDING STATEMENT MOD
This was a shared visit with the CARMELLA. I reviewed and verified the documentation and independently performed the documented:

## 2022-12-11 NOTE — ED ADULT NURSE REASSESSMENT NOTE - NS ED NURSE REASSESS COMMENT FT1
Report given to DARON Katz. Awaiting transport to inpatient bed assignment. Will continue to monitor.

## 2022-12-11 NOTE — H&P ADULT - HISTORY OF PRESENT ILLNESS
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 Patient was discharged last week with an SBO and said the pain felt the same as his last obstruction, so he came to the ED. His last BM was last night and last passed flatus this afternoon, endorses nausea with no episodes of emesis. Since his discharge, he had been tolerating a diet and took a trip to [place] without any issues. CT in the ED demonstrating SBO with transition point in the pelvis. 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 Patient was discharged last week with an SBO and said the pain felt the same as his last obstruction, so he came to the ED. His last BM was last night and last passed flatus this afternoon, endorses nausea with no episodes of emesis. Since his discharge, he had been tolerating a diet and took a trip to [place] without any issues. CT in the ED demonstrating SBO with transition point in the pelvis. NGT placed with 100cc of bilious output.

## 2022-12-11 NOTE — ED PROVIDER NOTE - PROGRESS NOTE DETAILS
Samir PGY2  Admitted to surgery for further management. CXR reviewed and showed that NG tube not in correct position and updated surgery - surgery will replace. Samir PGY2  In summary this is a 62 year-old-male with history of peritoneal cancer and recent SBO presents with abdominal pain/nausea/vomiting with CTAP showing recurrent SBO. Pending surgery recommendation.

## 2022-12-11 NOTE — PATIENT PROFILE ADULT - PATIENT'S PREFERRED PRONOUN
Problem: Patient Care Overview  Goal: Plan of Care Review  Outcome: Ongoing (interventions implemented as appropriate)  Day 1 xrt to lung. Lung handout given. Skin care and side effects reviewed. Contact info given. Pt verbalized understanding.       Him/He

## 2022-12-11 NOTE — ED ADULT NURSE REASSESSMENT NOTE - NS ED NURSE REASSESS COMMENT FT1
Pt A&Ox4, ambulatory. Respirations equal and unlabored. Pt with NG connected to low intermittent suction at this time. Pt endorsing some relief with the NG tube. Denies CP, SOB, palpitations, dizziness, blurry vision, any other complaints. Will continue to monitor.

## 2022-12-13 NOTE — DISCHARGE NOTE PROVIDER - CARE PROVIDERS DIRECT ADDRESSES
,DirectAddress_Unknown ,DirectAddress_Unknown,[email or web address]

## 2022-12-13 NOTE — DISCHARGE NOTE PROVIDER - PROVIDER TOKENS
PROVIDER:[TOKEN:[[id number]],FOLLOWUP:[1 week]] PROVIDER:[TOKEN:[[id number]],FOLLOWUP:[1 week]],PROVIDER:[TOKEN:[[id number]],FOLLOWUP:[1-3 days]]

## 2022-12-13 NOTE — DISCHARGE NOTE PROVIDER - NSDCFUSCHEDAPPT_GEN_ALL_CORE_FT
[facility]  CATSCAN  [facility]  Scheduled Appointment: 12/21/2022    Sadie Mendoza  [facility]  [facility] Practic  Scheduled Appointment: 12/28/2022    [facility]  Scheduled Appointment: 12/28/2022     Sadie Mendoza  [facility] Physician On license of [facility]  [place] CC Practic  Scheduled Appointment: 12/28/2022    [facility] CC [facility]  Scheduled Appointment: 12/28/2022     [facility]  UROLOGY [address] [place] [address] R  Scheduled Appointment: 12/20/2022    Cesar Sanford  [facility]  UROLOGY [address] [place] [address] R  Scheduled Appointment: 12/20/2022    Sadie Mendoza  [facility]  She CC Practic  Scheduled Appointment: 12/28/2022    [facility] CC [facility]  Scheduled Appointment: 12/28/2022

## 2022-12-13 NOTE — DISCHARGE NOTE PROVIDER - NSDCFUADDAPPT_GEN_ALL_CORE_FT
Please follow up with your Urologist or Nephrologist about your slightly elevated creatinine and history of a kidney/bladder calculus. Please follow up with Dr. Sanford (Urology). Call today for appointment in 3-4 days.     Please follow up with Dr. Nash (Surgical Oncology). Call today for appointment in 1 week.

## 2022-12-13 NOTE — DISCHARGE NOTE PROVIDER - CARE PROVIDER_API CALL
Florecita Mason)  Complex General Surgical Oncology; Surgery  [address] [place] [address]  Phone: [phone]  Fax: [phone]  Follow Up Time: 1 week   Florecita Mason)  Complex General Surgical Oncology; Surgery  [address] [place] [address]  Phone: [phone]  Fax: [phone]  Follow Up Time: 1 week    Cesar Sanford; RAMBO)  Urology  [address] [facility], [address]  [place] [address]  Phone: [phone]  Fax: [phone]  Follow Up Time: 1-3 days

## 2022-12-13 NOTE — DISCHARGE NOTE PROVIDER - HOSPITAL COURSE
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 Patient was discharged week prior with an SBO and said the pain felt the same as his last obstruction, so he came to the ED. His last BM was night of 12/10 and last passed flatus that afternoon, endorsed nausea with no episodes of emesis. Since his discharge, he had been tolerating a diet and took a trip to [place] without any issues. CT in the ED demonstrating SBO with transition point in the pelvis. Patient admitted to surgical oncology service under the care of Dr. Mason. NGT placed with 100cc of bilious output. Patient started on malignant SBO protocol (Decadron, Reglan, Octreotide). By hospital day#2 patient developed return of bowel function (passed flatus). NGT was clamped for 4 hours and then had minimal residual output upon return to low continuous suction. NGT subsequently removed. Diet advanced to clear liquids.     INCOMPLETE      By hospital day#3 patient was tolerating clear liquids and diet advanced further to fulls.   By hospital day#4 patient continued to have bowel function and diet advanced to regular.   At this time, patient is currently ambulating, voiding, tolerating a regular diet. Pain well controlled on PO pain meds. Patient has been deemed stable for discharge home with follow up as an outpatient.    This patient is a 62M w/ hx of metastatic appendiceal carcinoma with pseudomyxoma peritonei s/p cytoreductive surgery 2020 including partial gastrectomy, cholecystectomy, subtotal colectomy, diaphragm stripping, omentectomy, splenectomy s/p FOLFOX x 6 months and HIPEC and now s/p PIPAC x 8 most recently 6/3, who presented to [facility] on 12/11/22 with abdominal pain and nausea x1 day. Patient was discharged week prior with an SBO and said the pain felt the same as his last obstruction, so he came to the ED. His last BM was night of 12/10 and last passed flatus that afternoon, endorsed nausea with no episodes of emesis. Since his discharge, he had been tolerating a diet and took a trip to [place] without any issues. CT in the ED demonstrating SBO with transition point in the pelvis. Patient admitted to surgical oncology service under the care of Dr. Mason. NGT placed with 100cc of bilious output. Patient started on malignant SBO protocol (Decadron, Reglan, Octreotide). By hospital day#2 patient developed return of bowel function (passed flatus). NGT was clamped for 4 hours and then had minimal residual output upon return to low continuous suction. NGT subsequently removed. Diet advanced to clear liquids and then full liquids. By hospital day#3 patient continued to have bowel function and diet advanced to Low Residue Diet which patient tolerated without nausea or emesis.   At this time, patient is currently ambulating, voiding, tolerating a Low Residue diet. Per Dr. Mason, Patient has been deemed stable for discharge home with follow up as an outpatient.    This patient is a 62M w/ hx of metastatic appendiceal carcinoma with pseudomyxoma peritonei s/p cytoreductive surgery 2020 including partial gastrectomy, cholecystectomy, subtotal colectomy, diaphragm stripping, omentectomy, splenectomy s/p FOLFOX x 6 months and HIPEC and now s/p PIPAC x 8 most recently 6/3, who presented to [facility] on 12/11/22 with abdominal pain and nausea x1 day. Patient was discharged week prior with an SBO and said the pain felt the same as his last obstruction, so he came to the ED. His last BM was night of 12/10 and last passed flatus that afternoon, endorsed nausea with no episodes of emesis. Since his discharge, he had been tolerating a diet and took a trip to [place] without any issues. CT in the ED demonstrating SBO with transition point in the pelvis. Patient admitted to surgical oncology service under the care of Dr. Mason. NGT placed with 100cc of bilious output. Patient started on malignant SBO protocol (Decadron, Reglan, Octreotide). By hospital day#2 patient developed return of bowel function (passed flatus). NGT was clamped for 4 hours and then had minimal residual output upon return to low continuous suction. NGT subsequently removed. Diet advanced to clear liquids and then full liquids. By hospital day#3 patient continued to have bowel function and diet advanced to Low Residue Diet which patient tolerated without nausea or emesis.  Pt had increase in creatinine on 12/14 to 1.32 --> 500 cc Bolus given --> repeat creatinine was 1.31. Urology was called and recommended that patient is OK to go home on tamsulosin and follow up with outpatient Urology as he has a known stone that they are aware of. Pt's Urologist is Dr. Ortiz.    At this time, patient is currently ambulating, voiding, tolerating a Low Residue diet. Per Dr. Mason, Patient has been deemed stable for discharge home with follow up as an outpatient.     ICU Vital Signs Last 24 Hrs  T(C): 37 (14 Dec 2022 13:17), Max: 37 (14 Dec 2022 13:17)  T(F): 98.6 (14 Dec 2022 13:17), Max: 98.6 (14 Dec 2022 13:17)  HR: 65 (14 Dec 2022 13:17) (50 - 65)  BP: 127/63 (14 Dec 2022 13:17) (102/59 - 135/71)  BP(mean): --  ABP: --  ABP(mean): --  RR: 16 (14 Dec 2022 13:17) (16 - 18)  SpO2: 98% (14 Dec 2022 13:17) (97% - 100%)    O2 Parameters below as of 14 Dec 2022 13:17  Patient On (Oxygen Delivery Method): room air           This patient is a 62M w/ hx of metastatic appendiceal carcinoma with pseudomyxoma peritonei s/p cytoreductive surgery 2020 including partial gastrectomy, cholecystectomy, subtotal colectomy, diaphragm stripping, omentectomy, splenectomy s/p FOLFOX x 6 months and HIPEC and now s/p PIPAC x 8 most recently 6/3, who presented to [facility] on 12/11/22 with abdominal pain and nausea x1 day. Patient was discharged week prior with an SBO and said the pain felt the same as his last obstruction, so he came to the ED. His last BM was night of 12/10 and last passed flatus that afternoon, endorsed nausea with no episodes of emesis. Since his discharge, he had been tolerating a diet and took a trip to [place] without any issues. CT in the ED demonstrating SBO with transition point in the pelvis. Patient admitted to surgical oncology service under the care of Dr. Mason. NGT placed with 100cc of bilious output. Patient started on malignant SBO protocol (Decadron, Reglan, Octreotide). By hospital day#2 patient developed return of bowel function (passed flatus). NGT was clamped for 4 hours and then had minimal residual output upon return to low continuous suction. NGT subsequently removed. Diet advanced to clear liquids and then full liquids. By hospital day#3 patient continued to have bowel function and diet advanced to Low Residue Diet which patient tolerated without nausea or emesis.  Pt had increase in creatinine on 12/14 to 1.32 --> 500 cc Bolus given --> repeat creatinine was 1.31. Urology was called and Dr. Sanford decided to add patient on to OR schedule for 12/15/22 for Cystoscopy, Left Ureteroscopy. OR findings showed: Ureteral stone passed in the bladder . Left ureteroscopy did not show any evidence of Stone. Procedure went well.  Post operatively patient did well.  Diet was advanced and tolerated. Patient is voiding and ambulating without difficulty. Patient stable for d/c home and follow up with Dr. Sanford in 1 week and Dr. Mason in 1 week.   Per Urology, patient being discharged on tamsulosin and Trimethoprim 100mg BID.     ICU Vital Signs Last 24 Hrs  T(C): 37 (14 Dec 2022 13:17), Max: 37 (14 Dec 2022 13:17)  T(F): 98.6 (14 Dec 2022 13:17), Max: 98.6 (14 Dec 2022 13:17)  HR: 65 (14 Dec 2022 13:17) (50 - 65)  BP: 127/63 (14 Dec 2022 13:17) (102/59 - 135/71)  BP(mean): --  ABP: --  ABP(mean): --  RR: 16 (14 Dec 2022 13:17) (16 - 18)  SpO2: 98% (14 Dec 2022 13:17) (97% - 100%)    O2 Parameters below as of 14 Dec 2022 13:17  Patient On (Oxygen Delivery Method): room air           This patient is a 62M w/ hx of metastatic appendiceal carcinoma with pseudomyxoma peritonei s/p cytoreductive surgery 2020 including partial gastrectomy, cholecystectomy, subtotal colectomy, diaphragm stripping, omentectomy, splenectomy s/p FOLFOX x 6 months and HIPEC and now s/p PIPAC x 8 most recently 6/3, who presented to [facility] on 12/11/22 with abdominal pain and nausea x1 day. Patient was discharged week prior with an SBO and said the pain felt the same as his last obstruction, so he came to the ED. His last BM was night of 12/10 and last passed flatus that afternoon, endorsed nausea with no episodes of emesis. Since his discharge, he had been tolerating a diet and took a trip to [place] without any issues. CT in the ED demonstrating SBO with transition point in the pelvis. Patient admitted to surgical oncology service under the care of Dr. Mason. NGT placed with 100cc of bilious output. Patient started on malignant SBO protocol (Decadron, Reglan, Octreotide). By hospital day#2 patient developed return of bowel function (passed flatus). NGT was clamped for 4 hours and then had minimal residual output upon return to low continuous suction. NGT subsequently removed. Diet advanced to clear liquids and then full liquids. By hospital day#3 patient continued to have bowel function and diet advanced to Low Residue Diet which patient tolerated without nausea or emesis.  Pt had increase in creatinine on 12/14 to 1.32 --> 500 cc Bolus given --> repeat creatinine was 1.31. Urology was called and Dr. Sanford decided to add patient on to OR schedule. On 12/15/22 pt went to OR for Cystoscopy, Left Ureteroscopy, Stent Placement. OR findings showed: Ureteral stone passed in the bladder . Left ureteroscopy did not show any evidence of Stone. Procedure went well.  Post operatively patient did well.  Diet was advanced and tolerated. Patient is voiding and ambulating without difficulty. Patient stable for d/c home and follow up with Dr. Sanford in 3-4 days and Dr. Mason in 1 week. Per Urology, patient being discharged on tamsulosin and Trimethoprim 100mg BID x 1 week.     ICU Vital Signs Last 24 Hrs  T(C): 37 (14 Dec 2022 13:17), Max: 37 (14 Dec 2022 13:17)  T(F): 98.6 (14 Dec 2022 13:17), Max: 98.6 (14 Dec 2022 13:17)  HR: 65 (14 Dec 2022 13:17) (50 - 65)  BP: 127/63 (14 Dec 2022 13:17) (102/59 - 135/71)  BP(mean): --  ABP: --  ABP(mean): --  RR: 16 (14 Dec 2022 13:17) (16 - 18)  SpO2: 98% (14 Dec 2022 13:17) (97% - 100%)    O2 Parameters below as of 14 Dec 2022 13:17  Patient On (Oxygen Delivery Method): room air           This patient is a 62M w/ hx of metastatic appendiceal carcinoma with pseudomyxoma peritonei s/p cytoreductive surgery 2020 including partial gastrectomy, cholecystectomy, subtotal colectomy, diaphragm stripping, omentectomy, splenectomy s/p FOLFOX x 6 months and HIPEC and now s/p PIPAC x 8 most recently 6/3, who presented to [facility] on 12/11/22 with abdominal pain and nausea x1 day. Patient was discharged week prior with an SBO and said the pain felt the same as his last obstruction, so he came to the ED. His last BM was night of 12/10 and last passed flatus that afternoon, endorsed nausea with no episodes of emesis. Since his discharge, he had been tolerating a diet and took a trip to [place] without any issues. CT in the ED demonstrating SBO with transition point in the pelvis. Patient admitted to surgical oncology service under the care of Dr. Mason. NGT placed with 100cc of bilious output. Patient started on malignant SBO protocol (Decadron, Reglan, Octreotide). By hospital day#2 patient developed return of bowel function (passed flatus). NGT was clamped for 4 hours and then had minimal residual output upon return to low continuous suction. NGT subsequently removed. Diet advanced to clear liquids and then full liquids. By hospital day#3 patient continued to have bowel function and diet advanced to Low Residue Diet which patient tolerated without nausea or emesis.  Pt had increase in creatinine on 12/14 to 1.32 --> 500 cc Bolus given --> repeat creatinine was 1.31. Urology was called and Dr. Sanford decided to add patient on to OR schedule. On 12/15/22 pt went to OR for Cystoscopy, Left Ureteroscopy, Stent Placement. OR findings showed: Ureteral stone passed in the bladder . Left ureteroscopy did not show any evidence of Stone. Procedure went well.  Post operatively patient did well.  Diet was advanced and tolerated. Patient is voiding and ambulating without difficulty. Patient stable for d/c home and follow up with Dr. Sanford in 3-4 days and Dr. Mason in 1 week. Per Urology, patient being discharged on tamsulosin and Trimethoprim 100mg BID x 1 week.

## 2022-12-13 NOTE — DISCHARGE NOTE PROVIDER - NSDCFUADDINST_GEN_ALL_CORE_FT
You were admitted to the Hospital with a Malignant Small Bowel Obstruction which resolved with medical management (nothing by mouth/IV Fluids/serial abdominal exams/Decadron, Reglan, Octreotide). You are currently passing flatus and having bowel movements and you are tolerating a Low Fiber Diet without nausea or emesis. If you start vomiting or becoming very nauseated or bloated or stop passing flatus or bowel movement while at home, please contact Dr. Nash or go to your nearest Emergency Department.   You were admitted to the Hospital with a Malignant Small Bowel Obstruction which resolved with medical management (nothing by mouth/IV Fluids/serial abdominal exams/Decadron, Reglan, Octreotide). You are currently passing flatus and having bowel movements and you are tolerating a Low Fiber Diet without nausea or emesis. If you start vomiting or becoming very nauseated or bloated or stop passing flatus or bowel movement while at home, please contact Dr. Nash or go to your nearest Emergency Department.    While in hospital, Dr. Sanford took you to the operating room for a Cystoscopy, Left Ureteroscopy, Stent placement. If you notice any bleeding/discharge/increased pain from penis or abdominal pain, please call Dr. Sanford's office or go to the nearest Emergency Room.   You should take antibiotics  (Trimethoprim) for 1 week as prescribed. This medication was sent to [facility] at [facility] so you can pick it up on your way out of hospital.

## 2022-12-13 NOTE — DISCHARGE NOTE PROVIDER - NSDCCPCAREPLAN_GEN_ALL_CORE_FT
PRINCIPAL DISCHARGE DIAGNOSIS  Diagnosis: Small bowel obstruction  Assessment and Plan of Treatment: Now resolved.

## 2022-12-13 NOTE — DISCHARGE NOTE PROVIDER - NSDCCPTREATMENT_GEN_ALL_CORE_FT
PRINCIPAL PROCEDURE  Procedure: Cystoscopy with left ureteroscopy  Findings and Treatment: Stent placement.

## 2022-12-14 NOTE — CHART NOTE - NSCHARTNOTEFT_GEN_A_CORE
Patient seen and consented for cystoscopy, left ureteroscopy, possible laser lithotripsy, possible stent placement with Dr. Sanford and Manoj tomorrow. He is the 2nd add on case. Please call with any questions regarding procedure tomorrow.    Travon Kelley, [id number]  [facility]  [address] [place], [place] [id number] [phone]

## 2022-12-14 NOTE — DISCHARGE NOTE NURSING/CASE MANAGEMENT/SOCIAL WORK - NSDCFUADDAPPT_GEN_ALL_CORE_FT
Please follow up with your Urologist or Nephrologist about your slightly elevated creatinine and history of a kidney/bladder calculus.

## 2022-12-14 NOTE — DISCHARGE NOTE NURSING/CASE MANAGEMENT/SOCIAL WORK - NSDCPEFALRISK_GEN_ALL_CORE
For information on Fall & Injury Prevention, visit: [email or web address] OR  [email or web address] OR  https://www.cdc.gov/steadi/patient.html

## 2022-12-14 NOTE — DISCHARGE NOTE NURSING/CASE MANAGEMENT/SOCIAL WORK - PATIENT PORTAL LINK FT
You can access the FollowMyHealth Patient Portal offered by [facility] by registering at the following website: [email or web address]. By joining [employer] [employer]’s FollowMyHealth portal, you will also be able to view your health information using other applications (apps) compatible with our system.

## 2022-12-15 NOTE — PROGRESS NOTE ADULT - SUBJECTIVE AND OBJECTIVE BOX
GENERAL SURGERY PROGRESS NOTE    SUBJECTIVE  In no acute distress. Having bowel function.     OVERNIGHT EVENTS:  NAEON    10-point review of systems completed and negative except as noted above.      OBJECTIVE    MEDICATIONS  enoxaparin Injectable 40 milliGRAM(s) SubCutaneous every 24 hours  escitalopram 5 milliGRAM(s) Oral daily  famotidine    Tablet 40 milliGRAM(s) Oral at bedtime  lactated ringers. 1000 milliLiter(s) IV Continuous <Continuous>  pantoprazole    Tablet 40 milliGRAM(s) Oral before breakfast  simvastatin 10 milliGRAM(s) Oral at bedtime  tamsulosin 0.4 milliGRAM(s) Oral at bedtime      PHYSICAL EXAM  T(C): 36.3 (12-15-22 @ 05:10), Max: 37 (12-14-22 @ 13:17)  HR: 56 (12-15-22 @ 05:10) (53 - 65)  BP: 124/62 (12-15-22 @ 05:10) (107/59 - 132/80)  RR: 16 (12-15-22 @ 05:10) (16 - 16)  SpO2: 98% (12-15-22 @ 05:10) (98% - 100%)    12-14-22 @ 07:01  -  12-15-22 @ 07:00  --------------------------------------------------------  IN: 1300 mL / OUT: 1300 mL / NET: 0 mL        General: Appears well, NAD  Neuro: AAOx3  CHEST: Clear to auscultation bilaterally  CV: Regular rate and rhythm  Abdomen: soft, nontender, nondistended, no rebound or guarding  Extremities: Grossly symmetric    LABS                        11.7   8.47  )-----------( 438      ( 15 Dec 2022 05:25 )             37.2     12-15    140  |  103  |  16  ----------------------------<  87  4.2   |  27  |  1.25    Ca    9.0      15 Dec 2022 05:25  Phos  3.3     12-15  Mg     2.00     12-15      PT/INR - ( 15 Dec 2022 05:25 )   PT: 11.5 sec;   INR: 0.99 ratio         PTT - ( 15 Dec 2022 05:25 )  PTT:29.3 sec      RADIOLOGY & ADDITIONAL STUDIES
D Team General Surgery Progress Note    S: Pt seen and examined with team on morning rounds. Patient feels well. + Flatus/+BM. Denies bloating. Tolerating Full Liquid Diet. Would like to go home today immediately after tolerating lunch.       Vital Signs Last 24 Hrs  T(C): 36.6 (14 Dec 2022 05:00), Max: 36.6 (13 Dec 2022 10:04)  T(F): 97.9 (14 Dec 2022 05:00), Max: 97.9 (13 Dec 2022 21:28)  HR: 59 (14 Dec 2022 05:00) (50 - 60)  BP: 102/59 (14 Dec 2022 05:00) (102/59 - 135/71)  BP(mean): --  RR: 18 (14 Dec 2022 05:00) (16 - 18)  SpO2: 97% (14 Dec 2022 05:00) (97% - 100%)    Parameters below as of 14 Dec 2022 05:00  Patient On (Oxygen Delivery Method): room air        I&O's Summary    13 Dec 2022 07:01  -  14 Dec 2022 07:00  --------------------------------------------------------  IN: 1150 mL / OUT: 3700 mL / NET: -2550 mL      I&O's Detail    13 Dec 2022 07:01  -  14 Dec 2022 07:00  --------------------------------------------------------  IN:    IV PiggyBack: 50 mL    Oral Fluid: 1100 mL  Total IN: 1150 mL    OUT:    Voided (mL): 3700 mL  Total OUT: 3700 mL    Total NET: -2550 mL          General Appearance: Appears well, NAD  Chest: Breathing comfortably on RA.    CV: S1, S2 @ 59  Abdomen: Soft, nontender, nondistended. No rebound or guarding.   Extremities: Moves all extremities.  .  MEDICATIONS  (STANDING):  enoxaparin Injectable 40 milliGRAM(s) SubCutaneous every 24 hours  escitalopram 5 milliGRAM(s) Oral daily  famotidine    Tablet 40 milliGRAM(s) Oral at bedtime  pantoprazole    Tablet 40 milliGRAM(s) Oral before breakfast  simvastatin 10 milliGRAM(s) Oral at bedtime  tamsulosin 0.4 milliGRAM(s) Oral at bedtime    MEDICATIONS  (PRN):      LABS:                        11.1   6.81  )-----------( 424      ( 13 Dec 2022 04:45 )             34.0     12-14    138  |  102  |  16  ----------------------------<  99  3.8   |  30  |  1.32<H>    Ca    8.9      14 Dec 2022 05:50  Phos  2.8     12-14  Mg     2.00     12-14                    
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 Patient was discharged last week with an SBO and said the pain felt the same as his last obstruction, so he came to the ED. His last BM was last night and last passed flatus this afternoon, endorses nausea with no episodes of emesis. Since his discharge, he had been tolerating a diet and took a trip to [place] without any issues. CT in the ED demonstrating SBO with transition point in the pelvis. NGT placed with 100cc of bilious output.      SUBJECTIVE: Pt seen and examined by team on morning rounds. Patient lying comfortably in bed. Patient tolerating sips of clears, passing flatus, has not had a bowel movement. Denies chest pain, palpitations, SOB, palpitations, dizziness, fever, chills, N/V/D.       Vital Signs Last 24 Hrs  T(C): 36.5 (13 Dec 2022 05:20), Max: 36.7 (12 Dec 2022 14:06)  T(F): 97.7 (13 Dec 2022 05:20), Max: 98 (12 Dec 2022 14:06)  HR: 53 (13 Dec 2022 05:20) (50 - 62)  BP: 120/68 (13 Dec 2022 05:20) (118/70 - 155/81)  RR: 18 (13 Dec 2022 05:20) (18 - 18)  SpO2: 96% (13 Dec 2022 05:20) (96% - 100%)  Patient On (Oxygen Delivery Method): room air      General Appearance: Appears well, NAD  Neck: Supple  Chest: Equal expansion bilaterally, equal breath sounds  CV: Pulse regular presently  Abdomen: Soft, nontender, nondistended  Extremities: Grossly symmetric, SCD's in place     I&O's Summary    11 Dec 2022 07:01  -  12 Dec 2022 07:00  --------------------------------------------------------  IN: 1500 mL / OUT: 1150 mL / NET: 350 mL    12 Dec 2022 07:01  -  13 Dec 2022 06:56  --------------------------------------------------------  IN: 1200 mL / OUT: 850 mL / NET: 350 mL      I&O's Detail    11 Dec 2022 07:01  -  12 Dec 2022 07:00  --------------------------------------------------------  IN:    Lactated Ringers: 1000 mL    Lactated Ringers Bolus: 500 mL  Total IN: 1500 mL    OUT:    Nasogastric/Oral tube (mL): 300 mL    Oral Fluid: 0 mL    Other (mL): 250 mL    Voided (mL): 600 mL  Total OUT: 1150 mL    Total NET: 350 mL      12 Dec 2022 07:01  -  13 Dec 2022 06:56  --------------------------------------------------------  IN:    Lactated Ringers: 1200 mL  Total IN: 1200 mL    OUT:    Nasogastric/Oral tube (mL): 50 mL    Oral Fluid: 0 mL    Voided (mL): 800 mL  Total OUT: 850 mL    Total NET: 350 mL          MEDICATIONS  (STANDING):  acetaminophen   IVPB .. 1000 milliGRAM(s) IV Intermittent once  dexAMETHasone  Injectable 4 milliGRAM(s) IV Push every 12 hours  enoxaparin Injectable 40 milliGRAM(s) SubCutaneous every 24 hours  escitalopram 5 milliGRAM(s) Oral daily  famotidine Injectable 20 milliGRAM(s) IV Push at bedtime  lactated ringers. 1000 milliLiter(s) (100 mL/Hr) IV Continuous <Continuous>  metoclopramide Injectable 10 milliGRAM(s) IV Push every 8 hours  octreotide  Injectable 100 MICROGram(s) SubCutaneous every 8 hours  pantoprazole  Injectable 40 milliGRAM(s) IV Push daily  tamsulosin 0.4 milliGRAM(s) Oral at bedtime    MEDICATIONS  (PRN):      LABS:                        11.1   6.81  )-----------( 424      ( 13 Dec 2022 04:45 )             34.0     12-13    134<L>  |  100  |  18  ----------------------------<  142<H>  4.4   |  26  |  1.17    Ca    8.7      13 Dec 2022 04:45  Phos  3.7     12-13  Mg     1.70     12-13    TPro  7.3  /  Alb  4.3  /  TBili  0.6  /  DBili  x   /  AST  20  /  ALT  23  /  AlkPhos  97  12-11          RADIOLOGY & ADDITIONAL STUDIES:
D Team Surgery Daily Progress Note    Interval Events:  straight cath overnight- 250cc OP  passed TOV this morning    HPI: 62M w/ hx of metastatic appendiceal carcinoma with pseudomyxoma peritonei s/p cytoreductive surgery 2020 including partial gastrectomy, cholecystectomy, subtotal colectomy, diaphragm stripping, omentectomy, splenectomy s/p FOLFOX x 6 months and HIPEC and now s/p PIPAC x 8 most recently 6/3, who presents with abdominal pain and nausea x1 day. Patient was discharged last week with an SBO and said the pain felt the same as his last obstruction, so he came to the ED. His last BM was last night and last passed flatus this afternoon, endorses nausea with no episodes of emesis. Since his discharge, he had been tolerating a diet and took a trip to [place] without any issues. CT in the ED demonstrating SBO with transition point in the pelvis. NGT placed with 100cc of bilious output.     SUBJECTIVE: Pt seen and examined by team on morning rounds. Patient lying comfortably in bed. NGT to [facility]. Denies chest pain, SOB, palpitations, dizziness, fever, chills, N/V/D. Denies BM/flatus.      Vital Signs Last 24 Hrs  T(C): 36.3 (12 Dec 2022 06:15), Max: 36.9 (11 Dec 2022 13:45)  T(F): 97.3 (12 Dec 2022 06:15), Max: 98.5 (11 Dec 2022 13:45)  HR: 60 (12 Dec 2022 06:15) (55 - 66)  BP: 124/65 (12 Dec 2022 06:15) (124/65 - 169/89)  RR: 18 (12 Dec 2022 06:15) (16 - 18)  SpO2: 98% (12 Dec 2022 06:15) (98% - 100%)  Patient On (Oxygen Delivery Method): room air    General Appearance: Appears well, NAD  Neck: Supple  Chest: Equal expansion bilaterally, equal breath sounds  CV: Pulse regular presently  Abdomen: Soft, nontender, mildly distended  Extremities: Grossly symmetric, SCD's in place     I&O's Summary    11 Dec 2022 07:01  -  12 Dec 2022 07:00  --------------------------------------------------------  IN: 1500 mL / OUT: 1150 mL / NET: 350 mL      I&O's Detail    11 Dec 2022 07:01  -  12 Dec 2022 07:00  --------------------------------------------------------  IN:    Lactated Ringers: 1000 mL    Lactated Ringers Bolus: 500 mL  Total IN: 1500 mL    OUT:    Nasogastric/Oral tube (mL): 300 mL    Oral Fluid: 0 mL    Other (mL): 250 mL    Voided (mL): 600 mL  Total OUT: 1150 mL    Total NET: 350 mL          MEDICATIONS  (STANDING):  acetaminophen   IVPB .. 1000 milliGRAM(s) IV Intermittent once  dexAMETHasone  Injectable 4 milliGRAM(s) IV Push every 12 hours  enoxaparin Injectable 40 milliGRAM(s) SubCutaneous every 24 hours  escitalopram 5 milliGRAM(s) Oral daily  famotidine Injectable 20 milliGRAM(s) IV Push at bedtime  lactated ringers. 1000 milliLiter(s) (100 mL/Hr) IV Continuous <Continuous>  metoclopramide Injectable 10 milliGRAM(s) IV Push every 8 hours  octreotide  Injectable 100 MICROGram(s) SubCutaneous every 8 hours  pantoprazole  Injectable 40 milliGRAM(s) IV Push daily  tamsulosin 0.4 milliGRAM(s) Oral at bedtime    MEDICATIONS  (PRN):      LABS:                        11.9   x     )-----------( 428      ( 12 Dec 2022 06:56 )             38.1     12-11    140  |  103  |  18  ----------------------------<  108<H>  4.4   |  24  |  1.25    Ca    10.0      11 Dec 2022 15:30    TPro  7.3  /  Alb  4.3  /  TBili  0.6  /  DBili  x   /  AST  20  /  ALT  23  /  AlkPhos  97  12-11          RADIOLOGY & ADDITIONAL STUDIES:

## 2022-12-15 NOTE — ASU PREOP CHECKLIST - LOOSE TEETH
SWS called [facility] @ [phone] and selected member services then transportation. SWS spoke Kady who verified patient has transport benefits. Transport can be scheduled by calling [phone].    no

## 2022-12-15 NOTE — PROGRESS NOTE ADULT - ASSESSMENT
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     Plan:  - Advance diet as tolerated Reg this AM - ordered.  - Resume home meds  - Lovenox for DVT Ppx  - If patient tolerated LRD for breakfast and lunch, will d/c home after lunch.     D Team Surgery  [id number]      
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     Plan:  -advance diet as tolerated CLD to reg  -Malignant SBO protocol   - resume home meds  -Serial abdominal exams  -lovenox for DVT Ppx    D Team Surgery  [id number]  
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     Plan:  -NPO/IVF  -NGT to [facility]  -Malignant SBO protocol   - BPH meds via NGT clamp for 30minutes after meds  -Serial abdominal exams  -lovenox for DVT Ppx    D Team Surgery  [id number]  
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     Plan:  - NPO for Urology operation today 12/15.  - Lovenox for DVT Ppx    D Team Surgery  [id number]

## 2022-12-27 NOTE — ED ADULT NURSE NOTE - NSIMPLEMENTINTERV_GEN_ALL_ED
Implemented All Universal Safety Interventions:  [place] to call system. Call bell, personal items and telephone within reach. Instruct patient to call for assistance. Room bathroom lighting operational. Non-slip footwear when patient is off stretcher. Physically safe environment: no spills, clutter or unnecessary equipment. Stretcher in lowest position, wheels locked, appropriate side rails in place.

## 2022-12-27 NOTE — ED ADULT TRIAGE NOTE - CHIEF COMPLAINT QUOTE
Pt c/o abd pain/distention and nausea X 1 day. States feels like prior bowel obstruction. Last BM today. Denies chest pain, sob, v/d, fevers/chills. Appears very uncomfortable.  Pmhx: metastatic appendiceal carcinoma s/p partial gastrectomy, cholecystectomy, subtotal colectomy, diaphragm stripping, omentectomy, splenectomy

## 2022-12-27 NOTE — ED PROVIDER NOTE - PROGRESS NOTE DETAILS
Andreina Rodriguez PGY-3: Surgery at bedside. patient with appendiceal cancer. Prior SBO. DDx includes recurrent SBO vs partial SBO. Patient passing gas. Requesting CT A/P and labs. CT performed, pending CT read and admission, repeat pain meds and nausea meds ordered. - Kalen Abebe, PGY-2

## 2022-12-27 NOTE — ED ADULT NURSE NOTE - OBJECTIVE STATEMENT
PT received in room 10, 62 year old male complaining of R sided abd pain, n/v. Pt PMH of Stage 4 lung cancer, frequent SBOs. Pt PSH of 8 abd surgeries. Pt states sx started at around 6:30PM. Pt last cancer treatment today this morning. PT abd soft & tender, pt guarding abd. IV 18G, forearm, labs collected & sent, meds given. Pt placed on cardiac monitor, NSR noted. Will continue to monitor

## 2022-12-27 NOTE — ED PROVIDER NOTE - CLINICAL SUMMARY MEDICAL DECISION MAKING FREE TEXT BOX
62yoM w/Hx of metastatic appendiceal carcinoma s/p multiple intraabd surgeries p/w acute onset abd pain similar to previous SBOs in the past, VS significant for diastolic HTN, phys exam w/R-sided abd ttp and abd distension. Likely 2/2 recurrent SBO given complex surgical Hx, will eval w/labs, pain ctrl, CT a/p, likely tba for further evaluation. Gen surgery at bedside upon initial presentation to ED. - Kalen Abebe, PGY-2

## 2022-12-27 NOTE — ED PROVIDER NOTE - ATTENDING CONTRIBUTION TO CARE
Patient is a 61 yo M with metastatic appendiceal carcinoma with pseudomyxoma peritonei s/p cytoreductive surgery 2020 including partial gastrectomy, cholecystectomy, subtotal colectomy, diaphragm stripping, omentectomy, splenectomy s/p FOLFOX x 6 months and HIPEC and now s/p PIPAC x 8 most recently 6/3, who was admitted from 12/1-12/3, 12/11 - 12/14 for SBO now here for worsening abdominal since 6 pm this evening. He last ate around 3:30 PM. He states he also took miralax and tried to have a bowel movement around 8 pm but was able to pass a small amount of gas. He developed pain in his right lower abdomen, similar to past obstruction pain. No nausea, vomiting. No fevers. No chest pain or shortness of breath. He states that it feels like an SBO. LBM was yesterday. No blood in stool.     VS noted  Gen. no acute distress, anxious, uncomfortable  HEENT: EOMI, mmm  Lungs: CTAB/L no C/ W /R   CVS: RRR   Abd; Soft, distended, ttp in lower abdomen, more on right side, no CVA tenderness bilaterally.   Ext: no edema  Skin: no rash  Neuro AAOx3 non focal clear speech  a/p: r/o SBO - patient with extensive surgical history and multiple SBO in the past. Will give pain meds, zofran and check labs, CT A/P.   - Caty SILVA.

## 2022-12-27 NOTE — ED PROVIDER NOTE - OBJECTIVE STATEMENT
Pt is a 62yoM w/Hx of metastatic appendiceal carcinoma with pseudomyxoma peritonei s/p cytoreductive surgery 2020 including partial gastrectomy, cholecystectomy, subtotal colectomy, diaphragm stripping, omentectomy, splenectomy p/w acute onset abd pain since 6:30pm, last BM 3:30pm this afternoon prior to onset of pain. Abd pain is R-sided, similar to previous SBOs, associated w/abd swelling, nausea, and dry-heaving, pt passing gas. Started new medication (lymparza? par-b inhibitor) yesterday, otherwise no recent medication changes. No recent fever/chills/infection, SOB, CP, diarrhea/constipation, dysuria/hematuria, hematochezia/melena, numbness/tingling, focal weakness.

## 2022-12-27 NOTE — ED PROVIDER NOTE - NS ED ROS FT
GENERAL: No fever or chills, EYES: no change in vision, HEENT: no trouble swallowing or speaking, CARDIAC: no chest pain, PULMONARY: no cough or SOB, GI: +abdominal pain, +nausea, no vomiting, no diarrhea or constipation, : No changes in urination, SKIN: no rashes, NEURO: no headache,  MSK: No joint pain     All other ROS negative unless otherwise specified in HPI.     ~Kalen Abebe M.D. Resident

## 2022-12-27 NOTE — ED PROVIDER NOTE - PHYSICAL EXAMINATION
Gen: AAOx3, non-toxic WDWN middle-aged man lying in bed in NAD  Head: NCAT  HEENT: EOMI, oral mucosa dry, normal conjunctiva  Lung: CTAB, no respiratory distress, no wheezes/rhonchi/rales B/L, speaking in full sentences  CV: RRR, no murmurs, rubs or gallops  Abd: soft, +mildly distended, +R-sided ttp w/o rebound or guarding, no CVA ttp   MSK: no visible deformities  Neuro: No focal sensory or motor deficits  Skin: Warm, well perfused, no rash, no edema  Psych: normal affect.   ~Kalen Abebe M.D. Resident

## 2022-12-28 NOTE — H&P ADULT - NSHPLABSRESULTS_GEN_ALL_CORE
Labs:  CAPILLARY BLOOD GLUCOSE                              12.1   17.34 )-----------( 554      ( 27 Dec 2022 23:16 )             38.5       Auto Neutrophil %: 75.2 % (12-27-22 @ 23:16)    12-27    140  |  103  |  22  ----------------------------<  125<H>  4.1   |  24  |  1.46<H>      Calcium, Total Serum: 9.7 mg/dL (12-27-22 @ 23:16)      LFTs:             6.8  | 0.6  | 24       ------------------[103     ( 27 Dec 2022 23:16 )  4.0  | x    | 14          Lipase:26     Amylase:x         Blood Gas Venous - Lactate: 1.1 mmol/L (12-27-22 @ 23:19)      Coags:     11.9   ----< 1.03    ( 27 Dec 2022 23:16 )     28.0      Radiology:  CT report pending

## 2022-12-28 NOTE — ED ADULT NURSE REASSESSMENT NOTE - NS ED NURSE REASSESS COMMENT FT1
NG tube placed to R nostril b surgical resident. pt tolerated well. yellow to brown drainage noted in suction collection. 18G to R forearm removed d/t infiltration at CT. 20G to L forearm placed. respirations even and unlabored. awaiting bed assignment
pt received A&Ox4 c/o 7/10 generalized abd pain and nausea. MD made aware. medicated as per MD orders. bulge in RLQ noted. respirations even and unlabored. denies c/p, SOB, weakness. 18G to R forearm patent. awaiting CT. safety maintained.
Report received from night RN. Pt is a&ox4, breathing spontaneously and nonlabored, no acute distress in appearance. Pt denies presence of any pain or complaints, pt resting comfortably in bed. Vitals as noted in chart. NG tube in place and draining dark liquid. 20G IV in left forearm, IV is patent and free flowing, no pain/redness/swelling noted at IV site - LR started as per orders. PT straight cathed as per orders, pt reports not producing urine since yesterday and that he has trouble urinating due to enlarged prostate, pt drained approximately 250 mL of dark yellow colored urine. Pt skin in tact, pt is continent at baseline and currently. Abdomen is soft, not distended. Safety precautions maintained at all times.

## 2022-12-28 NOTE — PATIENT PROFILE ADULT - FALL HARM RISK - RISK INTERVENTIONS

## 2022-12-28 NOTE — CONSULT NOTE ADULT - ASSESSMENT
62M w/ recurrent SBO from metastatic appendiceal carcinoma w/ pseudomyxoma peritonei with CT showing hyperenhancement of R peripheral zone and moderate bilateral hydronephrosis.    - Please check PVR - if elevated, recommend Raygoza catheter  - Trend Cr  - Follow up urinalysis and urine culture  - Outpatient followup with urology    Case discussed with Dr. Multani

## 2022-12-28 NOTE — H&P ADULT - HISTORY OF PRESENT ILLNESS
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 Patient was admitted 12/11 with an SBO and said the pain felt the same as his last obstruction, so he came to the ED. Last gas and BM yesterday, endorses nausea with a few episodes of emesis. Workup in ED c/w malignant SBO. Surgery was called to evaluate.

## 2022-12-28 NOTE — H&P ADULT - ASSESSMENT
Assessment:  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     Plan:  - admit to surgery  - NPO, IVF  - NGT to [facility]  - [facility] protocol  - plan dw attending    Surgery  [id number]
English

## 2022-12-28 NOTE — H&P ADULT - NSHPPHYSICALEXAM_GEN_ALL_CORE
T(C): 36.8 (12-27-22 @ 22:12), Max: 36.8 (12-27-22 @ 22:12)  HR: 76 (12-28-22 @ 02:39) (65 - 86)  BP: 156/98 (12-28-22 @ 02:39) (156/98 - 162/105)  RR: 18 (12-28-22 @ 02:39) (18 - 18)  SpO2: 100% (12-28-22 @ 02:39) (100% - 100%)    Physical Exam:  General: NAD, alert  HEENT: normocephalic, PERRLA  CVS: RRR, no murmur  Chest: CTABL  Abdomen: distended, mild tenderness RLQ, no rebound or guarding  Extremities: peripheral pulses palpable

## 2022-12-29 NOTE — DIETITIAN INITIAL EVALUATION ADULT - OTHER INFO
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   Patient is on NPO during visit. NGT in place. Patient denies any nausea/vomiting at this time. Patient reports passing gas. Last bowel movement 12/27/2022, per RN flow sheet. Current weight: 119.9lbs/54.4kg (12/28/2022, per RN flow sheet). As per last RD note dated 12/2/2022, from previous stay, weight history: 134lbs (April 2022), 55.7kg (12/2). Noted with weight loss of 14.1lbs/10.5% BW x ~8months and weight loss of 1.3kg/2.3% BW x ~1 month.

## 2022-12-29 NOTE — PROGRESS NOTE ADULT - SUBJECTIVE AND OBJECTIVE BOX
D TEAM SURGERY DAILY PROGRESS NOTE  Patient is a 62y old  Male who presents with a chief complaint of     SUBJECTIVE:     Overnight Events: No acute events     Patient seen and evaluated at bedside on AM rounds. Patient without complaints.   Patient otherwise denies nausea, vomiting, chest pain, shortness of breath       OBJECTIVE:  Vital Signs Last 24 Hrs  T(C): 37 (29 Dec 2022 00:26), Max: 37 (29 Dec 2022 00:26)  T(F): 98.6 (29 Dec 2022 00:26), Max: 98.6 (29 Dec 2022 00:26)  HR: 67 (29 Dec 2022 00:26) (54 - 68)  BP: 127/63 (29 Dec 2022 00:26) (127/63 - 144/86)  BP(mean): 85 (28 Dec 2022 17:30) (85 - 85)  RR: 18 (29 Dec 2022 00:26) (14 - 18)  SpO2: 100% (29 Dec 2022 00:26) (99% - 100%)    Parameters below as of 29 Dec 2022 00:26  Patient On (Oxygen Delivery Method): room air      I&O's Detail    28 Dec 2022 07:01  -  29 Dec 2022 04:49  --------------------------------------------------------  IN:    Lactated Ringers: 1375 mL  Total IN: 1375 mL    OUT:    Nasogastric/Oral tube (mL): 350 mL    Oral Fluid: 0 mL    Voided (mL): 1075 mL  Total OUT: 1425 mL    Total NET: -50 mL          STANDING  dexAMETHasone  Injectable 4 milliGRAM(s) IV Push every 12 hours  enoxaparin Injectable 40 milliGRAM(s) SubCutaneous every 24 hours  lactated ringers. 1000 milliLiter(s) (125 mL/Hr) IV Continuous <Continuous>  metoclopramide Injectable 10 milliGRAM(s) IV Push every 8 hours  octreotide  Injectable 100 MICROGram(s) SubCutaneous every 8 hours  pantoprazole  Injectable 40 milliGRAM(s) IV Push every 24 hours    PRN      Labs:  141  |  105  |  19  ----------------------------<  109<H>    (12-28)  4.0   |  26  |  1.20            Ca    8.8      12-28  Mg    1.80  Phos  4.0            Urinalysis Basic - ( 28 Dec 2022 10:55 )    Color: Light Yellow / Appearance: Clear / SG: >1.050 / pH: x  Gluc: x / Ketone: Trace  / Bili: Negative / Urobili: <2 mg/dL   Blood: x / Protein: Trace / Nitrite: Negative   Leuk Esterase: Moderate / RBC: 2 /HPF / WBC 37 /HPF   Sq Epi: x / Non Sq Epi: 2 /HPF / Bacteria: Negative            Physical Exam:  General: well developed, well nourished, NAD. NGT in place.   Cardiovascular: appears well perfused   Respiratory: respirations non labored  Gastrointestinal: distended, mild tenderness RLQ, no rebound or guarding  Extremities: FROM, warm  Neurological: A+Ox3    D TEAM SURGERY DAILY PROGRESS NOTE  Patient is a 62y old  Male who presents with a chief complaint of SBO.    SUBJECTIVE:     Overnight Events: No acute events     Patient seen and evaluated at bedside on AM rounds. Patient reports pain is better. Denies flatus/BM. OOB/ambulating.   Patient otherwise denies nausea, vomiting, chest pain, shortness of breath, fever, chills.        OBJECTIVE:  Vital Signs Last 24 Hrs  T(C): 37 (29 Dec 2022 00:26), Max: 37 (29 Dec 2022 00:26)  T(F): 98.6 (29 Dec 2022 00:26), Max: 98.6 (29 Dec 2022 00:26)  HR: 67 (29 Dec 2022 00:26) (54 - 68)  BP: 127/63 (29 Dec 2022 00:26) (127/63 - 144/86)  BP(mean): 85 (28 Dec 2022 17:30) (85 - 85)  RR: 18 (29 Dec 2022 00:26) (14 - 18)  SpO2: 100% (29 Dec 2022 00:26) (99% - 100%)    Parameters below as of 29 Dec 2022 00:26  Patient On (Oxygen Delivery Method): room air      I&O's Detail    28 Dec 2022 07:01  -  29 Dec 2022 04:49  --------------------------------------------------------  IN:    Lactated Ringers: 1375 mL  Total IN: 1375 mL    OUT:    Nasogastric/Oral tube (mL): 350 mL    Oral Fluid: 0 mL    Voided (mL): 1075 mL  Total OUT: 1425 mL    Total NET: -50 mL          STANDING  dexAMETHasone  Injectable 4 milliGRAM(s) IV Push every 12 hours  enoxaparin Injectable 40 milliGRAM(s) SubCutaneous every 24 hours  lactated ringers. 1000 milliLiter(s) (125 mL/Hr) IV Continuous <Continuous>  metoclopramide Injectable 10 milliGRAM(s) IV Push every 8 hours  octreotide  Injectable 100 MICROGram(s) SubCutaneous every 8 hours  pantoprazole  Injectable 40 milliGRAM(s) IV Push every 24 hours    PRN      Labs:  141  |  105  |  19  ----------------------------<  109<H>    (12-28)  4.0   |  26  |  1.20            Ca    8.8      12-28  Mg    1.80  Phos  4.0            Urinalysis Basic - ( 28 Dec 2022 10:55 )    Color: Light Yellow / Appearance: Clear / SG: >1.050 / pH: x  Gluc: x / Ketone: Trace  / Bili: Negative / Urobili: <2 mg/dL   Blood: x / Protein: Trace / Nitrite: Negative   Leuk Esterase: Moderate / RBC: 2 /HPF / WBC 37 /HPF   Sq Epi: x / Non Sq Epi: 2 /HPF / Bacteria: Negative            Physical Exam:  General: NAD. Laying in bed comfortably. A&Ox4. NGT in place.   Cardiovascular: S1, S2. RRR. Extremities warm  Respiratory: respirations non labored, no signs of respiratory distress,   Gastrointestinal: mildly distended, nontender, soft, no rebound or guarding. NGT in place to suction with gastric contents.   Extremities: moving all extremities spontaneously.

## 2022-12-29 NOTE — DIETITIAN INITIAL EVALUATION ADULT - CALCULATED FROM (G/KG)
[facility]

                                       

Test Date:    2021               Test Time:    16:38:21

Pat Name:     JULITA LOVE             Department:   

Patient ID:   [id number]          Room:          

Gender:       F                        Technician:   

:          1976               Requested By: ED DOC

Order Number: [id number]              Reading MD:   Peter Lim

                                 Measurements

Intervals                              Axis          

Rate:         59                       P:            47

ME:           193                      QRS:          69

QRSD:         131                      T:            41

QT:           523                                    

QTc:          517                                    

                           Interpretive Statements

Sinus bradycardia

Ventricular premature complex

Probable left atrial enlargement

Left ventricular hypertrophy

NONSPECIFIC ST DEPRESSION, ANTEROLATERAL LDS

No previous ECG available for comparison

Electronically Signed On 3- 6:48:45 PDT by Peter Lim 65.16

## 2022-12-29 NOTE — DIETITIAN INITIAL EVALUATION ADULT - SIGNS/SYMPTOMS
consumes </=75% of EER for >/= 1month, physical signs of moderate fat and muscle loss.  consumes <75% of EER for >/= 1month, physical signs of moderate fat and muscle loss.

## 2022-12-29 NOTE — DIETITIAN INITIAL EVALUATION ADULT - PERTINENT MEDS FT
MEDICATIONS  (STANDING):  dexAMETHasone  Injectable 4 milliGRAM(s) IV Push every 12 hours  enoxaparin Injectable 40 milliGRAM(s) SubCutaneous every 24 hours  lactated ringers. 1000 milliLiter(s) (75 mL/Hr) IV Continuous <Continuous>  metoclopramide Injectable 10 milliGRAM(s) IV Push every 8 hours  octreotide  Injectable 100 MICROGram(s) SubCutaneous every 8 hours  pantoprazole  Injectable 40 milliGRAM(s) IV Push every 24 hours    MEDICATIONS  (PRN):

## 2022-12-29 NOTE — DIETITIAN INITIAL EVALUATION ADULT - NSFNSGIIOFT_GEN_A_CORE
12-28-22 @ 07:01  -  12-29-22 @ 07:00  --------------------------------------------------------  OUT:    Nasogastric/Oral tube (mL): 400 mL  Total OUT: 400 mL    Total NET: -400 mL      12-29-22 @ 07:01  -  12-29-22 @ 17:35  --------------------------------------------------------  OUT:    Nasogastric/Oral tube (mL): 100 mL  Total OUT: 100 mL    Total NET: -100 mL

## 2022-12-29 NOTE — DIETITIAN INITIAL EVALUATION ADULT - MALNUTRITION
Severe malnutrition in the context of acute on chronic illness Moderate malnutrition in the context of chronic illness

## 2022-12-29 NOTE — DIETITIAN INITIAL EVALUATION ADULT - ORAL INTAKE PTA/DIET HISTORY
Patient reports following a low fiber diet, consumes high calorie, high protein Boost supplement at home. Patient reports usual body weight 150lbs, 2 years ago. Reports decrease po intake and weight loss PTA. Patient has no known food allergies.

## 2022-12-29 NOTE — DIETITIAN INITIAL EVALUATION ADULT - ADD RECOMMEND
1. Advance po diet, when clinically permits.  2. Once PO diet initiates, monitor po diet tolerance.   3. If patient to remain NPO or unable to use gut, suggest initiating alternate means of nutrition support.   4. Monitor labs and hydration status.  5. RD remains available, consult nutrition services if needed.

## 2022-12-29 NOTE — DIETITIAN INITIAL EVALUATION ADULT - PERTINENT LABORATORY DATA
12-29    140  |  104  |  22  ----------------------------<  127<H>  4.6   |  25  |  1.22    Ca    8.9      29 Dec 2022 06:02  Phos  4.0     12-28  Mg     1.80     12-28    TPro  6.8  /  Alb  4.0  /  TBili  0.6  /  DBili  x   /  AST  24  /  ALT  14  /  AlkPhos  103  12-27

## 2022-12-29 NOTE — PROGRESS NOTE ADULT - ASSESSMENT
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     Plan:  - Diet: NPO with NGT to LCS  - IVF   - will plan clamp trial once passing flatus   - Malignant SBO protocol   - PPI daily   - monitor I&Os  - monitor labs, replete prn  - will plan for urology outpatient f/u  - DVT ppx: LVX  - Dispo: pending       D Team Surgery  [id number]

## 2022-12-30 NOTE — DISCHARGE NOTE PROVIDER - DETAILS OF MALNUTRITION DIAGNOSIS/DIAGNOSES
This patient has been assessed with a concern for Malnutrition and was treated during this hospitalization for the following Nutrition diagnosis/diagnoses:     -  12/29/2022: Severe protein-calorie malnutrition   -  12/29/2022: Underweight (BMI < 19)   This patient has been assessed with a concern for Malnutrition and was treated during this hospitalization for the following Nutrition diagnosis/diagnoses:     -  12/30/2022: Moderate protein-calorie malnutrition   -  12/29/2022: Underweight (BMI < 19)

## 2022-12-30 NOTE — DISCHARGE NOTE PROVIDER - NSDCFUSCHEDAPPT_GEN_ALL_CORE_FT
Sadie Mendoza  [facility]  [place]  Scheduled Appointment: 01/09/2023

## 2022-12-30 NOTE — DISCHARGE NOTE NURSING/CASE MANAGEMENT/SOCIAL WORK - PATIENT PORTAL LINK FT
You can access the FollowMyHealth Patient Portal offered by [facility] by registering at the following website: [email or web address]. By joining "[employer]’s FollowMyHealth portal, you will also be able to view your health information using other applications (apps) compatible with our system.

## 2022-12-30 NOTE — DISCHARGE NOTE PROVIDER - NSDCFUADDAPPT_GEN_ALL_CORE_FT
Please follow up with outpatient palliative care provider below:   Dr. Odonnell, Kayli Davis NP   [facility], [facility]  Phone: [phone] [address] [place] [address].

## 2022-12-30 NOTE — PROGRESS NOTE ADULT - ASSESSMENT
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     Plan:  - Diet: CLD  - Malignant SBO protocol   - PPI daily   - DVT ppx: LVX  - Flomax home med started  - Will plan for urology outpatient f/u for hyperenhancement of R peripheral zone and moderate bilateral hydronephrosis.  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 Now resolved.     Plan:  - Diet: CLD  - Malignant SBO protocol until tolerating fulls  - PPI daily   - DVT ppx: LVX  - Flomax home med   - Will plan for urology outpatient f/u for hyperenhancement of R peripheral zone and moderate bilateral hydronephrosis  - Palliative care consult for recurrent mSBOs  - Dispo: Home pending diet tolerance    D Team Surgery  [id number]

## 2022-12-30 NOTE — CONSULT NOTE ADULT - PROBLEM SELECTOR RECOMMENDATION 4
Patient's wife, Astrid, is his legal surrogate. He has 2 adult children. Pt was open to caregiver support services.     Thank you for allowing us to participate in your patient's care. We will continue to follow with you. Please page [phone] for any q's or c's.     Adilene Gaona D.O.   Palliative Medicine

## 2022-12-30 NOTE — DISCHARGE NOTE PROVIDER - CARE PROVIDERS DIRECT ADDRESSES
,DirectAddress_Unknown ,DirectAddress_Unknown,[email or web address]

## 2022-12-30 NOTE — DISCHARGE NOTE NURSING/CASE MANAGEMENT/SOCIAL WORK - NSDCPNINST_GEN_ALL_CORE
If there is presence of unresolved pain or signs of infection, please call your regular doctor or go to your local emergency room. Signs of infection include pain, heat, swelling, discharged, fever of 100.2 or above. Please report to your local emergency room if you are experiencing shortness of breath or chest pain.

## 2022-12-30 NOTE — DISCHARGE NOTE NURSING/CASE MANAGEMENT/SOCIAL WORK - NSDCPEFALRISK_GEN_ALL_CORE
For information on Fall & Injury Prevention, visit: [email or web address] OR  [email or web address] OR  https://www.cdc.gov/steadi/patient.html

## 2022-12-30 NOTE — CONSULT NOTE ADULT - PROBLEM SELECTOR RECOMMENDATION 9
Pt with metastatic appendiceal carcinoma s/p cytoreductive surgery, FOLFOX x6 months, s/p PIPAC x8  Onc: Dr. Garcia in [place]   Patient has had 3 MBO hospitalizations recently. Current, bowel obstruction has resolved.   Pt can follow up with outpatient palliative care provider below:   Dr. Odonnell, Kayli Davis NP   [facility], [facility]  Phone: [phone] [address] [place] [address]

## 2022-12-30 NOTE — DISCHARGE NOTE PROVIDER - CARE PROVIDER_API CALL
Florecita Mason)  Complex General Surgical Oncology; Surgery  [address] [place] [address]  Phone: [phone]  Fax: [phone]  Follow Up Time: Routine   Florecita Mason)  Complex General Surgical Oncology; Surgery  [address] [place] [address]  Phone: [phone]  Fax: [phone]  Follow Up Time: Routine    Giselle Olivier)  [facility]  [address] [place] [address]  Phone: [phone]  Fax: [phone]  Follow Up Time: Routine

## 2022-12-30 NOTE — CONSULT NOTE ADULT - PROBLEM SELECTOR RECOMMENDATION 2
Patient on Lexapro 5mg qd. He stated he would discuss with PCP potentially increasing to 10mg  Can discharge with Xanax 0.25mg tid prn x1 week   Discussed the benefits of medical cannabis, which he can see outpatient palliative care provider for medical certification if interested.

## 2022-12-30 NOTE — CONSULT NOTE ADULT - SUBJECTIVE AND OBJECTIVE BOX
HPI  62M w/ metastatic appendiceal carcinoma w/ pseudomyxoma peritonei s/p cytoreductive surgery, FOLFOX, HIPEC, PIPAC c/b recurrent SBOs and recent L ureteroscopy on December 15. Readmitted under general surgery for recurrent SBO. Urology consulted for CT findings of hyperenhancement of R peripheral zone of prostate as well as moderate bilateral hydronephrosis without stones. Last PSA was 3.56 in November, down from 5.14 one year prior.    Patient feels well with NGT in place. No flatus. No urinary symptoms - denies dysuria, frequency, urgency, hematuria.    PAST MEDICAL & SURGICAL HISTORY:  HLD (hyperlipidemia)      BPH (benign prostatic hyperplasia)      Neuropathic pain  hands and feet      UTI (urinary tract infection)  pt denes recent infection      Appendix carcinoma  PIPAC x 7, last done 4/2022      Anxiety      Cancer of appendix      Malignant neoplasm of appendix      GERD (gastroesophageal reflux disease)  pt denes recent endoscopy      History of undescended testicle  age 7      Abdominal mass  surgery 5/2020  remove mass, spleen, partial colectomy, lymph nodes, part small intestines, omentum, apendix, gall bladder, part stomach. HIPEC      History of abdominal paracentesis  x3      H/O colectomy  Splenectomy , Cholecystectomy   5/2020. Insertion of Mediport      Malignant neoplasm of appendix  chemo 10/2021; s/p Pipac x 7 last 04/2022          MEDICATIONS  (STANDING):  dexAMETHasone  Injectable 4 milliGRAM(s) IV Push every 12 hours  enoxaparin Injectable 40 milliGRAM(s) SubCutaneous every 24 hours  lactated ringers. 1000 milliLiter(s) (125 mL/Hr) IV Continuous <Continuous>  metoclopramide Injectable 10 milliGRAM(s) IV Push every 8 hours  octreotide  Injectable 100 MICROGram(s) SubCutaneous every 8 hours  pantoprazole  Injectable 40 milliGRAM(s) IV Push every 24 hours    MEDICATIONS  (PRN):      FAMILY HISTORY:  FH: diabetes mellitus (Father)    FH: HTN (hypertension) (Mother)        Allergies    barium sulfate (Unknown)    Intolerances        SOCIAL HISTORY:    REVIEW OF SYSTEMS:   Otherwise negative as stated in HPI    Physical Exam  Vital signs  T(C): 36.6 (12-28-22 @ 09:51), Max: 36.8 (12-27-22 @ 22:12)  HR: 66 (12-28-22 @ 09:51)  BP: 138/78 (12-28-22 @ 09:51)  SpO2: 100% (12-28-22 @ 09:51)  Wt(kg): --    Output      Gen: NAD  Pulm: No respiratory distress  Abd: Soft, nontender, mildly distended  : Nonpalpable bladder, uncircumcised penis      LABS:      12-28 @ 06:35    WBC 11.61 / Hct 33.4  / SCr 1.20     12-27 @ 23:16    WBC 17.34 / Hct 38.5  / SCr 1.46     12-28    141  |  105  |  19  ----------------------------<  109<H>  4.0   |  26  |  1.20    Ca    8.8      28 Dec 2022 06:35  Phos  4.0     12-28  Mg     1.80     12-28    TPro  6.8  /  Alb  4.0  /  TBili  0.6  /  DBili  x   /  AST  24  /  ALT  14  /  AlkPhos  103  12-27    PT/INR - ( 27 Dec 2022 23:16 )   PT: 11.9 sec;   INR: 1.03 ratio         PTT - ( 27 Dec 2022 23:16 )  PTT:28.0 sec        RADIOLOGY:      ACC: [id number] EXAM: CT ABDOMEN AND PELVIS IC    PROCEDURE DATE: 12/28/2022        INTERPRETATION: CLINICAL INFORMATION: Abdominal pain.    COMPARISON: December 11, 2022.    PROCEDURE:  CT of the Abdomen and Pelvis was performed with intravenous contrast.  Intravenous contrast: 90 mL Omnipaque 350. 10 mL discarded.  Oral contrast: None.  Sagittal and coronal reformats were performed.    FINDINGS:    LOWER CHEST: Within normal limits.    LIVER: Within normal limits.  BILE DUCTS: Mild right intrahepatic biliary ductal dilatation.  GALLBLADDER: Cholecystectomy.  SPLEEN: Splenectomy.  PANCREAS: Within normal limits.  ADRENALS: Within normal limits.  KIDNEYS/URETERS: Moderate bilateral hydroureteronephrosis to the level of the distal ureters.    BLADDER: Within normal limits.  REPRODUCTIVE ORGANS: Hyperenhancement of the right peripheral zone of the prostate gland. The prostate is enlarged.    BOWEL: Distal gastrectomy and gastrojejunostomy. Persistent dilated loops of small bowel with possible transition point in the right abdomen (series 601, images 19-26). Subtotal colectomy with ileocolic anastomosis.  PERITONEUM: Unchanged peritoneal thickening partially encasing small bowel loops, consistent with pseudomyxoma peritonei.  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  RETROPERITONEUM: No lymphadenopathy.  ABDOMINAL WALL: Within normal limits.  BONES: Degenerative changes of the spine.    IMPRESSION:  Persistent small bowel obstruction with a possible transition point in the right upper abdomen.    Redemonstration of pseudomyxoma peritonei and moderate bilateral hydroureteronephrosis.    Hyperenhancement of the right peripheral zone of the prostate gland, possibly neoplasm. Further evaluation with MRI prostate with contrast is recommended.
[facility]  Adilene Gaona Palliative Care Attending  Contact Info: Page [phone] (including Nights/Weekends), message on Microsoft Teams (Adilene Gaona), or leave  at Palliative Office [phone] (non-urgent)     HPI:  62M w/ hx of metastatic appendiceal carcinoma with pseudomyxoma peritonei s/p cytoreductive surgery 2020 including partial gastrectomy, cholecystectomy, subtotal colectomy, diaphragm stripping, omentectomy, splenectomy s/p FOLFOX x 6 months and HIPEC and now s/p PIPAC x 8 most recently 6/3, who presents with abdominal pain and nausea x1 day. Patient was admitted 12/11 with an SBO and said the pain felt the same as his last obstruction, so he came to the ED. Last gas and BM yesterday, endorses nausea with a few episodes of emesis. Workup in ED c/w malignant SBO. Surgery was called to evaluate. (28 Dec 2022 05:20)    PERTINENT PM/SXH:   HLD (hyperlipidemia)  BPH (benign prostatic hyperplasia)  Neuropathic pain  UTI (urinary tract infection)  Appendix carcinoma  UTI (urinary tract infection)  Anxiety  Cancer of appendix  Malignant neoplasm of appendix  GERD (gastroesophageal reflux disease)    History of undescended testicle  Abdominal mass  History of abdominal paracentesis  H/O colectomy  Malignant neoplasm of appendix    FAMILY HISTORY:  FH: diabetes mellitus (Father)  FH: HTN (hypertension) (Mother)    SOCIAL HISTORY: Retired    Significant other/partner[x ]  Children[x ]  Latter-day/Spirituality:  Substance hx:  [ ]   Tobacco hx:  [ ]   Alcohol hx: [ ]   Home Opioid hx:  [ ] I-Stop Reference No:  [id number]  Living Situation: [x ]Home  [ ]Long term care  [ ]Rehab [ ]Other    ADVANCE DIRECTIVES:    DNR/MOLST  [ ]  Living Will  [ ]   DECISION MAKER(s): Patient   [ ] Health Care Proxy(s)  [ ] Surrogate(s)  [ ] Guardian           Name(s): Phone Number(s):    BASELINE (I)ADL(s) (prior to admission):  Avery: [x ]Total  [ ] Moderate [ ]Dependent    Allergies    barium sulfate (Unknown)    Intolerances    MEDICATIONS  (STANDING):  dexAMETHasone  Injectable 4 milliGRAM(s) IV Push every 12 hours  enoxaparin Injectable 40 milliGRAM(s) SubCutaneous every 24 hours  escitalopram 5 milliGRAM(s) Oral daily  metoclopramide Injectable 10 milliGRAM(s) IV Push every 8 hours  octreotide  Injectable 100 MICROGram(s) SubCutaneous every 8 hours  pantoprazole  Injectable 40 milliGRAM(s) IV Push every 24 hours  tamsulosin 0.4 milliGRAM(s) Oral at bedtime    MEDICATIONS  (PRN):    PRESENT SYMPTOMS: [ ]Unable to self-report  [ ] CPOT [ ] PAINADs [ ] RDOS  Source if other than patient:  [ ]Family   [ ]Team     Pain: [ ]yes [x ]no- no pain at time of visit, however he did share that when he does have obstruction, patient endorses severe abdominal pain   QOL impact -   Location -                    Aggravating factors -  Quality -  Radiation -  Timing-  Severity (0-10 scale):  Minimal acceptable level (0-10 scale):     CPOT:    https://www.[facility].org/getattachment/[email or web address]/Critical-Care-Pain-Observation-Tool-(CPOT)    Dyspnea:                           [ ]Mild [ ]Moderate [ ]Severe  Anxiety:                             [ ]Mild [ ]Moderate [ ]Severe  Fatigue:                             [ ]Mild [ ]Moderate [ ]Severe  Nausea:                             [ ]Mild [ ]Moderate [ ]Severe  Loss of appetite:              [ ]Mild [ ]Moderate [ ]Severe  Constipation:                    [ ]Mild [ ]Moderate [ ]Severe    Other Symptoms:  [ ]All other review of systems negative     PCSSQ[Palliative Care Spiritual Screening Question]   Severity (0-10):  Chaplaincy Referral: [ ] yes [ ] refused [ ] following [x ] deferred     Caregiver Wallace? : [ ] yes [ x] no [ ] Deferred [ ] Declined             Social work referral [ ] Patient & Family Centered Care Referral [ ]  Anticipatory Grief present?:  [ x] yes [ ] no  [ ] Deferred                  Social work referral [ x] Patient & Family Centered Care Referral [ ]    PHYSICAL EXAM:  Vital Signs Last 24 Hrs  T(C): 36.5 (30 Dec 2022 09:00), Max: 37.2 (29 Dec 2022 20:51)  T(F): 97.7 (30 Dec 2022 09:00), Max: 98.9 (29 Dec 2022 20:51)  HR: 67 (30 Dec 2022 09:00) (43 - 67)  BP: 152/64 (30 Dec 2022 09:00) (112/57 - 152/64)  BP(mean): --  RR: 18 (30 Dec 2022 09:00) (18 - 18)  SpO2: 100% (30 Dec 2022 09:00) (98% - 100%)    Parameters below as of 30 Dec 2022 09:00  Patient On (Oxygen Delivery Method): room air     I&O's Summary    29 Dec 2022 07:01  -  30 Dec 2022 07:00  --------------------------------------------------------  IN: 750 mL / OUT: 2350 mL / NET: -1600 mL    30 Dec 2022 07:01  -  30 Dec 2022 14:14  --------------------------------------------------------  IN: 300 mL / OUT: 400 mL / NET: -100 mL      GENERAL: [ ]Cachexia  [x] frail   [ x]Alert  [x ]Oriented x 4  [ ]Lethargic  [ ]Unarousable  [x ]Verbal  [ ]Non-Verbal  Behavioral:   [ ] Anxiety  [ ] Delirium [ ] Agitation [x ] Other  HEENT:  [ x]Normal   [ ]Dry mouth   [ ]ET Tube/Trach  [ ]Oral lesions  PULMONARY:   [x ]Clear [ ]Tachypnea  [ ]Audible excessive secretions   [ ]Rhonchi        [ ]Right [ ]Left [ ]Bilateral  [ ]Crackles        [ ]Right [ ]Left [ ]Bilateral  [ ]Wheezing     [ ]Right [ ]Left [ ]Bilateral  [ ]Diminished breath sounds [ ]right [ ]left [ ]bilateral  CARDIOVASCULAR:    [ x]Regular [ ]Irregular [ ]Tachy  [ ]Earl [ ]Murmur [ ]Other  GASTROINTESTINAL:  [x ]Soft  [ ]Distended   [ ]+BS  [ x]Non tender [ ]Tender  [ ]Other [ ]PEG [ ]OGT/ NGT  Last BM: 12/29  GENITOURINARY:  [ x]Normal [ ] Incontinent   [ ]Oliguria/Anuria   [ ]Raygoza  MUSCULOSKELETAL:   [x ]Normal   [ ]Weakness  [ ]Bed/Wheelchair bound [ ]Edema  NEUROLOGIC:   [ x]No focal deficits  [ ]Cognitive impairment  [ ]Dysphagia [ ]Dysarthria [ ]Paresis [ ]Other   SKIN: Please see flowsheets   [ x]Normal  [ ]Rash  [ ]Other  [ ]Pressure ulcer(s)       Present on admission [ ]y [ ]n    CRITICAL CARE:  [ ] Shock Present  [ ]Septic [ ]Cardiogenic [ ]Neurologic [ ]Hypovolemic  [ ]  Vasopressors [ ]  Inotropes   [ ]Respiratory failure present [ ]Mechanical ventilation [ ]Non-invasive ventilatory support [ ]High flow    [ ]Acute  [ ]Chronic [ ]Hypoxic  [ ]Hypercarbic [ ]Other  [ ]Other organ failure     LABS:                        11.3   8.36  )-----------( 501      ( 30 Dec 2022 06:02 )             35.5   12-30    139  |  101  |  19  ----------------------------<  114<H>  4.0   |  29  |  1.16    Ca    9.0      30 Dec 2022 06:02  Phos  4.0     12-30  Mg     1.70     12-30          RADIOLOGY & ADDITIONAL STUDIES: < from: CT Abdomen and Pelvis w/ IV Cont (12.28.22 @ 04:28) >  THIS REPORT CONTAINS FINDINGS THAT MAY BE CRITICAL TO PATIENT CARE. The   findings were verbally communicated via telephone conference at 5:57 AM   EST on 12/28/2022 with Dr. Abebe by Dr. Ayala of [facility]. The findings were   acknowledged and understood. Additional findings were discussed by Dr. Smith with Dr. Zarate on December 28, 2022 at 8:22 AM.    < end of copied text >      PROTEIN CALORIE MALNUTRITION PRESENT: [ ]mild [x ]moderate [ ]severe [ ]underweight [ ]morbid obesity  https://www.andeal.org/vault/7410/web/files/ONC/Table_Clinical%20Characteristics%20to%20Document%20Malnutrition-White%20JV%20et%20al%202012.pdf    Height (cm): 170.2 (12-27-22 @ 22:12), 170.2 (12-15-22 @ 07:01), 170.2 (12-01-22 @ 14:40)  Weight (kg): 54.4 (12-28-22 @ 20:37), 57.4 (12-15-22 @ 05:03), 57 (12-01-22 @ 14:40)  BMI (kg/m2): 18.8 (12-28-22 @ 20:37), 19.8 (12-27-22 @ 22:12), 19.8 (12-15-22 @ 07:01)    [ ]PPSV2 < or = to 30% [ ]significant weight loss  [ ]poor nutritional intake  [ ]anasarca[ ]Artificial Nutrition      Other REFERRALS:  [ ]Hospice  [ ]Child Life  [ ]Social Work  [ ]Case management [ ]Holistic Therapy

## 2022-12-30 NOTE — PROGRESS NOTE ADULT - SUBJECTIVE AND OBJECTIVE BOX
D TEAM SURGERY DAILY PROGRESS NOTE  Patient is a 62y old  Male who presents with a chief complaint of Intestinal obstruction     (29 Dec 2022 12:35)      SUBJECTIVE:     Overnight Events: No acute events     Patient seen and evaluated at bedside on AM rounds. Patient reports pain is better. Denies flatus/BM. OOB/ambulating.   Patient otherwise denies nausea, vomiting, chest pain, shortness of breath, fever, chills.        OBJECTIVE:  Vital Signs Last 24 Hrs  T(C): 36.8 (30 Dec 2022 04:30), Max: 37.2 (29 Dec 2022 20:51)  T(F): 98.2 (30 Dec 2022 04:30), Max: 98.9 (29 Dec 2022 20:51)  HR: 54 (30 Dec 2022 04:30) (43 - 56)  BP: 112/57 (30 Dec 2022 04:30) (112/57 - 144/77)  BP(mean): --  RR: 18 (30 Dec 2022 04:30) (16 - 18)  SpO2: 98% (30 Dec 2022 04:30) (98% - 100%)    Parameters below as of 29 Dec 2022 23:54  Patient On (Oxygen Delivery Method): room air      I&O's Detail    28 Dec 2022 07:01  -  29 Dec 2022 07:00  --------------------------------------------------------  IN:    Lactated Ringers: 2875 mL  Total IN: 2875 mL    OUT:    Nasogastric/Oral tube (mL): 400 mL    Oral Fluid: 0 mL    Voided (mL): 1075 mL  Total OUT: 1475 mL    Total NET: 1400 mL      29 Dec 2022 07:01  -  30 Dec 2022 06:05  --------------------------------------------------------  IN:    Lactated Ringers: 750 mL  Total IN: 750 mL    OUT:    Nasogastric/Oral tube (mL): 100 mL    Voided (mL): 2250 mL  Total OUT: 2350 mL    Total NET: -1600 mL          STANDING  dexAMETHasone  Injectable 4 milliGRAM(s) IV Push every 12 hours  enoxaparin Injectable 40 milliGRAM(s) SubCutaneous every 24 hours  metoclopramide Injectable 10 milliGRAM(s) IV Push every 8 hours  octreotide  Injectable 100 MICROGram(s) SubCutaneous every 8 hours  pantoprazole  Injectable 40 milliGRAM(s) IV Push every 24 hours    PRN      Labs:  140  |  104  |  22  ----------------------------<  127<H>    (12-29)  4.6   |  25  |  1.22            Ca    8.9      12-29  Mg    x  Phos  x                    Physical Exam:  General: well developed, well nourished, NAD  Cardiovascular: appears well perfused   Respiratory: respirations non labored  Gastrointestinal: soft, nontender, nondistended  Extremities: FROM, warm  Neurological: A+Ox3    D TEAM SURGERY DAILY PROGRESS NOTE  Patient is a 62y old  Male who presents with a chief complaint of Intestinal obstruction     (29 Dec 2022 12:35)      SUBJECTIVE:     Overnight Events: No acute events     Patient seen and evaluated at bedside on AM rounds. Patient reports pain is better. + flatus and BM overnight OOB/ambulating.   Patient otherwise denies nausea, vomiting, chest pain, shortness of breath, fever, chills.        OBJECTIVE:  Vital Signs Last 24 Hrs  T(C): 36.8 (30 Dec 2022 04:30), Max: 37.2 (29 Dec 2022 20:51)  T(F): 98.2 (30 Dec 2022 04:30), Max: 98.9 (29 Dec 2022 20:51)  HR: 54 (30 Dec 2022 04:30) (43 - 56)  BP: 112/57 (30 Dec 2022 04:30) (112/57 - 144/77)  BP(mean): --  RR: 18 (30 Dec 2022 04:30) (16 - 18)  SpO2: 98% (30 Dec 2022 04:30) (98% - 100%)    Parameters below as of 29 Dec 2022 23:54  Patient On (Oxygen Delivery Method): room air      I&O's Detail    28 Dec 2022 07:01  -  29 Dec 2022 07:00  --------------------------------------------------------  IN:    Lactated Ringers: 2875 mL  Total IN: 2875 mL    OUT:    Nasogastric/Oral tube (mL): 400 mL    Oral Fluid: 0 mL    Voided (mL): 1075 mL  Total OUT: 1475 mL    Total NET: 1400 mL      29 Dec 2022 07:01  -  30 Dec 2022 06:05  --------------------------------------------------------  IN:    Lactated Ringers: 750 mL  Total IN: 750 mL    OUT:    Nasogastric/Oral tube (mL): 100 mL    Voided (mL): 2250 mL  Total OUT: 2350 mL    Total NET: -1600 mL          STANDING  dexAMETHasone  Injectable 4 milliGRAM(s) IV Push every 12 hours  enoxaparin Injectable 40 milliGRAM(s) SubCutaneous every 24 hours  metoclopramide Injectable 10 milliGRAM(s) IV Push every 8 hours  octreotide  Injectable 100 MICROGram(s) SubCutaneous every 8 hours  pantoprazole  Injectable 40 milliGRAM(s) IV Push every 24 hours    PRN  LABS PENDING        Physical Exam:  General: well developed, well nourished, NAD  Cardiovascular: appears well perfused   Respiratory: respirations non labored  Gastrointestinal: soft, nontender, nondistended  Extremities: FROM, warm  Neurological: A+Ox3    D TEAM SURGERY DAILY PROGRESS NOTE  Patient is a 62y old  Male who presents with a chief complaint of Intestinal obstruction (29 Dec 2022 12:35)    INTERVAL EVENTS: Patient is here HD#2 with malignant SBO, now resolved.    SUBJECTIVE: Patient seen and evaluated at bedside on AM rounds. Patient denies abdominal pain. Tolerating clear liquids without nausea, vomiting. Passing flatus and had a bowel movement last night. Has been OOB/ambulating. Voiding spontaneously.   Patient otherwise denies fever, chills, CP, SOB.      OBJECTIVE:  Vital Signs Last 24 Hrs  T(C): 36.8 (30 Dec 2022 04:30), Max: 37.2 (29 Dec 2022 20:51)  T(F): 98.2 (30 Dec 2022 04:30), Max: 98.9 (29 Dec 2022 20:51)  HR: 54 (30 Dec 2022 04:30) (43 - 56)  BP: 112/57 (30 Dec 2022 04:30) (112/57 - 144/77)  BP(mean): --  RR: 18 (30 Dec 2022 04:30) (16 - 18)  SpO2: 98% (30 Dec 2022 04:30) (98% - 100%)    Parameters below as of 29 Dec 2022 23:54  Patient On (Oxygen Delivery Method): room air      I&O's Detail    28 Dec 2022 07:01  -  29 Dec 2022 07:00  --------------------------------------------------------  IN:    Lactated Ringers: 2875 mL  Total IN: 2875 mL    OUT:    Nasogastric/Oral tube (mL): 400 mL    Oral Fluid: 0 mL    Voided (mL): 1075 mL  Total OUT: 1475 mL    Total NET: 1400 mL      29 Dec 2022 07:01  -  30 Dec 2022 06:05  --------------------------------------------------------  IN:    Lactated Ringers: 750 mL  Total IN: 750 mL    OUT:    Nasogastric/Oral tube (mL): 100 mL    Voided (mL): 2250 mL  Total OUT: 2350 mL    Total NET: -1600 mL      STANDING  dexAMETHasone  Injectable 4 milliGRAM(s) IV Push every 12 hours  enoxaparin Injectable 40 milliGRAM(s) SubCutaneous every 24 hours  metoclopramide Injectable 10 milliGRAM(s) IV Push every 8 hours  octreotide  Injectable 100 MICROGram(s) SubCutaneous every 8 hours  pantoprazole  Injectable 40 milliGRAM(s) IV Push every 24 hours    PRN                        11.3   8.36  )-----------( 501      ( 30 Dec 2022 06:02 )             35.5   12-30    139  |  101  |  19  ----------------------------<  114<H>  4.0   |  29  |  1.16    Ca    9.0      30 Dec 2022 06:02  Phos  4.0     12-30  Mg     1.70     12-30        Physical Exam:  General: well developed, well nourished, NAD  Cardiovascular: appears well perfused   Respiratory: respirations non labored  Gastrointestinal: soft, nontender, nondistended  Extremities: FROM, warm  Neurological: A+Ox3

## 2022-12-30 NOTE — DISCHARGE NOTE PROVIDER - HOSPITAL COURSE
This patient is a 62M w/ hx of metastatic appendiceal carcinoma with pseudomyxoma peritonei s/p cytoreductive surgery 2020 including partial gastrectomy, cholecystectomy, subtotal colectomy, diaphragm stripping, omentectomy, splenectomy s/p FOLFOX x 6 months and HIPEC and now s/p PIPAC x 8 most recently 6/3, who presented to [facility] ED on 12/28/22 with abdominal pain and nausea x1 day. Patient was admitted 12/11 with an SBO and said the pain felt the same as his last obstruction, so he came to the ED. Last gas and BM 12/27, endorsed nausea with a few episodes of emesis. Workup in ED c/w malignant SBO.   CT abdomen and pelvis performed which showed the following: Persistent small bowel obstruction with a possible transition point in   the right upper abdomen. Redemonstration of pseudomyxoma peritonei and moderate bilateral hydroureteronephrosis. Hyperenhancement of the right peripheral zone of the prostate gland,   possibly neoplasm. Further evaluation with MRI prostate with contrast is recommended.    Patient admitted to surgical oncology service under the care of Dr. Mason. Patient made NPO, started on IV fluids. NGT placed for gastric decompression. Patient started on malignant SBO protocol including decadron, reglan, octreotide. Urology consulted for CT showing hyperenhancement of R peripheral zone and moderate bilateral hydronephrosis. Recommended Raygoza catheter placement if patient had elevated post-void residuals (patient did not, was voiding spontaneously with minimal PVR) as well as trending Cr. Urinalysis negative. Recommended outpatient urology follow up.   By hospital day#2 patient had return of bowel function, passing flatus. NGT clamp trial yielded minimal residual output upon return to [facility], so NGT subsequently removed. Diet advanced to clears which he tolerated well.  By hospital day#3 *************    INCOMPLETE  At this time, patient is currently ambulating, voiding, tolerating a *** diet. Pain well controlled on PO pain meds. Patient has been deemed stable for discharge home with follow up as an outpatient. This patient is a 62M w/ hx of metastatic appendiceal carcinoma with pseudomyxoma peritonei s/p cytoreductive surgery 2020 including partial gastrectomy, cholecystectomy, subtotal colectomy, diaphragm stripping, omentectomy, splenectomy s/p FOLFOX x 6 months and HIPEC and now s/p PIPAC x 8 most recently 6/3, who presented to [facility] ED on 12/28/22 with abdominal pain and nausea x1 day. Patient was admitted 12/11 with an SBO and said the pain felt the same as his last obstruction, so he came to the ED. Last gas and BM 12/27, endorsed nausea with a few episodes of emesis. Workup in ED c/w malignant SBO.   CT abdomen and pelvis performed which showed the following: Persistent small bowel obstruction with a possible transition point in   the right upper abdomen. Redemonstration of pseudomyxoma peritonei and moderate bilateral hydroureteronephrosis. Hyperenhancement of the right peripheral zone of the prostate gland,   possibly neoplasm. Further evaluation with MRI prostate with contrast is recommended.    Patient admitted to surgical oncology service under the care of Dr. Mason. Patient made NPO, started on IV fluids. NGT placed for gastric decompression. Patient started on malignant SBO protocol including decadron, reglan, octreotide. Urology consulted for CT showing hyperenhancement of R peripheral zone and moderate bilateral hydronephrosis. Recommended Raygoza catheter placement if patient had elevated post-void residuals (patient did not, was voiding spontaneously with minimal PVR) as well as trending Cr. Urinalysis negative. Recommended outpatient urology follow up.   By hospital day#2 patient had return of bowel function, passing flatus. NGT clamp trial yielded minimal residual output upon return to [facility], so NGT subsequently removed. Diet advanced to clears which he tolerated well.  By hospital day#3 patient was started on a full diet and tolerated and cleared to be discharged home  At this time, patient is currently ambulating, voiding, tolerating a full liquid diet. Patient has been deemed stable for discharge home with follow up as an outpatient.

## 2022-12-30 NOTE — DISCHARGE NOTE PROVIDER - PROVIDER TOKENS
PROVIDER:[TOKEN:[[id number]],FOLLOWUP:[Routine]] PROVIDER:[TOKEN:[[id number]],FOLLOWUP:[Routine]],PROVIDER:[TOKEN:[[id number]],FOLLOWUP:[Routine]]

## 2022-12-30 NOTE — DISCHARGE NOTE PROVIDER - NSDCCPCAREPLAN_GEN_ALL_CORE_FT
PRINCIPAL DISCHARGE DIAGNOSIS  Diagnosis: SBO (small bowel obstruction)  Assessment and Plan of Treatment: DIET: Full liquid, low fiber diet  NOTIFY YOUR SURGEON IF YOU HAVE: any fever (over 100.4 F) persistent nausea/vomiting, or if your pain is not controlled on your discharge pain medications, unable to urinate.  FOLLOW UP:  1. Please follow up with your primary care physician in one week regarding your hospitalization, bring copies of your discharge paperwork.  2. Please follow up with your surgeon, Dr. Mason. Call [phone] to make an appointment.      SECONDARY DISCHARGE DIAGNOSES  Diagnosis: Hydroureteronephrosis  Assessment and Plan of Treatment: On your CT abdomen and pelvis you were found to have bilateral hydroureteronephrosis (swelling of the kidneys and ureters) as well as hyperenhancement of R peripheral zone of the prostate gland. You were seen by the urology sevice in regards to this finding.  Please follow up with Urology at the [facility] regarding these findings. Located at [address] [place] [address]. Call to make an appointment. Phone: [phone]

## 2022-12-30 NOTE — DISCHARGE NOTE PROVIDER - NSDCMRMEDTOKEN_GEN_ALL_CORE_FT
escitalopram 5 mg oral tablet: 1 tab(s) orally once a day  famotidine 40 mg oral tablet: 1 tab(s) orally once a day  Ondansetron HCl - 4 MG Oral Tablet:   pantoprazole 40 mg oral delayed release tablet: 1 tab(s) orally once a day (before a meal)  simvastatin 10 mg oral tablet: 1 tab(s) orally once a day (at bedtime)  tamsulosin 0.4 mg oral capsule: 1 cap(s) orally once a day (at bedtime)

## 2022-12-30 NOTE — CONSULT NOTE ADULT - ASSESSMENT
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 Palliative consulted for symptom management and initial rapport building.

## 2022-12-30 NOTE — PROGRESS NOTE ADULT - NUTRITIONAL ASSESSMENT
This patient has been assessed with a concern for Malnutrition and has been determined to have a diagnosis/diagnoses of Severe protein-calorie malnutrition and Underweight (BMI < 19).    This patient is being managed with:   Diet Clear Liquid-  Entered: Dec 29 2022  5:07PM

## 2022-12-30 NOTE — CONSULT NOTE ADULT - PROBLEM SELECTOR RECOMMENDATION 3
Patient states morphine has not provided adequate relief in the past. Fentanyl IV has worked previously.   > Can discharge with dilaudid concentrated solution 2mg q4h prn mod-severe pain. Please send to [facility] pharmacy and call pharmacy to ensure medication is in stock.   > bowel regimen while on opioids   > On discharge, please prescribe Naloxone spray 4 mg/0.1 ml intranasal, Spray 0.1 mL into one nostril. Repeat with second device into other nostril after 2-3 minutes if no or minimal response ([email or web address]). Please be sure the patient's pharmacy has the medication, otherwise, be sure there is a pharmacy were the patient can get the Naloxone inhaled.    Please be also sure the patient has pain medications available in his pharmacy before discharge.

## 2023-01-01 ENCOUNTER — RESULT REVIEW (OUTPATIENT)
Age: 63
End: 2023-01-01

## 2023-01-01 ENCOUNTER — APPOINTMENT (OUTPATIENT)
Dept: HEMATOLOGY ONCOLOGY | Facility: CLINIC | Age: 63
End: 2023-01-01
Payer: MEDICARE

## 2023-01-01 ENCOUNTER — TRANSCRIPTION ENCOUNTER (OUTPATIENT)
Age: 63
End: 2023-01-01

## 2023-01-01 ENCOUNTER — OUTPATIENT (OUTPATIENT)
Dept: OUTPATIENT SERVICES | Facility: HOSPITAL | Age: 63
LOS: 1 days | End: 2023-01-01
Payer: MEDICARE

## 2023-01-01 ENCOUNTER — RX RENEWAL (OUTPATIENT)
Age: 63
End: 2023-01-01

## 2023-01-01 ENCOUNTER — NON-APPOINTMENT (OUTPATIENT)
Age: 63
End: 2023-01-01

## 2023-01-01 ENCOUNTER — APPOINTMENT (OUTPATIENT)
Dept: ULTRASOUND IMAGING | Facility: CLINIC | Age: 63
End: 2023-01-01
Payer: MEDICARE

## 2023-01-01 ENCOUNTER — APPOINTMENT (OUTPATIENT)
Dept: GERIATRICS | Facility: CLINIC | Age: 63
End: 2023-01-01
Payer: MEDICARE

## 2023-01-01 ENCOUNTER — APPOINTMENT (OUTPATIENT)
Dept: CT IMAGING | Facility: CLINIC | Age: 63
End: 2023-01-01
Payer: MEDICARE

## 2023-01-01 ENCOUNTER — INPATIENT (INPATIENT)
Facility: HOSPITAL | Age: 63
LOS: 6 days | Discharge: HOME | End: 2023-02-22
Attending: SURGERY | Admitting: SURGERY
Payer: MEDICARE

## 2023-01-01 ENCOUNTER — APPOINTMENT (OUTPATIENT)
Dept: INFUSION THERAPY | Facility: CLINIC | Age: 63
End: 2023-01-01

## 2023-01-01 ENCOUNTER — OUTPATIENT (OUTPATIENT)
Dept: OUTPATIENT SERVICES | Facility: HOSPITAL | Age: 63
LOS: 1 days | Discharge: HOME | End: 2023-01-01

## 2023-01-01 ENCOUNTER — EMERGENCY (EMERGENCY)
Facility: HOSPITAL | Age: 63
LOS: 0 days | Discharge: HOME | End: 2023-05-04
Attending: EMERGENCY MEDICINE
Payer: MEDICARE

## 2023-01-01 ENCOUNTER — APPOINTMENT (OUTPATIENT)
Dept: NUCLEAR MEDICINE | Facility: CLINIC | Age: 63
End: 2023-01-01

## 2023-01-01 ENCOUNTER — APPOINTMENT (OUTPATIENT)
Dept: NEPHROLOGY | Facility: CLINIC | Age: 63
End: 2023-01-01
Payer: MEDICARE

## 2023-01-01 ENCOUNTER — APPOINTMENT (OUTPATIENT)
Dept: HEMATOLOGY ONCOLOGY | Facility: CLINIC | Age: 63
End: 2023-01-01

## 2023-01-01 ENCOUNTER — APPOINTMENT (OUTPATIENT)
Dept: GERIATRICS | Facility: CLINIC | Age: 63
End: 2023-01-01

## 2023-01-01 ENCOUNTER — APPOINTMENT (OUTPATIENT)
Dept: INFUSION THERAPY | Facility: CLINIC | Age: 63
End: 2023-01-01
Payer: MEDICARE

## 2023-01-01 ENCOUNTER — APPOINTMENT (OUTPATIENT)
Dept: SURGICAL ONCOLOGY | Facility: CLINIC | Age: 63
End: 2023-01-01
Payer: MEDICARE

## 2023-01-01 ENCOUNTER — APPOINTMENT (OUTPATIENT)
Dept: MRI IMAGING | Facility: CLINIC | Age: 63
End: 2023-01-01
Payer: MEDICARE

## 2023-01-01 ENCOUNTER — APPOINTMENT (OUTPATIENT)
Dept: UROLOGY | Facility: CLINIC | Age: 63
End: 2023-01-01

## 2023-01-01 VITALS
TEMPERATURE: 99 F | DIASTOLIC BLOOD PRESSURE: 92 MMHG | OXYGEN SATURATION: 100 % | HEART RATE: 66 BPM | SYSTOLIC BLOOD PRESSURE: 134 MMHG | RESPIRATION RATE: 17 BRPM

## 2023-01-01 VITALS
TEMPERATURE: 98 F | SYSTOLIC BLOOD PRESSURE: 136 MMHG | HEART RATE: 44 BPM | OXYGEN SATURATION: 100 % | RESPIRATION RATE: 19 BRPM | DIASTOLIC BLOOD PRESSURE: 79 MMHG

## 2023-01-01 VITALS
WEIGHT: 118.06 LBS | HEIGHT: 66 IN | HEART RATE: 60 BPM | OXYGEN SATURATION: 100 % | TEMPERATURE: 99 F | SYSTOLIC BLOOD PRESSURE: 117 MMHG | DIASTOLIC BLOOD PRESSURE: 78 MMHG | BODY MASS INDEX: 18.97 KG/M2 | RESPIRATION RATE: 18 BRPM

## 2023-01-01 VITALS
HEIGHT: 66 IN | SYSTOLIC BLOOD PRESSURE: 132 MMHG | BODY MASS INDEX: 18.49 KG/M2 | OXYGEN SATURATION: 98 % | TEMPERATURE: 98.2 F | WEIGHT: 115.06 LBS | DIASTOLIC BLOOD PRESSURE: 80 MMHG | HEART RATE: 69 BPM | RESPIRATION RATE: 18 BRPM

## 2023-01-01 VITALS
DIASTOLIC BLOOD PRESSURE: 74 MMHG | RESPIRATION RATE: 18 BRPM | SYSTOLIC BLOOD PRESSURE: 118 MMHG | HEART RATE: 82 BPM | TEMPERATURE: 98.3 F | OXYGEN SATURATION: 97 %

## 2023-01-01 VITALS
WEIGHT: 114.44 LBS | OXYGEN SATURATION: 100 % | HEIGHT: 66 IN | TEMPERATURE: 97.7 F | RESPIRATION RATE: 18 BRPM | SYSTOLIC BLOOD PRESSURE: 139 MMHG | BODY MASS INDEX: 18.39 KG/M2 | DIASTOLIC BLOOD PRESSURE: 85 MMHG | HEART RATE: 47 BPM

## 2023-01-01 VITALS
BODY MASS INDEX: 19.44 KG/M2 | HEIGHT: 66 IN | HEART RATE: 83 BPM | RESPIRATION RATE: 16 BRPM | WEIGHT: 121 LBS | SYSTOLIC BLOOD PRESSURE: 150 MMHG | DIASTOLIC BLOOD PRESSURE: 81 MMHG | OXYGEN SATURATION: 96 %

## 2023-01-01 VITALS
SYSTOLIC BLOOD PRESSURE: 122 MMHG | HEART RATE: 60 BPM | WEIGHT: 118 LBS | RESPIRATION RATE: 17 BRPM | BODY MASS INDEX: 18.96 KG/M2 | HEIGHT: 66 IN | DIASTOLIC BLOOD PRESSURE: 71 MMHG

## 2023-01-01 VITALS
OXYGEN SATURATION: 100 % | RESPIRATION RATE: 17 BRPM | TEMPERATURE: 98 F | SYSTOLIC BLOOD PRESSURE: 114 MMHG | HEART RATE: 60 BPM | DIASTOLIC BLOOD PRESSURE: 67 MMHG

## 2023-01-01 VITALS — HEIGHT: 67 IN | WEIGHT: 119.93 LBS

## 2023-01-01 DIAGNOSIS — C18.1 MALIGNANT NEOPLASM OF APPENDIX: Chronic | ICD-10-CM

## 2023-01-01 DIAGNOSIS — K21.9 GASTRO-ESOPHAGEAL REFLUX DISEASE WITHOUT ESOPHAGITIS: ICD-10-CM

## 2023-01-01 DIAGNOSIS — Z85.038 PERSONAL HISTORY OF OTHER MALIGNANT NEOPLASM OF LARGE INTESTINE: ICD-10-CM

## 2023-01-01 DIAGNOSIS — Z98.890 OTHER SPECIFIED POSTPROCEDURAL STATES: Chronic | ICD-10-CM

## 2023-01-01 DIAGNOSIS — R19.00 INTRA-ABDOMINAL AND PELVIC SWELLING, MASS AND LUMP, UNSPECIFIED SITE: Chronic | ICD-10-CM

## 2023-01-01 DIAGNOSIS — Z78.9 OTHER SPECIFIED HEALTH STATUS: ICD-10-CM

## 2023-01-01 DIAGNOSIS — N18.2 CHRONIC KIDNEY DISEASE, STAGE 2 (MILD): ICD-10-CM

## 2023-01-01 DIAGNOSIS — F41.9 ANXIETY DISORDER, UNSPECIFIED: ICD-10-CM

## 2023-01-01 DIAGNOSIS — Z87.438 PERSONAL HISTORY OF OTHER DISEASES OF MALE GENITAL ORGANS: Chronic | ICD-10-CM

## 2023-01-01 DIAGNOSIS — C78.6 SECONDARY MALIGNANT NEOPLASM OF RETROPERITONEUM AND PERITONEUM: ICD-10-CM

## 2023-01-01 DIAGNOSIS — Z90.49 ACQUIRED ABSENCE OF OTHER SPECIFIED PARTS OF DIGESTIVE TRACT: Chronic | ICD-10-CM

## 2023-01-01 DIAGNOSIS — C48.2 MALIGNANT NEOPLASM OF PERITONEUM, UNSPECIFIED: ICD-10-CM

## 2023-01-01 DIAGNOSIS — T81.49XA INFECTION FOLLOWING A PROCEDURE, OTHER SURGICAL SITE, INITIAL ENCOUNTER: ICD-10-CM

## 2023-01-01 DIAGNOSIS — D50.9 IRON DEFICIENCY ANEMIA, UNSPECIFIED: ICD-10-CM

## 2023-01-01 DIAGNOSIS — Z90.49 ACQUIRED ABSENCE OF OTHER SPECIFIED PARTS OF DIGESTIVE TRACT: ICD-10-CM

## 2023-01-01 DIAGNOSIS — R11.2 NAUSEA WITH VOMITING, UNSPECIFIED: ICD-10-CM

## 2023-01-01 DIAGNOSIS — N40.1 BENIGN PROSTATIC HYPERPLASIA WITH LOWER URINARY TRACT SYMPTOMS: ICD-10-CM

## 2023-01-01 DIAGNOSIS — C18.1 MALIGNANT NEOPLASM OF APPENDIX: ICD-10-CM

## 2023-01-01 DIAGNOSIS — C26.9 MALIGNANT NEOPLASM OF ILL-DEFINED SITES WITHIN THE DIGESTIVE SYSTEM: ICD-10-CM

## 2023-01-01 DIAGNOSIS — I26.99 OTHER PULMONARY EMBOLISM W/OUT ACUTE COR PULMONALE: ICD-10-CM

## 2023-01-01 DIAGNOSIS — R60.0 LOCALIZED EDEMA: ICD-10-CM

## 2023-01-01 DIAGNOSIS — R22.2 LOCALIZED SWELLING, MASS AND LUMP, TRUNK: ICD-10-CM

## 2023-01-01 DIAGNOSIS — Z00.00 ENCOUNTER FOR GENERAL ADULT MEDICAL EXAMINATION W/OUT ABNORMAL FINDINGS: ICD-10-CM

## 2023-01-01 DIAGNOSIS — N40.0 BENIGN PROSTATIC HYPERPLASIA WITHOUT LOWER URINARY TRACT SYMPTOMS: ICD-10-CM

## 2023-01-01 DIAGNOSIS — I82.403 ACUTE EMBOLISM AND THROMBOSIS OF UNSPECIFIED DEEP VEINS OF LOWER EXTREMITY, BILATERAL: ICD-10-CM

## 2023-01-01 DIAGNOSIS — B95.62 INFECTION FOLLOWING A PROCEDURE, OTHER SURGICAL SITE, INITIAL ENCOUNTER: ICD-10-CM

## 2023-01-01 DIAGNOSIS — Z87.440 PERSONAL HISTORY OF URINARY (TRACT) INFECTIONS: ICD-10-CM

## 2023-01-01 DIAGNOSIS — Z87.442 PERSONAL HISTORY OF URINARY CALCULI: ICD-10-CM

## 2023-01-01 DIAGNOSIS — E43 UNSPECIFIED SEVERE PROTEIN-CALORIE MALNUTRITION: ICD-10-CM

## 2023-01-01 DIAGNOSIS — Z86.39 PERSONAL HISTORY OF OTHER ENDOCRINE, NUTRITIONAL AND METABOLIC DISEASE: ICD-10-CM

## 2023-01-01 DIAGNOSIS — R11.0 NAUSEA: ICD-10-CM

## 2023-01-01 DIAGNOSIS — K31.89 OTHER DISEASES OF STOMACH AND DUODENUM: ICD-10-CM

## 2023-01-01 DIAGNOSIS — E78.5 HYPERLIPIDEMIA, UNSPECIFIED: ICD-10-CM

## 2023-01-01 DIAGNOSIS — Z01.84 ENCOUNTER FOR ANTIBODY RESPONSE EXAMINATION: ICD-10-CM

## 2023-01-01 DIAGNOSIS — Z51.5 ENCOUNTER FOR PALLIATIVE CARE: ICD-10-CM

## 2023-01-01 DIAGNOSIS — Z87.448 PERSONAL HISTORY OF OTHER DISEASES OF URINARY SYSTEM: ICD-10-CM

## 2023-01-01 DIAGNOSIS — G89.3 NEOPLASM RELATED PAIN (ACUTE) (CHRONIC): ICD-10-CM

## 2023-01-01 DIAGNOSIS — Z91.041 RADIOGRAPHIC DYE ALLERGY STATUS: ICD-10-CM

## 2023-01-01 LAB
ACANTHOCYTES BLD QL SMEAR: SLIGHT — SIGNIFICANT CHANGE UP
ALBUMIN SERPL ELPH-MCNC: 3.1 G/DL — LOW (ref 3.3–5)
ALBUMIN SERPL ELPH-MCNC: 3.2 G/DL — LOW (ref 3.3–5)
ALBUMIN SERPL ELPH-MCNC: 3.8 G/DL — SIGNIFICANT CHANGE UP (ref 3.3–5)
ALBUMIN SERPL ELPH-MCNC: 3.9 G/DL — SIGNIFICANT CHANGE UP (ref 3.3–5)
ALP SERPL-CCNC: 66 U/L — SIGNIFICANT CHANGE UP (ref 40–120)
ALP SERPL-CCNC: 71 U/L — SIGNIFICANT CHANGE UP (ref 40–120)
ALP SERPL-CCNC: 85 U/L — SIGNIFICANT CHANGE UP (ref 40–120)
ALP SERPL-CCNC: 90 U/L — SIGNIFICANT CHANGE UP (ref 40–120)
ALT FLD-CCNC: 15 U/L — SIGNIFICANT CHANGE UP (ref 10–45)
ALT FLD-CCNC: 18 U/L — SIGNIFICANT CHANGE UP (ref 10–45)
ALT FLD-CCNC: 19 U/L — SIGNIFICANT CHANGE UP (ref 4–41)
ALT FLD-CCNC: 24 U/L — SIGNIFICANT CHANGE UP (ref 4–41)
ANION GAP SERPL CALC-SCNC: 10 MMOL/L — SIGNIFICANT CHANGE UP (ref 5–17)
ANION GAP SERPL CALC-SCNC: 13 MMOL/L — SIGNIFICANT CHANGE UP (ref 7–14)
ANION GAP SERPL CALC-SCNC: 4 MMOL/L — LOW (ref 7–14)
ANION GAP SERPL CALC-SCNC: 5 MMOL/L — LOW (ref 7–14)
ANION GAP SERPL CALC-SCNC: 8 MMOL/L — SIGNIFICANT CHANGE UP (ref 7–14)
ANION GAP SERPL CALC-SCNC: 8 MMOL/L — SIGNIFICANT CHANGE UP (ref 7–14)
ANION GAP SERPL CALC-SCNC: 9 MMOL/L — SIGNIFICANT CHANGE UP (ref 5–17)
ANION GAP SERPL CALC-SCNC: 9 MMOL/L — SIGNIFICANT CHANGE UP (ref 7–14)
ANISOCYTOSIS BLD QL: SLIGHT — SIGNIFICANT CHANGE UP
APTT BLD: 26.4 SEC — LOW (ref 27–36.3)
AST SERPL-CCNC: 12 U/L — SIGNIFICANT CHANGE UP (ref 4–40)
AST SERPL-CCNC: 13 U/L — SIGNIFICANT CHANGE UP (ref 10–40)
AST SERPL-CCNC: 14 U/L — SIGNIFICANT CHANGE UP (ref 4–40)
AST SERPL-CCNC: 19 U/L — SIGNIFICANT CHANGE UP (ref 10–40)
BASOPHILS # BLD AUTO: 0 K/UL — SIGNIFICANT CHANGE UP (ref 0–0.2)
BASOPHILS # BLD AUTO: 0.03 K/UL — SIGNIFICANT CHANGE UP (ref 0–0.2)
BASOPHILS # BLD AUTO: 0.04 K/UL — SIGNIFICANT CHANGE UP (ref 0–0.2)
BASOPHILS # BLD AUTO: 0.04 K/UL — SIGNIFICANT CHANGE UP (ref 0–0.2)
BASOPHILS # BLD AUTO: 0.05 K/UL
BASOPHILS # BLD AUTO: 0.05 K/UL
BASOPHILS # BLD AUTO: 0.05 K/UL — SIGNIFICANT CHANGE UP (ref 0–0.2)
BASOPHILS # BLD AUTO: 0.05 K/UL — SIGNIFICANT CHANGE UP (ref 0–0.2)
BASOPHILS # BLD AUTO: 0.06 K/UL — SIGNIFICANT CHANGE UP (ref 0–0.2)
BASOPHILS # BLD AUTO: 0.13 K/UL — SIGNIFICANT CHANGE UP (ref 0–0.2)
BASOPHILS NFR BLD AUTO: 0 % — SIGNIFICANT CHANGE UP (ref 0–2)
BASOPHILS NFR BLD AUTO: 0.5 %
BASOPHILS NFR BLD AUTO: 0.5 % — SIGNIFICANT CHANGE UP (ref 0–2)
BASOPHILS NFR BLD AUTO: 0.6 %
BASOPHILS NFR BLD AUTO: 0.6 % — SIGNIFICANT CHANGE UP (ref 0–2)
BASOPHILS NFR BLD AUTO: 0.7 % — SIGNIFICANT CHANGE UP (ref 0–2)
BASOPHILS NFR BLD AUTO: 0.8 % — SIGNIFICANT CHANGE UP (ref 0–2)
BASOPHILS NFR BLD AUTO: 1 % — SIGNIFICANT CHANGE UP (ref 0–2)
BASOPHILS NFR BLD AUTO: 1 % — SIGNIFICANT CHANGE UP (ref 0–2)
BASOPHILS NFR BLD AUTO: 2 % — SIGNIFICANT CHANGE UP (ref 0–2)
BILIRUB SERPL-MCNC: 0.5 MG/DL — SIGNIFICANT CHANGE UP (ref 0.2–1.2)
BILIRUB SERPL-MCNC: 0.6 MG/DL — SIGNIFICANT CHANGE UP (ref 0.2–1.2)
BILIRUB SERPL-MCNC: 0.7 MG/DL — SIGNIFICANT CHANGE UP (ref 0.2–1.2)
BILIRUB SERPL-MCNC: 0.9 MG/DL — SIGNIFICANT CHANGE UP (ref 0.2–1.2)
BLD GP AB SCN SERPL QL: NEGATIVE — SIGNIFICANT CHANGE UP
BUN SERPL-MCNC: 10 MG/DL — SIGNIFICANT CHANGE UP (ref 7–23)
BUN SERPL-MCNC: 11 MG/DL — SIGNIFICANT CHANGE UP (ref 7–23)
BUN SERPL-MCNC: 12 MG/DL — SIGNIFICANT CHANGE UP (ref 7–23)
BUN SERPL-MCNC: 15 MG/DL — SIGNIFICANT CHANGE UP (ref 7–23)
BUN SERPL-MCNC: 16 MG/DL — SIGNIFICANT CHANGE UP (ref 7–23)
BUN SERPL-MCNC: 21 MG/DL — SIGNIFICANT CHANGE UP (ref 7–23)
BUN SERPL-MCNC: 25 MG/DL — HIGH (ref 7–23)
BUN SERPL-MCNC: 26 MG/DL — HIGH (ref 7–23)
BUN SERPL-MCNC: 29 MG/DL — HIGH (ref 7–23)
BUN SERPL-MCNC: 31 MG/DL — HIGH (ref 7–23)
CA-I BLD-SCNC: 1.1 MMOL/L — LOW (ref 1.15–1.29)
CA-I BLD-SCNC: 1.12 MMOL/L — LOW (ref 1.15–1.29)
CA-I BLD-SCNC: 1.13 MMOL/L — LOW (ref 1.15–1.29)
CA-I BLD-SCNC: 1.14 MMOL/L — LOW (ref 1.15–1.29)
CA-I BLD-SCNC: 1.18 MMOL/L — SIGNIFICANT CHANGE UP (ref 1.15–1.29)
CA-I BLD-SCNC: 1.19 MMOL/L — SIGNIFICANT CHANGE UP (ref 1.15–1.29)
CALCIUM SERPL-MCNC: 8.5 MG/DL — SIGNIFICANT CHANGE UP (ref 8.4–10.5)
CALCIUM SERPL-MCNC: 8.5 MG/DL — SIGNIFICANT CHANGE UP (ref 8.4–10.5)
CALCIUM SERPL-MCNC: 8.6 MG/DL — SIGNIFICANT CHANGE UP (ref 8.4–10.5)
CALCIUM SERPL-MCNC: 8.7 MG/DL — SIGNIFICANT CHANGE UP (ref 8.4–10.5)
CALCIUM SERPL-MCNC: 8.8 MG/DL — SIGNIFICANT CHANGE UP (ref 8.4–10.5)
CALCIUM SERPL-MCNC: 9.1 MG/DL — SIGNIFICANT CHANGE UP (ref 8.4–10.5)
CALCIUM SERPL-MCNC: 9.2 MG/DL — SIGNIFICANT CHANGE UP (ref 8.4–10.5)
CALCIUM SERPL-MCNC: 9.2 MG/DL — SIGNIFICANT CHANGE UP (ref 8.4–10.5)
CEA SERPL-MCNC: 10.3 NG/ML — HIGH (ref 1–3.8)
CEA SERPL-MCNC: 10.7 NG/ML — HIGH (ref 0–3.8)
CEA SERPL-MCNC: 12.4 NG/ML — HIGH (ref 0–3.8)
CEA SERPL-MCNC: 9.6 NG/ML — HIGH (ref 1–3.8)
CHLORIDE SERPL-SCNC: 102 MMOL/L — SIGNIFICANT CHANGE UP (ref 98–107)
CHLORIDE SERPL-SCNC: 103 MMOL/L — SIGNIFICANT CHANGE UP (ref 98–107)
CHLORIDE SERPL-SCNC: 104 MMOL/L — SIGNIFICANT CHANGE UP (ref 98–107)
CHLORIDE SERPL-SCNC: 104 MMOL/L — SIGNIFICANT CHANGE UP (ref 98–107)
CHLORIDE SERPL-SCNC: 105 MMOL/L — SIGNIFICANT CHANGE UP (ref 96–108)
CHLORIDE SERPL-SCNC: 105 MMOL/L — SIGNIFICANT CHANGE UP (ref 98–107)
CHLORIDE SERPL-SCNC: 106 MMOL/L — SIGNIFICANT CHANGE UP (ref 96–108)
CHLORIDE SERPL-SCNC: 106 MMOL/L — SIGNIFICANT CHANGE UP (ref 98–107)
CO2 SERPL-SCNC: 21 MMOL/L — LOW (ref 22–31)
CO2 SERPL-SCNC: 24 MMOL/L — SIGNIFICANT CHANGE UP (ref 22–31)
CO2 SERPL-SCNC: 25 MMOL/L — SIGNIFICANT CHANGE UP (ref 22–31)
CO2 SERPL-SCNC: 26 MMOL/L — SIGNIFICANT CHANGE UP (ref 22–31)
CO2 SERPL-SCNC: 27 MMOL/L — SIGNIFICANT CHANGE UP (ref 22–31)
CO2 SERPL-SCNC: 27 MMOL/L — SIGNIFICANT CHANGE UP (ref 22–31)
CO2 SERPL-SCNC: 29 MMOL/L — SIGNIFICANT CHANGE UP (ref 22–31)
CO2 SERPL-SCNC: 29 MMOL/L — SIGNIFICANT CHANGE UP (ref 22–31)
CREAT SERPL-MCNC: 1.3 MG/DL — SIGNIFICANT CHANGE UP (ref 0.5–1.3)
CREAT SERPL-MCNC: 1.35 MG/DL — HIGH (ref 0.5–1.3)
CREAT SERPL-MCNC: 1.39 MG/DL — HIGH (ref 0.5–1.3)
CREAT SERPL-MCNC: 1.4 MG/DL — HIGH (ref 0.5–1.3)
CREAT SERPL-MCNC: 1.44 MG/DL — HIGH (ref 0.5–1.3)
CREAT SERPL-MCNC: 1.46 MG/DL — HIGH (ref 0.5–1.3)
CREAT SERPL-MCNC: 1.49 MG/DL — HIGH (ref 0.5–1.3)
CREAT SERPL-MCNC: 1.52 MG/DL — HIGH (ref 0.5–1.3)
CREAT SERPL-MCNC: 1.52 MG/DL — HIGH (ref 0.5–1.3)
CREAT SERPL-MCNC: 1.53 MG/DL — HIGH (ref 0.5–1.3)
CULTURE RESULTS: SIGNIFICANT CHANGE UP
EGFR: 51 ML/MIN/1.73M2 — LOW
EGFR: 52 ML/MIN/1.73M2 — LOW
EGFR: 54 ML/MIN/1.73M2 — LOW
EGFR: 55 ML/MIN/1.73M2 — LOW
EGFR: 56 ML/MIN/1.73M2 — LOW
EGFR: 57 ML/MIN/1.73M2 — LOW
EGFR: 59 ML/MIN/1.73M2 — LOW
EGFR: 62 ML/MIN/1.73M2 — SIGNIFICANT CHANGE UP
ELLIPTOCYTES BLD QL SMEAR: SLIGHT — SIGNIFICANT CHANGE UP
EOSINOPHIL # BLD AUTO: 0.08 K/UL
EOSINOPHIL # BLD AUTO: 0.1 K/UL
EOSINOPHIL # BLD AUTO: 0.1 K/UL — SIGNIFICANT CHANGE UP (ref 0–0.5)
EOSINOPHIL # BLD AUTO: 0.11 K/UL — SIGNIFICANT CHANGE UP (ref 0–0.5)
EOSINOPHIL # BLD AUTO: 0.13 K/UL — SIGNIFICANT CHANGE UP (ref 0–0.5)
EOSINOPHIL # BLD AUTO: 0.14 K/UL — SIGNIFICANT CHANGE UP (ref 0–0.5)
EOSINOPHIL # BLD AUTO: 0.15 K/UL — SIGNIFICANT CHANGE UP (ref 0–0.5)
EOSINOPHIL # BLD AUTO: 0.19 K/UL — SIGNIFICANT CHANGE UP (ref 0–0.5)
EOSINOPHIL # BLD AUTO: 0.2 K/UL — SIGNIFICANT CHANGE UP (ref 0–0.5)
EOSINOPHIL # BLD AUTO: 0.56 K/UL — HIGH (ref 0–0.5)
EOSINOPHIL NFR BLD AUTO: 0.8 %
EOSINOPHIL NFR BLD AUTO: 1.2 %
EOSINOPHIL NFR BLD AUTO: 1.3 % — SIGNIFICANT CHANGE UP (ref 0–6)
EOSINOPHIL NFR BLD AUTO: 2 % — SIGNIFICANT CHANGE UP (ref 0–6)
EOSINOPHIL NFR BLD AUTO: 2.1 % — SIGNIFICANT CHANGE UP (ref 0–6)
EOSINOPHIL NFR BLD AUTO: 2.5 % — SIGNIFICANT CHANGE UP (ref 0–6)
EOSINOPHIL NFR BLD AUTO: 3.1 % — SIGNIFICANT CHANGE UP (ref 0–6)
EOSINOPHIL NFR BLD AUTO: 3.2 % — SIGNIFICANT CHANGE UP (ref 0–6)
EOSINOPHIL NFR BLD AUTO: 3.2 % — SIGNIFICANT CHANGE UP (ref 0–6)
EOSINOPHIL NFR BLD AUTO: 8.7 % — HIGH (ref 0–6)
GLUCOSE BLDC GLUCOMTR-MCNC: 104 MG/DL — HIGH (ref 70–99)
GLUCOSE BLDC GLUCOMTR-MCNC: 110 MG/DL — HIGH (ref 70–99)
GLUCOSE BLDC GLUCOMTR-MCNC: 112 MG/DL — HIGH (ref 70–99)
GLUCOSE BLDC GLUCOMTR-MCNC: 113 MG/DL — HIGH (ref 70–99)
GLUCOSE BLDC GLUCOMTR-MCNC: 115 MG/DL — HIGH (ref 70–99)
GLUCOSE BLDC GLUCOMTR-MCNC: 117 MG/DL — HIGH (ref 70–99)
GLUCOSE BLDC GLUCOMTR-MCNC: 119 MG/DL — HIGH (ref 70–99)
GLUCOSE BLDC GLUCOMTR-MCNC: 139 MG/DL — HIGH (ref 70–99)
GLUCOSE BLDC GLUCOMTR-MCNC: 151 MG/DL — HIGH (ref 70–99)
GLUCOSE BLDC GLUCOMTR-MCNC: 151 MG/DL — HIGH (ref 70–99)
GLUCOSE BLDC GLUCOMTR-MCNC: 93 MG/DL — SIGNIFICANT CHANGE UP (ref 70–99)
GLUCOSE SERPL-MCNC: 103 MG/DL — HIGH (ref 70–99)
GLUCOSE SERPL-MCNC: 105 MG/DL — HIGH (ref 70–99)
GLUCOSE SERPL-MCNC: 106 MG/DL — HIGH (ref 70–99)
GLUCOSE SERPL-MCNC: 108 MG/DL — HIGH (ref 70–99)
GLUCOSE SERPL-MCNC: 127 MG/DL — HIGH (ref 70–99)
GLUCOSE SERPL-MCNC: 91 MG/DL — SIGNIFICANT CHANGE UP (ref 70–99)
GLUCOSE SERPL-MCNC: 92 MG/DL — SIGNIFICANT CHANGE UP (ref 70–99)
GLUCOSE SERPL-MCNC: 98 MG/DL — SIGNIFICANT CHANGE UP (ref 70–99)
GLUCOSE SERPL-MCNC: 99 MG/DL — SIGNIFICANT CHANGE UP (ref 70–99)
GLUCOSE SERPL-MCNC: 99 MG/DL — SIGNIFICANT CHANGE UP (ref 70–99)
HCT VFR BLD CALC: 30.7 % — LOW (ref 39–50)
HCT VFR BLD CALC: 30.7 % — LOW (ref 39–50)
HCT VFR BLD CALC: 30.8 % — LOW (ref 39–50)
HCT VFR BLD CALC: 30.9 % — LOW (ref 39–50)
HCT VFR BLD CALC: 31.7 % — LOW (ref 39–50)
HCT VFR BLD CALC: 31.7 % — LOW (ref 39–50)
HCT VFR BLD CALC: 31.8 % — LOW (ref 39–50)
HCT VFR BLD CALC: 33.1 % — LOW (ref 39–50)
HCT VFR BLD CALC: 33.2 % — LOW (ref 39–50)
HCT VFR BLD CALC: 33.8 % — LOW (ref 39–50)
HCT VFR BLD CALC: 36.9 %
HCT VFR BLD CALC: 38.1 %
HGB BLD-MCNC: 10 G/DL — LOW (ref 13–17)
HGB BLD-MCNC: 10.7 G/DL — LOW (ref 13–17)
HGB BLD-MCNC: 10.8 G/DL — LOW (ref 13–17)
HGB BLD-MCNC: 10.9 G/DL — LOW (ref 13–17)
HGB BLD-MCNC: 11.2 G/DL
HGB BLD-MCNC: 11.6 G/DL
HGB BLD-MCNC: 9.7 G/DL — LOW (ref 13–17)
HGB BLD-MCNC: 9.8 G/DL — LOW (ref 13–17)
HGB BLD-MCNC: 9.9 G/DL — LOW (ref 13–17)
HGB BLD-MCNC: 9.9 G/DL — LOW (ref 13–17)
HOWELL-JOLLY BOD BLD QL SMEAR: PRESENT — SIGNIFICANT CHANGE UP
HYPOCHROMIA BLD QL: SLIGHT — SIGNIFICANT CHANGE UP
IANC: 2.76 K/UL — SIGNIFICANT CHANGE UP (ref 1.8–7.4)
IANC: 2.9 K/UL — SIGNIFICANT CHANGE UP (ref 1.8–7.4)
IANC: 3.14 K/UL — SIGNIFICANT CHANGE UP (ref 1.8–7.4)
IANC: 3.49 K/UL — SIGNIFICANT CHANGE UP (ref 1.8–7.4)
IANC: 3.83 K/UL — SIGNIFICANT CHANGE UP (ref 1.8–7.4)
IANC: 3.91 K/UL — SIGNIFICANT CHANGE UP (ref 1.8–7.4)
IMM GRANULOCYTES NFR BLD AUTO: 0.2 %
IMM GRANULOCYTES NFR BLD AUTO: 0.2 % — SIGNIFICANT CHANGE UP (ref 0–0.9)
IMM GRANULOCYTES NFR BLD AUTO: 0.3 % — SIGNIFICANT CHANGE UP (ref 0–0.9)
IMM GRANULOCYTES NFR BLD AUTO: 0.4 % — SIGNIFICANT CHANGE UP (ref 0–0.9)
IMM GRANULOCYTES NFR BLD AUTO: 1.1 %
INR BLD: 1.06 RATIO — SIGNIFICANT CHANGE UP (ref 0.88–1.16)
LYMPHOCYTES # BLD AUTO: 0.84 K/UL — LOW (ref 1–3.3)
LYMPHOCYTES # BLD AUTO: 0.98 K/UL — LOW (ref 1–3.3)
LYMPHOCYTES # BLD AUTO: 1.22 K/UL — SIGNIFICANT CHANGE UP (ref 1–3.3)
LYMPHOCYTES # BLD AUTO: 1.52 K/UL — SIGNIFICANT CHANGE UP (ref 1–3.3)
LYMPHOCYTES # BLD AUTO: 1.54 K/UL — SIGNIFICANT CHANGE UP (ref 1–3.3)
LYMPHOCYTES # BLD AUTO: 1.62 K/UL — SIGNIFICANT CHANGE UP (ref 1–3.3)
LYMPHOCYTES # BLD AUTO: 1.62 K/UL — SIGNIFICANT CHANGE UP (ref 1–3.3)
LYMPHOCYTES # BLD AUTO: 13 % — SIGNIFICANT CHANGE UP (ref 13–44)
LYMPHOCYTES # BLD AUTO: 19.7 % — SIGNIFICANT CHANGE UP (ref 13–44)
LYMPHOCYTES # BLD AUTO: 2.05 K/UL — SIGNIFICANT CHANGE UP (ref 1–3.3)
LYMPHOCYTES # BLD AUTO: 2.29 K/UL
LYMPHOCYTES # BLD AUTO: 2.46 K/UL
LYMPHOCYTES # BLD AUTO: 20.5 % — SIGNIFICANT CHANGE UP (ref 13–44)
LYMPHOCYTES # BLD AUTO: 25 % — SIGNIFICANT CHANGE UP (ref 13–44)
LYMPHOCYTES # BLD AUTO: 25.6 % — SIGNIFICANT CHANGE UP (ref 13–44)
LYMPHOCYTES # BLD AUTO: 27.1 % — SIGNIFICANT CHANGE UP (ref 13–44)
LYMPHOCYTES # BLD AUTO: 28.6 % — SIGNIFICANT CHANGE UP (ref 13–44)
LYMPHOCYTES # BLD AUTO: 29.5 % — SIGNIFICANT CHANGE UP (ref 13–44)
LYMPHOCYTES NFR BLD AUTO: 26.1 %
LYMPHOCYTES NFR BLD AUTO: 26.5 %
MACROCYTES BLD QL: SLIGHT — SIGNIFICANT CHANGE UP
MAGNESIUM SERPL-MCNC: 1.7 MG/DL — SIGNIFICANT CHANGE UP (ref 1.6–2.6)
MAGNESIUM SERPL-MCNC: 1.9 MG/DL — SIGNIFICANT CHANGE UP (ref 1.6–2.6)
MAGNESIUM SERPL-MCNC: 1.9 MG/DL — SIGNIFICANT CHANGE UP (ref 1.6–2.6)
MAGNESIUM SERPL-MCNC: 2 MG/DL — SIGNIFICANT CHANGE UP (ref 1.6–2.6)
MAGNESIUM SERPL-MCNC: 2.1 MG/DL — SIGNIFICANT CHANGE UP (ref 1.6–2.6)
MAN DIFF?: NORMAL
MAN DIFF?: NORMAL
MCHC RBC-ENTMCNC: 28.2 PG
MCHC RBC-ENTMCNC: 28.5 PG — SIGNIFICANT CHANGE UP (ref 27–34)
MCHC RBC-ENTMCNC: 28.5 PG — SIGNIFICANT CHANGE UP (ref 27–34)
MCHC RBC-ENTMCNC: 28.6 PG
MCHC RBC-ENTMCNC: 28.6 PG — SIGNIFICANT CHANGE UP (ref 27–34)
MCHC RBC-ENTMCNC: 28.6 PG — SIGNIFICANT CHANGE UP (ref 27–34)
MCHC RBC-ENTMCNC: 28.8 PG — SIGNIFICANT CHANGE UP (ref 27–34)
MCHC RBC-ENTMCNC: 28.9 PG — SIGNIFICANT CHANGE UP (ref 27–34)
MCHC RBC-ENTMCNC: 28.9 PG — SIGNIFICANT CHANGE UP (ref 27–34)
MCHC RBC-ENTMCNC: 29.2 PG — SIGNIFICANT CHANGE UP (ref 27–34)
MCHC RBC-ENTMCNC: 29.7 PG — SIGNIFICANT CHANGE UP (ref 27–34)
MCHC RBC-ENTMCNC: 30.4 GM/DL
MCHC RBC-ENTMCNC: 30.4 GM/DL
MCHC RBC-ENTMCNC: 30.4 PG — SIGNIFICANT CHANGE UP (ref 27–34)
MCHC RBC-ENTMCNC: 31.1 GM/DL — LOW (ref 32–36)
MCHC RBC-ENTMCNC: 31.2 GM/DL — LOW (ref 32–36)
MCHC RBC-ENTMCNC: 31.5 GM/DL — LOW (ref 32–36)
MCHC RBC-ENTMCNC: 31.6 GM/DL — LOW (ref 32–36)
MCHC RBC-ENTMCNC: 31.7 GM/DL — LOW (ref 32–36)
MCHC RBC-ENTMCNC: 31.7 GM/DL — LOW (ref 32–36)
MCHC RBC-ENTMCNC: 31.8 GM/DL — LOW (ref 32–36)
MCHC RBC-ENTMCNC: 31.9 GM/DL — LOW (ref 32–36)
MCHC RBC-ENTMCNC: 32.6 GM/DL — SIGNIFICANT CHANGE UP (ref 32–36)
MCHC RBC-ENTMCNC: 32.8 GM/DL — SIGNIFICANT CHANGE UP (ref 32–36)
MCV RBC AUTO: 89.9 FL — SIGNIFICANT CHANGE UP (ref 80–100)
MCV RBC AUTO: 90.3 FL — SIGNIFICANT CHANGE UP (ref 80–100)
MCV RBC AUTO: 90.6 FL — SIGNIFICANT CHANGE UP (ref 80–100)
MCV RBC AUTO: 90.9 FL — SIGNIFICANT CHANGE UP (ref 80–100)
MCV RBC AUTO: 90.9 FL — SIGNIFICANT CHANGE UP (ref 80–100)
MCV RBC AUTO: 91.2 FL — SIGNIFICANT CHANGE UP (ref 80–100)
MCV RBC AUTO: 91.4 FL — SIGNIFICANT CHANGE UP (ref 80–100)
MCV RBC AUTO: 91.9 FL — SIGNIFICANT CHANGE UP (ref 80–100)
MCV RBC AUTO: 92.2 FL — SIGNIFICANT CHANGE UP (ref 80–100)
MCV RBC AUTO: 92.7 FL
MCV RBC AUTO: 92.7 FL — SIGNIFICANT CHANGE UP (ref 80–100)
MCV RBC AUTO: 94.1 FL
MICROCYTES BLD QL: SLIGHT — SIGNIFICANT CHANGE UP
MONOCYTES # BLD AUTO: 0.4 K/UL — SIGNIFICANT CHANGE UP (ref 0–0.9)
MONOCYTES # BLD AUTO: 0.65 K/UL — SIGNIFICANT CHANGE UP (ref 0–0.9)
MONOCYTES # BLD AUTO: 0.7 K/UL — SIGNIFICANT CHANGE UP (ref 0–0.9)
MONOCYTES # BLD AUTO: 0.7 K/UL — SIGNIFICANT CHANGE UP (ref 0–0.9)
MONOCYTES # BLD AUTO: 0.71 K/UL — SIGNIFICANT CHANGE UP (ref 0–0.9)
MONOCYTES # BLD AUTO: 0.74 K/UL — SIGNIFICANT CHANGE UP (ref 0–0.9)
MONOCYTES # BLD AUTO: 0.75 K/UL — SIGNIFICANT CHANGE UP (ref 0–0.9)
MONOCYTES # BLD AUTO: 0.76 K/UL
MONOCYTES # BLD AUTO: 0.82 K/UL — SIGNIFICANT CHANGE UP (ref 0–0.9)
MONOCYTES # BLD AUTO: 1.05 K/UL
MONOCYTES NFR BLD AUTO: 10 % — SIGNIFICANT CHANGE UP (ref 2–14)
MONOCYTES NFR BLD AUTO: 11.1 %
MONOCYTES NFR BLD AUTO: 12.4 % — SIGNIFICANT CHANGE UP (ref 2–14)
MONOCYTES NFR BLD AUTO: 12.5 % — SIGNIFICANT CHANGE UP (ref 2–14)
MONOCYTES NFR BLD AUTO: 13.2 % — SIGNIFICANT CHANGE UP (ref 2–14)
MONOCYTES NFR BLD AUTO: 13.8 % — SIGNIFICANT CHANGE UP (ref 2–14)
MONOCYTES NFR BLD AUTO: 14.6 % — HIGH (ref 2–14)
MONOCYTES NFR BLD AUTO: 6.1 % — SIGNIFICANT CHANGE UP (ref 2–14)
MONOCYTES NFR BLD AUTO: 8.8 %
MONOCYTES NFR BLD AUTO: 9.8 % — SIGNIFICANT CHANGE UP (ref 2–14)
NEUTROPHILS # BLD AUTO: 2.76 K/UL — SIGNIFICANT CHANGE UP (ref 1.8–7.4)
NEUTROPHILS # BLD AUTO: 2.9 K/UL — SIGNIFICANT CHANGE UP (ref 1.8–7.4)
NEUTROPHILS # BLD AUTO: 3.14 K/UL — SIGNIFICANT CHANGE UP (ref 1.8–7.4)
NEUTROPHILS # BLD AUTO: 3.49 K/UL — SIGNIFICANT CHANGE UP (ref 1.8–7.4)
NEUTROPHILS # BLD AUTO: 3.55 K/UL — SIGNIFICANT CHANGE UP (ref 1.8–7.4)
NEUTROPHILS # BLD AUTO: 3.91 K/UL — SIGNIFICANT CHANGE UP (ref 1.8–7.4)
NEUTROPHILS # BLD AUTO: 4.4 K/UL — SIGNIFICANT CHANGE UP (ref 1.8–7.4)
NEUTROPHILS # BLD AUTO: 4.59 K/UL — SIGNIFICANT CHANGE UP (ref 1.8–7.4)
NEUTROPHILS # BLD AUTO: 5.42 K/UL
NEUTROPHILS # BLD AUTO: 5.68 K/UL
NEUTROPHILS NFR BLD AUTO: 53.4 % — SIGNIFICANT CHANGE UP (ref 43–77)
NEUTROPHILS NFR BLD AUTO: 55 % — SIGNIFICANT CHANGE UP (ref 43–77)
NEUTROPHILS NFR BLD AUTO: 55.4 % — SIGNIFICANT CHANGE UP (ref 43–77)
NEUTROPHILS NFR BLD AUTO: 58 % — SIGNIFICANT CHANGE UP (ref 43–77)
NEUTROPHILS NFR BLD AUTO: 60.4 %
NEUTROPHILS NFR BLD AUTO: 60.6 % — SIGNIFICANT CHANGE UP (ref 43–77)
NEUTROPHILS NFR BLD AUTO: 60.7 % — SIGNIFICANT CHANGE UP (ref 43–77)
NEUTROPHILS NFR BLD AUTO: 62.7 %
NEUTROPHILS NFR BLD AUTO: 63.1 % — SIGNIFICANT CHANGE UP (ref 43–77)
NEUTROPHILS NFR BLD AUTO: 67.8 % — SIGNIFICANT CHANGE UP (ref 43–77)
NRBC # BLD: 2 /100 WBCS — HIGH (ref 0–0)
NRBC # BLD: 2 /100 — HIGH (ref 0–0)
NRBC # BLD: 3 /100 WBCS — HIGH (ref 0–0)
NRBC # BLD: 3 /100 WBCS — HIGH (ref 0–0)
NRBC # BLD: 4 /100 WBCS — HIGH (ref 0–0)
NRBC # BLD: 4 /100 WBCS — HIGH (ref 0–0)
NRBC # BLD: 5 /100 WBCS — HIGH (ref 0–0)
NRBC # BLD: SIGNIFICANT CHANGE UP /100 WBCS (ref 0–0)
NRBC # FLD: 0.13 K/UL — HIGH (ref 0–0)
NRBC # FLD: 0.18 K/UL — HIGH (ref 0–0)
NRBC # FLD: 0.18 K/UL — HIGH (ref 0–0)
NRBC # FLD: 0.19 K/UL — HIGH (ref 0–0)
NRBC # FLD: 0.23 K/UL — HIGH (ref 0–0)
OVALOCYTES BLD QL SMEAR: SLIGHT — SIGNIFICANT CHANGE UP
PHOSPHATE SERPL-MCNC: 2.9 MG/DL — SIGNIFICANT CHANGE UP (ref 2.5–4.5)
PHOSPHATE SERPL-MCNC: 3 MG/DL — SIGNIFICANT CHANGE UP (ref 2.5–4.5)
PHOSPHATE SERPL-MCNC: 3.1 MG/DL — SIGNIFICANT CHANGE UP (ref 2.5–4.5)
PHOSPHATE SERPL-MCNC: 3.2 MG/DL — SIGNIFICANT CHANGE UP (ref 2.5–4.5)
PHOSPHATE SERPL-MCNC: 3.3 MG/DL — SIGNIFICANT CHANGE UP (ref 2.5–4.5)
PHOSPHATE SERPL-MCNC: 3.4 MG/DL — SIGNIFICANT CHANGE UP (ref 2.5–4.5)
PHOSPHATE SERPL-MCNC: 3.7 MG/DL — SIGNIFICANT CHANGE UP (ref 2.5–4.5)
PLAT MORPH BLD: NORMAL — SIGNIFICANT CHANGE UP
PLATELET # BLD AUTO: 317 K/UL — SIGNIFICANT CHANGE UP (ref 150–400)
PLATELET # BLD AUTO: 324 K/UL — SIGNIFICANT CHANGE UP (ref 150–400)
PLATELET # BLD AUTO: 328 K/UL — SIGNIFICANT CHANGE UP (ref 150–400)
PLATELET # BLD AUTO: 328 K/UL — SIGNIFICANT CHANGE UP (ref 150–400)
PLATELET # BLD AUTO: 333 K/UL — SIGNIFICANT CHANGE UP (ref 150–400)
PLATELET # BLD AUTO: 337 K/UL — SIGNIFICANT CHANGE UP (ref 150–400)
PLATELET # BLD AUTO: 344 K/UL — SIGNIFICANT CHANGE UP (ref 150–400)
PLATELET # BLD AUTO: 352 K/UL — SIGNIFICANT CHANGE UP (ref 150–400)
PLATELET # BLD AUTO: 361 K/UL — SIGNIFICANT CHANGE UP (ref 150–400)
PLATELET # BLD AUTO: 376 K/UL — SIGNIFICANT CHANGE UP (ref 150–400)
PLATELET # BLD AUTO: 409 K/UL
PLATELET # BLD AUTO: 450 K/UL
POIKILOCYTOSIS BLD QL AUTO: SLIGHT — SIGNIFICANT CHANGE UP
POTASSIUM SERPL-MCNC: 3.6 MMOL/L — SIGNIFICANT CHANGE UP (ref 3.5–5.3)
POTASSIUM SERPL-MCNC: 3.8 MMOL/L — SIGNIFICANT CHANGE UP (ref 3.5–5.3)
POTASSIUM SERPL-MCNC: 3.9 MMOL/L — SIGNIFICANT CHANGE UP (ref 3.5–5.3)
POTASSIUM SERPL-MCNC: 3.9 MMOL/L — SIGNIFICANT CHANGE UP (ref 3.5–5.3)
POTASSIUM SERPL-MCNC: 4 MMOL/L — SIGNIFICANT CHANGE UP (ref 3.5–5.3)
POTASSIUM SERPL-MCNC: 4.1 MMOL/L — SIGNIFICANT CHANGE UP (ref 3.5–5.3)
POTASSIUM SERPL-MCNC: 5 MMOL/L — SIGNIFICANT CHANGE UP (ref 3.5–5.3)
POTASSIUM SERPL-MCNC: 5.1 MMOL/L — SIGNIFICANT CHANGE UP (ref 3.5–5.3)
POTASSIUM SERPL-SCNC: 3.6 MMOL/L — SIGNIFICANT CHANGE UP (ref 3.5–5.3)
POTASSIUM SERPL-SCNC: 3.8 MMOL/L — SIGNIFICANT CHANGE UP (ref 3.5–5.3)
POTASSIUM SERPL-SCNC: 3.9 MMOL/L — SIGNIFICANT CHANGE UP (ref 3.5–5.3)
POTASSIUM SERPL-SCNC: 3.9 MMOL/L — SIGNIFICANT CHANGE UP (ref 3.5–5.3)
POTASSIUM SERPL-SCNC: 4 MMOL/L — SIGNIFICANT CHANGE UP (ref 3.5–5.3)
POTASSIUM SERPL-SCNC: 4.1 MMOL/L — SIGNIFICANT CHANGE UP (ref 3.5–5.3)
POTASSIUM SERPL-SCNC: 5 MMOL/L — SIGNIFICANT CHANGE UP (ref 3.5–5.3)
POTASSIUM SERPL-SCNC: 5.1 MMOL/L — SIGNIFICANT CHANGE UP (ref 3.5–5.3)
PREALB SERPL-MCNC: 17 MG/DL — LOW (ref 20–40)
PROT SERPL-MCNC: 4.9 G/DL — LOW (ref 6–8.3)
PROT SERPL-MCNC: 5.2 G/DL — LOW (ref 6–8.3)
PROT SERPL-MCNC: 5.9 G/DL — LOW (ref 6–8.3)
PROT SERPL-MCNC: 6.3 G/DL — SIGNIFICANT CHANGE UP (ref 6–8.3)
PROTHROM AB SERPL-ACNC: 12.3 SEC — SIGNIFICANT CHANGE UP (ref 10.5–13.4)
RBC # BLD: 3.36 M/UL — LOW (ref 4.2–5.8)
RBC # BLD: 3.39 M/UL — LOW (ref 4.2–5.8)
RBC # BLD: 3.39 M/UL — LOW (ref 4.2–5.8)
RBC # BLD: 3.4 M/UL — LOW (ref 4.2–5.8)
RBC # BLD: 3.44 M/UL — LOW (ref 4.2–5.8)
RBC # BLD: 3.46 M/UL — LOW (ref 4.2–5.8)
RBC # BLD: 3.5 M/UL — LOW (ref 4.2–5.8)
RBC # BLD: 3.58 M/UL — LOW (ref 4.2–5.8)
RBC # BLD: 3.64 M/UL — LOW (ref 4.2–5.8)
RBC # BLD: 3.76 M/UL — LOW (ref 4.2–5.8)
RBC # BLD: 3.92 M/UL
RBC # BLD: 4.11 M/UL
RBC # FLD: 16.4 %
RBC # FLD: 17.4 % — HIGH (ref 10.3–14.5)
RBC # FLD: 18 %
RBC # FLD: 18.2 % — HIGH (ref 10.3–14.5)
RBC # FLD: 18.3 % — HIGH (ref 10.3–14.5)
RBC # FLD: 18.3 % — HIGH (ref 10.3–14.5)
RBC # FLD: 18.4 % — HIGH (ref 10.3–14.5)
RBC # FLD: 18.4 % — HIGH (ref 10.3–14.5)
RBC # FLD: 18.5 % — HIGH (ref 10.3–14.5)
RBC # FLD: 18.5 % — HIGH (ref 10.3–14.5)
RBC # FLD: 18.8 % — HIGH (ref 10.3–14.5)
RBC # FLD: 19.9 % — HIGH (ref 10.3–14.5)
RBC BLD AUTO: ABNORMAL
RETICS #: 61.3 K/UL — SIGNIFICANT CHANGE UP (ref 25–125)
RETICS/RBC NFR: 1.7 % — SIGNIFICANT CHANGE UP (ref 0.5–2.5)
RH IG SCN BLD-IMP: POSITIVE — SIGNIFICANT CHANGE UP
SARS-COV-2 RNA SPEC QL NAA+PROBE: SIGNIFICANT CHANGE UP
SCHISTOCYTES BLD QL AUTO: SLIGHT — SIGNIFICANT CHANGE UP
SODIUM SERPL-SCNC: 136 MMOL/L — SIGNIFICANT CHANGE UP (ref 135–145)
SODIUM SERPL-SCNC: 136 MMOL/L — SIGNIFICANT CHANGE UP (ref 135–145)
SODIUM SERPL-SCNC: 137 MMOL/L — SIGNIFICANT CHANGE UP (ref 135–145)
SODIUM SERPL-SCNC: 138 MMOL/L — SIGNIFICANT CHANGE UP (ref 135–145)
SODIUM SERPL-SCNC: 138 MMOL/L — SIGNIFICANT CHANGE UP (ref 135–145)
SODIUM SERPL-SCNC: 140 MMOL/L — SIGNIFICANT CHANGE UP (ref 135–145)
SODIUM SERPL-SCNC: 141 MMOL/L — SIGNIFICANT CHANGE UP (ref 135–145)
SODIUM SERPL-SCNC: 141 MMOL/L — SIGNIFICANT CHANGE UP (ref 135–145)
SPECIMEN SOURCE: SIGNIFICANT CHANGE UP
TRIGL SERPL-MCNC: 61 MG/DL — SIGNIFICANT CHANGE UP
VARIANT LYMPHS # BLD: 6 % — SIGNIFICANT CHANGE UP (ref 0–6)
WBC # BLD: 4.79 K/UL — SIGNIFICANT CHANGE UP (ref 3.8–10.5)
WBC # BLD: 5.16 K/UL — SIGNIFICANT CHANGE UP (ref 3.8–10.5)
WBC # BLD: 5.66 K/UL — SIGNIFICANT CHANGE UP (ref 3.8–10.5)
WBC # BLD: 6.01 K/UL — SIGNIFICANT CHANGE UP (ref 3.8–10.5)
WBC # BLD: 6.2 K/UL — SIGNIFICANT CHANGE UP (ref 3.8–10.5)
WBC # BLD: 6.46 K/UL — SIGNIFICANT CHANGE UP (ref 3.8–10.5)
WBC # BLD: 6.49 K/UL — SIGNIFICANT CHANGE UP (ref 3.8–10.5)
WBC # BLD: 7.09 K/UL — SIGNIFICANT CHANGE UP (ref 3.8–10.5)
WBC # BLD: 7.09 K/UL — SIGNIFICANT CHANGE UP (ref 3.8–10.5)
WBC # BLD: 7.56 K/UL — SIGNIFICANT CHANGE UP (ref 3.8–10.5)
WBC # FLD AUTO: 4.79 K/UL — SIGNIFICANT CHANGE UP (ref 3.8–10.5)
WBC # FLD AUTO: 5.16 K/UL — SIGNIFICANT CHANGE UP (ref 3.8–10.5)
WBC # FLD AUTO: 5.66 K/UL — SIGNIFICANT CHANGE UP (ref 3.8–10.5)
WBC # FLD AUTO: 6.01 K/UL — SIGNIFICANT CHANGE UP (ref 3.8–10.5)
WBC # FLD AUTO: 6.2 K/UL — SIGNIFICANT CHANGE UP (ref 3.8–10.5)
WBC # FLD AUTO: 6.46 K/UL — SIGNIFICANT CHANGE UP (ref 3.8–10.5)
WBC # FLD AUTO: 6.49 K/UL — SIGNIFICANT CHANGE UP (ref 3.8–10.5)
WBC # FLD AUTO: 7.09 K/UL — SIGNIFICANT CHANGE UP (ref 3.8–10.5)
WBC # FLD AUTO: 7.09 K/UL — SIGNIFICANT CHANGE UP (ref 3.8–10.5)
WBC # FLD AUTO: 7.56 K/UL — SIGNIFICANT CHANGE UP (ref 3.8–10.5)
WBC # FLD AUTO: 8.64 K/UL
WBC # FLD AUTO: 9.42 K/UL

## 2023-01-01 PROCEDURE — 74177 CT ABD & PELVIS W/CONTRAST: CPT | Mod: 26,MH

## 2023-01-01 PROCEDURE — 99214 OFFICE O/P EST MOD 30 MIN: CPT

## 2023-01-01 PROCEDURE — 99223 1ST HOSP IP/OBS HIGH 75: CPT | Mod: FS

## 2023-01-01 PROCEDURE — 74177 CT ABD & PELVIS W/CONTRAST: CPT | Mod: 26

## 2023-01-01 PROCEDURE — 72197 MRI PELVIS W/O & W/DYE: CPT | Mod: 26,MH

## 2023-01-01 PROCEDURE — 99232 SBSQ HOSP IP/OBS MODERATE 35: CPT | Mod: FS

## 2023-01-01 PROCEDURE — 99232 SBSQ HOSP IP/OBS MODERATE 35: CPT

## 2023-01-01 PROCEDURE — 99205 OFFICE O/P NEW HI 60 MIN: CPT | Mod: 95

## 2023-01-01 PROCEDURE — 99222 1ST HOSP IP/OBS MODERATE 55: CPT

## 2023-01-01 PROCEDURE — 93970 EXTREMITY STUDY: CPT | Mod: 26

## 2023-01-01 PROCEDURE — 78815 PET IMAGE W/CT SKULL-THIGH: CPT | Mod: 26,PS

## 2023-01-01 PROCEDURE — 99214 OFFICE O/P EST MOD 30 MIN: CPT | Mod: 95

## 2023-01-01 PROCEDURE — 99213 OFFICE O/P EST LOW 20 MIN: CPT

## 2023-01-01 PROCEDURE — 93970 EXTREMITY STUDY: CPT

## 2023-01-01 PROCEDURE — 76498 UNLISTED MR PROCEDURE: CPT

## 2023-01-01 PROCEDURE — 99204 OFFICE O/P NEW MOD 45 MIN: CPT

## 2023-01-01 PROCEDURE — 99283 EMERGENCY DEPT VISIT LOW MDM: CPT | Mod: 25

## 2023-01-01 PROCEDURE — 71045 X-RAY EXAM CHEST 1 VIEW: CPT | Mod: 26

## 2023-01-01 PROCEDURE — 76498P: CUSTOM | Mod: 26,MH

## 2023-01-01 PROCEDURE — 71260 CT THORAX DX C+: CPT | Mod: 26,MH

## 2023-01-01 PROCEDURE — 71045 X-RAY EXAM CHEST 1 VIEW: CPT

## 2023-01-01 PROCEDURE — 99215 OFFICE O/P EST HI 40 MIN: CPT

## 2023-01-01 PROCEDURE — 74177 CT ABD & PELVIS W/CONTRAST: CPT

## 2023-01-01 PROCEDURE — 99284 EMERGENCY DEPT VISIT MOD MDM: CPT

## 2023-01-01 PROCEDURE — 99443: CPT | Mod: 95

## 2023-01-01 PROCEDURE — 71260 CT THORAX DX C+: CPT

## 2023-01-01 PROCEDURE — 71260 CT THORAX DX C+: CPT | Mod: 26

## 2023-01-01 PROCEDURE — 72197 MRI PELVIS W/O & W/DYE: CPT

## 2023-01-01 RX ORDER — MAGNESIUM SULFATE 500 MG/ML
2 VIAL (ML) INJECTION ONCE
Refills: 0 | Status: COMPLETED | OUTPATIENT
Start: 2023-01-01 | End: 2023-01-01

## 2023-01-01 RX ORDER — INSULIN LISPRO 100/ML
VIAL (ML) SUBCUTANEOUS EVERY 6 HOURS
Refills: 0 | Status: DISCONTINUED | OUTPATIENT
Start: 2023-01-01 | End: 2023-01-01

## 2023-01-01 RX ORDER — ELECTROLYTE SOLUTION,INJ
1 VIAL (ML) INTRAVENOUS
Refills: 0 | Status: DISCONTINUED | OUTPATIENT
Start: 2023-01-01 | End: 2023-01-01

## 2023-01-01 RX ORDER — OLANZAPINE 5 MG/1
5 TABLET, FILM COATED ORAL
Qty: 30 | Refills: 0 | Status: ACTIVE | COMMUNITY
Start: 2023-01-01 | End: 1900-01-01

## 2023-01-01 RX ORDER — I.V. FAT EMULSION 20 G/100ML
10.4 EMULSION INTRAVENOUS
Qty: 25 | Refills: 0 | Status: DISCONTINUED | OUTPATIENT
Start: 2023-01-01 | End: 2023-01-01

## 2023-01-01 RX ORDER — METHADONE HYDROCHLORIDE 10 MG/ML
10 CONCENTRATE ORAL TWICE DAILY
Qty: 30 | Refills: 0 | Status: ACTIVE | COMMUNITY
Start: 2023-01-01 | End: 1900-01-01

## 2023-01-01 RX ORDER — FAMOTIDINE 10 MG/ML
40 INJECTION INTRAVENOUS DAILY
Refills: 0 | Status: DISCONTINUED | OUTPATIENT
Start: 2023-01-01 | End: 2023-01-01

## 2023-01-01 RX ORDER — OLAPARIB 150 MG/1
150 TABLET, FILM COATED ORAL
Qty: 120 | Refills: 3 | Status: DISCONTINUED | COMMUNITY
Start: 2022-01-01 | End: 2023-01-01

## 2023-01-01 RX ORDER — HYDROMORPHONE HYDROCHLORIDE 5 MG/5ML
1 SOLUTION ORAL
Qty: 240 | Refills: 0 | Status: ACTIVE | COMMUNITY
Start: 2023-01-01 | End: 1900-01-01

## 2023-01-01 RX ORDER — SODIUM CHLORIDE 9 MG/ML
1000 INJECTION, SOLUTION INTRAVENOUS
Refills: 0 | Status: DISCONTINUED | OUTPATIENT
Start: 2023-01-01 | End: 2023-01-01

## 2023-01-01 RX ORDER — PANTOPRAZOLE SODIUM 20 MG/1
40 TABLET, DELAYED RELEASE ORAL
Refills: 0 | Status: DISCONTINUED | OUTPATIENT
Start: 2023-01-01 | End: 2023-01-01

## 2023-01-01 RX ORDER — ENOXAPARIN SODIUM 100 MG/ML
40 INJECTION SUBCUTANEOUS EVERY 24 HOURS
Refills: 0 | Status: DISCONTINUED | OUTPATIENT
Start: 2023-01-01 | End: 2023-01-01

## 2023-01-01 RX ORDER — ONDANSETRON 4 MG/1
4 TABLET, ORALLY DISINTEGRATING ORAL
Qty: 30 | Refills: 1 | Status: ACTIVE | COMMUNITY
Start: 2023-01-01 | End: 1900-01-01

## 2023-01-01 RX ORDER — DEXTROSE MONOHYDRATE, SODIUM CHLORIDE, AND POTASSIUM CHLORIDE 50; .745; 4.5 G/1000ML; G/1000ML; G/1000ML
1000 INJECTION, SOLUTION INTRAVENOUS
Refills: 0 | Status: DISCONTINUED | OUTPATIENT
Start: 2023-01-01 | End: 2023-01-01

## 2023-01-01 RX ORDER — HYOSCYAMINE SULFATE 0.12 MG/1
0.12 TABLET ORAL 4 TIMES DAILY
Qty: 40 | Refills: 0 | Status: ACTIVE | COMMUNITY
Start: 2023-01-01 | End: 1900-01-01

## 2023-01-01 RX ORDER — TAMSULOSIN HYDROCHLORIDE 0.4 MG/1
0.4 CAPSULE ORAL AT BEDTIME
Refills: 0 | Status: DISCONTINUED | OUTPATIENT
Start: 2023-01-01 | End: 2023-01-01

## 2023-01-01 RX ORDER — OCTREOTIDE ACETATE 100 UG/ML
100 INJECTION, SOLUTION INTRAVENOUS; SUBCUTANEOUS TWICE DAILY
Qty: 3 | Refills: 0 | Status: ACTIVE | COMMUNITY
Start: 2023-01-01 | End: 1900-01-01

## 2023-01-01 RX ORDER — LORAZEPAM 0.5 MG/1
0.5 TABLET ORAL
Qty: 20 | Refills: 0 | Status: ACTIVE | COMMUNITY
Start: 2023-01-01 | End: 1900-01-01

## 2023-01-01 RX ORDER — I.V. FAT EMULSION 20 G/100ML
20.8 EMULSION INTRAVENOUS
Qty: 50 | Refills: 0 | Status: DISCONTINUED | OUTPATIENT
Start: 2023-01-01 | End: 2023-01-01

## 2023-01-01 RX ORDER — RIVAROXABAN 15 MG/1
15 TABLET, FILM COATED ORAL
Qty: 42 | Refills: 0 | Status: ACTIVE | COMMUNITY
Start: 2023-01-01 | End: 1900-01-01

## 2023-01-01 RX ORDER — ENOXAPARIN SODIUM 100 MG/ML
40 INJECTION SUBCUTANEOUS ONCE
Refills: 0 | Status: COMPLETED | OUTPATIENT
Start: 2023-01-01 | End: 2023-01-01

## 2023-01-01 RX ORDER — ESCITALOPRAM OXALATE 10 MG/1
10 TABLET ORAL
Qty: 90 | Refills: 3 | Status: ACTIVE | COMMUNITY
Start: 2023-01-01 | End: 1900-01-01

## 2023-01-01 RX ORDER — ESCITALOPRAM OXALATE 10 MG/1
5 TABLET, FILM COATED ORAL DAILY
Refills: 0 | Status: DISCONTINUED | OUTPATIENT
Start: 2023-01-01 | End: 2023-01-01

## 2023-01-01 RX ORDER — CHLORHEXIDINE GLUCONATE 213 G/1000ML
1 SOLUTION TOPICAL DAILY
Refills: 0 | Status: DISCONTINUED | OUTPATIENT
Start: 2023-01-01 | End: 2023-01-01

## 2023-01-01 RX ORDER — FAMOTIDINE 40 MG/1
40 TABLET, FILM COATED ORAL
Qty: 90 | Refills: 3 | Status: ACTIVE | COMMUNITY
Start: 2021-12-06 | End: 1900-01-01

## 2023-01-01 RX ORDER — POTASSIUM CHLORIDE 20 MEQ
10 PACKET (EA) ORAL
Refills: 0 | Status: COMPLETED | OUTPATIENT
Start: 2023-01-01 | End: 2023-01-01

## 2023-01-01 RX ORDER — APIXABAN 5 MG/1
5 TABLET, FILM COATED ORAL
Qty: 60 | Refills: 1 | Status: ACTIVE | COMMUNITY
Start: 2023-01-01 | End: 1900-01-01

## 2023-01-01 RX ORDER — OLAPARIB 150 MG/1
300 TABLET, FILM COATED ORAL
Refills: 0 | Status: DISCONTINUED | OUTPATIENT
Start: 2023-01-01 | End: 2023-01-01

## 2023-01-01 RX ORDER — METHADONE HYDROCHLORIDE 10 MG/5ML
10 SOLUTION ORAL TWICE DAILY
Qty: 60 | Refills: 0 | Status: DISCONTINUED | COMMUNITY
Start: 2023-01-01 | End: 2023-01-01

## 2023-01-01 RX ORDER — INSULIN HUMAN 100 [IU]/ML
INJECTION, SOLUTION SUBCUTANEOUS EVERY 6 HOURS
Refills: 0 | Status: DISCONTINUED | OUTPATIENT
Start: 2023-01-01 | End: 2023-01-01

## 2023-01-01 RX ORDER — HYDROMORPHONE HYDROCHLORIDE 2 MG/ML
0.5 INJECTION INTRAMUSCULAR; INTRAVENOUS; SUBCUTANEOUS EVERY 6 HOURS
Refills: 0 | Status: DISCONTINUED | OUTPATIENT
Start: 2023-01-01 | End: 2023-01-01

## 2023-01-01 RX ORDER — SIMVASTATIN 10 MG/1
10 TABLET, FILM COATED ORAL
Qty: 90 | Refills: 0 | Status: ACTIVE | COMMUNITY
Start: 2022-05-17 | End: 1900-01-01

## 2023-01-01 RX ORDER — DEXAMETHASONE 4 MG/1
4 TABLET ORAL
Qty: 40 | Refills: 0 | Status: ACTIVE | COMMUNITY
Start: 2023-01-01 | End: 1900-01-01

## 2023-01-01 RX ORDER — PANTOPRAZOLE SODIUM 20 MG/1
40 TABLET, DELAYED RELEASE ORAL DAILY
Refills: 0 | Status: DISCONTINUED | OUTPATIENT
Start: 2023-01-01 | End: 2023-01-01

## 2023-01-01 RX ORDER — SIMVASTATIN 20 MG/1
10 TABLET, FILM COATED ORAL AT BEDTIME
Refills: 0 | Status: DISCONTINUED | OUTPATIENT
Start: 2023-01-01 | End: 2023-01-01

## 2023-01-01 RX ADMIN — Medication 1 EACH: at 18:27

## 2023-01-01 RX ADMIN — SIMVASTATIN 10 MILLIGRAM(S): 20 TABLET, FILM COATED ORAL at 21:13

## 2023-01-01 RX ADMIN — I.V. FAT EMULSION 20.8 ML/HR: 20 EMULSION INTRAVENOUS at 18:27

## 2023-01-01 RX ADMIN — ESCITALOPRAM OXALATE 5 MILLIGRAM(S): 10 TABLET, FILM COATED ORAL at 13:01

## 2023-01-01 RX ADMIN — SIMVASTATIN 10 MILLIGRAM(S): 20 TABLET, FILM COATED ORAL at 22:01

## 2023-01-01 RX ADMIN — TAMSULOSIN HYDROCHLORIDE 0.4 MILLIGRAM(S): 0.4 CAPSULE ORAL at 21:38

## 2023-01-01 RX ADMIN — SIMVASTATIN 10 MILLIGRAM(S): 20 TABLET, FILM COATED ORAL at 21:38

## 2023-01-01 RX ADMIN — SIMVASTATIN 10 MILLIGRAM(S): 20 TABLET, FILM COATED ORAL at 21:31

## 2023-01-01 RX ADMIN — Medication 100 MILLIEQUIVALENT(S): at 09:56

## 2023-01-01 RX ADMIN — TAMSULOSIN HYDROCHLORIDE 0.4 MILLIGRAM(S): 0.4 CAPSULE ORAL at 21:13

## 2023-01-01 RX ADMIN — SIMVASTATIN 10 MILLIGRAM(S): 20 TABLET, FILM COATED ORAL at 21:51

## 2023-01-01 RX ADMIN — FAMOTIDINE 40 MILLIGRAM(S): 10 INJECTION INTRAVENOUS at 13:28

## 2023-01-01 RX ADMIN — OLAPARIB 300 MILLIGRAM(S): 150 TABLET, FILM COATED ORAL at 21:22

## 2023-01-01 RX ADMIN — ESCITALOPRAM OXALATE 5 MILLIGRAM(S): 10 TABLET, FILM COATED ORAL at 12:18

## 2023-01-01 RX ADMIN — TAMSULOSIN HYDROCHLORIDE 0.4 MILLIGRAM(S): 0.4 CAPSULE ORAL at 21:22

## 2023-01-01 RX ADMIN — Medication 1: at 18:30

## 2023-01-01 RX ADMIN — ESCITALOPRAM OXALATE 5 MILLIGRAM(S): 10 TABLET, FILM COATED ORAL at 13:55

## 2023-01-01 RX ADMIN — I.V. FAT EMULSION 10.4 ML/HR: 20 EMULSION INTRAVENOUS at 20:29

## 2023-01-01 RX ADMIN — ESCITALOPRAM OXALATE 5 MILLIGRAM(S): 10 TABLET, FILM COATED ORAL at 13:28

## 2023-01-01 RX ADMIN — TAMSULOSIN HYDROCHLORIDE 0.4 MILLIGRAM(S): 0.4 CAPSULE ORAL at 21:31

## 2023-01-01 RX ADMIN — OLAPARIB 300 MILLIGRAM(S): 150 TABLET, FILM COATED ORAL at 08:57

## 2023-01-01 RX ADMIN — Medication 1: at 00:25

## 2023-01-01 RX ADMIN — OLAPARIB 300 MILLIGRAM(S): 150 TABLET, FILM COATED ORAL at 10:57

## 2023-01-01 RX ADMIN — Medication 25 GRAM(S): at 09:56

## 2023-01-01 RX ADMIN — OLAPARIB 300 MILLIGRAM(S): 150 TABLET, FILM COATED ORAL at 18:27

## 2023-01-01 RX ADMIN — I.V. FAT EMULSION 10.4 ML/HR: 20 EMULSION INTRAVENOUS at 22:18

## 2023-01-01 RX ADMIN — OLAPARIB 300 MILLIGRAM(S): 150 TABLET, FILM COATED ORAL at 23:51

## 2023-01-01 RX ADMIN — OLAPARIB 300 MILLIGRAM(S): 150 TABLET, FILM COATED ORAL at 08:51

## 2023-01-01 RX ADMIN — SODIUM CHLORIDE 50 MILLILITER(S): 9 INJECTION, SOLUTION INTRAVENOUS at 06:37

## 2023-01-01 RX ADMIN — SODIUM CHLORIDE 100 MILLILITER(S): 9 INJECTION, SOLUTION INTRAVENOUS at 13:28

## 2023-01-01 RX ADMIN — SIMVASTATIN 10 MILLIGRAM(S): 20 TABLET, FILM COATED ORAL at 21:22

## 2023-01-01 RX ADMIN — I.V. FAT EMULSION 20.8 ML/HR: 20 EMULSION INTRAVENOUS at 18:50

## 2023-01-01 RX ADMIN — Medication 25 GRAM(S): at 12:18

## 2023-01-01 RX ADMIN — TAMSULOSIN HYDROCHLORIDE 0.4 MILLIGRAM(S): 0.4 CAPSULE ORAL at 21:51

## 2023-01-01 RX ADMIN — ESCITALOPRAM OXALATE 5 MILLIGRAM(S): 10 TABLET, FILM COATED ORAL at 11:45

## 2023-01-01 RX ADMIN — SODIUM CHLORIDE 84 MILLILITER(S): 9 INJECTION, SOLUTION INTRAVENOUS at 15:26

## 2023-01-01 RX ADMIN — SIMVASTATIN 10 MILLIGRAM(S): 20 TABLET, FILM COATED ORAL at 21:34

## 2023-01-01 RX ADMIN — OLAPARIB 300 MILLIGRAM(S): 150 TABLET, FILM COATED ORAL at 21:13

## 2023-01-01 RX ADMIN — ESCITALOPRAM OXALATE 5 MILLIGRAM(S): 10 TABLET, FILM COATED ORAL at 22:23

## 2023-01-01 RX ADMIN — OLAPARIB 300 MILLIGRAM(S): 150 TABLET, FILM COATED ORAL at 21:33

## 2023-01-01 RX ADMIN — Medication 1 EACH: at 19:00

## 2023-01-01 RX ADMIN — TAMSULOSIN HYDROCHLORIDE 0.4 MILLIGRAM(S): 0.4 CAPSULE ORAL at 21:34

## 2023-01-01 RX ADMIN — PANTOPRAZOLE SODIUM 40 MILLIGRAM(S): 20 TABLET, DELAYED RELEASE ORAL at 13:28

## 2023-01-01 RX ADMIN — Medication 300 UNIT(S): at 08:05

## 2023-01-01 RX ADMIN — OLAPARIB 300 MILLIGRAM(S): 150 TABLET, FILM COATED ORAL at 09:12

## 2023-01-01 RX ADMIN — OLAPARIB 300 MILLIGRAM(S): 150 TABLET, FILM COATED ORAL at 21:38

## 2023-01-01 RX ADMIN — TAMSULOSIN HYDROCHLORIDE 0.4 MILLIGRAM(S): 0.4 CAPSULE ORAL at 22:01

## 2023-01-01 RX ADMIN — OLAPARIB 300 MILLIGRAM(S): 150 TABLET, FILM COATED ORAL at 05:25

## 2023-01-01 RX ADMIN — ESCITALOPRAM OXALATE 5 MILLIGRAM(S): 10 TABLET, FILM COATED ORAL at 13:12

## 2023-01-01 RX ADMIN — I.V. FAT EMULSION 20.8 ML/HR: 20 EMULSION INTRAVENOUS at 18:19

## 2023-01-01 RX ADMIN — OLAPARIB 300 MILLIGRAM(S): 150 TABLET, FILM COATED ORAL at 09:00

## 2023-01-01 RX ADMIN — OLAPARIB 300 MILLIGRAM(S): 150 TABLET, FILM COATED ORAL at 21:31

## 2023-01-06 PROBLEM — Z51.5 ENCOUNTER FOR PALLIATIVE CARE: Chronic | Status: ACTIVE | Noted: 2022-01-01

## 2023-01-06 PROBLEM — R10.9 UNSPECIFIED ABDOMINAL PAIN: Chronic | Status: ACTIVE | Noted: 2022-01-01

## 2023-01-09 NOTE — ASSESSMENT
[FreeTextEntry1] : 63 y/o male with low grade  mucinous neoplasm of appendix s/p CRS and HIPC  5/7/20 ( Dr Herberth Pink at [place]) R2 resection, tumor was metastatic to 2/14 regional lymph nodes. S/p 12 cycles ( 6 months ) of adjuvant FOLFOX - completed  in Dec 2020.\par Recurrent disease ( ascites/ pseudomyxoma peritonei)  in less than 6 months after completing adjuvant chemotherapy. \par S/p PIPAC x 6 ( last in June 2022 ). 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 Unfortunately due to postsurgical adhesions he is no longer a candidate for PIPAC.\par \par [employer] NGS showed few mutations including NILO and ARID mutations. PDL10 and MSI stable, low TMB- not a candidate for single agent immunotherapy. \par \par Options include clinical trial with targeted therapy versus standard chemotherapy- FOLFIRI/Bevacizumab.\par Patient interested in targeted therapy . He had poor response to FOLFOX and chance for response with another standard cytotoxic chemotherapy is not high in low proliferative tumors.\par \par No clinical trials available in [place] area.\par \par He was  very interested in trying PARP inhibitor outside setting of clinical trial.\par After long discussion - decided to start olaparib. Limited data but there are  reports of activity of PRP inhibitors in GI cancers with HRD / BRCA/ NILO mutations.\par Discussed side effects of PARP inhibitors.\par \par On   olaparib ( Lynparza) 300 mg bid since 12/30/22 ( approved for Lynparza compassionate use program)\par \par Monitor CBC in 2 weeks, x 2 next monthly , CMP every 4 weeks , next PRN.\par \par Follow up scans  few months- depending on clinical course.\par Will get CEA - next labs in 2 weeks.\par \par Return visit here in 4 weeks\par \par D/w patient and his wife . \par \par \par

## 2023-01-09 NOTE — PHYSICAL EXAM
[Normal] : affect appropriate [de-identified] : lost weight  [de-identified] : no edema  [de-identified] : not distended

## 2023-01-09 NOTE — HISTORY OF PRESENT ILLNESS
[Disease: _____________________] : Disease: [unfilled] [T: ___] : T[unfilled] [N: ___] : N[unfilled] [M: ___] : M[unfilled] [de-identified] : 62 y/o male with low grade appendiceal tumor diagnosed in 2020.  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 \par \par 5/7/20 CRS and HIPEC for LAMN  ( Dr. Herberth Pink at [place] ).  Surgery included  a splenectomy and distal gastrectomy, omentectomy, cholecystectomy, subtotal colectomy and diaphragm stripping. 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  R2b resection. 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 \par \par Moved back to  . \par \par Scans 4/30/21 POD worsening ascites. S/p paracentesis\par \par Seen by medical oncology at [facility] (Dr Daniels) - no role for systemic tx at present time. \par Referred for PIPAC ( Dr Mason) \par \par 6/3/21 - PIPAC #1 \par 7/12/21 - 7/13/21 - Admitted with small bowel obstruction. Dramatically improved after 24 hours nasogastric decompression.   \par 2/10/22: PIPAC #6\par 3/3/22: Continues to travel, scuba dive, ski, lift weights. \par 4/7/22: PIPAC #7. Sons wedding was 4/30/22\par 6/3/22: PIPAC #8\par 6/20/22: CT CAP stable from March. \par Developed severe abdominal pain after the scan, with interval development of ascites. Diagnostic paracentesis with MRSA. Hospitalized X 10 days. \par 7/7/22: Improving s/p hospitalization. Has lost 10 pounds, but is not eating better.  Labs  wbc 17. platelets 1000, CEA 4.3\par 7/20/22: Doing better. Weight stabilizing. WBC 12.7\par \par 8/29/22 - CT A/P - Pseudomyxoma peritonei with low density throughout the central mesentery and scalloping of the right hepatic lobe without significant change. Tumor in the pb hepatis which obliterates the left portal vein has progressed when compared with prior imaging dating to March 2022.\par 9/2/22: CEA slightly increased to 7.7.  Per Dr. Mason patient not a candidate for further PIPAC.  Would reconsider if large volume ascites should reaccumulate. \par \par Next line chemotherapy discussed but chance for response to cytotoxic tx is low in low proliferative tumors.\par Referred for clinical trials- contacted  several centers- no trial options available. Patient was very interested in trying PARP inhibitor outside setting of trial ( potential activity based on small trials ) .\par Plan to start olaparib ( Lynparza 300 mg bid) \par \par HE was admitted [facility]  three times in NOv/ Dec 2022  for malignant SBO- resolved with conservative management. Felt not to be a surgical candidate due to his extend of abdominal disease.\par Lost weight but now trying to maintain caloric intake on low residue diet . \par Started Lynparza 300 mg bid on 12//30/22 Tolerating OK \par \par  \par \par  [de-identified] : low grade appendiceal mucinous neoplasm LAMN [de-identified] : [facility] ( done in May 2021-  tumor tissue from May 2020:  PDL1 0 MS stable  TMB 3 muts/Mb  NILO     [id number]  GNAS R201C  and SOX9 H396f  [de-identified] : On Lynparza since Dec 30, 2022 Tolerating OK.  No n/v. No diarrhea. \par To see urology for abnormal Prostate on imaging. Bilat mild hydronephrosis on imaging- chronic for years

## 2023-01-09 NOTE — REVIEW OF SYSTEMS
[Patient Intake Form Reviewed] : Patient intake form was reviewed [Fatigue] : fatigue [Recent Change In Weight] : ~T no recent weight change [Negative] : Allergic/Immunologic [FreeTextEntry7] : per HPI  [de-identified] : chronic neuropathy  form previous chemotx

## 2023-01-09 NOTE — RESULTS/DATA
[FreeTextEntry1] : reviewed recent labs  1/4/22 WBC 8.6  plts 450  Hgb 11.6  \par CEA 7.9  Dec 2022

## 2023-01-12 NOTE — ASU PREOP CHECKLIST - SELECT TESTS ORDERED
Pt settled in room.  Denied pain or other needs at this time.  A&O x4.  No new concerns or symptoms at this time.  MILEY Galdamez.   BMP/CBC/Type and Cross/Type and Screen/COVID-19

## 2023-02-06 NOTE — REVIEW OF SYSTEMS
[Patient Intake Form Reviewed] : Patient intake form was reviewed [Fatigue] : no fatigue [Recent Change In Weight] : ~T recent weight change [Negative] : Allergic/Immunologic [FreeTextEntry7] : per HPI  [de-identified] : chronic neuropathy  form previous chemotx no change

## 2023-02-06 NOTE — ASSESSMENT
[FreeTextEntry1] : 63 y/o male with LAMN on olaparib  since Dec 30, 2022\par \par 63 y/o male with low grade  mucinous neoplasm of appendix s/p CRS and HIPC  5/7/20 ( Dr Herberth Pink at [place]) R2 resection, tumor was metastatic to 2/14 regional lymph nodes. S/p 12 cycles ( 6 months ) of adjuvant FOLFOX - completed  in Dec 2020.\par Recurrent disease ( ascites/ pseudomyxoma peritonei)  in less than 6 months after completing adjuvant chemotherapy. \par S/p PIPAC x 6 ( last in June 2022 ). 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 Unfortunately due to postsurgical adhesions he is no longer a candidate for PIPAC.\par \par [employer] NGS showed few mutations including NILO and ARID mutations. PDL10 and MSI stable, low TMB- not a candidate for single agent immunotherapy. \par \par Options include clinical trial with targeted therapy versus standard chemotherapy- FOLFIRI/Bevacizumab.\par Patient interested in targeted therapy . He had poor response to FOLFOX and chance for response with another standard cytotoxic chemotherapy is not high in low proliferative tumors.\par \par No clinical trials available in [place] area.\par \par He was  very interested in trying PARP inhibitor outside setting of clinical trial.\par After long discussion - decided to start olaparib. Limited data but there are  reports of activity of PRP inhibitors in GI cancers with HRD / BRCA/ NILO mutations.\par Discussed side effects of PARP inhibitors.\par \par On   olaparib ( Lynparza) 300 mg bid since 12/30/22 ( approved for Lynparza compassionate use program)\par \par \par Seems to be tolerating well.\par Mild anemia- expected. NRBC - ? unclear , sporadic. get retic count.\par recurrent partial SBO - responds to dietary measures.\par \par Continue Lynparza - 300 mg bid.\par \par Monitor CBC in 2 weeks, x 2 next monthly , CMP every 4 weeks , next PRN.\par \par Follow up scans  few months- depending on clinical course.\par Will get CEA - today.\par \par Return visit here in 4 weeks\par \par D/w patient and his wife . \par \par \par

## 2023-02-06 NOTE — PHYSICAL EXAM
[Restricted in physically strenuous activity but ambulatory and able to carry out work of a light or sedentary nature] : Status 1- Restricted in physically strenuous activity but ambulatory and able to carry out work of a light or sedentary nature, e.g., light house work, office work [Thin] : thin [Normal] : affect appropriate [de-identified] : looks OK  [de-identified] : no edema  [de-identified] : not distended , no overt ascites

## 2023-02-06 NOTE — HISTORY OF PRESENT ILLNESS
[Disease: _____________________] : Disease: [unfilled] [T: ___] : T[unfilled] [N: ___] : N[unfilled] [M: ___] : M[unfilled] [de-identified] : 60 y/o male with low grade appendiceal tumor diagnosed in 2020.  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 \par \par 5/7/20 CRS and HIPEC for LAMN  ( Dr. Herberth Pink at [place] ).  Surgery included  a splenectomy and distal gastrectomy, omentectomy, cholecystectomy, subtotal colectomy and diaphragm stripping. 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  R2b resection. 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 \par \par Moved back to  . \par \par Scans 4/30/21 POD worsening ascites. S/p paracentesis\par \par Seen by medical oncology at [facility] (Dr Daniels) - no role for systemic tx at present time. \par Referred for PIPAC ( Dr Mason) \par \par 6/3/21 - PIPAC #1 \par 7/12/21 - 7/13/21 - Admitted with small bowel obstruction. Dramatically improved after 24 hours nasogastric decompression.   \par 2/10/22: PIPAC #6\par 3/3/22: Continues to travel, scuba dive, ski, lift weights. \par 4/7/22: PIPAC #7. Sons wedding was 4/30/22\par 6/3/22: PIPAC #8\par 6/20/22: CT CAP stable from March. \par Developed severe abdominal pain after the scan, with interval development of ascites. Diagnostic paracentesis with MRSA. Hospitalized X 10 days. \par 7/7/22: Improving s/p hospitalization. Has lost 10 pounds, but is not eating better.  Labs  wbc 17. platelets 1000, CEA 4.3\par 7/20/22: Doing better. Weight stabilizing. WBC 12.7\par \par 8/29/22 - CT A/P - Pseudomyxoma peritonei with low density throughout the central mesentery and scalloping of the right hepatic lobe without significant change. Tumor in the pb hepatis which obliterates the left portal vein has progressed when compared with prior imaging dating to March 2022.\par 9/2/22: CEA slightly increased to 7.7.  Per Dr. Mason patient not a candidate for further PIPAC.  Would reconsider if large volume ascites should reaccumulate. \par \par Next line chemotherapy discussed but chance for response to cytotoxic tx is low in low proliferative tumors.\par Referred for clinical trials- contacted  several centers- no trial options available. Patient was very interested in trying PARP inhibitor outside setting of trial ( potential activity based on small trials ) .\par Plan to start olaparib ( Lynparza 300 mg bid) \par \par HE was admitted [facility]  three times in NOv/ Dec 2022  for malignant SBO- resolved with conservative management. Felt not to be a surgical candidate due to his extend of abdominal disease.\par Lost weight but now trying to maintain caloric intake on low residue diet . \par Started Lynparza 300 mg bid on 12//30/22 \par \par  \par \par  [de-identified] : low grade appendiceal mucinous neoplasm LAMN [de-identified] : [facility] ( done in May 2021-  tumor tissue from May 2020:  PDL1 0 MS stable  TMB 3 muts/Mb  NILO     [id number]  GNAS R201C  and SOX9 H396f  [de-identified] : On Lynparza since Dec 30, 2022 Tolerating OK.  No n/v/ diarrhea. \par HAs transient sx of recurrent partial SBO- RUQ pain and abd swelling for one-two days approximately one a week- he has to skip few meals once a week but so far always able to resume his food intake. He lost weight initially, now gaining back some ( was down to 111 lbs, now 114 lbs). he does not feel nausea after he tales Lynparza.\par \par

## 2023-02-15 NOTE — H&P ADULT - HISTORY OF PRESENT ILLNESS
General Surgery Consult  Consulting surgical team: D TEAM SURGERY  Consulting attending: Dr. Mason    HPI:  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 s/p cytoreductive surgery 2020, FOLFOX x 6 months and HIPEC, s/p PIPAC x 8, with hx of recurrent malignant SBO recently admitted 12/2023 presents as a direct admit with similar sx for 1 day, no n/v, mild diffuse ab pain with increased size in RUQ abdominal bulge that he notices whenever has had these sbo's, last passed flatus at 2pm. Vitals wnl, soft with mild distension of RUQ large bulge, nontender, labs and CT pending.     PAST MEDICAL HISTORY:  HLD (hyperlipidemia)    BPH (benign prostatic hyperplasia)    Neuropathic pain    UTI (urinary tract infection)    Appendix carcinoma    UTI (urinary tract infection)    Anxiety    Cancer of appendix    Malignant neoplasm of appendix    GERD (gastroesophageal reflux disease)    Abdominal pain    Encounter for palliative care        PAST SURGICAL HISTORY:  History of undescended testicle    Abdominal mass    History of abdominal paracentesis    H/O colectomy    Malignant neoplasm of appendix        MEDICATIONS:  dextrose 5% + sodium chloride 0.45% with potassium chloride 20 mEq/L 1000 milliLiter(s) IV Continuous <Continuous>  enoxaparin Injectable 40 milliGRAM(s) SubCutaneous once  escitalopram 5 milliGRAM(s) Oral daily  famotidine    Tablet 40 milliGRAM(s) Oral daily  pantoprazole  Injectable 40 milliGRAM(s) IV Push daily  simvastatin 10 milliGRAM(s) Oral at bedtime  tamsulosin 0.4 milliGRAM(s) Oral at bedtime      ALLERGIES:  barium sulfate (Unknown)      VITALS & I/Os:  Vital Signs Last 24 Hrs  T(C): 36.8 (15 Feb 2023 20:28), Max: 36.8 (15 Feb 2023 20:28)  T(F): 98.2 (15 Feb 2023 20:28), Max: 98.2 (15 Feb 2023 20:28)  HR: 44 (15 Feb 2023 20:28) (44 - 44)  BP: 136/79 (15 Feb 2023 20:28) (136/79 - 136/79)  BP(mean): --  RR: 19 (15 Feb 2023 20:28) (19 - 19)  SpO2: 100% (15 Feb 2023 20:28) (100% - 100%)    Parameters below as of 15 Feb 2023 20:28  Patient On (Oxygen Delivery Method): room air        I&O's Summary      PHYSICAL EXAM:  General: No acute distress  Respiratory: Nonlabored  Cardiovascular: appears well perfused  Abdominal: Soft, RUQ loss of domain? soft bulge, nontender. laparotomy incisions well-healed. No rebound or guarding. No organomegaly, no palpable mass.  Extremities: Warm    LABS:          Lactate:                  IMAGING:

## 2023-02-15 NOTE — H&P ADULT - ASSESSMENT
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    passed flatus at 2pm and patient feels bowel peristalsing through RUQ bulge    Plan:  -npo/ivf  -stat labs and AM labs  -picc and nutrition consult for tpn  -c/w home meds  -CT c/a/p with 1/2 dose of PO contrast  -hold off on malignant sbo protocol pending CT scan    Discussed with Dr. Isabella SHELTON TEAM SURGERY  [id number]

## 2023-02-15 NOTE — H&P ADULT - NSICDXPASTMEDICALHX_GEN_ALL_CORE_FT
PAST MEDICAL HISTORY:  Abdominal pain     Anxiety     Appendix carcinoma PIPAC x 7, last done 4/2022    BPH (benign prostatic hyperplasia)     Cancer of appendix     Encounter for palliative care     GERD (gastroesophageal reflux disease) pt denes recent endoscopy    HLD (hyperlipidemia)     Malignant neoplasm of appendix     Neuropathic pain hands and feet    UTI (urinary tract infection) pt denes recent infection     Statement Selected

## 2023-02-16 NOTE — DISCHARGE NOTE PROVIDER - NSDCACTIVITY_GEN_ALL_CORE
Follow Instructions Provided by your Surgical Team Showering allowed/Follow Instructions Provided by your Surgical Team

## 2023-02-16 NOTE — DISCHARGE NOTE PROVIDER - NSDCMRMEDTOKEN_GEN_ALL_CORE_FT
Dilaudid 1 mg/mL oral liquid: 2 milliliter(s) orally every 4 hours As needed for severe pain from obstruction MDD:12  escitalopram 5 mg oral tablet: 1 tab(s) orally once a day  famotidine 40 mg oral tablet: 1 tab(s) orally once a day  naloxone 4 mg/0.1 mL nasal spray: 4 milligram(s) intranasally once for overdose repeat with second device into other nostril after 2 to 3 minutes if no or minimal response MDD:1  Ondansetron HCl - 4 MG Oral Tablet:   pantoprazole 40 mg oral delayed release tablet: 1 tab(s) orally once a day (before a meal)  simvastatin 10 mg oral tablet: 1 tab(s) orally once a day (at bedtime)  tamsulosin 0.4 mg oral capsule: 1 cap(s) orally once a day (at bedtime)  Xanax 0.25 mg oral tablet: 1 tab(s) orally every 8 hours MDD:3   Dilaudid 1 mg/mL oral liquid: 2 milliliter(s) orally every 4 hours As needed for severe pain from obstruction MDD:12  escitalopram 5 mg oral tablet: 1 tab(s) orally once a day  famotidine 40 mg oral tablet: 1 tab(s) orally once a day  Ondansetron HCl - 4 MG Oral Tablet:   pantoprazole 40 mg oral delayed release tablet: 1 tab(s) orally once a day (before a meal)  simvastatin 10 mg oral tablet: 1 tab(s) orally once a day (at bedtime)  tamsulosin 0.4 mg oral capsule: 1 cap(s) orally once a day (at bedtime)  Xanax 0.25 mg oral tablet: 1 tab(s) orally every 8 hours MDD:3

## 2023-02-16 NOTE — PROGRESS NOTE ADULT - SUBJECTIVE AND OBJECTIVE BOX
Morning Surgical Progress Note  Patient is a 62y old  Male who presents with a chief complaint of sbo (15 Feb 2023 20:16)    SUBJECTIVE: Patient seen and examined at bedside with surgical team, patient without complaints.     Vital Signs Last 24 Hrs  T(C): 36.6 (16 Feb 2023 02:10), Max: 36.8 (15 Feb 2023 20:28)  T(F): 97.9 (16 Feb 2023 02:10), Max: 98.2 (15 Feb 2023 20:28)  HR: 52 (16 Feb 2023 02:10) (44 - 52)  BP: 136/73 (16 Feb 2023 02:10) (136/73 - 136/79)  RR: 19 (16 Feb 2023 02:10) (19 - 19)  SpO2: 99% (16 Feb 2023 02:10) (99% - 100%)    Parameters below as of 16 Feb 2023 02:10  Patient On (Oxygen Delivery Method): room air    I&O's Detail  Medications  MEDICATIONS  (STANDING):  dextrose 5% + sodium chloride 0.45% with potassium chloride 20 mEq/L 1000 milliLiter(s) (100 mL/Hr) IV Continuous <Continuous>  enoxaparin Injectable 40 milliGRAM(s) SubCutaneous once  escitalopram 5 milliGRAM(s) Oral daily  famotidine    Tablet 40 milliGRAM(s) Oral daily  olaparib 300 milliGRAM(s) Oral two times a day  pantoprazole  Injectable 40 milliGRAM(s) IV Push daily  simvastatin 10 milliGRAM(s) Oral at bedtime  tamsulosin 0.4 milliGRAM(s) Oral at bedtime  MEDICATIONS  (PRN):  HYDROmorphone  Injectable 0.5 milliGRAM(s) IV Push every 6 hours PRN Severe Pain (7 - 10)    Physical Exam  Constitutional: A&Ox3, NAD  Gastrointestinal: Soft nontender, nondistended  Extremities: Moving all extremities, no edema  Skin: No Rashes, Hematoma, Ecchymosis  LABS: PENDING Morning Surgical Progress Note  Patient is a 62y old  Male who presents with a chief complaint of sbo (15 Feb 2023 20:16)    SUBJECTIVE: Patient seen and examined at bedside with surgical team, patient states he had multiple sbos in the past, he states he is distended but passing gas, has no had a bm, he denies pain    Vital Signs Last 24 Hrs  T(C): 36.6 (16 Feb 2023 02:10), Max: 36.8 (15 Feb 2023 20:28)  T(F): 97.9 (16 Feb 2023 02:10), Max: 98.2 (15 Feb 2023 20:28)  HR: 52 (16 Feb 2023 02:10) (44 - 52)  BP: 136/73 (16 Feb 2023 02:10) (136/73 - 136/79)  RR: 19 (16 Feb 2023 02:10) (19 - 19)  SpO2: 99% (16 Feb 2023 02:10) (99% - 100%)    Parameters below as of 16 Feb 2023 02:10  Patient On (Oxygen Delivery Method): room air    I&O's Detail  Medications  MEDICATIONS  (STANDING):  dextrose 5% + sodium chloride 0.45% with potassium chloride 20 mEq/L 1000 milliLiter(s) (100 mL/Hr) IV Continuous <Continuous>  enoxaparin Injectable 40 milliGRAM(s) SubCutaneous once  escitalopram 5 milliGRAM(s) Oral daily  famotidine    Tablet 40 milliGRAM(s) Oral daily  olaparib 300 milliGRAM(s) Oral two times a day  pantoprazole  Injectable 40 milliGRAM(s) IV Push daily  simvastatin 10 milliGRAM(s) Oral at bedtime  tamsulosin 0.4 milliGRAM(s) Oral at bedtime  MEDICATIONS  (PRN):  HYDROmorphone  Injectable 0.5 milliGRAM(s) IV Push every 6 hours PRN Severe Pain (7 - 10)    Physical Exam  Constitutional: A&Ox3, NAD  Gastrointestinal: Soft nontender, distended, right abdominal buldge mid abdomen   Extremities: Moving all extremities, no edema  Skin: No Rashes, Hematoma, Ecchymosis  LABS: PENDING

## 2023-02-16 NOTE — CONSULT NOTE ADULT - SUBJECTIVE AND OBJECTIVE BOX
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 CT scan abd/pelvis ordered for today. Nutrition support consult called for evaluation for parenteral nutrition in view of signification weight loss (per pt, about 18 lbs in past 2-3 months) and PO intolerance. At this time, pt is resting comfortably and denies chest pain, shortness of breath, nausea or vomiting. Parenteral nutrition and PICC line placement were discussed with pt at bedside, pt expressions concerns and questions regarding PICC line placement.     ROS: Except as noted above, all other systems reviewed and are negative     Allergies  barium sulfate (Unknown)    PAST MEDICAL & SURGICAL HISTORY:  HLD (hyperlipidemia)  BPH (benign prostatic hyperplasia)  Neuropathic pain hands and feet  UTI (urinary tract infection) pt denes recent infection  Appendix carcinoma PIPAC x 7, last done 4/2022  Anxiety  Cancer of appendix  Malignant neoplasm of appendix  GERD (gastroesophageal reflux disease) pt denes recent endoscopy  Abdominal pain  Encounter for palliative care  History of undescended testicle age 7  Abdominal mass surgery 5/2020  remove mass, spleen, partial colectomy, lymph nodes, part small intestines, omentum, apendix, gall bladder, part stomach. HIPEC  History of abdominal paracentesis x3  H/O colectomy Splenectomy , Cholecystectomy   5/2020. Insertion of Mediport  Malignant neoplasm of appendix chemo 10/2021; s/p Pipac x 7 last 04/2022    FAMILY HISTORY:  FH: diabetes mellitus (Father)  FH: HTN (hypertension) (Mother)    Vital Signs Last 24 Hrs  T(C): 36.8 (16 Feb 2023 09:49), Max: 37.1 (16 Feb 2023 06:51)  T(F): 98.3 (16 Feb 2023 09:49), Max: 98.7 (16 Feb 2023 06:51)  HR: 53 (16 Feb 2023 09:49) (44 - 61)  BP: 119/71 (16 Feb 2023 09:49) (119/71 - 136/79)  RR: 18 (16 Feb 2023 09:49) (18 - 19)  SpO2: 99% (16 Feb 2023 09:49) (99% - 100%)    MEDICATIONS  (STANDING):  enoxaparin Injectable 40 milliGRAM(s) SubCutaneous once  escitalopram 5 milliGRAM(s) Oral daily  famotidine    Tablet 40 milliGRAM(s) Oral daily  olaparib 300 milliGRAM(s) Oral two times a day  pantoprazole  Injectable 40 milliGRAM(s) IV Push daily  potassium chloride  10 mEq/100 mL IVPB 10 milliEquivalent(s) IV Intermittent every 1 hour  simvastatin 10 milliGRAM(s) Oral at bedtime  tamsulosin 0.4 milliGRAM(s) Oral at bedtime    MEDICATIONS  (PRN):  HYDROmorphone  Injectable 0.5 milliGRAM(s) IV Push every 6 hours PRN Severe Pain (7 - 10)    I&O's Detail    16 Feb 2023 07:01  -  16 Feb 2023 10:45  --------------------------------------------------------  IN:    dextrose 5% + sodium chloride 0.45% w/ Additives: 400 mL    IV PiggyBack: 200 mL  Total IN: 600 mL    OUT:    Voided (mL): 200 mL  Total OUT: 200 mL    Total NET: 400 mL    Daily Height in cm: 170.18 (16 Feb 2023 05:56)      Drug Dosing Weight  Height (cm): 170.2 (16 Feb 2023 05:56)  Weight (kg): 55.9 (16 Feb 2023 05:56)  BMI (kg/m2): 19.3 (16 Feb 2023 05:56)  BSA (m2): 1.65 (16 Feb 2023 05:56)    PHYSICAL EXAM:  Constitutional: A&Ox3, NAD  Gastrointestinal: Abdomen soft nontender, distended, right abdominal bulge mid abdomen   Extremities: Moving all extremities, no edema    LABS:                        9.8    5.16  )-----------( 344      ( 16 Feb 2023 06:40 )             30.8     02-16    137  |  103  |  15  ----------------------------<  106<H>  3.6   |  25  |  1.40<H>    Ca    8.8      16 Feb 2023 06:40  Phos  3.1     02-16  Mg     1.70     02-16    PT/INR - ( 15 Feb 2023 23:31 )   PT: 12.3 sec;   INR: 1.06 ratio       PTT - ( 15 Feb 2023 23:31 )  PTT:26.4 sec    Current Weight: 55.9 kG  Height: 170.2 cm  BMI: 19.3   Current Diet: [ x ]NPO    CLINICAL INDICATORS  Severe protein calorie malnutrition in acute illness/ injury; secondary to poor appetite, weight loss >5% in 1 month and/or >7.5% in 3 months, caloric Intake <50% of nutrition needs >= 5 days, temporal wasting, severe loss of muscle mass/atrophy and loss of body fat stores.    Metabolic Requirements:  The patient will require:  ______25_______ kilocalories / kg / day  Diagnosis:  [  ] Mild protein malnutrition  [  ] Moderate protein calorie malnutrition in acute illness/ injury  [ x ] Severe protein calorie malnutrition in acute illness/ injury  [  ] Moderate protein calorie malnutrition in chronic illness  [  ] Severe protein calorie malnutrition in chronic illness    Nutritional Requirements  Carbohydrates: 1 gram = 3.4 kcal   Lipids: 1 gram = 10 kcal  Proteins: 1 gram = 4 kcal     Plan:  pending CT scan today and final read     follow up with patient if he is agreeable to PICC line placement for parenteral nutrition     discussed with surgery D team     Nutrition Support [id number]     **Greater than 50% of the encounter was spent counseling and/coordination of care on parenteral nutrition initiation and management. 50 minutes were spent face to face with the patient.

## 2023-02-16 NOTE — DIETITIAN INITIAL EVALUATION ADULT - OTHER INFO
63 y/o male with hx malignant appendix ca now presenting to be evaluated for PN. Nutrition Support Team to initiate TPN once PICC line is placed. Pt presently NPO. Pt has had great difficulty tolerating PO over the past few months with significant weight loss. He tried following a low fiber diet and limiting foods he thought were "giving him problems," however he continued to have intolerance and pain. He subsequently lost 18 lbs over the past 3 months. This 13% weight loss with < 75% of estimated nutrition needs being met during this time period present pt at severe risk for malnutrition. Pt without GI distress at present. Hypoactive bowel sounds noted. NKFA. Receiving IVF of D5W + NS 0.45% with IVPB fluids. PN management as per NST. RDN services to remain available as needed.

## 2023-02-16 NOTE — PATIENT PROFILE ADULT - FALL HARM RISK - RISK INTERVENTIONS

## 2023-02-16 NOTE — DISCHARGE NOTE PROVIDER - NSDCCPCAREPLAN_GEN_ALL_CORE_FT
PRINCIPAL DISCHARGE DIAGNOSIS  Diagnosis: Bowel obstruction  Assessment and Plan of Treatment: Continue TPN daily

## 2023-02-16 NOTE — DISCHARGE NOTE PROVIDER - DETAILS OF MALNUTRITION DIAGNOSIS/DIAGNOSES
This patient has been assessed with a concern for Malnutrition and was treated during this hospitalization for the following Nutrition diagnosis/diagnoses:     -  02/16/2023: Severe protein-calorie malnutrition

## 2023-02-16 NOTE — DIETITIAN INITIAL EVALUATION ADULT - NSICDXPASTMEDICALHX_GEN_ALL_CORE_FT
PAST MEDICAL HISTORY:  Abdominal pain     Anxiety     Appendix carcinoma PIPAC x 7, last done 4/2022    BPH (benign prostatic hyperplasia)     Cancer of appendix     Encounter for palliative care     GERD (gastroesophageal reflux disease) pt denes recent endoscopy    HLD (hyperlipidemia)     Malignant neoplasm of appendix     Neuropathic pain hands and feet    UTI (urinary tract infection) pt denes recent infection

## 2023-02-16 NOTE — DIETITIAN INITIAL EVALUATION ADULT - PERTINENT MEDS FT
MEDICATIONS  (STANDING):  dextrose 5% + lactated ringers 1000 milliLiter(s) (100 mL/Hr) IV Continuous <Continuous>  enoxaparin Injectable 40 milliGRAM(s) SubCutaneous once  escitalopram 5 milliGRAM(s) Oral daily  famotidine    Tablet 40 milliGRAM(s) Oral daily  olaparib 300 milliGRAM(s) Oral two times a day  pantoprazole  Injectable 40 milliGRAM(s) IV Push daily  potassium chloride  10 mEq/100 mL IVPB 10 milliEquivalent(s) IV Intermittent every 1 hour  simvastatin 10 milliGRAM(s) Oral at bedtime  tamsulosin 0.4 milliGRAM(s) Oral at bedtime    MEDICATIONS  (PRN):  HYDROmorphone  Injectable 0.5 milliGRAM(s) IV Push every 6 hours PRN Severe Pain (7 - 10)

## 2023-02-16 NOTE — DISCHARGE NOTE PROVIDER - CARE PROVIDER_API CALL
Florecita Msaon)  Complex General Surgical Oncology; Surgery  [address] [place] [address]  Phone: [phone]  Fax: [phone]  Follow Up Time: 1 week

## 2023-02-16 NOTE — DISCHARGE NOTE PROVIDER - NSDCFUADDAPPT_GEN_ALL_CORE_FT
Patient was advised to follow-up with their surgeon in 1-2 weeks and call the office with any questions or concerns.

## 2023-02-16 NOTE — PROGRESS NOTE ADULT - ASSESSMENT
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    Plan:  -npo/ivf  -picc and nutrition consult for tpn  -c/w home meds  -CT c/a/p with 1/2 dose of PO contrast  -hold off on malignant sbo protocol pending CT scan    Discussed with Dr. Isabella SHELTON TEAM SURGERY  [id number]    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 s/p cytoreductive surgery 2020, FOLFOX x 6 months and HIPEC, s/p PIPAC x 8, who presents with possible recurrent malignant SBO awaiting Ct scan    Plan:  -npo/ivf  -picc and nutrition consult for tpn  -c/w home meds  -CT c/a/p with 1/2 dose of PO contrast going today at 1030  -hem/onc consult   palliative consult  -hold off on malignant sbo protocol pending CT scan    Discussed with Dr. Isabella SHELTON TEAM SURGERY  [id number]

## 2023-02-16 NOTE — DISCHARGE NOTE PROVIDER - HOSPITAL COURSE
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 s/p cytoreductive surgery 2020, FOLFOX x 6 months and HIPEC, s/p PIPAC x 8, with hx of recurrent malignant SBO recently admitted 12/2023 who presented as a direct admit with similar sx for 1 day, no n/v, mild diffuse ab pain with increased size in RUQ abdominal bulge that he noticed whenever has had these sbo's, last passed flatus at 2pm on 2/15. 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 s/p cytoreductive surgery 2020, FOLFOX x 6 months and HIPEC, s/p PIPAC x 8, with hx of recurrent malignant SBO recently admitted 12/2023 who presented as a direct admit with similar sx for 1 day, no n/v, mild diffuse ab pain with increased size in RUQ abdominal bulge that he noticed whenever has had these sbo's, last passed flatus at 2pm on 2/15.      ****INCOMPLETE    Post op patients diet was slowly advanced as tolerated.  At this time, pt is tolerating a regular diet, ambulating and voiding.  Pt has been deemed stable for discharge at this time.      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 s/p cytoreductive surgery 2020, FOLFOX x 6 months and HIPEC, s/p PIPAC x 8, with hx of recurrent malignant SBO recently admitted 12/2023 who presented as a direct admit with similar sx for 1 day, no n/v, mild diffuse ab pain with increased size in RUQ abdominal bulge that he noticed whenever has had these sbo's, last passed flatus at 2pm on 2/15.          Post op patients diet was slowly advanced as tolerated.  At this time, pt is tolerating a regular diet, ambulating and voiding.  Pt has been deemed stable for discharge at this time.      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 s/p cytoreductive surgery 2020, FOLFOX x 6 months and HIPEC, s/p PIPAC x 8, with hx of recurrent malignant SBO recently admitted 12/2023 who presented as a direct admit with similar sx for 1 day, no n/v, mild diffuse ab pain with increased size in RUQ abdominal bulge that he noticed whenever has had these sbo's, last passed flatus at 2pm on 2/15.    2/16 Nutrition consulted to PICC line and TPN, patient refused PICC line, insisted on using mediport, cultures sent from [facility] prior to initiation of TPN. CT scan performed showed dilated loops of SB in right abdomen with narrowing c/w malignant SBO, however pt with bowel function.   2/17 Heme/Onc consulted, recommend holding Lynzana while pt admitted, symptom control of malignant SBO, Patient to followup with Dr. Sadie Mendoza,([facility] [place] [facility] [place]  ) upon discharge  2/18 TPN initiated by nutrition service, running over 12hrs.    Currently pt is not tolerating PO diet on TPN currently via mediport, ambulating and voiding.  Pt has been deemed stable for discharge at this time.      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 s/p cytoreductive surgery 2020, FOLFOX x 6 months and HIPEC, s/p PIPAC x 8, with hx of recurrent malignant SBO recently admitted 12/2023 who presented as a direct admit with similar sx for 1 day, no n/v, mild diffuse ab pain with increased size in RUQ abdominal bulge that he noticed whenever has had these sbo's, last passed flatus at 2pm on 2/15.    2/16 Nutrition consulted to PICC line and TPN, patient refused PICC line, insisted on using mediport, cultures sent from [facility] prior to initiation of TPN. CT scan performed showed dilated loops of SB in right abdomen with narrowing c/w malignant SBO, however pt with bowel function.   2/17 Heme/Onc consulted, recommend holding Lynzana while pt admitted, symptom control of malignant SBO, Patient to followup with Dr. Sadie Mendoza,([facility] [place] [facility] [place]  ) upon discharge  2/18 TPN initiated by nutrition support, cycled over 12hrs.    Currently pt is not tolerating PO diet on TPN currently via mediport, ambulating and voiding.  Pt has been deemed stable for discharge at this time.

## 2023-02-16 NOTE — DIETITIAN INITIAL EVALUATION ADULT - PERTINENT LABORATORY DATA
02-16    137  |  103  |  15  ----------------------------<  106<H>  3.6   |  25  |  1.40<H>    Ca    8.8      16 Feb 2023 06:40  Phos  3.1     02-16  Mg     1.70     02-16

## 2023-02-16 NOTE — DISCHARGE NOTE PROVIDER - NSDCFUSCHEDAPPT_GEN_ALL_CORE_FT
Sadie Mendoza  [facility] Physician [facility]  [facility]  Scheduled Appointment: 03/06/2023    [facility]  Scheduled Appointment: 03/06/2023

## 2023-02-17 NOTE — PROGRESS NOTE ADULT - SUBJECTIVE AND OBJECTIVE BOX
D TEAM Surgery Progress Note  Patient is a 62y old  Male who presents with a chief complaint of Malignant neoplasm of peritoneum (16 Feb 2023 13:24)    INTERVAL EVENTS: Patient is here HD#2 with abdominal pain 2/2 partial malignant SBO. CT scan yesterday showed dilated loops of SB in right abdomen with narrowing c/w malignant SBO, however pt with bowel function.     SUBJECTIVE: Patient seen and examined at bedside with surgical team, patient without acute complaints. Expressing concerns for his nutritional status given he hasn't eaten since Saturday. Denies abdominal pain. Passing flatus and had a bowel movement at 3am. Denies fever, chills, CP, SOB nausea, vomiting.    OBJECTIVE:    Vital Signs Last 24 Hrs  T(C): 36.7 (17 Feb 2023 05:03), Max: 36.9 (16 Feb 2023 17:01)  T(F): 98 (17 Feb 2023 05:03), Max: 98.5 (16 Feb 2023 17:01)  HR: 50 (17 Feb 2023 05:03) (47 - 57)  BP: 122/72 (17 Feb 2023 05:03) (119/71 - 133/73)  BP(mean): --  RR: 18 (17 Feb 2023 05:03) (18 - 18)  SpO2: 99% (17 Feb 2023 05:03) (90% - 100%)    Parameters below as of 17 Feb 2023 05:03  Patient On (Oxygen Delivery Method): room air    I&O's Detail    16 Feb 2023 07:01  -  17 Feb 2023 07:00  --------------------------------------------------------  IN:    dextrose 5% + lactated ringers w/ Additives: 1500 mL    dextrose 5% + sodium chloride 0.45% w/ Additives: 400 mL    IV PiggyBack: 300 mL    Oral Fluid: 600 mL  Total IN: 2800 mL    OUT:    Voided (mL): 1800 mL  Total OUT: 1800 mL    Total NET: 1000 mL      MEDICATIONS  (STANDING):  dextrose 5% + lactated ringers 1000 milliLiter(s) (100 mL/Hr) IV Continuous <Continuous>  escitalopram 5 milliGRAM(s) Oral daily  famotidine    Tablet 40 milliGRAM(s) Oral daily  olaparib 300 milliGRAM(s) Oral two times a day  pantoprazole  Injectable 40 milliGRAM(s) IV Push daily  simvastatin 10 milliGRAM(s) Oral at bedtime  tamsulosin 0.4 milliGRAM(s) Oral at bedtime    MEDICATIONS  (PRN):  HYDROmorphone  Injectable 0.5 milliGRAM(s) IV Push every 6 hours PRN Severe Pain (7 - 10)      PHYSICAL EXAM:  Constitutional: A&Ox3, NAD  Respiratory: Unlabored breathing  Abdomen: Soft, nondistended, NTTP. No rebound or guarding.  Extremities: WWP, CLARK spontaneously    LABS:                        9.7    7.09  )-----------( 328      ( 17 Feb 2023 06:33 )             30.7     02-17    137  |  105  |  11  ----------------------------<  105<H>  4.0   |  24  |  1.52<H>    Ca    8.6      17 Feb 2023 06:33  Phos  3.2     02-17  Mg     1.90     02-17      PT/INR - ( 15 Feb 2023 23:31 )   PT: 12.3 sec;   INR: 1.06 ratio         PTT - ( 15 Feb 2023 23:31 )  PTT:26.4 sec        IMAGING:  < from: CT Abdomen and Pelvis w/ Oral Cont and w/ IV Cont (02.16.23 @ 12:46) >  ACC: [id number] EXAM:  CT ABDOMEN AND PELVIS OC IC   ORDERED BY: HELENE SANTOS     ACC: [id number] EXAM:  CT CHEST OC IC   ORDERED BY: HELENE SANTOS     PROCEDURE DATE:  02/16/2023          INTERPRETATION:  CLINICAL INFORMATION: Appendiceal carcinoma status post   debulking and HIPEC. History of malignant small bowel obstruction.   Abdominal pain.    COMPARISON: 12/28/2022    CONTRAST/COMPLICATIONS:  IV Contrast: Omnipaque 350 90 cc administered   10 cc discarded  Oral Contrast: Omnipaque 300   Fruit 2o  Complications: None reported at time of study completion    PROCEDURE:  CT of the Chest, Abdomen and Pelvis was performed.  Sagittal and coronal reformats were performed.    FINDINGS:  CHEST:  LUNGS AND LARGE AIRWAYS: Patent central airways. Calcified granuloma in   the left pulmonary apex.  PLEURA: No pleural effusion.  VESSELS: Within normal limits.  HEART: Heart size is normal. No pericardial effusion.  MEDIASTINUM AND VICKI: No lymphadenopathy.  CHEST WALL AND LOWER NECK: Within normal limits.    ABDOMEN AND PELVIS:  LIVER: Within normal limits.  BILE DUCTS: Normal caliber.  GALLBLADDER: Within normal limits.  SPLEEN: Within normal limits.  PANCREAS: Within normal limits.  ADRENALS: Within normal limits.  KIDNEYS/URETERS: Delayed right-sided nephrogram with moderate to severe   bilateral hydronephrosis, right greater than left.    BLADDER: Within normal limits.  REPRODUCTIVE ORGANS: Enlarged prostate.    BOWEL: Distal gastrectomy and gastrojejunostomy. Redemonstration of   dilated loops of bowel in the right abdomen similar to findings present   on prior examination, now filled with oral contrast, consistent with   small bowel obstruction. There are multiple sites of luminal narrowing   notably in the right upper quadrant (2:80) and midabdomen (2:192) in   keeping with findings of malignant small bowel obstruction. Small amount   of oral contrast in more distal decompressed small bowel loops in the   left lower quadrant suggesting partial small bowel obstruction. Subtotal   colectomy. Oral contrast has not yet reached the rectum and distal colon.    PERITONEUM: Redemonstrated pseudomyxoma peritonei with  low-attenuation   implants in the abdomen.. No pneumoperitoneum. No contrast extravasation   into theperitoneum.  VESSELS: Within normal limits.  RETROPERITONEUM/LYMPH NODES: No lymphadenopathy.  ABDOMINAL WALL: Within normal limits.  BONES: Within normal limits.    IMPRESSION:  Redemonstrated findings of pseudomyxoma peritonei with dilated loops of  small bowel in the right abdomen and multiple regions of narrowing   consistent with the presence of malignancy small bowel obstruction.   Though oral contrast has not yet reached the rectum and distal colon, a   small amount of contrast is presentin more distal small bowel loops   suggesting partial small bowel obstruction.    Bilateral moderate to severe hydronephrosis, with delayed nephrogram in   the right kidney similar to findings present on prior exam.      < end of copied text >

## 2023-02-17 NOTE — PROGRESS NOTE ADULT - SUBJECTIVE AND OBJECTIVE BOX
NUTRITION NOTE  [id number] [place]  ===============================    Interval events    Allergies  barium sulfate (Unknown)    PAST MEDICAL & SURGICAL HISTORY:  HLD (hyperlipidemia)  BPH (benign prostatic hyperplasia)  Neuropathic pain hands and feet  UTI (urinary tract infection) pt denes recent infection  Appendix carcinoma PIPAC x 7, last done 4/2022  Anxiety  Cancer of appendix  Malignant neoplasm of appendix  GERD (gastroesophageal reflux disease) pt denes recent endoscopy  Abdominal pain  Encounter for palliative care  History of undescended testicle age 7  Abdominal mass surgery 5/2020  remove mass, spleen, partial colectomy, lymph nodes, part small intestines, omentum, apendix, gall bladder, part stomach. HIPEC  History of abdominal paracentesis x3  H/O colectomy Splenectomy , Cholecystectomy   5/2020. Insertion of Mediport  Malignant neoplasm of appendix chemo 10/2021; s/p Pipac x 7 last 04/2022    FAMILY HISTORY:  FH: diabetes mellitus (Father)  FH: HTN (hypertension) (Mother)    Vital Signs Last 24 Hrs  T(C): 36.6 (17 Feb 2023 08:59), Max: 36.9 (16 Feb 2023 17:01)  T(F): 97.9 (17 Feb 2023 08:59), Max: 98.5 (16 Feb 2023 17:01)  HR: 48 (17 Feb 2023 08:59) (47 - 57)  BP: 121/69 (17 Feb 2023 08:59) (121/69 - 133/73)  RR: 18 (17 Feb 2023 08:59) (18 - 18)  SpO2: 100% (17 Feb 2023 08:59) (90% - 100%)    MEDICATIONS  (STANDING):  dextrose 5% + lactated ringers 1000 milliLiter(s) (100 mL/Hr) IV Continuous <Continuous>  enoxaparin Injectable 40 milliGRAM(s) SubCutaneous every 24 hours  escitalopram 5 milliGRAM(s) Oral daily  famotidine    Tablet 40 milliGRAM(s) Oral daily  olaparib 300 milliGRAM(s) Oral two times a day  pantoprazole  Injectable 40 milliGRAM(s) IV Push daily  simvastatin 10 milliGRAM(s) Oral at bedtime  tamsulosin 0.4 milliGRAM(s) Oral at bedtime    MEDICATIONS  (PRN):  HYDROmorphone  Injectable 0.5 milliGRAM(s) IV Push every 6 hours PRN Severe Pain (7 - 10)    I&O's Detail    16 Feb 2023 07:01  -  17 Feb 2023 07:00  --------------------------------------------------------  IN:    dextrose 5% + lactated ringers w/ Additives: 1500 mL    dextrose 5% + sodium chloride 0.45% w/ Additives: 400 mL    IV PiggyBack: 300 mL    Oral Fluid: 600 mL  Total IN: 2800 mL    OUT:    Voided (mL): 1800 mL  Total OUT: 1800 mL    Total NET: 1000 mL      17 Feb 2023 07:01  -  17 Feb 2023 12:04  --------------------------------------------------------  IN:    dextrose 5% + lactated ringers w/ Additives: 500 mL  Total IN: 500 mL    OUT:    Oral Fluid: 0 mL    Voided (mL): 0 mL  Total OUT: 0 mL    Total NET: 500 mL    Drug Dosing Weight  Height (cm): 170.2 (16 Feb 2023 05:56)  Weight (kg): 55.9 (16 Feb 2023 05:56)  BMI (kg/m2): 19.3 (16 Feb 2023 05:56)  BSA (m2): 1.65 (16 Feb 2023 05:56)    PHYSICAL EXAM:  Constitutional: A&Ox3, NAD  Gastrointestinal: Abdomen soft nontender, distended, right abdominal bulge mid abdomen   Extremities: Moving all extremities, no edema    Neuro:    CV:    Pulm:    GI/Nutrition:    Extremity:    Skin:    Diet: NPO    LABORATORY                        9.7    7.09  )-----------( 328      ( 17 Feb 2023 06:33 )             30.7   02-17    137  |  105  |  11  ----------------------------<  105<H>  4.0   |  24  |  1.52<H>    Ca    8.6      17 Feb 2023 06:33  Phos  3.2     02-17  Mg     1.90     02-17 02-17 Chol -- LDL -- HDL -- Trig 61    ASSESSMENT/PLAN:  62yMale      1. Protein calorie malnutrition being optimized with TPN: CHO [ ] gm.  AA [ ] gm. Lipids [ ] gm.  2.  Hyperglycemia managed with: [ ] units of regular insulin    3.  Check fluid balance daily.  Strict I/O  [ ] [ ]   4.  Daily BMP, Ionized Calcium, Magnesium and Phosphorous   5.  Triglycerides at initiation of TPN and monthly [ ] [ ]   6.  Pepcid in TPN for Gi prophylaxis  [ ]  NUTRITION NOTE  [id number] [place]  ===============================    Interval events - Patient was seen and examined at bedside, no acute events overnight. Patient denies chest pain, shortness of breath, nausea or vomiting at this time. Plan to initiate parenteral nutrition pending culture results from port device.     ROS: Except as noted above, all other systems reviewed and are negative     Allergies  barium sulfate (Unknown)    PAST MEDICAL & SURGICAL HISTORY:  HLD (hyperlipidemia)  BPH (benign prostatic hyperplasia)  Neuropathic pain hands and feet  UTI (urinary tract infection) pt denes recent infection  Appendix carcinoma PIPAC x 7, last done 4/2022  Anxiety  Cancer of appendix  Malignant neoplasm of appendix  GERD (gastroesophageal reflux disease) pt denes recent endoscopy  Abdominal pain  Encounter for palliative care  History of undescended testicle age 7  Abdominal mass surgery 5/2020  remove mass, spleen, partial colectomy, lymph nodes, part small intestines, omentum, apendix, gall bladder, part stomach. HIPEC  History of abdominal paracentesis x3  H/O colectomy Splenectomy , Cholecystectomy   5/2020. Insertion of Mediport  Malignant neoplasm of appendix chemo 10/2021; s/p Pipac x 7 last 04/2022    FAMILY HISTORY:  FH: diabetes mellitus (Father)  FH: HTN (hypertension) (Mother)    Vital Signs Last 24 Hrs  T(C): 36.6 (17 Feb 2023 08:59), Max: 36.9 (16 Feb 2023 17:01)  T(F): 97.9 (17 Feb 2023 08:59), Max: 98.5 (16 Feb 2023 17:01)  HR: 48 (17 Feb 2023 08:59) (47 - 57)  BP: 121/69 (17 Feb 2023 08:59) (121/69 - 133/73)  RR: 18 (17 Feb 2023 08:59) (18 - 18)  SpO2: 100% (17 Feb 2023 08:59) (90% - 100%)    MEDICATIONS  (STANDING):  dextrose 5% + lactated ringers 1000 milliLiter(s) (100 mL/Hr) IV Continuous <Continuous>  enoxaparin Injectable 40 milliGRAM(s) SubCutaneous every 24 hours  escitalopram 5 milliGRAM(s) Oral daily  famotidine    Tablet 40 milliGRAM(s) Oral daily  olaparib 300 milliGRAM(s) Oral two times a day  pantoprazole  Injectable 40 milliGRAM(s) IV Push daily  simvastatin 10 milliGRAM(s) Oral at bedtime  tamsulosin 0.4 milliGRAM(s) Oral at bedtime    MEDICATIONS  (PRN):  HYDROmorphone  Injectable 0.5 milliGRAM(s) IV Push every 6 hours PRN Severe Pain (7 - 10)    I&O's Detail    16 Feb 2023 07:01  -  17 Feb 2023 07:00  --------------------------------------------------------  IN:    dextrose 5% + lactated ringers w/ Additives: 1500 mL    dextrose 5% + sodium chloride 0.45% w/ Additives: 400 mL    IV PiggyBack: 300 mL    Oral Fluid: 600 mL  Total IN: 2800 mL    OUT:    Voided (mL): 1800 mL  Total OUT: 1800 mL    Total NET: 1000 mL      17 Feb 2023 07:01  -  17 Feb 2023 12:04  --------------------------------------------------------  IN:    dextrose 5% + lactated ringers w/ Additives: 500 mL  Total IN: 500 mL    OUT:    Oral Fluid: 0 mL    Voided (mL): 0 mL  Total OUT: 0 mL    Total NET: 500 mL    Drug Dosing Weight  Height (cm): 170.2 (16 Feb 2023 05:56)  Weight (kg): 55.9 (16 Feb 2023 05:56)  BMI (kg/m2): 19.3 (16 Feb 2023 05:56)  BSA (m2): 1.65 (16 Feb 2023 05:56)    PHYSICAL EXAM:  Constitutional: A&Ox3, NAD  Gastrointestinal: Abdomen soft nontender, distended, right abdominal bulge mid abdomen   Extremities: Moving all extremities, no edema    Diet: NPO    LABORATORY                        9.7    7.09  )-----------( 328      ( 17 Feb 2023 06:33 )             30.7   02-17    137  |  105  |  11  ----------------------------<  105<H>  4.0   |  24  |  1.52<H>    Ca    8.6      17 Feb 2023 06:33  Phos  3.2     02-17  Mg     1.90     02-17 02-17 Chol -- LDL -- HDL -- Trig 61    ASSESSMENT/PLAN:  62yMale    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 CT scan abd/pelvis ordered for today. Nutrition support consult called for evaluation for parenteral nutrition in view of signification weight loss (per pt, about 18 lbs in past 2-3 months) and PO intolerance. At this time, pt is resting comfortably and denies chest pain, shortness of breath, nausea or vomiting. Parenteral nutrition and PICC line placement were discussed with pt at bedside, pt expressions concerns and questions regarding PICC line placement.     Nutrition Support [id number]  NUTRITION NOTE  [id number] [place]  ===============================    Interval events - Patient was seen and examined at bedside, no acute events overnight. Patient denies chest pain, shortness of breath, nausea or vomiting at this time. Plan to initiate parenteral nutrition pending culture results from mediport.     ROS: Except as noted above, all other systems reviewed and are negative     Allergies  barium sulfate (Unknown)    PAST MEDICAL & SURGICAL HISTORY:  HLD (hyperlipidemia)  BPH (benign prostatic hyperplasia)  Neuropathic pain hands and feet  UTI (urinary tract infection) pt denes recent infection  Appendix carcinoma PIPAC x 7, last done 4/2022  Anxiety  Cancer of appendix  Malignant neoplasm of appendix  GERD (gastroesophageal reflux disease) pt denes recent endoscopy  Abdominal pain  Encounter for palliative care  History of undescended testicle age 7  Abdominal mass surgery 5/2020  remove mass, spleen, partial colectomy, lymph nodes, part small intestines, omentum, apendix, gall bladder, part stomach. HIPEC  History of abdominal paracentesis x3  H/O colectomy Splenectomy , Cholecystectomy   5/2020. Insertion of Mediport  Malignant neoplasm of appendix chemo 10/2021; s/p Pipac x 7 last 04/2022    FAMILY HISTORY:  FH: diabetes mellitus (Father)  FH: HTN (hypertension) (Mother)    Vital Signs Last 24 Hrs  T(C): 36.6 (17 Feb 2023 08:59), Max: 36.9 (16 Feb 2023 17:01)  T(F): 97.9 (17 Feb 2023 08:59), Max: 98.5 (16 Feb 2023 17:01)  HR: 48 (17 Feb 2023 08:59) (47 - 57)  BP: 121/69 (17 Feb 2023 08:59) (121/69 - 133/73)  RR: 18 (17 Feb 2023 08:59) (18 - 18)  SpO2: 100% (17 Feb 2023 08:59) (90% - 100%)    MEDICATIONS  (STANDING):  dextrose 5% + lactated ringers 1000 milliLiter(s) (100 mL/Hr) IV Continuous <Continuous>  enoxaparin Injectable 40 milliGRAM(s) SubCutaneous every 24 hours  escitalopram 5 milliGRAM(s) Oral daily  famotidine    Tablet 40 milliGRAM(s) Oral daily  olaparib 300 milliGRAM(s) Oral two times a day  pantoprazole  Injectable 40 milliGRAM(s) IV Push daily  simvastatin 10 milliGRAM(s) Oral at bedtime  tamsulosin 0.4 milliGRAM(s) Oral at bedtime    MEDICATIONS  (PRN):  HYDROmorphone  Injectable 0.5 milliGRAM(s) IV Push every 6 hours PRN Severe Pain (7 - 10)    I&O's Detail    16 Feb 2023 07:01  -  17 Feb 2023 07:00  --------------------------------------------------------  IN:    dextrose 5% + lactated ringers w/ Additives: 1500 mL    dextrose 5% + sodium chloride 0.45% w/ Additives: 400 mL    IV PiggyBack: 300 mL    Oral Fluid: 600 mL  Total IN: 2800 mL    OUT:    Voided (mL): 1800 mL  Total OUT: 1800 mL    Total NET: 1000 mL      17 Feb 2023 07:01  -  17 Feb 2023 12:04  --------------------------------------------------------  IN:    dextrose 5% + lactated ringers w/ Additives: 500 mL  Total IN: 500 mL    OUT:    Oral Fluid: 0 mL    Voided (mL): 0 mL  Total OUT: 0 mL    Total NET: 500 mL    Drug Dosing Weight  Height (cm): 170.2 (16 Feb 2023 05:56)  Weight (kg): 55.9 (16 Feb 2023 05:56)  BMI (kg/m2): 19.3 (16 Feb 2023 05:56)  BSA (m2): 1.65 (16 Feb 2023 05:56)    PHYSICAL EXAM:  Constitutional: A&Ox3, NAD  Gastrointestinal: Abdomen soft nontender, distended, right abdominal bulge mid abdomen   Extremities: Moving all extremities, no edema    Diet: NPO    LABORATORY                        9.7    7.09  )-----------( 328      ( 17 Feb 2023 06:33 )             30.7   02-17    137  |  105  |  11  ----------------------------<  105<H>  4.0   |  24  |  1.52<H>    Ca    8.6      17 Feb 2023 06:33  Phos  3.2     02-17  Mg     1.90     02-17 02-17 Chol -- LDL -- HDL -- Trig 61    ASSESSMENT/PLAN:  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 Nutrition support consult called for evaluation for parenteral nutrition in view of signification weight loss (per pt, about 18 lbs in past 2-3 months) and PO intolerance.    Pt refusing PICC placement and would like to have TPN through [facility]. Cultures from [facility] drone this morning, pending results.     will continue to follow    Nutrition Support [id number]  NUTRITION NOTE  [id number] [place]  ===============================    Interval events - Patient was seen and examined at bedside, no acute events overnight. Patient denies chest pain, shortness of breath, nausea or vomiting at this time. Plan to initiate parenteral nutrition pending culture results from mediport.     ROS: Except as noted above, all other systems reviewed and are negative     Allergies  barium sulfate (Unknown)    PAST MEDICAL & SURGICAL HISTORY:  HLD (hyperlipidemia)  BPH (benign prostatic hyperplasia)  Neuropathic pain hands and feet  UTI (urinary tract infection) pt denes recent infection  Appendix carcinoma PIPAC x 7, last done 4/2022  Anxiety  Cancer of appendix  Malignant neoplasm of appendix  GERD (gastroesophageal reflux disease) pt denes recent endoscopy  Abdominal pain  Encounter for palliative care  History of undescended testicle age 7  Abdominal mass surgery 5/2020  remove mass, spleen, partial colectomy, lymph nodes, part small intestines, omentum, apendix, gall bladder, part stomach. HIPEC  History of abdominal paracentesis x3  H/O colectomy Splenectomy , Cholecystectomy   5/2020. Insertion of Mediport  Malignant neoplasm of appendix chemo 10/2021; s/p Pipac x 7 last 04/2022    FAMILY HISTORY:  FH: diabetes mellitus (Father)  FH: HTN (hypertension) (Mother)    Vital Signs Last 24 Hrs  T(C): 36.6 (17 Feb 2023 08:59), Max: 36.9 (16 Feb 2023 17:01)  T(F): 97.9 (17 Feb 2023 08:59), Max: 98.5 (16 Feb 2023 17:01)  HR: 48 (17 Feb 2023 08:59) (47 - 57)  BP: 121/69 (17 Feb 2023 08:59) (121/69 - 133/73)  RR: 18 (17 Feb 2023 08:59) (18 - 18)  SpO2: 100% (17 Feb 2023 08:59) (90% - 100%)    MEDICATIONS  (STANDING):  dextrose 5% + lactated ringers 1000 milliLiter(s) (100 mL/Hr) IV Continuous <Continuous>  enoxaparin Injectable 40 milliGRAM(s) SubCutaneous every 24 hours  escitalopram 5 milliGRAM(s) Oral daily  famotidine    Tablet 40 milliGRAM(s) Oral daily  olaparib 300 milliGRAM(s) Oral two times a day  pantoprazole  Injectable 40 milliGRAM(s) IV Push daily  simvastatin 10 milliGRAM(s) Oral at bedtime  tamsulosin 0.4 milliGRAM(s) Oral at bedtime    MEDICATIONS  (PRN):  HYDROmorphone  Injectable 0.5 milliGRAM(s) IV Push every 6 hours PRN Severe Pain (7 - 10)    I&O's Detail    16 Feb 2023 07:01  -  17 Feb 2023 07:00  --------------------------------------------------------  IN:    dextrose 5% + lactated ringers w/ Additives: 1500 mL    dextrose 5% + sodium chloride 0.45% w/ Additives: 400 mL    IV PiggyBack: 300 mL    Oral Fluid: 600 mL  Total IN: 2800 mL    OUT:    Voided (mL): 1800 mL  Total OUT: 1800 mL    Total NET: 1000 mL      17 Feb 2023 07:01  -  17 Feb 2023 12:04  --------------------------------------------------------  IN:    dextrose 5% + lactated ringers w/ Additives: 500 mL  Total IN: 500 mL    OUT:    Oral Fluid: 0 mL    Voided (mL): 0 mL  Total OUT: 0 mL    Total NET: 500 mL    Drug Dosing Weight  Height (cm): 170.2 (16 Feb 2023 05:56)  Weight (kg): 55.9 (16 Feb 2023 05:56)  BMI (kg/m2): 19.3 (16 Feb 2023 05:56)  BSA (m2): 1.65 (16 Feb 2023 05:56)    PHYSICAL EXAM:  Constitutional: A&Ox3, NAD  Gastrointestinal: Abdomen soft nontender, distended, right abdominal bulge mid abdomen   Extremities: Moving all extremities, no edema    Diet: NPO    LABORATORY                        9.7    7.09  )-----------( 328      ( 17 Feb 2023 06:33 )             30.7   02-17    137  |  105  |  11  ----------------------------<  105<H>  4.0   |  24  |  1.52<H>    Ca    8.6      17 Feb 2023 06:33  Phos  3.2     02-17  Mg     1.90     02-17 02-17 Chol -- LDL -- HDL -- Trig 61    ASSESSMENT/PLAN:  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 Nutrition support consult called for evaluation for parenteral nutrition in view of signification weight loss (per pt, about 18 lbs in past 2-3 months) and PO intolerance.    Pt refusing PICC placement and would like to have TPN through [facility]. Cultures from [facility] done this morning, pending results.     will continue to follow    Nutrition Support [id number]

## 2023-02-17 NOTE — CONSULT NOTE ADULT - ASSESSMENT
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 Oncology consulted for further recommendations:    -Patient with hostory of recurrent SBOs  -Rest of care as per primary team  -Patient to followup with Dr. Shanae Garcia ([facility] [place] [facility] [place]  ) upon discharge  -C/w Supportive care, pain control, Nutrition, PT, DVT ppx  -Oncology will continue to follow with you      Case discussed with Dr. 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 Oncology consulted for further recommendations:    -Patient with history of recurrent SBOs  -Admitted for Surgery, on conservative management for malignant SBO   -NPO/IVF  -Would hold Lynparza while admitted  -Agree with getting Palliative Care consult for symptom management  -Rest of care as per primary team  -Patient to followup with Dr. Sadie Mendoza,([facility] [place] [facility] [place]  ) upon discharge  -C/w Supportive care, pain control, Nutrition, PT, DVT ppx  -Oncology will continue to follow with you      Case discussed with Dr. Gui ALVARES  Oncology Physician Assistant  [facility]  Pager [phone] also available on Teams    If before 8am/after 5pm or on weekends please page On-call Oncology Fellow

## 2023-02-17 NOTE — CONSULT NOTE ADULT - SUBJECTIVE AND OBJECTIVE BOX
Patient is a 62y old  Male who presents with a chief complaint of sbo (17 Feb 2023 09:06)      HPI:                                                                                              General Surgery Consult  Consulting surgical team: D TEAM SURGERY  Consulting attending: Dr. Mason    HPI:  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 s/p cytoreductive surgery 2020, FOLFOX x 6 months and HIPEC, s/p PIPAC x 8, with hx of recurrent malignant SBO recently admitted 12/2023 presents as a direct admit with similar sx for 1 day, no n/v, mild diffuse ab pain with increased size in RUQ abdominal bulge that he notices whenever has had these sbo's, last passed flatus at 2pm. Vitals wnl, soft with mild distension of RUQ large bulge, nontender, labs and CT pending.     PAST MEDICAL HISTORY:  HLD (hyperlipidemia)    BPH (benign prostatic hyperplasia)    Neuropathic pain    UTI (urinary tract infection)    Appendix carcinoma    UTI (urinary tract infection)    Anxiety    Cancer of appendix    Malignant neoplasm of appendix    GERD (gastroesophageal reflux disease)    Abdominal pain    Encounter for palliative care        PAST SURGICAL HISTORY:  History of undescended testicle    Abdominal mass    History of abdominal paracentesis    H/O colectomy    Malignant neoplasm of appendix        MEDICATIONS:  dextrose 5% + sodium chloride 0.45% with potassium chloride 20 mEq/L 1000 milliLiter(s) IV Continuous <Continuous>  enoxaparin Injectable 40 milliGRAM(s) SubCutaneous once  escitalopram 5 milliGRAM(s) Oral daily  famotidine    Tablet 40 milliGRAM(s) Oral daily  pantoprazole  Injectable 40 milliGRAM(s) IV Push daily  simvastatin 10 milliGRAM(s) Oral at bedtime  tamsulosin 0.4 milliGRAM(s) Oral at bedtime      ALLERGIES:  barium sulfate (Unknown)      VITALS & I/Os:  Vital Signs Last 24 Hrs  T(C): 36.8 (15 Feb 2023 20:28), Max: 36.8 (15 Feb 2023 20:28)  T(F): 98.2 (15 Feb 2023 20:28), Max: 98.2 (15 Feb 2023 20:28)  HR: 44 (15 Feb 2023 20:28) (44 - 44)  BP: 136/79 (15 Feb 2023 20:28) (136/79 - 136/79)  BP(mean): --  RR: 19 (15 Feb 2023 20:28) (19 - 19)  SpO2: 100% (15 Feb 2023 20:28) (100% - 100%)    Parameters below as of 15 Feb 2023 20:28  Patient On (Oxygen Delivery Method): room air        I&O's Summary      PHYSICAL EXAM:  General: No acute distress  Respiratory: Nonlabored  Cardiovascular: appears well perfused  Abdominal: Soft, RUQ loss of domain? soft bulge, nontender. laparotomy incisions well-healed. No rebound or guarding. No organomegaly, no palpable mass.  Extremities: Warm    LABS:          Lactate:                  IMAGING:     (15 Feb 2023 20:16)       Oncologic History:      ROS: as above     PAST MEDICAL & SURGICAL HISTORY:  HLD (hyperlipidemia)      BPH (benign prostatic hyperplasia)      Neuropathic pain  hands and feet      UTI (urinary tract infection)  pt denes recent infection      Appendix carcinoma  PIPAC x 7, last done 4/2022      Anxiety      Cancer of appendix      Malignant neoplasm of appendix      GERD (gastroesophageal reflux disease)  pt denes recent endoscopy      Abdominal pain      Encounter for palliative care      History of undescended testicle  age 7      Abdominal mass  surgery 5/2020  remove mass, spleen, partial colectomy, lymph nodes, part small intestines, omentum, apendix, gall bladder, part stomach. HIPEC      History of abdominal paracentesis  x3      H/O colectomy  Splenectomy , Cholecystectomy   5/2020. Insertion of Mediport      Malignant neoplasm of appendix  chemo 10/2021; s/p Pipac x 7 last 04/2022          SOCIAL HISTORY:    FAMILY HISTORY:  FH: diabetes mellitus (Father)    FH: HTN (hypertension) (Mother)        MEDICATIONS  (STANDING):  dextrose 5% + lactated ringers 1000 milliLiter(s) (100 mL/Hr) IV Continuous <Continuous>  enoxaparin Injectable 40 milliGRAM(s) SubCutaneous every 24 hours  escitalopram 5 milliGRAM(s) Oral daily  famotidine    Tablet 40 milliGRAM(s) Oral daily  olaparib 300 milliGRAM(s) Oral two times a day  pantoprazole  Injectable 40 milliGRAM(s) IV Push daily  simvastatin 10 milliGRAM(s) Oral at bedtime  tamsulosin 0.4 milliGRAM(s) Oral at bedtime    MEDICATIONS  (PRN):  HYDROmorphone  Injectable 0.5 milliGRAM(s) IV Push every 6 hours PRN Severe Pain (7 - 10)      Allergies    barium sulfate (Unknown)    Intolerances        Vital Signs Last 24 Hrs  T(C): 36.6 (17 Feb 2023 08:59), Max: 36.9 (16 Feb 2023 17:01)  T(F): 97.9 (17 Feb 2023 08:59), Max: 98.5 (16 Feb 2023 17:01)  HR: 48 (17 Feb 2023 08:59) (47 - 57)  BP: 121/69 (17 Feb 2023 08:59) (121/69 - 133/73)  BP(mean): --  RR: 18 (17 Feb 2023 08:59) (18 - 18)  SpO2: 100% (17 Feb 2023 08:59) (90% - 100%)    Parameters below as of 17 Feb 2023 08:59  Patient On (Oxygen Delivery Method): room air        PHYSICAL EXAM  General: adult in NAD  HEENT: clear oropharynx, anicteric sclera, pink conjunctiva  Neck: supple  CV: normal S1/S2 with no murmur rubs or gallops  Lungs: positive air movement b/l ant lungs, clear to auscultation, no wheezes, no rales  Abdomen: soft non-tender non-distended, no hepatosplenomegaly  Ext: no clubbing cyanosis or edema  Skin: no rashes and no petechiae  Neuro: alert and oriented X 3, none focal    LABS:                          9.7    7.09  )-----------( 328      ( 17 Feb 2023 06:33 )             30.7         Mean Cell Volume : 90.3 fL  Mean Cell Hemoglobin : 28.5 pg  Mean Cell Hemoglobin Concentration : 31.6 gm/dL  Auto Neutrophil # : x  Auto Lymphocyte # : x  Auto Monocyte # : x  Auto Eosinophil # : x  Auto Basophil # : x  Auto Neutrophil % : x  Auto Lymphocyte % : x  Auto Monocyte % : x  Auto Eosinophil % : x  Auto Basophil % : x      Serial CBC's  02-17 @ 06:33  Hct-30.7 / Hgb-9.7 / Plat-328 / RBC-3.40 / WBC-7.09  Serial CBC's  02-16 @ 06:40  Hct-30.8 / Hgb-9.8 / Plat-344 / RBC-3.39 / WBC-5.16  Serial CBC's  02-15 @ 23:31  Hct-33.8 / Hgb-10.7 / Plat-376 / RBC-3.76 / WBC-7.09      02-17    137  |  105  |  11  ----------------------------<  105<H>  4.0   |  24  |  1.52<H>    Ca    8.6      17 Feb 2023 06:33  Phos  3.2     02-17  Mg     1.90     02-17        PT/INR - ( 15 Feb 2023 23:31 )   PT: 12.3 sec;   INR: 1.06 ratio         PTT - ( 15 Feb 2023 23:31 )  PTT:26.4 sec                RADIOLOGY & ADDITIONAL STUDIES:     Patient is a 62y old  Male who presents with a chief complaint of sbo (17 Feb 2023 09:06)      HPI:  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 s/p cytoreductive surgery 2020, FOLFOX x 6 months and HIPEC, s/p PIPAC x 8, with hx of recurrent malignant SBO recently admitted 12/2023 presents as a direct admit with similar sx for 1 day, no n/v, mild diffuse ab pain with increased size in RUQ abdominal bulge that he notices whenever has had these sbo's, last passed flatus at 2pm. Vitals wnl, soft with mild distension of RUQ large bulge, nontender, labs and CT pending.     PAST MEDICAL HISTORY:  HLD (hyperlipidemia)    BPH (benign prostatic hyperplasia)    Neuropathic pain    UTI (urinary tract infection)    Appendix carcinoma    UTI (urinary tract infection)    Anxiety    Cancer of appendix    Malignant neoplasm of appendix    GERD (gastroesophageal reflux disease)    Abdominal pain    Encounter for palliative care        PAST SURGICAL HISTORY:  History of undescended testicle    Abdominal mass    History of abdominal paracentesis    H/O colectomy    Malignant neoplasm of appendix        MEDICATIONS:  dextrose 5% + sodium chloride 0.45% with potassium chloride 20 mEq/L 1000 milliLiter(s) IV Continuous <Continuous>  enoxaparin Injectable 40 milliGRAM(s) SubCutaneous once  escitalopram 5 milliGRAM(s) Oral daily  famotidine    Tablet 40 milliGRAM(s) Oral daily  pantoprazole  Injectable 40 milliGRAM(s) IV Push daily  simvastatin 10 milliGRAM(s) Oral at bedtime  tamsulosin 0.4 milliGRAM(s) Oral at bedtime      ALLERGIES:  barium sulfate (Unknown)      VITALS & I/Os:  Vital Signs Last 24 Hrs  T(C): 36.8 (15 Feb 2023 20:28), Max: 36.8 (15 Feb 2023 20:28)  T(F): 98.2 (15 Feb 2023 20:28), Max: 98.2 (15 Feb 2023 20:28)  HR: 44 (15 Feb 2023 20:28) (44 - 44)  BP: 136/79 (15 Feb 2023 20:28) (136/79 - 136/79)  BP(mean): --  RR: 19 (15 Feb 2023 20:28) (19 - 19)  SpO2: 100% (15 Feb 2023 20:28) (100% - 100%)    Parameters below as of 15 Feb 2023 20:28  Patient On (Oxygen Delivery Method): room air        I&O's Summary      PHYSICAL EXAM:  General: No acute distress  Respiratory: Nonlabored  Cardiovascular: appears well perfused  Abdominal: Soft, RUQ loss of domain? soft bulge, nontender. laparotomy incisions well-healed. No rebound or guarding. No organomegaly, no palpable mass.  Extremities: Warm    LABS:          Lactate:                  IMAGING:     (15 Feb 2023 20:16)       Oncologic History:      ROS: as above     PAST MEDICAL & SURGICAL HISTORY:  HLD (hyperlipidemia)      BPH (benign prostatic hyperplasia)      Neuropathic pain  hands and feet      UTI (urinary tract infection)  pt denes recent infection      Appendix carcinoma  PIPAC x 7, last done 4/2022      Anxiety      Cancer of appendix      Malignant neoplasm of appendix      GERD (gastroesophageal reflux disease)  pt denes recent endoscopy      Abdominal pain      Encounter for palliative care      History of undescended testicle  age 7      Abdominal mass  surgery 5/2020  remove mass, spleen, partial colectomy, lymph nodes, part small intestines, omentum, apendix, gall bladder, part stomach. HIPEC      History of abdominal paracentesis  x3      H/O colectomy  Splenectomy , Cholecystectomy   5/2020. Insertion of Mediport      Malignant neoplasm of appendix  chemo 10/2021; s/p Pipac x 7 last 04/2022          SOCIAL HISTORY:    FAMILY HISTORY:  FH: diabetes mellitus (Father)    FH: HTN (hypertension) (Mother)        MEDICATIONS  (STANDING):  dextrose 5% + lactated ringers 1000 milliLiter(s) (100 mL/Hr) IV Continuous <Continuous>  enoxaparin Injectable 40 milliGRAM(s) SubCutaneous every 24 hours  escitalopram 5 milliGRAM(s) Oral daily  famotidine    Tablet 40 milliGRAM(s) Oral daily  olaparib 300 milliGRAM(s) Oral two times a day  pantoprazole  Injectable 40 milliGRAM(s) IV Push daily  simvastatin 10 milliGRAM(s) Oral at bedtime  tamsulosin 0.4 milliGRAM(s) Oral at bedtime    MEDICATIONS  (PRN):  HYDROmorphone  Injectable 0.5 milliGRAM(s) IV Push every 6 hours PRN Severe Pain (7 - 10)      Allergies    barium sulfate (Unknown)    Intolerances        Vital Signs Last 24 Hrs  T(C): 36.6 (17 Feb 2023 08:59), Max: 36.9 (16 Feb 2023 17:01)  T(F): 97.9 (17 Feb 2023 08:59), Max: 98.5 (16 Feb 2023 17:01)  HR: 48 (17 Feb 2023 08:59) (47 - 57)  BP: 121/69 (17 Feb 2023 08:59) (121/69 - 133/73)  BP(mean): --  RR: 18 (17 Feb 2023 08:59) (18 - 18)  SpO2: 100% (17 Feb 2023 08:59) (90% - 100%)    Parameters below as of 17 Feb 2023 08:59  Patient On (Oxygen Delivery Method): room air        PHYSICAL EXAM  General: adult in NAD  HEENT: clear oropharynx, anicteric sclera, pink conjunctiva  Neck: supple  CV: normal S1/S2 with no murmur rubs or gallops  Lungs: positive air movement b/l ant lungs, clear to auscultation, no wheezes, no rales  Abdomen: soft non-tender non-distended, no hepatosplenomegaly  Ext: no clubbing cyanosis or edema  Skin: no rashes and no petechiae  Neuro: alert and oriented X 3, none focal    LABS:                          9.7    7.09  )-----------( 328      ( 17 Feb 2023 06:33 )             30.7         Mean Cell Volume : 90.3 fL  Mean Cell Hemoglobin : 28.5 pg  Mean Cell Hemoglobin Concentration : 31.6 gm/dL  Auto Neutrophil # : x  Auto Lymphocyte # : x  Auto Monocyte # : x  Auto Eosinophil # : x  Auto Basophil # : x  Auto Neutrophil % : x  Auto Lymphocyte % : x  Auto Monocyte % : x  Auto Eosinophil % : x  Auto Basophil % : x      Serial CBC's  02-17 @ 06:33  Hct-30.7 / Hgb-9.7 / Plat-328 / RBC-3.40 / WBC-7.09  Serial CBC's  02-16 @ 06:40  Hct-30.8 / Hgb-9.8 / Plat-344 / RBC-3.39 / WBC-5.16  Serial CBC's  02-15 @ 23:31  Hct-33.8 / Hgb-10.7 / Plat-376 / RBC-3.76 / WBC-7.09      02-17    137  |  105  |  11  ----------------------------<  105<H>  4.0   |  24  |  1.52<H>    Ca    8.6      17 Feb 2023 06:33  Phos  3.2     02-17  Mg     1.90     02-17        PT/INR - ( 15 Feb 2023 23:31 )   PT: 12.3 sec;   INR: 1.06 ratio         PTT - ( 15 Feb 2023 23:31 )  PTT:26.4 sec                RADIOLOGY & ADDITIONAL STUDIES:         HPI:  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 s/p cytoreductive surgery 2020, FOLFOX x 6 months and HIPEC, s/p PIPAC x 8, with hx of recurrent malignant SBO recently admitted 12/2023 presents as a direct admit with similar sx for 1 day, no n/v, mild diffuse ab pain with increased size in RUQ abdominal bulge that he notices whenever has had these sbo's, last passed flatus at 2pm. Vitals wnl, soft with mild distension of RUQ large bulge, nontender, labs and CT pending.     PAST MEDICAL HISTORY:  HLD (hyperlipidemia)    BPH (benign prostatic hyperplasia)    Neuropathic pain    UTI (urinary tract infection)    Appendix carcinoma    UTI (urinary tract infection)    Anxiety    Cancer of appendix    Malignant neoplasm of appendix    GERD (gastroesophageal reflux disease)    Abdominal pain    Encounter for palliative care        PAST SURGICAL HISTORY:  History of undescended testicle    Abdominal mass    History of abdominal paracentesis    H/O colectomy    Malignant neoplasm of appendix      Oncological History:  61 y/o male with low grade appendiceal tumor diagnosed in 2020. 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    5/7/20 CRS and HIPEC for LAMN ( Dr. Herberth Pink at [place] ). Surgery included a splenectomy and distal gastrectomy, omentectomy, cholecystectomy, subtotal colectomy and diaphragm stripping. 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 R2b resection.    Pathology : LAMN ( low grade G 1 appendiceal mucinous neoplasm) invading into periappendiceal adipose tissue, present on serosa of colon and spleen 2/14 lymph nodes with metastatic disease pT4a, pN1b pM1b  Adjuvant chemotherapy FOLFOX x 6 months ( completed in December 2020).    Moved back to .    Scans 4/30/21 POD worsening ascites. S/p paracentesis    Seen by medical oncology at [facility] (Dr Daniels) - no role for systemic tx at present time.  Referred for PIPAC ( Dr Mason)    6/3/21 - PIPAC #1  7/12/21 - 7/13/21 - Admitted with small bowel obstruction. Dramatically improved after 24 hours nasogastric decompression.  2/10/22: PIPAC #6  3/3/22: Continues to travel, scuba dive, ski, lift weights.  4/7/22: PIPAC #7. Sons wedding was 4/30/22  6/3/22: PIPAC #8  6/20/22: CT CAP stable from March.  Developed severe abdominal pain after the scan, with interval development of ascites. Diagnostic paracentesis with MRSA. Hospitalized X 10 days.  7/7/22: Improving s/p hospitalization. Has lost 10 pounds, but is not eating better. Labs wbc 17. platelets 1000, CEA 4.3  7/20/22: Doing better. Weight stabilizing. WBC 12.7    8/29/22 - CT A/P - Pseudomyxoma peritonei with low density throughout the central mesentery and scalloping of the right hepatic lobe without significant change. Tumor in the pb hepatis which obliterates the left portal vein has progressed when compared with prior imaging dating to March 2022.  9/2/22: CEA slightly increased to 7.7. Per Dr. Mason patient not a candidate for further PIPAC. Would reconsider if large volume ascites should reaccumulate.    Next line chemotherapy discussed but chance for response to cytotoxic tx is low in low proliferative tumors.  Referred for clinical trials- contacted several centers- no trial options available. Patient was very interested in trying PARP inhibitor outside setting of trial ( potential activity based on small trials ).  Plan to start olaparib ( Lynparza 300 mg bid)    HE was admitted [facility] three times in NOv/ Dec 2022 for malignant SBO- resolved with conservative management. Felt not to be a surgical candidate due to his extend of abdominal disease.  Lost weight but now trying to maintain caloric intake on low residue diet.  Started Lynparza 300 mg bid on 12//30/22    MEDICATIONS:  dextrose 5% + sodium chloride 0.45% with potassium chloride 20 mEq/L 1000 milliLiter(s) IV Continuous <Continuous>  enoxaparin Injectable 40 milliGRAM(s) SubCutaneous once  escitalopram 5 milliGRAM(s) Oral daily  famotidine    Tablet 40 milliGRAM(s) Oral daily  pantoprazole  Injectable 40 milliGRAM(s) IV Push daily  simvastatin 10 milliGRAM(s) Oral at bedtime  tamsulosin 0.4 milliGRAM(s) Oral at bedtime      ALLERGIES:  barium sulfate (Unknown)      VITALS & I/Os:  Vital Signs Last 24 Hrs  T(C): 36.8 (15 Feb 2023 20:28), Max: 36.8 (15 Feb 2023 20:28)  T(F): 98.2 (15 Feb 2023 20:28), Max: 98.2 (15 Feb 2023 20:28)  HR: 44 (15 Feb 2023 20:28) (44 - 44)  BP: 136/79 (15 Feb 2023 20:28) (136/79 - 136/79)  BP(mean): --  RR: 19 (15 Feb 2023 20:28) (19 - 19)  SpO2: 100% (15 Feb 2023 20:28) (100% - 100%)    Parameters below as of 15 Feb 2023 20:28  Patient On (Oxygen Delivery Method): room air        I&O's Summary      PHYSICAL EXAM:  General: No acute distress  Respiratory: Nonlabored  Cardiovascular: appears well perfused  Abdominal: Soft, RUQ loss of domain? soft bulge, nontender. laparotomy incisions well-healed. No rebound or guarding. No organomegaly, no palpable mass.  Extremities: Warm    LABS:          Lactate:                  IMAGING:     (15 Feb 2023 20:16)       Oncologic History:      ROS: as above     PAST MEDICAL & SURGICAL HISTORY:  HLD (hyperlipidemia)      BPH (benign prostatic hyperplasia)      Neuropathic pain  hands and feet      UTI (urinary tract infection)  pt denes recent infection      Appendix carcinoma  PIPAC x 7, last done 4/2022      Anxiety      Cancer of appendix      Malignant neoplasm of appendix      GERD (gastroesophageal reflux disease)  pt denes recent endoscopy      Abdominal pain      Encounter for palliative care      History of undescended testicle  age 7      Abdominal mass  surgery 5/2020  remove mass, spleen, partial colectomy, lymph nodes, part small intestines, omentum, apendix, gall bladder, part stomach. HIPEC      History of abdominal paracentesis  x3      H/O colectomy  Splenectomy , Cholecystectomy   5/2020. Insertion of Mediport      Malignant neoplasm of appendix  chemo 10/2021; s/p Pipac x 7 last 04/2022          SOCIAL HISTORY:    FAMILY HISTORY:  FH: diabetes mellitus (Father)    FH: HTN (hypertension) (Mother)        MEDICATIONS  (STANDING):  dextrose 5% + lactated ringers 1000 milliLiter(s) (50 mL/Hr) IV Continuous <Continuous>  dextrose 5% + sodium chloride 0.9% 1000 milliLiter(s) (84 mL/Hr) IV Continuous <Continuous>  enoxaparin Injectable 40 milliGRAM(s) SubCutaneous every 24 hours  escitalopram 5 milliGRAM(s) Oral daily  famotidine    Tablet 40 milliGRAM(s) Oral daily  olaparib 300 milliGRAM(s) Oral two times a day  pantoprazole  Injectable 40 milliGRAM(s) IV Push daily  simvastatin 10 milliGRAM(s) Oral at bedtime  tamsulosin 0.4 milliGRAM(s) Oral at bedtime    MEDICATIONS  (PRN):  HYDROmorphone  Injectable 0.5 milliGRAM(s) IV Push every 6 hours PRN Severe Pain (7 - 10)      Allergies    barium sulfate (Unknown)    Intolerances        Vital Signs Last 24 Hrs  T(C): 36.7 (17 Feb 2023 12:48), Max: 36.9 (16 Feb 2023 17:01)  T(F): 98 (17 Feb 2023 12:48), Max: 98.5 (16 Feb 2023 17:01)  HR: 52 (17 Feb 2023 12:48) (48 - 57)  BP: 113/64 (17 Feb 2023 12:48) (113/64 - 133/73)  BP(mean): --  RR: 18 (17 Feb 2023 12:48) (18 - 18)  SpO2: 100% (17 Feb 2023 12:48) (90% - 100%)    Parameters below as of 17 Feb 2023 12:48  Patient On (Oxygen Delivery Method): room air        PHYSICAL EXAM  General: adult in NAD  HEENT: clear oropharynx, anicteric sclera, pink conjunctiva  Neck: supple  CV: normal S1/S2 with no murmur rubs or gallops  Lungs: positive air movement b/l ant lungs, clear to auscultation, no wheezes, no rales  Abdomen: soft non-tender non-distended, no hepatosplenomegaly  Ext: no clubbing cyanosis or edema  Skin: no rashes and no petechiae  Neuro: alert and oriented X 3, none focal    LABS:                          9.7    7.09  )-----------( 328      ( 17 Feb 2023 06:33 )             30.7         Mean Cell Volume : 90.3 fL  Mean Cell Hemoglobin : 28.5 pg  Mean Cell Hemoglobin Concentration : 31.6 gm/dL  Auto Neutrophil # : x  Auto Lymphocyte # : x  Auto Monocyte # : x  Auto Eosinophil # : x  Auto Basophil # : x  Auto Neutrophil % : x  Auto Lymphocyte % : x  Auto Monocyte % : x  Auto Eosinophil % : x  Auto Basophil % : x      Serial CBC's  02-17 @ 06:33  Hct-30.7 / Hgb-9.7 / Plat-328 / RBC-3.40 / WBC-7.09  Serial CBC's  02-16 @ 06:40  Hct-30.8 / Hgb-9.8 / Plat-344 / RBC-3.39 / WBC-5.16  Serial CBC's  02-15 @ 23:31  Hct-33.8 / Hgb-10.7 / Plat-376 / RBC-3.76 / WBC-7.09      02-17    137  |  105  |  11  ----------------------------<  105<H>  4.0   |  24  |  1.52<H>    Ca    8.6      17 Feb 2023 06:33  Phos  3.2     02-17  Mg     1.90     02-17        PT/INR - ( 15 Feb 2023 23:31 )   PT: 12.3 sec;   INR: 1.06 ratio         PTT - ( 15 Feb 2023 23:31 )  PTT:26.4 sec                RADIOLOGY & ADDITIONAL STUDIES:

## 2023-02-17 NOTE — PROGRESS NOTE ADULT - ASSESSMENT
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 s/p cytoreductive surgery 2020, FOLFOX x 6 months and HIPEC, s/p PIPAC x 8, who presents with possible recurrent malignant SBO awaiting Ct scan    PLAN:  - NPO/IVF  - Pt refusing PICC placement; pt requesting use of mediport (placed in April 2020; hasn't been used in 2y; gets flushed regularly, per pt last flushed 5d ago) discussed with Nutrition Support; blood cultures from mediport sent and ID consult placed for clearance for mediport use for TPN  - TPN once mediport cleared for use  - c/w home meds  - Hem/onc consult   - Palliative consult  - VTE ppx: Lovenox    D TEAM SURGERY  [id number]

## 2023-02-18 NOTE — PROGRESS NOTE ADULT - SUBJECTIVE AND OBJECTIVE BOX
NUTRITION NOTE  [id number] [place]  ===============================    Interval events - Patient was seen and examined at bedside, no acute events overnight. Patient denies chest pain, shortness of breath, nausea or vomiting at this time. Plan to initiate parenteral nutrition pending culture results from mediport.    ROS: Except as noted above, all other systems reviewed and are negative     Allergies  barium sulfate (Unknown)    PAST MEDICAL & SURGICAL HISTORY:  HLD (hyperlipidemia)  BPH (benign prostatic hyperplasia)  Neuropathic pain hands and feet  UTI (urinary tract infection) pt denes recent infection  Appendix carcinoma PIPAC x 7, last done 4/2022  Anxiety  Cancer of appendix  Malignant neoplasm of appendix  GERD (gastroesophageal reflux disease) pt denes recent endoscopy  Abdominal pain  Encounter for palliative care  History of undescended testicle age 7  Abdominal mass surgery 5/2020  remove mass, spleen, partial colectomy, lymph nodes, part small intestines, omentum, apendix, gall bladder, part stomach. HIPEC  History of abdominal paracentesis x3  H/O colectomy Splenectomy , Cholecystectomy   5/2020. Insertion of Mediport  Malignant neoplasm of appendix chemo 10/2021; s/p Pipac x 7 last 04/2022    FAMILY HISTORY:  FH: diabetes mellitus (Father)  FH: HTN (hypertension) (Mother)    Vital Signs Last 24 Hrs  T(C): 36.7 (18 Feb 2023 04:05), Max: 36.8 (18 Feb 2023 00:28)  T(F): 98 (18 Feb 2023 04:05), Max: 98.3 (18 Feb 2023 00:28)  HR: 47 (18 Feb 2023 04:05) (42 - 52)  BP: 116/68 (18 Feb 2023 04:05) (108/61 - 128/76)  RR: 18 (18 Feb 2023 04:05) (16 - 18)  SpO2: 98% (18 Feb 2023 00:28) (98% - 100%)    MEDICATIONS  (STANDING):  dextrose 5% + lactated ringers 1000 milliLiter(s) (50 mL/Hr) IV Continuous <Continuous>  dextrose 5% + sodium chloride 0.9% 1000 milliLiter(s) (84 mL/Hr) IV Continuous <Continuous>  enoxaparin Injectable 40 milliGRAM(s) SubCutaneous every 24 hours  escitalopram 5 milliGRAM(s) Oral daily  famotidine    Tablet 40 milliGRAM(s) Oral daily  fat emulsion (Fish Oil and Plant Based) 20% Infusion 10.4 mL/Hr (10.4 mL/Hr) IV Continuous <Continuous>  magnesium sulfate  IVPB 2 Gram(s) IV Intermittent once  olaparib 300 milliGRAM(s) Oral two times a day  pantoprazole  Injectable 40 milliGRAM(s) IV Push daily  Parenteral Nutrition - Adult 1 Each TPN Continuous <Continuous>  simvastatin 10 milliGRAM(s) Oral at bedtime  tamsulosin 0.4 milliGRAM(s) Oral at bedtime    MEDICATIONS  (PRN):  HYDROmorphone  Injectable 0.5 milliGRAM(s) IV Push every 6 hours PRN Severe Pain (7 - 10)    I&O's Detail    17 Feb 2023 07:01  -  18 Feb 2023 07:00  --------------------------------------------------------  IN:    dextrose 5% + lactated ringers w/ Additives: 700 mL    dextrose 5% + sodium chloride 0.9% w/ Additives: 996 mL  Total IN: 1696 mL    OUT:    Oral Fluid: 0 mL    Voided (mL): 650 mL  Total OUT: 650 mL    Total NET: 1046 mL      18 Feb 2023 07:01  -  18 Feb 2023 11:15  --------------------------------------------------------  IN:  Total IN: 0 mL    OUT:    Voided (mL): 140 mL  Total OUT: 140 mL    Total NET: -140 mL    Drug Dosing Weight  Height (cm): 170.2 (16 Feb 2023 05:56)  Weight (kg): 55.9 (16 Feb 2023 05:56)  BMI (kg/m2): 19.3 (16 Feb 2023 05:56)  BSA (m2): 1.65 (16 Feb 2023 05:56)    PHYSICAL EXAM:  Constitutional: A&Ox3, NAD  Gastrointestinal: Abdomen soft nontender, distended, right abdominal bulge mid abdomen   Extremities: Moving all extremities, no edema    Diet: NPO; plan to start parenteral nutrition pending port culture results    LABORATORY                                 9.8    5.66  )-----------( 324      ( 18 Feb 2023 06:50 )             30.9   02-18    140  |  106  |  10  ----------------------------<  91  3.9   |  26  |  1.53<H>    Ca    8.7      18 Feb 2023 06:50  Phos  3.3     02-18  Mg     1.70     02-18    TPro  4.9<L>  /  Alb  3.1<L>  /  TBili  0.9  /  DBili  x   /  AST  14  /  ALT  24  /  AlkPhos  90  02-18    LIVER FUNCTIONS - ( 18 Feb 2023 06:50 )  Alb: 3.1 g/dL / Pro: 4.9 g/dL / ALK PHOS: 90 U/L / ALT: 24 U/L / AST: 14 U/L / GGT: x           02-17 Chol -- LDL -- HDL -- Trig 61    ASSESSMENT/PLAN:  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 Nutrition support consult called for evaluation for parenteral nutrition in view of signification weight loss (per pt, about 18 lbs in past 2-3 months) and PO intolerance.    Pt refusing PICC placement and would like to have TPN through mediport. Cultures from mediport drawn on 2/17, plated in lab at 12:50 pm. Awaiting culture results from port.     TPN bag ordered for tonight, initiation of TPN will be pending culture results. TPN bag ordered to run cycled over 12 hours with 1L infusion volume.     labs reviewed - electrolytes adjusted in TPN bag    monitor fingersticks, obtain daily weights    will continue to follow; TPN volume and calories will be increased tomorrow      1.  Severe protein calorie malnutrition being optimized with TPN: CHO [125] gm.  AA [45] gm. SMOF Lipids [25] gm.  2.  Hyperglycemia managed with: [0] units of regular insulin    3.  Check fluid balance daily.  Strict I/O  [ ] [ ]   4.  Daily BMP, Ionized Calcium, Magnesium and Phosphorous   5.  Triglycerides at initiation of TPN and monthly 02-17 Chol -- LDL -- HDL -- Trig 61     Nutrition Support [id number]

## 2023-02-18 NOTE — PROGRESS NOTE ADULT - SUBJECTIVE AND OBJECTIVE BOX
D Team (Surgical Oncology) Daily Progress Note    SUBJECTIVE:  Pt seen and examined, and is resting comfortably in bed. No acute events overnight. Pain is adequately controlled on current regimen. Pt has no complaints at this time. +/- for flatus and BM.     OBJECTIVE:  Vital Signs Last 24 Hrs  T(C): 36.7 (18 Feb 2023 12:12), Max: 36.8 (18 Feb 2023 00:28)  T(F): 98 (18 Feb 2023 12:12), Max: 98.3 (18 Feb 2023 00:28)  HR: 51 (18 Feb 2023 12:12) (42 - 51)  BP: 103/54 (18 Feb 2023 12:12) (103/54 - 128/76)  BP(mean): --  RR: 18 (18 Feb 2023 12:12) (16 - 18)  SpO2: 100% (18 Feb 2023 12:12) (98% - 100%)    Parameters below as of 18 Feb 2023 12:12  Patient On (Oxygen Delivery Method): room air        I&O's Detail    17 Feb 2023 07:01  -  18 Feb 2023 07:00  --------------------------------------------------------  IN:    dextrose 5% + lactated ringers w/ Additives: 700 mL    dextrose 5% + sodium chloride 0.9% w/ Additives: 996 mL  Total IN: 1696 mL    OUT:    Oral Fluid: 0 mL    Voided (mL): 650 mL  Total OUT: 650 mL    Total NET: 1046 mL      18 Feb 2023 07:01  -  18 Feb 2023 13:09  --------------------------------------------------------  IN:    dextrose 5% + lactated ringers w/ Additives: 200 mL    IV PiggyBack: 50 mL  Total IN: 250 mL    OUT:    Voided (mL): 140 mL  Total OUT: 140 mL    Total NET: 110 mL        Exam:  Constitutional: A&Ox3, NAD  Respiratory: Unlabored breathing  Abdomen: Soft, nondistended, NTTP. No rebound or guarding.  Extremities: WWP, CLARK spontaneously                        9.8    5.66  )-----------( 324      ( 18 Feb 2023 06:50 )             30.9       02-18    140  |  106  |  10  ----------------------------<  91  3.9   |  26  |  1.53<H>    Ca    8.7      18 Feb 2023 06:50  Phos  3.3     02-18  Mg     1.70     02-18    TPro  4.9<L>  /  Alb  3.1<L>  /  TBili  0.9  /  DBili  x   /  AST  14  /  ALT  24  /  AlkPhos  90  02-18             D Team (Surgical Oncology) Daily Progress Note    SUBJECTIVE:  Pt seen and examined, and is resting comfortably in bed. No acute events overnight. Pain is adequately controlled on current regimen. Pt has no complaints at this time. Aware of risks and benefits of accessing mediport for TPN pending negative blood cx prelim results this PM. +/- for flatus and BM.     OBJECTIVE:  Vital Signs Last 24 Hrs  T(C): 36.7 (18 Feb 2023 12:12), Max: 36.8 (18 Feb 2023 00:28)  T(F): 98 (18 Feb 2023 12:12), Max: 98.3 (18 Feb 2023 00:28)  HR: 51 (18 Feb 2023 12:12) (42 - 51)  BP: 103/54 (18 Feb 2023 12:12) (103/54 - 128/76)  BP(mean): --  RR: 18 (18 Feb 2023 12:12) (16 - 18)  SpO2: 100% (18 Feb 2023 12:12) (98% - 100%)    Parameters below as of 18 Feb 2023 12:12  Patient On (Oxygen Delivery Method): room air        I&O's Detail    17 Feb 2023 07:01  -  18 Feb 2023 07:00  --------------------------------------------------------  IN:    dextrose 5% + lactated ringers w/ Additives: 700 mL    dextrose 5% + sodium chloride 0.9% w/ Additives: 996 mL  Total IN: 1696 mL    OUT:    Oral Fluid: 0 mL    Voided (mL): 650 mL  Total OUT: 650 mL    Total NET: 1046 mL      18 Feb 2023 07:01  -  18 Feb 2023 13:09  --------------------------------------------------------  IN:    dextrose 5% + lactated ringers w/ Additives: 200 mL    IV PiggyBack: 50 mL  Total IN: 250 mL    OUT:    Voided (mL): 140 mL  Total OUT: 140 mL    Total NET: 110 mL        Exam:  Constitutional: A&Ox3, NAD  Respiratory: Unlabored breathing  Abdomen: Soft, nondistended, NTTP. No rebound or guarding.  Extremities: WWP, CLARK spontaneously                        9.8    5.66  )-----------( 324      ( 18 Feb 2023 06:50 )             30.9       02-18    140  |  106  |  10  ----------------------------<  91  3.9   |  26  |  1.53<H>    Ca    8.7      18 Feb 2023 06:50  Phos  3.3     02-18  Mg     1.70     02-18    TPro  4.9<L>  /  Alb  3.1<L>  /  TBili  0.9  /  DBili  x   /  AST  14  /  ALT  24  /  AlkPhos  90  02-18

## 2023-02-18 NOTE — PROGRESS NOTE ADULT - ASSESSMENT
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 s/p cytoreductive surgery 2020, FOLFOX x 6 months and HIPEC, s/p PIPAC x 8, who presents with possible recurrent malignant SBO awaiting Ct scan    PLAN:  - NPO/IVF  - Pt refusing PICC placement; pt requesting use of mediport (placed in April 2020; hasn't been used in 2y; gets flushed regularly, per pt last flushed 5d ago) discussed with Nutrition Support; blood culture from mediport sent and ID consult placed for clearance for mediport use for TPN  - TPN once mediport cleared for use  - c/w home meds  - Hem/onc consult   - Palliative consult  - VTE ppx: Lovenox    D TEAM SURGERY  [id number]  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

## 2023-02-19 NOTE — PROGRESS NOTE ADULT - ASSESSMENT
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   - c/w home meds  - VTE ppx: Lovenox    CAT TEAM SURGERY  [id number]

## 2023-02-19 NOTE — PROGRESS NOTE ADULT - SUBJECTIVE AND OBJECTIVE BOX
NUTRITION NOTE  [id number] [place]  ===============================    Interval events - Patient was seen and examined at bedside, no acute events overnight. Patient denies chest pain, shortness of breath, nausea or vomiting at this time. Parenteral nutrition initiated via mediport on 2/18/23, prelim cultures from port are negative. TPN volume and calories increased today. TPN and lipids ordered to run over 12 hours tonight.     ROS: Except as noted above, all other systems reviewed and are negative     Allergies  barium sulfate (Unknown)    PAST MEDICAL & SURGICAL HISTORY:  HLD (hyperlipidemia)  BPH (benign prostatic hyperplasia)  Neuropathic pain hands and feet  UTI (urinary tract infection) pt denes recent infection  Appendix carcinoma PIPAC x 7, last done 4/2022  Anxiety  Cancer of appendix  Malignant neoplasm of appendix  GERD (gastroesophageal reflux disease) pt denes recent endoscopy  Abdominal pain  Encounter for palliative care  History of undescended testicle age 7  Abdominal mass surgery 5/2020  remove mass, spleen, partial colectomy, lymph nodes, part small intestines, omentum, apendix, gall bladder, part stomach. HIPEC  History of abdominal paracentesis x3  H/O colectomy Splenectomy , Cholecystectomy   5/2020. Insertion of Mediport  Malignant neoplasm of appendix chemo 10/2021; s/p Pipac x 7 last 04/2022    FAMILY HISTORY:  FH: diabetes mellitus (Father)  FH: HTN (hypertension) (Mother)    Vital Signs Last 24 Hrs  T(C): 36.6 (19 Feb 2023 04:05), Max: 37 (18 Feb 2023 20:15)  T(F): 97.9 (19 Feb 2023 04:05), Max: 98.6 (18 Feb 2023 20:15)  HR: 55 (19 Feb 2023 04:05) (45 - 56)  BP: 110/68 (19 Feb 2023 04:05) (103/54 - 128/74)  RR: 18 (19 Feb 2023 04:05) (17 - 18)  SpO2: 99% (19 Feb 2023 04:05) (97% - 100%)    MEDICATIONS  (STANDING):  enoxaparin Injectable 40 milliGRAM(s) SubCutaneous every 24 hours  escitalopram 5 milliGRAM(s) Oral daily  famotidine    Tablet 40 milliGRAM(s) Oral daily  fat emulsion (Fish Oil and Plant Based) 20% Infusion 20.8 mL/Hr (20.8 mL/Hr) IV Continuous <Continuous>  insulin regular  human corrective regimen sliding scale   SubCutaneous every 6 hours  olaparib 300 milliGRAM(s) Oral two times a day  pantoprazole  Injectable 40 milliGRAM(s) IV Push daily  Parenteral Nutrition - Adult 1 Each TPN Continuous <Continuous>  Parenteral Nutrition - Adult 1 Each TPN Continuous <Continuous>  simvastatin 10 milliGRAM(s) Oral at bedtime  tamsulosin 0.4 milliGRAM(s) Oral at bedtime    MEDICATIONS  (PRN):  HYDROmorphone  Injectable 0.5 milliGRAM(s) IV Push every 6 hours PRN Severe Pain (7 - 10)    I&O's Detail    18 Feb 2023 07:01  -  19 Feb 2023 07:00  --------------------------------------------------------  IN:    dextrose 5% + lactated ringers w/ Additives: 500 mL    Fat Emulsion (Fish Oil &amp; Plant Based) 20% Infusion: 104 mL    IV PiggyBack: 50 mL    TPN (Total Parenteral Nutrition): 840 mL  Total IN: 1494 mL    OUT:    Oral Fluid: 0 mL    Voided (mL): 640 mL  Total OUT: 640 mL    Total NET: 854 mL    Drug Dosing Weight  Height (cm): 170.2 (16 Feb 2023 05:56)  Weight (kg): 55.9 (16 Feb 2023 05:56)  BMI (kg/m2): 19.3 (16 Feb 2023 05:56)  BSA (m2): 1.65 (16 Feb 2023 05:56)    PHYSICAL EXAM:  Constitutional: A&Ox3, NAD  Gastrointestinal: Abdomen soft nontender, distended, right abdominal bulge mid abdomen   Extremities: Moving all extremities, no edema    Diet: NPO and TPN/lipids (started on 2/18/23)    LABORATORY                        10.0   4.79  )-----------( 317      ( 19 Feb 2023 06:28 )             31.7     02-19    137  |  104  |  12  ----------------------------<  127<H>  4.1   |  29  |  1.52<H>    Ca    8.5      19 Feb 2023 06:28  Phos  2.9     02-19  Mg     2.10     02-19    TPro  5.2<L>  /  Alb  3.2<L>  /  TBili  0.7  /  DBili  x   /  AST  12  /  ALT  19  /  AlkPhos  85  02-19    LIVER FUNCTIONS - ( 19 Feb 2023 06:28 )  Alb: 3.2 g/dL / Pro: 5.2 g/dL / ALK PHOS: 85 U/L / ALT: 19 U/L / AST: 12 U/L / GGT: x           02-17 Chol -- LDL -- HDL -- Trig 61    RECENT CULTURES   Culture - Blood (collected 17 Feb 2023 09:23)  Source: .Blood Port Device  Preliminary Report (18 Feb 2023 13:02):    No growth to date.    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 Nutrition support consult called for evaluation for parenteral nutrition in view of signification weight loss (per pt, about 18 lbs in past 2-3 months) and PO intolerance.    Pt refusing [facility]ICC placement [facility] are [facility]GTD.     TPN infusion volume increased to 1.5L, TPN will provide 1710 kcal/day    labs reviewed - electrolytes adjusted in TPN bag    monitor fingersticks, obtain daily weights    continue parenteral nutrition at this time, will follow up with primary team on plan - discharge planning in process with home TPN     1.  Severe protein calorie malnutrition being optimized with TPN: CHO [250] gm.  AA [90] gm. SMOF Lipids [50] gm.  2.  Hyperglycemia managed with: [0] units of regular insulin    3.  Check fluid balance daily.  Strict I/O  [ ] [ ]   4.  Daily BMP, Ionized Calcium, Magnesium and Phosphorous   5.  Triglycerides at initiation of TPN and monthly 02-17 Chol -- LDL -- HDL -- Trig 61     Nutrition Support [id number]

## 2023-02-19 NOTE — PROGRESS NOTE ADULT - SUBJECTIVE AND OBJECTIVE BOX
Surgery Progress Note     Subjective/24hour Events:   Patient seen and examined.   TPN started yesterday.   No acute events overnight.   Pain controlled.   Having GI function.     Vital Signs:  Vital Signs Last 24 Hrs  T(C): 36.6 (19 Feb 2023 04:05), Max: 37 (18 Feb 2023 20:15)  T(F): 97.9 (19 Feb 2023 04:05), Max: 98.6 (18 Feb 2023 20:15)  HR: 55 (19 Feb 2023 04:05) (45 - 56)  BP: 110/68 (19 Feb 2023 04:05) (103/54 - 128/74)  BP(mean): --  RR: 18 (19 Feb 2023 04:05) (17 - 18)  SpO2: 99% (19 Feb 2023 04:05) (97% - 100%)    Parameters below as of 19 Feb 2023 04:05  Patient On (Oxygen Delivery Method): room air        CAPILLARY BLOOD GLUCOSE          I&O's Detail    18 Feb 2023 07:01  -  19 Feb 2023 07:00  --------------------------------------------------------  IN:    dextrose 5% + lactated ringers w/ Additives: 500 mL    Fat Emulsion (Fish Oil &amp; Plant Based) 20% Infusion: 104 mL    IV PiggyBack: 50 mL    TPN (Total Parenteral Nutrition): 840 mL  Total IN: 1494 mL    OUT:    Oral Fluid: 0 mL    Voided (mL): 640 mL  Total OUT: 640 mL    Total NET: 854 mL          MEDICATIONS  (STANDING):  enoxaparin Injectable 40 milliGRAM(s) SubCutaneous every 24 hours  escitalopram 5 milliGRAM(s) Oral daily  famotidine    Tablet 40 milliGRAM(s) Oral daily  fat emulsion (Fish Oil and Plant Based) 20% Infusion 20.8 mL/Hr (20.8 mL/Hr) IV Continuous <Continuous>  insulin regular  human corrective regimen sliding scale   SubCutaneous every 6 hours  olaparib 300 milliGRAM(s) Oral two times a day  pantoprazole  Injectable 40 milliGRAM(s) IV Push daily  Parenteral Nutrition - Adult 1 Each TPN Continuous <Continuous>  Parenteral Nutrition - Adult 1 Each TPN Continuous <Continuous>  simvastatin 10 milliGRAM(s) Oral at bedtime  tamsulosin 0.4 milliGRAM(s) Oral at bedtime    MEDICATIONS  (PRN):  HYDROmorphone  Injectable 0.5 milliGRAM(s) IV Push every 6 hours PRN Severe Pain (7 - 10)      Physical Exam:  Gen: NAD.  Lungs: Non labored breathing.   Ab: Soft, nontender, nondistended.   Ext: Moves all 4 spontaneously.     Labs:    02-19    137  |  104  |  12  ----------------------------<  127<H>  4.1   |  29  |  1.52<H>    Ca    8.5      19 Feb 2023 06:28  Phos  2.9     02-19  Mg     2.10     02-19    TPro  5.2<L>  /  Alb  3.2<L>  /  TBili  0.7  /  DBili  x   /  AST  12  /  ALT  19  /  AlkPhos  85  02-19    LIVER FUNCTIONS - ( 19 Feb 2023 06:28 )  Alb: 3.2 g/dL / Pro: 5.2 g/dL / ALK PHOS: 85 U/L / ALT: 19 U/L / AST: 12 U/L / GGT: x                                 10.0   4.79  )-----------( 317      ( 19 Feb 2023 06:28 )             31.7

## 2023-02-20 NOTE — PROGRESS NOTE ADULT - SUBJECTIVE AND OBJECTIVE BOX
NUTRITION NOTE  [id number] HECTOR  ===============================    Interval events; no acute events overnight   Blood cxs negative to date.     VITAL SIGNS:  T(C): 36.9 (02-20-23 @ 12:25), Max: 37 (02-20-23 @ 00:33)  HR: 55 (02-20-23 @ 12:25) (55 - 76)  BP: 123/73 (02-20-23 @ 12:25) (112/71 - 123/73)  ABP: --  ABP(mean): --  RR: 19 (02-20-23 @ 12:25) (18 - 19)  SpO2: 100% (02-20-23 @ 12:25) (98% - 100%)  CVP(mm Hg): --  02-19 @ 07:01  -  02-20 @ 07:00  --------------------------------------------------------  IN: 93.4 mL / OUT: 1600 mL / NET: -1506.6 mL    02-20 @ 07:01  -  02-20 @ 12:56  --------------------------------------------------------  IN: 0 mL / OUT: 300 mL / NET: -300 mL    Glucose 113-151    PHYSICAL EXAM:  Constitutional: A&Ox3, NAD  Gastrointestinal: Abdomen soft nontender, distended, right abdominal bulge mid abdomen   Extremities: Moving all extremities, no edema  PICC Site: C/D/I     Diet: NPO and TPN/lipids (started on 2/18/23)    Metabolic/FLUIDS/ELECTROLYTES/NUTRITION:  Gastrointestinal Medications:  famotidine    Tablet 40 milliGRAM(s) Oral daily  fat emulsion (Fish Oil and Plant Based) 20% Infusion 20.8 mL/Hr IV Continuous <Continuous>  fat emulsion (Fish Oil and Plant Based) 20% Infusion 20.8 mL/Hr IV Continuous <Continuous>  pantoprazole    Tablet 40 milliGRAM(s) Oral before breakfast  Parenteral Nutrition - Adult 1 Each TPN Continuous <Continuous>  Parenteral Nutrition - Adult 1 Each TPN Continuous <Continuous>    I&O's Detail  62y  Daily   02-20    136  |  102  |  25<H>  ----------------------------<  99  5.0   |  29  |  1.49<H>    Ca    8.8      20 Feb 2023 10:08  Phos  2.9     02-19  Mg     2.10     02-19    TPro  5.2<L>  /  Alb  3.2<L>  /  TBili  0.7  /  DBili  x   /  AST  12  /  ALT  19  /  AlkPhos  85  02-      INFECTIOUS DISEASE:  Antimicrobials/Immunologic Medications:    RECENT CULTURES:  02-17 @ 09:23 .Blood Port Device     No growth to date.    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 Nutrition support consult called for evaluation for parenteral nutrition in view of signification weight loss (per pt, about 18 lbs in past 2-3 months) and PO intolerance.    TPN given through mediport. Cultures from mediport drawn on 2/17 are NGTD.     TPN infusion volume increased to 1.5L, TPN will provide 1710 kcal/day    labs reviewed - electrolytes adjusted in TPN bag    monitor fingersticks, obtain daily weights    continue parenteral nutrition at this time, will follow up with primary team on plan - discharge planning in process with home TPN     1.  Severe protein calorie malnutrition being optimized with TPN: CHO [250] gm.  AA [90] gm. SMOF Lipids [50] gm.  2.  Hyperglycemia managed with: [0] units of regular insulin    3.  Check fluid balance daily.  Strict I/O  [ ] [ ]   4.  Daily BMP, Ionized Calcium, Magnesium and Phosphorous   5.  Triglycerides at initiation of TPN and monthly 02-17 Chol -- LDL -- HDL -- Trig 61     Nutrition Support [id number]          NUTRITION NOTE  [id number] HECTOR  ===============================    Interval events; no acute events overnight   Blood cxs negative to date.     VITAL SIGNS:  T(C): 36.9 (02-20-23 @ 12:25), Max: 37 (02-20-23 @ 00:33)  HR: 55 (02-20-23 @ 12:25) (55 - 76)  BP: 123/73 (02-20-23 @ 12:25) (112/71 - 123/73)  ABP: --  ABP(mean): --  RR: 19 (02-20-23 @ 12:25) (18 - 19)  SpO2: 100% (02-20-23 @ 12:25) (98% - 100%)  CVP(mm Hg): --  02-19 @ 07:01  -  02-20 @ 07:00  --------------------------------------------------------  IN: 93.4 mL / OUT: 1600 mL / NET: -1506.6 mL    02-20 @ 07:01  -  02-20 @ 12:56  --------------------------------------------------------  IN: 0 mL / OUT: 300 mL / NET: -300 mL    Glucose 113-151    PHYSICAL EXAM:  Constitutional: A&Ox3, NAD  Gastrointestinal: Abdomen soft nontender, distended, right abdominal bulge mid abdomen   Extremities: Moving all extremities, no edema  Med port Site: C/D/I     Diet: NPO and TPN/lipids (started on 2/18/23)    Metabolic/FLUIDS/ELECTROLYTES/NUTRITION:  Gastrointestinal Medications:  famotidine    Tablet 40 milliGRAM(s) Oral daily  fat emulsion (Fish Oil and Plant Based) 20% Infusion 20.8 mL/Hr IV Continuous <Continuous>  fat emulsion (Fish Oil and Plant Based) 20% Infusion 20.8 mL/Hr IV Continuous <Continuous>  pantoprazole    Tablet 40 milliGRAM(s) Oral before breakfast  Parenteral Nutrition - Adult 1 Each TPN Continuous <Continuous>  Parenteral Nutrition - Adult 1 Each TPN Continuous <Continuous>    I&O's Detail  62y  Daily   02-20    136  |  102  |  25<H>  ----------------------------<  99  5.0   |  29  |  1.49<H>    Ca    8.8      20 Feb 2023 10:08  Phos  2.9     02-19  Mg     2.10     02-19    TPro  5.2<L>  /  Alb  3.2<L>  /  TBili  0.7  /  DBili  x   /  AST  12  /  ALT  19  /  AlkPhos  85  02-      INFECTIOUS DISEASE:  Antimicrobials/Immunologic Medications:    RECENT CULTURES:  02-17 @ 09:23 .Blood Port Device     No growth to date.    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 Nutrition support consult called for evaluation for parenteral nutrition in view of signification weight loss (per pt, about 18 lbs in past 2-3 months) and PO intolerance.    TPN given through mediport. Cultures from mediport drawn on 2/17 are NGTD.     TPN infusion volume increased to 1.5L, TPN will provide 1710 kcal/day    labs reviewed - electrolytes adjusted in TPN bag    monitor fingersticks, obtain daily weights    continue parenteral nutrition at this time, will follow up with primary team on plan - discharge planning in process with home TPN     1.  Severe protein calorie malnutrition being optimized with TPN: CHO [250] gm.  AA [90] gm. SMOF Lipids [50] gm.  2.  Hyperglycemia managed with: [0] units of regular insulin    3.  Check fluid balance daily.  Strict I/O  [ ] [ ]   4.  Daily BMP, Ionized Calcium, Magnesium and Phosphorous   5.  Triglycerides at initiation of TPN and monthly 02-17 Chol -- LDL -- HDL -- Trig 61     Nutrition Support [id number]

## 2023-02-20 NOTE — PROGRESS NOTE ADULT - SUBJECTIVE AND OBJECTIVE BOX
Surgery Progress Note    Overnight Events: No acute events overnight.  SUBJECTIVE: Patient seen and examined at bedside with surgical team, patient without complaints. Denies fever, chills, CP, SOB nausea, vomiting, dizziness.      OBJECTIVE:    Vital Signs Last 24 Hrs  T(C): 36.7 (20 Feb 2023 06:12), Max: 37 (20 Feb 2023 00:33)  T(F): 98 (20 Feb 2023 06:12), Max: 98.6 (20 Feb 2023 00:33)  HR: 56 (20 Feb 2023 06:12) (55 - 57)  BP: 112/71 (20 Feb 2023 06:12) (112/71 - 128/74)  BP(mean): --  RR: 18 (20 Feb 2023 06:12) (18 - 18)  SpO2: 99% (20 Feb 2023 06:12) (98% - 100%)    Parameters below as of 20 Feb 2023 06:12  Patient On (Oxygen Delivery Method): room air    I&O's Detail    19 Feb 2023 07:01  -  20 Feb 2023 07:00  --------------------------------------------------------  IN:    Fat Emulsion (Fish Oil &amp; Plant Based) 20% Infusion: 10.4 mL    TPN (Total Parenteral Nutrition): 83 mL  Total IN: 93.4 mL    OUT:    Oral Fluid: 0 mL    Voided (mL): 1600 mL  Total OUT: 1600 mL    Total NET: -1506.6 mL      MEDICATIONS  (STANDING):  enoxaparin Injectable 40 milliGRAM(s) SubCutaneous every 24 hours  escitalopram 5 milliGRAM(s) Oral daily  famotidine    Tablet 40 milliGRAM(s) Oral daily  fat emulsion (Fish Oil and Plant Based) 20% Infusion 20.8 mL/Hr (20.8 mL/Hr) IV Continuous <Continuous>  insulin lispro (ADMELOG) corrective regimen sliding scale   SubCutaneous every 6 hours  olaparib 300 milliGRAM(s) Oral two times a day  pantoprazole  Injectable 40 milliGRAM(s) IV Push daily  Parenteral Nutrition - Adult 1 Each TPN Continuous <Continuous>  simvastatin 10 milliGRAM(s) Oral at bedtime  tamsulosin 0.4 milliGRAM(s) Oral at bedtime    MEDICATIONS  (PRN):  HYDROmorphone  Injectable 0.5 milliGRAM(s) IV Push every 6 hours PRN Severe Pain (7 - 10)      PHYSICAL EXAM:  Constitutional: A&Ox3, NAD  Respiratory: Unlabored breathing  Abdomen: Soft, nondistended, NTTP. No rebound or guarding.   Extremities: WWP, CLARK spontaneously    LABS:                        10.0   4.79  )-----------( 317      ( 19 Feb 2023 06:28 )             31.7     02-19    137  |  104  |  12  ----------------------------<  127<H>  4.1   |  29  |  1.52<H>    Ca    8.5      19 Feb 2023 06:28  Phos  2.9     02-19  Mg     2.10     02-19    TPro  5.2<L>  /  Alb  3.2<L>  /  TBili  0.7  /  DBili  x   /  AST  12  /  ALT  19  /  AlkPhos  85  02-19      LIVER FUNCTIONS - ( 19 Feb 2023 06:28 )  Alb: 3.2 g/dL / Pro: 5.2 g/dL / ALK PHOS: 85 U/L / ALT: 19 U/L / AST: 12 U/L / GGT: x               IMAGING:

## 2023-02-20 NOTE — PROGRESS NOTE ADULT - ASSESSMENT
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   - f/u CM for home TPN services setup  - CEA level prior to discharge  - c/w home meds  - VTE ppx: Lovenox    D TEAM SURGERY  [id number]

## 2023-02-21 NOTE — DISCHARGE NOTE NURSING/CASE MANAGEMENT/SOCIAL WORK - NSDCPEFALRISK_GEN_ALL_CORE
For information on Fall & Injury Prevention, visit: [email or web address] OR  [email or web address] OR  https://www.cdc.gov/steadi/patient.html

## 2023-02-21 NOTE — PROGRESS NOTE ADULT - ASSESSMENT
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   - f/u CM for home TPN services setup  - CEA level prior to discharge  - c/w home meds  - VTE ppx: Lovenox    D TEAM SURGERY  [id number]

## 2023-02-21 NOTE — PROGRESS NOTE ADULT - SUBJECTIVE AND OBJECTIVE BOX
NUTRITION NOTE  [id number] GREEN[place]OD  ===============================    Interval events; no acute events overnight; discharge planning in process     VITAL SIGNS:  T(C): 36.5 (02-21-23 @ 09:30), Max: 37.1 (02-20-23 @ 15:42)  HR: 54 (02-21-23 @ 09:30) (54 - 76)  BP: 114/69 (02-21-23 @ 09:30) (113/62 - 123/73)  ABP: --  ABP(mean): --  RR: 17 (02-21-23 @ 09:30) (16 - 19)  SpO2: 100% (02-21-23 @ 09:30) (100% - 100%)  CVP(mm Hg): --  02-20 @ 07:01  -  02-21 @ 07:00  --------------------------------------------------------  IN: 95.8 mL / OUT: 1150 mL / NET: -1054.2 mL    02-21 @ 07:01  -  02-21 @ 11:47  --------------------------------------------------------  IN: 20 mL / OUT: 800 mL / NET: -780 mL    Glucose 117-151      PHYSICAL EXAM:  Constitutional: A&Ox3, NAD  Gastrointestinal: Abdomen soft nontender, distended, right abdominal bulge mid abdomen   Extremities: Moving all extremities, no edema  Med port Site: C/D/I     Diet: NPO and TPN/lipids (started on 2/18/23)    Metabolic/FLUIDS/ELECTROLYTES/NUTRITION:  Gastrointestinal Medications:  famotidine    Tablet 40 milliGRAM(s) Oral daily  fat emulsion (Fish Oil and Plant Based) 20% Infusion 20.8 mL/Hr IV Continuous <Continuous>  fat emulsion (Fish Oil and Plant Based) 20% Infusion 20.8 mL/Hr IV Continuous <Continuous>  fat emulsion (Fish Oil and Plant Based) 20% Infusion 20.8 mL/Hr IV Continuous <Continuous>  pantoprazole    Tablet 40 milliGRAM(s) Oral before breakfast  Parenteral Nutrition - Adult 1 Each TPN Continuous <Continuous>  Parenteral Nutrition - Adult 1 Each TPN Continuous <Continuous>    I&O's Detail  62y  Daily   02-21    136  |  102  |  29<H>  ----------------------------<  98  3.8   |  21<L>  |  1.39<H>    Ca    8.5      21 Feb 2023 05:53  Phos  3.4     02-21  Mg     2.00     02-21    INFECTIOUS DISEASE:  Antimicrobials/Immunologic Medications:    RECENT CULTURES:  02-17 @ 09:23 .Blood Port Device     No growth to date.    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 Nutrition support consult called for evaluation for parenteral nutrition in view of signification weight loss (per pt, about 18 lbs in past 2-3 months) and PO intolerance.    TPN given through mediport     TPN infusion volume increased to 1.5L, TPN will provide 1710 kcal/day    labs reviewed - electrolytes adjusted in TPN bag    monitor fingersticks, obtain daily weights    continue parenteral nutrition at this time, will follow up with primary team on plan - discharge planning in process with home TPN     1.  Severe protein calorie malnutrition being optimized with TPN: CHO [250] gm.  AA [90] gm. SMOF Lipids [50] gm.  2.  Hyperglycemia managed with: [0] units of regular insulin    3.  Check fluid balance daily.  Strict I/O  [ ] [ ]   4.  Daily BMP, Ionized Calcium, Magnesium and Phosphorous   5.  Triglycerides at initiation of TPN and monthly 02-17 Chol -- LDL -- HDL -- Trig 61     Nutrition Support [id number]

## 2023-02-21 NOTE — DISCHARGE NOTE NURSING/CASE MANAGEMENT/SOCIAL WORK - PATIENT PORTAL LINK FT
You can access the FollowMyHealth Patient Portal offered by [facility] by registering at the following website: [email or web address]. By joining [facility]’s FollowMyHealth portal, you will also be able to view your health information using other applications (apps) compatible with our system.

## 2023-02-21 NOTE — PROGRESS NOTE ADULT - SUBJECTIVE AND OBJECTIVE BOX
Morning Surgical Progress Note  Patient is a 62y old  Male who presents with a chief complaint of SBO (20 Feb 2023 12:56)    SUBJECTIVE: Patient seen and examined at bedside with surgical team, patient without complaints.     Vital Signs Last 24 Hrs  T(C): 36.7 (20 Feb 2023 20:11), Max: 37.1 (20 Feb 2023 15:42)  T(F): 98 (20 Feb 2023 20:11), Max: 98.8 (20 Feb 2023 15:42)  HR: 76 (20 Feb 2023 20:11) (55 - 76)  BP: 120/66 (20 Feb 2023 20:11) (119/53 - 123/73)  BP(mean): --  RR: 18 (20 Feb 2023 20:11) (18 - 19)  SpO2: 100% (20 Feb 2023 20:11) (98% - 100%)    Parameters below as of 20 Feb 2023 20:11  Patient On (Oxygen Delivery Method): room air    I&O's Detail    19 Feb 2023 07:01  -  20 Feb 2023 07:00  --------------------------------------------------------  IN:    Fat Emulsion (Fish Oil &amp; Plant Based) 20% Infusion: 10.4 mL    TPN (Total Parenteral Nutrition): 83 mL  Total IN: 93.4 mL    OUT:    Oral Fluid: 0 mL    Voided (mL): 1600 mL  Total OUT: 1600 mL    Total NET: -1506.6 mL      20 Feb 2023 07:01  -  21 Feb 2023 06:12  --------------------------------------------------------  IN:    Fat Emulsion (Fish Oil &amp; Plant Based) 20% Infusion: 20.8 mL    TPN (Total Parenteral Nutrition): 75 mL  Total IN: 95.8 mL    OUT:    Fat Emulsion (Fish Oil &amp; Plant Based) 20% Infusion: 0 mL    IV PiggyBack: 0 mL    Oral Fluid: 0 mL    Voided (mL): 1150 mL  Total OUT: 1150 mL    Total NET: -1054.2 mL        Medications  MEDICATIONS  (STANDING):  enoxaparin Injectable 40 milliGRAM(s) SubCutaneous every 24 hours  escitalopram 5 milliGRAM(s) Oral daily  famotidine    Tablet 40 milliGRAM(s) Oral daily  fat emulsion (Fish Oil and Plant Based) 20% Infusion 20.8 mL/Hr (20.8 mL/Hr) IV Continuous <Continuous>  fat emulsion (Fish Oil and Plant Based) 20% Infusion 20.8 mL/Hr (20.8 mL/Hr) IV Continuous <Continuous>  insulin lispro (ADMELOG) corrective regimen sliding scale   SubCutaneous every 6 hours  olaparib 300 milliGRAM(s) Oral two times a day  pantoprazole    Tablet 40 milliGRAM(s) Oral before breakfast  Parenteral Nutrition - Adult 1 Each TPN Continuous <Continuous>  simvastatin 10 milliGRAM(s) Oral at bedtime  tamsulosin 0.4 milliGRAM(s) Oral at bedtime    MEDICATIONS  (PRN):  HYDROmorphone  Injectable 0.5 milliGRAM(s) IV Push every 6 hours PRN Severe Pain (7 - 10)    Physical Exam  Constitutional: A&Ox3, NAD  Gastrointestinal: Soft nontender, nondistended  Extremities: Moving all extremities, no edema  Skin: No Rashes, Hematoma, Ecchymosis  LABS: pending

## 2023-02-22 NOTE — PROGRESS NOTE ADULT - PROVIDER SPECIALTY LIST ADULT
Nutrition Support
Surgery
Nutrition Support

## 2023-02-22 NOTE — PROGRESS NOTE ADULT - NUTRITIONAL ASSESSMENT
This patient has been assessed with a concern for Malnutrition and has been determined to have a diagnosis/diagnoses of Severe protein-calorie malnutrition.    This patient is being managed with:   Parenteral Nutrition - Adult-  Entered: Feb 21 2023 10:00PM    fat emulsion (Fish Oil and Plant Based) 20% Infusion-[Known as SMOFLIPID 20% Infusion]  50 Gram(s) in IV Solution 250 milliLiter(s) infuse at 20.8 mL/Hr  Dose Rate: 20.8 mL/Hr Infuse Over: 12 Hours  Administration Instructions: Use 1.2 micron in-line filter  Entered: Feb 21 2023 10:22AM    Parenteral Nutrition - Adult-  Entered: Feb 20 2023 10:00PM    fat emulsion (Fish Oil and Plant Based) 20% Infusion-[Known as SMOFLIPID 20% Infusion]  50 Gram(s) in IV Solution 250 milliLiter(s) infuse at 20.8 mL/Hr  Dose Rate: 20.8 mL/Hr Infuse Over: 12 Hours  Administration Instructions: Use 1.2 micron in-line filter  Entered: Feb 20 2023 11:30AM    fat emulsion (Fish Oil and Plant Based) 20% Infusion-[Known as SMOFLIPID 20% Infusion]  50 Gram(s) in IV Solution 250 milliLiter(s) infuse at 20.8 mL/Hr  Dose Rate: 20.8 mL/Hr Infuse Over: 12 Hours  Administration Instructions: Use 1.2 micron in-line filter  Entered: Feb 19 2023 10:22AM    Diet NPO-  Except Medications  Entered: Feb 15 2023  7:54PM    
Severe protein calorie malnutrition in acute illness/ injury; secondary to poor appetite, weight loss >5% in 1 month and/or >7.5% in 3 months, caloric Intake <50% of nutrition needs >= 5 days, temporal wasting, severe loss of muscle mass/atrophy and loss of body fat stores.    Diet, NPO:   Except Medications (02-15-23 @ 19:56) [Active]    fat emulsion (Fish Oil and Plant Based) 20% Infusion 20.8 mL/Hr (20.8 mL/Hr) IV Continuous <Continuous>, 02-19-23 @ 10:22,   Parenteral Nutrition - Adult 1 Each TPN Continuous <Continuous>, 02-19-23 @ 22:00, , Stop order after: 1 Days    **Greater than 50% of the encounter was spent counseling and/coordination of care on parenteral nutrition. 25 minutes were spent face to face with the patient.
This patient has been assessed with a concern for Malnutrition and has been determined to have a diagnosis/diagnoses of Severe protein-calorie malnutrition.    This patient is being managed with:   Parenteral Nutrition - Adult-  Entered: Feb 18 2023 10:00PM    fat emulsion (Fish Oil and Plant Based) 20% Infusion-[Known as SMOFLIPID 20% Infusion]  25 Gram(s) in IV Solution 125 milliLiter(s) infuse at 10.4 mL/Hr  Dose Rate: 10.4 mL/Hr Infuse Over: 12 Hours  Administration Instructions: Use 1.2 micron in-line filter  Entered: Feb 18 2023  5:00PM    Diet NPO-  Except Medications  Entered: Feb 15 2023  7:54PM    
This patient has been assessed with a concern for Malnutrition and has been determined to have a diagnosis/diagnoses of Severe protein-calorie malnutrition.    This patient is being managed with:   Parenteral Nutrition - Adult-  Entered: Feb 20 2023 10:00PM    fat emulsion (Fish Oil and Plant Based) 20% Infusion-[Known as SMOFLIPID 20% Infusion]  50 Gram(s) in IV Solution 250 milliLiter(s) infuse at 20.8 mL/Hr  Dose Rate: 20.8 mL/Hr Infuse Over: 12 Hours  Administration Instructions: Use 1.2 micron in-line filter  Entered: Feb 20 2023 11:30AM    fat emulsion (Fish Oil and Plant Based) 20% Infusion-[Known as SMOFLIPID 20% Infusion]  50 Gram(s) in IV Solution 250 milliLiter(s) infuse at 20.8 mL/Hr  Dose Rate: 20.8 mL/Hr Infuse Over: 12 Hours  Administration Instructions: Use 1.2 micron in-line filter  Entered: Feb 19 2023 10:22AM    Diet NPO-  Except Medications  Entered: Feb 15 2023  7:54PM    
Severe protein calorie malnutrition in acute illness/ injury; secondary to poor appetite, weight loss >5% in 1 month and/or >7.5% in 3 months, caloric Intake <50% of nutrition needs >= 5 days, temporal wasting, severe loss of muscle mass/atrophy and loss of body fat stores.     Diet, NPO:   Except Medications (02-15-23 @ 19:56) [Active]      fat emulsion (Fish Oil and Plant Based) 20% Infusion 20.8 mL/Hr (20.8 mL/Hr) IV Continuous <Continuous>, 02-21-23 @ 10:22,   Parenteral Nutrition - Adult 1 Each TPN Continuous <Continuous>, 02-21-23 @ 22:00, , Stop order after: 1 Days        Greater than 50% of the encounter was spent counseling and/coordination of care on parenteral nutrition. 25 minutes were spent face to face with the patient.
Severe protein calorie malnutrition in acute illness/ injury; secondary to poor appetite, weight loss >5% in 1 month and/or >7.5% in 3 months, caloric Intake <50% of nutrition needs >= 5 days, temporal wasting, severe loss of muscle mass/atrophy and loss of body fat stores.    Diet, NPO:   Except Medications (02-15-23 @ 19:56) [Active]    **Greater than 50% of the encounter was spent counseling and/coordination of care on parenteral nutrition. 25 minutes were spent face to face with the patient.
Severe protein calorie malnutrition in acute illness/ injury; secondary to poor appetite, weight loss >5% in 1 month and/or >7.5% in 3 months, caloric Intake <50% of nutrition needs >= 5 days, temporal wasting, severe loss of muscle mass/atrophy and loss of body fat stores.    Diet, NPO:   Except Medications (02-15-23 @ 19:56) [Active]    fat emulsion (Fish Oil and Plant Based) 20% Infusion 10.4 mL/Hr (10.4 mL/Hr) IV Continuous <Continuous>, 02-18-23 @ 17:00, 17:00  Parenteral Nutrition - Adult 1 Each TPN Continuous <Continuous>, 02-18-23 @ 22:00, 22:00, Stop order after: 1 Days    **Greater than 50% of the encounter was spent counseling and/coordination of care on parenteral nutrition. 25 minutes were spent face to face with the patient.
This patient has been assessed with a concern for Malnutrition and has been determined to have a diagnosis/diagnoses of Severe protein-calorie malnutrition.    This patient is being managed with:   Parenteral Nutrition - Adult-  Entered: Feb 20 2023 10:00PM    fat emulsion (Fish Oil and Plant Based) 20% Infusion-[Known as SMOFLIPID 20% Infusion]  50 Gram(s) in IV Solution 250 milliLiter(s) infuse at 20.8 mL/Hr  Dose Rate: 20.8 mL/Hr Infuse Over: 12 Hours  Administration Instructions: Use 1.2 micron in-line filter  Entered: Feb 20 2023 11:30AM    Parenteral Nutrition - Adult-  Entered: Feb 19 2023 10:00PM    fat emulsion (Fish Oil and Plant Based) 20% Infusion-[Known as SMOFLIPID 20% Infusion]  50 Gram(s) in IV Solution 250 milliLiter(s) infuse at 20.8 mL/Hr  Dose Rate: 20.8 mL/Hr Infuse Over: 12 Hours  Administration Instructions: Use 1.2 micron in-line filter  Entered: Feb 19 2023 10:22AM    Diet NPO-  Except Medications  Entered: Feb 15 2023  7:54PM

## 2023-02-22 NOTE — PROGRESS NOTE ADULT - ASSESSMENT
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 Nutrition support consult called for parenteral nutrition in view of signification weight loss (per pt, about 18 lbs in past 2-3 months) and PO intolerance.     PLAN:  - Discharge home today with TPN and home care/infusion services  - TPN to be given via L chest wall mediport  (blood cultures no growth to date 2/17); cycled over 12h; Providing the following:   Carbohydrates:__250__grams, Amino Acid:___90___grams, Lipids:___50____ grams, Total volume:___1500____mL. Total kilocalories: __1710__kcal.  - Weights three times a week  - Monitor BMP, Mg+, Ionized Ca++, Phosphorus weekly upon discharge  - Monitor Triglycerides monthly (16 on 2/17)  - Pre-albumin weekly.  - Normoglycemic, no insulin in TPN    Nutrition Support  [id number]

## 2023-02-22 NOTE — PROGRESS NOTE ADULT - SUBJECTIVE AND OBJECTIVE BOX
NUTRITION NOTE  [id number] [place]  ===============================  INTERVAL EVENTS: Patient is here HD#7 with severe protein calorie malnutrition in the setting of metastatic appendiceal cancer with pseudomyxoma peritonei. Tolerating TPN 1500mL cycled over 12h which is providing 1710 kilocalories.     SUBJECTIVE: Patient seen and examined at bedside, patient without complaints. Denies abdominal pain, nausea, vomiting currently. Denies appetite. Passing flatus. Denies BM. Has been OOB and ambulating. Denies fever, chills, CP, SOB.    ROS: Except as noted above, all other systems reviewed and are negative     HISTORY      OBJECTIVE:    Vital Signs Last 24 Hrs  T(C): 36.5 (22 Feb 2023 06:10), Max: 36.8 (21 Feb 2023 20:00)  T(F): 97.7 (22 Feb 2023 06:10), Max: 98.3 (21 Feb 2023 20:00)  HR: 60 (22 Feb 2023 06:10) (54 - 60)  BP: 114/67 (22 Feb 2023 06:10) (114/67 - 132/77)  BP(mean): --  RR: 17 (22 Feb 2023 06:10) (17 - 17)  SpO2: 100% (22 Feb 2023 06:10) (100% - 100%)    Parameters below as of 22 Feb 2023 06:10  Patient On (Oxygen Delivery Method): room air    I&O's Detail    21 Feb 2023 07:01  -  22 Feb 2023 07:00  --------------------------------------------------------  IN:    Fat Emulsion (Fish Oil &amp; Plant Based) 20% Infusion: 20.8 mL    Oral Fluid: 30 mL    TPN (Total Parenteral Nutrition): 1365 mL  Total IN: 1415.8 mL    OUT:    Fat Emulsion (Fish Oil &amp; Plant Based) 20% Infusion: 0 mL    Fat Emulsion (Fish Oil &amp; Plant Based) 20% Infusion: 0 mL    IV PiggyBack: 0 mL    Voided (mL): 1100 mL  Total OUT: 1100 mL    Total NET: 315.8 mL      MEDICATIONS  (STANDING):  chlorhexidine 2% Cloths 1 Application(s) Topical daily  enoxaparin Injectable 40 milliGRAM(s) SubCutaneous every 24 hours  escitalopram 5 milliGRAM(s) Oral daily  famotidine    Tablet 40 milliGRAM(s) Oral daily  fat emulsion (Fish Oil and Plant Based) 20% Infusion 20.8 mL/Hr (20.8 mL/Hr) IV Continuous <Continuous>  heparin  Lock Flush 100 Units/mL Injectable 100 Unit(s) IV Push once  insulin lispro (ADMELOG) corrective regimen sliding scale   SubCutaneous every 6 hours  olaparib 300 milliGRAM(s) Oral two times a day  pantoprazole    Tablet 40 milliGRAM(s) Oral before breakfast  Parenteral Nutrition - Adult 1 Each TPN Continuous <Continuous>  simvastatin 10 milliGRAM(s) Oral at bedtime  tamsulosin 0.4 milliGRAM(s) Oral at bedtime    MEDICATIONS  (PRN):  HYDROmorphone  Injectable 0.5 milliGRAM(s) IV Push every 6 hours PRN Severe Pain (7 - 10)      PHYSICAL EXAM:  Constitutional: A&Ox3, NAD  Respiratory/Chest: Unlabored breathing; left chest wall mediport C/D/I   Abdomen: Soft, nondistended, NTTP. No rebound or guarding.  Extremities: WWP, CLARK spontaneously    LABS:                        9.9    6.01  )-----------( 337      ( 21 Feb 2023 05:53 )             31.8     02-21    136  |  102  |  29<H>  ----------------------------<  98  3.8   |  21<L>  |  1.39<H>    Ca    8.5      21 Feb 2023 05:53  Phos  3.4     02-21  Mg     2.00     02-21      Metabolic Requirements:  The patient will require:  ______25______ kilocalories / kg / day  Diagnosis:  [  ] Mild protein malnutrition  [  ] Moderate protein calorie malnutrition in acute illness/ injury  [ x ] Severe protein calorie malnutrition in acute illness/ injury  [  ] Moderate protein calorie malnutrition in chronic illness  [  ] Severe protein calorie malnutrition in chronic illness

## 2023-02-28 NOTE — PATIENT PROFILE ADULT - NSPRONUTRITIONRISK_GEN_A_NUR
Addended by: MIGUEL TINOCO on: 2/28/2023 08:20 AM     Modules accepted: Orders    
No indicators present

## 2023-03-06 NOTE — REVIEW OF SYSTEMS
[Patient Intake Form Reviewed] : Patient intake form was reviewed [Recent Change In Weight] : ~T recent weight change [Negative] : Allergic/Immunologic [Fatigue] : no fatigue [FreeTextEntry2] : gained a little  [FreeTextEntry7] : per HPI  [de-identified] : chronic neuropathy  form previous chemotx no change

## 2023-03-06 NOTE — HISTORY OF PRESENT ILLNESS
[Disease: _____________________] : Disease: [unfilled] [T: ___] : T[unfilled] [N: ___] : N[unfilled] [M: ___] : M[unfilled] [de-identified] : 62 y/o male with low grade appendiceal tumor diagnosed in 2020.  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 \par \par 5/7/20 CRS and HIPEC for LAMN  ( Dr. Herberth Pink at [facility] ).  Surgery included  a splenectomy and distal gastrectomy, omentectomy, cholecystectomy, subtotal colectomy and diaphragm stripping. 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  R2b resection. 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 \par \par Moved back to  . \par \par Scans 4/30/21 POD worsening ascites. S/p paracentesis\par \par Seen by medical oncology at [facility] (Dr Daniels) - no role for systemic tx at present time. \par Referred for PIPAC ( Dr Mason) \par \par 6/3/21 - PIPAC #1 \par 7/12/21 - 7/13/21 - Admitted with small bowel obstruction. Dramatically improved after 24 hours nasogastric decompression.   \par 2/10/22: PIPAC #6\par 3/3/22: Continues to travel, scuba dive, ski, lift weights. \par 4/7/22: PIPAC #7. Sons wedding was 4/30/22\par 6/3/22: PIPAC #8\par 6/20/22: CT CAP stable from March. \par Developed severe abdominal pain after the scan, with interval development of ascites. Diagnostic paracentesis with MRSA. Hospitalized X 10 days. \par 7/7/22: Improving s/p hospitalization. Has lost 10 pounds, but is not eating better.  Labs  wbc 17. platelets 1000, CEA 4.3\par 7/20/22: Doing better. Weight stabilizing. WBC 12.7\par \par 8/29/22 - CT A/P - Pseudomyxoma peritonei with low density throughout the central mesentery and scalloping of the right hepatic lobe without significant change. Tumor in the pb hepatis which obliterates the left portal vein has progressed when compared with prior imaging dating to March 2022.\par 9/2/22: CEA slightly increased to 7.7.  Per Dr. Mason patient not a candidate for further PIPAC.  Would reconsider if large volume ascites should reaccumulate. \par \par Next line chemotherapy discussed but chance for response to cytotoxic tx is low in low proliferative tumors.\par Referred for clinical trials- contacted  several centers- no trial options available. Patient was very interested in trying PARP inhibitor outside setting of trial ( potential activity based on small trials ) .\par Plan to start olaparib ( Lynparza 300 mg bid) \par \par HE was admitted [facility]  three times in NOv/ Dec 2022  for malignant SBO- resolved with conservative management. Felt not to be a surgical candidate due to his extend of abdominal disease.\par Lost weight but now trying to maintain caloric intake on low residue diet . \par Started Lynparza 300 mg bid on 12//30/22 \par \par  \par \par  [de-identified] : low grade appendiceal mucinous neoplasm LAMN [de-identified] : [facility] ( done in May 2021-  tumor tissue from May 2020:  PDL1 0 MS stable  TMB 3 muts/Mb  NILO     [id number]  GNAS R201C  and SOX9 H396f  [de-identified] : On Lynparza since Dec 30, 2022 Tolerating OK.  No n/v/ diarrhea. \par HAs transient sx of recurrent partial SBO.\par \par Now on TPN.\par Overall stable. He was able to gain little weight. No obvious recurrence of ascites.\par recent PET ( requested by [facility]) c/w known peritoneal disease but no other sites .\par

## 2023-03-06 NOTE — PHYSICAL EXAM
[Restricted in physically strenuous activity but ambulatory and able to carry out work of a light or sedentary nature] : Status 1- Restricted in physically strenuous activity but ambulatory and able to carry out work of a light or sedentary nature, e.g., light house work, office work [Thin] : thin [Normal] : affect appropriate [de-identified] : looks OK  [de-identified] : no edema   Mediport  [de-identified] : not distended , no overt ascites

## 2023-03-06 NOTE — ASSESSMENT
[FreeTextEntry1] : 61 y/o male with LAMN on olaparib  since Dec 30, 2022\par \par 61 y/o male with low grade  mucinous neoplasm of appendix s/p CRS and HIPC  5/7/20 ( Dr Herberth Pink at [place]) R2 resection, tumor was metastatic to 2/14 regional lymph nodes. S/p 12 cycles ( 6 months ) of adjuvant FOLFOX - completed  in Dec 2020.\par Recurrent disease ( ascites/ pseudomyxoma peritonei)  in less than 6 months after completing adjuvant chemotherapy. \par S/p PIPAC x 6 ( last in June 2022 ). 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 Unfortunately due to postsurgical adhesions he is no longer a candidate for PIPAC.\par \par [employer] NGS showed few mutations including NILO and ARID mutations. PDL10 and MSI stable, low TMB- not a candidate for single agent immunotherapy. \par \par Options include clinical trial with targeted therapy versus standard chemotherapy- FOLFIRI/Bevacizumab.\par Patient interested in targeted therapy . He had poor response to FOLFOX and chance for response with another standard cytotoxic chemotherapy is not high in low proliferative tumors.\par \par No clinical trials available in [place] area.\par \par He was  very interested in trying PARP inhibitor outside setting of clinical trial.\par After long discussion - decided to start olaparib. Limited data but there are  reports of activity of PRP inhibitors in GI cancers with HRD / BRCA/ NILO mutations.\par Discussed side effects of PARP inhibitors.\par \par On   olaparib ( Lynparza) 300 mg bid since 12/30/22 ( approved for Lynparza compassionate use program)\par \par Tolerating well.\par Mild anemia- expected. \par recurrent partial SBO - now on TPN.\par \par Continue Lynparza - 300 mg bid.\par \par Labs monitored weekly due to TPN- stable.\par CEA stable - will recheck in  5-6 weeks  ( April 10) \par \par Exam in April.\par \par Follow up scans  few months- depending on clinical course.\par \par \par D/w patient and his wife . \par \par \par

## 2023-03-09 PROBLEM — F41.9 ANXIETY: Status: ACTIVE | Noted: 2023-01-01

## 2023-03-14 PROBLEM — N18.2 CKD (CHRONIC KIDNEY DISEASE), STAGE II: Status: ACTIVE | Noted: 2023-01-01

## 2023-04-12 NOTE — REASON FOR VISIT
[Home] : at home, [unfilled] , at the time of the visit. [Medical Office: ([facility])___] : at the medical office located in  [Follow-Up Visit] : a follow-up [FreeTextEntry2] : LAMN  on olaparib

## 2023-04-12 NOTE — REVIEW OF SYSTEMS
[Recent Change In Weight] : ~T recent weight change [Negative] : Heme/Lymph [Fatigue] : no fatigue [FreeTextEntry2] : gained a little  [FreeTextEntry7] : per HPI  [de-identified] : chronic neuropathy  form previous chemotx no change

## 2023-04-12 NOTE — ASSESSMENT
[FreeTextEntry1] : 61 y/o male with LAMN on olaparib  since Dec 30, 2022\par \par 61 y/o male with low grade  mucinous neoplasm of appendix s/p CRS and HIPC  5/7/20 ( Dr Herberth Pink at [place]) R2 resection, tumor was metastatic to 2/14 regional lymph nodes. S/p 12 cycles ( 6 months ) of adjuvant FOLFOX - completed  in Dec 2020.\par Recurrent disease ( ascites/ pseudomyxoma peritonei)  in less than 6 months after completing adjuvant chemotherapy. \par S/p PIPAC x 6 ( last in June 2022 ). 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 Unfortunately due to postsurgical adhesions he is no longer a candidate for PIPAC.\par \par [employer] NGS showed few mutations including NILO and ARID mutations. PDL10 and MSI stable, low TMB- not a candidate for single agent immunotherapy. \par \par Options include clinical trial with targeted therapy versus standard chemotherapy- FOLFIRI/Bevacizumab.\par Patient interested in targeted therapy . He had poor response to FOLFOX and chance for response with another standard cytotoxic chemotherapy is not high in low proliferative tumors.\par \par No clinical trials available in [place] area.\par \par He was  very interested in trying PARP inhibitor outside setting of clinical trial.\par After long discussion - decided to start olaparib. Limited data but there are  reports of activity of PRP inhibitors in GI cancers with HRD / BRCA/ NILO mutations.\par Discussed side effects of PARP inhibitors.\par \par On   olaparib ( Lynparza) 300 mg bid since 12/30/22 ( approved for Lynparza compassionate use program)\par \par Tolerating well.\par Mild anemia- expected. \par recurrent partial SBO - now on TPN.\par \par Continue Lynparza - 300 mg bid.\par \par Labs monitored weekly due to TPN- stable.\par CEA stable - will  check monthly ( home draw with TPN labs )\par \par Follow up CT scans ( CAP with contrast) in [place]/ June \par \par Exam in June / sooner PRN.\par \par \par D/w patient and his wife . \par \par \par

## 2023-04-12 NOTE — HISTORY OF PRESENT ILLNESS
[Disease: _____________________] : Disease: [unfilled] [T: ___] : T[unfilled] [N: ___] : N[unfilled] [M: ___] : M[unfilled] [de-identified] : 60 y/o male with low grade appendiceal tumor diagnosed in 2020.  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 \par \par 5/7/20 CRS and HIPEC for LAMN  ( Dr. Herberth Pink at [place] ).  Surgery included  a splenectomy and distal gastrectomy, omentectomy, cholecystectomy, subtotal colectomy and diaphragm stripping. 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  R2b resection. 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 \par \par Moved back to  . \par \par Scans 4/30/21 POD worsening ascites. S/p paracentesis\par \par Seen by medical oncology at [facility] (Dr Daniels) - no role for systemic tx at present time. \par Referred for PIPAC ( Dr Mason) \par \par 6/3/21 - PIPAC #1 \par 7/12/21 - 7/13/21 - Admitted with small bowel obstruction. Dramatically improved after 24 hours nasogastric decompression.   \par 2/10/22: PIPAC #6\par 3/3/22: Continues to travel, scuba dive, ski, lift weights. \par 4/7/22: PIPAC #7. Sons wedding was 4/30/22\par 6/3/22: PIPAC #8\par 6/20/22: CT CAP stable from March. \par Developed severe abdominal pain after the scan, with interval development of ascites. Diagnostic paracentesis with MRSA. Hospitalized X 10 days. \par 7/7/22: Improving s/p hospitalization. Has lost 10 pounds, but is not eating better.  Labs  wbc 17. platelets 1000, CEA 4.3\par 7/20/22: Doing better. Weight stabilizing. WBC 12.7\par \par 8/29/22 - CT A/P - Pseudomyxoma peritonei with low density throughout the central mesentery and scalloping of the right hepatic lobe without significant change. Tumor in the pb hepatis which obliterates the left portal vein has progressed when compared with prior imaging dating to March 2022.\par 9/2/22: CEA slightly increased to 7.7.  Per Dr. Mason patient not a candidate for further PIPAC.  Would reconsider if large volume ascites should reaccumulate. \par \par Next line chemotherapy discussed but chance for response to cytotoxic tx is low in low proliferative tumors.\par Referred for clinical trials- contacted  several centers- no trial options available. Patient was very interested in trying PARP inhibitor outside setting of trial ( potential activity based on small trials ) .\par Plan to start olaparib ( Lynparza 300 mg bid) \par \par HE was admitted [facility]  three times in NOv/ Dec 2022  for malignant SBO- resolved with conservative management. Felt not to be a surgical candidate due to his extend of abdominal disease.\par \par Started Lynparza 300 mg bid on 12//30/22 \par \par  Recurrent partial SBO- now on TPN ( started Dec 2022) \par \par  [de-identified] : low grade appendiceal mucinous neoplasm LAMN [de-identified] : [facility] ( done in May 2021-  tumor tissue from May 2020:  PDL1 0 MS stable  TMB 3 muts/Mb  NILO     [id number]  GNAS R201C  and SOX9 H396f  [de-identified] : On Lynparza since Dec 30, 2022 Tolerating OK.  No n/v/ diarrhea. \par \par Overall stable. He was able to gain little weight. No obvious recurrence of ascites although abdomen slightly more distended/ firm lately.\par \par

## 2023-04-12 NOTE — PHYSICAL EXAM
[Restricted in physically strenuous activity but ambulatory and able to carry out work of a light or sedentary nature] : Status 1- Restricted in physically strenuous activity but ambulatory and able to carry out work of a light or sedentary nature, e.g., light house work, office work [Thin] : thin [Normal] : affect appropriate [de-identified] : looks OK

## 2023-04-28 PROBLEM — I26.99 PULMONARY EMBOLISM ON RIGHT: Status: ACTIVE | Noted: 2023-01-01

## 2023-04-28 NOTE — ASSESSMENT
[FreeTextEntry1] : 63 y/o male with LAMN on olaparib  since Dec 30, 2022\par \par 63 y/o male with low grade  mucinous neoplasm of appendix s/p CRS and HIPC  5/7/20 ( Dr Herberth Pink at [place]) R2 resection, tumor was metastatic to 2/14 regional lymph nodes. S/p 12 cycles ( 6 months ) of adjuvant FOLFOX - completed  in Dec 2020.\par Recurrent disease ( ascites/ pseudomyxoma peritonei)  in less than 6 months after completing adjuvant chemotherapy. \par S/p PIPAC x 6 ( last in June 2022 ). 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 Unfortunately due to postsurgical adhesions he is no longer a candidate for PIPAC.\par \par [employer] NGS showed few mutations including NILO and ARID mutations. PDL10 and MSI stable, low TMB- not a candidate for single agent immunotherapy. \par \par Options include clinical trial with targeted therapy versus standard chemotherapy- FOLFIRI/Bevacizumab.\par Patient interested in targeted therapy . He had poor response to FOLFOX and chance for response with another standard cytotoxic chemotherapy is not high in low proliferative tumors.\par \par No clinical trials available in [place] area.\par \par He was  very interested in trying PARP inhibitor outside setting of clinical trial.\par After long discussion - decided to start olaparib. Limited data but there are  reports of activity of PRP inhibitors in GI cancers with HRD / BRCA/ NILO mutations.\par Discussed side effects of PARP inhibitors.\par \par On   olaparib ( Lynparza) 300 mg bid since 12/30/22 ( approved for Lynparza compassionate use program)\par \par Tolerating OK. \par recurrent partial SBO - now on TPN.\par \par Continue Lynparza - 300 mg bid ( he is able to take pills ).\par \par Labs monitored weekly due to TPN- stable.\par CEA stable - will  check monthly ( home draw with TPN labs )\par \par Recent scans CT CAP- disease stable, no overt obstruction. 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 Unclear if olaparib also contributed ( cases reported in prostate cancer patients on olaparib)\par Needs AC- discussed Lovenox versus DOAC. He  has been able to tolerate pills- prefers to start with oral AC.\par Rx Eliquis at standard dose.\par Explained side effects.\par \par Discussed also option- cont tx versus palliative care only/ discussed home hospice.\par Prefers to continue treatment at this point but interested in palliative care evaluation.\par referred to Palliative care clinic.\par \par Follow up here in 4 weeks.\par \par \par D/w patient and his wife . \par \par \par

## 2023-04-28 NOTE — REASON FOR VISIT
[Home] : at home, [unfilled] , at the time of the visit. [Medical Office: ([facility])___] : at the medical office located in  [Spouse] : spouse [Verbal consent obtained from patient] : the patient, [unfilled]

## 2023-04-28 NOTE — HISTORY OF PRESENT ILLNESS
[Disease: _____________________] : Disease: [unfilled] [T: ___] : T[unfilled] [N: ___] : N[unfilled] [M: ___] : M[unfilled] [de-identified] : 62 y/o male with low grade appendiceal tumor diagnosed in 2020.  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 \par \par 5/7/20 CRS and HIPEC for LAMN  ( Dr. Herberth Pink at [facility] ).  Surgery included  a splenectomy and distal gastrectomy, omentectomy, cholecystectomy, subtotal colectomy and diaphragm stripping. 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  R2b resection. 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 \par \par Moved back to  . \par \par Scans 4/30/21 POD worsening ascites. S/p paracentesis\par \par Seen by medical oncology at [facility] (Dr Daniels) - no role for systemic tx at present time. \par Referred for PIPAC ( Dr Mason) \par \par 6/3/21 - PIPAC #1 \par 7/12/21 - 7/13/21 - Admitted with small bowel obstruction. Dramatically improved after 24 hours nasogastric decompression.   \par 2/10/22: PIPAC #6\par 3/3/22: Continues to travel, scuba dive, ski, lift weights. \par 4/7/22: PIPAC #7. Sons wedding was 4/30/22\par 6/3/22: PIPAC #8\par 6/20/22: CT CAP stable from March. \par Developed severe abdominal pain after the scan, with interval development of ascites. Diagnostic paracentesis with MRSA. Hospitalized X 10 days. \par 7/7/22: Improving s/p hospitalization. Has lost 10 pounds, but is not eating better.  Labs  wbc 17. platelets 1000, CEA 4.3\par 7/20/22: Doing better. Weight stabilizing. WBC 12.7\par \par 8/29/22 - CT A/P - Pseudomyxoma peritonei with low density throughout the central mesentery and scalloping of the right hepatic lobe without significant change. Tumor in the bp hepatis which obliterates the left portal vein has progressed when compared with prior imaging dating to March 2022.\par 9/2/22: CEA slightly increased to 7.7.  Per Dr. Mason patient not a candidate for further PIPAC.  Would reconsider if large volume ascites should reaccumulate. \par \par Next line chemotherapy discussed but chance for response to cytotoxic tx is low in low proliferative tumors.\par Referred for clinical trials- contacted  several centers- no trial options available. Patient was very interested in trying PARP inhibitor outside setting of trial ( potential activity based on small trials ) .\par Plan to start olaparib ( Lynparza 300 mg bid) \par \par HE was admitted [facility]  three times in NOv/ Dec 2022  for malignant SBO- resolved with conservative management. 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  Sporadic vomiting . \par Abd pain  still an issue- takes hydromorphone liquid.\par had routine scans CT CAP 4/20/23  and today had repeat CT abd pelvis with po contrast . \par No resp sx, no pleurisy, no cough no fever no SOB

## 2023-05-02 PROBLEM — C78.6: Status: ACTIVE | Noted: 2022-01-01

## 2023-05-04 NOTE — ED PROVIDER NOTE - OBJECTIVE STATEMENT
63 y/o male with a PMHx of palliative care, abdominal pain, GERD, malignant neoplasm of appendix, UTI, neuropathic pain, BPH, HLD, presents to the ED c/o power port complication. Pt reports he has left sided power port for TPN since February, follows with nephrologist Eddie Betts and last night he stepped on the tubing and dislodged the port. Pt reports needle came out from the port but port stayed in the skin and TPN may have infiltrated. Pt reports there is a fluid collection under skin by port and he wants to have the port evaluated. Pt has had the port for the last 3 years, [place] Alpesh: [phone].

## 2023-05-04 NOTE — ED PROVIDER NOTE - PATIENT PORTAL LINK FT
You can access the FollowMyHealth Patient Portal offered by [facility] by registering at the following website: [email or web address]. By joining [facility]’s FollowMyHealth portal, you will also be able to view your health information using other applications (apps) compatible with our system.

## 2023-05-04 NOTE — ED ADULT TRIAGE NOTE - CHIEF COMPLAINT QUOTE
Pt presents to ER c/o power port complication. Pt reports he has left sided power port for TPN and last night he stepped on the tubing and dislodged the port. Pt reports there is a fluid collection under skin by port and he wants to have the port evaluated

## 2023-05-04 NOTE — ED ADULT NURSE NOTE - OBJECTIVE STATEMENT
Pt. presents to the ER with complaints of port tubing dislodged. Pt. denies pain, SOB, and fever. Left chest port site is cleaned, dry, and intact. Pt reports history of cancer and he receives TPN.

## 2023-05-04 NOTE — ED PROVIDER NOTE - NS ED ROS FT
Constitutional: No fever or chills  Eyes: No visual changes  HEENT: No throat pain  CV: No chest pain  Resp: No SOB no cough  GI: No abd pain, nausea or vomiting  : No dysuria  MSK: No musculoskeletal pain  Skin: +Dislodged port   Neuro: No headache

## 2023-05-04 NOTE — ED PROVIDER NOTE - PHYSICAL EXAMINATION
Gen: Thin appearing in NAD  Head: NC/AT  Neck: trachea midline  Resp:  No distress  Ext: no deformities  Neuro:  A&O appears non focal  Skin: Left anterior chest wall poor intact, mild dependent edema around the port, no crepitus, no tenderness   Psych:  Normal affect and mood

## 2023-05-04 NOTE — ED PROVIDER NOTE - CARE PROVIDER_API CALL
Bertram Koch)  Surgery; Vascular Surgery  [address] [place] [address]  Phone: [phone]  Fax: [phone]  Follow Up Time:

## 2023-05-04 NOTE — ED PROVIDER NOTE - CARE PROVIDERS DIRECT ADDRESSES
[email or web address]

## 2023-05-04 NOTE — ED PROVIDER NOTE - NSFOLLOWUPINSTRUCTIONS_ED_ALL_ED_FT
your chest xray shows your power port is in good placement    you may use your power port    if you have any pain, swelling, redness at the site of your port stop infusion and follow up with a vascular doctor

## 2023-05-10 NOTE — PHYSICAL EXAM
[Normal] : oriented to person, place and time, with appropriate affect [de-identified] : abdomen soft, non-distended, non-tender. no masses. laparotomy and drain incisions well healed. Port sites well healed

## 2023-05-10 NOTE — ASSESSMENT
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 \par No extraperitoneal disease \par \par Presented with progression requiring paracentesis-- 1.6L drained May 2021. \par \par Given his progression within 1 year of CRS/ HIPEC, as well as R2b/ CC 2 resection at first operation I do not think additional cytoreductive surgery is feasible. I have notified his surgeon from [facility], Dr. Peters who agrees. \par \par I discussed case with patients medical oncologist who favors regional approach over additional systemic therapy.\par \par Completed 8 PIPAC cycles. \par \par PCI PIPAC 1: 29\par PCI PIPAC 2: 28\par PCI PIPAC 3: 24\par PCI PIPAC 4: \par PCI PIPAC 5: 22\par PIPAC#6: 24\par PIPAC 7: \par PIPAC 8:  unable to determine \par On Lynparza since 12/30/22\par \par He is interested in repeat cytoreductive surgery, which would require multivisceral transplant and he is seeking opinions at multiple centers. \par \par Plan: \par [] Follow up with medical oncology\par [] Not a candidate for further PIPAC at this time-- should large volume ascites reaccumulate we can consider at that time \par f/u with palliative care- Dr Manav Shin this afternoon. \par \par \par \par

## 2023-05-10 NOTE — HISTORY OF PRESENT ILLNESS
[de-identified] : Mr. Arias is a 62 year old gentleman referred to me by Dr. 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 He underwent  CRS and HIPEC for presumed LAMN, which turned out to node positive.([facility] pathology review well-differentiated mucinous adenocarcinoma) on 5/7/2020 at [place] with Dr. Herberth Peters. \par \par PCI: 34\par CC: R2b\par \par Mr. Arias has since moved to [place] to be closer to family and has established care in [facility]. Lives in [place]. \par \par Cytoreduction included a splenectomy and distal gastrectomy, omentectomy, cholecystectomy, subtotal colectomy and diaphragm stripping. 4L of mucinous ascites was drained. 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 As his tumor was felt to be low grade, he did not receive any systemic therapy up front. He now reports feeling well. He reports 3-4 loose but not watery BM's/day. He reports improving weight and energy levels. He denies any PO intolerance. He plays golf and does nature walks close to [facility]. \par \par CEA \par 6/5/20: 3.8\par 8/12/20: 6.1\par 4/7: 10.1 \par \par Pathology: \par 5/7/21: Paracentesis, cytology negative for malignant cells. 1.6 Liters removed. 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 He underwent subtotal colectomy with positive margins. 2/14 nodes involved. [facility] review: low grade appendiceal mucinous neoplasm\par \par Scans were obtained at [facility] and demonstrated: \par 4/30/21: loculated intraperitoneal fluid. No bowel obstruction. \par \par Last colonoscopy May 2020. \par \par Underwent diagnostic laparoscopy by me on 5/19/21. PCI approx 25. Evacuated 1.4 liters ascites. Biopsies consistent with mucinous adenocarcinoma. \par 6/3: PIPAC #1 \par 6/17: Doing well. No pain. Mild reflux/ dyspepsia. Relieved with tums. Eating well. \par 7/15/21: Doing well. Admitted earlier this week 7/12-7/13 to [facility] with small bowel obstruction. Dramatically improved after 24 hours nasogastric decompression. Feels well since discharge. Multiple BM last 48 hours. Tolerating full liquid diet. No abdominal pain. \par 8/19/21: Doing well. No adverse events. No complaints. Wants to go swimming. Eating well. Weight is stable. Moving bowels at least daily. \par 12/16/21: Doing well. Completed compassionate use PIPAC X 2. No complaints. Wants to lift weights, go scuba diving, and skiing. Plan for travel to [place] next month\par 2/10/22: PIPAC #6\par 3/3/22: Continues to travel, scuba dive, ski, lift weights. \par 4/7/22: PIPAC #7. Sons wedding was 4/30/22\par 6/3/22: PIPAC #8\par 6/20/22: CT CAP w smoothi ready stable from March. Developed severe abdominal pain after the scan, with interval development of ascites. Diagnostic paracentesis with MRSA. Hospitalized X 10 days. \par 7/7/22: Improving s/p hospitalization. Has lost 10 pounds, but is not eating better. Labs w wbc 17. platelets 1000, CEA 4.3\par 7/20/22: Doing better. Weight stabilizing. WBC 12.7\par 9/2/22: WBC normalized 8.8. CEA slightly increased to 7.7. \par \par He was admitted [facility] three times in Nov/ Dec 2022 for malignant SBO- resolved with conservative management. Felt not to be a surgical candidate due to his extend of abdominal disease. 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 No focal or additional FDG-avid lesions.\par \par \par 3/22/23 CEA 12.4 (gradually increasing)\par \par CT CAP 4/28/23 - incidental PE R lower lobe. Stable appearance the patient's bowel with evidence of patulous appearance and distention but no definitive evidence of obstruction with air seen extending into the rectum. Findings consistent with pseudomyxoma peritonitis. There appears be stable appearance of mild narrowing of the origin of the portal vein without thrombosis with small collateral seen consistent with pb hepatis disease. New parenchymal infiltrate seen in the right lower lobe suggesting acute pneumonia versus aspiration\par \par Started on Xarelto, due to the PE, under the care of Dr Garcia. \par \par Today he presents with his wife, he is experiencing abdominal pain, n/v. Is scheduled for a palliative care consultation. He continues with TPN. He is not taking in food PO, he can drink water but even that bothers his abdomen. He also aspirates liquid at times.

## 2023-05-10 NOTE — REASON FOR VISIT
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

## 2023-05-16 PROBLEM — K31.89: Status: ACTIVE | Noted: 2023-01-01

## 2023-05-16 NOTE — ASSESSMENT
[______] : HCP: [unfilled] [FreeTextEntry1] : 62yoM with:\par \par # Appendiceal Cancer - s/p extensive surgeries. On Lynparza.  \par \par # Pain 2/2 Neoplasm - Increase methadone to 5mg BID. c/w PRN hydromorphone solution 3mg Q4h PRN. \par Trial of anti-spasmodic prescribed, hyoscyamine. \par \par # Nausea - Increase medicinal cannabis tincture dose. Explained how to titrate up slowly to seek therapeutic dose. \par Change ondansetron to ODT, recommend scheduled usage while symptoms are uncontrolled.\par \par # Bowel prophylaxis - Stressed importance of maintaining bowel regularity in light of opioid usage. \par \par # Encounter for Palliative Care - Emotional support provided.\par Patient states he has HCP form at home; requested a copy be sent to me for our records. \par MOLST form on file - DNR/DNI (trial of limited NIPPV OK). \par \par Follow up in 1 week, pt and his wife know to call sooner with questions or issues.

## 2023-05-16 NOTE — DATA REVIEWED
[FreeTextEntry1] : CT A/P (4/28/23):\par \par FINDINGS:\par LOWER CHEST: Patient is developed new right lower lobe peribronchial inflammatory change consistent with a developing right lower lobe infiltrate. There is also a incidental pulmonary embolus identified involving a right lower lobe pulmonary artery segment\par \par  LIVER: Within normal limits.\par BILE DUCTS: Stable mild intrahepatic ductal prominence.\par GALLBLADDER: Cholecystectomy\par SPLEEN: Splenectomy\par PANCREAS: Within normal limits.\par ADRENALS: Within normal limits.\par KIDNEYS/URETERS: Right hydronephrosis again seen. Mild decompression with previously described involving the left hydronephrosis.\par \par BLADDER: Within normal limits.\par REPRODUCTIVE ORGANS: Prostate is slightly enlarged\par \par BOWEL: Patient is a past history of distal gastrectomy and gastrojejunostomy. Patulous bowel is seen but no evidence of obstruction or changes appearance can be identified. No evidence of free air or fluid collections. Stable mucosal enhancement involving the distal sigmoid colon which is not well distensible but no obstruction seen this is unchanged over several exams\par PERITONEUM: Persistent findings of low attenuation consistent with pseudomyxoma peritonitis most pronounced in the pb hepatis region.\par VESSELS: Low attenuation appears to encase and narrow the proximal portal vein without evidence of thrombosis. This likely represents disease in the pb hepatis with small collateral vessel seen.\par RETROPERITONEUM/LYMPH NODES: No lymphadenopathy.\par ABDOMINAL WALL: Within normal limits.\par BONES: Within normal limits.\par \par IMPRESSION:\par \par Incidental pulmonary artery embolus identified right lower lobe with evidence of parenchymal infiltrate also identified.\par \par Stable appearance the patient's bowel with evidence of patulous appearance and distention but no definitive evidence of obstruction with air seen extending into the rectum.\par \par Findings consistent with pseudomyxoma peritonitis. There appears be stable appearance of mild narrowing of the origin of the portal vein without thrombosis with small collateral seen consistent with pb hepatis disease.\par \par New parenchymal infiltrate seen in the right lower lobe suggesting acute pneumonia versus aspiration\par \par Stable right hydronephrosis.

## 2023-05-16 NOTE — ASSESSMENT
[______] : HCP: [unfilled] [FreeTextEntry1] : 62yoM with:\par \par # Appendiceal Cancer - On Lynparza; is s/p extensive surgeries. Follow up with Medical Oncology to discuss systemic treatments. \par \par # Pain 2/2 Neoplasm - Start methadone 2mg BID. Explained the rationale for initiation of a long-acting opioid and set expectations for the pain relief provided by extended release formulation. Patient should continue to use short-acting opioid for pain when pain requires it. Will continually monitor PRN usage and optimize the long-acting regimen in order to optimize pain control.\par c/w PRN hydromorphone solution 3mg Q4h PRN\par \par # Nausea - Medical cannabis certification completed today. Provided cannabis education, overview of state program, and discussed adverse effects in detail. Counseled that vaporized cannabis is not the preferred route of administration due to the fact that both short-term and long-term risks associated with vaporizing oils are not yet fully understood. Recommend starting with 1:1 THC:CBD formulation at low dose of THC (~2mg/dose).\par \par # Bowel prophylaxis - Stressed importance of maintaining bowel regularity in light of opioid usage. \par \par # Encounter for Palliative Care - Emotional support provided.\par Explained the role of palliative care in enhancing quality of life in the setting of serious illness.\par Patient states he has HCP form at home; requested a copy be sent to me for our records. \par Copy of MOLST form sent to patient, he sent form back completed on his end, electing orders for DNR, DNI. \par \par Follow up in 2 weeks, call sooner with questions or issues.

## 2023-05-16 NOTE — HISTORY OF PRESENT ILLNESS
[Home] : at home, [unfilled] , at the time of the visit. [Medical Office: ([facility])___] : at the medical office located in  [Spouse] : spouse [Verbal consent obtained from patient] : the patient, [unfilled] [FreeTextEntry1] : 62yoM with low-grade appendiceal cancer presents for initial palliative care visit, referred by Dr. Mason. \par PMH also significant for recently diagnosed PE, was just started on Eliquis.\par \par Diagnosed 4/2020, underwent cytoreduction with HIPEC 5/2020 followed by adjuvant chemotherapy.  When he developed disease recurrence he was started on PIPEC trial. \par \par He has had recurrent SBO and has been on TPN since around Feb 20, 2023. \par Currently being treated with Lynparza. \par \par Main reason for referral today is for quality of life optimization and assistance with GOC discussions. \par \par He has abdominal pain, diffuse. crampy at times. Uses hydromorphone liquid, 2 to 3mg PRN. He is use of the hydromorphone varies. He can take it 2 times a day or 5 times a day. Yesterday he did not experience any relief of his pain with the hydromorphone. \par \par ROS:\par +n/v - worsening. Is vomiting at least once daily\par +gas - often feels like he has gas trapped in his chest. He uses tums prn, famotidine. \par +pain - uses hydromorphone 2 to 3mg liquid  \par +BMs alternate between watery and sludge-like material. Has not had a BM in the last day and a half which is a little concerning for him. \par +wakes up a few times a night to urinate \par +anxiety- on escitalopram 5mg QD x years\par \par Is working with [facility] and a transplant center in [place] to consider whether an intestinal transplant is option for him. He understands that this is a long-shot.  He hopes to get his pain well enough controlled so that he can socialize, travel. \par \par Ambulates independently without assistive device. He tries to maintain exercise regularly. \par \par Patient is , lives with his wife. He has two children, ages 32 and 29. 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 \par \par TPN physician - Dr. Eddie Betts\par PCP: Dr. Pipe Lester\par \par I-Stop Ref#: [id number]

## 2023-05-16 NOTE — HISTORY OF PRESENT ILLNESS
[Home] : at home, [unfilled] , at the time of the visit. [Medical Office: ([facility])___] : at the medical office located in  [Spouse] : spouse [Verbal consent obtained from patient] : the patient, [unfilled] [FreeTextEntry1] : 62yoM with low-grade appendiceal cancer presents for follow up visit. \par PMH also significant for recently diagnosed PE, was just started on Eliquis.\par \par Diagnosed 4/2020, underwent cytoreduction with HIPEC 5/2020 followed by adjuvant chemotherapy.  When he developed disease recurrence he was started on PIPEC trial. \par \par He has had recurrent SBO and has been on TPN since around Feb 20, 2023. \par Currently being treated with Lynparza. \par \par Main reason for referral today is for quality of life optimization and assistance with GOC discussions. \par \par He has abdominal pain, diffuse. crampy at times. Uses hydromorphone liquid, 2 to 3mg PRN. He is use of the hydromorphone varies. He can take it 2 times a day or 5 times a day. Yesterday he did not experience any relief of his pain with the hydromorphone. \par \par Interval Hx (5/16/23):\par Patient seen via telemedicine.\par Pain and n/v remain prominent issues for him. Felt miserable yesterday due to severe pain, n/v. He vomited 3 times. Preceding the episode of vomiting he has severe intestinal cramping. \par Had increased methadone dose to 3mg BID per my instructions over the phone last week. He's not sure there has been any difference in pain level. He uses Hydromorphone 3mg anywhere between 2 and 5 times daily, often feels nauseous after using it. Although he took a dose this morning and has not felt nausea today.  Obtained medical cannabis tincture in a 1:1 formulation. He takes 0.5mL (2.5mg THC&CBD) about twice daily, has not noticed any therapeutic effect. No AEs. \par \par ROS:\par +n/v - worsening. Is vomiting at least once daily\par +gas - often feels like he has gas trapped in his chest. He uses tums prn, famotidine. \par +BMs alternate between watery and sludge-like material. Has not had a BM in the last day and a half which is a little concerning for him. \par +wakes up a few times a night to urinate \par +anxiety- on escitalopram 5mg QD x years\par \par Is working with [facility] and a transplant center in [place] to consider whether an intestinal transplant is option for him. He understands that this is a long-shot.  He hopes to get his pain well enough controlled so that he can socialize, travel. \par \par Ambulates independently without assistive device. He tries to maintain exercise regularly. \par \par Patient is , lives with his wife. He has two children, ages 32 and 29. 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 \par \par TPN physician - Dr. Eddie Betts\par PCP: Dr. Pipe Lester\par \par I-Stop Ref#: [id number]

## 2023-05-22 NOTE — PHYSICAL EXAM
[General Appearance - Alert] : alert [General Appearance - In No Acute Distress] : in no acute distress [Oriented To Time, Place, And Person] : oriented to person, place, and time [Affect] : the affect was normal Purse String (Simple) Text: Given the location of the defect and the characteristics of the surrounding skin a purse string closure was deemed most appropriate.  Undermining was performed circumfirentially around the surgical defect.  A purse string suture was then placed and tightened.

## 2023-05-27 NOTE — ED PROVIDER NOTE - OBJECTIVE STATEMENT
0 PA: Patient is a 60 year old male with PMHx of cancer of appendix with mets, s/p partial colectomy who presents with c/o diffuse abd pain, n/v x 1 day. DENIES coffee ground vomiting, diarrhea, fever or chills. ~Aniket Quintana PA-C

## 2023-05-30 PROBLEM — R11.0 NAUSEA: Status: ACTIVE | Noted: 2023-01-01

## 2023-05-30 NOTE — HISTORY OF PRESENT ILLNESS
[Home] : at home, [unfilled] , at the time of the visit. [Medical Office: ([facility])___] : at the medical office located in  [Spouse] : spouse [Verbal consent obtained from patient] : the patient, [unfilled] [FreeTextEntry1] : 62yoM with low-grade appendiceal cancer presents for follow up visit. \par PMH also significant for recently diagnosed PE, was just started on Eliquis.\par \par Diagnosed 4/2020, underwent cytoreduction with HIPEC 5/2020 followed by adjuvant chemotherapy.  When he developed disease recurrence he was started on PIPEC trial. \par \par He has had recurrent SBO and has been on TPN since around Feb 20, 2023. \par Currently being treated with Lynparza. \par \par Main reason for referral today is for quality of life optimization and assistance with GOC discussions. \par \par He has abdominal pain, diffuse. crampy at times. Uses hydromorphone liquid, 2 to 3mg PRN. He is use of the hydromorphone varies. He can take it 2 times a day or 5 times a day. Yesterday he did not experience any relief of his pain with the hydromorphone. \par \par Interval Hx (5/23/23):\par Patient seen via telemedicine for symptom management follow up. \par He stopped Lynparza on his own, he felt it was not providing him more benefit than \par Pain has been better managed over the last week; he is using methadone 5mg BID. He has been able to use the hydromorphone with less frequency. Hyoscyamine did not do much for his abdominal pain. \par Nausea and vomiting continue to persist. He has been using medicinal cannabis Q6 hours at a dose of 3mg:3mg. Has been using ondansetron ODT every 6 hours as well. \par \par ROS:\par +n/v - worsening. Is vomiting at least once daily\par +gas - often feels like he has gas trapped in his chest. He uses tums prn, famotidine. \par +BMs alternate between watery and sludge-like material. Has not had a BM in the last day\par +wakes up a few times a night to urinate, or with nausea and vomiting. \par +anxiety- on escitalopram 5mg QD x years\par \par Is working with [facility] and a transplant center in [place] to consider whether an intestinal transplant is option for him. He understands that this is a long-shot.  He hopes to get his pain well enough controlled so that he can socialize, travel. \par \par Ambulates independently without assistive device. He tries to maintain exercise regularly. \par \par Patient is , lives with his wife. He has two children, ages 32 and 29. 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 \par \par TPN physician - Dr. Eddie Betts\par PCP: Dr. Pipe Lester\par \par I-Stop Ref#: [id number]

## 2023-05-30 NOTE — HISTORY OF PRESENT ILLNESS
[Home] : at home, [unfilled] , at the time of the visit. [Medical Office: ([facility])___] : at the medical office located in  [Spouse] : spouse [Verbal consent obtained from patient] : the patient, [unfilled] [FreeTextEntry1] : 62yoM with low-grade appendiceal cancer presents for follow up visit. \par PMH also significant for recently diagnosed PE, was just started on Eliquis.\par \par Diagnosed 4/2020, underwent cytoreduction with HIPEC 5/2020 followed by adjuvant chemotherapy.  When he developed disease recurrence he was started on PIPEC trial. \par \par He has had recurrent SBO and has been on TPN since around Feb 20, 2023. \par Currently being treated with Lynparza. \par \par Main reason for referral today is for quality of life optimization and assistance with GOC discussions. \par \par He has abdominal pain, diffuse. crampy at times. Uses hydromorphone liquid, 2 to 3mg PRN. He is use of the hydromorphone varies. He can take it 2 times a day or 5 times a day. Yesterday he did not experience any relief of his pain with the hydromorphone. \par \par Interval Hx (5/30/23):\par Patient seen via telemedicine for symptom management follow up. \par He remains OFF of Lynparza.\par Pain is better controlled lately. He is using methadone 5mg BID and takes the hydromorphone 3mg with less frequency.  Nausea and vomiting continue to persist and this is his main issue. Has not been able to obtain octreotide yet due to lack of insurance coverage and ongoing appeals process. \par He has been using medicinal cannabis Q6 hours at a dose of 3.75mg:3.75mg without any associated AEs. Has not been using other anti-emetics at this time due to low efficacy of other agents. They thought the cannabis was helping as he did have a few days of low level n/v however over the last few days the nausea has increased to where it was before, it is constant. He vomits intermittently. Yesterday vomited 3 times. Is going for CT scan tomorrow to eval his anatomy for potential venting G-tube placement. \par \par ROS:\par +n/v - worsening. Is vomiting at least once daily\par +gas - often feels like he has gas trapped in his chest. He uses tums prn, famotidine. \par +BMs alternate between watery and sludge-like material. Has not had a BM in the last day\par +wakes up a few times a night to urinate, or with nausea and vomiting. \par +anxiety- on escitalopram 10mg QD \par \par Is working with [facility] and a transplant center in [place] to consider whether an intestinal transplant is option for him. He understands that this is a long-shot.  He hopes to get his pain well enough controlled so that he can socialize, travel. \par \par Ambulates independently without assistive device. He tries to maintain exercise regularly. \par \par Patient is , lives with his wife. He has two children, ages 32 and 29. 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 \par \par TPN physician - Dr. Eddie Betts\par PCP: Dr. Pipe Lester\[facility] \par I-Stop Ref#: [id number]

## 2023-05-30 NOTE — ASSESSMENT
[______] : HCP: [unfilled] [FreeTextEntry1] : 62yoM with:\par \par # Appendiceal Cancer - s/p extensive surgeries. Pt has elected to come off of Lynparza. \par \par # Pain 2/2 Neoplasm - C/w methadone 5mg BID. c/w PRN hydromorphone solution 3mg Q4h PRN. \par \par # Nausea - Increase medicinal cannabis tincture dose. Explained how to titrate up slowly to seek therapeutic dose. \par Trial of olanzapine 2.5mg QHS prescribed. \par Will appeal the recent insurance coverage rejection of octreotide, which is prescribed for patient's refractory n/v in the setting of chronic SBO. CT scan is scheduled for tomorrow to evaluate patient's anatomy for venting G-tube. \par \par # Bowel prophylaxis - Stressed importance of maintaining bowel regularity in light of opioid usage. \par \par # Encounter for Palliative Care - Emotional support provided.\par HCP form on file. \par MOLST form on file - DNR/DNI (trial of limited NIPPV OK). \par \par Follow up in 1 week, pt and his wife know to call sooner with questions or issues.

## 2023-05-30 NOTE — ASSESSMENT
[______] : HCP: [unfilled] [FreeTextEntry1] : 62yoM with:\par \par # Appendiceal Cancer - s/p extensive surgeries. Pt has elected to come off of Lynparza. \par \par # Pain 2/2 Neoplasm - C/w methadone 5mg BID. c/w PRN hydromorphone solution 3mg Q4h PRN. \par \par # Refractory Nausea & Vomiting - Increase medicinal cannabis tincture dose. Explained how to titrate up slowly to seek therapeutic dose. \par Communicated with Dr. Mason about the potential venting G-tube placement. She will discuss with interventional GI.  Subcutaneous octreotide ordered for the purposes of treatment of patient's refractory nausea and vomiting in the setting of his chronic small bowel obstruction. \par \par # Bowel prophylaxis - Stressed importance of striving to maintain bowel regularity in light of opioid usage. \par \par # Encounter for Palliative Care - Emotional support provided. \par HCP form on file. \par MOLST form on file - DNR/DNI (trial of limited NIPPV OK). \par \par Follow up in 1 week, pt and his wife know to call sooner with questions or issues.

## 2023-06-05 NOTE — ED STATDOCS - CROS ED ROS STATEMENT
ITP updated, medications reviewed and updated in Epic as needed, MET level obtained and recorded. all other ROS negative except as per HPI no

## 2023-06-06 NOTE — HISTORY OF PRESENT ILLNESS
[Home] : at home, [unfilled] , at the time of the visit. [Medical Office: ([facility])___] : at the medical office located in  [Spouse] : spouse [Verbal consent obtained from patient] : the patient, [unfilled] [FreeTextEntry1] : 62yoM with low-grade appendiceal cancer presents for follow up visit. \par PMH also significant for recently diagnosed PE, was just started on Eliquis.\par \par Diagnosed 4/2020, underwent cytoreduction with HIPEC 5/2020 followed by adjuvant chemotherapy.  When he developed disease recurrence he was started on PIPEC trial. \par \par He has had recurrent SBO and has been on TPN since around Feb 20, 2023. \par Currently being treated with Lynparza. \par \par Main reason for referral today is for quality of life optimization and assistance with GOC discussions. \par \par He has abdominal pain, diffuse. crampy at times. Uses hydromorphone liquid, 2 to 3mg PRN. He is use of the hydromorphone varies. He can take it 2 times a day or 5 times a day. Yesterday he did not experience any relief of his pain with the hydromorphone. \par \par Interval Hx (6/6/23):\par Patient seen via telemedicine for symptom management follow up. \par He remains OFF of Lynparza.\par Pain remains moderately well controlled lately. He is using methadone 5mg BID and takes the hydromorphone 3mg with less frequency.  He initiated olanzapine 2.5mg QHS last week, is tolerating it fine. He feels that nausea/vomiting are a bit improved. Is vomiting less but nausea is pretty consistently present. Has not been able to obtain octreotide yet due to lack of insurance coverage and ongoing appeals process. \par He has been using medicinal cannabis Q6 hours at a dose of 3.75mg:3.75mg without any associated AEs. \par \par ROS:\par +gas - often feels like he has gas trapped in his chest. He uses tums prn, famotidine. \par +BMs alternate between watery and sludge-like material. Has started wearing adult diaper due to fecal incontinence at night.\par +anxiety- on escitalopram 10mg QD \par +b/l LE edema -He tries to keep legs elevated whenever possible.  Dr. Betts going to adjust TPN slightly\par \par Ambulates independently without assistive device. He tries to maintain exercise regularly. \par \par Patient is , lives with his wife. He has two children, ages 32 and 29. 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 \par \par TPN physician - Dr. Eddie Betts\par PCP: Dr. Pipe Lester\par \par I-Stop Ref#: [id number]

## 2023-06-06 NOTE — DATA REVIEWED
[FreeTextEntry1] : CT A/P (5/31/23):\par FINDINGS:\par LOWER CHEST: Hiatal hernia. Mild wall thickening of the distal esophagus. Esophagitis or other etiologies cannot be excluded.\par \par LIVER: Again is noted soft tissue infiltration of the pb hepatis with severe narrowing of the distal portal vein and nonvisualization of the left portal vein. No discrete liver lesion is identified. The hepatic veins are patent.\par BILE DUCTS: Stable moderate central intrahepatic biliary dilatation extending to the confluence.\par GALLBLADDER: Status post cholecystectomy.\par SPLEEN: Splenectomy.\par PANCREAS: Within normal limits.\par ADRENALS: Within normal limits.\par KIDNEYS/URETERS: Again is noted moderate to severe right hydronephrosis extending to the proximal right ureter. Otherwise, within normal limits.\par \par BLADDER: Within normal limits.\par REPRODUCTIVE ORGANS: The prostate is not enlarged.\par \par BOWEL: Status post Billroth surgery. Status post subtotal colectomy with ileocolic anastomosis. Again are noted severely packed small bowel loops within the peritoneal cavity, likely related to serosal and peritoneal implants encasing the bowel. There are multiple dilated proximal small bowel loops in the right upper quadrant with tapered transition to normal caliber distal small bowel. The mesenteric vessels opacify unremarkably.\par PERITONEUM: There is trace tracking fluid adjacent to the right lobe of the liver inferiorly. No discrete drainable intraperitoneal collection is identified.\par VESSELS: Again is noted extrinsic compression of the proximal celiac artery with mild poststenotic dilatation.\par RETROPERITONEUM/LYMPH NODES: Again are noted shotty retroperitoneal and para-aortic lymph nodes.\par ABDOMINAL WALL: Subcutaneous edema.\par BONES: Minimal degenerative changes of bone.\par \par IMPRESSION:\par 1. Persistent dilated proximal small bowel loops with tapered transition to normal caliber distal small bowel, tightly packed likely secondary to serosal and peritoneal implants encasing the bowel.\par 2. Moderate intrahepatic biliary dilatation secondary to soft tissue infiltration of the pb hepatis and common hepatic duct.\par 3. Stable narrowing of the distal portal vein and nonvisualization of the left portal vein.\par 4. Stable right hydronephrosis.\par 5. No evidence of discrete drainable collection.

## 2023-06-06 NOTE — ASSESSMENT
[______] : HCP: [unfilled] [FreeTextEntry1] : 62yoM with:\par \par # Appendiceal Cancer - s/p extensive surgeries. Pt has elected to come off of Lynparza. \par \par # Pain 2/2 Neoplasm - C/w methadone 5mg BID. c/w PRN hydromorphone solution 3mg Q4h PRN. \par \par # Nausea -  \par C/w olanzapine 2.5mg QHS, medicinal cannabis. \par Will appeal the recent insurance coverage rejection of octreotide, which is prescribed for patient's refractory n/v in the setting of chronic SBO. CT scan done last week, follow up with Surg Onc re: potential venting G-tube. \par \par # Bowel prophylaxis - Stressed importance of maintaining bowel regularity in light of opioid usage. \par \par # Encounter for Palliative Care - Emotional support provided.\par HCP form on file. \par MOLST form on file - DNR/DNI (trial of limited NIPPV OK). \par \par Follow up in 1 week, pt and his wife know to call sooner with questions or issues.

## 2023-06-09 PROBLEM — Z01.84 IMMUNITY STATUS TESTING: Status: RESOLVED | Noted: 2022-01-01 | Resolved: 2023-01-01

## 2023-06-09 PROBLEM — E43 SEVERE MALNUTRITION: Status: ACTIVE | Noted: 2023-01-01

## 2023-06-09 PROBLEM — T81.49XA INFECTION OF POSTOPERATIVE WOUND DUE TO METHICILLIN-RESISTANT STAPHYLOCOCCUS AUREUS: Status: RESOLVED | Noted: 2022-07-07 | Resolved: 2023-01-01

## 2023-06-09 PROBLEM — Z87.448 HISTORY OF PYELONEPHRITIS: Status: RESOLVED | Noted: 2021-02-26 | Resolved: 2023-01-01

## 2023-06-09 PROBLEM — Z86.39 HISTORY OF HYPOGONADISM: Status: RESOLVED | Noted: 2022-01-01 | Resolved: 2023-01-01

## 2023-06-09 PROBLEM — Z87.442 HISTORY OF RENAL CALCULI: Status: RESOLVED | Noted: 2022-01-01 | Resolved: 2023-01-01

## 2023-06-09 PROBLEM — Z00.00 ANNUAL PHYSICAL EXAM: Status: RESOLVED | Noted: 2022-01-01 | Resolved: 2023-01-01

## 2023-06-09 PROBLEM — D50.9 IRON DEFICIENCY ANEMIA: Status: ACTIVE | Noted: 2023-01-01

## 2023-06-09 PROBLEM — Z78.9 ON TOTAL PARENTERAL NUTRITION (TPN): Status: ACTIVE | Noted: 2023-01-01

## 2023-06-14 PROBLEM — R60.0 LEG EDEMA: Status: ACTIVE | Noted: 2023-01-01

## 2023-06-20 PROBLEM — C18.1 PRIMARY CANCER OF APPENDIX: Status: ACTIVE | Noted: 2021-05-03

## 2023-06-20 NOTE — PATIENT PROFILE ADULT - FOOD INSECURITY
Patient (s)  given copy of dc instructions and 0 script(s). Patient (s)  verbalized understanding of instructions and script (s). Patient given a current medication reconciliation form and verbalized understanding of their medications. Patient (s) verbalized understanding of the importance of discussing medications with his or her physician or clinic they will be following up with. Patient alert and oriented and in no acute distress. Patient discharged home ambulatory with self.       Laverne Calvo RN  06/20/23 0844 no

## 2023-06-20 NOTE — HISTORY OF PRESENT ILLNESS
[FreeTextEntry1] : 62yoM with low-grade appendiceal cancer presents for follow up visit. \par PMH also significant for recently diagnosed PE, was just started on Eliquis.\par \par Diagnosed 2020, underwent cytoreduction with HIPEC 2020 followed by adjuvant chemotherapy.  When he developed disease recurrence he was started on PIPEC trial. \par \par He has had recurrent SBO and has been on TPN since around 2023. \par Currently being treated with Lynparza. \par \par Main reason for referral today is for quality of life optimization and assistance with GOC discussions. \par \par He has abdominal pain, diffuse. crampy at times. Uses hydromorphone liquid, 2 to 3mg PRN. He is use of the hydromorphone varies. He can take it 2 times a day or 5 times a day. Yesterday he did not experience any relief of his pain with the hydromorphone. \par \par Interval Hx (23):\par Patient seen via telemedicine for symptom management follow up. \par He remains OFF of Lynparza.\par Pain remains moderately well controlled lately. He is using methadone 5mg BID and takes the hydromorphone 3mg rarely, perhaps 1-2 times per week. He initiated olanzapine 2.5mg QHS last week, is tolerating it fine.\par Was recently diagnosed with LE DVTs and started on Eliquis.  Is receiving iron infusions. \par \par The nausea and vomiting has been worse lately. Yesterday he was nauseous all day and vomited countless times. Has paid out of pocket for subcutaneous octreotide 100mcg pre filled syringes which should be arriving today. He has been using medicinal cannabis Q6 hours at a dose of 5mmg without any associated AEs. \par \par ROS:\par +gas - often feels like he has gas trapped in his chest. He uses tums prn, famotidine. \par +BMs alternate between watery and sludge-like material. Has started wearing adult diaper due to fecal incontinence at night.\par +anxiety- on escitalopram 10mg QD \par +b/l LE edema -He tries to keep legs elevated whenever possible.  Dr. Betts going to adjust TPN slightly\par \par Ambulates independently without assistive device. He tries to maintain exercise regularly. \par \par Patient is , lives with his wife. He has two children, ages 32 and 29. 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 \par \par TPN physician - Dr. Eddie Betts\par PCP: Dr. Pipe Lester\par \par I-Stop Ref#: [id number]

## 2023-06-20 NOTE — ASSESSMENT
[FreeTextEntry1] : 62yoM with:\par \par # Appendiceal Cancer - s/p extensive surgeries. Pt has elected to come off of Lynparza. \par \par # Pain 2/2 Neoplasm - C/w methadone 5mg BID. c/w PRN hydromorphone solution 3mg Q4h PRN. \par \par # Nausea and vomiting -   \par Increase olanzapine to 5mg QHS.\par c/w medicinal cannabis Q6h but instructed on slow uptitration of dose. \par Unfortunately a venting G-tube has been deemed not an option by interventional GI and surgery given the difficult anatomy.  Pt to initiate subcutaneous octreotide 100mcg BID today, with goal of uptitration and hopeful coverage through free drug program for depot injection. \par \par # Bowel prophylaxis - Stressed importance of maintaining bowel regularity in light of opioid usage. \par \par # LE DVT - on Eliquis\par \par # Encounter for Palliative Care - Emotional support provided to patient and his wife.\par HCP form on file. \par MOLST form on file - DNR/DNI (trial of limited NIPPV OK). \par \par Follow up in 1 week, pt and his wife know to call sooner with questions or issues.

## 2023-06-28 PROBLEM — I82.403 DVT OF LOWER EXTREMITY, BILATERAL: Status: ACTIVE | Noted: 2023-01-01

## 2023-06-28 PROBLEM — R11.2 NAUSEA AND VOMITING: Status: ACTIVE | Noted: 2023-01-01

## 2023-06-28 PROBLEM — C78.6 PERITONEAL CARCINOMATOSIS: Status: ACTIVE | Noted: 2022-01-01

## 2023-06-28 PROBLEM — Z51.5 ENCOUNTER FOR PALLIATIVE CARE: Status: ACTIVE | Noted: 2023-01-01

## 2023-06-28 PROBLEM — G89.3 CANCER ASSOCIATED PAIN: Status: ACTIVE | Noted: 2023-01-01

## 2023-06-28 NOTE — ASSESSMENT
[______] : HCP: [unfilled] [FreeTextEntry1] : 62yoM with:\par \par # Appendiceal Cancer - s/p extensive surgeries. Pt has elected to come off of Lynparza and is now receiving home hospice care. \par \par # Pain 2/2 Neoplasm - C/w methadone 5mg BID. c/w PRN hydromorphone solution 3mg Q4h PRN. \par \par # Nausea and vomiting -   \par C/w olanzapine to 5mg QHS. Start lorazepam 0.5mg Q6h PRN nausea/anxiety, can uptitrate as necessary.\par c/w medicinal cannabis Q6h but instructed on slow uptitration of dose. \par Unfortunately a venting G-tube has been deemed not an option by interventional GI and surgery given the difficult anatomy.  \par \par # Bowel prophylaxis - Stressed importance of maintaining bowel regularity in light of opioid usage. \par \par # LE DVT - on Eliquis\par \par # Encounter for Palliative Care - Emotional support provided to patient and his wife.\par HCP form on file. \par MOLST form on file - DNR/DNI (trial of limited NIPPV OK). \par Discussed the topic of TPN cessation given that patient is coming upon the end of life. He will consider this further, expressing that if the n/v cannot be controlled he would like to come off of TPN sooner than later. If n/v can be better controlled he would want to extend the TPN.  Will defer further conversations to the hospice care team. \par \par Remain available to assist as needed, otherwise will defer care to the hospice team.

## 2023-06-30 NOTE — DISCHARGE NOTE PROVIDER - NSDCCONDITION_GEN_ALL_CORE
THERAPEUTIC PHLEBOTOMY    Date obtained: 10/06/21    Pre-phlebotomy Vital Signs: Refer to Doc flow sheet    Start time: 1240    Tourniquet placed on patient's arm and arm palpated for venous access. Area of venous access cleansed with alcohol. Sheath removed from the needle and needle inserted into the left antecubital site. Blood flowing well down the tubing into collection bag. Adjustment/no adjustment of needle needed to maintain adequate blood flow. Scale monitoring amount of blood in bag. Stop Time: 5696  Needle removed from patient's arm. Pressure applied to patient's arm until bleeding stopped. Dry sterile dressing applied over puncture site and secured with Coban/self-adherent wrap. Final amount of blood removed: 500ml  Post Vital Signs: Refer to Doc flow sheet  Patient tolerated procedure well. No redness, edema, or signs of active bleeding noted. Discharge Instructions provided: No heavy pushing or lifting for the next 24 hours. Keep dressing dry and in place for 4 to 6 hours. If a fever GREATER than 100.5 develops, contact office. Patient discharged ambulatory with belongings.     Electronically signed by Olivier Dodd RN on 10/6/2021 at 12:57 PM none Stable

## 2023-07-11 NOTE — ASU PATIENT PROFILE, ADULT - MUTUALITY COMMENT, PROFILE
Terbinafine Counseling: Patient counseling regarding adverse effects of terbinafine including but not limited to headache, diarrhea, rash, upset stomach, liver function test abnormalities, itching, taste/smell disturbance, nausea, abdominal pain, and flatulence.  There is a rare possibility of liver failure that can occur when taking terbinafine.  The patient understands that a baseline LFT and kidney function test may be required. The patient verbalized understanding of the proper use and possible adverse effects of terbinafine.  All of the patient's questions and concerns were addressed. pt will speak  to surgeon and anesthesiologist pre op

## 2023-10-04 NOTE — PROVIDER CONTACT NOTE (OTHER) - DATE AND TIME:
Patient's diabetes is not currently well controlled. Her most recent A1c from 8/14/23 was 13.8%, which is above goal of <7%. Patient is currently on both Januvia and Trulicity, spoke to patient about the therapeutic duplication, will stop Januvia.   Patient is taking metformin 1000 mg once a day, per note from Dr. Vides on 9/25/23, patient was supposed to take it twice a day but sometimes forgets to take the second dose. Dr. Vides told her to only take the metformin once a day. Will talk to patient about switching to Metformin XR at future visits.   Spoke to patient about injection technique with her Trulicity, reminded patient of the importance of switching injection sites weekly. Per Dr. Vides's note from 9/25/23, she has been experiencing injection site reactions, a knot at the injection site that lasts about 2 days before resolving.   Informed patient that the [employer] will need a PA from insurance, will fill through  for the testing supplies.     PLAN:    Start  Trulicity 3 mg/0.5 mL; inject 3 mg under the skin weekly.     Stop  Januvia 50 mg; take 1 tablet by mouth daily    Continue:  Metformin 1000 mg; take 1 tablet by mouth daily  
28-Dec-2022 13:00

## 2023-12-10 NOTE — ASU PATIENT PROFILE, ADULT - PAIN LOCATION
Chief complaint:   Chief Complaint   Patient presents with    Suspected Coronavirus (Covid-19)       Vitals:  Visit Vitals  /67   Pulse (!) 106   Temp 98.9 °F (37.2 °C) (Temporal)   Resp 16   Wt 54.4 kg (120 lb)   LMP 07/02/2013   SpO2 94%   BMI 23.44 kg/m²       HISTORY OF PRESENT ILLNESS     URI   This is a new problem. The current episode started in the past 7 days (12/8/23). The maximum temperature recorded prior to her arrival was 101 - 101.9 F (101 this morning). Associated symptoms include congestion (slight), coughing, ear pain (right more so than left with swallowing), headaches and a sore throat. Pertinent negatives include no diarrhea, nausea, plugged ear sensation, vomiting or wheezing. Treatments tried: cold and flu tablets. The treatment provided mild relief.       Other significant problems:  Patient Active Problem List    Diagnosis Date Noted    Screening for breast cancer 03/18/2019     Priority: Low    Osteoporosis, senile 05/25/2017     Priority: Low    Chronic midline low back pain with left-sided sciatica 12/30/2016     Priority: Low    Family history of breast cancer in mother 12/30/2016     Priority: Low    Vaginal dryness, menopausal 12/01/2015     Priority: Low    Hypogammaglobulinemia (CMD) 12/16/2014     Priority: Low    Cystocele, midline 11/16/2013     Priority: Low    Rectocele 11/16/2013     Priority: Low    Cervicalgia 10/16/2013     Priority: Low    Photopsia-L 10/16/2013     Priority: Low    Family history of malignant neoplasm of breast 10/16/2013     Priority: Low    Retinal hole or tear, right 07/03/2013     Priority: Low    Dysphagia, pharyngoesophageal phase 08/16/2011     Priority: Low    Cervical radiculopathy C4-5 08/11/2011     Priority: Low    Nontoxic multinodular goiter 05/06/2011     Priority: Low    Persistent disorder of initiating or maintaining sleep 08/02/2010     Priority: Low    TORUS MANDIBULARIS-bilateral 07/22/2010     Priority: Low    Other  kyphoscoliosis and scoliosis 05/31/2006     Priority: Low     saccrocooygeal scoliosis with offset of her coccyx 1 cm to L      Coccydynia 04/26/2004     Priority: Low    ROTATOR CUFF SYND NOS-R shoulder 04/24/2003     Priority: Low     Continued aching at night. Right arm.         PAST MEDICAL, FAMILY AND SOCIAL HISTORY     Medications:  Current Outpatient Medications   Medication Sig Dispense Refill    traMADol (ULTRAM) 50 MG tablet Take 1 tablet by mouth daily as needed for Pain. 20 tablet 0    predniSONE (DELTASONE) 20 MG tablet Take 1 tablet by mouth daily (before breakfast). 7 tablet 0    Cholecalciferol (VITAMIN D) 2000 UNITS tablet Take 4,000 Units by mouth daily.      CALCIUM 500 MG PO TABS 1 tablet TID 0 0     No current facility-administered medications for this visit.       Allergies:  ALLERGIES:   Allergen Reactions    Sulfa Antibiotics      Bactrim   flu-like symptoms       Past Medical  History/Surgeries:  Past Medical History:   Diagnosis Date    Acute left otitis media 9/30/2014    Benign neoplasm of colon 8/17/07    Colonoscopy/Dr. Martinez-tubular adenoma transverse colon    Degeneration of intervertebral disc, site unspecified 4/26/2004    Disorders of bursae and tendons in shoulder region, unspecified 4/24/2003    right shoulder rotator cuff discomfort at night, and with certain activities. no tear.    Insomnia, unspecified 5/31/2006    Laryngitis, acute 9/30/2014    Measles without mention of complication     Measles    Menopausal symptoms 12/1/2015    Mumps without mention of complication     Mumps    OSTEOPENIA 5/31/2006    Osteoporosis 05/19/2017    Otalgia of left ear 10/21/2014    Other disorder of coccyx 4/26/2004    Other kyphoscoliosis and scoliosis 5/31/2006    saccrocooygeal scoliosis with offset of her coccyx 1 cm to L    Personal history of colonic polyps 05/17/2019    Tubular adenoma    Pyelonephritis, unspecified 3/05    Retinal hole or tear, right 7/3/2013    Sinusitis, acute  frontal-L 9/30/2014    Stricture and stenosis of esophagus     Unspecified constipation 5/31/2006    Varicella without mention of complication     Chicken Pox       Past Surgical History:   Procedure Laterality Date    Appendectomy  1965    Cardiac stress test complete  7/12/01    Cardiac Stress Test - normal    Colonoscopy diagnostic  05/17/2019    Dr. Martinez TA removed recall due 5/2024    Colonoscopy remove lesions by snare  08/17/2007    5 year recall, hx of polyps, Dr GAETANO Martinez    Esophagogastroduodenoscopy transoral flex diag  8/19/11    Schatzki's ring dilated    Esophagogastroduodenoscopy transoral flex w/bx single or mult  8/19/2011    Dr Martinez/negative biopsy    Esophagogastroduodenoscopy with transendo balloon dil <30mm  8/19/2011    Dr Martinez    Myocardial perf panel  3/7/08    Normal stress test, 9.5 mets, EF-73%, [facility] treadmill-2    Retinal laser procedure  7/3/13    Right eye, Dr. Hernández       Family History:  Family History   Problem Relation Age of Onset    Cancer, Breast Mother 52        Now 96    Myocardial Infarction Father 70    Asthma Sister     Glaucoma Brother     Cancer, Breast Maternal Grandmother     Patient is unaware of any medical problems Maternal Grandfather     Myocardial Infarction Paternal Grandmother     Patient is unaware of any medical problems Paternal Grandfather        Social History:  Social History     Tobacco Use    Smoking status: Never    Smokeless tobacco: Never   Substance Use Topics    Alcohol use: No       REVIEW OF SYSTEMS     Review of Systems   HENT:  Positive for congestion (slight), ear pain (right more so than left with swallowing) and sore throat. Negative for ear discharge and sinus pressure.    Eyes:  Negative for discharge and redness.   Respiratory:  Positive for cough. Negative for shortness of breath and wheezing.    Gastrointestinal:  Negative for diarrhea, nausea and vomiting.   Neurological:  Positive for headaches.       PHYSICAL EXAM     Physical  Exam  Vitals and nursing note reviewed.   Constitutional:       General: She is not in acute distress.     Appearance: She is well-developed. She is not diaphoretic.   HENT:      Head: Normocephalic and atraumatic.      Right Ear: Ear canal and external ear normal. No drainage, swelling or tenderness. Tympanic membrane is erythematous. Tympanic membrane is not perforated.      Left Ear: Tympanic membrane, ear canal and external ear normal. No drainage, swelling or tenderness. Tympanic membrane is not perforated or erythematous.      Nose: Mucosal edema and rhinorrhea present.      Right Sinus: No maxillary sinus tenderness or frontal sinus tenderness.      Left Sinus: No maxillary sinus tenderness or frontal sinus tenderness.      Mouth/Throat:      Pharynx: Uvula midline. Posterior oropharyngeal erythema (slight) present. No pharyngeal swelling or oropharyngeal exudate.      Tonsils: No tonsillar abscesses.      Neck: Normal range of motion and neck supple.   Eyes:      General:         Right eye: No discharge.         Left eye: No discharge.      Extraocular Movements:      Right eye: Normal extraocular motion.      Left eye: Normal extraocular motion.      Conjunctiva/sclera: Conjunctivae normal.      Right eye: Right conjunctiva is not injected. No exudate.     Left eye: Left conjunctiva is not injected. No exudate.     Pupils: Pupils are equal, round, and reactive to light. Pupils are equal.      Right eye: Pupil is round and reactive.      Left eye: Pupil is round and reactive.   Cardiovascular:      Rate and Rhythm: Normal rate and regular rhythm.   Pulmonary:      Effort: Pulmonary effort is normal. No tachypnea, bradypnea or respiratory distress.      Breath sounds: Normal breath sounds. No decreased breath sounds, wheezing, rhonchi or rales.   Lymphadenopathy:      Cervical: No cervical adenopathy.   Skin:     General: Skin is warm and dry.      Findings: No rash.   Neurological:      Mental Status: She is  alert and oriented to person, place, and time.   Psychiatric:         Behavior: Behavior normal.         Thought Content: Thought content normal.         Judgment: Judgment normal.     Walk In on 12/10/2023   Component Date Value Ref Range Status    POCT Rapid SARS-COV-2 by PCR 12/10/2023 Detected (A)  Not Detected / Detected / Presumptive Positive / Inhibitors present Final    POCT Influenza A by PCR 12/10/2023 Not Detected  Not Detected Final    POCT Influenza B By PCR 12/10/2023 Not Detected  Not Detected Final    RSV By PCR 12/10/2023 Not Detected  Not Detected Final     Patient called with results. Tammy L Schladweiler, NP    ASSESSMENT/PLAN     Catarina was seen today for suspected coronavirus (covid-19).    Diagnoses and all orders for this visit:    COVID-19 virus infection  -     nirmatrelvir & ritonavir 300mg/100mg (Paxlovid, 300/100,) 20 x 150 MG & 10 x 100MG; Take 300 mg nirmatrelvir (two 150 mg tablets) with 100 mg ritonavir (one 100 mg tablet), with all three tablets taken together by mouth twice daily for 5 days.    Right acute otitis media  -     amoxicillin (AMOXIL) 875 MG tablet; Take 1 tablet by mouth in the morning and 1 tablet in the evening. Do all this for 10 days.    Suspected COVID-19 virus infection  -     POCT COVID/Flu/RSV panel      Discussed medication dosage, usage, goals of therapy, and side effects.  Pros and cons of paxlovid reviewed, patient would like to proceed. Probiotic recommended.   Quarantine per CDC guidelines reviewed. Normal course of illness reviewed. Comfort care measures, over the counter treatments, when to follow up and seek emergency care; all reviewed with patient.   After visit summary reviewed with patient.  Tammy L Schladweiler, NP     left abdomninal

## 2023-12-31 NOTE — ASU PREOP CHECKLIST - TAMPON REMOVED
75 yo male with h/o mixed dementia with Lewy bodies and Alzheimer's disease, BPH, HLD, depression presented to ED today with weakness. Found to have influenza A.  Was noted to be distended at nursing home today, in the ED they placed Mexican coude and got 900 cc urine output.  Vitals on arrival grossly normal except some hypertension.  Labs notable for creatinine 14.44> 11.96, K6.1.  FENa 4.3% consistent with postobstructive nephropathy.  Also noted to have anion gap of 28, pH 7.35, bicarb 20.4- AGMA likely from obstruction.  UA with only small amount of blood and 6-10 WBC.  Nephrology consulted.  IV fluids-0.45 NS @150 cc/h.  CXR left-sided opacities-received cefepime 2 g azithromycin 500 mg.  Since afebrile, no leukocytosis, negative Pro-Robert and LA-no plan to continue antibiotics.       n/a

## 2024-01-08 NOTE — ASU PREOP CHECKLIST - PATIENT PROBLEMS/NEEDS
"Chief Complaint   Patient presents with    Well Child     4 month   Immunization Documentation    Prior to Immunization administration, verified patients identity using patient's name and date of birth. Please see IMMUNIZATIONS  and order for additional information.  Patient / Parent instructed to remain in clinic for 15 minutes and report any adverse reaction to staff immediately.      Initial Pulse 132   Temp 98.5  F (36.9  C) (Axillary)   Resp 30   Ht 0.622 m (2' 0.5\")   Wt 6.124 kg (13 lb 8 oz)   HC 41.3 cm (16.25\")   BMI 15.81 kg/m   Estimated body mass index is 15.81 kg/m  as calculated from the following:    Height as of this encounter: 0.622 m (2' 0.5\").    Weight as of this encounter: 6.124 kg (13 lb 8 oz).  Medication Review: complete    The next two questions are to help us understand your food security.  If you are feeling you need any assistance in this area, we have resources available to support you today.          1/2/2024   SDOH- Food Insecurity   Within the past 12 months, did you worry that your food would run out before you got money to buy more? N   Within the past 12 months, did the food you bought just not last and you didn t have money to get more? N         Norma J. Gosselin, LPN      " Patient expressed no known problems or needs

## 2024-04-01 NOTE — PATIENT PROFILE ADULT - FUNCTIONAL ASSESSMENT - BASIC MOBILITY 3.
Linz[employer] Pending    Insurance response  Prescription Drug Insurance: [employer]  Notes: Prior authorization submitted - will update provider when decision has been made by insurance.        4 = No assist / stand by assistance

## 2024-07-05 NOTE — RESULTS/DATA
[FreeTextEntry1] :  EXAM: CT ABDOMEN AND PELVIS OC IC   *** ADDENDUM 02/28/2021 ***  The patient is status post cholecystectomy and splenectomy.  *** END OF ADDENDUM 02/28/2021 ***   PROCEDURE DATE: 02/18/2021    INTERPRETATION: CLINICAL INFORMATION: Abdominal pain. History of prior resection.  COMPARISON: None.  PROCEDURE: CT of the Abdomen and Pelvis was performed with intravenous contrast. Intravenous contrast: 90 ml Omnipaque 350. 10 ml discarded. Oral contrast: positive contrast was administered. Sagittal and coronal reformats were performed.  FINDINGS: LOWER CHEST: Within normal limits.  LIVER: Within normal limits. BILE DUCTS: Normal caliber. GALLBLADDER: Within normal limits. SPLEEN: Within normal limits. PANCREAS: Within normal limits. ADRENALS: Within normal limits. KIDNEYS/URETERS: Mild dilatation of the left ureter with urothelial enhancement. No obstructing stones.  BLADDER: Mild wall enhancement. REPRODUCTIVE ORGANS: Prostate is enlarged.  BOWEL: Surgical material in the proximal stomach. Subtotal colectomy. Large amount of fecal material in the remaining colon. No bowel obstruction. PERITONEUM: Trace perihepatic ascites (2-50). VESSELS: Moderate atherosclerotic calcification. RETROPERITONEUM/LYMPH NODES: No lymphadenopathy. ABDOMINAL WALL: Within normal limits. BONES: No acute abnormality.  IMPRESSION: No bowel obstruction. Mild left hydroureter with urothelial enhancement with bladder wall enhancement. Concerning for cystitis with ascending infection. Please correlate with clinical findings.   ***Please see the addendum at the top of this report. It may contain additional important information or changes.****    LEXUS JIMENEZ MD; Attending Radiologist This document has been electronically signed. Feb 18 2021 8:46PM Addend:LEXUS JIMENEZ MD; Attending Radiologist This addendum was electronically signed on: Feb 28 2021 1:30AM.                                Notes Heterosexual

## 2024-10-14 NOTE — ED PROVIDER NOTE - SEVERE SEPSIS CRITERIA MET YN (MLM)
Shahriar was contacted by a population [facility] pharmacist. Patient was identified for pharmacy outreach based on prescription fill history for their cholesterol medicine(s), Rosuvastatin 20mg. Patient does not wish to discuss.      JUAN A [employer] student  [facility]  [phone]    Sepsis Criteria were met:

## 2024-11-11 NOTE — ASU PATIENT PROFILE, ADULT - HEALTHCARE QUESTIONS, PROFILE
Problem: Skin Integrity Alteration  Goal: Skin remains intact with no new/deterioration of wound or pressure injury  Outcome: Monitoring/Evaluating progress  Goal: Participates in wound care activities  Outcome: Monitoring/Evaluating progress     Problem: At Risk for Falls  Goal: Patient does not fall  Outcome: Monitoring/Evaluating progress  Goal: Patient takes action to control fall-related risks  Outcome: Monitoring/Evaluating progress     Patient has a new wound (approximately 3 weeks old per wife's report) to the left 2nd dorsal toe.  Following debridement, applied Iodosorb, covered with gauze and paper tape. Arterial ultrasound ordered by provider. Follow up in 1 week.  
how long is it

## 2025-02-03 NOTE — ASU PREOP CHECKLIST - COMMENTS
What Type Of Note Output Would You Prefer (Optional)?: Bullet Format
meds this am 700 with a sip of water
Hpi Title: Evaluation of Skin Lesions
Additional History: Patient has a new lesion on her right lateral abdomen she would like evaluated. She also would like to have her textured skin on her face evaluated. Otherwise denies any new or changing lesions.

## 2025-02-05 NOTE — H&P PST ADULT - HEIGHT IN FEET
5

## 2025-02-18 NOTE — H&P ADULT - NSICDXPASTSURGICALHX_GEN_ALL_CORE_FT
Patient called and he states he just talked to [facility] and was told they have not received everything they need.  Please call patient with update.   PAST SURGICAL HISTORY:  Abdominal mass surgery 5/2020  remove mass, spleen, partial colectomy, lymph nodes, part small intestines, omentum, apendix, gall bladder, part stomach. [facility]    History of abdominal paracentesis x3    History of undescended testicle age 7

## 2025-02-24 NOTE — PATIENT PROFILE ADULT - NSPROPTRIGHTSUPPORTPERSON_GEN_A_NUR
Date of Procedure:  2/24/25    Staff Surgeon Dr. Galileo Connors     Assistant:Jordyn POON.    Preoperative diagnosis: Colostomy in place    Postoperative diagnosis: The same     Procedure: Exploratory laparotomy   Lysis of adhesions   Rectosigmoid resection   Takedown of colostomy with colon resection   Creation of colorectal anastomosis     Anesthesia: General     Indication for procedure:   Pleasant 42-year-old gentleman with colostomy in place.  Patient was in need of colostomy reversal.      Description of procedure:   Following signing informed consent patient was taken the operating room placed supine position.  General endotracheal anesthesia was administered.  Sams catheter was inserted.  Abdomen is prepped and draped standard fashion.  A time-out procedure was performed.  Having performed time-out procedure I make a generous midline incision through his old scar.  Dissection was carried down through the deep dermal tissue down to the fascia.  The fascia was sharply divided in the abdominal cavity was entered.  Omental adhesions were taken down from the anterior abdominal wall.  I mobilized the omentum into the upper abdomen.  Next I mobilized the small bowel.  The small bowel was mobilized from the ligament of Treitz all the way to the terminal ileum.  Multiple interloop adhesions were taken down.  This exposes the rectosigmoid stump.  I dissect the rectosigmoid stump around the mesorectum.  Dissection was performed down onto the proximal rectum.  Superior hemorrhoidal vessel was ligated with 0 silk ties.  The mesorectum was ligated with ties as well.  I used a contour stapler to staple across the proximal rectum.  The rectum was sent off the field as specimen.  Patient does have an enlarged appendix.  In order to prevent future potential for appendicitis decision was made to remove the appendix.  I dissect the appendix at its base  the mesoappendix from the appendix.  This is ligated with  a silk tie.  Two clamps were placed on the appendix at the base and I cut the appendiceal stump.  The appendiceal stump was ligated with a silk tie.  The stump was then cauterized.  The stump is then dunked in the area of the cecum uneventfully.  Next I take down the colostomy by  the mucocutaneous junction circumferentially.  There is a moderate-sized parastomal hernia.  I separate the colon from the fascia and bring the colon all the the what back into the abdominal cavity.  I ligate a few cm of the distal mesentery in his sharply divide the colon 2 cm proximal to the colostomy site.  Having divided the colon I placed a Prolene pursestring suture through the reduce colon.  The anvil to a 29 EEA was then inserted.  Having inserted the anvil I then fashioned create a stapled end-to-end colorectal anastomosis.  Having created the anastomosis a bowel clamp was placed on the colon proximal to the anastomosis.  I then perform endoscopy insufflating the area.  The anastomosis was submerged in saline.  We then insufflation I visualized the anastomosis.  It appears nice.  It is intact with no mucosal changes and no significant bleeding noted.  There were no bubbles were findings suggestive significant of a leak.  Next the abdominal cavity was irrigated with significant amount of diluted Betadine.  Next the abdominal cavity was inspected.  I run the small bowel once again from the ligament of Treitz of the terminal ileum.  No injuries were noted.  No significant interloop adhesions were noted.  I do bring a drain from the right lower abdomen placed this into the pelvis uneventfully.  The fascia was injected with Exparel.  I do closed the parastomal hernia associated with the ostomy site without event.  I closed the midline fascia with a heavy PDS suture.  The skin incisions closed with 4-0 Monocryl.  Patient was extubated taken recovery room in stable condition.  There were no immediate complications.  Blood loss  was 100 cc   same name as above

## 2025-03-03 NOTE — PHYSICAL THERAPY INITIAL EVALUATION ADULT - STANDING BALANCE: DYNAMIC, REHAB EVAL
03/03/25                            Abraham Patel  [address] [place] [id number]    To Whom It May Concern:    This is to certify Abraham Patel was evaluated with Oswaldo Ying MD on 03/03/25 and can return to light work on 3/3/2025.     RESTRICTIONS: 25 lbs lifting restriction on the right upper extremity                     Oswaldo Ying MD  [facility]  [address] [facility] [id number]  Dept Phone: [phone]       normal balance

## 2025-03-03 NOTE — H&P PST ADULT - NSANTHAGERD_ENT_A_CORE
Cardiology follow-up    [facility]  [address] [place] [address]  Colt Grace MD        Assessment     Diagnoses and all orders for this visit:  Essential hypertension  -     Electrocardiogram 12-Lead        Orders Placed This Encounter    Electrocardiogram 12-Lead         Assesment and Plan:     Congestive heart failure, hypertensive   2017: Had a 2D echo as part of the workup done which showed an EF in the range of 45-50%. Global hypokinesia.  No significant valve pathology   CTA performed on 2018 did not show any significant coronary disease. EF 48%. No significant coronary calcification  2018 echo with EF 45-50%.  Mild global hypokinesia.  No significant valve pathologies.     Compensated and euvolemic     Continue current management    EF normalized          HTN  Controlled        Persistent atrial fibrillation  On anticoagulation   Continue Eliquis         Colon Polyp  2019, s/p removal of one polyp, there a residual polyp they could not remove  Repeat colon with polyp removal in  needs follow up in  Year      Breast Cancer recently diagnosed     In need of lumpectomy in the near future       Low CV risk for surgery may proceed with surgery without any further testing.  Okay to stop anticoagulation prior to the surgery to restart after.    Cardiac Imagin2018 normal coronaries by CTA  2017 Holter monitor with atrial fibrillation.  No symptoms reported.  Average heart rate 76  2017: Had a 2D echo as part of the workup done which showed an EF in the range of 45-50%. Global hypokinesia.  No significant valve pathology   CTA performed on 2018 did not show any significant coronary disease. EF 48%. No significant coronary calcification  2018 echo with EF 45-50%.  Mild global hypokinesia.  No significant valve pathologies    ECHO 5/10/22  Left ventricle: The cavity size is normal. Wall thickness is mildly      increased. There is concentric hypertrophy. The ejection fraction was     measured by biplane method of disks. Wall motion is normal; there are     no regional wall motion abnormalities. The ejection fraction is 60%.  2. Left atrium: The atrium is mildly dilated.  3. Right ventricle: The cavity size is mildly to moderately dilated.     Systolic function is normal by visual assessment. The RV pressure     during systole by Doppler is 31mm Hg.  4. Right atrium: The atrium is moderately dilated.  5. Atrial septum: Color doppler shows no obvious shunt.  6. Inferior vena cava: The vessel is normal in size. The respirophasic     diameter changes are in the normal range (greater than or equal to     50%).      ECHO 4/11/2024  Left ventricle: The cavity size is normal. Wall thickness is at the upper     limits of normal. Systolic function is normal. The ejection fraction was     measured by 3D assessment. The ejection fraction is 59%.  2. Left atrium: The atrium is mildly dilated.  3. Right ventricle: The cavity size is mildly to moderately dilated. Systolic     function is normal.  4. Right atrium: The atrium is moderately dilated.  5. Baseline ECG: Atrial fibrillation         Referral Provider: Mary Moseley MD       HPI:  77-year-old female with history of hypertension chronic persistent atrial fibrillation and nonischemic cardiomyopathy/systolic heart failure with an EF in the range of 45-50% that subsequently normalized. She had normal coronaries by CTA on 1/4/2018.  He was recently diagnosed with breast cancer.  Status post biopsy.  She is in need of lumpectomy in the near future.     She has been doing well from the cardiac standpoint of view.  Denies any cardiovascular symptoms.  Denies any chest pain, PND, orthopnea, palpitations, presyncopal or syncopal episodes.     Times she does have some episodes of mild lightheadedness/orthostasis.  Happens once a week approximately very minimal symptomatology.  No  presyncopal syncopal events.      Denied change in her medications on her regimen.  If those symptoms of orthostasis were to become more frequent then we can consider decreasing lisinopril to half the dose.    In terms of preoperative occasion for lumpectomy she is a low CV risk to proceed with surgery without any further testing.  It is okay to stop anticoagulation prior to the surgery.    Follow-up with us in 6 months.      Family hx:  Non contributory      Social Hx:  Former smoker, quit in 1979        The 10-year ASCVD risk score (Nain BLUE, et al., 2019) is: 27.2%    Values used to calculate the score:      Age: 78 years      Sex: Female      Is Non- : No      Diabetic: No      Tobacco smoker: No      Systolic Blood Pressure: 114 mmHg      Is BP treated: Yes      HDL Cholesterol: 74 mg/dL      Total Cholesterol: 144 mg/dL        Labs:  Cholesterol (mg/dL)   Date Value   01/14/2025 144     HDL (mg/dL)   Date Value   01/14/2025 74     Cholesterol/ HDL Ratio (no units)   Date Value   01/14/2025 1.9     Triglycerides (mg/dL)   Date Value   01/14/2025 82     LDL (mg/dL)   Date Value   01/14/2025 54        Hemoglobin A1C (%)   Date Value   01/14/2025 5.4        WBC (K/mcL)   Date Value   02/18/2025 7.2     RBC (mil/mcL)   Date Value   02/18/2025 4.55     HCT (%)   Date Value   02/18/2025 45.6     HGB (g/dL)   Date Value   02/18/2025 14.9     PLT (K/mcL)   Date Value   02/18/2025 244        Sodium (mmol/L)   Date Value   02/18/2025 142     Potassium (mmol/L)   Date Value   02/18/2025 4.8     Chloride (mmol/L)   Date Value   02/18/2025 108     Glucose (mg/dL)   Date Value   02/18/2025 66 (L)     Calcium (mg/dL)   Date Value   02/18/2025 10.2     Carbon Dioxide (mmol/L)   Date Value   02/18/2025 30     BUN (mg/dL)   Date Value   02/18/2025 19     Creatinine (mg/dL)   Date Value   02/18/2025 0.75        GOT/AST (Units/L)   Date Value   02/18/2025 19     GPT/ALT (Units/L)   Date Value   02/18/2025  24     No results found for: \"GGTP\"  Alkaline Phosphatase (Units/L)   Date Value   2025 96     Bilirubin, Total (mg/dL)   Date Value   2025 0.5        Imaging:  No results found.  Results for orders placed in visit on 05/10/22    TRANSTHORACIC ECHO(TTE) COMPLETE W/ W/O IMAGING AGENT    Impression  *[facility]*  [address] [place] [id number] [phone]  Transthoracic Echocardiogram (TTE)    Patient: Jana Moss    Study Date/Time:     May 10 2022 8:34AM  MRN:     [id number]             FIN#:                [id number]  :     1947          Ht/Wt:               165.1cm 79.4kg  Age:     75                  BSA/BMI:             1.87m^2 29.1kg/m^2  Gender:  F                   Baseline BP:         117 / 79  Ordering Physician:   Colt Grace MD    Referring Physician:  MD Colt Khan MD    Performing Physician: Paul Cannon MD  Diagnostic Physician: Paul Cannon MD  Sonographer:          Gloria Crain RDCS    --------------------------------------------------------------------------  INDICATIONS:  Chronic Systolic Congestive Heart Failure.    --------------------------------------------------------------------------  STUDY CONCLUSIONS  SUMMARY:    1. Left ventricle: The cavity size is normal. Wall thickness is mildly  increased. There is concentric hypertrophy. The ejection fraction was  measured by biplane method of disks. Wall motion is normal; there are  no regional wall motion abnormalities. The ejection fraction is 60%.  2. Left atrium: The atrium is mildly dilated.  3. Right ventricle: The cavity size is mildly to moderately dilated.  Systolic function is normal by visual assessment. The RV pressure  during systole by Doppler is 31mm Hg.  4. Right atrium: The atrium is moderately dilated.  5. Atrial septum: Color doppler shows no obvious shunt.  6. Inferior vena cava: The vessel is normal in size. The  respirophasic  diameter changes are in the normal range (greater than or equal to  50%).  Impressions:  There is no previous report available for comparison at this  time.    --------------------------------------------------------------------------  STUDY DATA:  Boni Clark There is no prior study available for comparison  at this time.  Procedure:  Transthoracic echocardiography was performed.  Image quality was adequate.  M-mode, complete 2D, complete spectral  Doppler, and color Doppler.  Study status:  Routine.  Study completion:  There were no complications.    FINDINGS    LEFT VENTRICLE:  The cavity size is normal. Wall thickness is mildly  increased. There is concentric hypertrophy. Systolic function is normal.  Wall motion is normal; there are no regional wall motion abnormalities.  Unable to accurately assess left ventricular diastolic function parameters  due to atrial fibrillation.    The ejection fraction was measured by  biplane method of disks. The ejection fraction is 60%. The tissue Doppler  parameters are abnormal. The study is not technically sufficient to allow  evaluation of LV diastolic function.    AORTIC VALVE:  The annulus is normal. The valve is trileaflet. The  leaflets are mildly thickened and mildly calcified. Cusp separation is  normal. Mobility is not restricted.  Doppler:  Transvalvular velocity is  within the normal range. There is no stenosis.  No regurgitation.    The  LVOT to aortic valve VTI ratio is 0.7. The valve area by the velocity-time  integral method is 1.6cm^2. The valve area index by the velocity-time  integral method is 0.86cm^2/m^2. The ratio of LVOT to aortic valve peak  velocity is 0.87. The valve area by the peak velocity method is 1.8cm^2.  The valve area index by the peak velocity method is 0.94cm^2/m^2.    The  mean systolic gradient is 3mm Hg. The peak systolic gradient is 3mm Hg.    AORTA:  Aortic root: The aortic root is normal in size.  Ascending aorta: The  ascending aorta is normal in size.    MITRAL VALVE:  The annulus is mildly calcified. The leaflets are mildly  thickened and mildly calcified. Leaflet separation is normal. Mobility is  not restricted. No echocardiographic evidence for prolapse.  Doppler:  Transvalvular velocity is within the normal range. There is no evidence  for stenosis.  Mild regurgitation.    The valve area by pressure half-time  is 5.6cm^2. The valve area index by pressure half-time is 3.02cm^2/m^2.  The peak diastolic gradient is 3mm Hg.    ATRIAL SEPTUM:   Color doppler shows no obvious shunt.    LEFT ATRIUM:  The atrium is mildly dilated.    RIGHT VENTRICLE:  The cavity size is mildly to moderately dilated.  Systolic function is normal by visual assessment. Systolic pressure is at  the upper limits of normal. The estimated peak pressure is 31mm Hg.  The RV pressure during systole by Doppler is 31mm Hg.    VENTRICULAR SEPTUM:   Thickness is mildly increased. There is no diastolic  flattening and no systolic flattening.    PULMONIC VALVE:   Poorly visualized.  Doppler:  Transvalvular velocity is  within the normal range.  Trivial regurgitation. The peak systolic  gradient is 2mm Hg.    TRICUSPID VALVE:  Leaflet separation is normal.  Doppler:   Mild-moderate  regurgitation.    RIGHT ATRIUM:  The atrium is moderately dilated.       The estimated  central venous pressure is 3mm Hg.    PERICARDIUM:  There is no pericardial effusion.    SYSTEMIC VEINS:  Inferior vena cava: The vessel is normal in size. The respirophasic  diameter changes are in the normal range (greater than or equal to 50%).    --------------------------------------------------------------------------  Measurements    Left ventricle                Value        Ref        Left atrium continued        Value          Ref  REFUGIO, LAX chord                4.2   cm     3.8 - 5.2  Vol, ES, 1-p A4C     (H)     66    ml       22 - 52  ESD, LAX chord                2.8   cm     2.2 - 3.5   Vol/bsa, ES, 1-p A4C         35    ml/m^2   11 - 40  REFUGIO/bsa, LAX chord            2.3   cm/m^2 2.3 - 3.1  Vol, ES, 1-p A2C     (H)     66    ml       22 - 52  ESD/bsa, LAX chord            1.5   cm/m^2 1.3 - 2.1  Vol/bsa, ES, 1-p A2C         35    ml/m^2   13 - 40  PW, ED, LAX                   0.9   cm     0.6 - 0.9  Vol, ES, 2-p                 67    ml       -------  REFUGIO major ax, A4C             5.2   cm     ---------  Vol/bsa, ES, 2-p     (H)     36    ml/m^2   16 - 34  ESD major ax, A4C             4.8   cm     ---------  FS major axis, A4C            8     %      ---------  Aortic valve                 Value          Ref  REFUGIO/bsa major ax, A4C         2.8   cm/m^2 ---------  Peak v, S                    0.8   m/sec    -------  ESD/bsa major ax, A4C         2.5   cm/m^2 ---------  Mean v, S                    0.78  m/sec    -------  FLORIAN, A4C                      16.1  cm^2   ---------  Mean grad, S                 3     mm Hg    -------  EDILBERTO, A4C                      11.5  cm^2   ---------  Peak grad, S                 3     mm Hg    -------  FAC, A4C                      29    %      ---------  LVOT/AV, VTI ratio           0.7            -------  PW, ED                        0.9   cm     0.6 - 0.9  AJITH, VTI                     1.6   cm^2     -------  IVS/PW, ED                    1.25         ---------  AJITH/bsa, VTI                 0.86  cm^2/m^2 -------  EDV                           75    ml     46 - 106   LVOT/AV, Vpeak ratio         0.87           -------  ESV                           21    ml     14 - 42    AJITH, Vmax                    1.8   cm^2     -------  EF                            60    %      54 - 74    AJITH/bsa, Vmax                0.94  cm^2/m^2 -------  SV                            51    ml     ---------  EDV/bsa                       40    ml/m^2 29 - 61    Mitral valve                 Value          Ref  ESV/bsa                       11    ml/m^2 8 - 24     Peak E                        0.89  m/sec    -------  SV/bsa                        27    ml/m^2 ---------  Decel time                   134   ms       -------  SV, 1-p A4C                   21    ml     ---------  PHT                          39    ms       -------  SV/bsa, 1-p A4C               11    ml/m^2 ---------  Peak grad, D                 3     mm Hg    -------  EDV, 2-p                      58    ml     46 - 106   MVA, PHT                     5.6   cm^2     -------  ESV, 2-p                      23    ml     14 - 42    MVA/bsa, PHT                 3.02  cm^2/m^2 -------  SV, 2-p                       35    ml     ---------  MR peak v                    4.11  m/sec    -------  EDV/bsa, 2-p                  31    ml/m^2 29 - 61    Peak LV-LA grad S            68    mm Hg    -------  ESV/bsa, 2-p                  12    ml/m^2 8 - 24     Max MR v                     4.11  m/sec    -------  SV/bsa, 2-p                   18.8  ml/m^2 ---------  Pulmonic valve               Value          Ref  LVOT                          Value        Ref        Peak v, S                    0.8   m/sec    -------  Diam, S                       1.7   cm     ---------  Peak grad, S                 2     mm Hg    -------  Area                          2.3   cm^2   ---------  DC v, ED                     1.15  m/sec    -------  Peak blank, S                   0.71  m/sec  ---------  DC grad, ED                  5     mm Hg    -------  Peak grad, S                  2     mm Hg  ---------  Tricuspid valve              Value          Ref  Ventricular septum            Value        Ref        TR peak v                    2.6   m/sec    <=2.8  IVS, ED               (H)     1.2   cm     0.6 - 0.9  Peak RV-RA grad, S           28    mm Hg    -------  Max TR blank                   2.5   m/sec    -------  Right ventricle               Value        Ref  Pressure, S                   31    mm Hg  ---------  Pulmonary artery             Value          Ref  Pressure, S                   29    mm Hg    -------  Left atrium                   Value        Ref        Pressure ED                  8     mm Hg    -------  AP dim, ES                    3.6   cm     2.7 - 3.8  AP dim index                  1.9   cm/m^2 1.5 - 2.3  Systemic veins               Value          Ref  Area ES, A4C          (H)     23    cm^2   <=20       Estimated CVP                3     mm Hg    -------  Area ES, A2C                  22    cm^2   ---------  Legend:  (L)  and  (H)  bhavesh values outside specified reference range.    Prepared and electronically signed by  Bhavesh Cannon MD  05/10/2022 12:43    No results found for this or any previous visit.    No results found for this or any previous visit.    No results found for this or any previous visit.         Subjective     Patient ID: Jana is a 78 year old female.    Chief Complaint   Patient presents with    Follow-up         Past Medical History:   Diagnosis Date    Adenoma 2020    REPEAT ON ON     Atrial fibrillation  (CMD)     Atypical squamous cell changes of undetermined significance (ASCUS) on cervical cytology with positive high risk human papilloma virus (HPV)     Closed fracture of lateral portion of right tibial plateau     Essential (primary) hypertension     High cholesterol     Impacted cerumen of both ears     Inconclusive mammogram     Lower urinary tract infection     Upper respiratory infection      Social History     Tobacco Use    Smoking status: Former     Current packs/day: 0.00     Types: Cigarettes     Quit date:      Years since quittin.2    Smokeless tobacco: Never   Vaping Use    Vaping status: never used   Substance Use Topics    Alcohol use: Yes     Comment: occasionally    Drug use: Never     Current Outpatient Medications   Medication Sig Dispense Refill    Clotrimazole 1 % Ointment Apply 1 Tube topically in the morning and 1 Tube in the evening. 60 g 1    lisinopril (ZESTRIL) 40 MG tablet Take 1 tablet by mouth  daily. 90 tablet 3    metoPROLOL succinate (TOPROL-XL) 100 MG 24 hr tablet Take 1 tablet by mouth daily. 90 tablet 3    atorvastatin (LIPITOR) 40 MG tablet Take 1 tablet by mouth daily. 90 tablet 3    apixaBAN (Eliquis) 5 MG Tab Take 1 tablet by mouth in the morning and 1 tablet in the evening. 180 tablet 3     No current facility-administered medications for this visit.     Family History   Problem Relation Age of Onset    Diabetes Mother     Hypertension Mother     Hypertension Father     Dementia/Alzheimers Sister     Stroke Brother     Dementia/Alzheimers Brother     Cancer Brother         BLADDER    Cancer, Skin Brother     Cancer, Breast Paternal Aunt     Cancer, Breast Paternal Cousin     Patient is unaware of any medical problems Son     Patient is unaware of any medical problems Son      ALLERGIES:  Seasonal      Review of Systems   12 point review of systems done, all negative except per HPI.    Vitals:    03/03/25 0814   BP: 114/77   BP Location: LUE - Left upper extremity   Patient Position: Sitting   Cuff Size: Regular   Pulse: 67   SpO2: 98%   Weight: 78.5 kg (173 lb 1 oz)   Height: 5' 4\" (1.626 m)   PainSc:  0          Wt Readings from Last 4 Encounters:   03/03/25 78.5 kg (173 lb 1 oz)   02/28/25 78.3 kg (172 lb 9.9 oz)   02/18/25 78.6 kg (173 lb 3.2 oz)   01/14/25 79.7 kg (175 lb 9.6 oz)         Physical Exam  Constitutional:       Appearance: She is well-developed.   HENT:      Head: Normocephalic.   Eyes:      Pupils: Pupils are equal, round, and reactive to light.   Neck:      Thyroid: No thyromegaly.      Vascular: No JVD.   Cardiovascular:      Rate and Rhythm: Normal rate. Rhythm irregularly irregular.      Pulses: Intact distal pulses.      Heart sounds:      No friction rub. No gallop.      Comments: S1-S2 are variable.  Pulmonary:      Effort: Pulmonary effort is normal. No respiratory distress.      Breath sounds: Normal breath sounds. No wheezing or rales.   Chest:      Chest wall: No  tenderness.   Abdominal:      General: Bowel sounds are normal. There is no distension.      Palpations: Abdomen is soft. There is no mass.      Tenderness: There is no abdominal tenderness.   Musculoskeletal:         General: Normal range of motion.      Cervical back: Normal range of motion.   Skin:     General: Skin is warm and dry.      Coloration: Skin is not pale.      Findings: No erythema.   Neurological:      Mental Status: She is alert and oriented to person, place, and time.           Colt Grace MD   Interventional Cardiology       Disclaimer: this document  was dictated using Dragon Voice Recognition Software. Rapid proofreading  was performed to expedite delivery of information. Phonetic errors will occur, which are common with voice recognition software. If there is concern about meaning or content, please contact our office    The total amount of time spent in the care of this patient ( this includes. but is not limited to,   face-to-face interaction, imaging and chart review, discussion with other physicians and member of the health care team and coordination of care) is 45 minutes.   Spent greater than 50% of time with counseling and education. counseling regarding importance of medication compliance, importance of optimal diabetes management, importance of optimal blood pressure management, and importance to abstain and or quit smoking as well as review of past medical records and cardiovascular work.       Yes

## 2025-04-17 NOTE — ED PROVIDER NOTE - ATTENDING CONTRIBUTION TO CARE
Aklief counseling:  Patient advised to apply a pea-sized amount only at bedtime and wait 30 minutes after washing their face before applying.  If too drying, patient may add a non-comedogenic moisturizer.  The most commonly reported side effects including irritation, redness, scaling, dryness, stinging, burning, itching, and increased risk of sunburn.  The patient verbalized understanding of the proper use and possible adverse effects of retinoids.  All of the patient's questions and concerns were addressed. Topical Retinoid Pregnancy And Lactation Text: This medication is Pregnancy Category C. It is unknown if this medication is excreted in breast milk. Use Enhanced Medication Counseling?: No Azithromycin Pregnancy And Lactation Text: This medication is considered safe during pregnancy and is also secreted in breast milk. Doxycycline Counseling:  Patient counseled regarding possible photosensitivity and increased risk for sunburn.  Patient instructed to avoid sunlight, if possible.  When exposed to sunlight, patients should wear protective clothing, sunglasses, and sunscreen.  The patient was instructed to call the office immediately if the following severe adverse effects occur:  hearing changes, easy bruising/bleeding, severe headache, or vision changes.  The patient verbalized understanding of the proper use and possible adverse effects of doxycycline.  All of the patient's questions and concerns were addressed. High Dose Vitamin A Pregnancy And Lactation Text: High dose vitamin A therapy is contraindicated during pregnancy and breast feeding. Tetracycline Counseling: Patient counseled regarding possible photosensitivity and increased risk for sunburn.  Patient instructed to avoid sunlight, if possible.  When exposed to sunlight, patients should wear protective clothing, sunglasses, and sunscreen.  The patient was instructed to call the office immediately if the following severe adverse effects occur:  hearing changes, easy bruising/bleeding, severe headache, or vision changes.  The patient verbalized understanding of the proper use and possible adverse effects of tetracycline.  All of the patient's questions and concerns were addressed. Patient understands to avoid pregnancy while on therapy due to potential birth defects. Winlevi Counseling:  I discussed with the patient the risks of topical clascoterone including but not limited to erythema, scaling, itching, and stinging. Patient voiced their understanding. Topical Sulfur Applications Counseling: Topical Sulfur Counseling: Patient counseled that this medication may cause skin irritation or allergic reactions.  In the event of skin irritation, the patient was advised to reduce the amount of the drug applied or use it less frequently.   The patient verbalized understanding of the proper use and possible adverse effects of topical sulfur application.  All of the patient's questions and concerns were addressed. Dapsone Counseling: I discussed with the patient the risks of dapsone including but not limited to hemolytic anemia, agranulocytosis, rashes, methemoglobinemia, kidney failure, peripheral neuropathy, headaches, GI upset, and liver toxicity.  Patients who start dapsone require monitoring including baseline LFTs and weekly CBCs for the first month, then every month thereafter.  The patient verbalized understanding of the proper use and possible adverse effects of dapsone.  All of the patient's questions and concerns were addressed. Benzoyl Peroxide Pregnancy And Lactation Text: This medication is Pregnancy Category C. It is unknown if benzoyl peroxide is excreted in breast milk. Isotretinoin Pregnancy And Lactation Text: This medication is Pregnancy Category X and is considered extremely dangerous during pregnancy. It is unknown if it is excreted in breast milk. Spironolactone Counseling: Patient advised regarding risks of diarrhea, abdominal pain, hyperkalemia, birth defects (for female patients), liver toxicity and renal toxicity. The patient may need blood work to monitor liver and kidney function and potassium levels while on therapy. The patient verbalized understanding of the proper use and possible adverse effects of spironolactone.  All of the patient's questions and concerns were addressed. Topical Clindamycin Counseling: Patient counseled that this medication may cause skin irritation or allergic reactions.  In the event of skin irritation, the patient was advised to reduce the amount of the drug applied or use it less frequently.   The patient verbalized understanding of the proper use and possible adverse effects of clindamycin.  All of the patient's questions and concerns were addressed. Azelaic Acid Pregnancy And Lactation Text: This medication is considered safe during pregnancy and breast feeding. Birth Control Pills Counseling: Birth Control Pill Counseling: I discussed with the patient the potential side effects of OCPs including but not limited to increased risk of stroke, heart attack, thrombophlebitis, deep venous thrombosis, hepatic adenomas, breast changes, GI upset, headaches, and depression.  The patient verbalized understanding of the proper use and possible adverse effects of OCPs. All of the patient's questions and concerns were addressed. Erythromycin Pregnancy And Lactation Text: This medication is Pregnancy Category B and is considered safe during pregnancy. It is also excreted in breast milk. Winlevi Pregnancy And Lactation Text: This medication is considered safe during pregnancy and breastfeeding. Aklief Pregnancy And Lactation Text: It is unknown if this medication is safe to use during pregnancy.  It is unknown if this medication is excreted in breast milk.  Breastfeeding women should use the topical cream on the smallest area of the skin for the shortest time needed while breastfeeding.  Do not apply to nipple and areola. Sarecycline Counseling: Patient advised regarding possible photosensitivity and discoloration of the teeth, skin, lips, tongue and gums.  Patient instructed to avoid sunlight, if possible.  When exposed to sunlight, patients should wear protective clothing, sunglasses, and sunscreen.  The patient was instructed to call the office immediately if the following severe adverse effects occur:  hearing changes, easy bruising/bleeding, severe headache, or vision changes.  The patient verbalized understanding of the proper use and possible adverse effects of sarecycline.  All of the patient's questions and concerns were addressed. Tazorac Counseling:  Patient advised that medication is irritating and drying.  Patient may need to apply sparingly and wash off after an hour before eventually leaving it on overnight.  The patient verbalized understanding of the proper use and possible adverse effects of tazorac.  All of the patient's questions and concerns were addressed. Bactrim Counseling:  I discussed with the patient the risks of sulfa antibiotics including but not limited to GI upset, allergic reaction, drug rash, diarrhea, dizziness, photosensitivity, and yeast infections.  Rarely, more serious reactions can occur including but not limited to aplastic anemia, agranulocytosis, methemoglobinemia, blood dyscrasias, liver or kidney failure, lung infiltrates or desquamative/blistering drug rashes. Doxycycline Pregnancy And Lactation Text: This medication is Pregnancy Category D and not consider safe during pregnancy. It is also excreted in breast milk but is considered safe for shorter treatment courses. Minocycline Counseling: Patient advised regarding possible photosensitivity and discoloration of the teeth, skin, lips, tongue and gums.  Patient instructed to avoid sunlight, if possible.  When exposed to sunlight, patients should wear protective clothing, sunglasses, and sunscreen.  The patient was instructed to call the office immediately if the following severe adverse effects occur:  hearing changes, easy bruising/bleeding, severe headache, or vision changes.  The patient verbalized understanding of the proper use and possible adverse effects of minocycline.  All of the patient's questions and concerns were addressed. Tetracycline Pregnancy And Lactation Text: This medication is Pregnancy Category D and not consider safe during pregnancy. It is also excreted in breast milk. Topical Retinoid counseling:  Patient advised to apply a pea-sized amount only at bedtime and wait 30 minutes after washing their face before applying.  If too drying, patient may add a non-comedogenic moisturizer. The patient verbalized understanding of the proper use and possible adverse effects of retinoids.  All of the patient's questions and concerns were addressed. Azithromycin Counseling:  I discussed with the patient the risks of azithromycin including but not limited to GI upset, allergic reaction, drug rash, diarrhea, and yeast infections. Topical Sulfur Applications Pregnancy And Lactation Text: This medication is Pregnancy Category C and has an unknown safety profile during pregnancy. It is unknown if this topical medication is excreted in breast milk. High Dose Vitamin A Counseling: Side effects reviewed, pt to contact office should one occur. Spironolactone Pregnancy And Lactation Text: This medication can cause feminization of the male fetus and should be avoided during pregnancy. The active metabolite is also found in breast milk. Benzoyl Peroxide Counseling: Patient counseled that medicine may cause skin irritation and bleach clothing.  In the event of skin irritation, the patient was advised to reduce the amount of the drug applied or use it less frequently.   The patient verbalized understanding of the proper use and possible adverse effects of benzoyl peroxide.  All of the patient's questions and concerns were addressed. Dapsone Pregnancy And Lactation Text: This medication is Pregnancy Category C and is not considered safe during pregnancy or breast feeding. Birth Control Pills Pregnancy And Lactation Text: This medication should be avoided if pregnant and for the first 30 days post-partum. Isotretinoin Counseling: Patient should get monthly blood tests, not donate blood, not drive at night if vision affected, not share medication, and not undergo elective surgery for 6 months after tx completed. Side effects reviewed, pt to contact office should one occur. Azelaic Acid Counseling: Patient counseled that medicine may cause skin irritation and to avoid applying near the eyes.  In the event of skin irritation, the patient was advised to reduce the amount of the drug applied or use it less frequently.   The patient verbalized understanding of the proper use and possible adverse effects of azelaic acid.  All of the patient's questions and concerns were addressed. HPI: 62 yo M with a Past Medical History of anxiety, BPH, GERD, HLD and appendiceal carcinoma (S/P diagnostic laparoscopy and PIPAC x 8- (most recently June 2, 2022) presents to the ED with acute abdominal pain that started earlier this evening. His pain started acutely a few hours prior to arrival to the ED. His pain is severe in nature 10/10, diffuse throughout his abdomen with associated N/V. Pt states that he usually has a loose BM due to his short gut every few hours but has not had one in 12 hours which is very unusual for him. no fevers chills, Cp, SOB. Usual state of health prior to onset of pain. was to be follow up with [facility] this week.   EXAM: Uncomfortable appearing, diffusely tenderness to palpation abd with guarding. Umbilical dehiscence noted with small amount of pus drainage.   MDM: pt with multiple medical problems that has multiple medical problems s/p PIPAC x 8 episodes that presents with diffuse abd pain with no BM x 12 hours which is unusual. Concern for SBO vs infection from recent lap. Will require labs and imaging and pain control and Surgery consult. Per pt, he had a CT IV contrast today 16 hours prior to arrival. Spoke to rads resident and they state dye load per person per day should be max 200 mL, each IV CT AP is 90 mL so pt should be ok. Pt and wife made aware. Topical Clindamycin Pregnancy And Lactation Text: This medication is Pregnancy Category B and is considered safe during pregnancy. It is unknown if it is excreted in breast milk. Tazorac Pregnancy And Lactation Text: This medication is not safe during pregnancy. It is unknown if this medication is excreted in breast milk. Detail Level: Zone Erythromycin Counseling:  I discussed with the patient the risks of erythromycin including but not limited to GI upset, allergic reaction, drug rash, diarrhea, increase in liver enzymes, and yeast infections. Bactrim Pregnancy And Lactation Text: This medication is Pregnancy Category D and is known to cause fetal risk.  It is also excreted in breast milk. HPI: 60 yo M with a Past Medical History of anxiety, BPH, GERD, HLD and appendiceal carcinoma (S/P diagnostic laparoscopy and PIPAC x 8- (most recently June 2, 2022) presents to the ED with acute abdominal pain that started earlier this evening. His pain started acutely a few hours prior to arrival to the ED. His pain is severe in nature 10/10, diffuse throughout his abdomen with associated N/V. Pt states that he usually has a loose BM due to his short gut every few hours but has not had one in 12 hours which is very unusual for him. no fevers chills, Cp, SOB. Usual state of health prior to onset of pain. was to be follow up with [facility] this week.   EXAM: Uncomfortable appearing, diffusely tenderness to palpation abd with guarding. Umbilical dehiscence noted with small amount of pus drainage.   MDM: pt with multiple medical problems that has multiple medical problems s/p PIPAC x 8 episodes that presents with diffuse abd pain with no BM x 12 hours which is unusual. Concern for SBO vs infection from recent lap. Will require labs and imaging and pain control and Surgery consult. Per pt, he had a CT IV contrast today 16 hours prior to arrival. Spoke to rads resident and they state dye load per person per day should be max 200 mL, each IV CT AP is 90 mL so pt should be ok. Pt and wife made aware. Would obtain PO contrast CT but pt is extremely nauseas so likely cannot tolerate PO contrast and do not want pt to aspirate. Will provide zofran and monitor.

## 2025-04-21 NOTE — PROGRESS NOTE ADULT - ASSESSMENT
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 CT with 3 mm L UVJ stone, moderate R hydro, mild L hydro. Cr 1.14.    - No acute urologic intervention at this time  - If patient does not need to go to OR, patient can be managed with medical expulsive therapy with tamsulosin given asymptomatic presentation   - Cr 1.2   - Will follow while patient in house     Seen and examined with Dr. Sanford  Discussed with provider PANTERA CurtisBC that patient has needed to be sedated for previous MRI scans as well, MRI made aware and MRI stated they will come over and talk with the patient as well prior to doing the scan. Communicated situation with nurse that will be taking the patient up stairs

## 2025-06-05 NOTE — H&P ADULT - NSRESEARCHGRANTASSESS_GEN_A_CORE
[] [facility] DME form     [] [facility] RX form    [] [facility] RX form    [] Provider signature    [x] Progress notes request     [x] A1[facility] / Lab request    [x] Faxed to [phone]     [] Sent to [facility]     
Not applicable: This is a surgical and/or non-medical patient

## (undated) DEVICE — CABLE DAC ACTIVE CORD

## (undated) DEVICE — TIP METZENBAUM SCISSOR MONOPOLAR ENDOCUT (ORANGE)

## (undated) DEVICE — BLADE SURGICAL #15 CARBON

## (undated) DEVICE — SUT BIOSYN 4-0 18" P-12

## (undated) DEVICE — PACK GENERAL LAPAROSCOPY

## (undated) DEVICE — TROCAR APPLIED MEDICAL KII FIOS FIRST ENTRY 12MM X 100MM ADVANCED FIXATION

## (undated) DEVICE — SPECIMEN CONTAINER 8OZ W LID

## (undated) DEVICE — SUT VICRYL 0 27" UR-6

## (undated) DEVICE — STAPLER COVIDIEN ENDO GIA STANDARD HANDLE

## (undated) DEVICE — DRAPE TOWEL BLUE 17" X 24"

## (undated) DEVICE — Device

## (undated) DEVICE — POSITIONER JACKSON TABLE PRONEVIEW CUSHION, CHEST, HIP, THIGH PADS

## (undated) DEVICE — SMOKE EVACUTATION SYS LAPROSCOPIC AC/PA

## (undated) DEVICE — ELCTR GROUNDING PAD ADULT COVIDIEN

## (undated) DEVICE — GLV 8 DURAPRENE

## (undated) DEVICE — DRAPE CAMERA ENDOMATE

## (undated) DEVICE — TROCAR COVIDIEN BLUNT TIP HASSAN 10MM

## (undated) DEVICE — ENDOCATCH 10MM SPECIMEN POUCH

## (undated) DEVICE — TROCAR COVIDIEN VERSAPORT BLADELESS OPTICAL 5MM STANDARD

## (undated) DEVICE — TUBING INSUFFLATION LAP FILTER 10FT

## (undated) DEVICE — TUBING HYDRO-SURG PLUS IRRIGATOR W SMOKEVAC & PROBE

## (undated) DEVICE — ANESTHESIA CIRCUIT ADULT HMEF

## (undated) DEVICE — BASIN SET SINGLE

## (undated) DEVICE — GLV 7.5 PROTEXIS (WHITE)

## (undated) DEVICE — TROCAR APPLIED MEDICAL KII FIOS FIRST ENTRY 5MM X 100MM ADVANCED FIXATION

## (undated) DEVICE — DRSG XEROFORM 5 X 9"

## (undated) DEVICE — SYR CATH TIP 2 OZ

## (undated) DEVICE — TUBING LEVEL ONE NORMOFLO SET

## (undated) DEVICE — SPECIMEN TRAP 70ML

## (undated) DEVICE — CANISTER DISPOSABLE THIN WALL 3000CC

## (undated) DEVICE — SUT MONOCRYL 4-0 27" PS-2 UNDYED

## (undated) DEVICE — DISSECTOR ENDOSCOPIC KITTNER SINGLE TIP

## (undated) DEVICE — DRAPE MAYO STAND 23"

## (undated) DEVICE — WARMING BLANKET FULL ADULT

## (undated) DEVICE — TROCAR COVIDIEN BLUNT TIP HASSAN 12MM

## (undated) DEVICE — LIJ/LIA-ADAPTER LCKNG ELLIK EVACUATING: Type: DURABLE MEDICAL EQUIPMENT

## (undated) DEVICE — TUBING OLYMPUS INSUFFLATION

## (undated) DEVICE — DRAPE IOBAN 23" X 23"

## (undated) DEVICE — FOLEY TRAY 16FR 5CC LF UMETER CLOSED

## (undated) DEVICE — SHEARS COVIDIEN ENDO SHEAR 5MM X 31CM W UNIPOLAR CAUTERY

## (undated) DEVICE — DRAPE INSTRUMENT POUCH 6.75" X 11"

## (undated) DEVICE — BAG URINE W METER 2L

## (undated) DEVICE — POSITIONER STRAP ARMBOARD VELCRO TS-30

## (undated) DEVICE — VENODYNE/SCD SLEEVE CALF MEDIUM

## (undated) DEVICE — TROCAR COVIDIEN VERSASTEP PLUS 12MM STANDARD

## (undated) DEVICE — SOL ANTI FOG

## (undated) DEVICE — POSITIONER FOAM EGG CRATE ULNAR 2PCS (PINK)

## (undated) DEVICE — SOL INJ NS 0.9% 1000ML

## (undated) DEVICE — SOL IRR POUR H2O 1500ML

## (undated) DEVICE — LABELS BLANK W PEN

## (undated) DEVICE — SUT SILK 3-0 30" SH

## (undated) DEVICE — D HELP - CLEARVIEW CLEARIFY SYSTEM

## (undated) DEVICE — SUT PDS II 0 36" CT-1

## (undated) DEVICE — TAPE UMBILICAL 1/8 X 18" STRANDS

## (undated) DEVICE — SHEARS HARMONIC ACE 5MM X 36CM CURVED TIP

## (undated) DEVICE — SCOPE WARMER SEAL DISP

## (undated) DEVICE — TROCAR APPLIED MEDICAL KII BALLOON BLUNT TIP 12MM X 100MM

## (undated) DEVICE — FOLEY CATH 2-WAY 16FR 5CC LATEX COUDE RED

## (undated) DEVICE — SET CONN TUBE 3M CAPNOPEN USE IRB 20-0859

## (undated) DEVICE — BAR ROLLER FOR ELITE ELCTR

## (undated) DEVICE — SUT POLYSORB 0 30" GS-23

## (undated) DEVICE — TUBING SET TUR BLADDER IRRIGATION Y-TYPE 81"

## (undated) DEVICE — SYR LUER LOK 10CC

## (undated) DEVICE — WARMING BLANKET UPPER ADULT

## (undated) DEVICE — SUT SILK 2-0 30" SH

## (undated) DEVICE — PACK CYSTO

## (undated) DEVICE — SOL IRR BAG H2O 3000ML